# Patient Record
Sex: FEMALE | Race: WHITE | NOT HISPANIC OR LATINO | Employment: OTHER | URBAN - METROPOLITAN AREA
[De-identification: names, ages, dates, MRNs, and addresses within clinical notes are randomized per-mention and may not be internally consistent; named-entity substitution may affect disease eponyms.]

---

## 2017-03-07 RX ORDER — LISINOPRIL 5 MG/1
TABLET ORAL
Qty: 90 TABLET | Refills: 3 | Status: SHIPPED | OUTPATIENT
Start: 2017-03-07 | End: 2017-12-20 | Stop reason: SDUPTHER

## 2017-03-13 ENCOUNTER — PATIENT MESSAGE (OUTPATIENT)
Dept: INTERNAL MEDICINE | Facility: CLINIC | Age: 67
End: 2017-03-13

## 2017-03-22 ENCOUNTER — PATIENT OUTREACH (OUTPATIENT)
Dept: ADMINISTRATIVE | Facility: HOSPITAL | Age: 67
End: 2017-03-22

## 2017-03-22 ENCOUNTER — LAB VISIT (OUTPATIENT)
Dept: LAB | Facility: HOSPITAL | Age: 67
End: 2017-03-22
Attending: INTERNAL MEDICINE
Payer: MEDICARE

## 2017-03-22 PROCEDURE — 83036 HEMOGLOBIN GLYCOSYLATED A1C: CPT

## 2017-03-22 PROCEDURE — 36415 COLL VENOUS BLD VENIPUNCTURE: CPT | Mod: PO

## 2017-03-23 LAB
ESTIMATED AVG GLUCOSE: 189 MG/DL
HBA1C MFR BLD HPLC: 8.2 %

## 2017-04-04 ENCOUNTER — OFFICE VISIT (OUTPATIENT)
Dept: INTERNAL MEDICINE | Facility: CLINIC | Age: 67
End: 2017-04-04
Payer: MEDICARE

## 2017-04-04 VITALS
SYSTOLIC BLOOD PRESSURE: 104 MMHG | WEIGHT: 243.81 LBS | BODY MASS INDEX: 36.95 KG/M2 | HEIGHT: 68 IN | OXYGEN SATURATION: 97 % | DIASTOLIC BLOOD PRESSURE: 68 MMHG | TEMPERATURE: 97 F | HEART RATE: 77 BPM

## 2017-04-04 DIAGNOSIS — E11.69 HYPERLIPIDEMIA ASSOCIATED WITH TYPE 2 DIABETES MELLITUS: ICD-10-CM

## 2017-04-04 DIAGNOSIS — G47.33 OSA ON CPAP: ICD-10-CM

## 2017-04-04 DIAGNOSIS — E78.5 HYPERLIPIDEMIA ASSOCIATED WITH TYPE 2 DIABETES MELLITUS: ICD-10-CM

## 2017-04-04 DIAGNOSIS — E03.9 ACQUIRED HYPOTHYROIDISM: ICD-10-CM

## 2017-04-04 DIAGNOSIS — Z29.9 PREVENTIVE MEASURE: ICD-10-CM

## 2017-04-04 PROCEDURE — 99499 UNLISTED E&M SERVICE: CPT | Mod: S$GLB,,, | Performed by: INTERNAL MEDICINE

## 2017-04-04 PROCEDURE — 1160F RVW MEDS BY RX/DR IN RCRD: CPT | Mod: S$GLB,,, | Performed by: INTERNAL MEDICINE

## 2017-04-04 PROCEDURE — 90471 IMMUNIZATION ADMIN: CPT | Mod: S$GLB,,, | Performed by: INTERNAL MEDICINE

## 2017-04-04 PROCEDURE — 3078F DIAST BP <80 MM HG: CPT | Mod: S$GLB,,, | Performed by: INTERNAL MEDICINE

## 2017-04-04 PROCEDURE — 1157F ADVNC CARE PLAN IN RCRD: CPT | Mod: S$GLB,,, | Performed by: INTERNAL MEDICINE

## 2017-04-04 PROCEDURE — 1159F MED LIST DOCD IN RCRD: CPT | Mod: S$GLB,,, | Performed by: INTERNAL MEDICINE

## 2017-04-04 PROCEDURE — 90632 HEPA VACCINE ADULT IM: CPT | Mod: S$GLB,,, | Performed by: INTERNAL MEDICINE

## 2017-04-04 PROCEDURE — 3045F PR MOST RECENT HEMOGLOBIN A1C LEVEL 7.0-9.0%: CPT | Mod: S$GLB,,, | Performed by: INTERNAL MEDICINE

## 2017-04-04 PROCEDURE — 1126F AMNT PAIN NOTED NONE PRSNT: CPT | Mod: S$GLB,,, | Performed by: INTERNAL MEDICINE

## 2017-04-04 PROCEDURE — 4010F ACE/ARB THERAPY RXD/TAKEN: CPT | Mod: S$GLB,,, | Performed by: INTERNAL MEDICINE

## 2017-04-04 PROCEDURE — 3074F SYST BP LT 130 MM HG: CPT | Mod: S$GLB,,, | Performed by: INTERNAL MEDICINE

## 2017-04-04 PROCEDURE — 99999 PR PBB SHADOW E&M-EST. PATIENT-LVL III: CPT | Mod: PBBFAC,,, | Performed by: INTERNAL MEDICINE

## 2017-04-04 PROCEDURE — 99214 OFFICE O/P EST MOD 30 MIN: CPT | Mod: 25,S$GLB,, | Performed by: INTERNAL MEDICINE

## 2017-04-04 RX ORDER — SODIUM, POTASSIUM,MAG SULFATES 17.5-3.13G
1 SOLUTION, RECONSTITUTED, ORAL ORAL DAILY
Qty: 1 BOTTLE | Refills: 0 | Status: SHIPPED | OUTPATIENT
Start: 2017-04-04 | End: 2017-04-04 | Stop reason: SDUPTHER

## 2017-04-04 RX ORDER — SODIUM, POTASSIUM,MAG SULFATES 17.5-3.13G
1 SOLUTION, RECONSTITUTED, ORAL ORAL DAILY
Qty: 1 BOTTLE | Refills: 0 | Status: ON HOLD | OUTPATIENT
Start: 2017-04-04 | End: 2017-05-02 | Stop reason: HOSPADM

## 2017-04-04 RX ORDER — METFORMIN HYDROCHLORIDE 500 MG/1
500 TABLET, EXTENDED RELEASE ORAL
Qty: 30 TABLET | Refills: 11 | Status: SHIPPED | OUTPATIENT
Start: 2017-04-04 | End: 2017-05-25 | Stop reason: SDUPTHER

## 2017-04-04 NOTE — PROGRESS NOTES
"Subjective:       Patient ID: Jaimie Luque is a 67 y.o. female.    Chief Complaint: Follow-up    HPI Comments: Here for follow up of medical problems.  Has been walking more lately.  AC breakfast 150s.  Lantus 10u daily, victoza, glimepiride, invokana.  Intol of regular metformin in the past but willing to try XR.  Energy good.  BMs normal.  No cp/sob/palp.    Updated/ annual due 6/17:  HM: 4/17 today HAV#2, 9/16 fluvax, 3/15 [id number], 3/14 pdbiel56, 9/09 TDaP, 11/12 zoster, 2/12 BMD rep 3-5y, 2/07 Cscope rep 10y, 6/16 mmg, 2/17 Eye Dr. Franco, 9/16 HCV neg.        Review of Systems   Constitutional: Negative for chills, diaphoresis and fever.   Respiratory: Negative for cough and shortness of breath.    Cardiovascular: Negative for chest pain, palpitations and leg swelling.   Gastrointestinal: Negative for blood in stool, constipation, diarrhea, nausea and vomiting.   Genitourinary: Negative for dysuria, frequency and hematuria.   Psychiatric/Behavioral: The patient is not nervous/anxious.        Objective:   /68 (BP Location: Right arm, Patient Position: Sitting, BP Method: Manual)  Pulse 77  Temp 96.8 °F (36 °C) (Tympanic)   Ht 5' 7.5" (1.715 m)  Wt 110.6 kg (243 lb 13.3 oz)  SpO2 97%  BMI 37.63 kg/m2    Physical Exam   Constitutional: She is oriented to person, place, and time. She appears well-developed.   HENT:   Mouth/Throat: Oropharynx is clear and moist.   Neck: Neck supple. Carotid bruit is not present. No thyroid mass present.   Cardiovascular: Normal rate, regular rhythm and intact distal pulses.  Exam reveals no gallop and no friction rub.    No murmur heard.  Pulmonary/Chest: Effort normal and breath sounds normal. She has no wheezes. She has no rales.   Abdominal: Soft. Bowel sounds are normal. She exhibits no mass. There is no hepatosplenomegaly. There is no tenderness.   Musculoskeletal: She exhibits no edema.   Lymphadenopathy:     She has no cervical adenopathy.   Neurological: " She is alert and oriented to person, place, and time.   Psychiatric: She has a normal mood and affect.     Results for NORMA HILLS (MRN [id number]) as of 4/4/2017 08:42   Ref. Range 12/14/2016 09:09 12/20/2016 16:32 3/22/2017 08:30   Hemoglobin A1C Latest Ref Range: 4.5 - 6.2 % 7.7 (H)  8.2 (H)   Estimated Avg Glucose Latest Ref Range: 68 - 131 mg/dL 174 (H)  189 (H)     Assessment:       1. Uncontrolled type 2 diabetes mellitus without complication, with long-term current use of insulin    2. Hyperlipidemia associated with type 2 diabetes mellitus    3. Acquired hypothyroidism    4. TALIA on CPAP    5. Preventive measure        Plan:       Norma was seen today for follow-up.    Diagnoses and all orders for this visit:    Uncontrolled type 2 diabetes mellitus without complication, with long-term current use of insulin- try XR metformin, incr exercise.  -     Hemoglobin A1c; Future  -     Microalbumin/creatinine urine ratio; Future    Hyperlipidemia associated with type 2 diabetes mellitus- on statin, check lab.    Acquired hypothyroidism- check lab.  -     TSH; Future    TALIA on CPAP- doing well.    Preventive measure- due in 3mo:  -     Hemoglobin A1c; Future  -     Comprehensive metabolic panel; Future  -     Lipid panel; Future  -     Mammo Digital Screening Bilat with CAD; Future  -     CBC auto differential; Future  -     TSH; Future  -     Vitamin D; Future  -     Microalbumin/creatinine urine ratio; Future  -     Case request GI: COLONOSCOPY  -     sodium,potassium,mag sulfates (SUPREP BOWEL PREP KIT) 17.5-3.13-1.6 gram SolR; Take 1 kit by mouth once daily.  -     Hepatitis A Vaccine (Adult) (IM)

## 2017-04-04 NOTE — MR AVS SNAPSHOT
[facility]  [address]  [facility] [address]  Phone: [phone]  Fax: [phone]                  Jaimie Luque   2017 8:40 AM   Office Visit    Description:  Female : 1950   Provider:  Jessica Ward MD   Department:  [facility] - Internal [facility]           Reason for Visit     Follow-up           Diagnoses this Visit        Comments    Uncontrolled type 2 diabetes mellitus without complication, with long-term current use of insulin    -  Primary     Hyperlipidemia associated with type 2 diabetes mellitus         Acquired hypothyroidism         TALIA on CPAP         Preventive measure                To Do List           Future Appointments        Provider Department Dept Phone    2017 8:45 AM [facility] MAMMO1-SCR [facility] [phone]    2017 7:00 AM SPECIMEN, [facility] [phone]    2017 8:20 AM LABORATORY, [facility] [phone]    2017 8:40 AM Jessica Ward MD [facility] [phone]    10/23/2017 10:20 AM Elizabeth Lejeune, NP [facility] [phone]      Goals (5 Years of Data)     None      Follow-Up and Disposition     Return in about 3 months (around 2017).       These Medications        Disp Refills Start End    metformin (GLUCOPHAGE-XR) 500 MG 24 hr tablet 30 tablet 11 2017     Take 1 tablet (500 mg total) by mouth daily with breakfast. - Oral    Pharmacy: [facility] [id number] [place] [address] [facility] Ph #: [phone]       sodium,potassium,mag sulfates (SUPREP BOWEL PREP KIT) 17.5-3.13-1.6 gram SolR 1 Bottle 0 2017     Take 1 kit by mouth once daily. - Oral    Pharmacy: [facility] [address] [facility] Ph #: [phone]         [facility]     [facility] Care [facility] -  Assistance  Unless otherwise directed by your provider,  "please contact [facility], our nurse care line that is available for 24/7 assistance.     Registered nurses in the [facility] provide: appointment scheduling, clinical advisement, health education, and other advisory services.  Call: [phone] (toll free)               Medications           Message regarding Medications     Verify the changes and/or additions to your medication regime listed below are the same as discussed with your clinician today.  If any of these changes or additions are incorrect, please notify your healthcare provider.        START taking these NEW medications        Refills    metformin (GLUCOPHAGE-XR) 500 MG 24 hr tablet 11    Sig: Take 1 tablet (500 mg total) by mouth daily with breakfast.    Class: Normal    Route: Oral    sodium,potassium,mag sulfates (SUPREP BOWEL PREP KIT) 17.5-3.13-1.6 gram SolR 0    Sig: Take 1 kit by mouth once daily.    Class: Normal    Route: Oral           Verify that the below list of medications is an accurate representation of the medications you are currently taking.  If none reported, the list may be blank. If incorrect, please contact your healthcare provider. Carry this list with you in case of emergency.           Current Medications     aspirin 81 mg Tab Take by mouth. 1 Tablet Oral Every day    atorvastatin (LIPITOR) 20 MG tablet TAKE 1 TABLET BY MOUTH EVERY DAY    BD ULTRA-FINE LAURA PEN NEEDLES 32 gauge x 5/32" Ndle USE THREE TIMES DAILY    blood sugar diagnostic Strp TRUEresult Matrix Blood Glucose Monitoring System  Dx:  E11.9   Medically Necessary    cholecalciferol, vitamin D3, 2,000 unit Tab Take by mouth. 1 Tablet Oral Every day    gabapentin (NEURONTIN) 300 MG capsule TAKE ONE CAPSULE BY MOUTH THREE TIMES DAILY    glimepiride (AMARYL) 2 MG tablet Take 1 tablet (2 mg total) by mouth 2 (two) times daily before meals. Take two tablets in AM, two tablets in PM before meals    INVOKANA 300 mg Tab TAKE 1 TABLET BY MOUTH EVERY DAY " "   lancets (ACCU-CHEK SOFTCLIX LANCETS) Misc Accuchek Multiclix drum lancets, [facility]  Dx:  E11.9    LANTUS SOLOSTAR 100 unit/mL (3 mL) InPn pen INJECT 10 UNITS UNDER THE SKIN EVERY DAY    levothyroxine (SYNTHROID) 112 MCG tablet Take 1 tablet (112 mcg total) by mouth once daily.    liraglutide 0.6 mg/0.1 mL, 18 mg/3 mL, subq PNIJ (VICTOZA 3-ANATOLIY) 0.6 mg/0.1 mL (18 mg/3 mL) PnIj Inject 1.8 mg into the skin Daily.    lisinopril (PRINIVIL,ZESTRIL) 5 MG tablet TAKE 1 TABLET BY MOUTH EVERY DAY    loratadine (CLARITIN) 10 mg tablet Take 10 mg by mouth.    meclizine (ANTIVERT) 25 mg tablet Take 1 tablet by mouth as needed.     metformin (GLUCOPHAGE-XR) 500 MG 24 hr tablet Take 1 tablet (500 mg total) by mouth daily with breakfast.    sodium,potassium,mag sulfates (SUPREP BOWEL PREP KIT) 17.5-3.13-1.6 gram SolR Take 1 kit by mouth once daily.           Clinical Reference Information           Your Vitals Were     BP Pulse Temp Height    104/68 (BP Location: Right arm, Patient Position: Sitting, BP Method: Manual) 77 96.8 °F (36 °C) (Tympanic) 5' 7.5" (1.715 m)    Weight SpO2 BMI    110.6 kg (243 lb 13.3 oz) 97% 37.63 kg/m2      Blood Pressure          Most Recent Value    BP  104/68      Allergies as of 4/4/2017     Adhesive Tape-silicones    Penicillins      Immunizations Administered on Date of Encounter - 4/4/2017     Name Date Dose VIS Date Route    HEPATITIS A 4/4/2017 1 mL 7/20/2016 Intramuscular      Orders Placed During Today's Visit      Normal Orders This Visit    Case request GI: COLONOSCOPY     Hepatitis A Vaccine (Adult) (IM)     Future Labs/Procedures Expected by Expires    CBC auto differential  4/4/2017 4/4/2018    Comprehensive metabolic panel  4/4/2017 4/4/2018    Hemoglobin A1c  4/4/2017 6/3/2018    Lipid panel  4/4/2017 4/4/2018    Mammo Digital Screening Bilat with CAD  4/4/2017 4/4/2018    Microalbumin/creatinine urine ratio  4/4/2017 6/3/2018    TSH  4/4/2017 4/4/2018    Vitamin D  As directed " 4/4/2018      Language Assistance Services     ATTENTION: Language assistance services are available, free of charge. Please call [phone].      ATENCIÓN: Si habla walt, tiene a calderon disposición servicios gratuitos de asistencia lingüística. Llame al [phone].     CHÚ Ý: N?u b?n nói Ti?ng Vi?t, có các d?ch v? h? tr? ngôn ng? mi?n phí dành cho b?n. G?i s? [phone].         [facility] complies with applicable Federal civil rights laws and does not discriminate on the basis of race, color, national origin, age, disability, or sex.

## 2017-05-02 ENCOUNTER — ANESTHESIA EVENT (OUTPATIENT)
Dept: ENDOSCOPY | Facility: HOSPITAL | Age: 67
End: 2017-05-02
Payer: MEDICARE

## 2017-05-02 ENCOUNTER — SURGERY (OUTPATIENT)
Age: 67
End: 2017-05-02

## 2017-05-02 ENCOUNTER — HOSPITAL ENCOUNTER (OUTPATIENT)
Facility: HOSPITAL | Age: 67
Discharge: HOME | End: 2017-05-02
Attending: INTERNAL MEDICINE | Admitting: INTERNAL MEDICINE
Payer: MEDICARE

## 2017-05-02 ENCOUNTER — ANESTHESIA (OUTPATIENT)
Dept: ENDOSCOPY | Facility: HOSPITAL | Age: 67
End: 2017-05-02
Payer: MEDICARE

## 2017-05-02 VITALS
HEART RATE: 66 BPM | WEIGHT: 237 LBS | HEIGHT: 68 IN | TEMPERATURE: 98 F | SYSTOLIC BLOOD PRESSURE: 107 MMHG | RESPIRATION RATE: 18 BRPM | BODY MASS INDEX: 35.92 KG/M2 | OXYGEN SATURATION: 99 % | DIASTOLIC BLOOD PRESSURE: 72 MMHG

## 2017-05-02 DIAGNOSIS — B02.29 PHN (POSTHERPETIC NEURALGIA): ICD-10-CM

## 2017-05-02 DIAGNOSIS — E11.69 HYPERLIPIDEMIA ASSOCIATED WITH TYPE 2 DIABETES MELLITUS: ICD-10-CM

## 2017-05-02 DIAGNOSIS — E78.5 HYPERLIPIDEMIA ASSOCIATED WITH TYPE 2 DIABETES MELLITUS: ICD-10-CM

## 2017-05-02 DIAGNOSIS — K57.30 DIVERTICULOSIS OF LARGE INTESTINE WITHOUT HEMORRHAGE: ICD-10-CM

## 2017-05-02 DIAGNOSIS — Z12.11 COLON CANCER SCREENING: Primary | ICD-10-CM

## 2017-05-02 DIAGNOSIS — L40.9 PSORIASIS: ICD-10-CM

## 2017-05-02 DIAGNOSIS — G47.33 OSA ON CPAP: ICD-10-CM

## 2017-05-02 DIAGNOSIS — E66.9 OBESITY (BMI 35.0-39.9 WITHOUT COMORBIDITY): ICD-10-CM

## 2017-05-02 DIAGNOSIS — E03.9 ACQUIRED HYPOTHYROIDISM: ICD-10-CM

## 2017-05-02 LAB — POCT GLUCOSE: 143 MG/DL (ref 70–110)

## 2017-05-02 PROCEDURE — 25000003 PHARM REV CODE 250: Performed by: NURSE ANESTHETIST, CERTIFIED REGISTERED

## 2017-05-02 PROCEDURE — 37000008 HC ANESTHESIA 1ST 15 MINUTES: Performed by: INTERNAL MEDICINE

## 2017-05-02 PROCEDURE — 25000003 PHARM REV CODE 250: Performed by: INTERNAL MEDICINE

## 2017-05-02 PROCEDURE — G0105 COLORECTAL SCRN; HI RISK IND: HCPCS | Performed by: INTERNAL MEDICINE

## 2017-05-02 PROCEDURE — 63600175 PHARM REV CODE 636 W HCPCS: Performed by: NURSE ANESTHETIST, CERTIFIED REGISTERED

## 2017-05-02 PROCEDURE — 82962 GLUCOSE BLOOD TEST: CPT | Performed by: INTERNAL MEDICINE

## 2017-05-02 PROCEDURE — 37000009 HC ANESTHESIA EA ADD 15 MINS: Performed by: INTERNAL MEDICINE

## 2017-05-02 PROCEDURE — G0105 COLORECTAL SCRN; HI RISK IND: HCPCS | Mod: ,,, | Performed by: INTERNAL MEDICINE

## 2017-05-02 RX ORDER — LIDOCAINE HCL/PF 100 MG/5ML
SYRINGE (ML) INTRAVENOUS
Status: DISCONTINUED | OUTPATIENT
Start: 2017-05-02 | End: 2017-05-02

## 2017-05-02 RX ORDER — PROPOFOL 10 MG/ML
INJECTION, EMULSION INTRAVENOUS
Status: DISCONTINUED | OUTPATIENT
Start: 2017-05-02 | End: 2017-05-02

## 2017-05-02 RX ORDER — SODIUM CHLORIDE, SODIUM LACTATE, POTASSIUM CHLORIDE, CALCIUM CHLORIDE 600; 310; 30; 20 MG/100ML; MG/100ML; MG/100ML; MG/100ML
INJECTION, SOLUTION INTRAVENOUS CONTINUOUS
Status: DISCONTINUED | OUTPATIENT
Start: 2017-05-02 | End: 2017-05-02 | Stop reason: HOSPADM

## 2017-05-02 RX ORDER — SODIUM CHLORIDE, SODIUM LACTATE, POTASSIUM CHLORIDE, CALCIUM CHLORIDE 600; 310; 30; 20 MG/100ML; MG/100ML; MG/100ML; MG/100ML
INJECTION, SOLUTION INTRAVENOUS CONTINUOUS PRN
Status: DISCONTINUED | OUTPATIENT
Start: 2017-05-02 | End: 2017-05-02

## 2017-05-02 RX ADMIN — SODIUM CHLORIDE, SODIUM LACTATE, POTASSIUM CHLORIDE, AND CALCIUM CHLORIDE: .6; .31; .03; .02 INJECTION, SOLUTION INTRAVENOUS at 12:05

## 2017-05-02 RX ADMIN — SODIUM CHLORIDE, SODIUM LACTATE, POTASSIUM CHLORIDE, AND CALCIUM CHLORIDE: 600; 310; 30; 20 INJECTION, SOLUTION INTRAVENOUS at 01:05

## 2017-05-02 RX ADMIN — PROPOFOL 30 MG: 10 INJECTION, EMULSION INTRAVENOUS at 02:05

## 2017-05-02 RX ADMIN — LIDOCAINE HYDROCHLORIDE 50 MG: 20 INJECTION, SOLUTION INTRAVENOUS at 01:05

## 2017-05-02 RX ADMIN — PROPOFOL 30 MG: 10 INJECTION, EMULSION INTRAVENOUS at 01:05

## 2017-05-02 RX ADMIN — PROPOFOL 130 MG: 10 INJECTION, EMULSION INTRAVENOUS at 01:05

## 2017-05-02 NOTE — IP AVS SNAPSHOT
[address] [place] [address]           Patient Discharge Instructions   Our goal is to set you up for success. This packet includes information on your condition, medications, and your home care.  It will help you care for yourself to prevent having to return to the hospital.     Please ask your nurse if you have any questions.      There are many details to remember when preparing to leave the hospital. Here is what you will need to do:    1. Take your medicine. If you are prescribed medications, review your Medication List on the following pages. You may have new medications to  at the pharmacy and others that you'll need to stop taking. Review the instructions for how and when to take your medications. Talk with your doctor or nurses if you are unsure of what to do.     2. Go to your follow-up appointments. Specific follow-up information is listed in the following pages. Your may be contacted by a nurse or clinical provider about future appointments. Be sure we have all of the phone numbers to reach you. Please contact your provider's office if you are unable to make an appointment.     3. Watch for warning signs. Your doctor or nurse will give you detailed warning signs to watch for and when to call for assistance. These instructions may also include educational information about your condition. If you experience any of warning signs to your health, call your doctor.               ** Verify the list of medication(s) below is accurate and up to date. Carry this with you in case of emergency. If your medications have changed, please notify your healthcare provider.             Medication List      ASK your doctor about these medications        Additional Info                      aspirin 81 mg Tab   Refills:  0    Instructions:  Take by mouth. 1 Tablet Oral Every day     Begin Date    AM    Noon    PM    Bedtime       atorvastatin 20 MG tablet   Commonly  "known as:  LIPITOR   Quantity:  90 tablet   Refills:  3    Instructions:  TAKE 1 TABLET BY MOUTH EVERY DAY     Begin Date    AM    Noon    PM    Bedtime       BD ULTRA-FINE LAURA PEN NEEDLES 32 gauge x 5/32" Ndle   Quantity:  300 each   Refills:  0   Generic drug:  pen needle, diabetic    Instructions:  USE THREE TIMES DAILY     Begin Date    AM    Noon    PM    Bedtime       blood sugar diagnostic Strp   Quantity:  1 each   Refills:  3    Instructions:  TRUEresult Matrix Blood Glucose Monitoring System  Dx:  E11.9   Medically Necessary     Begin Date    AM    Noon    PM    Bedtime       cholecalciferol (vitamin D3) 2,000 unit Tab   Refills:  0    Instructions:  Take by mouth. 1 Tablet Oral Every day     Begin Date    AM    Noon    PM    Bedtime       gabapentin 300 MG capsule   Commonly known as:  NEURONTIN   Quantity:  270 capsule   Refills:  4    Instructions:  TAKE ONE CAPSULE BY MOUTH THREE TIMES DAILY     Begin Date    AM    Noon    PM    Bedtime       glimepiride 2 MG tablet   Commonly known as:  AMARYL   Quantity:  360 tablet   Refills:  3   Dose:  2 mg    Instructions:  Take 1 tablet (2 mg total) by mouth 2 (two) times daily before meals. Take two tablets in AM, two tablets in PM before meals     Begin Date    AM    Noon    PM    Bedtime       INVOKANA 300 mg Tab tablet   Quantity:  90 tablet   Refills:  3   Generic drug:  canagliflozin    Instructions:  TAKE 1 TABLET BY MOUTH EVERY DAY     Begin Date    AM    Noon    PM    Bedtime       lancets Misc   Commonly known as:  ACCU-CHEK SOFTCLIX LANCETS   Quantity:  306 each   Refills:  3    Instructions:  Accuchek Multiclix drum lancets, [employer]  Dx:  E11.9     Begin Date    AM    Noon    PM    Bedtime       LANTUS SOLOSTAR 100 unit/mL (3 mL) Inpn pen   Quantity:  15 mL   Refills:  4   Generic drug:  insulin glargine    Instructions:  INJECT 10 UNITS UNDER THE SKIN EVERY DAY     Begin Date    AM    Noon    PM    Bedtime       levothyroxine 112 MCG tablet "   Commonly known as:  SYNTHROID   Quantity:  90 tablet   Refills:  3   Dose:  112 mcg    Instructions:  Take 1 tablet (112 mcg total) by mouth once daily.     Begin Date    AM    Noon    PM    Bedtime       liraglutide 0.6 mg/0.1 mL (18 mg/3 mL) subq PNIJ 0.6 mg/0.1 mL (18 mg/3 mL) Pnij   Commonly known as:  VICTOZA 3-ANATOLIY   Quantity:  27 mL   Refills:  3   Dose:  1.8 mg    Instructions:  Inject 1.8 mg into the skin Daily.     Begin Date    AM    Noon    PM    Bedtime       lisinopril 5 MG tablet   Commonly known as:  PRINIVIL,ZESTRIL   Quantity:  90 tablet   Refills:  3    Instructions:  TAKE 1 TABLET BY MOUTH EVERY DAY     Begin Date    AM    Noon    PM    Bedtime       loratadine 10 mg tablet   Commonly known as:  CLARITIN   Refills:  0   Dose:  10 mg    Instructions:  Take 10 mg by mouth.     Begin Date    AM    Noon    PM    Bedtime       meclizine 25 mg tablet   Commonly known as:  ANTIVERT   Refills:  0   Dose:  1 tablet    Instructions:  Take 1 tablet by mouth as needed.     Begin Date    AM    Noon    PM    Bedtime       metformin 500 MG 24 hr tablet   Commonly known as:  GLUCOPHAGE-XR   Quantity:  30 tablet   Refills:  11   Dose:  500 mg    Instructions:  Take 1 tablet (500 mg total) by mouth daily with breakfast.     Begin Date    AM    Noon    PM    Bedtime       sodium,potassium,mag sulfates 17.5-3.13-1.6 gram Solr   Commonly known as:  SUPREP BOWEL PREP KIT   Quantity:  1 Bottle   Refills:  0   Dose:  1 kit    Instructions:  Take 1 kit by mouth once daily.     Begin Date    AM    Noon    PM    Bedtime                  Please bring to all follow up appointments:    1. A copy of your discharge instructions.  2. All medicines you are currently taking in their original bottles.  3. Identification and insurance card.    Please arrive 15 minutes ahead of scheduled appointment time.    Please call 24 hours in advance if you must reschedule your appointment and/or time.        Your Scheduled Appointments      Jun 16, 2017  8:45 AM CDT   Mammo Screening with [facility] [id number]   [facility]    [address] [facility] [address] [place] [address]   [phone]            Jul 05, 2017  7:00 AM CDT   Urine with SPECIMEN, [facility]    [address] [place] [address]   [phone]            Jul 05, 2017  8:20 AM CDT   Fasting Lab with LABORATORY, [facility]    [address] [facility] [place] [address]   [phone]            Jul 12, 2017  8:40 AM CDT   Established Patient Visit with Jessica Ward MD   [facility] ([facility])    [address] [facility] [place] [address]   [phone]            Oct 23, 2017 10:20 AM CDT   Established Patient Visit with Elizabeth Lejeune, NP   [facility] ([facility])    [address] [facility] [facility] [place] [address]   [phone]                  Discharge Instructions         Diverticulosis    Diverticulosis means that small pouches have formed in the wall of your large intestine (colon). Most often, this problem causes no symptoms and is common as people age. But the pouches in the colon are at risk of becoming infected. When this happens, the condition is called diverticulitis. Although most people with diverticulosis never develop diverticulitis, it is still not uncommon. Rectal bleeding can also occur and in less common situations, a type of colon inflammation called colitis.  While most people do not have symptoms, some people with diverticulosis may have:  · Abdominal cramps and pain  · Bloating  · Constipation  · Change in bowel habits  Causes  The exact cause of diverticulosis (and diverticulitis) has not been proved, but a few things are associated with the condition:  · Low-fiber diet  · Constipation  · Lack of exercise  Your healthcare provider will talk with you about how to manage your condition. Diet changes may be all  that are needed to help control diverticulosis and prevent progression to diverticulitis. If you develop diverticulitis, you will likely need other treatments.  Home care  You may be told to take fiber supplements daily. Fiber adds bulk to the stool so that it passes through the colon more easily. Stool softeners may be recommended. You may also be given medications for pain relief. Be sure to take all medications as directed.  In the past, people were told to avoid corn, nuts, and seeds. This is no longer necessary.  Follow these guidelines when caring for yourself at home:  · Eat unprocessed foods that are high in fiber. Whole grains, fruits, and vegetables are good choices.  · Drink 6 to 8 glasses of water every day unless your healthcare provider has you limit how much fluid you should have.  · Watch for changes in your bowel movements. Tell your provider if you notice any changes.  · Begin an exercise program. Ask your provider how to get started. Generally, walking is the best.  · Get plenty of rest and sleep.  Follow-up care  Follow up with your healthcare provider, or as advised. Regular visits may be needed to check on your health. Sometimes special procedures such as colonoscopy, are needed after an episode of diverticulitis or blooding. Be sure to keep all your appointments.  If a stool sample was taken, or cultures were done, you should be told if they are positive, or if your treatment needs to be changed. You can call as directed for the results.  If X-rays were done, a radiologist will look at them. You will be told if there is a change in your treatment.  If antibiotics were prescribed, be sure to finish them all.  When to seek medical advice  Call your healthcare provider right away if any of these occur:  · Fever of 100.4°F (38°C) or higher, or as directed by your healthcare provider  · Severe cramps in the lower left side of the abdomen or pain that is getting worse  · Tenderness in the lower left  "side of the abdomen or worsening pain throughout the abdomen  · Diarrhea or constipation that doesn't get better within 24 hours  · Nausea and vomiting  · Bleeding from the rectum  Call 911  Call emergency services if any of the following occur:  · Trouble breathing  · Confusion  · Very drowsy or trouble awakening  · Fainting or loss of consciousness  · Rapid heart rate  · Chest pain  Date Last Reviewed: 12/30/2015  © 7577-2361 [employer]. [address] [place] [address], [place] [address]. All rights reserved. This information is not intended as a substitute for professional medical care. Always follow your healthcare professional's instructions.            Admission Information     Date & Time Provider Department CSN    5/2/2017  9:56 AM Noe Penn III, MD [facility] -  [id number]      Care Providers     Provider Role Specialty Primary office phone    Noe Penn III, MD Attending Provider Gastroenterology [phone]    Noe Penn III, MD Surgeon  Gastroenterology [phone]      Your Vitals Were     BP Pulse Temp Resp Height Weight    103/41 (BP Location: Left arm, Patient Position: Lying, BP Method: Automatic) 74 97.9 °F (36.6 °C) (Oral) 16 5' 7.5" (1.715 m) 107.5 kg (237 lb)    SpO2 BMI             97% 36.57 kg/m2         Recent Lab Values        5/5/2015 8/11/2015 11/12/2015 2/19/2016 6/15/2016 9/14/2016 12/14/2016 3/22/2017      8:13 AM  8:58 AM  8:09 AM  7:16 AM  8:06 AM  8:29 AM  9:09 AM  8:30 AM    A1C 8.4 (H) 8.3 (H) 7.5 (H) 7.5 (H) 8.0 (H) 8.0 (H) 7.7 (H) 8.2 (H)    Comment for A1C at  8:29 AM on 9/14/2016:  According to ADA guidelines, hemoglobin A1C <7.0% represents  optimal control in non-pregnant diabetic patients.  Different  metrics may apply to specific populations.   Standards of Medical Care in Diabetes - 2016.  For the purpose of screening for the presence of diabetes:  <5.7%     Consistent with the absence of diabetes  5.7-6.4%  Consistent with increasing " risk for diabetes   (prediabetes)  >or=6.5%  Consistent with diabetes  Currently no consensus exists for use of hemoglobin A1C  for diagnosis of diabetes for children.      Comment for A1C at  9:09 AM on 12/14/2016:  According to ADA guidelines, hemoglobin A1C <7.0% represents  optimal control in non-pregnant diabetic patients.  Different  metrics may apply to specific populations.   Standards of Medical Care in Diabetes - 2016.  For the purpose of screening for the presence of diabetes:  <5.7%     Consistent with the absence of diabetes  5.7-6.4%  Consistent with increasing risk for diabetes   (prediabetes)  >or=6.5%  Consistent with diabetes  Currently no consensus exists for use of hemoglobin A1C  for diagnosis of diabetes for children.      Comment for A1C at  8:30 AM on 3/22/2017:  According to ADA guidelines, hemoglobin A1C <7.0% represents  optimal control in non-pregnant diabetic patients.  Different  metrics may apply to specific populations.   Standards of Medical Care in Diabetes - 2016.  For the purpose of screening for the presence of diabetes:  <5.7%     Consistent with the absence of diabetes  5.7-6.4%  Consistent with increasing risk for diabetes   (prediabetes)  >or=6.5%  Consistent with diabetes  Currently no consensus exists for use of hemoglobin A1C  for diagnosis of diabetes for children.        Allergies as of 5/2/2017        Reactions    Adhesive Tape-silicones     Other reaction(s): skin irritation to area    Penicillins       [facility]     [facility] Care Line - 24/7 Assistance  Unless otherwise directed by your provider, please contact [facility], our nurse care line that is available for 24/7 assistance.     Registered nurses in the [facility] provide clinical advisement, health education, appointment booking, and other advisory services.  Call for this free service at [phone].        Advance Directives     An advance directive is a document which,  in the event you are no longer able to make decisions for yourself, tells your healthcare team what kind of treatment you do or do not want to receive, or who you would like to make those decisions for you.  If you do not currently have an advance directive, [facility] encourages you to create one.  For more information call:  [phone]), [phone] ([phone]),  or log on to [email or web address].        Language Assistance Services     ATTENTION: Language assistance services are available, free of charge. Please call [phone].      ATENCIÓN: Si habla español, tiene a calderon disposición servicios gratuitos de asistencia lingüística. Llame al [phone].     CHÚ Ý: N?u b?n nói Ti?ng Vi?t, có các d?ch v? h? tr? ngôn ng? mi?n phí dành cho b?n. G?i s? [phone].        Diabetes Discharge Instructions                                    [facility] complies with applicable Federal civil rights laws and does not discriminate on the basis of race, color, national origin, age, disability, or sex.

## 2017-05-02 NOTE — ANESTHESIA POSTPROCEDURE EVALUATION
"Anesthesia Post Evaluation    Patient: Jaimie Luque    Procedure(s) Performed: Procedure(s) (LRB):  COLONOSCOPY (N/A)    Final Anesthesia Type: MAC  Patient location during evaluation: PACU  Patient participation: Yes- Able to Participate  Level of consciousness: awake and alert and oriented  Post-procedure vital signs: reviewed and stable  Pain management: adequate  Airway patency: patent    Anesthetic complications: no      Cardiovascular status: blood pressure returned to baseline  Respiratory status: unassisted, spontaneous ventilation and room air  Hydration status: euvolemic  Follow-up not needed.        Visit Vitals    BP (!) 103/41 (BP Location: Left arm, Patient Position: Lying, BP Method: Automatic)    Pulse 74    Temp 36.6 °C (97.9 °F) (Oral)    Resp 16    Ht 5' 7.5" (1.715 m)    Wt 107.5 kg (237 lb)    SpO2 97%    Breastfeeding No    BMI 36.57 kg/m2       Pain/Abbey Score: Pain Assessment Performed: Yes (5/2/2017  2:21 PM)  Presence of Pain: denies (5/2/2017  2:21 PM)  Abbey Score: 9 (5/2/2017  2:21 PM)      "

## 2017-05-02 NOTE — TRANSFER OF CARE
"Anesthesia Transfer of Care Note    Patient: Jaimie Luque    Procedure(s) Performed: Procedure(s) (LRB):  COLONOSCOPY (N/A)    Patient location: PACU    Anesthesia Type: MAC    Transport from OR: Transported from OR on room air with adequate spontaneous ventilation    Post pain: adequate analgesia    Post assessment: no apparent anesthetic complications    Post vital signs: stable    Level of consciousness: awake and alert    Nausea/Vomiting: no nausea/vomiting    Complications: none          Last vitals:   Visit Vitals    BP (!) 103/41 (BP Location: Left arm, Patient Position: Lying, BP Method: Automatic)    Pulse 74    Temp 36.6 °C (97.9 °F) (Oral)    Resp 16    Ht 5' 7.5" (1.715 m)    Wt 107.5 kg (237 lb)    SpO2 97%    Breastfeeding No    BMI 36.57 kg/m2     "

## 2017-05-02 NOTE — ANESTHESIA PREPROCEDURE EVALUATION
05/02/2017  Jaimie Luque is a 67 y.o., female.    Anesthesia Evaluation    I have reviewed the Patient Summary Reports.    I have reviewed the Nursing Notes.   I have reviewed the Medications.     Review of Systems  Anesthesia Hx:  No problems with previous Anesthesia    Social:  Non-Smoker, Social Alcohol Use    EENT/Dental:   chronic allergic rhinitis   Cardiovascular:   Exercise tolerance: good hyperlipidemia    Pulmonary:   Sleep Apnea, CPAP    Renal/:  Renal/ Normal     Hepatic/GI:  Hepatic/GI Normal Bowel Prep. 0700 last drink of fluid.   Musculoskeletal:  Musculoskeletal Normal    Neurological:   Neuromuscular Disease,    Endocrine:   Diabetes, type 2, using insulin Hypothyroidism    Dermatological:  Skin Normal    Psych:  Psychiatric Normal           Physical Exam  General:  Well nourished, Obesity    Airway/Jaw/Neck:  Airway Findings: Mallampati: IV                Anesthesia Plan  Type of Anesthesia, risks & benefits discussed:  Anesthesia Type:  MAC  Patient's Preference:   Intra-op Monitoring Plan:   Intra-op Monitoring Plan Comments:   Post Op Pain Control Plan:   Post Op Pain Control Plan Comments:   Induction:   IV  Beta Blocker:         Informed Consent: Patient understands risks and agrees with Anesthesia plan.  Questions answered. Anesthesia consent signed with patient.  ASA Score: 3     Day of Surgery Review of History & Physical: I have interviewed and examined the patient. I have reviewed the patient's H&P dated: 05/02/17. There are no significant changes.  H&P update referred to the surgeon.         Ready For Surgery From Anesthesia Perspective.

## 2017-05-02 NOTE — INTERVAL H&P NOTE
"The patient has been examined and the H&P has been reviewed:  Family History   Problem Relation Age of Onset    Heart disease Father     Melanoma Neg Hx     Lupus Neg Hx      Past Medical History:   Diagnosis Date    Diabetes mellitus type II     Hyperlipidemia     Hypothyroid     TALIA (obstructive sleep apnea)     on CPAP    Osteopenia     Psoriasis      Past Surgical History:   Procedure Laterality Date    HYSTERECTOMY      ROBOTIC ASSISTED HYSTERECTOMY       Social History     Social History    Marital status:      Spouse name: N/A    Number of children: N/A    Years of education: N/A     Occupational History    Not on file.     Social History Main Topics    Smoking status: Never Smoker    Smokeless tobacco: Never Used    Alcohol use Yes      Comment: rare    Drug use: No    Sexual activity: Yes     Partners: Male     Other Topics Concern    Not on file     Social History Narrative    . Lives with spouse. Has 3 children. Patient retired as  for [employer].      Review of patient's allergies indicates:   Allergen Reactions    Adhesive tape-silicones      Other reaction(s): skin irritation to area    Penicillins      No current facility-administered medications on file prior to encounter.      Current Outpatient Prescriptions on File Prior to Encounter   Medication Sig Dispense Refill    aspirin 81 mg Tab Take by mouth. 1 Tablet Oral Every day      atorvastatin (LIPITOR) 20 MG tablet TAKE 1 TABLET BY MOUTH EVERY DAY 90 tablet 3    BD ULTRA-FINE LAURA PEN NEEDLES 32 gauge x 5/32" Ndle USE THREE TIMES DAILY 300 each 0    blood sugar diagnostic Strp TRUEresult Matrix Blood Glucose Monitoring System  Dx:  E11.9   Medically Necessary 1 each 3    cholecalciferol, vitamin D3, 2,000 unit Tab Take by mouth. 1 Tablet Oral Every day      gabapentin (NEURONTIN) 300 MG capsule TAKE ONE CAPSULE BY MOUTH THREE TIMES DAILY 270 capsule 4    glimepiride (AMARYL) 2 MG tablet " Take 1 tablet (2 mg total) by mouth 2 (two) times daily before meals. Take two tablets in AM, two tablets in PM before meals 360 tablet 3    INVOKANA 300 mg Tab TAKE 1 TABLET BY MOUTH EVERY DAY 90 tablet 3    lancets (ACCU-CHEK SOFTCLIX LANCETS) Misc Accuchek Multiclix drum lancets, [facility]  Dx:  E11.9 306 each 3    LANTUS SOLOSTAR 100 unit/mL (3 mL) InPn pen INJECT 10 UNITS UNDER THE SKIN EVERY DAY 15 mL 4    levothyroxine (SYNTHROID) 112 MCG tablet Take 1 tablet (112 mcg total) by mouth once daily. 90 tablet 3    liraglutide 0.6 mg/0.1 mL, 18 mg/3 mL, subq PNIJ (VICTOZA 3-ANATOLIY) 0.6 mg/0.1 mL (18 mg/3 mL) PnIj Inject 1.8 mg into the skin Daily. 27 mL 3    lisinopril (PRINIVIL,ZESTRIL) 5 MG tablet TAKE 1 TABLET BY MOUTH EVERY DAY 90 tablet 3    loratadine (CLARITIN) 10 mg tablet Take 10 mg by mouth.      metformin (GLUCOPHAGE-XR) 500 MG 24 hr tablet Take 1 tablet (500 mg total) by mouth daily with breakfast. 30 tablet 11    sodium,potassium,mag sulfates (SUPREP BOWEL PREP KIT) 17.5-3.13-1.6 gram SolR Take 1 kit by mouth once daily. 1 Bottle 0    meclizine (ANTIVERT) 25 mg tablet Take 1 tablet by mouth as needed.              Anesthesia/Surgery risks, benefits and alternative options discussed and understood by patient/family.          Active Hospital Problems    Diagnosis  POA    Colon cancer screening [Z12.11]  Not Applicable      Resolved Hospital Problems    Diagnosis Date Resolved POA   No resolved problems to display.

## 2017-05-02 NOTE — H&P (VIEW-ONLY)
"Subjective:       Patient ID: Jaimie Luque is a 67 y.o. female.    Chief Complaint: Follow-up    HPI Comments: Here for follow up of medical problems.  Has been walking more lately.  AC breakfast 150s.  Lantus 10u daily, victoza, glimepiride, invokana.  Intol of regular metformin in the past but willing to try XR.  Energy good.  BMs normal.  No cp/sob/palp.    Updated/ annual due 6/17:  HM: 4/17 today HAV#2, 9/16 fluvax, 3/15 suptpv07, 3/14 xcwlfs12, 9/09 TDaP, 11/12 zoster, 2/12 BMD rep 3-5y, 2/07 Cscope rep 10y, 6/16 mmg, 2/17 Eye Dr. Franco, 9/16 HCV neg.        Review of Systems   Constitutional: Negative for chills, diaphoresis and fever.   Respiratory: Negative for cough and shortness of breath.    Cardiovascular: Negative for chest pain, palpitations and leg swelling.   Gastrointestinal: Negative for blood in stool, constipation, diarrhea, nausea and vomiting.   Genitourinary: Negative for dysuria, frequency and hematuria.   Psychiatric/Behavioral: The patient is not nervous/anxious.        Objective:   /68 (BP Location: Right arm, Patient Position: Sitting, BP Method: Manual)  Pulse 77  Temp 96.8 °F (36 °C) (Tympanic)   Ht 5' 7.5" (1.715 m)  Wt 110.6 kg (243 lb 13.3 oz)  SpO2 97%  BMI 37.63 kg/m2    Physical Exam   Constitutional: She is oriented to person, place, and time. She appears well-developed.   HENT:   Mouth/Throat: Oropharynx is clear and moist.   Neck: Neck supple. Carotid bruit is not present. No thyroid mass present.   Cardiovascular: Normal rate, regular rhythm and intact distal pulses.  Exam reveals no gallop and no friction rub.    No murmur heard.  Pulmonary/Chest: Effort normal and breath sounds normal. She has no wheezes. She has no rales.   Abdominal: Soft. Bowel sounds are normal. She exhibits no mass. There is no hepatosplenomegaly. There is no tenderness.   Musculoskeletal: She exhibits no edema.   Lymphadenopathy:     She has no cervical adenopathy.   Neurological: " She is alert and oriented to person, place, and time.   Psychiatric: She has a normal mood and affect.     Results for NORMA HILLS (MRN [id number]) as of 4/4/2017 08:42   Ref. Range 12/14/2016 09:09 12/20/2016 16:32 3/22/2017 08:30   Hemoglobin A1C Latest Ref Range: 4.5 - 6.2 % 7.7 (H)  8.2 (H)   Estimated Avg Glucose Latest Ref Range: 68 - 131 mg/dL 174 (H)  189 (H)     Assessment:       1. Uncontrolled type 2 diabetes mellitus without complication, with long-term current use of insulin    2. Hyperlipidemia associated with type 2 diabetes mellitus    3. Acquired hypothyroidism    4. TALIA on CPAP    5. Preventive measure        Plan:       Norma was seen today for follow-up.    Diagnoses and all orders for this visit:    Uncontrolled type 2 diabetes mellitus without complication, with long-term current use of insulin- try XR metformin, incr exercise.  -     Hemoglobin A1c; Future  -     Microalbumin/creatinine urine ratio; Future    Hyperlipidemia associated with type 2 diabetes mellitus- on statin, check lab.    Acquired hypothyroidism- check lab.  -     TSH; Future    TALIA on CPAP- doing well.    Preventive measure- due in 3mo:  -     Hemoglobin A1c; Future  -     Comprehensive metabolic panel; Future  -     Lipid panel; Future  -     Mammo Digital Screening Bilat with CAD; Future  -     CBC auto differential; Future  -     TSH; Future  -     Vitamin D; Future  -     Microalbumin/creatinine urine ratio; Future  -     Case request GI: COLONOSCOPY  -     sodium,potassium,mag sulfates (SUPREP BOWEL PREP KIT) 17.5-3.13-1.6 gram SolR; Take 1 kit by mouth once daily.  -     Hepatitis A Vaccine (Adult) (IM)

## 2017-05-02 NOTE — ANESTHESIA RELEASE NOTE
"Anesthesia Release from PACU Note    Patient: Jaimie Luque    Procedure(s) Performed: Procedure(s) (LRB):  COLONOSCOPY (N/A)    Anesthesia type: MAC    Post pain: Adequate analgesia    Post assessment: no apparent anesthetic complications, tolerated procedure well and no evidence of recall    Last Vitals:   Visit Vitals    BP (!) 103/41 (BP Location: Left arm, Patient Position: Lying, BP Method: Automatic)    Pulse 74    Temp 36.6 °C (97.9 °F) (Oral)    Resp 16    Ht 5' 7.5" (1.715 m)    Wt 107.5 kg (237 lb)    SpO2 97%    Breastfeeding No    BMI 36.57 kg/m2       Post vital signs: stable    Level of consciousness: awake, alert  and oriented    Nausea/Vomiting: no nausea/no vomiting    Complications: none    Airway Patency: patent    Respiratory: unassisted, spontaneous ventilation, room air    Cardiovascular: stable    Hydration: euvolemic  "

## 2017-05-02 NOTE — DISCHARGE SUMMARY
"[facility]  Brief Operative Note     SUMMARY     Surgery Date: 5/2/2017     Surgeon(s) and Role:     * Noe Penn III, MD - Primary    Assisting Surgeon: None    Pre-op Diagnosis:  Preventive measure [Z29.9]    Post-op Diagnosis:  Post-Op Diagnosis Codes:     * Preventive measure [Z29.9]     - Diverticulosis  Procedure(s) (LRB):  COLONOSCOPY (N/A)    Anesthesia: Choice    Description of the findings of the procedure: Procedures completed. See Procedure note for full details.    Findings/Key Components: Procedures completed. See Procedure note for full details.    Prosthetics/Devices: None    Estimated Blood Loss: * No values recorded between 5/2/2017 12:00 AM and 5/2/2017  2:39 PM *         Specimens:   Specimen     None          Discharge Note    SUMMARY     Admit Date: 5/2/2017    Discharge Date and Time: 5/2/2017    Hospital Course (synopsis of major diagnoses, care, treatment, and services provided during the course of the hospital stay):  Procedures completed. See Procedure note for full details. Discharge patient when discharge criteria met.    Final Diagnosis: Post-Op Diagnosis Codes:     * Preventive measure [Z29.9]    -  Diverticulosis  Disposition: Discharge patient when discharge criteria met.    Follow Up/Patient Instructions:       Medications:  Reconciled Home Medications: Current Discharge Medication List      CONTINUE these medications which have NOT CHANGED    Details   aspirin 81 mg Tab Take by mouth. 1 Tablet Oral Every day      atorvastatin (LIPITOR) 20 MG tablet TAKE 1 TABLET BY MOUTH EVERY DAY  Qty: 90 tablet, Refills: 3      BD ULTRA-FINE LAURA PEN NEEDLES 32 gauge x 5/32" Ndle USE THREE TIMES DAILY  Qty: 300 each, Refills: 0      blood sugar diagnostic Strp TRUEresult Matrix Blood Glucose Monitoring System  Dx:  E11.9   Medically Necessary  Qty: 1 each, Refills: 3    Associated Diagnoses: Diabetes mellitus without complication      cholecalciferol, vitamin D3, 2,000 unit " Tab Take by mouth. 1 Tablet Oral Every day      gabapentin (NEURONTIN) 300 MG capsule TAKE ONE CAPSULE BY MOUTH THREE TIMES DAILY  Qty: 270 capsule, Refills: 4    Associated Diagnoses: PHN (postherpetic neuralgia)      glimepiride (AMARYL) 2 MG tablet Take 1 tablet (2 mg total) by mouth 2 (two) times daily before meals. Take two tablets in AM, two tablets in PM before meals  Qty: 360 tablet, Refills: 3      INVOKANA 300 mg Tab TAKE 1 TABLET BY MOUTH EVERY DAY  Qty: 90 tablet, Refills: 3      lancets (ACCU-CHEK SOFTCLIX LANCETS) Misc Accuchek Multiclix drum lancets, [facility]  Dx:  E11.9  Qty: 306 each, Refills: 3      LANTUS SOLOSTAR 100 unit/mL (3 mL) InPn pen INJECT 10 UNITS UNDER THE SKIN EVERY DAY  Qty: 15 mL, Refills: 4      levothyroxine (SYNTHROID) 112 MCG tablet Take 1 tablet (112 mcg total) by mouth once daily.  Qty: 90 tablet, Refills: 3    Associated Diagnoses: Other specified hypothyroidism      liraglutide 0.6 mg/0.1 mL, 18 mg/3 mL, subq PNIJ (VICTOZA 3-ANATOLIY) 0.6 mg/0.1 mL (18 mg/3 mL) PnIj Inject 1.8 mg into the skin Daily.  Qty: 27 mL, Refills: 3    Associated Diagnoses: Diabetes mellitus without complication      lisinopril (PRINIVIL,ZESTRIL) 5 MG tablet TAKE 1 TABLET BY MOUTH EVERY DAY  Qty: 90 tablet, Refills: 3      loratadine (CLARITIN) 10 mg tablet Take 10 mg by mouth.      metformin (GLUCOPHAGE-XR) 500 MG 24 hr tablet Take 1 tablet (500 mg total) by mouth daily with breakfast.  Qty: 30 tablet, Refills: 11    Associated Diagnoses: Hyperlipidemia associated with type 2 diabetes mellitus; Preventive measure      meclizine (ANTIVERT) 25 mg tablet Take 1 tablet by mouth as needed.          STOP taking these medications       sodium,potassium,mag sulfates (SUPREP BOWEL PREP KIT) 17.5-3.13-1.6 gram SolR Comments:   Reason for Stopping:                Discharge Procedure Orders  Diet general

## 2017-05-02 NOTE — DISCHARGE INSTRUCTIONS

## 2017-05-03 ENCOUNTER — TELEPHONE (OUTPATIENT)
Dept: ENDOSCOPY | Facility: HOSPITAL | Age: 67
End: 2017-05-03

## 2017-05-25 ENCOUNTER — LAB VISIT (OUTPATIENT)
Dept: LAB | Facility: HOSPITAL | Age: 67
End: 2017-05-25
Attending: NURSE PRACTITIONER
Payer: MEDICARE

## 2017-05-25 ENCOUNTER — OFFICE VISIT (OUTPATIENT)
Dept: DIABETES | Facility: CLINIC | Age: 67
End: 2017-05-25
Payer: MEDICARE

## 2017-05-25 VITALS
WEIGHT: 241.63 LBS | DIASTOLIC BLOOD PRESSURE: 60 MMHG | BODY MASS INDEX: 36.62 KG/M2 | SYSTOLIC BLOOD PRESSURE: 114 MMHG | HEIGHT: 68 IN

## 2017-05-25 DIAGNOSIS — E11.69 HYPERLIPIDEMIA ASSOCIATED WITH TYPE 2 DIABETES MELLITUS: ICD-10-CM

## 2017-05-25 DIAGNOSIS — E66.9 OBESITY (BMI 35.0-39.9 WITHOUT COMORBIDITY): ICD-10-CM

## 2017-05-25 DIAGNOSIS — E78.5 HYPERLIPIDEMIA ASSOCIATED WITH TYPE 2 DIABETES MELLITUS: ICD-10-CM

## 2017-05-25 LAB
C PEPTIDE SERPL-MCNC: 1.94 NG/ML
GLUCOSE SERPL-MCNC: 169 MG/DL (ref 70–110)

## 2017-05-25 PROCEDURE — 82948 REAGENT STRIP/BLOOD GLUCOSE: CPT | Mod: S$GLB,,, | Performed by: NURSE PRACTITIONER

## 2017-05-25 PROCEDURE — 3045F PR MOST RECENT HEMOGLOBIN A1C LEVEL 7.0-9.0%: CPT | Mod: S$GLB,,, | Performed by: NURSE PRACTITIONER

## 2017-05-25 PROCEDURE — 84681 ASSAY OF C-PEPTIDE: CPT

## 2017-05-25 PROCEDURE — 86341 ISLET CELL ANTIBODY: CPT

## 2017-05-25 PROCEDURE — 1159F MED LIST DOCD IN RCRD: CPT | Mod: S$GLB,,, | Performed by: NURSE PRACTITIONER

## 2017-05-25 PROCEDURE — 99499 UNLISTED E&M SERVICE: CPT | Mod: S$GLB,,, | Performed by: NURSE PRACTITIONER

## 2017-05-25 PROCEDURE — 99999 PR PBB SHADOW E&M-EST. PATIENT-LVL III: CPT | Mod: PBBFAC,,, | Performed by: NURSE PRACTITIONER

## 2017-05-25 PROCEDURE — 99215 OFFICE O/P EST HI 40 MIN: CPT | Mod: S$GLB,,, | Performed by: NURSE PRACTITIONER

## 2017-05-25 PROCEDURE — 4010F ACE/ARB THERAPY RXD/TAKEN: CPT | Mod: S$GLB,,, | Performed by: NURSE PRACTITIONER

## 2017-05-25 PROCEDURE — 36415 COLL VENOUS BLD VENIPUNCTURE: CPT | Mod: PO

## 2017-05-25 RX ORDER — METFORMIN HYDROCHLORIDE 500 MG/1
1000 TABLET, EXTENDED RELEASE ORAL 2 TIMES DAILY WITH MEALS
Qty: 30 TABLET | Refills: 11 | Status: SHIPPED | OUTPATIENT
Start: 2017-05-25 | End: 2018-07-03 | Stop reason: SDUPTHER

## 2017-05-25 NOTE — PATIENT INSTRUCTIONS
"PATIENT INSTRUCTIONS  - Follow up as scheduled.    - INCREASE METFORMIN AS DIRECTED  - FINISH VICTOZA AND START TRULICITY ONCE A WEEK  - INCREASE EXERCISE- TRY TO GET 5-7000 STEPS A DAY. ONE 30 MINUTE WALK PER DAY WILL GET YOU THERE.   - WORK ON CARB COUNTING AND FIXING PLATES IN A HEALTHY MANNER.     - Bring meter and blood sugar log to each appointment.     - You will take your blood sugar two times daily. Once will always be in the morning before you have any food, (known as your fasting blood sugar), and once should be two hours after EITHER your breakfast, lunch, or dinner, (known as your post-prandial blood sugar). Each day you should check a different meal, (ie. Monday-breakfast; Tuesday- lunch; Wednesday- supper, then repeat).     - Send me your blood sugars weekly if directed to do so. The easiest way to do this is through [facility].    - Blood Sugar Goals:  1. The goal for fasting blood sugars is 80 -130 mg/dl.   2. The goal for the 2 hour after meal blood sugars is below 180 mg/dl.  3. Blood sugars below 70 are unacceptable and should raise a "RED FLAG".                                                                    Diabetes (General Information)  Diabetes is a long-term health problem. It means your body does not make enough insulin. Or it may mean that your body cannot use the insulin it makes. Insulin is a hormone in your body. It lets blood sugar (glucose) reach the cells in your body. All of your cells need glucose for fuel.  When you have diabetes, the glucose in your blood builds up because it cannot get into the cells. This buildup is called high blood sugar (hyperglycemia).  Your blood sugar level depends on several things. It depends on what kind of food you eat and how much of it you eat. It also depends on how much exercise you get, and how much insulin you have in your body. Eating too much of the wrong kinds of food or not taking diabetes medicine on time can cause high blood sugar. " Infections can cause high blood sugar even if you are taking medicines correctly.  These things can also cause low blood sugar:  · Missing meals  · Not eating enough food  · Taking too much diabetes medicine  Diabetes can cause serious problems over time if you do not get treated. These problems include heart disease, stroke, kidney failure, and blindness. They also include nerve pain or loss of feeling in your legs and feet, and gangrene of the feet. By keeping your blood sugar under control you can prevent or delay these problems.  Normal blood sugar levels are 80 to 100 before a meal and less than 180 in the 1 to 2 hours after a meal.  Home care  Follow these guidelines when caring for yourself at home:  · Follow the diet your healthcare provider gives you. Take insulin or other diabetes medicine exactly as told to.  · Watch your blood sugar as you are told to. Keep a log of your results. This will help your provider change your medicines to keep your blood sugar under control.  · Try to reach your ideal weight. You may be able to cut back on or not have to take diabetes medicine if you eat the right foods and get exercise.  · Do not smoke. Smoking worsens the effects of diabetes on your circulation. You are much more likely to have a heart attack if you have diabetes and you smoke.  · Take good care of your feet. If you have lost feeling in your feet, you may not see an injury or infection. Check your feet and between your toes at least once a week.  · Wear a medical alert bracelet or necklace, or carry a card in your wallet that says you have diabetes. This will help healthcare providers give you the right care if you get very ill and cannot tell them that you have diabetes.  Sick day plan  If you get a cold, the flu, or a bacterial or viral infection, take these steps:  · Look at your diabetes sick plan and call your healthcare provider as you were told to. You may need to call your provider right away  if:  ¨ Your blood sugar is above 240 while taking your diabetes medicine  ¨ Your urine ketone levels are above normal or high  ¨ You have been vomiting more than 6 hours  ¨ You have trouble breathing or your breath ha s a fruity smell  ¨ You have a high fever  ¨ You have a fever for several days and you are not getting better  ¨ You get light-headed and are sleepier than usual  · Keep taking your diabetes pills (oral medicine) even if you have been vomiting and are feeling sick. Call your provider right away because you may need insulin to lower your blood sugar until you recover from your illness.  · Keep taking your insulin even if you have been vomiting and are feeling sick. Call your provider right away to ask if you need to change your insulin dose. This will depend on your blood sugar results.  · Check your blood sugar every 2 to 4 hours, or at least 4 times a day.  · Check your ketones often. If you are vomiting and having diarrhea, watch them more often.  · Do not skip meals. Try to eat small meals on a regular schedule. Do this even if you do not feel like eating.  · Drink water or other liquids that do not have caffeine or calories. This will keep you from getting dehydrated. If you are nauseated or vomiting, takes small sips every 5 minutes. To prevent dehydration try to drink a cup (8 ounces) of fluids every hour while you are awake.  General care  Always bring a source of fast-acting sugar with you in case you have symptoms of low blood sugar (below 70). At the first sign of low blood sugar, eat or drink 15 to 20 grams of fast-acting sugar to raise your blood sugar. Examples are:  · 3 to 4 glucose tablets. You can buy these at most drugstores.  · 4 ounces (1/2 cup) of regular (not diet) soft drinks  · 4 ounces (1/2 cup) of any fruit juice  · 8 ounces (1 cup) of milk  · 5 to 6 pieces of hard candy  · 1 tablespoon of honey  Check your blood sugar 15 minutes after treating yourself. If it is still below  70, take 15 to 20 more grams of fast-acting sugar. Test again in 15 minutes. If it returns to normal (70 or above), eat a snack or meal to keep your blood sugar in a safe range. If it stays low, call your doctor or go to an emergency room.  Follow-up care  Follow-up with your healthcare provider, or as advised. For more information about diabetes, visit the American Diabetes Association website at www.diabetes.org or call 902-603-9225.  When to seek medical advice  Call your healthcare provider right away if you have any of these symptoms of high blood sugar:  · Frequent urination  · Dizziness  · Drowsiness  · Thirst  · Headache  · Nausea or vomiting  · Abdominal pain  · Eyesight changes  · Fast breathing  · Confusion or loss of consciousness  Also call your provider right away if you have any of these signs of low blood sugar:  · Fatigue  · Headache  · Shakes  · Excess sweating  · Hunger  · Feeling anxious or restless  · Eyesight changes  · Drowsiness  · Weakness  · Confusion or loss of consciousness  Call 911  Call for emergency help right away if any of these occur:  · Chest pain or shortness of breath  · Dizziness or fainting  · Weakness of an arm or leg or one side of the face  · Trouble speaking or seeing   Date Last Reviewed: 6/1/2016 © 2000-2016 [employer]. [address] [place] [address], [place] [address]. All rights reserved. This information is not intended as a substitute for professional medical care. Always follow your healthcare professional's instructions.                                                                     Understanding Type 2 Diabetes  When your body is working normally, the food you eat is digested and used as fuel. This fuel supplies energy to the bodys cells. When you have diabetes, the fuel cant enter the cells. Without treatment, diabetes can cause serious long-term health problems.     Your body breaks down the food you eat into glucose.          How the body gets  energy  The digestive system breaks down food, resulting in a sugar called glucose. Some of this glucose is stored in the liver. But most of it enters the bloodstream and travels to the cells to be used as fuel. Glucose needs the help of a hormone called insulin to enter the cells. Insulin is made in the pancreas. It is released into the bloodstream in response to the presence of glucose in the blood. Think of insulin as a key. When insulin reaches a cell, it attaches to the cell wall. This signals the cell to create an opening that allows glucose to enter the cell.  When you have type 2 diabetes  Early in type 2 diabetes, your cells dont respond properly to insulin. Because of this, less glucose than normal moves into cells. This is called insulin resistance. In response, the pancreas makes more insulin. But eventually, the pancreas cant produce enough insulin to overcome insulin resistance. As less and less glucose enters cells, it builds up to a harmful level in the bloodstream. This is known as high blood sugar or hyperglycemia. The result is type 2 diabetes. The cells become starved for energy, which can leave you feeling tired and rundown.  Why high blood sugar is a problem  If high blood sugar is not controlled, blood vessels throughout the body become damaged. Prolonged high blood sugar affects organs, blood vessels, and nerves. As a result, the risks of damage to the heart, kidneys, eyes, and limbs increase. Diabetes also makes other problems, such as high blood pressure and high cholesterol, more dangerous. Over time, people with uncontrolled high blood sugar have an increase in risk of dying of, or being disabled by, heart attack or stroke.  Date Last Reviewed: 7/1/2016 © 2000-2016 [employer]. [address] [place] [address], [place] [address]. All rights reserved. This information is not intended as a substitute for professional medical care. Always follow your healthcare professional's  instructions.                                                            Using a Blood Sugar Log    You have diabetes. This means your body has trouble regulating a sugar called glucose. To help manage your diabetes, youll need to check your blood sugar level as directed by your healthcare provider. Keeping a log of your blood sugar levels will help you track your blood sugar readings. Its a simple and easy way to see how well you are controlling your diabetes.  Checking your blood sugar level  You can check your blood sugar level with a blood glucose meter. Youll first prick the side of your finger with a tiny lancet to draw a tiny drop of blood onto the test strip. Some glucose meters let you use another place on your body to test. But these other places should not be used in some cases as they may be inaccurate. Follow the instructions for your glucose meter. And talk with your healthcare provider before doing the test on other places.  The strip goes into the meter first, then a drop of blood is placed on the tip of the strip. The meter then shows a reading that tells you the level of your blood sugar. Your readings should be in your target range as often as possible. This means not too high or too low. Staying in this range helps lower your risk for complications. Your healthcare provider will help you figure out the target range that is best for you.  Tracking your readings  Every time you check your blood sugar, use your log to keep track of your readings. Your meter will also probably have a memory feature that your healthcare provider can check at your next visit. You may be advised by your healthcare provider to check your blood sugar in the morning, at bedtime, and before and after meals. Be sure to write down all of your numbers. Also use your log to record things that might have affected your blood sugar. Some examples include being sick, certain medicines, being physically active, feeling stressed,  or skipping meals.   Lessons learned from your readings  Tracking your blood sugar readings helps you see patterns. These patterns tell you how your actions affect your blood sugar. For instance, you may have higher numbers after eating certain foods or lower numbers after exercise. They just help you understand how to stay in your target range more often, so that your diabetes remains in good control.  Sharing your log with your healthcare team  Bring your blood sugar log and glucose meter with you to all of your healthcare appointments. This can help your healthcare team make changes to your treatment plan, if needed. This may involve making changes in what you eat, what medicines you take, or how much you exercise.  To learn more  The resources below can help you learn more:  · American Diabetes Association 122-291-8768 www.diabetes.org  · [employer] 815-112-5537 www.lighthouse.org  · [employer] [place] 835-123-6336 www.nei.nih.gov  · [employer] 736-019-5728 www.hormone.org  Date Last Reviewed: 5/1/2016  © 0308-7146 [employer]. [address] [place] [address]. All rights reserved. This information is not intended as a substitute for professional medical care. Always follow your healthcare professional's instructions.                                                                                            Diabetes: Exams and Tests    For your diabetes care, you may see your primary care provider or a specialist 2 to 4 times a year, or as directed. This page lists some of the regular exams and tests recommended for people with diabetes. To learn more, contact the American Diabetes Association (508-276-1420, www.diabetes.org).  Tests and immunizations  These should be done at least as often as stated below:  · Blood pressure check: every healthcare provider visit  · A1C: at first, every 3 months; if controlled, then every 3 to 6 months   · Cholesterol and  blood lipid tests: at least every 12 months.  · Urine tests for kidney function: every 12 months  · Flu shots: once a year  · Pneumonia shots: talk with your healthcare provider about which pneumonia vaccines are right for you  · Hepatitis B shots: as soon as possible if youre under 60, or as advised by your healthcare provider if youre older than 60  · Shingles vaccine after age 60, even if you have already had shingles   · Other tests or vaccines: as advised by your healthcare provider  · Individualized medical nutrition therapy: at least once, then as needed  · Stop smoking counseling, if you still smoke, at each visit   Regular exams  The following exams help keep you healthy:  · Foot exams. Nerve and blood vessel problems can affect your feet sooner than other parts of your body. Make sure that your healthcare provider checks your feet at every office visit.  · Eye exams. You can have problems with your eyes even if you dont have trouble seeing. An ophthalmologist (eye healthcare provider) or specially trained optometrist will give you a dilated eye exam at least once a year. If you see dark spots, see poorly in dim light, have eye pain or pressure, or notice any other problems, tell your healthcare provider right away.  · Dental exams. Gum disease (also called periodontal disease) and other mouth problems are common in people with diabetes. To help prevent these problems, see your dentist two or more times a year.  Ask your healthcare provider what other exams youll need on a regular basis.  Date Last Reviewed: 6/1/2016 © 2000-2016 [employer]. [address] [place] [address], [place] [address]. All rights reserved. This information is not intended as a substitute for professional medical care. Always follow your healthcare professional's instructions.

## 2017-05-25 NOTE — PROGRESS NOTES
"Subjective:         Patient ID: Jaimie Luque is a 67 y.o. female.  Patient's current PCP is Jessica Luna MD.   Social History     Social History    Marital status:      Spouse name: N/A    Number of children: N/A    Years of education: N/A     Occupational History    Not on file.     Social History Main Topics    Smoking status: Never Smoker    Smokeless tobacco: Never Used    Alcohol use Yes      Comment: rare    Drug use: No    Sexual activity: Yes     Partners: Male     Other Topics Concern    Not on file     Social History Narrative    . Lives with spouse. Has 3 children. Patient retired as  for [employer].        Chief Complaint: Diabetes Mellitus    HPI  Jaimie Luque is a 67 y.o. White female presenting for new consultation for diabetes. Patient previously saw NAVEED Patel NP, last seen in 2016. Patient has had diabetes for approximately 15 years and has the following complications from diabetes: none. Blood glucose testing is performed sporadically. In the past 1 months patient reports fasting blood glucose values to have approximately ranged from 90s - 150s, with most ranging in the 120s. Patient recently started on Metformin in April 2017.     She denies any recent hospital admissions or emergency room visits.  Height: 5' 7.5" (171.5 cm)  //  Weight: 109.6 kg (241 lb 10 oz), Body mass index is 37.29 kg/m².  Home Blood Glucose reading this AM: 124 mg/dl fasting.  Her blood sugar in clinic today is:   Lab Results   Component Value Date    POCGLU 169 (A) 05/25/2017   Has not eaten yet-fasting    Labs reviewed and are noted below.    Her most recent A1C is:   Lab Results   Component Value Date    HGBA1C 8.2 (H) 03/22/2017     No results found for: CPEPTIDE  No results found for: GLUTAMICACID  Lab Results   Component Value Date    WBC 7.58 06/15/2016    HGB 14.5 06/15/2016    HCT 45.7 06/15/2016     06/15/2016    CHOL 134 06/15/2016    TRIG 84 06/15/2016    " HDL 53 06/15/2016    ALT 15 06/15/2016    AST 15 06/15/2016     06/15/2016    K 4.4 06/15/2016     06/15/2016    CREATININE 0.9 06/15/2016    BUN 12 06/15/2016    CO2 29 06/15/2016    TSH 0.628 06/15/2016    HGBA1C 8.2 (H) 03/22/2017       CURRENT DM MEDICATIONS:   Current Outpatient Prescriptions   Medication Sig Dispense Refill    canagliflozin (INVOKANA) 300 mg Tab tablet Take 1 tablet (300 mg total) by mouth once daily. 30 tablet 0    glimepiride (AMARYL) 2 MG tablet Take 1 tablet (2 mg total) by mouth 2 (two) times daily before meals. Take two tablets in AM, two tablets in PM before meals 360 tablet 3    LANTUS SOLOSTAR 100 unit/mL (3 mL) InPn pen INJECT 10 UNITS UNDER THE SKIN EVERY DAY 15 mL 4    liraglutide 0.6 mg/0.1 mL, 18 mg/3 mL, subq PNIJ (VICTOZA 3-ANATOLIY) 0.6 mg/0.1 mL (18 mg/3 mL) PnIj Inject 1.8 mg into the skin Daily. 27 mL 3    metformin (GLUCOPHAGE-XR) 500 MG 24 hr tablet Take 1 tablet (500 mg total) by mouth daily with breakfast. 30 tablet 11     No current facility-administered medications for this visit.        Health Maintenance   Topic Date Due    Foot Exam  06/22/2017    Lipid Panel  06/15/2017    Influenza Vaccine  08/01/2017    Hemoglobin A1c  09/22/2017    Eye Exam  02/03/2018    Mammogram  06/15/2018    TETANUS VACCINE  09/09/2019    DEXA SCAN  06/15/2021    Colonoscopy  05/02/2022    Hepatitis C Screening  Completed    Zoster Vaccine  Completed    Pneumococcal (65+)  Completed       STANDARDS OF CARE:  Current Ophthalmologist/Optometrist: Dr. Kaur.  Last exam 2017.   Current Dentist: Yes. Last examination in 2016.  Current Podiatrist: No  She  has attended diabetes education in the past.     LIFESTYLE:  ACTIVITY LEVEL: Sedentary  EXERCISE:  none  MEAL PLANNING: Patient reports number of meals per day to be 3 and number of snacks per day to be 2    BLOOD GLUCOSE TESTING: Patient reports testing on average a total of 1 times per day.    Review of Systems    Constitutional: Negative.  Negative for activity change, appetite change, chills, diaphoresis, fatigue, fever and unexpected weight change.   Eyes: Negative for visual disturbance.   Respiratory: Negative for apnea and shortness of breath.    Cardiovascular: Negative.  Negative for chest pain, palpitations and leg swelling.   Gastrointestinal: Negative for abdominal distention, constipation, diarrhea, nausea and vomiting.   Endocrine: Negative.  Negative for cold intolerance, heat intolerance, polydipsia, polyphagia and polyuria.   Genitourinary: Negative.  Negative for frequency.   Skin: Negative.  Negative for color change, pallor, rash and wound.   Neurological: Negative.  Negative for dizziness, seizures, syncope, light-headedness, numbness and headaches.   Psychiatric/Behavioral: Negative for confusion, hallucinations and suicidal ideas.         Objective:      Physical Exam   Constitutional: She is oriented to person, place, and time. She appears well-developed and well-nourished.   HENT:   Head: Normocephalic and atraumatic.   Eyes: EOM are normal. Pupils are equal, round, and reactive to light.   Neck: Trachea normal and normal range of motion. Neck supple. No thyroid mass present.   Cardiovascular: Normal rate, regular rhythm, normal heart sounds and intact distal pulses.  Exam reveals no gallop and no friction rub.    No murmur heard.  Pulses:       Dorsalis pedis pulses are 2+ on the right side, and 2+ on the left side.        Posterior tibial pulses are 2+ on the right side, and 2+ on the left side.   Pulmonary/Chest: Effort normal and breath sounds normal. No respiratory distress. She has no decreased breath sounds. She has no wheezes. She has no rhonchi. She has no rales. She exhibits no tenderness.   Abdominal: Soft. Normal appearance and bowel sounds are normal. She exhibits no distension. There is no tenderness.   Musculoskeletal: Normal range of motion. She exhibits no edema.        Right foot:  There is no deformity.        Left foot: There is no deformity.   Feet:   Right Foot:   Protective Sensation: 10 sites tested. 10 sites sensed.   Skin Integrity: Negative for ulcer, blister, skin breakdown, erythema, warmth, callus or dry skin.   Left Foot:   Protective Sensation: 10 sites tested. 10 sites sensed.   Skin Integrity: Negative for ulcer, blister, skin breakdown, erythema, warmth, callus or dry skin.   Neurological: She is alert and oriented to person, place, and time. She has normal strength and normal reflexes.   Skin: Skin is warm, dry and intact.   Psychiatric: She has a normal mood and affect. Her speech is normal and behavior is normal. Judgment and thought content normal.   Nursing note and vitals reviewed.      Assessment:       1. Uncontrolled type 2 diabetes mellitus without complication, with long-term current use of insulin -Uncontrolled, recently started on Metformin, no diet or exercise plan in place.    2. Hyperlipidemia associated with type 2 diabetes mellitus    3. Obesity (BMI 35.0-39.9 without comorbidity)        Plan:   Uncontrolled type 2 diabetes mellitus without complication, with long-term current use of insulin  -     C-peptide; Future; Expected date: 05/25/2017  -     Glutamic acid decarboxylase; Future; Expected date: 05/25/2017  -     POCT glucose  -     Ambulatory consult to Podiatry  -     metformin (GLUCOPHAGE-XR) 500 MG 24 hr tablet; Take 2 tablets (1,000 mg total) by mouth 2 (two) times daily with meals.  Dispense: 30 tablet; Refill: 11  -     Hemoglobin A1c; Future; Expected date: 06/25/2017  -     dulaglutide (TRULICITY) 1.5 mg/0.5 mL PnIj; Inject 1.5 mg into the skin every 7 days.  Dispense: 4 Syringe; Refill: 6    -DM education performed. Patient to increase Metformin as tolerated, with goal of 1000mg BID. Patient will finish up remaining Victoza and start Trulicity, for easier compliance, as Trulicity is only once per week. Continue other medications as prescribed.  She will increase her steps per day from 1000/day to goal of 5-7000K per day. She will work on carb counting and follow up with dietitian as recommended. Labs today. A1C in one month, RV in 1.5 months after next A1C. She will increase blood sugar testing to twice per day and send in weekly for my review. She will be scheduled with podiatry for DM foot exam.     Hyperlipidemia associated with type 2 diabetes mellitus  - Labs today, f/u with pcp as directed    Obesity (BMI 35.0-39.9 without comorbidity)  - Patient to increase steps per day and should follow up with dietitian for meal planning.         Additional Plan Details:    1.) Patient was instructed to monitor blood glucose 2 - 3 x daily, fasting and ac dinner or at bedtime. Discussed ADA goal for fasting blood sugar, 80 - 130mg/dL; pp blood sugars below 180 mg/dl. Also, discussed prevention of hypoglycemia and the need to adjust goals to higher levels if persistent hypoglycemia.  Reminded to bring BG records or meter to each visit for review.  2.) Reviewed pathophysiology of Type 2 diabetes, complications related to the disease, importance of annual dilated eye exam and daily foot examination.  3.) We discussed the ADA recommendations, which are as follows:  Hemoglobin A1c below 7.0 %. All patients with diabetes should be on statins unless contraindicated.  ACE or ARB therapy if not contraindicated.    4.) Continue medications as prescribed.  My [facility] e-mail or phone review in one week with BG records for adjustment of medication.  5.) Meal planning teaching: Reviewed carb counting, portion control, importance of spacing meals throughout the day to prevent post prandial elevations.  6.) Discussed activity with related benefits, methods, and precautions. Recommended patient start or continue some form of exercise and increase as tolerated to 30 minutes per day to aid in control of BGs.  7.) A1C, TSH, Lipid Panel, CMP with eGFR and Micro/Creatinine are utd or  were ordered per ADA protocol.  8.) Return to clinic in 1.5 months for follow up. The patient was explained the above plan and given opportunity to ask questions.  She understands, chooses and consents to this plan and accepts all the risks, which include but are not limited to the risks mentioned above. She understands the alternative of having no testing, interventions or treatments at this time. She left content and without further questions.     A total of 65 minutes was spent in face to face time, of which over 50% was spent in counseling patient on disease process, complications, treatment, and side effects of medications.        RONNIE DesaiC

## 2017-05-31 ENCOUNTER — PATIENT MESSAGE (OUTPATIENT)
Dept: INTERNAL MEDICINE | Facility: CLINIC | Age: 67
End: 2017-05-31

## 2017-05-31 LAB — GAD65 AB SER-SCNC: 0 NMOL/L

## 2017-06-04 RX ORDER — LIRAGLUTIDE 6 MG/ML
INJECTION SUBCUTANEOUS
Qty: 27 ML | Refills: 3 | Status: SHIPPED | OUTPATIENT
Start: 2017-06-04 | End: 2017-07-12

## 2017-06-15 ENCOUNTER — PATIENT MESSAGE (OUTPATIENT)
Dept: INTERNAL MEDICINE | Facility: CLINIC | Age: 67
End: 2017-06-15

## 2017-06-19 DIAGNOSIS — B02.29 PHN (POSTHERPETIC NEURALGIA): ICD-10-CM

## 2017-06-20 RX ORDER — GABAPENTIN 300 MG/1
CAPSULE ORAL
Qty: 270 CAPSULE | Refills: 1 | Status: SHIPPED | OUTPATIENT
Start: 2017-06-20 | End: 2017-12-14 | Stop reason: SDUPTHER

## 2017-07-05 ENCOUNTER — LAB VISIT (OUTPATIENT)
Dept: LAB | Facility: HOSPITAL | Age: 67
End: 2017-07-05
Attending: INTERNAL MEDICINE
Payer: MEDICARE

## 2017-07-05 DIAGNOSIS — E78.5 HYPERLIPIDEMIA ASSOCIATED WITH TYPE 2 DIABETES MELLITUS: ICD-10-CM

## 2017-07-05 DIAGNOSIS — Z29.9 PREVENTIVE MEASURE: ICD-10-CM

## 2017-07-05 DIAGNOSIS — E11.69 HYPERLIPIDEMIA ASSOCIATED WITH TYPE 2 DIABETES MELLITUS: ICD-10-CM

## 2017-07-05 LAB
CREAT UR-MCNC: 128 MG/DL
MICROALBUMIN UR DL<=1MG/L-MCNC: 6 UG/ML
MICROALBUMIN/CREATININE RATIO: 4.7 UG/MG

## 2017-07-05 PROCEDURE — 82570 ASSAY OF URINE CREATININE: CPT

## 2017-07-12 ENCOUNTER — OFFICE VISIT (OUTPATIENT)
Dept: INTERNAL MEDICINE | Facility: CLINIC | Age: 67
End: 2017-07-12
Payer: MEDICARE

## 2017-07-12 VITALS
SYSTOLIC BLOOD PRESSURE: 120 MMHG | WEIGHT: 233.94 LBS | HEIGHT: 68 IN | DIASTOLIC BLOOD PRESSURE: 66 MMHG | HEART RATE: 82 BPM | OXYGEN SATURATION: 96 % | TEMPERATURE: 97 F | BODY MASS INDEX: 35.45 KG/M2

## 2017-07-12 DIAGNOSIS — N18.30 CKD (CHRONIC KIDNEY DISEASE) STAGE 3, GFR 30-59 ML/MIN: ICD-10-CM

## 2017-07-12 DIAGNOSIS — E78.5 HYPERLIPIDEMIA ASSOCIATED WITH TYPE 2 DIABETES MELLITUS: ICD-10-CM

## 2017-07-12 DIAGNOSIS — E03.9 ACQUIRED HYPOTHYROIDISM: ICD-10-CM

## 2017-07-12 DIAGNOSIS — G47.33 OSA ON CPAP: ICD-10-CM

## 2017-07-12 DIAGNOSIS — Z00.00 ENCOUNTER FOR PREVENTIVE HEALTH EXAMINATION: ICD-10-CM

## 2017-07-12 DIAGNOSIS — E11.69 HYPERLIPIDEMIA ASSOCIATED WITH TYPE 2 DIABETES MELLITUS: ICD-10-CM

## 2017-07-12 DIAGNOSIS — B02.29 PHN (POSTHERPETIC NEURALGIA): ICD-10-CM

## 2017-07-12 DIAGNOSIS — E66.9 OBESITY (BMI 35.0-39.9 WITHOUT COMORBIDITY): ICD-10-CM

## 2017-07-12 PROCEDURE — 99397 PER PM REEVAL EST PAT 65+ YR: CPT | Mod: S$GLB,,, | Performed by: INTERNAL MEDICINE

## 2017-07-12 PROCEDURE — 99999 PR PBB SHADOW E&M-EST. PATIENT-LVL III: CPT | Mod: PBBFAC,,, | Performed by: INTERNAL MEDICINE

## 2017-07-12 PROCEDURE — 99499 UNLISTED E&M SERVICE: CPT | Mod: S$GLB,,, | Performed by: INTERNAL MEDICINE

## 2017-07-12 NOTE — PROGRESS NOTES
"Subjective:       Patient ID: Jaimie Luque is a 67 y.o. female.    Chief Complaint: Annual Exam    Here for f/u medical problems and preventive exam.  Energy ok, but no regular exercise.  DM team working with pt.  AC breakfast today 192.  New regimen started last week.  No f/c/sw/cough.  No cp/sob/palp.  BMs normal.  Urine normal.  Taking vit D.    HM: 4/17 HAV#2, 9/16 fluvax, 3/15 tssera42, 3/14 bipfax79, 9/09 TDaP, 11/12 zoster, 6/16 BMD rep 5y, 5/17 Cscope rep 5y, 6/16 mmg(q2), 2/17 Eye Dr. Franco, 9/16 HCV neg.          Review of Systems   Constitutional: Negative for appetite change, chills, diaphoresis and fever.   HENT: Negative for congestion, ear pain, rhinorrhea, sinus pressure and sore throat.    Respiratory: Negative for cough, chest tightness and shortness of breath.    Cardiovascular: Negative for chest pain and palpitations.   Gastrointestinal: Negative for blood in stool, constipation, diarrhea, nausea and vomiting.   Genitourinary: Negative for dysuria, frequency, hematuria, menstrual problem, urgency and vaginal discharge.   Musculoskeletal: Negative for arthralgias.   Skin: Negative for rash.   Neurological: Negative for dizziness and headaches.   Psychiatric/Behavioral: Negative for sleep disturbance. The patient is not nervous/anxious.        Objective:   /66 (BP Location: Right arm)   Pulse 82   Temp 96.5 °F (35.8 °C) (Tympanic)   Ht 5' 7.5" (1.715 m)   Wt 106.1 kg (233 lb 14.5 oz)   SpO2 96%   BMI 36.09 kg/m²     Physical Exam   Constitutional: She is oriented to person, place, and time. She appears well-developed and well-nourished.   HENT:   Right Ear: External ear normal. Tympanic membrane is not injected.   Left Ear: External ear normal. Tympanic membrane is not injected.   Mouth/Throat: Oropharynx is clear and moist.   Eyes: Conjunctivae are normal.   Neck: Normal range of motion. Neck supple. No thyromegaly present.   Cardiovascular: Normal rate, regular rhythm and " intact distal pulses.  Exam reveals no gallop and no friction rub.    No murmur heard.  Pulses:       Dorsalis pedis pulses are 2+ on the right side, and 2+ on the left side.        Posterior tibial pulses are 2+ on the right side, and 2+ on the left side.   Pulmonary/Chest: Effort normal and breath sounds normal. She has no wheezes. She has no rales. Right breast exhibits no mass, no nipple discharge, no skin change and no tenderness. Left breast exhibits no mass, no nipple discharge, no skin change and no tenderness.   Abdominal: Soft. Bowel sounds are normal. She exhibits no mass. There is no tenderness.   Musculoskeletal: She exhibits no edema.   Feet:   Right Foot:   Protective Sensation: 10 sites tested. 10 sites sensed.   Skin Integrity: Negative for ulcer, blister, skin breakdown, erythema, warmth, callus or dry skin.   Left Foot:   Protective Sensation: 10 sites tested. 10 sites sensed.   Skin Integrity: Negative for ulcer, blister, skin breakdown, erythema, warmth, callus or dry skin.   Lymphadenopathy:     She has no cervical adenopathy.        Right axillary: No lateral adenopathy present.        Left axillary: No lateral adenopathy present.  Neurological: She is alert and oriented to person, place, and time.   Skin: Skin is warm. No rash noted.   Psychiatric: She has a normal mood and affect.     Results for NORMA HILLS (MRN [id number]) as of 7/12/2017 08:40   Ref. Range 7/5/2017 07:55 7/5/2017 08:05   WBC Latest Ref Range: 3.90 - 12.70 K/uL  8.37   RBC Latest Ref Range: 4.00 - 5.40 M/uL  5.21   Hemoglobin Latest Ref Range: 12.0 - 16.0 g/dL  14.9   Hematocrit Latest Ref Range: 37.0 - 48.5 %  44.9   MCV Latest Ref Range: 82 - 98 fL  86   MCH Latest Ref Range: 27.0 - 31.0 pg  28.6   MCHC Latest Ref Range: 32.0 - 36.0 %  33.2   RDW Latest Ref Range: 11.5 - 14.5 %  15.0 (H)   Platelets Latest Ref Range: 150 - 350 K/uL  214   MPV Latest Ref Range: 9.2 - 12.9 fL  11.7   Gran% Latest Ref Range: 38.0 - 73.0 %   45.3   Gran # Latest Ref Range: 1.8 - 7.7 K/uL  3.8   Lymph% Latest Ref Range: 18.0 - 48.0 %  41.6   Lymph # Latest Ref Range: 1.0 - 4.8 K/uL  3.5   Mono% Latest Ref Range: 4.0 - 15.0 %  8.8   Mono # Latest Ref Range: 0.3 - 1.0 K/uL  0.7   Eosinophil% Latest Ref Range: 0.0 - 8.0 %  3.1   Eos # Latest Ref Range: 0.0 - 0.5 K/uL  0.3   Basophil% Latest Ref Range: 0.0 - 1.9 %  0.6   Baso # Latest Ref Range: 0.00 - 0.20 K/uL  0.05   Sodium Latest Ref Range: 136 - 145 mmol/L  137   Potassium Latest Ref Range: 3.5 - 5.1 mmol/L  4.2   Chloride Latest Ref Range: 95 - 110 mmol/L  102   CO2 Latest Ref Range: 23 - 29 mmol/L  26   Anion Gap Latest Ref Range: 8 - 16 mmol/L  9   BUN, Bld Latest Ref Range: 8 - 23 mg/dL  16   Creatinine Latest Ref Range: 0.5 - 1.4 mg/dL  1.1   eGFR if non African American Latest Ref Range: >60 mL/min/1.73 m^2  51.7 (A)   eGFR if African American Latest Ref Range: >60 mL/min/1.73 m^2  59.6 (A)   Glucose Latest Ref Range: 70 - 110 mg/dL  207 (H)   Calcium Latest Ref Range: 8.7 - 10.5 mg/dL  9.4   Alkaline Phosphatase Latest Ref Range: 55 - 135 U/L  73   Total Protein Latest Ref Range: 6.0 - 8.4 g/dL  7.0   Albumin Latest Ref Range: 3.5 - 5.2 g/dL  3.5   Total Bilirubin Latest Ref Range: 0.1 - 1.0 mg/dL  0.9   AST Latest Ref Range: 10 - 40 U/L  14   ALT Latest Ref Range: 10 - 44 U/L  19   Triglycerides Latest Ref Range: 30 - 150 mg/dL  115   Cholesterol Latest Ref Range: 120 - 199 mg/dL  143   HDL Latest Ref Range: 40 - 75 mg/dL  61   LDL Cholesterol Latest Ref Range: 63.0 - 159.0 mg/dL  59.0 (L)   Total Cholesterol/HDL Ratio Latest Ref Range: 2.0 - 5.0   2.3   Vit D, 25-Hydroxy Latest Ref Range: 30 - 96 ng/mL  41   Hemoglobin A1C Latest Ref Range: 4.0 - 5.6 %  8.2 (H)   Estimated Avg Glucose Latest Ref Range: 68 - 131 mg/dL  189 (H)   TSH Latest Ref Range: 0.400 - 4.000 uIU/mL  0.762   Microalbum.,U,Random Latest Units: ug/mL 6.0    Microalb Creat Ratio Latest Ref Range: 0.0 - 30.0 ug/mg 4.7       Assessment:       1. Uncontrolled type 2 diabetes mellitus without complication, with long-term current use of insulin    2. PHN (postherpetic neuralgia)    3. TALIA on CPAP    4. Obesity (BMI 35.0-39.9 without comorbidity)    5. Hyperlipidemia associated with type 2 diabetes mellitus    6. Acquired hypothyroidism    7. Encounter for preventive health examination    8. CKD (chronic kidney disease) stage 3, GFR 30-59 ml/min        Plan:       Jaimie was seen today for annual exam.    Diagnoses and all orders for this visit:    Uncontrolled type 2 diabetes mellitus without complication, with long-term current use of insulin- discussed must start exercise, f/u with DM.  -     Hemoglobin A1c; Future    PHN (postherpetic neuralgia)- stable on lilliam.    TALIA on CPAP    Obesity (BMI 35.0-39.9 without comorbidity)- discussed must start exercise.    Hyperlipidemia associated with type 2 diabetes mellitus- stable on statin.    Acquired hypothyroidism- stable on rx.    Encounter for preventive health examination- utd.    CKD (chronic kidney disease) stage 3, GFR 30-59 ml/min- recheck 3mo make sure not progressing.  -     Basic metabolic panel; Future

## 2017-07-25 ENCOUNTER — PATIENT MESSAGE (OUTPATIENT)
Dept: INTERNAL MEDICINE | Facility: CLINIC | Age: 67
End: 2017-07-25

## 2017-08-22 ENCOUNTER — PATIENT MESSAGE (OUTPATIENT)
Dept: DIABETES | Facility: CLINIC | Age: 67
End: 2017-08-22

## 2017-08-22 ENCOUNTER — TELEPHONE (OUTPATIENT)
Dept: DIABETES | Facility: CLINIC | Age: 67
End: 2017-08-22

## 2017-09-15 RX ORDER — LEVOTHYROXINE SODIUM 112 UG/1
TABLET ORAL
Qty: 90 TABLET | Refills: 3 | Status: SHIPPED | OUTPATIENT
Start: 2017-09-15 | End: 2018-07-03 | Stop reason: SDUPTHER

## 2017-10-05 ENCOUNTER — LAB VISIT (OUTPATIENT)
Dept: LAB | Facility: HOSPITAL | Age: 67
End: 2017-10-05
Attending: INTERNAL MEDICINE
Payer: MEDICARE

## 2017-10-05 DIAGNOSIS — N18.30 CKD (CHRONIC KIDNEY DISEASE) STAGE 3, GFR 30-59 ML/MIN: ICD-10-CM

## 2017-10-05 LAB
ANION GAP SERPL CALC-SCNC: 11 MMOL/L
BUN SERPL-MCNC: 12 MG/DL
CALCIUM SERPL-MCNC: 9.5 MG/DL
CHLORIDE SERPL-SCNC: 104 MMOL/L
CO2 SERPL-SCNC: 25 MMOL/L
CREAT SERPL-MCNC: 1 MG/DL
EST. GFR  (AFRICAN AMERICAN): >60 ML/MIN/1.73 M^2
EST. GFR  (NON AFRICAN AMERICAN): 58 ML/MIN/1.73 M^2
ESTIMATED AVG GLUCOSE: 246 MG/DL
GLUCOSE SERPL-MCNC: 222 MG/DL
HBA1C MFR BLD HPLC: 10.2 %
POTASSIUM SERPL-SCNC: 4.1 MMOL/L
SODIUM SERPL-SCNC: 140 MMOL/L

## 2017-10-05 PROCEDURE — 80048 BASIC METABOLIC PNL TOTAL CA: CPT

## 2017-10-05 PROCEDURE — 36415 COLL VENOUS BLD VENIPUNCTURE: CPT | Mod: PO

## 2017-10-05 PROCEDURE — 83036 HEMOGLOBIN GLYCOSYLATED A1C: CPT

## 2017-10-12 ENCOUNTER — TELEPHONE (OUTPATIENT)
Dept: DIABETES | Facility: CLINIC | Age: 67
End: 2017-10-12

## 2017-10-12 ENCOUNTER — PATIENT MESSAGE (OUTPATIENT)
Dept: DIABETES | Facility: CLINIC | Age: 67
End: 2017-10-12

## 2017-10-12 ENCOUNTER — OFFICE VISIT (OUTPATIENT)
Dept: INTERNAL MEDICINE | Facility: CLINIC | Age: 67
End: 2017-10-12
Payer: MEDICARE

## 2017-10-12 VITALS
HEIGHT: 67 IN | OXYGEN SATURATION: 94 % | DIASTOLIC BLOOD PRESSURE: 77 MMHG | SYSTOLIC BLOOD PRESSURE: 119 MMHG | HEART RATE: 93 BPM | BODY MASS INDEX: 33.84 KG/M2 | WEIGHT: 215.63 LBS | TEMPERATURE: 96 F

## 2017-10-12 DIAGNOSIS — N18.30 CKD (CHRONIC KIDNEY DISEASE) STAGE 3, GFR 30-59 ML/MIN: ICD-10-CM

## 2017-10-12 DIAGNOSIS — Z79.4 TYPE 2 DIABETES MELLITUS WITH HYPEROSMOLARITY WITHOUT COMA, WITH LONG-TERM CURRENT USE OF INSULIN: ICD-10-CM

## 2017-10-12 DIAGNOSIS — E03.9 ACQUIRED HYPOTHYROIDISM: ICD-10-CM

## 2017-10-12 DIAGNOSIS — E11.00 TYPE 2 DIABETES MELLITUS WITH HYPEROSMOLARITY WITHOUT COMA, WITH LONG-TERM CURRENT USE OF INSULIN: ICD-10-CM

## 2017-10-12 DIAGNOSIS — E11.69 HYPERLIPIDEMIA ASSOCIATED WITH TYPE 2 DIABETES MELLITUS: ICD-10-CM

## 2017-10-12 DIAGNOSIS — E78.5 HYPERLIPIDEMIA ASSOCIATED WITH TYPE 2 DIABETES MELLITUS: ICD-10-CM

## 2017-10-12 PROCEDURE — 99214 OFFICE O/P EST MOD 30 MIN: CPT | Mod: S$GLB,,, | Performed by: INTERNAL MEDICINE

## 2017-10-12 PROCEDURE — 99499 UNLISTED E&M SERVICE: CPT | Mod: S$GLB,,, | Performed by: INTERNAL MEDICINE

## 2017-10-12 PROCEDURE — 99999 PR PBB SHADOW E&M-EST. PATIENT-LVL III: CPT | Mod: PBBFAC,,, | Performed by: INTERNAL MEDICINE

## 2017-10-12 RX ORDER — INSULIN GLARGINE 100 [IU]/ML
8 INJECTION, SOLUTION SUBCUTANEOUS DAILY
Qty: 1 BOX | Refills: 11 | Status: SHIPPED | OUTPATIENT
Start: 2017-10-12 | End: 2018-03-29

## 2017-10-12 NOTE — PROGRESS NOTES
Subjective:       Patient ID: Jaimie Luque is a 67 y.o. female.    Chief Complaint: Follow-up    Here for follow up of medical problems.  Stopped Lantus and victoza and glyburide, started on Tanzeum with her metformin and invokana.  4 months on this treatment, and has lost 18#.  Trying to walk, variable, up to 30min.  Morning sugars 170-200.  Denies stress.  No f/c/sw/cough.  No cp/sob/palp.  BMs and urine normal.    Updated/ annual due 7/18:  HM: 4/17 HAV#2, 10/17 today fluvax, 3/15 [id number], 3/14 [id number], 9/09 TDaP, 11/12 zoster, 6/16 BMD rep 5y, 5/17 Cscope rep 5y, 6/16 mmg(q2), 2/17 Eye Dr. Franco, 9/16 HCV neg.          Diabetes   She has type 2 diabetes mellitus. No MedicAlert identification noted. The initial diagnosis of diabetes was made 15 years ago. Pertinent negatives for hypoglycemia include no confusion, dizziness, headaches, hunger, mood changes, nervousness/anxiousness, pallor, seizures, sleepiness, speech difficulty, sweats or tremors. Associated symptoms include polydipsia, weakness and weight loss. Pertinent negatives for diabetes include no blurred vision, no chest pain, no fatigue, no foot paresthesias, no foot ulcerations, no polyphagia, no polyuria and no visual change. Pertinent negatives for hypoglycemia complications include no blackouts, no hospitalization, no nocturnal hypoglycemia, no required assistance and no required glucagon injection. Symptoms are stable. Pertinent negatives for diabetic complications include no autonomic neuropathy, CVA, heart disease, impotence, nephropathy, peripheral neuropathy, PVD or retinopathy. Risk factors for coronary artery disease include obesity and diabetes mellitus. Current diabetic treatment includes oral agent (triple therapy). She is compliant with treatment most of the time. Her weight is decreasing steadily. She is following a generally healthy diet. Meal planning includes avoidance of concentrated sweets. She has not had a previous visit  "with a dietitian. She participates in exercise three times a week. She monitors blood glucose at home 1-2 x per day. Blood glucose monitoring compliance is inadequate. Her home blood glucose trend is increasing rapidly. She does not see a podiatrist.Eye exam is current.     Review of Systems   Constitutional: Positive for weight loss. Negative for chills, diaphoresis, fatigue and fever.   Eyes: Negative for blurred vision.   Respiratory: Negative for cough and shortness of breath.    Cardiovascular: Negative for chest pain, palpitations and leg swelling.   Gastrointestinal: Negative for blood in stool, constipation, diarrhea, nausea and vomiting.   Endocrine: Positive for polydipsia. Negative for polyphagia and polyuria.   Genitourinary: Negative for dysuria, frequency, hematuria and impotence.   Skin: Negative for pallor.   Neurological: Positive for weakness. Negative for dizziness, tremors, seizures, speech difficulty and headaches.   Psychiatric/Behavioral: Negative for confusion. The patient is not nervous/anxious.        Objective:   /77 (BP Location: Right arm, Patient Position: Sitting)   Pulse 93   Temp 96.1 °F (35.6 °C) (Tympanic)   Ht 5' 7" (1.702 m)   Wt 97.8 kg (215 lb 9.8 oz)   SpO2 (!) 94%   BMI 33.77 kg/m²     Physical Exam   Constitutional: She is oriented to person, place, and time. She appears well-developed.   HENT:   Mouth/Throat: Oropharynx is clear and moist.   Neck: Neck supple. Carotid bruit is not present. No thyroid mass present.   Cardiovascular: Normal rate, regular rhythm and intact distal pulses.  Exam reveals no gallop and no friction rub.    No murmur heard.  Pulmonary/Chest: Effort normal and breath sounds normal. She has no wheezes. She has no rales.   Abdominal: Soft. Bowel sounds are normal. She exhibits no mass. There is no hepatosplenomegaly. There is no tenderness.   Musculoskeletal: She exhibits no edema.   Lymphadenopathy:     She has no cervical adenopathy. "   Neurological: She is alert and oriented to person, place, and time.   Psychiatric: She has a normal mood and affect.     Results for NORMA HILLS (MRN [id number]) as of 10/12/2017 08:43   Ref. Range 7/5/2017 08:05 10/5/2017 08:06   Sodium Latest Ref Range: 136 - 145 mmol/L 137 140   Potassium Latest Ref Range: 3.5 - 5.1 mmol/L 4.2 4.1   Chloride Latest Ref Range: 95 - 110 mmol/L 102 104   CO2 Latest Ref Range: 23 - 29 mmol/L 26 25   Anion Gap Latest Ref Range: 8 - 16 mmol/L 9 11   BUN, Bld Latest Ref Range: 8 - 23 mg/dL 16 12   Creatinine Latest Ref Range: 0.5 - 1.4 mg/dL 1.1 1.0   eGFR if non African American Latest Ref Range: >60 mL/min/1.73 m^2 51.7 (A) 58.0 (A)   eGFR if African American Latest Ref Range: >60 mL/min/1.73 m^2 59.6 (A) >60.0   Glucose Latest Ref Range: 70 - 110 mg/dL 207 (H) 222 (H)   Calcium Latest Ref Range: 8.7 - 10.5 mg/dL 9.4 9.5   Alkaline Phosphatase Latest Ref Range: 55 - 135 U/L 73    Total Protein Latest Ref Range: 6.0 - 8.4 g/dL 7.0    Albumin Latest Ref Range: 3.5 - 5.2 g/dL 3.5    Total Bilirubin Latest Ref Range: 0.1 - 1.0 mg/dL 0.9    AST Latest Ref Range: 10 - 40 U/L 14    ALT Latest Ref Range: 10 - 44 U/L 19    Triglycerides Latest Ref Range: 30 - 150 mg/dL 115    Cholesterol Latest Ref Range: 120 - 199 mg/dL 143    HDL Latest Ref Range: 40 - 75 mg/dL 61    LDL Cholesterol Latest Ref Range: 63.0 - 159.0 mg/dL 59.0 (L)    Total Cholesterol/HDL Ratio Latest Ref Range: 2.0 - 5.0  2.3    Vit D, 25-Hydroxy Latest Ref Range: 30 - 96 ng/mL 41    Hemoglobin A1C Latest Ref Range: 4.0 - 5.6 % 8.2 (H) 10.2 (H)   Estimated Avg Glucose Latest Ref Range: 68 - 131 mg/dL 189 (H) 246 (H)     Assessment:       1. Uncontrolled type 2 diabetes mellitus without complication, with long-term current use of insulin    2. Hyperlipidemia associated with type 2 diabetes mellitus    3. Acquired hypothyroidism    4. CKD (chronic kidney disease) stage 3, GFR 30-59 ml/min    5. Type 2 diabetes mellitus with  hyperosmolarity without coma, with long-term current use of insulin        Plan:       Jaimie was seen today for follow-up.    Diagnoses and all orders for this visit:    Uncontrolled type 2 diabetes mellitus without complication, with long-term current use of insulin- restart lantus at 8u.  Monitor sugars.  Send me sugars 1 week.  See DM nurse 2 weeks, RTC 2 mo to me.  -     Ambulatory Referral to Diabetes Education  -     insulin glargine (LANTUS SOLOSTAR) 100 unit/mL (3 mL) InPn pen; Inject 8 Units into the skin once daily.    Hyperlipidemia associated with type 2 diabetes mellitus- stable on statin.    Acquired hypothyroidism- clin stable.    CKD (chronic kidney disease) stage 3, GFR 30-59 ml/min- stable.    RTC 2 mo.  fluvax now.

## 2017-10-18 ENCOUNTER — PATIENT MESSAGE (OUTPATIENT)
Dept: INTERNAL MEDICINE | Facility: CLINIC | Age: 67
End: 2017-10-18

## 2017-10-23 ENCOUNTER — OFFICE VISIT (OUTPATIENT)
Dept: PULMONOLOGY | Facility: CLINIC | Age: 67
End: 2017-10-23
Payer: MEDICARE

## 2017-10-23 VITALS
DIASTOLIC BLOOD PRESSURE: 74 MMHG | HEIGHT: 67 IN | SYSTOLIC BLOOD PRESSURE: 112 MMHG | HEART RATE: 86 BPM | RESPIRATION RATE: 17 BRPM | OXYGEN SATURATION: 95 % | WEIGHT: 217.13 LBS | BODY MASS INDEX: 34.08 KG/M2

## 2017-10-23 DIAGNOSIS — G47.33 OSA (OBSTRUCTIVE SLEEP APNEA): Primary | ICD-10-CM

## 2017-10-23 PROCEDURE — 99213 OFFICE O/P EST LOW 20 MIN: CPT | Mod: S$GLB,,, | Performed by: NURSE PRACTITIONER

## 2017-10-23 PROCEDURE — 99999 PR PBB SHADOW E&M-EST. PATIENT-LVL IV: CPT | Mod: PBBFAC,,, | Performed by: NURSE PRACTITIONER

## 2017-10-23 NOTE — PROGRESS NOTES
"Subjective:      Patient ID: Jaimie Luque is a 67 y.o. female.    Chief Complaint: Sleep Apnea    Presents to office for review of CPAP therapy. Patient states improved symptoms with use of CPAP. Sleeping more soundly. Waking up feeling more refreshed. Improved daytime sleepiness. Patient states she is benefiting from use of the CPAP.   Patient Active Problem List:     Acquired hypothyroidism     Hyperlipidemia associated with type 2 diabetes mellitus     Psoriasis     Obesity (BMI 35.0-39.9 without comorbidity)     PHN (postherpetic neuralgia)     Uncontrolled type 2 diabetes mellitus without complication, with long-term current use of insulin     TALIA on CPAP              /74   Pulse 86   Resp 17   Ht 5' 7" (1.702 m)   Wt 98.5 kg (217 lb 2.5 oz)   SpO2 95%   BMI 34.01 kg/m²   Body mass index is 34.01 kg/m².    Review of Systems   Constitutional: Negative.    HENT: Negative.    Respiratory: Negative.    Cardiovascular: Negative.    Musculoskeletal: Negative.    Gastrointestinal: Negative.    Neurological: Negative.    Psychiatric/Behavioral: Negative.      Objective:      Physical Exam   Constitutional: She is oriented to person, place, and time. She appears well-developed and well-nourished.   HENT:   Head: Normocephalic and atraumatic.   Mouth/Throat: Oropharynx is clear and moist.   Neck: Normal range of motion. Neck supple.   Cardiovascular: Normal rate and regular rhythm.  Exam reveals no gallop.    No murmur heard.  Pulmonary/Chest: Effort normal and breath sounds normal.   Abdominal: Soft. There is no tenderness.   Musculoskeletal: Normal range of motion. She exhibits no edema.   Neurological: She is alert and oriented to person, place, and time.   Skin: Skin is warm and dry.   Psychiatric: She has a normal mood and affect.     Personal Diagnostic Review      70% compliance over last 30 days      Assessment:       1. TALIA (obstructive sleep apnea)        Outpatient Encounter Prescriptions as of " 10/23/2017   Medication Sig Dispense Refill    albiglutide (TANZEUM) 30 mg/0.5 mL PnIj Inject 30 mg into the skin once a week. 12 each 3    aspirin 81 mg Tab Take by mouth. 1 Tablet Oral Every day      atorvastatin (LIPITOR) 20 MG tablet TAKE 1 TABLET BY MOUTH EVERY DAY 90 tablet 3    blood sugar diagnostic Strp TRUEresToothpick Matrix Blood Glucose Monitoring System  Dx:  E11.9   Medically Necessary 1 each 3    canagliflozin (INVOKANA) 300 mg Tab tablet Take 1 tablet (300 mg total) by mouth once daily. 90 tablet 3    cholecalciferol, vitamin D3, 2,000 unit Tab Take by mouth. 1 Tablet Oral Every day      gabapentin (NEURONTIN) 300 MG capsule TAKE 1 CAPSULE BY MOUTH THREE TIMES DAILY 270 capsule 1    insulin glargine (LANTUS SOLOSTAR) 100 unit/mL (3 mL) InPn pen Inject 8 Units into the skin once daily. 1 Box 11    lancets (ACCU-CHEK SOFTCLIX LANCETS) Misc Accuchek Multiclix drum lancets, [facility]  Dx:  E11.9 306 each 3    levothyroxine (SYNTHROID) 112 MCG tablet TAKE 1 TABLET(112 MCG) BY MOUTH EVERY DAY 90 tablet 3    lisinopril (PRINIVIL,ZESTRIL) 5 MG tablet TAKE 1 TABLET BY MOUTH EVERY DAY 90 tablet 3    loratadine (CLARITIN) 10 mg tablet Take 10 mg by mouth.      meclizine (ANTIVERT) 25 mg tablet Take 1 tablet by mouth as needed.       metformin (GLUCOPHAGE-XR) 500 MG 24 hr tablet Take 2 tablets (1,000 mg total) by mouth 2 (two) times daily with meals. 30 tablet 11     No facility-administered encounter medications on file as of 10/23/2017.      Orders Placed This Encounter   Procedures    CPAP/BIPAP SUPPLIES     [facility]. 90 day supply with 3 refills.     Order Specific Question:   Type of mask:     Answer:   Nasal     Order Specific Question:   Headgear?     Answer:   Yes     Order Specific Question:   Tubing?     Answer:   Yes     Order Specific Question:   Humidifier chamber?     Answer:   Yes     Order Specific Question:   Chin strap?     Answer:   Yes     Order Specific Question:    Filters?     Answer:   Yes     Order Specific Question:   Length of need (1-99 months):     Answer:   99     Plan:      Doing well on PAP settings. Patient is compliant. Follow up in 12 months with PAP data download or call earlier if any problems.  CPAP supply order

## 2017-10-27 ENCOUNTER — CLINICAL SUPPORT (OUTPATIENT)
Dept: DIABETES | Facility: CLINIC | Age: 67
End: 2017-10-27
Payer: MEDICARE

## 2017-10-27 VITALS — BODY MASS INDEX: 32.74 KG/M2 | WEIGHT: 216.06 LBS | HEIGHT: 68 IN

## 2017-10-27 PROCEDURE — 99999 PR PBB SHADOW E&M-EST. PATIENT-LVL III: CPT | Mod: PBBFAC,,, | Performed by: DIETITIAN, REGISTERED

## 2017-10-27 PROCEDURE — G0108 DIAB MANAGE TRN  PER INDIV: HCPCS | Mod: S$GLB,,, | Performed by: DIETITIAN, REGISTERED

## 2017-10-27 NOTE — PROGRESS NOTES
"PCP:  Jessica Ward MD  REFERRING PROVIDER:  Jessica Ward MD    HISTORY OF PRESENT ILLNESS:  67 y.o. female patient is in clinic today for diabetes f/u. Patient currently takes invokana 300mg 1 tab daily, lantus 10 units, metformin 1000 mg BID, Tanzeum 30 mg weekly for diabetes. Rarely missing doses. 25 lb weight loss since last visit with diabetes educator. Patient reports decreased appetite and increase BM frequency with start of metformin in January. Fasting BG readings range 140-204 since restart of Lantus early October. Patient has had several diabetes medication changes in the past 6 months. Overall trends are improving.    Hemoglobin A1C   Date Value Ref Range Status   10/05/2017 10.2 (H) 4.0 - 5.6 % Final     Comment:     According to ADA guidelines, hemoglobin A1c <7.0% represents  optimal control in non-pregnant diabetic patients. Different  metrics may apply to specific patient populations.   Standards of Medical Care in Diabetes-2016.  For the purpose of screening for the presence of diabetes:  <5.7%     Consistent with the absence of diabetes  5.7-6.4%  Consistent with increasing risk for diabetes   (prediabetes)  >or=6.5%  Consistent with diabetes  Currently, no consensus exists for use of hemoglobin A1c  for diagnosis of diabetes for children.  This Hemoglobin A1c assay has significant interference with fetal   hemoglobin   (HbF). The results are invalid for patients with abnormal amounts of   HbF,   including those with known Hereditary Persistence   of Fetal Hemoglobin. Heterozygous hemoglobin variants (HbAS, HbAC,   HbAD, HbAE, HbA2) do not significantly interfere with this assay;   however, presence of multiple variants in a sample may impact the %   interference.     , ADA recommends less than 7.0.      VITAL SIGNS:  Height: 5' 7.5" (171.5 cm)   Weight: 98 kg (216 lb 0.8 oz)   IBW: 137.5 lbs +/-10% and AdjIBW: 163 lbs/74kg    ALLERGIES & MEDICATIONS: Reviewed and " Reconciled  MEDICAL/SURGICAL & FAMILY HISTORY: Reviewed and Reconciled    LABORATORY:  Reviewed Diabetes Management Flowsheet and Results Review.    HEALTH MAINTENANCE: Reviewed and Reconciled  Eye exam:  2/17 Chloe Sandoval  Dental exam: Recommend regular exams; denies gums bleeding.  Podiatry exam:  7/17    SELF-MONITORING: Glucometer - Accucheck Varsha; Food - none are avail    ACTIVITY LEVEL: Walking 4 days per week, at least 3000 steps per day.     NUTRITION INTAKE: Meal patterns include 3 meals, 1-2 snacks daily with intake 2143-4444 cals/d. 4-5 days for one gallon of milk.  B - special k OR cheerios R Life w/ milk (whole) - milk (whole), water  S - none  L - leftovers OR sandwich - milk OR water  S - raspberries  D - tempura fried chicken strips, strawberries - milk  S - none  Beverages - milk (whole), water  DIning out - 2x/month    PSYCHOSOCIAL: Stage of change - contemplation  Barriers to change - readiness    EDUCATIONAL ASSESSMENT:   1. Impediments in learning class environment - NONE.  2. Needs improvement in self care management skills.  -healthy eating  -being active  -self-monitoring  -taking medication  -problem solving  -healthy coping  -reducing risks    MNT ASSESSMENT:  2432-6361 calories, 5-6 ounces protein daily  30-45 grams carb/meal, 5-15 grams carb/snack  increase fruit 2 serv/d, vegetables 2+ cups/d, whole grains 3+serv/d, lowfat dairy 3+serv/d  low-fat, low-sodium  150 min physical activity per week, moderate intensity, as tolerated      PLAN:  · Reviewed pathophysiology diabetes, complications and importance of annual dilated eye exams, regular dental exams, and daily foot self-exams.    · Encouraged daily self-monitoring of glucose, food and activity patterns, return records to clinic.      Reviewed glucose goals. Discussed prevention, identification and treatment of hyperglycemia and hypoglycemia and when to contact clinic. Instructed to test glucose twice daily - 2 hr pp rotating  times as needed.   · Reviewed action of diabetes medications and to continue taking as prescribed. Will increase Lantus to 12 units daily - note sent to PCP. Instructed her to send glu readings via mychart or phone in 1 week.   Reviewed micro/macronutrient effect on health management. Reviewed principles of energy metabolism to assist weight and health management.    Discussed importance of daily physical activity with review of benefits, methods, guidelines and precautions.   Discussed behavioral strategies for improving social and environmental support of lifestyle changes. Counseling provided to assist in goal-setting, reviewing complications of diabetes as independent to other health indicators.    Reviewed ADA goals, progress.   GOALS: Self monitoring: daily food & activity journal. Meal plan-90% accuracy, Physical activity-150 minutes per week.   Visit Time Spent:  30 minutes    Thank you for the opportunity to work with your patient.

## 2017-10-31 ENCOUNTER — PATIENT MESSAGE (OUTPATIENT)
Dept: ADMINISTRATIVE | Facility: OTHER | Age: 67
End: 2017-10-31

## 2017-11-20 ENCOUNTER — TELEPHONE (OUTPATIENT)
Dept: DIABETES | Facility: CLINIC | Age: 67
End: 2017-11-20

## 2017-11-21 NOTE — TELEPHONE ENCOUNTER
Called and spoke with patient. Patient stated that she is seeing someone for her diabetes management.

## 2017-11-26 RX ORDER — ATORVASTATIN CALCIUM 20 MG/1
TABLET, FILM COATED ORAL
Qty: 90 TABLET | Refills: 3 | Status: SHIPPED | OUTPATIENT
Start: 2017-11-26 | End: 2018-11-12 | Stop reason: SDUPTHER

## 2017-11-29 ENCOUNTER — PATIENT MESSAGE (OUTPATIENT)
Dept: DIABETES | Facility: CLINIC | Age: 67
End: 2017-11-29

## 2017-12-07 ENCOUNTER — PATIENT MESSAGE (OUTPATIENT)
Dept: DIABETES | Facility: CLINIC | Age: 67
End: 2017-12-07

## 2017-12-14 DIAGNOSIS — B02.29 PHN (POSTHERPETIC NEURALGIA): ICD-10-CM

## 2017-12-14 RX ORDER — GABAPENTIN 300 MG/1
CAPSULE ORAL
Qty: 270 CAPSULE | Refills: 1 | Status: SHIPPED | OUTPATIENT
Start: 2017-12-14 | End: 2018-06-19 | Stop reason: SDUPTHER

## 2017-12-20 ENCOUNTER — OFFICE VISIT (OUTPATIENT)
Dept: INTERNAL MEDICINE | Facility: CLINIC | Age: 67
End: 2017-12-20
Payer: MEDICARE

## 2017-12-20 ENCOUNTER — LAB VISIT (OUTPATIENT)
Dept: LAB | Facility: HOSPITAL | Age: 67
End: 2017-12-20
Attending: INTERNAL MEDICINE
Payer: MEDICARE

## 2017-12-20 VITALS
WEIGHT: 211.88 LBS | TEMPERATURE: 97 F | BODY MASS INDEX: 32.11 KG/M2 | HEIGHT: 68 IN | HEART RATE: 76 BPM | SYSTOLIC BLOOD PRESSURE: 92 MMHG | DIASTOLIC BLOOD PRESSURE: 60 MMHG | OXYGEN SATURATION: 98 %

## 2017-12-20 DIAGNOSIS — R63.0 ANOREXIA: ICD-10-CM

## 2017-12-20 DIAGNOSIS — E78.5 HYPERLIPIDEMIA ASSOCIATED WITH TYPE 2 DIABETES MELLITUS: ICD-10-CM

## 2017-12-20 DIAGNOSIS — G47.33 OSA ON CPAP: ICD-10-CM

## 2017-12-20 DIAGNOSIS — R63.4 WEIGHT LOSS: ICD-10-CM

## 2017-12-20 DIAGNOSIS — E03.9 ACQUIRED HYPOTHYROIDISM: ICD-10-CM

## 2017-12-20 DIAGNOSIS — E11.69 HYPERLIPIDEMIA ASSOCIATED WITH TYPE 2 DIABETES MELLITUS: ICD-10-CM

## 2017-12-20 DIAGNOSIS — M79.675 GREAT TOE PAIN, LEFT: ICD-10-CM

## 2017-12-20 DIAGNOSIS — B02.29 PHN (POSTHERPETIC NEURALGIA): ICD-10-CM

## 2017-12-20 LAB
CREAT SERPL-MCNC: 0.9 MG/DL
EST. GFR  (AFRICAN AMERICAN): >60 ML/MIN/1.73 M^2
EST. GFR  (NON AFRICAN AMERICAN): >60 ML/MIN/1.73 M^2

## 2017-12-20 PROCEDURE — 82565 ASSAY OF CREATININE: CPT | Mod: PO

## 2017-12-20 PROCEDURE — 99999 PR PBB SHADOW E&M-EST. PATIENT-LVL III: CPT | Mod: PBBFAC,,, | Performed by: INTERNAL MEDICINE

## 2017-12-20 PROCEDURE — 99499 UNLISTED E&M SERVICE: CPT | Mod: S$GLB,,, | Performed by: INTERNAL MEDICINE

## 2017-12-20 PROCEDURE — 36415 COLL VENOUS BLD VENIPUNCTURE: CPT | Mod: PO

## 2017-12-20 PROCEDURE — 99214 OFFICE O/P EST MOD 30 MIN: CPT | Mod: S$GLB,,, | Performed by: INTERNAL MEDICINE

## 2017-12-20 RX ORDER — LISINOPRIL 2.5 MG/1
2.5 TABLET ORAL DAILY
Qty: 90 TABLET | Refills: 3 | Status: SHIPPED | OUTPATIENT
Start: 2017-12-20 | End: 2018-03-28 | Stop reason: SDUPTHER

## 2017-12-20 NOTE — PROGRESS NOTES
Subjective:       Patient ID: Jaimie Luque is a 67 y.o. female.    Chief Complaint: Follow-up    Here for follow up of medical problems.  Seeing DM team.  Up to Lantus 16u daily now.  Avg AC breakfast: 152.  Walking more.  Has lost 22# purposefully in past 6mo.  She says purposefully but really has no appetite, attributes to metformin.  Sugars have been increasing despite her wt loss.  Doing well on CPAP.  Sleeping well.  Still needs gabapentin for PHN, from shingles about 5y ago.  Energy much better since CPAP.  Left great toe pain on and off.    Updated/ annual due 7/18:  HM: 4/17 HAV#2, 10/17 fluvax, 3/15 [id number], 3/14 [id number], 9/09 TDaP, 11/12 zoster, 6/16 BMD rep 5y, 5/17 Cscope rep 5y, 6/16 mmg(q2), 2/17 Eye Dr. Franco, 9/16 HCV neg.            Diabetes   She has type 2 diabetes mellitus. No MedicAlert identification noted. The initial diagnosis of diabetes was made 19 years ago. Hypoglycemia symptoms include sleepiness. Pertinent negatives for hypoglycemia include no confusion, dizziness, headaches, hunger, mood changes, nervousness/anxiousness, pallor, seizures, speech difficulty, sweats or tremors. Associated symptoms include weight loss. Pertinent negatives for diabetes include no blurred vision, no chest pain, no fatigue, no foot paresthesias, no foot ulcerations, no polydipsia, no polyphagia, no polyuria, no visual change and no weakness. Pertinent negatives for hypoglycemia complications include no blackouts, no hospitalization, no nocturnal hypoglycemia and no required assistance. Symptoms are improving. Pertinent negatives for diabetic complications include no autonomic neuropathy, CVA, heart disease, impotence, nephropathy, peripheral neuropathy, PVD or retinopathy. Risk factors for coronary artery disease include obesity, sedentary lifestyle and diabetes mellitus. Current diabetic treatment includes diet, insulin injections and oral agent (triple therapy). She is compliant with treatment most  "of the time. She is currently taking insulin pre-breakfast. Insulin injections are given by patient. Rotation sites for injection include the abdominal wall. Her weight is decreasing steadily. She has had a previous visit with a dietitian. She participates in exercise intermittently. She monitors blood glucose at home 1-2 x per day. She monitors urine at home <1 x per month. Blood glucose monitoring compliance is fair. She does not see a podiatrist.Eye exam is current.     Review of Systems   Constitutional: Positive for weight loss. Negative for chills, diaphoresis, fatigue and fever.   Eyes: Negative for blurred vision.   Respiratory: Negative for cough and shortness of breath.    Cardiovascular: Negative for chest pain, palpitations and leg swelling.   Gastrointestinal: Negative for blood in stool, constipation, diarrhea, nausea and vomiting.   Endocrine: Negative for polydipsia, polyphagia and polyuria.   Genitourinary: Negative for dysuria, frequency, hematuria and impotence.   Skin: Negative for pallor.   Neurological: Negative for dizziness, tremors, seizures, speech difficulty, weakness and headaches.   Psychiatric/Behavioral: Negative for confusion. The patient is not nervous/anxious.        Objective:   BP 92/60 (BP Location: Right arm, Patient Position: Sitting)   Pulse 76   Temp 96.7 °F (35.9 °C) (Tympanic)   Ht 5' 7.5" (1.715 m)   Wt 96.1 kg (211 lb 13.8 oz)   SpO2 98%   BMI 32.69 kg/m²     Physical Exam   Constitutional: She is oriented to person, place, and time. She appears well-developed.   HENT:   Mouth/Throat: Oropharynx is clear and moist.   Neck: Neck supple. Carotid bruit is not present. No thyroid mass present.   Cardiovascular: Normal rate, regular rhythm and intact distal pulses.  Exam reveals no gallop and no friction rub.    No murmur heard.  Pulmonary/Chest: Effort normal and breath sounds normal. She has no wheezes. She has no rales.   Abdominal: Soft. Bowel sounds are normal. She " exhibits no mass. There is no hepatosplenomegaly. There is no tenderness.   Musculoskeletal: She exhibits no edema.   Lymphadenopathy:     She has no cervical adenopathy.   Neurological: She is alert and oriented to person, place, and time.   Psychiatric: She has a normal mood and affect.       Assessment:       1. Uncontrolled type 2 diabetes mellitus without complication, with long-term current use of insulin    2. Acquired hypothyroidism    3. Hyperlipidemia associated with type 2 diabetes mellitus    4. TALIA on CPAP    5. Great toe pain, left    6. PHN (postherpetic neuralgia)        Plan:       Jaimie was seen today for follow-up.    Diagnoses and all orders for this visit:    Uncontrolled type 2 diabetes mellitus without complication, with long-term current use of insulin- continue present treatment with DM team, recheck 3mo.  BP running low since wt loss- DECREASE lisinopril to 2.5mg daily.  -     Hemoglobin A1c; Future    Acquired hypothyroidism- clin stable.    Hyperlipidemia associated with type 2 diabetes mellitus    TALIA on CPAP- doing well.    Great toe pain, left- likely PN due to DM, wants further eval.  -     Ambulatory consult to Podiatry    PHN (postherpetic neuralgia)- cont gabapentin.    Anorexia, 22# wt loss, ongoing PHN, worsening DM- CT abd/pelvis with DC.

## 2018-01-03 ENCOUNTER — HOSPITAL ENCOUNTER (OUTPATIENT)
Dept: RADIOLOGY | Facility: HOSPITAL | Age: 68
Discharge: HOME | End: 2018-01-03
Attending: INTERNAL MEDICINE
Payer: MEDICARE

## 2018-01-03 ENCOUNTER — PATIENT MESSAGE (OUTPATIENT)
Dept: INTERNAL MEDICINE | Facility: CLINIC | Age: 68
End: 2018-01-03

## 2018-01-03 DIAGNOSIS — R63.4 WEIGHT LOSS: ICD-10-CM

## 2018-01-03 DIAGNOSIS — R63.0 ANOREXIA: ICD-10-CM

## 2018-01-03 PROCEDURE — 25500020 PHARM REV CODE 255: Performed by: INTERNAL MEDICINE

## 2018-01-03 PROCEDURE — 74178 CT ABD&PLV WO CNTR FLWD CNTR: CPT | Mod: TC

## 2018-01-03 RX ADMIN — IOHEXOL 75 ML: 350 INJECTION, SOLUTION INTRAVENOUS at 10:01

## 2018-01-05 ENCOUNTER — TELEPHONE (OUTPATIENT)
Dept: INTERNAL MEDICINE | Facility: CLINIC | Age: 68
End: 2018-01-05

## 2018-01-05 NOTE — TELEPHONE ENCOUNTER
S/w pt.  Confirmed receiving results on portal.  Scheduled appt with Zuleima next week as ordered.  Pt verbalized understanding./rpr

## 2018-01-05 NOTE — TELEPHONE ENCOUNTER
----- Message from Jessica Ward MD sent at 1/3/2018  6:00 PM CST -----  I released the results to pt, but please tell her I want a back-up appt with Zuleima next week so we can go over the next tests needs and schedule that appt.  SM

## 2018-01-10 ENCOUNTER — OFFICE VISIT (OUTPATIENT)
Dept: PODIATRY | Facility: CLINIC | Age: 68
End: 2018-01-10
Payer: MEDICARE

## 2018-01-10 ENCOUNTER — OFFICE VISIT (OUTPATIENT)
Dept: INTERNAL MEDICINE | Facility: CLINIC | Age: 68
End: 2018-01-10
Payer: MEDICARE

## 2018-01-10 VITALS
BODY MASS INDEX: 32.18 KG/M2 | SYSTOLIC BLOOD PRESSURE: 115 MMHG | HEART RATE: 75 BPM | WEIGHT: 212.31 LBS | HEIGHT: 68 IN | DIASTOLIC BLOOD PRESSURE: 73 MMHG

## 2018-01-10 VITALS
DIASTOLIC BLOOD PRESSURE: 62 MMHG | TEMPERATURE: 95 F | HEIGHT: 68 IN | WEIGHT: 211.19 LBS | BODY MASS INDEX: 32.01 KG/M2 | HEART RATE: 88 BPM | SYSTOLIC BLOOD PRESSURE: 98 MMHG | OXYGEN SATURATION: 98 %

## 2018-01-10 DIAGNOSIS — R91.1 PULMONARY NODULE: ICD-10-CM

## 2018-01-10 DIAGNOSIS — G57.92 NEURITIS OF FOOT, LEFT: Primary | ICD-10-CM

## 2018-01-10 DIAGNOSIS — R63.4 WEIGHT LOSS: ICD-10-CM

## 2018-01-10 DIAGNOSIS — R93.5 ABNORMAL ABDOMINAL CT SCAN: Primary | ICD-10-CM

## 2018-01-10 PROCEDURE — 99213 OFFICE O/P EST LOW 20 MIN: CPT | Mod: S$GLB,,, | Performed by: PHYSICIAN ASSISTANT

## 2018-01-10 PROCEDURE — 99203 OFFICE O/P NEW LOW 30 MIN: CPT | Mod: S$GLB,,, | Performed by: PODIATRIST

## 2018-01-10 PROCEDURE — 99999 PR PBB SHADOW E&M-EST. PATIENT-LVL III: CPT | Mod: PBBFAC,,, | Performed by: PODIATRIST

## 2018-01-10 PROCEDURE — 99999 PR PBB SHADOW E&M-EST. PATIENT-LVL IV: CPT | Mod: PBBFAC,,, | Performed by: PHYSICIAN ASSISTANT

## 2018-01-10 NOTE — LETTER
January 10, 2018      Jessica Ward MD  [address] [facility] [place] [address]           [facility] - Podiatry  [address] [place] [place] [address]  Phone: [phone]  Fax: [phone]          Patient: Jaimie Luque   MR Number: [id number]   YOB: 1950   Date of Visit: 1/10/2018       Dear Dr. Jessica Ward:    Thank you for referring Jaimie Luque to me for evaluation. Attached you will find relevant portions of my assessment and plan of care.    If you have questions, please do not hesitate to call me. I look forward to following Jaimie Luque along with you.    Sincerely,    Isiah Ohara DPM    Enclosure  CC:  No Recipients    If you would like to receive this communication electronically, please contact [email or web address] or [phone] to request more information on [facility] access.    For providers and/or their staff who would like to refer a patient to [facility], please contact us through our one-stop-shop provider referral line, [facility], at [phone].    If you feel you have received this communication in error or would no longer like to receive these types of communications, please e-mail [email or web address]

## 2018-01-10 NOTE — PROGRESS NOTES
Subjective:     Patient ID: Jaimie Luque is a 68 y.o. female.    Chief Complaint: Toe Pain (left great toenail/toe, patient described the pain as an ache)    Jaimie is a 68 y.o. female who presents to the podiatry clinic  with complaint of  left big toe pain. Onset of the symptoms was several months ago. Precipitating event: none known. Current symptoms include: pain, numbness, and tingling. . Aggravating factors: inactivity. Symptoms have been intermittent. Patient has had no prior foot problems. Evaluation to date: none. Treatment to date: Gabapentine due to h/o Shingles. Patients rates pain 2/10 on pain scale. Patient admits she is diabeitc and her blood sugar runs 140-170mg/dl.       Patient Active Problem List   Diagnosis    Acquired hypothyroidism    Hyperlipidemia associated with type 2 diabetes mellitus    Psoriasis    Obesity (BMI 35.0-39.9 without comorbidity)    PHN (postherpetic neuralgia)    Uncontrolled type 2 diabetes mellitus without complication, with long-term current use of insulin    TALIA on CPAP       Medication List with Changes/Refills   Current Medications    ALBIGLUTIDE (TANZEUM) 30 MG/0.5 ML PNIJ    Inject 30 mg into the skin once a week.    ASPIRIN 81 MG TAB    Take by mouth. 1 Tablet Oral Every day    ATORVASTATIN (LIPITOR) 20 MG TABLET    TAKE 1 TABLET BY MOUTH EVERY DAY    BLOOD SUGAR DIAGNOSTIC STRP    [employer] Matrix Blood Glucose Monitoring System  Dx:  E11.9   Medically Necessary    CANAGLIFLOZIN (INVOKANA) 300 MG TAB TABLET    Take 1 tablet (300 mg total) by mouth once daily.    CHOLECALCIFEROL, VITAMIN D3, 2,000 UNIT TAB    Take by mouth. 1 Tablet Oral Every day    GABAPENTIN (NEURONTIN) 300 MG CAPSULE    TAKE 1 CAPSULE BY MOUTH THREE TIMES DAILY    INSULIN GLARGINE (LANTUS SOLOSTAR) 100 UNIT/ML (3 ML) INPN PEN    Inject 8 Units into the skin once daily.    LANCETS (ACCU-CHEK SOFTCLIX LANCETS) MISC    Accuchek Multiclix drum lancets, [facility]  Dx:  E11.9     "LEVOTHYROXINE (SYNTHROID) 112 MCG TABLET    TAKE 1 TABLET(112 MCG) BY MOUTH EVERY DAY    LISINOPRIL (PRINIVIL,ZESTRIL) 2.5 MG TABLET    Take 1 tablet (2.5 mg total) by mouth once daily.    LORATADINE (CLARITIN) 10 MG TABLET    Take 10 mg by mouth.    MECLIZINE (ANTIVERT) 25 MG TABLET    Take 1 tablet by mouth as needed.     METFORMIN (GLUCOPHAGE-XR) 500 MG 24 HR TABLET    Take 2 tablets (1,000 mg total) by mouth 2 (two) times daily with meals.       Review of patient's allergies indicates:   Allergen Reactions    Adhesive tape-silicones      Other reaction(s): skin irritation to area    Penicillins        Past Surgical History:   Procedure Laterality Date    COLONOSCOPY N/A 5/2/2017    Procedure: COLONOSCOPY;  Surgeon: Noe Penn III, MD;  Location: [facility];  Service: Endoscopy;  Laterality: N/A;    HYSTERECTOMY      ROBOTIC ASSISTED HYSTERECTOMY         Family History   Problem Relation Age of Onset    Heart disease Father     Melanoma Neg Hx     Lupus Neg Hx        Social History     Social History    Marital status:      Spouse name: N/A    Number of children: N/A    Years of education: N/A     Occupational History    Not on file.     Social History Main Topics    Smoking status: Never Smoker    Smokeless tobacco: Never Used    Alcohol use Yes      Comment: rare    Drug use: No    Sexual activity: Yes     Partners: Male     Other Topics Concern    Not on file     Social History Narrative    . Lives with spouse. Has 3 children. Patient retired as  for [employer].        Vitals:    01/10/18 1334   BP: 115/73   Pulse: 75   Weight: 96.3 kg (212 lb 4.9 oz)   Height: 5' 7.5" (1.715 m)   PainSc: 0-No pain       Hemoglobin A1C   Date Value Ref Range Status   10/05/2017 10.2 (H) 4.0 - 5.6 % Final     Comment:     According to ADA guidelines, hemoglobin A1c <7.0% represents  optimal control in non-pregnant diabetic patients. Different  metrics may apply to specific " patient populations.   Standards of Medical Care in Diabetes-2016.  For the purpose of screening for the presence of diabetes:  <5.7%     Consistent with the absence of diabetes  5.7-6.4%  Consistent with increasing risk for diabetes   (prediabetes)  >or=6.5%  Consistent with diabetes  Currently, no consensus exists for use of hemoglobin A1c  for diagnosis of diabetes for children.  This Hemoglobin A1c assay has significant interference with fetal   hemoglobin   (HbF). The results are invalid for patients with abnormal amounts of   HbF,   including those with known Hereditary Persistence   of Fetal Hemoglobin. Heterozygous hemoglobin variants (HbAS, HbAC,   HbAD, HbAE, HbA2) do not significantly interfere with this assay;   however, presence of multiple variants in a sample may impact the %   interference.     07/05/2017 8.2 (H) 4.0 - 5.6 % Final     Comment:     According to ADA guidelines, hemoglobin A1c <7.0% represents  optimal control in non-pregnant diabetic patients. Different  metrics may apply to specific patient populations.   Standards of Medical Care in Diabetes-2016.  For the purpose of screening for the presence of diabetes:  <5.7%     Consistent with the absence of diabetes  5.7-6.4%  Consistent with increasing risk for diabetes   (prediabetes)  >or=6.5%  Consistent with diabetes  Currently, no consensus exists for use of hemoglobin A1c  for diagnosis of diabetes for children.  This Hemoglobin A1c assay has significant interference with fetal   hemoglobin   (HbF). The results are invalid for patients with abnormal amounts of   HbF,   including those with known Hereditary Persistence   of Fetal Hemoglobin. Heterozygous hemoglobin variants (HbAS, HbAC,   HbAD, HbAE, HbA2) do not significantly interfere with this assay;   however, presence of multiple variants in a sample may impact the %   interference.     03/22/2017 8.2 (H) 4.5 - 6.2 % Final     Comment:     According to ADA guidelines, hemoglobin A1C  <7.0% represents  optimal control in non-pregnant diabetic patients.  Different  metrics may apply to specific populations.   Standards of Medical Care in Diabetes - 2016.  For the purpose of screening for the presence of diabetes:  <5.7%     Consistent with the absence of diabetes  5.7-6.4%  Consistent with increasing risk for diabetes   (prediabetes)  >or=6.5%  Consistent with diabetes  Currently no consensus exists for use of hemoglobin A1C  for diagnosis of diabetes for children.         Review of Systems   Constitutional: Negative for chills and fever.   Respiratory: Negative for shortness of breath.    Cardiovascular: Negative for chest pain, palpitations, orthopnea, claudication and leg swelling.   Gastrointestinal: Negative for diarrhea, nausea and vomiting.   Musculoskeletal: Negative for joint pain.   Skin: Negative for rash.   Neurological: Positive for tingling. Negative for dizziness, sensory change, focal weakness and weakness.   Psychiatric/Behavioral: Negative.              Objective:       PHYSICAL EXAM: Apperance: Alert and orient in no distress,well developed, and with good attention to grooming and body habits  Patient presents ambulating in tennis shoes.   LOWER EXTREMITY EXAM:  VASCULAR: Dorsalis pedis pulses 2/4 bilateral and Posterior Tibial pulses 2/4 bilateral. Capillary fill time <4 seconds bilateral. No edema observed bilateral. Varicosities absent bilateral. Skin temperature of the lower extremities is warm to warm, proximal to distal. Hair growth WNL bilateral.  DERMATOLOGICAL: No skin rashes, subcutaneous nodules, lesions, or ulcers observed bilateral. Nails 1,2,3,4,5 bilateral normal length and thickness. Webspaces 1-4 clean, dry and without evidence of break in skin integrity bilateral.   NEUROLOGICAL: Light touch, sharp-dull, proprioception all present and equal bilaterally.  Vibratory sensation diminished at bilateral hallux. Protective sensation intact at all 10 sites as tested  with a [place] 5.07 monofilament.   MUSCULOSKELETAL: Muscle strength is 5/5 for foot inverters, everters, plantarflexors, and dorsiflexors. Muscle tone is normal. No pain on palpation or ROM of left hallux.          Assessment:       Encounter Diagnoses   Name Primary?    Neuritis of foot, left Yes    Uncontrolled type 2 diabetes mellitus without complication, with long-term current use of insulin          Plan:   Neuritis of foot, left    Uncontrolled type 2 diabetes mellitus without complication, with long-term current use of insulin      I counseled the patient on her conditions, regarding findings of my examination, my impressions, and usual treatment plan.   Discussed with patient treatments for neuropathy consisting of topical or oral medication.  Recommendations given for over-the-counter medicine such as Two [place] Goats and/or Capsaicin.  Counseled patient on daily foot inspections and proper shoe gear.  Patient referred to physiatry for lower back evaluation.   Patient to return if symptoms persist or worsen.                 Isiah Ohara DPM  [facility]

## 2018-01-10 NOTE — PROGRESS NOTES
"Subjective:       Patient ID: Jaimie Luque is a 68 y.o. female.    Chief Complaint: Follow-up    68 year old female presents to clinic for f/u from last PCP visit for abnormal abd CT and weight loss. She reports feeling well otherwise and reports no CP, SOB, cough, fever, chills, or other medical complaints. Pt denies any hx of smoking.    Abd/pelv CT 1/3/18: "Indeterminate 3.5 x 2.9 cm hypovascular mass within the right hepatic lobe. Recommend triple phase MRI for further evaluation. 9 mm nodule within the right middle lobe. 5 mm nodule within the lingula. Recommend followup chest CT. Hypodensity is seen within the transverse colon measuring 1.1 cm, 1.2 cm within the ascending colon, and splenic flexure measure 1.5 cm. Unclear it this represents polyp because significant stool throughout; recommend correlation with screening colonoscopy after bowel prep."    Colonoscopy 5/2/17: "Diverticulosis in the sigmoid colon. No specimens collected. The exam was otherwise normal to the cecum."    PCP: Dr. Ward    Patient Active Problem List:     Acquired hypothyroidism     Hyperlipidemia associated with type 2 diabetes mellitus     Psoriasis     Obesity (BMI 35.0-39.9 without comorbidity)     PHN (postherpetic neuralgia)     Uncontrolled type 2 diabetes mellitus without complication, with long-term current use of insulin     TALIA on CPAP      Review of Systems   Constitutional: Negative for chills and fever.   Respiratory: Negative for cough and shortness of breath.    Cardiovascular: Negative for chest pain, palpitations and leg swelling.   Gastrointestinal: Negative for nausea and vomiting.   Skin: Negative for rash.   Neurological: Negative for dizziness, weakness, numbness and headaches.   Psychiatric/Behavioral: Negative for confusion.       Objective:       Vitals:    01/10/18 0849   BP: 98/62   BP Location: Right arm   Patient Position: Sitting   BP Method: Large (Manual)   Pulse: 88   Temp: (!) 95.4 °F (35.2 °C) " "  TempSrc: Tympanic   SpO2: 98%   Weight: 95.8 kg (211 lb 3.2 oz)   Height: 5' 7.5" (1.715 m)     Physical Exam   Constitutional: She is oriented to person, place, and time. She appears well-developed and well-nourished. No distress.   HENT:   Head: Normocephalic and atraumatic.   Eyes: EOM are normal. No scleral icterus.   Neck: Neck supple.   Cardiovascular: Normal rate and regular rhythm.    Pulmonary/Chest: Effort normal and breath sounds normal. No respiratory distress. She has no decreased breath sounds. She has no wheezes. She has no rhonchi. She has no rales.   Musculoskeletal: Normal range of motion.   Neurological: She is alert and oriented to person, place, and time. No cranial nerve deficit.   Skin: Skin is warm and dry.   Psychiatric: She has a normal mood and affect. Her speech is normal and behavior is normal. Thought content normal.       Assessment:       1. Abnormal abdominal CT scan    2. Weight loss    3. Pulmonary nodule        Plan:         Jaimie was seen today for follow-up.    Diagnoses and all orders for this visit:    Abnormal abdominal CT scan, Weight loss  -     Ambulatory referral to Gastroenterology  GI appt scheduled for pt for further evaluation of liver and colon abnormalities.    Pulmonary nodule  Pt refuses chest CT for further evaluation.    F/u with PCP in 2 months as scheduled for health management.  "

## 2018-01-16 ENCOUNTER — OFFICE VISIT (OUTPATIENT)
Dept: GASTROENTEROLOGY | Facility: CLINIC | Age: 68
End: 2018-01-16
Payer: MEDICARE

## 2018-01-16 ENCOUNTER — PATIENT MESSAGE (OUTPATIENT)
Dept: GASTROENTEROLOGY | Facility: CLINIC | Age: 68
End: 2018-01-16

## 2018-01-16 ENCOUNTER — LAB VISIT (OUTPATIENT)
Dept: LAB | Facility: HOSPITAL | Age: 68
End: 2018-01-16
Attending: NURSE PRACTITIONER
Payer: MEDICARE

## 2018-01-16 VITALS
HEART RATE: 78 BPM | HEIGHT: 68 IN | SYSTOLIC BLOOD PRESSURE: 98 MMHG | BODY MASS INDEX: 31.87 KG/M2 | WEIGHT: 210.31 LBS | DIASTOLIC BLOOD PRESSURE: 58 MMHG

## 2018-01-16 DIAGNOSIS — K76.9 LIVER LESION: Primary | ICD-10-CM

## 2018-01-16 DIAGNOSIS — R93.5 ABNORMAL CT OF THE ABDOMEN: ICD-10-CM

## 2018-01-16 DIAGNOSIS — K76.9 LIVER LESION: ICD-10-CM

## 2018-01-16 LAB
AFP SERPL-MCNC: 1.2 NG/ML
CANCER AG19-9 SERPL-ACNC: 5 U/ML
CEA SERPL-MCNC: 2.7 NG/ML
CREAT SERPL-MCNC: 1 MG/DL
EST. GFR  (AFRICAN AMERICAN): >60 ML/MIN/1.73 M^2
EST. GFR  (NON AFRICAN AMERICAN): 57.6 ML/MIN/1.73 M^2

## 2018-01-16 PROCEDURE — 86301 IMMUNOASSAY TUMOR CA 19-9: CPT

## 2018-01-16 PROCEDURE — 99204 OFFICE O/P NEW MOD 45 MIN: CPT | Mod: S$GLB,,, | Performed by: NURSE PRACTITIONER

## 2018-01-16 PROCEDURE — 82105 ALPHA-FETOPROTEIN SERUM: CPT

## 2018-01-16 PROCEDURE — 36415 COLL VENOUS BLD VENIPUNCTURE: CPT | Mod: PO

## 2018-01-16 PROCEDURE — 82378 CARCINOEMBRYONIC ANTIGEN: CPT

## 2018-01-16 PROCEDURE — 82565 ASSAY OF CREATININE: CPT

## 2018-01-16 PROCEDURE — 99999 PR PBB SHADOW E&M-EST. PATIENT-LVL IV: CPT | Mod: PBBFAC,,, | Performed by: NURSE PRACTITIONER

## 2018-01-16 NOTE — LETTER
January 17, 2018      EYAD Dominguez  [address] [place] [address]           [facility]  [address] [place] [address]  Phone: [phone]  Fax: [phone]          Patient: Jaimie Luque   MR Number: [id number]   YOB: 1950   Date of Visit: 1/16/2018       Dear Zuleima Eastman:    Thank you for referring Jaimie Luque to me for evaluation. Attached you will find relevant portions of my assessment and plan of care.    If you have questions, please do not hesitate to call me. I look forward to following Jaimie Luque along with you.    Sincerely,    Latoya Dotson, [facility]    Enclosure  CC:  No Recipients    If you would like to receive this communication electronically, please contact [email or web address] or [phone] to request more information on [employer] Link access.    For providers and/or their staff who would like to refer a patient to [facility], please contact us through our one-stop-shop provider referral line, [facility] , at [phone].    If you feel you have received this communication in error or would no longer like to receive these types of communications, please e-mail [email or web address]

## 2018-01-17 NOTE — PROGRESS NOTES
Clinic Consult:  [facility] Consultation Note    Reason for Consult:  The primary encounter diagnosis was Liver lesion. A diagnosis of Abnormal CT of the abdomen was also pertinent to this visit.    PCP: Jessica Luna   [address] / [place] [address]    HPI:  This is a 68 y.o. female here for evaluation of the above  Pt states that she was recently seen by her PCP for unexplained weight loss.  Workup included a CT and/pelvis.  Imaging showed a 9mm nodule in the R middle lobe of the lung.  There is a Indeterminate 3.5 x 2.9 cm hypovascular mass within the right hepatic lobe. Hypodensity is seen within the transverse colon measuring 1.1 cm, 1.2 cm within the ascending colon, and splenic flexure measure 1.5 cm.   Pt had a colonosocpy in 5/17 which showed no polyps.  There was diverticulosis but no other significant findings.   Pt states that she has otherwise been feeling well.  She has attributed the weight loss to starting metformin and that causing a decrease in appetite.  She denies any previous hx of liver disease.  Labs are unremarkable.       Review of Systems   Constitutional: Negative for chills, fever, malaise/fatigue and weight loss.   Respiratory: Negative for cough.    Cardiovascular: Negative for chest pain.   Gastrointestinal:        Per HPI   Musculoskeletal: Negative for myalgias.   Skin: Negative for itching and rash.   Neurological: Negative for headaches.   Psychiatric/Behavioral: The patient is not nervous/anxious.        Medical History:   Past Medical History:   Diagnosis Date    Diabetes mellitus type II     Hyperlipidemia     Hypothyroid     TALIA (obstructive sleep apnea)     on CPAP    Osteopenia     Psoriasis        Surgical History:  Past Surgical History:   Procedure Laterality Date    COLONOSCOPY N/A 5/2/2017    Procedure: COLONOSCOPY;  Surgeon: Noe Penn III, MD;  Location: [facility];  Service: Endoscopy;  Laterality: N/A;    HYSTERECTOMY       ROBOTIC ASSISTED HYSTERECTOMY         Family History:   Family History   Problem Relation Age of Onset    Heart disease Father     Melanoma Neg Hx     Lupus Neg Hx        Social History:   Social History   Substance Use Topics    Smoking status: Never Smoker    Smokeless tobacco: Never Used    Alcohol use Yes      Comment: rare       Allergies: Reviewed    Home Medications:   Current Outpatient Prescriptions on File Prior to Visit   Medication Sig Dispense Refill    albiglutide (TANZEUM) 30 mg/0.5 mL PnIj Inject 30 mg into the skin once a week. 12 each 3    aspirin 81 mg Tab Take by mouth. 1 Tablet Oral Every day      atorvastatin (LIPITOR) 20 MG tablet TAKE 1 TABLET BY MOUTH EVERY DAY 90 tablet 3    blood sugar diagnostic Strp [employer] Matrix Blood Glucose Monitoring System  Dx:  E11.9   Medically Necessary 1 each 3    canagliflozin (INVOKANA) 300 mg Tab tablet Take 1 tablet (300 mg total) by mouth once daily. 90 tablet 3    cholecalciferol, vitamin D3, 2,000 unit Tab Take by mouth. 1 Tablet Oral Every day      gabapentin (NEURONTIN) 300 MG capsule TAKE 1 CAPSULE BY MOUTH THREE TIMES DAILY 270 capsule 1    insulin glargine (LANTUS SOLOSTAR) 100 unit/mL (3 mL) InPn pen Inject 8 Units into the skin once daily. (Patient taking differently: Inject 16 Units into the skin once daily. ) 1 Box 11    lancets (ACCU-CHEK SOFTCLIX LANCETS) Misc Accuchek Multiclix drum lancets, [facility]  Dx:  E11.9 306 each 3    levothyroxine (SYNTHROID) 112 MCG tablet TAKE 1 TABLET(112 MCG) BY MOUTH EVERY DAY 90 tablet 3    lisinopril (PRINIVIL,ZESTRIL) 2.5 MG tablet Take 1 tablet (2.5 mg total) by mouth once daily. 90 tablet 3    loratadine (CLARITIN) 10 mg tablet Take 10 mg by mouth.      meclizine (ANTIVERT) 25 mg tablet Take 1 tablet by mouth as needed.       metformin (GLUCOPHAGE-XR) 500 MG 24 hr tablet Take 2 tablets (1,000 mg total) by mouth 2 (two) times daily with meals. 30 tablet 11     No current  "facility-administered medications on file prior to visit.        Physical Exam:  Vital Signs:  BP (!) 98/58   Pulse 78   Ht 5' 7.5" (1.715 m)   Wt 95.4 kg (210 lb 5.1 oz)   BMI 32.45 kg/m²   Body mass index is 32.45 kg/m².  Physical Exam   Constitutional: She is oriented to person, place, and time. She appears well-developed and well-nourished.   HENT:   Head: Normocephalic.   Eyes: No scleral icterus.   Neck: Normal range of motion.   Cardiovascular: Normal rate and regular rhythm.    Pulmonary/Chest: Effort normal and breath sounds normal.   Abdominal: Soft. Bowel sounds are normal. She exhibits no distension. There is no tenderness.   Musculoskeletal: Normal range of motion.   Neurological: She is alert and oriented to person, place, and time.   Skin: Skin is warm and dry.   Psychiatric: She has a normal mood and affect.   Vitals reviewed.      Labs: Pertinent labs reviewed.    Assessment:  1. Liver lesion    2. Abnormal CT of the abdomen         Recommendations:  Case discussed with Dr. Parker.   Will plan for further imaging and tumor markers for further evaluation.   May need repeat colonoscopy at some time.     Liver lesion  -     CT Chest Without Contrast; Future; Expected date: 01/16/2018  -     MRI Abdomen W WO Contrast; Future; Expected date: 01/16/2018  -     AFP tumor marker; Future; Expected date: 01/16/2018  -     CEA; Future; Expected date: 01/16/2018  -     Cancer antigen 19-9; Future; Expected date: 01/16/2018  -     CREATININE, SERUM; Future; Expected date: 01/16/2018    Abnormal CT of the abdomen  -     CT Chest Without Contrast; Future; Expected date: 01/16/2018  -     MRI Abdomen W WO Contrast; Future; Expected date: 01/16/2018  -     AFP tumor marker; Future; Expected date: 01/16/2018  -     CEA; Future; Expected date: 01/16/2018  -     Cancer antigen 19-9; Future; Expected date: 01/16/2018  -     CREATININE, SERUM; Future; Expected date: 01/16/2018        Follow up to be determined by " results of above.        Thank you so much for allowing me to participate in the care of TRES Arora

## 2018-01-22 ENCOUNTER — OFFICE VISIT (OUTPATIENT)
Dept: PHYSICAL MEDICINE AND REHAB | Facility: CLINIC | Age: 68
End: 2018-01-22
Payer: MEDICARE

## 2018-01-22 VITALS
HEIGHT: 67 IN | DIASTOLIC BLOOD PRESSURE: 64 MMHG | WEIGHT: 210 LBS | BODY MASS INDEX: 32.96 KG/M2 | SYSTOLIC BLOOD PRESSURE: 124 MMHG | HEART RATE: 71 BPM | RESPIRATION RATE: 14 BRPM

## 2018-01-22 DIAGNOSIS — M47.816 SPONDYLOSIS OF LUMBAR REGION WITHOUT MYELOPATHY OR RADICULOPATHY: Primary | ICD-10-CM

## 2018-01-22 DIAGNOSIS — M54.10 RADICULITIS OF LEG: ICD-10-CM

## 2018-01-22 PROCEDURE — 99999 PR PBB SHADOW E&M-EST. PATIENT-LVL III: CPT | Mod: PBBFAC,,, | Performed by: PHYSICAL MEDICINE & REHABILITATION

## 2018-01-22 PROCEDURE — 99204 OFFICE O/P NEW MOD 45 MIN: CPT | Mod: S$GLB,,, | Performed by: PHYSICAL MEDICINE & REHABILITATION

## 2018-01-22 NOTE — PROGRESS NOTES
PM&R NEW PATIENT HISTORY & PHYSICAL :    Referring Physician: Lev    Chief Complaint   Patient presents with    Back Pain     low back , on the left    Knee Pain     left, popletial area    Foot Pain     left       HPI: This is a 68 y.o.  female being seen in clinic today for evaluation of chronic low back achy pain with some discomfort in her left leg.  She feels stiffness in her low back and knee that both improve somewhat with moving.  She feels achy and numbness in her left great toe that has awakened her from sleep on occasion.  She denies weakness in her legs.     History obtained from patient    Functional History:  Walking: Not limited  Transfers: Independent  Assistive devices: No  Power mobility: No  Falls: None     Needs help with:  Nothing - all ADLS normal      Past family, medical, social, and surgical history reviewed in chart    Review of Systems:     General- denies lethargy, weight change, fever, chills  Head/neck- denies swallowing difficulties  ENT- denies hearing changes  Cardiovascular-denies chest pain  Pulmonary- denies shortness of breath  GI- denies constipation or bowel incontinence  - denies bladder incontinence  Skin- denies wounds or rashes  Musculoskeletal- denies weakness, +pain  Neurologic- denies numbness and tingling  Psychiatric- denies depressive or psychotic features, denies anxiety  Lymphatic-denies swelling  Endocrine- denies hypoglycemic symptoms/+DM history  All other pertinent systems negative     Physical Examination:  General: Well developed, well nourished female, NAD  HEENT:NCAT EOMI bilaterally   Pulmonary:Normal respirations    Spinal Examination: CERVICAL  Active ROM is within normal limits.  Inspection: No deformity of spinal alignment.    Spinal Examination: LUMBAR or THORACIC  Active ROM is within normal limits.  Inspection: No deformity of spinal alignment.  No palpable olisthesis.  Palpation: No vertebral tenderness to percussion.  Mild ttp at left si  joint  Facet loading neg bilaterally  SLR Test (seated ):negative bilaterally  Able to stand on heels and toes    Bilateral Upper and Lower Extremities:  Pulses are 2+ at radial,  bilaterally.  Shoulder/Elbow/Wrist/Hand ROM   Hip/Knee/Ankle ROM wnl  Bilateral Extremities show normal capillary refill.  No signs of cyanosis, rubor, edema, skin changes, or dysvascular changes of appendages.  Nails appear intact.    Neurological Exam:  Cranial Nerves:  II-XII grossly intact    Manual Muscle Testing: (Motor 5=normal)  RIGHT Lower extremity: Hip flexion 5/5, Hip Abduction 4/5, Hip Adduction 4/5, Knee extension 5/5, Knee flexion 5/5, Ankle dorsiflexion 5/5, Extensor hallucis longus 5/5, Ankle plantarflexion 5/5  LEFT Lower extremity:  Hip flexion 5/5, Hip Abduction 4/5,Hip Adduction 4/5, Knee extension 5/5, Knee flexion 5/5, Ankle dorsiflexion 5/5, Extensor hallucis longus 5/5, Ankle plantarflexion 5/5    No focal atrophy is noted of either upper or lower extremity.    Bilateral Reflexes:hypo at patellar  No clonus at knee or ankle.    Sensation: tested to light touch  - intact in legs  Gait: Narrow base and good arm swing.    IMPRESSION/PLAN: This is a 68 y.o.  female with lumbar DJD, obesity:Body mass index is 32.89 kg/m².      1. Rx for PT-Core and lower ext strengthening, stretch, ROM, modalities  2. Ice/heat modalities  3. Increase gabapentin 600mg qhs if tolerated  4. Handouts on back care, exercise, stretch provided    Cristela Valiente M.D.  Physical Medicine and Rehab

## 2018-01-22 NOTE — PATIENT INSTRUCTIONS
Possible Causes of Low Back or Leg Pain    The symptoms in your back or leg may be due to pressure on a nerve. This pressure may be caused by a damaged disk or by abnormal bone growth. Either way, you may feel pain, burning, tingling, or numbness. If you have pressure on a nerve that connects to the sciatic nerve, pain may shoot down your leg.    Pressure from the disk  Constant wear and tear can weaken a disk over time and cause back pain. The disk can then be damaged by a sudden movement or injury. If its soft center begins to bulge, the disk may press on a nerve. Or the outside of the disk may tear, and the soft center may squeeze through and pinch a nerve.    Pressure from bone  As a disk wears out, the vertebrae right above and below the disk begin to touch. This can put pressure on a nerve. Often, abnormal bone (called bone spurs) grows where the vertebrae rub against each other. This can cause the foramen or the spinal canal to narrow (called stenosis) and press against a nerve.  Date Last Reviewed: 10/4/2015  © 2734-9164 [employer]. [address] [place] [address]. All rights reserved. This information is not intended as a substitute for professional medical care. Always follow your healthcare professional's instructions.        Back Safety: Poor Posture Hurts  An unhealthy spine often starts with bad habits. Poor movement patterns and posture problems are common causes of back pain. Disk, bone, nerve, and soft tissue problems can all be affected by poor posture. They can lead to pain, stiffness, and other symptoms.    Poor posture backfires  Poor posture can cause pain. Too much slouching puts increased pressure on the disks. An excessive lumbar curve can overload and inflame the vertebrae. As a result, the back muscles may tighten or spasm to splint and protect the spine. This adds to the pain you feel.    Proper posture: The key to safe movement  Your spine bears your weight  throughout the day. This is true whether youre sleeping, standing, or bending. Certain positions strain your spine more than others. But by maintaining proper posture in all positions, you can reduce the stress on your spine.       To improve your standing posture, follow these steps:  · Breathe deeply.  · Relax your shoulders, hips, and ankles. · Think of the ears, shoulders, hips, and ankles as a series of dots. Now, adjust your body to connect the dots in a straight line.  · Tuck your buttocks in just a bit if you need to.      Date Last Reviewed: 10/28/2015  © 7407-2647 [email or web address]. [address] [place] [address], [place] [address]. All rights reserved. This information is not intended as a substitute for professional medical care. Always follow your healthcare professional's instructions.        Caring for Your Back Throughout the Day  Take care of your back throughout the day. You will likely have fewer back problems if you do. Try to warm up before you move. Shift positions often. Also do your best to form healthy habits.    Warm up for the day  Do a few slow, catlike stretches before starting your day. This simple warmup can soften your disks, stretch your back muscles, and help prevent injuries.  Shift positions often  At work and at home, change positions often. This helps keep your body from getting stiff. Stand up or lean back while you sit. If you can, get up and move every 1/2 hour.  Form healthy habits  Here are some suggestions:   · Keep a healthy weight. When you weigh too much, your back is under excess strain. But losing just a few extra pounds can help a lot.  · Try not to overeat. Learn about serving sizes. The size of a serving depends on the food and the food group. Many foods list serving sizes on the labels.  · Handle minor aches with cold and heat. Apply cold the first 24 to 48 hours. Use heat after that. Always place a thin cloth between your skin and the source of cold or  heat.  · Take medicines as directed. This helps keep pain under control. Always read labels, and call your healthcare provider or pharmacist if you have any questions.  Walk each day  A daily walk keeps your back and thigh muscles stretched and strong. This gives your back better support. Be sure to walk with your spines three curves aligned, by keeping your head, hips, and toes connected by a vertical line.   Date Last Reviewed: 10/18/2015  © 3504-6345 [employer]. [address] [place] [address]. All rights reserved. This information is not intended as a substitute for professional medical care. Always follow your healthcare professional's instructions.        Relieving Tension in Your Back  Being relaxed helps keep your mind healthy and your back ready to move. Take short breaks often. Walk around. Stretch. Switch tasks. Also give the following a try.  Make time to relax. Start by setting aside 5 minutes daily.   Deep breathing    Deep breathing is a simple way to reduce stress. You can do it almost any time you need to relax.  · Inhale slowly through your nose. Let your lungs and stomach expand.  · Hold your breath for 2 to 3 seconds.  · Exhale slowly through your mouth until your lungs feel empty. Repeat 3 to 4 times.  Relieve tension  Muscle tension can create tender spots called trigger points. The tips below may help relieve muscle tension.  · Press the trigger point if you can reach it. If not, lie on a soft tennis ball, or ask a friend to press the spot. Use steady pressure for 10 to 15 seconds. Breathe deeply. Repeat a few times.  · Massage trigger points with ice for 2 to 5 minutes. Press lightly at first. Slowly increase firmness.  Date Last Reviewed: 10/18/2015  © 0211-6243 [employer]. [address] [place] [address]. All rights reserved. This information is not intended as a substitute for professional medical care. Always follow your healthcare  professional's instructions.        Back Safety: Basics of Good Posture  Good posture helps protect you from injury. It also increases your comfort. Aim for good posture throughout the day.    Check your posture  The human body works best when it is properly aligned. To improve your standing posture, follow these steps:  · Take a moment to close your eyes and feel your body. Then breathe deeply and relax your shoulders, hips, and knees.  · Now, from the very top of your head, lift up just a bit. Think of a line linking your ears, shoulders, hips, and ankles. Adjust your body to follow the line. You may need to relax your hips and tuck your buttocks under a bit.  · Next, take a look at yourself in a mirror. Is one ear, shoulder, or hip higher than the other? They should be level.  Check how you sit  When you sit properly, pressure on your back is reduced. Try these steps:  · Sit so that the curve of your lower back fits easily against the chair. Keep your gaze level.  · Support your feet. They should be flat on the floor or on a footrest. Your knees should be level with your hips.  · Adjust the chair height as needed. Sit so your forearms are level with the work surface.  Proper posture helps  When your back is aligned, its more likely to stay safe throughout the day.  · Standing in place. Rest one foot on a stool or low box to ease pressure on your lower back. Switch feet often. If you can, adjust the height of your work surface so your neck and shoulders arent under strain.  · Driving. Sit close enough to the steering wheel to keep your knees slightly bent. For comfort, your knees should be level with your hips or just a bit lower. Sit as straight as you can. The curve of your lower back should be fully supported.  · Walking. Stand tall and walk with your head up. Let your arms swing while you walk. This helps relax muscles. Wear shoes that fit and support your feet. If you will be standing or walking for a long  time, dont wear high heels.  · Sitting and sleeping. Choose your furniture with care. Make sure its not causing or increasing your back pain. Chairs should allow for comfortable, correct sitting posture. Use pillows for added support if needed. Your bed should support your backs natural curves without being too hard or too soft.  Date Last Reviewed: 10/18/2015  © 7154-8394 [employer]. [address] [place] [address], [place] [address]. All rights reserved. This information is not intended as a substitute for professional medical care. Always follow your healthcare professional's instructions.        Degenerative Disk Disease    Spinal disks are gel-filled cushions between the bones, or vertebrae, of the spine. The disks act like shock absorbers. Over time, the disks may break down. This is called degenerative disk disease.  This condition can affect the neck or back. It is one of the most common causes of low back pain. It is the leading cause of disability in people under age 45 in the [place]. The pain often remains localized to the lower back or neck. Muscle spasm is often present and adds to the pain.  Disk degeneration is a natural part of aging. But it is not painful for most people. It may also occur as a result of repeated minor injuries due to daily activities, sports, or accidents. It may lead to osteoarthritis of the spine. Back pain related to disk disease may come and go. Or it may become chronic and last for months or years. The disk may bulge or rupture. This is called a slipped disk or herniated disk. That can put pressure on a nearby spinal nerve and cause neck or back pain that spreads down one arm or leg.  X-rays or an MRI may help to diagnose this condition. For acute pain, treatment includes anti-inflammatory medicines, muscle relaxants, rest, ice, or heat. Strong prescription pain medicines, called opioids, may be needed for short-term treatment if pain suddenly gets worse.  Opioid medicines can be addictive. So they are not advised for long-term pain management. Other types of medicines are preferred. Surgery is generally not used to treat this condition unless there is a complication.    Home care  · For neck pain: Use a comfortable pillow that supports the head and keeps the spine in a neutral position. Your head should not be tilted forward or backward.  · For back pain: Avoid sitting for long periods of time. This puts more stress on the lower back than standing or walking. Starting a regular exercise program to strengthen the supporting muscles of the spine will make it easier to live with degenerative disk disease.  · Apply an ice pack over the injured area for no more than 15 to 20 minutes. Do this every 3 to 6 hours for the first 24 to 48 hours. To make an ice pack, put ice cubes in a plastic bag that seals at the top. Wrap the bag in a clean, thin towel or cloth. Never put ice or an ice pack directly on the skin. Keep using ice packs to ease pain and swelling as needed. After 48 hours, apply heat (warm shower or warm bath) for 20 minutes several times a day, or switch between ice and heat.   · You may use over-the-counter pain medicine to control pain, unless another pain medicine was prescribed. If you have chronic liver or kidney disease or ever had a stomach ulcer or GI bleeding, talk with your provider before using these medicines.  Follow-up care  Follow up with your healthcare provider, or as directed.  If X-rays, a CT scan or an MRI were done, you will be notified of any new findings that may affect your care.  When to seek medical advice  Contact your healthcare provider right away if any of these occur:  · Increasing back pain  · Your foot drags when you walk, a condition called foot drop  · You have new weakness, numbness, or pain in one or both arms or legs  · Loss of bowel or bladder control  · Numbness or tingling in the buttock or groin area  Date Last Reviewed:  11/23/2015  © 4380-0707 [employer]. [address] [place] [address]. All rights reserved. This information is not intended as a substitute for professional medical care. Always follow your healthcare professional's instructions.        Back Safety: Getting Into and Out of Bed  Good posture protects your back when you sit, stand, and walk. It is also important while getting into and out of bed. Follow the steps below to get out of bed. Reverse them to get into bed.  Safety tip: Sit at the side of the bed for a few seconds before standing up. Then, after you stand up, wait a moment before walking to be sure youre not dizzy.      Roll onto your side.   1. Roll onto your side  · Keep your knees together.  · Flatten your stomach muscles to keep your back from arching.  · Put your hands on the bed in front of you.     Raise your body.   2. Raise your body  · Push your upper body off the bed as you swing your legs to the floor.  · Keeping your back straight, move your whole body as one unit. Dont bend or twist at the waist.  · Let the weight of your legs help you move.     Stand up.   3. Stand up  · Lean forward from your hip and roll onto the balls of your feet.  · Flatten your stomach muscles to keep your back from arching.  · Using your arm and leg muscles, push yourself to a standing position.  Date Last Reviewed: 8/31/2015  © 9101-5325 [employer]. [address] [place] [address]. All rights reserved. This information is not intended as a substitute for professional medical care. Always follow your healthcare professional's instructions.        Exercises to Strengthen Your Lower Back  Strong lower back and abdominal muscles work together to support your spine. The exercises below will help strengthen the lower back. It is important that you begin exercising slowly and increase levels gradually.  Always begin any exercise program with stretching. If you feel pain while  doing any of these exercises, stop and talk to your doctor about a more specific exercise program that better suits your condition.   Low back stretch  The point of stretching is to make you more flexible and increase your range of motion. Stretch only as much as you are able. Stretch slowly. Do not push your stretch to the limit. If at any point you feel pain while stretching, this is your (temporary) limit.  · Lie on your back with your knees bent and both feet on the ground.  · Slowly raise your left knee to your chest as you flatten your lower back against the floor. Hold for 5 seconds.  · Relax and repeat the exercise with your right knee.  · Do 10 of these exercises for each leg.  · Repeat hugging both knees to your chest at the same time.  Building lower back strength  Start your exercise routine with 10 to 30 minutes a day, 1 to 3 times a day.  Initial exercises  Lying on your back:  1. Ankle pumps: Move your foot up and down, towards your head, and then away. Repeat 10 times with each foot.  2. Heel slides: Slowly bend your knee, drawing the heel of your foot towards you. Then slide your heel/foot from you, straightening your knee. Do not lift your foot off the floor (this is not a leg lift).  3. Abdominal contraction: Bend your knees and put your hands on your stomach. Tighten your stomach muscles. Hold for 5 seconds, then relax. Repeat 10 times.  4. Straight leg raise: Bend one leg at the knee and keep the other leg straight. Tighten your stomach muscles. Slowly lift your straight leg 6 to 12 inches off the floor and hold for up to 5 seconds. Repeat 10 times on each side.  Standin. Wall squats: Stand with your back against the wall. Move your feet about 12 inches away from the wall. Tighten your stomach muscles, and slowly bend your knees until they are at about a 45 degree angle. Do not go down too far. Hold about 5 seconds. Then slowly return to your starting position. Repeat 10 times.  2. Heel  raises: Stand facing the wall. Slowly raise the heels of your feet up and down, while keeping your toes on the floor. If you have trouble balancing, you can touch the wall with your hands. Repeat 10 times.  More advanced exercises  When you feel comfortable enough, try these exercises.  1. Kneeling lumbar extension: Begin on your hands and knees. At the same time, raise and straighten your right arm and left leg until they are parallel to the ground. Hold for 2 seconds and come back slowly to a starting position. Repeat with left arm and right leg, alternating 10 times.  2. Prone lumbar extension: Lie face down, arms extended overhead, palms on the floor. At the same time, raise your right arm and left leg as high as comfortably possible. Hold for 10 seconds and slowly return to start. Repeat with left arm and right leg, alternating 10 times. Gradually build up to 20 times. (Advanced: Repeat this exercise raising both arms and both legs a few inches off the floor at the same time. Hold for 5 seconds and release.)  3. Pelvic tilt: Lie on the floor on your back with your knees bent at 90 degrees. Your feet should be flat on the floor. Inhale, exhale, then slowly contract your abdominal muscles bringing your navel toward your spine. Let your pelvis rock back until your lower back is flat on the floor. Hold for 10 seconds while breathing smoothly.  4. Abdominal crunch: Perform a pelvic tilt (above) flattening your lower back against the floor. Holding the tension in your abdominal muscles, take another breath and raise your shoulder blades off the ground (this is not a full sit-up). Keep your head in line with your body (dont bend your neck forward). Hold for 2 seconds, then slowly lower.  Date Last Reviewed: 6/1/2016  © 0153-1245 [employer]. [address] [place] [address], [place] [address]. All rights reserved. This information is not intended as a substitute for professional medical care. Always follow your  healthcare professional's instructions.        Back Exercises: Back Press    Do this exercise on your hands and knees. Keep your knees under your hips and your hands under your shoulders. Keep your spine in a neutral position (not arched or sagging). Be sure to maintain your necks natural curve:  · Tighten your stomach and buttock muscles to press your back upward. Let your head drop slightly.  · Hold for 5 seconds. Return to starting position.  · Repeat 5 times.  Date Last Reviewed: 10/11/2015  © 3773-9913 [employer]. [address] [place] [address]. All rights reserved. This information is not intended as a substitute for professional medical care. Always follow your healthcare professional's instructions.        Back Exercises: Hip Rotator Stretch    To start, lie on your back with your knees bent and feet flat on the floor. Dont press your neck or lower back to the floor. Breathe deeply. You should feel comfortable and relaxed in this position.  · Rest your right ankle on your left knee.  · Place a towel behind your left thigh, and use it to pull the knee toward your chest. Feel the stretch in your buttocks.  · Hold for 30 to 60 seconds. Release.  · Repeat 2 times.  · Switch legs.   · If there is any pain other than stretch in the knee or buttock, stop and contact your healthcare provider.  For your safety, check with your healthcare provider before starting an exercise program.   Date Last Reviewed: 8/16/2015 © 2000-2017 [employer]. [address] [place] [address]. All rights reserved. This information is not intended as a substitute for professional medical care. Always follow your healthcare professional's instructions.        Back Exercises: Leg Pull    To start, lie on your back with your knees bent and feet flat on the floor. Dont press your neck or lower back to the floor. Breathe deeply. You should feel comfortable and relaxed in this  position.  · Pull one knee to your chest.  · Hold for 30 to 60 seconds. Return to starting position.  · Repeat 2 times.  · Switch legs.  · For a double leg pull, pull both legs to your chest at the same time. Repeat 2 times.  For your safety, check with your healthcare provider before starting an exercise program.   Date Last Reviewed: 8/16/2015  © 4172-3125 [employer]. [address] [place] [address]. All rights reserved. This information is not intended as a substitute for professional medical care. Always follow your healthcare professional's instructions.        Back Exercises: Lower Back Rotation    To start, lie on your back with your knees bent and feet flat on the floor. Dont press your neck or lower back to the floor. Breathe deeply. You should feel comfortable and relaxed in this position.  · Drop both knees to one side. Turn your head to the other side. Keep your shoulders flat on the floor.  · Do not push through pain.  · Hold for 20 seconds.  · Slowly switch sides.  · Repeat 2 to 5 times.  Date Last Reviewed: 10/11/2015  © 4505-0269 [employer]. [address] [place] [address]. All rights reserved. This information is not intended as a substitute for professional medical care. Always follow your healthcare professional's instructions.        Back Exercises: Lower Back Stretch    To start, sit in a chair with your feet flat on the floor. Shift your weight slightly forward. Relax, and keep your ears, shoulders, and hips aligned.  · Sit with your feet well apart.  · Bend forward and touch the floor with the backs of your hands. Relax and let your body drop.  · Hold for 20 seconds. Return to starting position.  · Repeat 2 times.   Date Last Reviewed: 8/16/2015  © 6997-1738 [employer]. [address] [place] [address]. All rights reserved. This information is not intended as a substitute for professional medical care. Always follow  your healthcare professional's instructions.        Back Exercises: Seated Rotation    To start, sit in a chair with your feet flat on the floor. Shift your weight slightly forward to avoid rounding your back. Relax, and keep your ears, shoulders, and hips aligned:  · Fold your arms and elbows just below shoulder height.  · Turn from the waist with hips forward. Turn your head last. Do not push through the pain.  · Hold for a count of 10 to 30. Return to starting position.  · Repeat 3 to 5 times on one side. Then switch sides.  Date Last Reviewed: 10/11/2015  © 6181-0832 [employer]. [address] [place] [address]. All rights reserved. This information is not intended as a substitute for professional medical care. Always follow your healthcare professional's instructions.        Lumbar Flexion (Flexibility)    1. Lie on your back on the floor, with your knees bent and your feet flat on the floor.  2. Gently pull your knees up toward your chest. Put your hands under your thighs to help pull your knees up.  3. Press your lower back down to the floor. Hold for 20 seconds.  4. Lower your legs back down to the floor and relax.  5. Repeat 2 times, or as instructed.  Date Last Reviewed: 3/10/2016  © 0076-0807 [employer]. [address] [place] [address]. All rights reserved. This information is not intended as a substitute for professional medical care. Always follow your healthcare professional's instructions.

## 2018-01-26 ENCOUNTER — TELEPHONE (OUTPATIENT)
Dept: RADIOLOGY | Facility: HOSPITAL | Age: 68
End: 2018-01-26

## 2018-01-29 ENCOUNTER — HOSPITAL ENCOUNTER (OUTPATIENT)
Dept: RADIOLOGY | Facility: HOSPITAL | Age: 68
Discharge: HOME | End: 2018-01-29
Attending: NURSE PRACTITIONER
Payer: MEDICARE

## 2018-01-29 DIAGNOSIS — R93.5 ABNORMAL CT OF THE ABDOMEN: ICD-10-CM

## 2018-01-29 DIAGNOSIS — K76.9 LIVER LESION: ICD-10-CM

## 2018-01-29 PROCEDURE — 71250 CT THORAX DX C-: CPT | Mod: 26,,, | Performed by: RADIOLOGY

## 2018-01-29 PROCEDURE — 74183 MRI ABD W/O CNTR FLWD CNTR: CPT | Mod: 26,,, | Performed by: RADIOLOGY

## 2018-01-29 PROCEDURE — 74183 MRI ABD W/O CNTR FLWD CNTR: CPT | Mod: TC,PO

## 2018-01-29 PROCEDURE — 71250 CT THORAX DX C-: CPT | Mod: TC,PO

## 2018-01-29 PROCEDURE — 25500020 PHARM REV CODE 255: Mod: PO | Performed by: NURSE PRACTITIONER

## 2018-01-29 PROCEDURE — A9585 GADOBUTROL INJECTION: HCPCS | Mod: PO | Performed by: NURSE PRACTITIONER

## 2018-01-29 RX ORDER — GADOBUTROL 604.72 MG/ML
9.5 INJECTION INTRAVENOUS
Status: COMPLETED | OUTPATIENT
Start: 2018-01-29 | End: 2018-01-29

## 2018-01-29 RX ADMIN — GADOBUTROL 9.5 ML: 604.72 INJECTION INTRAVENOUS at 04:01

## 2018-01-30 ENCOUNTER — NUTRITION (OUTPATIENT)
Dept: DIABETES | Facility: CLINIC | Age: 68
End: 2018-01-30
Payer: MEDICARE

## 2018-01-30 VITALS — WEIGHT: 210.75 LBS | BODY MASS INDEX: 33.08 KG/M2 | HEIGHT: 67 IN

## 2018-01-30 PROCEDURE — 99999 PR PBB SHADOW E&M-EST. PATIENT-LVL III: CPT | Mod: PBBFAC,,, | Performed by: DIETITIAN, REGISTERED

## 2018-01-30 PROCEDURE — G0108 DIAB MANAGE TRN  PER INDIV: HCPCS | Mod: S$GLB,,, | Performed by: DIETITIAN, REGISTERED

## 2018-01-30 NOTE — PROGRESS NOTES
"PCP:  Jessica Ward MD  REFERRING PROVIDER:  Jessica Ward MD    HISTORY OF PRESENT ILLNESS:  68 y.o. female patient is in clinic today for diabetes f/u. Patient currently takes Invokana 300mg daily, Lantus 16 units, metformin 1000 mg BID, Tanzeum 30 mg weekly for diabetes. 5 lb weight loss since last visit with diabetes educator -- weight loss over last year: 33 lbs total.  Fasting BG readings range 127-155.     Hemoglobin A1C   Date Value Ref Range Status   10/05/2017 10.2 (H) 4.0 - 5.6 % Final     Comment:     According to ADA guidelines, hemoglobin A1c <7.0% represents  optimal control in non-pregnant diabetic patients. Different  metrics may apply to specific patient populations.   Standards of Medical Care in Diabetes-2016.  For the purpose of screening for the presence of diabetes:  <5.7%     Consistent with the absence of diabetes  5.7-6.4%  Consistent with increasing risk for diabetes   (prediabetes)  >or=6.5%  Consistent with diabetes  Currently, no consensus exists for use of hemoglobin A1c  for diagnosis of diabetes for children.  This Hemoglobin A1c assay has significant interference with fetal   hemoglobin   (HbF). The results are invalid for patients with abnormal amounts of   HbF,   including those with known Hereditary Persistence   of Fetal Hemoglobin. Heterozygous hemoglobin variants (HbAS, HbAC,   HbAD, HbAE, HbA2) do not significantly interfere with this assay;   however, presence of multiple variants in a sample may impact the %   interference.     , ADA recommends less than 7.0.      VITAL SIGNS:  Height: 5' 7" (170.2 cm)   Weight: 95.6 kg (210 lb 12.2 oz)   IBW: 137.5 lbs +/-10% and AdjIBW: 163 lbs/74kg    ALLERGIES & MEDICATIONS: Reviewed and Reconciled  MEDICAL/SURGICAL & FAMILY HISTORY: Reviewed and Reconciled    LABORATORY:  Reviewed Diabetes Management Flowsheet and Results Review.    HEALTH MAINTENANCE: Reviewed and Reconciled  Eye exam: 2/17 Chloe Sandoval -- appt schedule " 2/7/18  Dental exam: Recommend regular exams; denies gums bleeding.  Podiatry exam:  1/2018 Dr. Ohara    SELF-MONITORING: Glucometer - Accucheck Varsha; Food - none are avail    ACTIVITY LEVEL: Walking 4 days per week, at least 3000 steps per day.     NUTRITION INTAKE: Meal patterns include 3 meals, 1-2 snacks daily with intake 6732-3140 cals/d.   B - Special K cereal, raspberries - water   S - none  L - leftovers  S - none  D - 1/2 [employer] chicken croissant sandwich, french fries - water  S - sometimes - ice cream OR maria c chips  Beverages - milk (whole), water  DIning out - 2x/month    PSYCHOSOCIAL: Stage of change - action  Barriers to change - none    EDUCATIONAL ASSESSMENT:   1. Impediments in learning class environment - NONE.  2. Needs improvement in self care management skills.  -healthy eating  -being active  -self-monitoring  -taking medication  -problem solving  -healthy coping  -reducing risks    MNT ASSESSMENT:  3007-9636 calories, 5-6 ounces protein daily  30-45 grams carb/meal, 5-15 grams carb/snack  increase fruit 2 serv/d, vegetables 2+ cups/d, whole grains 3+serv/d, lowfat dairy 3+serv/d  low-fat, low-sodium  150 min physical activity per week, moderate intensity, as tolerated      PLAN:  · Reviewed pathophysiology diabetes, complications and importance of annual dilated eye exams, regular dental exams, and daily foot self-exams.    · Encouraged daily self-monitoring of glucose, food and activity patterns, return records to clinic.      Reviewed glucose goals. Discussed prevention, identification and treatment of hyperglycemia and hypoglycemia and when to contact clinic.   · Reviewed action of diabetes medications and to continue taking as prescribed.    Reviewed micro/macronutrient effect on health management. Reviewed principles of energy metabolism to assist weight and health management.    Discussed importance of daily physical activity with review of benefits, methods, guidelines and  precautions.   Discussed behavioral strategies for improving social and environmental support of lifestyle changes. Counseling provided to assist in goal-setting, reviewing complications of diabetes as independent to other health indicators.    Reviewed ADA goals, progress.   GOALS: Self monitoring: daily food & activity journal. Meal plan-90% accuracy, Physical activity-150 minutes per week.   Visit Time Spent:  30 minutes    Thank you for the opportunity to work with your patient.

## 2018-01-31 ENCOUNTER — PATIENT MESSAGE (OUTPATIENT)
Dept: INTERNAL MEDICINE | Facility: CLINIC | Age: 68
End: 2018-01-31

## 2018-02-06 ENCOUNTER — PATIENT MESSAGE (OUTPATIENT)
Dept: INTERNAL MEDICINE | Facility: CLINIC | Age: 68
End: 2018-02-06

## 2018-02-06 DIAGNOSIS — R93.89 ABNORMAL CT OF THE CHEST: Primary | ICD-10-CM

## 2018-02-06 DIAGNOSIS — D18.00 HEMANGIOMA: Primary | ICD-10-CM

## 2018-02-06 NOTE — TELEPHONE ENCOUNTER
Please tell pt we need to see what a lung specialist recommends regarding her CT lung results.  SM

## 2018-02-09 ENCOUNTER — PATIENT MESSAGE (OUTPATIENT)
Dept: INTERNAL MEDICINE | Facility: CLINIC | Age: 68
End: 2018-02-09

## 2018-02-14 ENCOUNTER — TELEPHONE (OUTPATIENT)
Dept: PULMONOLOGY | Facility: CLINIC | Age: 68
End: 2018-02-14

## 2018-02-14 ENCOUNTER — OFFICE VISIT (OUTPATIENT)
Dept: PULMONOLOGY | Facility: CLINIC | Age: 68
End: 2018-02-14
Payer: MEDICARE

## 2018-02-14 VITALS
BODY MASS INDEX: 32.76 KG/M2 | HEART RATE: 82 BPM | OXYGEN SATURATION: 97 % | HEIGHT: 67 IN | DIASTOLIC BLOOD PRESSURE: 60 MMHG | RESPIRATION RATE: 16 BRPM | SYSTOLIC BLOOD PRESSURE: 96 MMHG | WEIGHT: 208.75 LBS

## 2018-02-14 DIAGNOSIS — R91.8 MULTIPLE LUNG NODULES ON CT: Primary | ICD-10-CM

## 2018-02-14 DIAGNOSIS — G47.33 OSA ON CPAP: ICD-10-CM

## 2018-02-14 DIAGNOSIS — R63.4 WEIGHT LOSS: ICD-10-CM

## 2018-02-14 PROCEDURE — 1159F MED LIST DOCD IN RCRD: CPT | Mod: S$GLB,,, | Performed by: INTERNAL MEDICINE

## 2018-02-14 PROCEDURE — 99999 PR PBB SHADOW E&M-EST. PATIENT-LVL III: CPT | Mod: PBBFAC,,, | Performed by: INTERNAL MEDICINE

## 2018-02-14 PROCEDURE — 3008F BODY MASS INDEX DOCD: CPT | Mod: S$GLB,,, | Performed by: INTERNAL MEDICINE

## 2018-02-14 PROCEDURE — 99214 OFFICE O/P EST MOD 30 MIN: CPT | Mod: S$GLB,,, | Performed by: INTERNAL MEDICINE

## 2018-02-14 PROCEDURE — 99499 UNLISTED E&M SERVICE: CPT | Mod: S$GLB,,, | Performed by: INTERNAL MEDICINE

## 2018-02-14 RX ORDER — INSULIN GLARGINE 100 [IU]/ML
INJECTION, SOLUTION SUBCUTANEOUS
COMMUNITY
End: 2018-04-26 | Stop reason: SDUPTHER

## 2018-02-14 NOTE — PROGRESS NOTES
Pulmonary Outpatient Follow Up Visit     Subjective:    Patient is new to me.   Patient ID: Jaimie Luque is a 68 y.o. female.    Chief Complaint: Sleep Apnea and Abnormal Ct Scan    HPI    68-year-old female patient known to our practice, has been treated for obstructive sleep apnea compliant with CPAP as per last compliance report.   Today she presents for evaluation of lung nodules that showed up on her CAT scan of the chest which was done last month after a CT abdomen done for weight loss showed lung nodules, and showed a liver lesion felt to be a hemangioma on a subsequent MRI of the abdomen.    The patient denies coughing shortness of breath or chest pain.  She states that she lost more than 30 pounds over the last 1-2 years.  She has a history of hysterectomy and oophorectomy that were done in 2009 for uterine prolapse.  The patient health maintenance exam including mammograms, colonoscopy were normal and were performed in 2016 and 2017 respectively.    No personal history of cancer.    Review of Systems   Constitutional: Positive for weight loss. Negative for fever, chills and night sweats.   HENT: Negative for voice change and congestion.         Obstructive sleep apnea.   Respiratory: Negative for cough and sputum production.    Cardiovascular: Negative for chest pain.   Genitourinary: Negative for hematuria.   Endocrine: Diabetes mellitus.  Hypothyroid.   Musculoskeletal: Negative for joint swelling.   Skin: Negative.    Gastrointestinal: Negative for abdominal pain.   Neurological: Negative for syncope.   Hematological: Negative for adenopathy.   Psychiatric/Behavioral: The patient is not nervous/anxious.          Outpatient Encounter Prescriptions as of 2/14/2018   Medication Sig Dispense Refill    aspirin 81 mg Tab Take by mouth. 1 Tablet Oral Every day      atorvastatin (LIPITOR) 20 MG tablet TAKE 1 TABLET BY MOUTH EVERY DAY 90 tablet 3    blood sugar  diagnostic Strp TRUEresult Matrix Blood Glucose Monitoring System  Dx:  E11.9   Medically Necessary 1 each 3    canagliflozin (INVOKANA) 300 mg Tab tablet Take 1 tablet (300 mg total) by mouth once daily. 90 tablet 3    cholecalciferol, vitamin D3, 2,000 unit Tab Take by mouth. 1 Tablet Oral Every day      dulaglutide (TRULICITY) 0.75 mg/0.5 mL PnIj Inject 0.5 mLs (0.75 mg total) into the skin once a week. 4 Syringe 11    gabapentin (NEURONTIN) 300 MG capsule TAKE 1 CAPSULE BY MOUTH THREE TIMES DAILY 270 capsule 1    insulin glargine (LANTUS SOLOSTAR) 100 unit/mL (3 mL) InPn pen Inject 8 Units into the skin once daily. (Patient taking differently: Inject 16 Units into the skin once daily. ) 1 Box 11    insulin glargine (LANTUS SOLOSTAR) 100 unit/mL (3 mL) InPn pen Inject into the skin.      lancets (ACCU-CHEK SOFTCLIX LANCETS) Misc Accuchek Multiclix drum lancets, [facility]  Dx:  E11.9 306 each 3    levothyroxine (SYNTHROID) 112 MCG tablet TAKE 1 TABLET(112 MCG) BY MOUTH EVERY DAY 90 tablet 3    lisinopril (PRINIVIL,ZESTRIL) 2.5 MG tablet Take 1 tablet (2.5 mg total) by mouth once daily. 90 tablet 3    loratadine (CLARITIN) 10 mg tablet Take 10 mg by mouth.      meclizine (ANTIVERT) 25 mg tablet Take 1 tablet by mouth as needed.       metformin (GLUCOPHAGE-XR) 500 MG 24 hr tablet Take 2 tablets (1,000 mg total) by mouth 2 (two) times daily with meals. 30 tablet 11    albiglutide (TANZEUM) 30 mg/0.5 mL PnIj Inject 30 mg into the skin once a week. 12 each 3     No facility-administered encounter medications on file as of 2/14/2018.        Objective:          Physical Exam   Constitutional: She is oriented to person, place, and time. She appears well-developed and well-nourished.   HENT:   Head: Normocephalic.   Cardiovascular: Normal rate and regular rhythm.    Pulmonary/Chest: Normal expansion, effort normal and breath sounds normal.   Abdominal: Soft.   Musculoskeletal: She exhibits no edema.    Lymphadenopathy: No supraclavicular adenopathy is present.   Neurological: She is alert and oriented to person, place, and time.   Skin: Skin is warm.   Psychiatric: She has a normal mood and affect.       Laboratory    Lab Results   Component Value Date    WBC 8.37 07/05/2017    HGB 14.9 07/05/2017    HCT 44.9 07/05/2017    MCV 86 07/05/2017     07/05/2017     BMP  Lab Results   Component Value Date     10/05/2017    K 4.1 10/05/2017     10/05/2017    CO2 25 10/05/2017    BUN 12 10/05/2017    CREATININE 1.0 01/16/2018    CALCIUM 9.5 10/05/2017    ANIONGAP 11 10/05/2017    ESTGFRAFRICA >60.0 01/16/2018    EGFRNONAA 57.6 (A) 01/16/2018     BNP  @LABRCNTIP(BNP,BNPTRIAGEBLO)@  Lab Results   Component Value Date    TSH 0.762 07/05/2017     ABG  @LABRCNTIP(PH,PO2,PCO2,HCO3,BE)@  TSH and a tumor markers were personally reviewed and were within normal limit.  Diagnostic Results:  CT scan of the chest from January 2018 was reviewed personally and showed a right middle lobe 1 cm nodule and other multiple small pulmonary nodules.     CT abdomen and MRI of the abdomen reports were reviewed.    Assessment/Plan:   Multiple lung nodules on CT  -     CT Chest With Contrast; Future; Expected date: 04/16/2018  -     Complete PFT with bronchodilator; Future    TALIA on CPAP    Weight loss      I have discussed the images finding with  the radiologist.  The liver lesion appears to be a hemangioma, and biopsy of the liver lesion is not advisable.  I have discussed with the patient the option CT guided biopsy of the 1 cm right lung nodule versus a 3 months follow-up CAT scan of the chest in mid April.  Patient opted for follow-up CAT scan.    Although the lung nodules do not show suspicious features, however the significant weight loss is concerning in this patient.  Metastatic disease is in the differential.    Follow-up in about 3 months (around 5/14/2018).    This note was prepared using voice recognition  system and is likely to have sound alike errors that may have been overlooked even after proof reading.  Please call me with any questions    Discussed diagnosis, its evaluation, treatment and usual course. All questions answered.    Thank you for the courtesy of participating in the care of this patient    Corky Luna MD

## 2018-02-14 NOTE — TELEPHONE ENCOUNTER
Patient notified of change in F/u appointment date and time, patient agreed and stated understanding

## 2018-02-14 NOTE — LETTER
February 14, 2018      Jessica Ward MD  [address] [facility] [place] [address]           [facility] - [facility]  [address] [place] [address]  Phone: [phone]  Fax: [phone]          Patient: Jaimie Luque   MR Number: [id number]   YOB: 1950   Date of Visit: 2/14/2018       Dear Dr. Jessica Ward:    Thank you for referring Jaimie Luque to me for evaluation. Attached you will find relevant portions of my assessment and plan of care.    If you have questions, please do not hesitate to call me. I look forward to following Jaimie Luque along with you.    Sincerely,    Corky Luna MD    Enclosure  CC:  No Recipients    If you would like to receive this communication electronically, please contact [email or web address] or [phone] to request more information on [employer] Link access.    For providers and/or their staff who would like to refer a patient to [facility], please contact us through our one-stop-shop provider referral line, [facility], at [phone].    If you feel you have received this communication in error or would no longer like to receive these types of communications, please e-mail [email or web address]

## 2018-03-15 ENCOUNTER — PATIENT OUTREACH (OUTPATIENT)
Dept: ADMINISTRATIVE | Facility: HOSPITAL | Age: 68
End: 2018-03-15

## 2018-03-18 RX ORDER — LISINOPRIL 5 MG/1
TABLET ORAL
Qty: 90 TABLET | Refills: 3 | Status: SHIPPED | OUTPATIENT
Start: 2018-03-18 | End: 2018-03-28

## 2018-03-21 ENCOUNTER — LAB VISIT (OUTPATIENT)
Dept: LAB | Facility: HOSPITAL | Age: 68
End: 2018-03-21
Attending: INTERNAL MEDICINE
Payer: MEDICARE

## 2018-03-21 LAB
ESTIMATED AVG GLUCOSE: 186 MG/DL
HBA1C MFR BLD HPLC: 8.1 %

## 2018-03-21 PROCEDURE — 83036 HEMOGLOBIN GLYCOSYLATED A1C: CPT

## 2018-03-21 PROCEDURE — 36415 COLL VENOUS BLD VENIPUNCTURE: CPT | Mod: PO

## 2018-03-28 ENCOUNTER — OFFICE VISIT (OUTPATIENT)
Dept: INTERNAL MEDICINE | Facility: CLINIC | Age: 68
End: 2018-03-28
Payer: MEDICARE

## 2018-03-28 VITALS
HEIGHT: 67 IN | DIASTOLIC BLOOD PRESSURE: 62 MMHG | SYSTOLIC BLOOD PRESSURE: 110 MMHG | TEMPERATURE: 97 F | BODY MASS INDEX: 33.15 KG/M2 | HEART RATE: 93 BPM | OXYGEN SATURATION: 97 % | WEIGHT: 211.19 LBS

## 2018-03-28 DIAGNOSIS — R63.4 WEIGHT LOSS: ICD-10-CM

## 2018-03-28 DIAGNOSIS — E11.69 HYPERLIPIDEMIA ASSOCIATED WITH TYPE 2 DIABETES MELLITUS: ICD-10-CM

## 2018-03-28 DIAGNOSIS — E03.9 ACQUIRED HYPOTHYROIDISM: Primary | ICD-10-CM

## 2018-03-28 DIAGNOSIS — G47.33 OSA ON CPAP: ICD-10-CM

## 2018-03-28 DIAGNOSIS — Z29.9 PREVENTIVE MEASURE: ICD-10-CM

## 2018-03-28 DIAGNOSIS — E78.5 HYPERLIPIDEMIA ASSOCIATED WITH TYPE 2 DIABETES MELLITUS: ICD-10-CM

## 2018-03-28 PROCEDURE — 3078F DIAST BP <80 MM HG: CPT | Mod: CPTII,S$GLB,, | Performed by: INTERNAL MEDICINE

## 2018-03-28 PROCEDURE — 99999 PR PBB SHADOW E&M-EST. PATIENT-LVL III: CPT | Mod: PBBFAC,,, | Performed by: INTERNAL MEDICINE

## 2018-03-28 PROCEDURE — 99499 UNLISTED E&M SERVICE: CPT | Mod: S$GLB,,, | Performed by: INTERNAL MEDICINE

## 2018-03-28 PROCEDURE — 3074F SYST BP LT 130 MM HG: CPT | Mod: CPTII,S$GLB,, | Performed by: INTERNAL MEDICINE

## 2018-03-28 PROCEDURE — 3045F PR MOST RECENT HEMOGLOBIN A1C LEVEL 7.0-9.0%: CPT | Mod: CPTII,S$GLB,, | Performed by: INTERNAL MEDICINE

## 2018-03-28 PROCEDURE — 99214 OFFICE O/P EST MOD 30 MIN: CPT | Mod: S$GLB,,, | Performed by: INTERNAL MEDICINE

## 2018-03-28 RX ORDER — LISINOPRIL 2.5 MG/1
2.5 TABLET ORAL DAILY
Qty: 90 TABLET | Refills: 3 | Status: SHIPPED | OUTPATIENT
Start: 2018-03-28 | End: 2019-03-20 | Stop reason: SDUPTHER

## 2018-03-28 NOTE — PROGRESS NOTES
"Subjective:      Patient ID: Jaimie Luque is a 68 y.o. female.    Chief Complaint: No chief complaint on file.      HPI  Here for follow up of medical problems.  AC breakfast 130-170.  Walking more for exercise. Gained back 2# of 41# previously lost.  Reports good energy "most of the time."  Denies anxiety/depression.  PHN has calmed down in last couple of weeks. No f/c/sw/cough.  No cp/sob/palp.  BMs normal most of the time.    Updated/ annual due 7/18:  HM: 4/17 HAV#2, 10/17 fluvax, 3/15 caavpx35, 3/14 nsjxew44, 9/09 TDaP, 11/12 zoster, 6/16 BMD rep 5y, 5/17 Cscope rep 5y, 6/16 mmg(q2), 2/17 Eye Dr. Franco, 9/16 HCV neg.      Review of Systems   Constitutional: Negative for chills, diaphoresis and fever.   Respiratory: Negative for cough and shortness of breath.    Cardiovascular: Negative for chest pain, palpitations and leg swelling.   Gastrointestinal: Negative for blood in stool, constipation, diarrhea, nausea and vomiting.   Genitourinary: Negative for dysuria, frequency and hematuria.   Psychiatric/Behavioral: The patient is not nervous/anxious.          Objective:   /62 (BP Location: Right arm, Patient Position: Sitting, BP Method: Medium (Manual))   Pulse 93   Temp 97 °F (36.1 °C) (Tympanic)   Ht 5' 7" (1.702 m)   Wt 95.8 kg (211 lb 3.2 oz)   SpO2 97%   BMI 33.08 kg/m²     Physical Exam   Constitutional: She is oriented to person, place, and time. She appears well-developed.   HENT:   Mouth/Throat: Oropharynx is clear and moist.   Neck: Neck supple. Carotid bruit is not present. No thyroid mass present.   Cardiovascular: Normal rate, regular rhythm and intact distal pulses.  Exam reveals no gallop and no friction rub.    No murmur heard.  Pulmonary/Chest: Effort normal and breath sounds normal. She has no wheezes. She has no rales.   Abdominal: Soft. Bowel sounds are normal. She exhibits no mass. There is no hepatosplenomegaly. There is no tenderness.   Musculoskeletal: She exhibits no " edema.   Lymphadenopathy:     She has no cervical adenopathy.   Neurological: She is alert and oriented to person, place, and time.   Psychiatric: She has a normal mood and affect.         Results for orders placed or performed in visit on 03/21/18   Hemoglobin A1c   Result Value Ref Range    Hemoglobin A1C 8.1 (H) 4.0 - 5.6 %    Estimated Avg Glucose 186 (H) 68 - 131 mg/dL       Assessment:       1. Acquired hypothyroidism    2. Uncontrolled type 2 diabetes mellitus without complication, with long-term current use of insulin    3. Hyperlipidemia associated with type 2 diabetes mellitus    4. TALIA on CPAP    5. Weight loss    6. Preventive measure          Plan:     Acquired hypothyroidism- Clinically stable, continue present treatment.    Uncontrolled type 2 diabetes mellitus without complication, with long-term current use of insulin- continue meds and DM team.  -     Hemoglobin A1c; Future; Expected date: 03/28/2018  -     Microalbumin/creatinine urine ratio; Future; Expected date: 03/28/2018    Hyperlipidemia associated with type 2 diabetes mellitus- check lab.    TALIA on CPAP doing well.    Weight loss- gained back a couple pounds, net loss 40#, says purposefully and related to DM meds.  Will follow.    Preventive measure- due in July:  -     CBC auto differential; Future; Expected date: 03/28/2018  -     Comprehensive metabolic panel; Future; Expected date: 03/28/2018  -     Lipid panel; Future; Expected date: 03/28/2018  -     TSH; Future; Expected date: 03/28/2018  -     Vitamin D; Future  -     Hemoglobin A1c; Future; Expected date: 03/28/2018  -     Microalbumin/creatinine urine ratio; Future; Expected date: 03/28/2018  -     Mammo Digital Screening Bilat with CAD; Future; Expected date: 03/28/2018    Other orders  -     lisinopril (PRINIVIL,ZESTRIL) 2.5 MG tablet; Take 1 tablet (2.5 mg total) by mouth once daily.  Dispense: 90 tablet; Refill: 3  -     dulaglutide (TRULICITY) 0.75 mg/0.5 mL PnIj; Inject 0.5  mLs (0.75 mg total) into the skin once a week.  Dispense: 12 Syringe; Refill: 3  -     canagliflozin (INVOKANA) 300 mg Tab tablet; Take 1 tablet (300 mg total) by mouth once daily.  Dispense: 90 tablet; Refill: 3

## 2018-03-29 ENCOUNTER — PATIENT MESSAGE (OUTPATIENT)
Dept: INTERNAL MEDICINE | Facility: CLINIC | Age: 68
End: 2018-03-29

## 2018-03-29 RX ORDER — INSULIN GLARGINE 100 [IU]/ML
16 INJECTION, SOLUTION SUBCUTANEOUS DAILY
Qty: 3 ML | Refills: 6 | Status: SHIPPED | OUTPATIENT
Start: 2018-03-29 | End: 2018-05-15 | Stop reason: SDUPTHER

## 2018-04-04 NOTE — TELEPHONE ENCOUNTER
Informed pt that meds could not be filled until 5-19-18 due to her lantus being filled on 2-23-18. Pt verbalized understanding. /srb

## 2018-04-09 ENCOUNTER — TELEPHONE (OUTPATIENT)
Dept: RADIOLOGY | Facility: HOSPITAL | Age: 68
End: 2018-04-09

## 2018-04-09 ENCOUNTER — TELEPHONE (OUTPATIENT)
Dept: PULMONOLOGY | Facility: CLINIC | Age: 68
End: 2018-04-09

## 2018-04-09 DIAGNOSIS — R91.8 LUNG NODULES: Primary | ICD-10-CM

## 2018-04-09 NOTE — TELEPHONE ENCOUNTER
----- Message from Anabella Smyth RT sent at 4/9/2018  9:21 AM CDT -----  Contact: RADIOLOGY  Hello,    This patient is scheduled for tomorrow for a CT w/ contrast. We will need a recent creatinine for this patient (within the last 30 days). Please schedule patient for STAT labs 1 hr prior to radiology appointment and please call patient about test.. If you have any questions please contact Radiology at [phone].    Thank you,    Radiology Dept

## 2018-04-10 ENCOUNTER — HOSPITAL ENCOUNTER (OUTPATIENT)
Dept: RADIOLOGY | Facility: HOSPITAL | Age: 68
Discharge: HOME | End: 2018-04-10
Attending: INTERNAL MEDICINE
Payer: MEDICARE

## 2018-04-10 DIAGNOSIS — R91.8 MULTIPLE LUNG NODULES ON CT: ICD-10-CM

## 2018-04-10 PROCEDURE — 71260 CT THORAX DX C+: CPT | Mod: TC,PO

## 2018-04-10 PROCEDURE — 71260 CT THORAX DX C+: CPT | Mod: 26,,, | Performed by: RADIOLOGY

## 2018-04-10 PROCEDURE — 25500020 PHARM REV CODE 255: Mod: PO | Performed by: INTERNAL MEDICINE

## 2018-04-10 RX ADMIN — IOHEXOL 75 ML: 350 INJECTION, SOLUTION INTRAVENOUS at 09:04

## 2018-04-11 ENCOUNTER — PROCEDURE VISIT (OUTPATIENT)
Dept: PULMONOLOGY | Facility: CLINIC | Age: 68
End: 2018-04-11
Payer: MEDICARE

## 2018-04-11 ENCOUNTER — LAB VISIT (OUTPATIENT)
Dept: LAB | Facility: HOSPITAL | Age: 68
End: 2018-04-11
Attending: INTERNAL MEDICINE
Payer: MEDICARE

## 2018-04-11 ENCOUNTER — OFFICE VISIT (OUTPATIENT)
Dept: PULMONOLOGY | Facility: CLINIC | Age: 68
End: 2018-04-11
Payer: MEDICARE

## 2018-04-11 ENCOUNTER — TELEPHONE (OUTPATIENT)
Dept: PULMONOLOGY | Facility: CLINIC | Age: 68
End: 2018-04-11

## 2018-04-11 ENCOUNTER — PATIENT MESSAGE (OUTPATIENT)
Dept: PULMONOLOGY | Facility: CLINIC | Age: 68
End: 2018-04-11

## 2018-04-11 VITALS
RESPIRATION RATE: 14 BRPM | HEIGHT: 68 IN | HEART RATE: 76 BPM | WEIGHT: 207 LBS | OXYGEN SATURATION: 95 % | DIASTOLIC BLOOD PRESSURE: 64 MMHG | SYSTOLIC BLOOD PRESSURE: 98 MMHG | BODY MASS INDEX: 31.37 KG/M2

## 2018-04-11 DIAGNOSIS — J43.9 BLEB, LUNG: ICD-10-CM

## 2018-04-11 DIAGNOSIS — R91.8 MULTIPLE LUNG NODULES ON CT: ICD-10-CM

## 2018-04-11 DIAGNOSIS — R94.2 OBSTRUCTIVE PATTERN PRESENT ON PULMONARY FUNCTION TESTING: Primary | ICD-10-CM

## 2018-04-11 DIAGNOSIS — J43.9 PULMONARY EMPHYSEMA, UNSPECIFIED EMPHYSEMA TYPE: ICD-10-CM

## 2018-04-11 DIAGNOSIS — J98.4 SCARRING OF LUNG: ICD-10-CM

## 2018-04-11 DIAGNOSIS — R09.89 PULMONARY AIR TRAPPING: ICD-10-CM

## 2018-04-11 DIAGNOSIS — G47.33 OSA ON CPAP: ICD-10-CM

## 2018-04-11 LAB
POST FEF 25 75: 0.92 L/S (ref 1.46–2.83)
POST FET 100: 11.28 S
POST FEV1 FVC: 65 %
POST FEV1: 1.88 L (ref 2.3–2.95)
POST FIF 50: 3.01 L/S
POST FVC: 2.9 L (ref 3.07–3.83)
POST PEF: 5.91 L/S (ref 5.3–7.19)
PRE DLCO: 18.59 ML/MMHG/MIN (ref 20.2–28.49)
PRE ERV: 0.86 L
PRE FEF 25 75: 0.85 L/S (ref 1.46–2.83)
PRE FET 100: 11.01 S
PRE FEV1 FVC: 63 %
PRE FEV1: 1.82 L (ref 2.3–2.95)
PRE FIF 50: 3.32 L/S
PRE FRC PL: 4.24 L (ref 2.3–3.25)
PRE FVC: 2.88 L (ref 3.07–3.83)
PRE KROGHS K: 4.77 1/MIN
PRE PEF: 5.32 L/S (ref 5.3–7.19)
PRE RV: 3.19 L (ref 1.86–2.56)
PRE SVC: 2.88 L
PRE TLC: 6.08 L (ref 5.14–5.91)
PREDICTED DLCO: 24.34 ML/MMHG/MIN (ref 20.2–28.49)
PREDICTED FEV1 FVC: 76.15 % (ref 71.25–81.04)
PREDICTED FEV1: 2.62 L (ref 2.3–2.95)
PREDICTED FRC N2: 2.78 L (ref 2.3–3.25)
PREDICTED FRC PL: 2.78 L (ref 2.3–3.25)
PREDICTED FVC: 3.45 L (ref 3.07–3.83)
PREDICTED RV: 2.21 L (ref 1.86–2.56)
PREDICTED SVC: 3.23 L
PREDICTED TLC: 5.53 L (ref 5.14–5.91)
PROVOCATION PROTOCOL: ABNORMAL

## 2018-04-11 PROCEDURE — 94060 EVALUATION OF WHEEZING: CPT | Mod: S$GLB,,, | Performed by: INTERNAL MEDICINE

## 2018-04-11 PROCEDURE — 82542 COL CHROMOTOGRAPHY QUAL/QUAN: CPT

## 2018-04-11 PROCEDURE — 99214 OFFICE O/P EST MOD 30 MIN: CPT | Mod: 25,S$GLB,, | Performed by: INTERNAL MEDICINE

## 2018-04-11 PROCEDURE — 3074F SYST BP LT 130 MM HG: CPT | Mod: CPTII,S$GLB,, | Performed by: INTERNAL MEDICINE

## 2018-04-11 PROCEDURE — 94729 DIFFUSING CAPACITY: CPT | Mod: S$GLB,,, | Performed by: INTERNAL MEDICINE

## 2018-04-11 PROCEDURE — 3078F DIAST BP <80 MM HG: CPT | Mod: CPTII,S$GLB,, | Performed by: INTERNAL MEDICINE

## 2018-04-11 PROCEDURE — 99999 PR PBB SHADOW E&M-EST. PATIENT-LVL III: CPT | Mod: PBBFAC,,, | Performed by: INTERNAL MEDICINE

## 2018-04-11 PROCEDURE — 94726 PLETHYSMOGRAPHY LUNG VOLUMES: CPT | Mod: S$GLB,,, | Performed by: INTERNAL MEDICINE

## 2018-04-11 PROCEDURE — 36415 COLL VENOUS BLD VENIPUNCTURE: CPT | Mod: PO

## 2018-04-11 PROCEDURE — 99499 UNLISTED E&M SERVICE: CPT | Mod: S$GLB,,, | Performed by: INTERNAL MEDICINE

## 2018-04-11 RX ORDER — ALBUTEROL SULFATE 90 UG/1
2 AEROSOL, METERED RESPIRATORY (INHALATION) EVERY 6 HOURS PRN
Qty: 1 INHALER | Refills: 4 | Status: SHIPPED | OUTPATIENT
Start: 2018-04-11 | End: 2019-01-30

## 2018-04-11 RX ORDER — MECLIZINE HYDROCHLORIDE CHEWABLE TABLETS 25 MG/1
25 TABLET, CHEWABLE ORAL
COMMUNITY
End: 2018-04-11 | Stop reason: SDUPTHER

## 2018-04-11 RX ORDER — FLUTICASONE FUROATE AND VILANTEROL 100; 25 UG/1; UG/1
1 POWDER RESPIRATORY (INHALATION) DAILY
Qty: 14 EACH | Refills: 5 | Status: SHIPPED | OUTPATIENT
Start: 2018-04-11 | End: 2018-07-03

## 2018-04-11 NOTE — PROGRESS NOTES
Pulmonary Outpatient Follow Up Visit     Subjective:       Patient ID: Jaimie Luque is a 68 y.o. female.    Chief Complaint: Sleep Apnea and Pulmonary Nodules    HPI  68-year-old female patient of presenting for follow-up.  She was initially evaluated about 2 months ago for pulmonary nodules and the plan was to repeat CAT scan of chest in 3 months from the initial CAT scan.      Her initial CAT scan was performed as a workup of weight loss.  Patient has gained couple of pounds recently.  Malignancy workup has been negative so far.      She does have obstructive sleep apnea and she is on compliant with CPAP.  She today denies a cough, denies wheezing however she admits having shortness of breath sometimes on exertion.  Her initial CAT scan was performed as a workup of weight loss.  Patient has gained couple of pounds recently.  Malignancy workup has been negative so far.    Denies smoking, denies a family history of asthma or emphysema.    Review of Systems   Constitutional: Negative for fever, chills and night sweats.   HENT: Positive for congestion.    Eyes: Negative for redness.   Respiratory: Positive for shortness of breath and dyspnea on extertion. Negative for cough, sputum production and wheezing.    Cardiovascular: Negative for chest pain.   Genitourinary: Negative for hematuria.   Endocrine: Hypothyroid.  Diabetes mellitus.   Musculoskeletal:        Diabetes mellitus   Skin:        psoriasis   Gastrointestinal: Negative for abdominal pain.   Neurological: Negative for syncope.        Neuralgia   Hematological: Negative for adenopathy.   Psychiatric/Behavioral: Positive for sleep disturbance. The patient is not nervous/anxious.          Outpatient Encounter Prescriptions as of 4/11/2018   Medication Sig Dispense Refill    aspirin 81 mg Tab Take by mouth. 1 Tablet Oral Every day      atorvastatin (LIPITOR) 20 MG tablet TAKE 1 TABLET BY MOUTH EVERY DAY 90 tablet 3     blood sugar diagnostic Strp TRUEresult Matrix Blood Glucose Monitoring System  Dx:  E11.9   Medically Necessary 1 each 3    canagliflozin (INVOKANA) 300 mg Tab tablet Take 1 tablet (300 mg total) by mouth once daily. 90 tablet 3    cholecalciferol, vitamin D3, 2,000 unit Tab Take by mouth. 1 Tablet Oral Every day      dulaglutide (TRULICITY) 0.75 mg/0.5 mL PnIj Inject 0.5 mLs (0.75 mg total) into the skin once a week. 12 Syringe 3    gabapentin (NEURONTIN) 300 MG capsule TAKE 1 CAPSULE BY MOUTH THREE TIMES DAILY 270 capsule 1    insulin glargine (BASAGLAR KWIKPEN U-100 INSULIN) 100 unit/mL (3 mL) InPn pen Inject 16 Units into the skin once daily. 3 mL 6    insulin glargine (LANTUS SOLOSTAR) 100 unit/mL (3 mL) InPn pen Inject into the skin.      lancets (ACCU-CHEK SOFTCLIX LANCETS) Misc Accuchek Multiclix drum lancets, [facility]  Dx:  E11.9 306 each 3    levothyroxine (SYNTHROID) 112 MCG tablet TAKE 1 TABLET(112 MCG) BY MOUTH EVERY DAY 90 tablet 3    lisinopril (PRINIVIL,ZESTRIL) 2.5 MG tablet Take 1 tablet (2.5 mg total) by mouth once daily. 90 tablet 3    loratadine (CLARITIN) 10 mg tablet Take 10 mg by mouth.      meclizine (ANTIVERT) 25 mg tablet Take 1 tablet by mouth as needed.       metformin (GLUCOPHAGE-XR) 500 MG 24 hr tablet Take 2 tablets (1,000 mg total) by mouth 2 (two) times daily with meals. 30 tablet 11    albuterol 90 mcg/actuation inhaler Inhale 2 puffs into the lungs every 6 (six) hours as needed for Wheezing or Shortness of Breath. Rescue 1 Inhaler 4    fluticasone-vilanterol (BREO ELLIPTA) 100-25 mcg/dose diskus inhaler Inhale 1 puff into the lungs once daily. Controller 14 each 5    [DISCONTINUED] meclizine (ANTIVERT) 32 MG tablet Take 25 mg by mouth.      [] omnipaque 350 iohexol 75 mL        No facility-administered encounter medications on file as of 2018.        Objective:          Physical Exam   Constitutional: She is oriented to person, place, and  time. She appears well-developed and well-nourished.   HENT:   Head: Normocephalic.   Neck: Neck supple.   Cardiovascular: Normal rate and regular rhythm.    Pulmonary/Chest: Normal expansion and effort normal. She has decreased breath sounds. She has no wheezes. She has no rales.   Abdominal: Soft.   Musculoskeletal: She exhibits no edema or tenderness.   Lymphadenopathy:     She has no cervical adenopathy.   Neurological: She is alert and oriented to person, place, and time.   Skin: Skin is warm.   Psychiatric: She has a normal mood and affect.       Laboratory    Lab Results   Component Value Date    WBC 8.37 07/05/2017    HGB 14.9 07/05/2017    HCT 44.9 07/05/2017    MCV 86 07/05/2017     07/05/2017     BMP  Lab Results   Component Value Date     10/05/2017    K 4.1 10/05/2017     10/05/2017    CO2 25 10/05/2017    BUN 12 10/05/2017    CREATININE 0.9 04/10/2018    CALCIUM 9.5 10/05/2017    ANIONGAP 11 10/05/2017    ESTGFRAFRICA >60 04/10/2018    EGFRNONAA >60 04/10/2018     BNP  @LABRCNTIP(BNP,BNPTRIAGEBLO)@  Lab Results   Component Value Date    TSH 0.762 07/05/2017     ABG  @LABRCNTIP(PH,PO2,PCO2,HCO3,BE)@    Diagnostic Results:  PFT done today personally reviewed, moderate obstruction noted, no significant bronchodilation, air trapping noted, minimal decrease of DLCO to 76%.    CT chest with contrast personally reviewed  Stable lung nodules were noted.  Bilateral apical scarring noted.  Left lower lobe emphysematous bleb noted.  Tree-in-bud opacity noted.    Assessment/Plan:   Obstructive pattern present on pulmonary function testing  -     fluticasone-vilanterol (BREO ELLIPTA) 100-25 mcg/dose diskus inhaler; Inhale 1 puff into the lungs once daily. Controller  Dispense: 14 each; Refill: 5  -     albuterol 90 mcg/actuation inhaler; Inhale 2 puffs into the lungs every 6 (six) hours as needed for Wheezing or Shortness of Breath. Rescue  Dispense: 1 Inhaler; Refill: 4    Pulmonary air  trapping  -     fluticasone-vilanterol (BREO ELLIPTA) 100-25 mcg/dose diskus inhaler; Inhale 1 puff into the lungs once daily. Controller  Dispense: 14 each; Refill: 5    Multiple lung nodules on CT  -     CT Chest Without Contrast; Future; Expected date: 01/11/2019    Pulmonary emphysema, unspecified emphysema type  -     Alpha 1 Antitrypsin Defiiciency Profile; Future; Expected date: 04/11/2018  -     Spirometry with/without bronchodilator; Future; Expected date: 07/11/2018    Bleb, lung  -     Alpha 1 Antitrypsin Defiiciency Profile; Future; Expected date: 04/11/2018    Scarring of lung  -     Alpha 1 Antitrypsin Defiiciency Profile; Future; Expected date: 04/11/2018    TALIA on CPAP    This patient has obstructive pattern on PFT with air trapping, she is a never smoker, asthma versus infectious etiology are in the differential.  I will check alpha-1 antitrypsin deficiency profile, IgE as a workup for possible ABPA, might need a bronchoscopy and sampling to rule out unusual infections in the future.   I will start her on ICS/LABA patient albuterol as needed for shortness of breath.    Follow-up in about 3 months (around 7/11/2018).    This note was prepared using voice recognition system and is likely to have sound alike errors that may have been overlooked even after proof reading.  Please call me with any questions    Discussed diagnosis, its evaluation, treatment and usual course. All questions answered.    Thank you for the courtesy of participating in the care of this patient    Corky Luna MD

## 2018-04-12 ENCOUNTER — TELEPHONE (OUTPATIENT)
Dept: PULMONOLOGY | Facility: CLINIC | Age: 68
End: 2018-04-12

## 2018-04-12 NOTE — TELEPHONE ENCOUNTER
----- Message from Rosa Presley LPN sent at 4/11/2018  4:38 PM CDT -----  A CT has been scheduled for me on 7.17.18; however, it was my understanding that the test was be scheduled in January, 2019. Please advise

## 2018-04-18 LAB
A1AT PROTEOTYPE S/Z, LC-MS/MS: NORMAL
A1AT SERPL NEPH-MCNC: 151 MG/DL

## 2018-04-26 NOTE — TELEPHONE ENCOUNTER
----- Message from Caro Solitario sent at 4/26/2018  9:19 AM CDT -----  Contact[facility]  [facility] strips needed...[phone]

## 2018-04-27 RX ORDER — LANCETS
EACH MISCELLANEOUS
Qty: 306 EACH | Refills: 3 | OUTPATIENT
Start: 2018-04-27 | End: 2018-05-08 | Stop reason: SDUPTHER

## 2018-04-27 RX ORDER — INSULIN GLARGINE 100 [IU]/ML
16 INJECTION, SOLUTION SUBCUTANEOUS DAILY
Qty: 3 ML | Refills: 11 | OUTPATIENT
Start: 2018-04-27 | End: 2019-01-30

## 2018-05-01 ENCOUNTER — NUTRITION (OUTPATIENT)
Dept: DIABETES | Facility: CLINIC | Age: 68
End: 2018-05-01
Payer: MEDICARE

## 2018-05-01 VITALS — BODY MASS INDEX: 31.61 KG/M2 | WEIGHT: 208.56 LBS | HEIGHT: 68 IN

## 2018-05-01 DIAGNOSIS — Z79.4 LONG TERM (CURRENT) USE OF INSULIN: ICD-10-CM

## 2018-05-01 DIAGNOSIS — E11.65 TYPE 2 DIABETES MELLITUS WITH HYPERGLYCEMIA, UNSPECIFIED WHETHER LONG TERM INSULIN USE: Primary | ICD-10-CM

## 2018-05-01 PROCEDURE — G0108 DIAB MANAGE TRN  PER INDIV: HCPCS | Mod: S$GLB,,, | Performed by: DIETITIAN, REGISTERED

## 2018-05-01 PROCEDURE — 99999 PR PBB SHADOW E&M-EST. PATIENT-LVL III: CPT | Mod: PBBFAC,,, | Performed by: DIETITIAN, REGISTERED

## 2018-05-01 NOTE — PROGRESS NOTES
"PCP:  Jessica Ward MD  REFERRING PROVIDER:  Jessica Ward MD    HISTORY OF PRESENT ILLNESS:  68 y.o. female patient is in clinic today for diabetes f/u. Patient currently takes Invokana 300mg daily, Lantus 16 units, metformin 1000 mg BID, Trulicity 0.75 weekly for diabetes. 2 lb weight loss since last visit with diabetes educator -- weight loss over last year: 33 lbs total.  Fasting BG readings range 107-140s; PP readings . Patient has been working on increasing activity -- avg 0173-1674 steps on fitness tracker.    Hemoglobin A1C   Date Value Ref Range Status   03/21/2018 8.1 (H) 4.0 - 5.6 % Final     Comment:     According to ADA guidelines, hemoglobin A1c <7.0% represents  optimal control in non-pregnant diabetic patients. Different  metrics may apply to specific patient populations.   Standards of Medical Care in Diabetes-2016.  For the purpose of screening for the presence of diabetes:  <5.7%     Consistent with the absence of diabetes  5.7-6.4%  Consistent with increasing risk for diabetes   (prediabetes)  >or=6.5%  Consistent with diabetes  Currently, no consensus exists for use of hemoglobin A1c  for diagnosis of diabetes for children.  This Hemoglobin A1c assay has significant interference with fetal   hemoglobin   (HbF). The results are invalid for patients with abnormal amounts of   HbF,   including those with known Hereditary Persistence   of Fetal Hemoglobin. Heterozygous hemoglobin variants (HbAS, HbAC,   HbAD, HbAE, HbA2) do not significantly interfere with this assay;   however, presence of multiple variants in a sample may impact the %   interference.     , ADA recommends less than 7.0.      VITAL SIGNS:  Height: 5' 7.5" (171.5 cm)   Weight: 94.6 kg (208 lb 8.9 oz)   IBW: 137.5 lbs +/-10% and AdjIBW: 163 lbs/74kg    ALLERGIES & MEDICATIONS: Reviewed and Reconciled  MEDICAL/SURGICAL & FAMILY HISTORY: Reviewed and Reconciled    LABORATORY:  Reviewed Diabetes Management Flowsheet and " Results Review.    HEALTH MAINTENANCE: Reviewed and Reconciled  Eye exam: 2/18 Chloe Jaime --   Dental exam: Recommend regular exams; denies gums bleeding.  Podiatry exam:  1/2018 Dr. Ohara    SELF-MONITORING: Glucometer - Accucheck Varsha; Food - none are avail    ACTIVITY LEVEL: Walking 4 days per week, at least 5,000 steps per day.     NUTRITION INTAKE: Meal patterns include 3 meals, 1-2 snacks daily with intake 5948-5229 cals/d.   B - Special K cereal, raspberries - water   S - none  L - snack  D - sausage, tator tots, small pc of cake - milk  S - sometimes - ice cream OR maria c chips  Beverages - milk (whole), water  DIning out - 2x/month    PSYCHOSOCIAL: Stage of change - action  Barriers to change - none    EDUCATIONAL ASSESSMENT:   1. Impediments in learning class environment - NONE.  2. Needs improvement in self care management skills.  -healthy eating  -being active  -self-monitoring  -taking medication  -problem solving  -healthy coping  -reducing risks    MNT ASSESSMENT:  2129-5682 calories, 5-6 ounces protein daily  30-45 grams carb/meal, 5-15 grams carb/snack  increase fruit 2 serv/d, vegetables 2+ cups/d, whole grains 3+serv/d, lowfat dairy 3+serv/d  low-fat, low-sodium  150 min physical activity per week, moderate intensity, as tolerated      PLAN:  · Reviewed pathophysiology diabetes, complications and importance of annual dilated eye exams, regular dental exams, and daily foot self-exams.    · Encouraged daily self-monitoring of glucose, food and activity patterns, return records to clinic.      Reviewed glucose goals. Discussed prevention, identification and treatment of hyperglycemia and hypoglycemia and when to contact clinic.   · Reviewed action of diabetes medications and to continue taking as prescribed.    Reviewed micro/macronutrient effect on health management. Reviewed principles of energy metabolism to assist weight and health management.    Discussed importance of daily physical  activity with review of benefits, methods, guidelines and precautions.   Discussed behavioral strategies for improving social and environmental support of lifestyle changes. Counseling provided to assist in goal-setting, reviewing complications of diabetes as independent to other health indicators.    Reviewed ADA goals, progress.   GOALS: Self monitoring: daily food & activity journal. Meal plan-90% accuracy, Physical activity-150 minutes per week.   Visit Time Spent:  30 minutes    6-month recall for cont. Support as patients BG log shows much improvement with BG trending down to 100-110s regularly.     Thank you for the opportunity to work with your patient.

## 2018-05-08 ENCOUNTER — TELEPHONE (OUTPATIENT)
Dept: INTERNAL MEDICINE | Facility: CLINIC | Age: 68
End: 2018-05-08

## 2018-05-08 RX ORDER — LANCETS
EACH MISCELLANEOUS
Qty: 200 EACH | Refills: 3 | Status: SHIPPED | OUTPATIENT
Start: 2018-05-08 | End: 2023-01-17 | Stop reason: SDUPTHER

## 2018-05-08 NOTE — TELEPHONE ENCOUNTER
----- Message from Ulises Salas sent at 5/8/2018  3:00 PM CDT -----  Contact: [facility]  Needs orders and clinical notes for pt to receive lancet device for diabetic testing. [phone]. If any questions pls call [phone].

## 2018-05-15 ENCOUNTER — PATIENT MESSAGE (OUTPATIENT)
Dept: INTERNAL MEDICINE | Facility: CLINIC | Age: 68
End: 2018-05-15

## 2018-05-15 RX ORDER — LANCETS
EACH MISCELLANEOUS
Qty: 200 EACH | Refills: 3 | Status: SHIPPED | OUTPATIENT
Start: 2018-05-15 | End: 2019-01-30

## 2018-05-15 RX ORDER — INSULIN GLARGINE 100 [IU]/ML
16 INJECTION, SOLUTION SUBCUTANEOUS DAILY
Qty: 3 ML | Refills: 6 | Status: SHIPPED | OUTPATIENT
Start: 2018-05-15 | End: 2018-08-20 | Stop reason: SDUPTHER

## 2018-06-19 DIAGNOSIS — B02.29 PHN (POSTHERPETIC NEURALGIA): ICD-10-CM

## 2018-06-19 RX ORDER — GABAPENTIN 300 MG/1
CAPSULE ORAL
Qty: 270 CAPSULE | Refills: 3 | Status: SHIPPED | OUTPATIENT
Start: 2018-06-19 | End: 2019-06-18 | Stop reason: SDUPTHER

## 2018-06-21 RX ORDER — METFORMIN HYDROCHLORIDE 500 MG/1
1000 TABLET, EXTENDED RELEASE ORAL 2 TIMES DAILY WITH MEALS
Qty: 30 TABLET | Refills: 11 | OUTPATIENT
Start: 2018-06-21

## 2018-06-21 NOTE — TELEPHONE ENCOUNTER
Reached out to patient regarding refill request. Patient was advised she will need an appt before receiving a refill on medication. Patient stated she will think about it and give us a call back.

## 2018-06-26 NOTE — PROGRESS NOTES
"Subjective:      Patient ID: Jaimie Luque is a 68 y.o. female.    Chief Complaint: Annual Exam      HPI  Here for f/u medical problems and preventive exam. Lost 10# more since last visit, 50# total.  Good energy in general, no lightheaded.  AC breakfast 120s-130s, occas 160.  Never too low.  No f/c/sw/cough.  No exertional cp/sob/palp.  Denies anxiety.  Loose BMs frequently, alt with constipation.  Taking vit D.  Still needs gabapentin for PHN itching mid chest.    HM: 4/17 HAV#2, 10/17 fluvax, 3/15 [id number], 3/14 [id number], 9/09 TDaP, 11/12 zoster, 6/16 BMD rep 5y, 5/17 Cscope rep 5y, 6/18 mmg(q2), 2/18 Eye Dr. Franco, 9/16 HCV neg.     Review of Systems   Constitutional: Negative for appetite change, chills, diaphoresis and fever.   HENT: Negative for congestion, ear pain, rhinorrhea, sinus pressure and sore throat.    Respiratory: Negative for cough, chest tightness and shortness of breath.    Cardiovascular: Negative for chest pain, palpitations and leg swelling.   Gastrointestinal: Negative for blood in stool, constipation, diarrhea, nausea and vomiting.   Genitourinary: Negative for dysuria, frequency, hematuria, menstrual problem, urgency and vaginal discharge.   Musculoskeletal: Negative for arthralgias.   Skin: Negative for rash.   Neurological: Negative for dizziness and headaches.   Psychiatric/Behavioral: Negative for sleep disturbance. The patient is not nervous/anxious.          Objective:   /72 (BP Location: Right arm, Patient Position: Sitting)   Pulse 84   Temp 98 °F (36.7 °C) (Tympanic)   Ht 5' 7.5" (1.715 m)   Wt 91.4 kg (201 lb 8 oz)   SpO2 96%   BMI 31.09 kg/m²     Physical Exam   Constitutional: She is oriented to person, place, and time. She appears well-developed and well-nourished.   HENT:   Right Ear: External ear normal. Tympanic membrane is not injected.   Left Ear: External ear normal. Tympanic membrane is not injected.   Mouth/Throat: Oropharynx is clear and moist. "   Eyes: Conjunctivae are normal.   Neck: Normal range of motion. Neck supple. No thyromegaly present.   Cardiovascular: Normal rate, regular rhythm and intact distal pulses.  Exam reveals no gallop and no friction rub.    No murmur heard.  Pulses:       Dorsalis pedis pulses are 2+ on the right side, and 2+ on the left side.        Posterior tibial pulses are 2+ on the right side, and 2+ on the left side.   Pulmonary/Chest: Effort normal and breath sounds normal. She has no wheezes. She has no rales. Right breast exhibits no mass, no nipple discharge, no skin change and no tenderness. Left breast exhibits no mass, no nipple discharge, no skin change and no tenderness.   Abdominal: Soft. Bowel sounds are normal. She exhibits no mass. There is no tenderness.   Musculoskeletal: She exhibits no edema.   Feet:   Right Foot:   Protective Sensation: 10 sites tested. 10 sites sensed.   Skin Integrity: Negative for ulcer, blister, skin breakdown, erythema, warmth, callus or dry skin.   Left Foot:   Protective Sensation: 10 sites tested. 10 sites sensed.   Skin Integrity: Negative for ulcer, blister, skin breakdown, erythema, warmth, callus or dry skin.   Lymphadenopathy:     She has no cervical adenopathy.        Right axillary: No lateral adenopathy present.        Left axillary: No lateral adenopathy present.  Neurological: She is alert and oriented to person, place, and time.   Skin: Skin is warm. No rash noted.   Psychiatric: She has a normal mood and affect.       Results for NORMA HILLS (MRN [id number]) as of 7/3/2018 15:51   Ref. Range 6/28/2018 06:54 6/28/2018 07:05   WBC Latest Ref Range: 3.90 - 12.70 K/uL  9.12   RBC Latest Ref Range: 4.00 - 5.40 M/uL  5.14   Hemoglobin Latest Ref Range: 12.0 - 16.0 g/dL  14.6   Hematocrit Latest Ref Range: 37.0 - 48.5 %  45.5   MCV Latest Ref Range: 82 - 98 fL  89   MCH Latest Ref Range: 27.0 - 31.0 pg  28.4   MCHC Latest Ref Range: 32.0 - 36.0 g/dL  32.1   RDW Latest Ref Range:  11.5 - 14.5 %  15.3 (H)   Platelets Latest Ref Range: 150 - 350 K/uL  249   MPV Latest Ref Range: 9.2 - 12.9 fL  11.7   Gran% Latest Ref Range: 38.0 - 73.0 %  59.8   Gran # (ANC) Latest Ref Range: 1.8 - 7.7 K/uL  5.5   Immature Granulocytes Latest Ref Range: 0.0 - 0.5 %  0.4   Immature Grans (Abs) Latest Ref Range: 0.00 - 0.04 K/uL  0.04   Lymph% Latest Ref Range: 18.0 - 48.0 %  29.2   Lymph # Latest Ref Range: 1.0 - 4.8 K/uL  2.7   Mono% Latest Ref Range: 4.0 - 15.0 %  7.8   Mono # Latest Ref Range: 0.3 - 1.0 K/uL  0.7   Eosinophil% Latest Ref Range: 0.0 - 8.0 %  2.3   Eos # Latest Ref Range: 0.0 - 0.5 K/uL  0.2   Basophil% Latest Ref Range: 0.0 - 1.9 %  0.5   Baso # Latest Ref Range: 0.00 - 0.20 K/uL  0.05   nRBC Latest Ref Range: 0 /100 WBC  0   Sodium Latest Ref Range: 136 - 145 mmol/L  139   Potassium Latest Ref Range: 3.5 - 5.1 mmol/L  4.3   Chloride Latest Ref Range: 95 - 110 mmol/L  102   CO2 Latest Ref Range: 23 - 29 mmol/L  27   Anion Gap Latest Ref Range: 8 - 16 mmol/L  10   BUN, Bld Latest Ref Range: 8 - 23 mg/dL  19   Creatinine Latest Ref Range: 0.5 - 1.4 mg/dL  0.9   eGFR if non African American Latest Ref Range: >60 mL/min/1.73 m^2  >60.0   eGFR if African American Latest Ref Range: >60 mL/min/1.73 m^2  >60.0   Glucose Latest Ref Range: 70 - 110 mg/dL  127 (H)   Calcium Latest Ref Range: 8.7 - 10.5 mg/dL  9.8   Alkaline Phosphatase Latest Ref Range: 55 - 135 U/L  63   Total Protein Latest Ref Range: 6.0 - 8.4 g/dL  7.1   Albumin Latest Ref Range: 3.5 - 5.2 g/dL  3.6   Total Bilirubin Latest Ref Range: 0.1 - 1.0 mg/dL  0.9   AST Latest Ref Range: 10 - 40 U/L  13   ALT Latest Ref Range: 10 - 44 U/L  13   Triglycerides Latest Ref Range: 30 - 150 mg/dL  94   Cholesterol Latest Ref Range: 120 - 199 mg/dL  143   HDL Latest Ref Range: 40 - 75 mg/dL  60   LDL Cholesterol Latest Ref Range: 63.0 - 159.0 mg/dL  64.2   Total Cholesterol/HDL Ratio Latest Ref Range: 2.0 - 5.0   2.4   Vit D, 25-Hydroxy Latest Ref  Range: 30 - 96 ng/mL  47   Hemoglobin A1C Latest Ref Range: 4.0 - 5.6 %  7.6 (H)   Estimated Avg Glucose Latest Ref Range: 68 - 131 mg/dL  171 (H)   TSH Latest Ref Range: 0.400 - 4.000 uIU/mL  0.238 (L)   Free T4 Latest Ref Range: 0.71 - 1.51 ng/dL  1.62 (H)   Microalbum.,U,Random Latest Units: ug/mL 9.0    Microalb Creat Ratio Latest Ref Range: 0.0 - 30.0 ug/mg 4.8        Assessment:       1. Encounter for preventive health examination    2. Uncontrolled type 2 diabetes mellitus without complication, with long-term current use of insulin    3. Psoriasis    4. TALIA on CPAP    5. Hyperlipidemia associated with type 2 diabetes mellitus    6. Acquired hypothyroidism    7. Multiple lung nodules on CT          Plan:     Encounter for preventive health examination- utd.    Uncontrolled type 2 diabetes mellitus without complication, with long-term current use of insulin- doing better, cont rx.    Psoriasis    PHN- needs to continue gabapentin.    TALIA on CPAP- doing well.    Hyperlipidemia associated with type 2 diabetes mellitus- 10yr vasc risk 8.5%, cont statin and ASA, increase exercise.    Acquired hypothyroidism- too high, decrease to 100mcg, recheck 3mo.    Multiple lung nodules on CT- f/w Pulm.

## 2018-06-28 ENCOUNTER — HOSPITAL ENCOUNTER (OUTPATIENT)
Dept: RADIOLOGY | Facility: HOSPITAL | Age: 68
Discharge: HOME | End: 2018-06-28
Attending: INTERNAL MEDICINE
Payer: MEDICARE

## 2018-06-28 DIAGNOSIS — E11.69 HYPERLIPIDEMIA ASSOCIATED WITH TYPE 2 DIABETES MELLITUS: ICD-10-CM

## 2018-06-28 DIAGNOSIS — E03.9 ACQUIRED HYPOTHYROIDISM: ICD-10-CM

## 2018-06-28 DIAGNOSIS — Z29.9 PREVENTIVE MEASURE: ICD-10-CM

## 2018-06-28 DIAGNOSIS — E78.5 HYPERLIPIDEMIA ASSOCIATED WITH TYPE 2 DIABETES MELLITUS: ICD-10-CM

## 2018-06-28 DIAGNOSIS — Z12.39 BREAST CANCER SCREENING: ICD-10-CM

## 2018-06-28 PROCEDURE — 77067 SCR MAMMO BI INCL CAD: CPT | Mod: TC,PO

## 2018-06-28 PROCEDURE — 77063 BREAST TOMOSYNTHESIS BI: CPT | Mod: 26,,, | Performed by: RADIOLOGY

## 2018-06-28 PROCEDURE — 77067 SCR MAMMO BI INCL CAD: CPT | Mod: 26,,, | Performed by: RADIOLOGY

## 2018-07-03 ENCOUNTER — OFFICE VISIT (OUTPATIENT)
Dept: INTERNAL MEDICINE | Facility: CLINIC | Age: 68
End: 2018-07-03
Payer: MEDICARE

## 2018-07-03 VITALS
TEMPERATURE: 98 F | HEIGHT: 68 IN | OXYGEN SATURATION: 96 % | BODY MASS INDEX: 30.54 KG/M2 | DIASTOLIC BLOOD PRESSURE: 72 MMHG | HEART RATE: 84 BPM | SYSTOLIC BLOOD PRESSURE: 114 MMHG | WEIGHT: 201.5 LBS

## 2018-07-03 DIAGNOSIS — B02.29 PHN (POSTHERPETIC NEURALGIA): ICD-10-CM

## 2018-07-03 DIAGNOSIS — Z00.00 ENCOUNTER FOR PREVENTIVE HEALTH EXAMINATION: Primary | ICD-10-CM

## 2018-07-03 DIAGNOSIS — E11.69 HYPERLIPIDEMIA ASSOCIATED WITH TYPE 2 DIABETES MELLITUS: ICD-10-CM

## 2018-07-03 DIAGNOSIS — L40.9 PSORIASIS: ICD-10-CM

## 2018-07-03 DIAGNOSIS — E03.9 ACQUIRED HYPOTHYROIDISM: ICD-10-CM

## 2018-07-03 DIAGNOSIS — E78.5 HYPERLIPIDEMIA ASSOCIATED WITH TYPE 2 DIABETES MELLITUS: ICD-10-CM

## 2018-07-03 DIAGNOSIS — G47.33 OSA ON CPAP: ICD-10-CM

## 2018-07-03 DIAGNOSIS — R91.8 MULTIPLE LUNG NODULES ON CT: ICD-10-CM

## 2018-07-03 PROCEDURE — 3078F DIAST BP <80 MM HG: CPT | Mod: CPTII,S$GLB,, | Performed by: INTERNAL MEDICINE

## 2018-07-03 PROCEDURE — 3045F PR MOST RECENT HEMOGLOBIN A1C LEVEL 7.0-9.0%: CPT | Mod: CPTII,S$GLB,, | Performed by: INTERNAL MEDICINE

## 2018-07-03 PROCEDURE — 99999 PR PBB SHADOW E&M-EST. PATIENT-LVL III: CPT | Mod: PBBFAC,,, | Performed by: INTERNAL MEDICINE

## 2018-07-03 PROCEDURE — 3074F SYST BP LT 130 MM HG: CPT | Mod: CPTII,S$GLB,, | Performed by: INTERNAL MEDICINE

## 2018-07-03 PROCEDURE — 99214 OFFICE O/P EST MOD 30 MIN: CPT | Mod: S$GLB,,, | Performed by: INTERNAL MEDICINE

## 2018-07-03 RX ORDER — LEVOTHYROXINE SODIUM 100 UG/1
100 TABLET ORAL
Qty: 90 TABLET | Refills: 3 | Status: SHIPPED | OUTPATIENT
Start: 2018-07-03 | End: 2019-06-18 | Stop reason: SDUPTHER

## 2018-07-03 RX ORDER — METFORMIN HYDROCHLORIDE 500 MG/1
1000 TABLET, EXTENDED RELEASE ORAL 2 TIMES DAILY WITH MEALS
Qty: 360 TABLET | Refills: 3 | Status: SHIPPED | OUTPATIENT
Start: 2018-07-03 | End: 2019-05-03 | Stop reason: SDUPTHER

## 2018-08-03 ENCOUNTER — TELEPHONE (OUTPATIENT)
Dept: RADIOLOGY | Facility: HOSPITAL | Age: 68
End: 2018-08-03

## 2018-08-06 ENCOUNTER — HOSPITAL ENCOUNTER (OUTPATIENT)
Dept: RADIOLOGY | Facility: HOSPITAL | Age: 68
Discharge: HOME | End: 2018-08-06
Attending: NURSE PRACTITIONER
Payer: MEDICARE

## 2018-08-06 DIAGNOSIS — D18.00 HEMANGIOMA: ICD-10-CM

## 2018-08-06 PROCEDURE — 76700 US EXAM ABDOM COMPLETE: CPT | Mod: TC,PO

## 2018-08-06 PROCEDURE — 76700 US EXAM ABDOM COMPLETE: CPT | Mod: 26,,, | Performed by: RADIOLOGY

## 2018-08-20 RX ORDER — INSULIN GLARGINE 100 [IU]/ML
16 INJECTION, SOLUTION SUBCUTANEOUS DAILY
Qty: 3 ML | Refills: 6 | Status: SHIPPED | OUTPATIENT
Start: 2018-08-20 | End: 2018-12-30 | Stop reason: SDUPTHER

## 2018-09-16 RX ORDER — LEVOTHYROXINE SODIUM 112 UG/1
TABLET ORAL
Qty: 90 TABLET | Refills: 3 | Status: SHIPPED | OUTPATIENT
Start: 2018-09-16 | End: 2018-10-02 | Stop reason: DRUGHIGH

## 2018-09-19 NOTE — PROGRESS NOTES
"Subjective:      Patient ID: Jaimie Luque is a 68 y.o. female.    Chief Complaint: Follow-up (3 month)      HPI  Here for follow up of medical problems.  On less dose synthroid.  BMs now no urgency.  Walking more lately.  AC sugars breakfast 110 range.  Doing well on cpap.  Sleeps more soundly with it on.      Updated/ annual due 7/19:  HM: 4/17 HAV#2, 10/18 today fluvax, 3/15 [id number], 3/14 [id number], 10/18 today booster at pharm TDaP, 11/12 zoster, 6/16 BMD rep 5y, 5/17 Cscope rep 5y, 6/18 mmg(q2), 2/18 Eye Dr. Franco, 9/16 HCV neg.     Review of Systems   Constitutional: Positive for fatigue. Negative for chills, diaphoresis and fever.   Respiratory: Negative for cough and shortness of breath.    Cardiovascular: Negative for chest pain, palpitations and leg swelling.   Gastrointestinal: Negative for blood in stool, constipation, diarrhea, nausea and vomiting.   Endocrine: Negative for polydipsia, polyphagia and polyuria.   Genitourinary: Negative for dysuria, frequency and hematuria.   Skin: Negative for pallor.   Neurological: Negative for dizziness, tremors, seizures, speech difficulty, weakness and headaches.   Psychiatric/Behavioral: Negative for confusion. The patient is not nervous/anxious.          Objective:   /76 (BP Location: Right arm, Patient Position: Sitting)   Pulse 78   Temp 98.1 °F (36.7 °C) (Tympanic)   Ht 5' 7.5" (1.715 m)   Wt 90.1 kg (198 lb 10.2 oz)   SpO2 96%   BMI 30.65 kg/m²     Physical Exam   Constitutional: She is oriented to person, place, and time. She appears well-developed.   HENT:   Mouth/Throat: Oropharynx is clear and moist.   Neck: Neck supple. Carotid bruit is not present. No thyroid mass present.   Cardiovascular: Normal rate, regular rhythm and intact distal pulses. Exam reveals no gallop and no friction rub.   No murmur heard.  Pulmonary/Chest: Effort normal and breath sounds normal. She has no wheezes. She has no rales.   Abdominal: Soft. Bowel sounds are " normal. She exhibits no mass. There is no hepatosplenomegaly. There is no tenderness.   Musculoskeletal: She exhibits no edema.   Lymphadenopathy:     She has no cervical adenopathy.   Neurological: She is alert and oriented to person, place, and time.   Psychiatric: She has a normal mood and affect.       Results for NORMA HILLS (MRN [id number]) as of 10/2/2018 14:06   Ref. Range 6/28/2018 07:05 6/28/2018 07:47 8/6/2018 09:30 9/25/2018 09:43   Hemoglobin A1C Latest Ref Range: 4.0 - 5.6 % 7.6 (H)   7.4 (H)   Estimated Avg Glucose Latest Ref Range: 68 - 131 mg/dL 171 (H)   166 (H)   TSH Latest Ref Range: 0.400 - 4.000 uIU/mL 0.238 (L)   0.774   Free T4 Latest Ref Range: 0.71 - 1.51 ng/dL 1.62 (H)          Assessment:       1. Acquired hypothyroidism    2. Uncontrolled type 2 diabetes mellitus without complication, with long-term current use of insulin    3. Preventive measure          Plan:     Acquired hypothyroidism- now clin stable, cont rx.    Uncontrolled type 2 diabetes mellitus without complication, with long-term current use of insulin- doing better, cont increased exercise.  -     Hemoglobin A1c; Future; Expected date: 10/02/2018    Preventive measure- Discussed pt needs to get Shingrix vaccination at pharmacy.  Get TDaP at pharm.

## 2018-09-25 ENCOUNTER — LAB VISIT (OUTPATIENT)
Dept: LAB | Facility: HOSPITAL | Age: 68
End: 2018-09-25
Attending: INTERNAL MEDICINE
Payer: MEDICARE

## 2018-09-25 DIAGNOSIS — E03.9 ACQUIRED HYPOTHYROIDISM: ICD-10-CM

## 2018-09-25 LAB
ESTIMATED AVG GLUCOSE: 166 MG/DL
HBA1C MFR BLD HPLC: 7.4 %
TSH SERPL DL<=0.005 MIU/L-ACNC: 0.77 UIU/ML

## 2018-09-25 PROCEDURE — 36415 COLL VENOUS BLD VENIPUNCTURE: CPT | Mod: PO

## 2018-09-25 PROCEDURE — 84443 ASSAY THYROID STIM HORMONE: CPT

## 2018-09-25 PROCEDURE — 83036 HEMOGLOBIN GLYCOSYLATED A1C: CPT

## 2018-10-02 ENCOUNTER — OFFICE VISIT (OUTPATIENT)
Dept: INTERNAL MEDICINE | Facility: CLINIC | Age: 68
End: 2018-10-02
Payer: MEDICARE

## 2018-10-02 VITALS
WEIGHT: 198.63 LBS | BODY MASS INDEX: 30.1 KG/M2 | HEIGHT: 68 IN | DIASTOLIC BLOOD PRESSURE: 76 MMHG | OXYGEN SATURATION: 96 % | TEMPERATURE: 98 F | SYSTOLIC BLOOD PRESSURE: 110 MMHG | HEART RATE: 78 BPM

## 2018-10-02 DIAGNOSIS — Z29.9 PREVENTIVE MEASURE: ICD-10-CM

## 2018-10-02 DIAGNOSIS — E03.9 ACQUIRED HYPOTHYROIDISM: Primary | ICD-10-CM

## 2018-10-02 PROCEDURE — 99214 OFFICE O/P EST MOD 30 MIN: CPT | Mod: S$PBB,,, | Performed by: INTERNAL MEDICINE

## 2018-10-02 PROCEDURE — 3078F DIAST BP <80 MM HG: CPT | Mod: CPTII,,, | Performed by: INTERNAL MEDICINE

## 2018-10-02 PROCEDURE — 99999 PR PBB SHADOW E&M-EST. PATIENT-LVL IV: CPT | Mod: PBBFAC,,, | Performed by: INTERNAL MEDICINE

## 2018-10-02 PROCEDURE — 1101F PT FALLS ASSESS-DOCD LE1/YR: CPT | Mod: CPTII,,, | Performed by: INTERNAL MEDICINE

## 2018-10-02 PROCEDURE — 3074F SYST BP LT 130 MM HG: CPT | Mod: CPTII,,, | Performed by: INTERNAL MEDICINE

## 2018-10-02 PROCEDURE — 3045F PR MOST RECENT HEMOGLOBIN A1C LEVEL 7.0-9.0%: CPT | Mod: CPTII,,, | Performed by: INTERNAL MEDICINE

## 2018-10-02 PROCEDURE — 99214 OFFICE O/P EST MOD 30 MIN: CPT | Mod: PBBFAC,PO,25 | Performed by: INTERNAL MEDICINE

## 2018-10-02 PROCEDURE — 90662 IIV NO PRSV INCREASED AG IM: CPT | Mod: PBBFAC,PO

## 2018-10-23 ENCOUNTER — OFFICE VISIT (OUTPATIENT)
Dept: SLEEP MEDICINE | Facility: CLINIC | Age: 68
End: 2018-10-23
Payer: MEDICARE

## 2018-10-23 VITALS
SYSTOLIC BLOOD PRESSURE: 118 MMHG | HEART RATE: 70 BPM | BODY MASS INDEX: 31.52 KG/M2 | DIASTOLIC BLOOD PRESSURE: 58 MMHG | WEIGHT: 200.81 LBS | RESPIRATION RATE: 16 BRPM | OXYGEN SATURATION: 97 % | HEIGHT: 67 IN

## 2018-10-23 DIAGNOSIS — G47.33 OSA (OBSTRUCTIVE SLEEP APNEA): Primary | ICD-10-CM

## 2018-10-23 PROCEDURE — 3078F DIAST BP <80 MM HG: CPT | Mod: CPTII,,, | Performed by: NURSE PRACTITIONER

## 2018-10-23 PROCEDURE — 99213 OFFICE O/P EST LOW 20 MIN: CPT | Mod: S$PBB,,, | Performed by: NURSE PRACTITIONER

## 2018-10-23 PROCEDURE — 99999 PR PBB SHADOW E&M-EST. PATIENT-LVL IV: CPT | Mod: PBBFAC,,, | Performed by: NURSE PRACTITIONER

## 2018-10-23 PROCEDURE — 99214 OFFICE O/P EST MOD 30 MIN: CPT | Mod: PBBFAC,PO | Performed by: NURSE PRACTITIONER

## 2018-10-23 PROCEDURE — 3074F SYST BP LT 130 MM HG: CPT | Mod: CPTII,,, | Performed by: NURSE PRACTITIONER

## 2018-10-23 PROCEDURE — 1101F PT FALLS ASSESS-DOCD LE1/YR: CPT | Mod: CPTII,,, | Performed by: NURSE PRACTITIONER

## 2018-10-23 NOTE — PROGRESS NOTES
"Subjective:      Patient ID: Jaimie Luque is a 68 y.o. female.    Chief Complaint: Sleep Apnea    Presents to office for review of CPAP therapy. Patient states improved symptoms with use of CPAP. Sleeping more soundly. Waking up feeling more refreshed. Improved daytime sleepiness. Patient states she is benefiting from use of the CPAP.   She has CT follow up in 3 months with Dr. Luna.  No fever, chills, or hemoptysis. No pleuritic type chest pain. Breathing is stable as compared to 6 months ago.    Patient Active Problem List:     Acquired hypothyroidism     Hyperlipidemia associated with type 2 diabetes mellitus     Psoriasis     Obesity (BMI 35.0-39.9 without comorbidity)     PHN (postherpetic neuralgia)     Uncontrolled type 2 diabetes mellitus without complication, with long-term current use of insulin     TALIA on CPAP              BP (!) 118/58   Pulse 70   Resp 16   Ht 5' 7" (1.702 m)   Wt 91.1 kg (200 lb 13.4 oz)   SpO2 97%   BMI 31.46 kg/m²   Body mass index is 31.46 kg/m².    Review of Systems   Constitutional: Negative.    HENT: Negative.    Respiratory: Negative.    Cardiovascular: Negative.    Musculoskeletal: Negative.    Gastrointestinal: Negative.    Neurological: Negative.    Psychiatric/Behavioral: Negative.      Objective:      Physical Exam   Constitutional: She is oriented to person, place, and time. She appears well-developed and well-nourished.   HENT:   Head: Normocephalic and atraumatic.   Mouth/Throat: Oropharynx is clear and moist.   Neck: Normal range of motion. Neck supple.   Cardiovascular: Normal rate.   Pulmonary/Chest: Effort normal and breath sounds normal.   Abdominal: Soft.   Musculoskeletal: Normal range of motion. She exhibits no edema.   Neurological: She is alert and oriented to person, place, and time.   Skin: Skin is warm and dry.   Psychiatric: She has a normal mood and affect.     Personal Diagnostic Review  CPAP download shows patient wears on average 5 hrs and " 3 minutes. Greater than 4 hrs 66.7 % of the time. AHI unknown      Assessment:       1. TALIA (obstructive sleep apnea)        Outpatient Encounter Medications as of 10/23/2018   Medication Sig Dispense Refill    aspirin 81 mg Tab Take by mouth. 1 Tablet Oral Every day      atorvastatin (LIPITOR) 20 MG tablet TAKE 1 TABLET BY MOUTH EVERY DAY 90 tablet 3    blood sugar diagnostic Strp TRUEresArthroCAD Matrix Blood Glucose Monitoring System  Dx:  E11.9   Medically Necessary 1 each 3    canagliflozin (INVOKANA) 300 mg Tab tablet Take 1 tablet (300 mg total) by mouth once daily. 90 tablet 3    cholecalciferol, vitamin D3, 2,000 unit Tab Take by mouth. 1 Tablet Oral Every day      dulaglutide (TRULICITY) 0.75 mg/0.5 mL PnIj Inject 0.5 mLs (0.75 mg total) into the skin once a week. 12 Syringe 3    gabapentin (NEURONTIN) 300 MG capsule TAKE 1 CAPSULE BY MOUTH THREE TIMES DAILY 270 capsule 3    insulin glargine (BASAGLAR KWIKPEN U-100 INSULIN) 100 unit/mL (3 mL) InPn pen Inject 16 Units into the skin once daily. 3 mL 6    lancets (ACCU-CHEK MULTICLIX LANCET) Misc Accu Chek Multi Clix  Check blood sugar twice daily   Dx: E11.65 200 each 3    lancets (ACCU-CHEK SOFTCLIX LANCETS) Misc Check blood sugar twice daily.  Accuchek Multiclix drum lancets, [facility]  Dx:  E11.9 200 each 3    levothyroxine (SYNTHROID) 100 MCG tablet Take 1 tablet (100 mcg total) by mouth before breakfast. 90 tablet 3    lisinopril (PRINIVIL,ZESTRIL) 2.5 MG tablet Take 1 tablet (2.5 mg total) by mouth once daily. 90 tablet 3    loratadine (CLARITIN) 10 mg tablet Take 10 mg by mouth.      meclizine (ANTIVERT) 25 mg tablet Take 1 tablet by mouth as needed.       metFORMIN (GLUCOPHAGE-XR) 500 MG 24 hr tablet Take 2 tablets (1,000 mg total) by mouth 2 (two) times daily with meals. 360 tablet 3    albuterol 90 mcg/actuation inhaler Inhale 2 puffs into the lungs every 6 (six) hours as needed for Wheezing or Shortness of Breath. Rescue 1 Inhaler  4    diphth,pertus,acell,,tetanus (BOOSTRIX) 2.5-8-5 Lf-mcg-Lf/0.5mL Syrg injection Inject into the muscle as directed 0.5 mL 0    insulin glargine (LANTUS SOLOSTAR) 100 unit/mL (3 mL) InPn pen Inject 16 Units into the skin once daily. 3 mL 11     No facility-administered encounter medications on file as of 10/23/2018.      Orders Placed This Encounter   Procedures    CPAP/BIPAP SUPPLIES     90 day supply with 3 refills.     Order Specific Question:   Type of mask:     Answer:   Nasal     Order Specific Question:   Headgear?     Answer:   Yes     Order Specific Question:   Tubing?     Answer:   Yes     Order Specific Question:   Humidifier chamber?     Answer:   Yes     Order Specific Question:   Chin strap?     Answer:   Yes     Order Specific Question:   Filters?     Answer:   Yes     Order Specific Question:   Length of need (1-99 months):     Answer:   99     Plan:   Wear PAP nightly. Doing well on PAP settings. Follow up in 12 months with PAP data download or call earlier if any problems.

## 2018-11-12 RX ORDER — ATORVASTATIN CALCIUM 20 MG/1
TABLET, FILM COATED ORAL
Qty: 90 TABLET | Refills: 3 | Status: SHIPPED | OUTPATIENT
Start: 2018-11-12 | End: 2019-11-03 | Stop reason: SDUPTHER

## 2018-12-31 RX ORDER — INSULIN GLARGINE 100 [IU]/ML
INJECTION, SOLUTION SUBCUTANEOUS
Qty: 3 BOX | Refills: 6 | Status: SHIPPED | OUTPATIENT
Start: 2018-12-31 | End: 2019-07-02

## 2018-12-31 RX ORDER — DULAGLUTIDE 0.75 MG/.5ML
INJECTION, SOLUTION SUBCUTANEOUS
Qty: 6 ML | Refills: 6 | Status: SHIPPED | OUTPATIENT
Start: 2018-12-31 | End: 2020-02-12

## 2019-01-25 ENCOUNTER — PATIENT MESSAGE (OUTPATIENT)
Dept: INTERNAL MEDICINE | Facility: CLINIC | Age: 69
End: 2019-01-25

## 2019-01-25 NOTE — TELEPHONE ENCOUNTER
Please tell pt I want to check lab and have her see Zuleima Long so we can discuss best treatment change for her.  Please sched lab/Zuleima.  BRIAN

## 2019-01-28 ENCOUNTER — LAB VISIT (OUTPATIENT)
Dept: LAB | Facility: HOSPITAL | Age: 69
End: 2019-01-28
Attending: INTERNAL MEDICINE
Payer: MEDICARE

## 2019-01-28 LAB
ESTIMATED AVG GLUCOSE: 183 MG/DL
HBA1C MFR BLD HPLC: 8 %

## 2019-01-28 PROCEDURE — 83036 HEMOGLOBIN GLYCOSYLATED A1C: CPT

## 2019-01-28 PROCEDURE — 36415 COLL VENOUS BLD VENIPUNCTURE: CPT

## 2019-01-30 ENCOUNTER — OFFICE VISIT (OUTPATIENT)
Dept: INTERNAL MEDICINE | Facility: CLINIC | Age: 69
End: 2019-01-30
Payer: MEDICARE

## 2019-01-30 VITALS
WEIGHT: 205.69 LBS | HEART RATE: 76 BPM | HEIGHT: 67 IN | TEMPERATURE: 99 F | SYSTOLIC BLOOD PRESSURE: 98 MMHG | DIASTOLIC BLOOD PRESSURE: 62 MMHG | OXYGEN SATURATION: 95 % | BODY MASS INDEX: 32.28 KG/M2

## 2019-01-30 DIAGNOSIS — E78.5 HYPERLIPIDEMIA ASSOCIATED WITH TYPE 2 DIABETES MELLITUS: ICD-10-CM

## 2019-01-30 DIAGNOSIS — E03.9 ACQUIRED HYPOTHYROIDISM: ICD-10-CM

## 2019-01-30 DIAGNOSIS — G47.33 OSA ON CPAP: ICD-10-CM

## 2019-01-30 DIAGNOSIS — E11.69 HYPERLIPIDEMIA ASSOCIATED WITH TYPE 2 DIABETES MELLITUS: ICD-10-CM

## 2019-01-30 DIAGNOSIS — R91.8 MULTIPLE LUNG NODULES ON CT: ICD-10-CM

## 2019-01-30 PROCEDURE — 3074F SYST BP LT 130 MM HG: CPT | Mod: CPTII,S$GLB,, | Performed by: PHYSICIAN ASSISTANT

## 2019-01-30 PROCEDURE — 1101F PT FALLS ASSESS-DOCD LE1/YR: CPT | Mod: CPTII,S$GLB,, | Performed by: PHYSICIAN ASSISTANT

## 2019-01-30 PROCEDURE — 3045F PR MOST RECENT HEMOGLOBIN A1C LEVEL 7.0-9.0%: ICD-10-PCS | Mod: CPTII,S$GLB,, | Performed by: PHYSICIAN ASSISTANT

## 2019-01-30 PROCEDURE — 3074F PR MOST RECENT SYSTOLIC BLOOD PRESSURE < 130 MM HG: ICD-10-PCS | Mod: CPTII,S$GLB,, | Performed by: PHYSICIAN ASSISTANT

## 2019-01-30 PROCEDURE — 99999 PR PBB SHADOW E&M-EST. PATIENT-LVL IV: ICD-10-PCS | Mod: PBBFAC,,, | Performed by: PHYSICIAN ASSISTANT

## 2019-01-30 PROCEDURE — 1101F PR PT FALLS ASSESS DOC 0-1 FALLS W/OUT INJ PAST YR: ICD-10-PCS | Mod: CPTII,S$GLB,, | Performed by: PHYSICIAN ASSISTANT

## 2019-01-30 PROCEDURE — 99213 PR OFFICE/OUTPT VISIT, EST, LEVL III, 20-29 MIN: ICD-10-PCS | Mod: S$GLB,,, | Performed by: PHYSICIAN ASSISTANT

## 2019-01-30 PROCEDURE — 3078F PR MOST RECENT DIASTOLIC BLOOD PRESSURE < 80 MM HG: ICD-10-PCS | Mod: CPTII,S$GLB,, | Performed by: PHYSICIAN ASSISTANT

## 2019-01-30 PROCEDURE — 3045F PR MOST RECENT HEMOGLOBIN A1C LEVEL 7.0-9.0%: CPT | Mod: CPTII,S$GLB,, | Performed by: PHYSICIAN ASSISTANT

## 2019-01-30 PROCEDURE — 99999 PR PBB SHADOW E&M-EST. PATIENT-LVL IV: CPT | Mod: PBBFAC,,, | Performed by: PHYSICIAN ASSISTANT

## 2019-01-30 PROCEDURE — 3078F DIAST BP <80 MM HG: CPT | Mod: CPTII,S$GLB,, | Performed by: PHYSICIAN ASSISTANT

## 2019-01-30 PROCEDURE — 99213 OFFICE O/P EST LOW 20 MIN: CPT | Mod: S$GLB,,, | Performed by: PHYSICIAN ASSISTANT

## 2019-01-30 NOTE — PROGRESS NOTES
"Subjective:       Patient ID: Jaimie Luque is a 69 y.o. female.    Chief Complaint: Follow-up (Rx change)    69 year old female presents to clinic for medication changes. She reports insurance is no longer covering Invokana. She has not taken Invokana in about one month. She reports glucose at home has been 120-180 AC breakfast. She reports no CP, SOB, N/V, fever, chills, or other medical complaints. She does not really want to start another medication to take the place of Invokana. She reports she would prefer to work on diet and exercise to see if she can improve glucose on her own first. She admits to not following a healthy diet currently and does not exercise routinely.    PCP: Dr. Ward    Patient Active Problem List:     Acquired hypothyroidism     Hyperlipidemia associated with type 2 diabetes mellitus     Psoriasis     Obesity (BMI 35.0-39.9 without comorbidity)     PHN (postherpetic neuralgia)     Uncontrolled type 2 diabetes mellitus without complication, with long-term current use of insulin     TALIA on CPAP     Multiple lung nodules on CT     Weight loss      Review of Systems   Constitutional: Negative for chills and fever.   Respiratory: Negative for cough and shortness of breath.    Cardiovascular: Negative for chest pain, palpitations and leg swelling.   Gastrointestinal: Negative for abdominal pain, nausea and vomiting.   Skin: Negative for rash.   Neurological: Negative for dizziness, weakness, numbness and headaches.   Psychiatric/Behavioral: Negative for confusion.       Objective:       Vitals:    01/30/19 1038   BP: 98/62   BP Location: Left arm   Patient Position: Sitting   BP Method: Large (Manual)   Pulse: 76   Temp: 98.9 °F (37.2 °C)   TempSrc: Tympanic   SpO2: 95%   Weight: 93.3 kg (205 lb 11 oz)   Height: 5' 7" (1.702 m)     Physical Exam   Constitutional: She is oriented to person, place, and time. She appears well-developed and well-nourished. No distress.   HENT:   Head: " Normocephalic and atraumatic.   Eyes: EOM are normal. No scleral icterus.   Neck: Neck supple.   Cardiovascular: Normal rate and regular rhythm.   Pulmonary/Chest: Effort normal and breath sounds normal. No stridor. No respiratory distress. She has no decreased breath sounds. She has no wheezes. She has no rhonchi. She has no rales.   Musculoskeletal: Normal range of motion.   Neurological: She is alert and oriented to person, place, and time. No cranial nerve deficit.   Skin: Skin is warm and dry. No rash noted.   Psychiatric: She has a normal mood and affect. Her speech is normal and behavior is normal. Thought content normal.       Component      Latest Ref Rng & Units 1/28/2019 9/25/2018   Hemoglobin A1C External      4.0 - 5.6 % 8.0 (H) 7.4 (H)   Estimated Avg Glucose      68 - 131 mg/dL 183 (H) 166 (H)     Assessment:       1. Uncontrolled type 2 diabetes mellitus without complication, with long-term current use of insulin    2. Hyperlipidemia associated with type 2 diabetes mellitus    3. Acquired hypothyroidism    4. Multiple lung nodules on CT    5. TALIA on CPAP        Plan:         Jaimie was seen today for follow-up.    Diagnoses and all orders for this visit:    Uncontrolled type 2 diabetes mellitus without complication, with long-term current use of insulin  Pt declines to start another Rx for DM at this time - pt to work on healthier diet and regular exercise. Monitor glucose and f/u with PCP in 2 months as scheduled for further management. Pt to RTC sooner if glucose worsens prior to then.    Hyperlipidemia associated with type 2 diabetes mellitus  Pt taking statin.    Acquired hypothyroidism  Controlled.    Multiple lung nodules on CT  F/u with pulm as recommended.    TALIA on CPAP  Stable.    F/u with PCP Dr. Ward as scheduled 4/2/19 (with prior A1C).

## 2019-03-19 NOTE — PROGRESS NOTES
"Subjective:      Patient ID: Jaimie Luque is a 69 y.o. female.    Chief Complaint: Results; Follow-up; Nasal Congestion; Cough; and Fatigue      HPI  Here for follow up of medical problems.  Recent nasal drainage and cough.  No f/c/n/v.  AC breakfast sugars , mostly 130s.  No regular exercise.  No exertional cp/sob/palp.  Occas chest pain/pressure when lies down in bed.  BMs normal.  On basal insulin 16u, and trulicity, and metformin.      Updated/ annual due 7/19:  HM: 4/17 HAV#2, 10/18 fluvax, 3/15 [id number], 3/14 [id number], 10/18 booster at pharm TDaP, 11/12 zoster, 6/16 BMD rep 5y, 5/17 Cscope rep 5y, 6/18 mmg(q2), 2/19 Eye Dr. Franco, 9/16 HCV neg.     Review of Systems   Constitutional: Negative for chills, diaphoresis and fever.   Respiratory: Negative for cough and shortness of breath.    Cardiovascular: Negative for chest pain, palpitations and leg swelling.   Gastrointestinal: Negative for blood in stool, constipation, diarrhea, nausea and vomiting.   Genitourinary: Negative for dysuria, frequency and hematuria.   Psychiatric/Behavioral: The patient is not nervous/anxious.          Objective:   /70 (BP Location: Left arm, Patient Position: Sitting, BP Method: Large (Manual))   Pulse 82   Temp 98.4 °F (36.9 °C) (Tympanic)   Ht 5' 7.5" (1.715 m)   Wt 94.6 kg (208 lb 8.9 oz)   SpO2 97%   BMI 32.18 kg/m²     Physical Exam   Constitutional: She is oriented to person, place, and time. She appears well-developed.   HENT:   Mouth/Throat: Oropharynx is clear and moist.   Neck: Neck supple. Carotid bruit is not present. No thyroid mass present.   Cardiovascular: Normal rate, regular rhythm and intact distal pulses. Exam reveals no gallop and no friction rub.   No murmur heard.  Pulmonary/Chest: Effort normal and breath sounds normal. She has no wheezes. She has no rales.   Abdominal: Soft. Bowel sounds are normal. She exhibits no mass. There is no hepatosplenomegaly. There is no tenderness. "   Musculoskeletal: She exhibits no edema.   Lymphadenopathy:     She has no cervical adenopathy.   Neurological: She is alert and oriented to person, place, and time.   Psychiatric: She has a normal mood and affect.       Results for orders placed or performed in visit on 03/26/19   Hemoglobin A1c   Result Value Ref Range    Hemoglobin A1C 8.0 (H) 4.0 - 5.6 %    Estimated Avg Glucose 183 (H) 68 - 131 mg/dL         Assessment:       1. Uncontrolled type 2 diabetes mellitus without complication, with long-term current use of insulin    2. TALIA on CPAP    3. Hyperlipidemia associated with type 2 diabetes mellitus    4. Acquired hypothyroidism    5. Preventive measure    6. Encounter for screening mammogram for malignant neoplasm of breast     7. Seasonal allergic rhinitis due to pollen          Plan:     Uncontrolled type 2 diabetes mellitus without complication, with long-term current use of insulin- increase exercise, recheck 3mo.  -     Microalbumin/creatinine urine ratio; Future; Expected date: 04/02/2019  -     Hemoglobin A1c; Future; Expected date: 04/02/2019  -     Mammo Digital Screening Bilat; Future; Expected date: 04/02/2019    TALIA on CPAP doing well.    All rhin- start flonase, Tulio DM.    Hyperlipidemia associated with type 2 diabetes mellitus- start exercise, recheck 3mo.    Acquired hypothyroidism- Clinically stable, continue present treatment.    Preventive measure- due in 3mo.  -     CBC auto differential; Future; Expected date: 04/02/2019  -     Comprehensive metabolic panel; Future; Expected date: 04/02/2019  -     Lipid panel; Future; Expected date: 04/02/2019  -     TSH; Future; Expected date: 04/02/2019  -     Microalbumin/creatinine urine ratio; Future; Expected date: 04/02/2019  -     Hemoglobin A1c; Future; Expected date: 04/02/2019  -     Mammo Digital Screening Bilat; Future; Expected date: 04/02/2019    Encounter for screening mammogram for malignant neoplasm of breast   -     Mammo Digital  Screening Bilat; Future; Expected date: 04/02/2019

## 2019-03-20 ENCOUNTER — PATIENT MESSAGE (OUTPATIENT)
Dept: INTERNAL MEDICINE | Facility: CLINIC | Age: 69
End: 2019-03-20

## 2019-03-21 RX ORDER — LISINOPRIL 2.5 MG/1
TABLET ORAL
Qty: 90 TABLET | Refills: 3 | Status: SHIPPED | OUTPATIENT
Start: 2019-03-21 | End: 2020-03-09

## 2019-03-21 RX ORDER — PEN NEEDLE, DIABETIC 30 GX3/16"
NEEDLE, DISPOSABLE MISCELLANEOUS
Qty: 300 EACH | Refills: 0 | Status: SHIPPED | OUTPATIENT
Start: 2019-03-21 | End: 2020-11-06 | Stop reason: SDUPTHER

## 2019-03-26 ENCOUNTER — LAB VISIT (OUTPATIENT)
Dept: LAB | Facility: HOSPITAL | Age: 69
End: 2019-03-26
Attending: INTERNAL MEDICINE
Payer: MEDICARE

## 2019-03-26 LAB
ESTIMATED AVG GLUCOSE: 183 MG/DL (ref 68–131)
HBA1C MFR BLD HPLC: 8 % (ref 4–5.6)

## 2019-03-26 PROCEDURE — 36415 COLL VENOUS BLD VENIPUNCTURE: CPT

## 2019-03-26 PROCEDURE — 83036 HEMOGLOBIN GLYCOSYLATED A1C: CPT

## 2019-04-02 ENCOUNTER — OFFICE VISIT (OUTPATIENT)
Dept: INTERNAL MEDICINE | Facility: CLINIC | Age: 69
End: 2019-04-02
Payer: MEDICARE

## 2019-04-02 VITALS
TEMPERATURE: 98 F | SYSTOLIC BLOOD PRESSURE: 112 MMHG | HEART RATE: 82 BPM | DIASTOLIC BLOOD PRESSURE: 70 MMHG | HEIGHT: 68 IN | OXYGEN SATURATION: 97 % | BODY MASS INDEX: 31.61 KG/M2 | WEIGHT: 208.56 LBS

## 2019-04-02 DIAGNOSIS — Z29.9 PREVENTIVE MEASURE: ICD-10-CM

## 2019-04-02 DIAGNOSIS — Z12.31 ENCOUNTER FOR SCREENING MAMMOGRAM FOR MALIGNANT NEOPLASM OF BREAST: ICD-10-CM

## 2019-04-02 DIAGNOSIS — E11.69 HYPERLIPIDEMIA ASSOCIATED WITH TYPE 2 DIABETES MELLITUS: ICD-10-CM

## 2019-04-02 DIAGNOSIS — E03.9 ACQUIRED HYPOTHYROIDISM: ICD-10-CM

## 2019-04-02 DIAGNOSIS — J30.1 SEASONAL ALLERGIC RHINITIS DUE TO POLLEN: ICD-10-CM

## 2019-04-02 DIAGNOSIS — G47.33 OSA ON CPAP: ICD-10-CM

## 2019-04-02 DIAGNOSIS — E78.5 HYPERLIPIDEMIA ASSOCIATED WITH TYPE 2 DIABETES MELLITUS: ICD-10-CM

## 2019-04-02 PROCEDURE — 99499 UNLISTED E&M SERVICE: CPT | Mod: S$GLB,,, | Performed by: INTERNAL MEDICINE

## 2019-04-02 PROCEDURE — 3074F PR MOST RECENT SYSTOLIC BLOOD PRESSURE < 130 MM HG: ICD-10-PCS | Mod: CPTII,S$GLB,, | Performed by: INTERNAL MEDICINE

## 2019-04-02 PROCEDURE — 3045F PR MOST RECENT HEMOGLOBIN A1C LEVEL 7.0-9.0%: ICD-10-PCS | Mod: CPTII,S$GLB,, | Performed by: INTERNAL MEDICINE

## 2019-04-02 PROCEDURE — 3288F PR FALLS RISK ASSESSMENT DOCUMENTED: ICD-10-PCS | Mod: CPTII,S$GLB,, | Performed by: INTERNAL MEDICINE

## 2019-04-02 PROCEDURE — 99214 PR OFFICE/OUTPT VISIT, EST, LEVL IV, 30-39 MIN: ICD-10-PCS | Mod: S$GLB,,, | Performed by: INTERNAL MEDICINE

## 2019-04-02 PROCEDURE — 3288F FALL RISK ASSESSMENT DOCD: CPT | Mod: CPTII,S$GLB,, | Performed by: INTERNAL MEDICINE

## 2019-04-02 PROCEDURE — 99214 OFFICE O/P EST MOD 30 MIN: CPT | Mod: S$GLB,,, | Performed by: INTERNAL MEDICINE

## 2019-04-02 PROCEDURE — 99499 RISK ADDL DX/OHS AUDIT: ICD-10-PCS | Mod: S$GLB,,, | Performed by: INTERNAL MEDICINE

## 2019-04-02 PROCEDURE — 3078F PR MOST RECENT DIASTOLIC BLOOD PRESSURE < 80 MM HG: ICD-10-PCS | Mod: CPTII,S$GLB,, | Performed by: INTERNAL MEDICINE

## 2019-04-02 PROCEDURE — 99999 PR PBB SHADOW E&M-EST. PATIENT-LVL IV: CPT | Mod: PBBFAC,,, | Performed by: INTERNAL MEDICINE

## 2019-04-02 PROCEDURE — 3078F DIAST BP <80 MM HG: CPT | Mod: CPTII,S$GLB,, | Performed by: INTERNAL MEDICINE

## 2019-04-02 PROCEDURE — 1100F PR PT FALLS ASSESS DOC 2+ FALLS/FALL W/INJURY/YR: ICD-10-PCS | Mod: CPTII,S$GLB,, | Performed by: INTERNAL MEDICINE

## 2019-04-02 PROCEDURE — 3074F SYST BP LT 130 MM HG: CPT | Mod: CPTII,S$GLB,, | Performed by: INTERNAL MEDICINE

## 2019-04-02 PROCEDURE — 3045F PR MOST RECENT HEMOGLOBIN A1C LEVEL 7.0-9.0%: CPT | Mod: CPTII,S$GLB,, | Performed by: INTERNAL MEDICINE

## 2019-04-02 PROCEDURE — 1100F PTFALLS ASSESS-DOCD GE2>/YR: CPT | Mod: CPTII,S$GLB,, | Performed by: INTERNAL MEDICINE

## 2019-04-02 PROCEDURE — 99999 PR PBB SHADOW E&M-EST. PATIENT-LVL IV: ICD-10-PCS | Mod: PBBFAC,,, | Performed by: INTERNAL MEDICINE

## 2019-04-02 RX ORDER — FLUTICASONE PROPIONATE 50 MCG
2 SPRAY, SUSPENSION (ML) NASAL DAILY
Qty: 16 G | Refills: 6 | Status: SHIPPED | OUTPATIENT
Start: 2019-04-02 | End: 2021-03-09

## 2019-04-05 ENCOUNTER — PATIENT OUTREACH (OUTPATIENT)
Dept: ADMINISTRATIVE | Facility: HOSPITAL | Age: 69
End: 2019-04-05

## 2019-04-05 NOTE — LETTER
April 5, 2019        We are seeing Jaimie Luque, 1950, at [facility]. Jessica Luna MD is their primary care physician. To help with our [place] maintenance records could you please send the following:     A copy of the most current Diabetic/Insulin Resistant Eye Exam sheet/report,   (including clear determination of Retinopathy status)      Please fax to [facility] at [phone], attention Baltazar Salas LPN.    Thank-you in advance for your assistance. If you have any questions or concerns please contact me at [phone].    Baltazar AYALA LPN-Care Coordinator  [facility]  [phone]

## 2019-05-03 ENCOUNTER — PATIENT MESSAGE (OUTPATIENT)
Dept: FAMILY MEDICINE | Facility: CLINIC | Age: 69
End: 2019-05-03

## 2019-05-03 RX ORDER — METFORMIN HYDROCHLORIDE 500 MG/1
1000 TABLET, EXTENDED RELEASE ORAL 2 TIMES DAILY WITH MEALS
Qty: 360 TABLET | Refills: 3 | Status: SHIPPED | OUTPATIENT
Start: 2019-05-03 | End: 2019-10-29

## 2019-06-10 ENCOUNTER — PATIENT OUTREACH (OUTPATIENT)
Dept: ADMINISTRATIVE | Facility: HOSPITAL | Age: 69
End: 2019-06-10

## 2019-06-10 NOTE — PROGRESS NOTES
Pt schedule 06/25/2019 for A1c       Avis ROD LPN Care Coordinator  Care Coordination Department  [facility]  [phone]

## 2019-06-18 DIAGNOSIS — B02.29 PHN (POSTHERPETIC NEURALGIA): ICD-10-CM

## 2019-06-18 DIAGNOSIS — E03.9 ACQUIRED HYPOTHYROIDISM: ICD-10-CM

## 2019-06-18 RX ORDER — LEVOTHYROXINE SODIUM 100 UG/1
TABLET ORAL
Qty: 90 TABLET | Refills: 3 | Status: SHIPPED | OUTPATIENT
Start: 2019-06-18 | End: 2020-06-03

## 2019-06-18 RX ORDER — GABAPENTIN 300 MG/1
CAPSULE ORAL
Qty: 270 CAPSULE | Refills: 3 | Status: SHIPPED | OUTPATIENT
Start: 2019-06-18 | End: 2020-06-03

## 2019-06-18 NOTE — PROGRESS NOTES
"Subjective:      Patient ID: Jaimie Luque is a 69 y.o. female.    Chief Complaint: Annual Exam      HPI  Here for f/u medical problems and preventive exam.  Sleeping well, on cpap.  Walking for exercise, almost daily.  Energy good.  No f/c/sw/cough.  No cp/sob/palp.  BMs normal.  Urine normal.  Taking vit D.  AC breakfast sugars 120-170s.  Still can't wear bra due to PMN from shingles about 6y ago.    HM: 4/17 HAV#2, 10/18 fluvax, 3/15 [id number], 3/14 [id number], 10/18 booster at pharm TDaP, 11/12 zoster, 2019 Shingrix x1, 6/16 BMD rep 5y, 5/17 Cscope rep 5y, 6/18 mmg(q2), 2/19 Eye Dr. Franco, 9/16 HCV neg.     Review of Systems   Constitutional: Positive for unexpected weight change. Negative for activity change.   HENT: Negative for hearing loss, rhinorrhea and trouble swallowing.    Eyes: Negative for discharge and visual disturbance.   Respiratory: Negative for chest tightness and wheezing.    Cardiovascular: Negative for chest pain and palpitations.   Gastrointestinal: Negative for blood in stool, constipation, diarrhea and vomiting.   Endocrine: Negative for polydipsia and polyuria.   Genitourinary: Negative for difficulty urinating, dysuria, hematuria and menstrual problem.   Musculoskeletal: Negative for arthralgias, joint swelling and neck pain.   Neurological: Negative for weakness and headaches.   Psychiatric/Behavioral: Negative for confusion and dysphoric mood.         Objective:   /62 (BP Location: Left arm, Patient Position: Sitting, BP Method: Large (Manual))   Pulse 80   Temp 98 °F (36.7 °C) (Oral)   Ht 5' 7" (1.702 m)   Wt 95.8 kg (211 lb 3.2 oz)   SpO2 95%   BMI 33.08 kg/m²     Physical Exam   Constitutional: She is oriented to person, place, and time. She appears well-developed and well-nourished.   HENT:   Right Ear: External ear normal. Tympanic membrane is not injected.   Left Ear: External ear normal. Tympanic membrane is not injected.   Mouth/Throat: Oropharynx is clear and " moist.   Eyes: Conjunctivae are normal.   Neck: Normal range of motion. Neck supple. No thyromegaly present.   Cardiovascular: Normal rate, regular rhythm and intact distal pulses. Exam reveals no gallop.   No murmur heard.  Pulses:       Dorsalis pedis pulses are 2+ on the right side, and 2+ on the left side.        Posterior tibial pulses are 2+ on the right side, and 2+ on the left side.   Pulmonary/Chest: Effort normal and breath sounds normal. She has no wheezes. She has no rales. Right breast exhibits no mass, no nipple discharge, no skin change and no tenderness. Left breast exhibits no mass, no nipple discharge, no skin change and no tenderness.   Abdominal: Soft. Bowel sounds are normal. She exhibits no mass. There is no tenderness.   Genitourinary: Vagina normal and uterus normal. Uterus is not tender. Cervix exhibits no motion tenderness and no discharge. Right adnexum displays no mass, no tenderness and no fullness. Left adnexum displays no mass, no tenderness and no fullness. No tenderness in the vagina. No vaginal discharge found.   Musculoskeletal: She exhibits no edema.   Feet:   Right Foot:   Protective Sensation: 10 sites tested. 10 sites sensed.   Skin Integrity: Negative for ulcer, blister, skin breakdown, erythema, warmth, callus or dry skin.   Left Foot:   Protective Sensation: 10 sites tested. 10 sites sensed.   Skin Integrity: Negative for ulcer, blister, skin breakdown, erythema, warmth, callus or dry skin.   Lymphadenopathy:     She has no cervical adenopathy.     She has no axillary adenopathy.   Neurological: She is alert and oriented to person, place, and time.   Skin: Skin is warm. No rash noted.   Psychiatric: She has a normal mood and affect.           Assessment:       1. Encounter for preventive health examination    2. Acquired hypothyroidism    3. Hyperlipidemia associated with type 2 diabetes mellitus    4. Multiple lung nodules on CT    5. TALIA on CPAP    6. Psoriasis    7.  Uncontrolled type 2 diabetes mellitus without complication, with long-term current use of insulin    8. Weight loss    9. PHN (postherpetic neuralgia)    10. Bleb, lung          Plan:     Encounter for preventive health examination- utd.    Acquired hypothyroidism- Clinically stable, continue present treatment.    Hyperlipidemia associated with type 2 diabetes mellitus- LDL at goal.    Multiple lung nodules on CT, Bleb- recheck 4mo.    TALIA on CPAP, doing well.    Psoriasis    Uncontrolled type 2 diabetes mellitus without complication, with long-term current use of insulin- increase to 20u basaglar.  Recheck 4mo.  -     insulin (BASAGLAR KWIKPEN U-100 INSULIN) glargine 100 units/mL (3mL) SubQ pen; Inject 20 Units into the skin once daily.  Dispense: 3 Box; Refill: 6  -     Hemoglobin A1c; Future; Expected date: 07/02/2019    Weight loss, not recently.

## 2019-06-25 ENCOUNTER — LAB VISIT (OUTPATIENT)
Dept: LAB | Facility: HOSPITAL | Age: 69
End: 2019-06-25
Attending: INTERNAL MEDICINE
Payer: MEDICARE

## 2019-06-25 ENCOUNTER — PATIENT MESSAGE (OUTPATIENT)
Dept: FAMILY MEDICINE | Facility: CLINIC | Age: 69
End: 2019-06-25

## 2019-06-25 DIAGNOSIS — E03.9 ACQUIRED HYPOTHYROIDISM: ICD-10-CM

## 2019-06-25 DIAGNOSIS — Z29.9 PREVENTIVE MEASURE: ICD-10-CM

## 2019-06-25 DIAGNOSIS — E78.5 HYPERLIPIDEMIA ASSOCIATED WITH TYPE 2 DIABETES MELLITUS: ICD-10-CM

## 2019-06-25 DIAGNOSIS — E11.69 HYPERLIPIDEMIA ASSOCIATED WITH TYPE 2 DIABETES MELLITUS: ICD-10-CM

## 2019-06-25 LAB
ALBUMIN SERPL BCP-MCNC: 3.3 G/DL (ref 3.5–5.2)
ALP SERPL-CCNC: 66 U/L (ref 55–135)
ALT SERPL W/O P-5'-P-CCNC: 15 U/L (ref 10–44)
ANION GAP SERPL CALC-SCNC: 12 MMOL/L (ref 8–16)
AST SERPL-CCNC: 14 U/L (ref 10–40)
BASOPHILS # BLD AUTO: 0.06 K/UL (ref 0–0.2)
BASOPHILS NFR BLD: 0.8 % (ref 0–1.9)
BILIRUB SERPL-MCNC: 0.4 MG/DL (ref 0.1–1)
BUN SERPL-MCNC: 14 MG/DL (ref 8–23)
CALCIUM SERPL-MCNC: 9.5 MG/DL (ref 8.7–10.5)
CHLORIDE SERPL-SCNC: 103 MMOL/L (ref 95–110)
CHOLEST SERPL-MCNC: 138 MG/DL (ref 120–199)
CHOLEST/HDLC SERPL: 2.1 {RATIO} (ref 2–5)
CO2 SERPL-SCNC: 24 MMOL/L (ref 23–29)
CREAT SERPL-MCNC: 0.9 MG/DL (ref 0.5–1.4)
DIFFERENTIAL METHOD: ABNORMAL
EOSINOPHIL # BLD AUTO: 0.3 K/UL (ref 0–0.5)
EOSINOPHIL NFR BLD: 3.5 % (ref 0–8)
ERYTHROCYTE [DISTWIDTH] IN BLOOD BY AUTOMATED COUNT: 14.5 % (ref 11.5–14.5)
EST. GFR  (AFRICAN AMERICAN): >60 ML/MIN/1.73 M^2
EST. GFR  (NON AFRICAN AMERICAN): >60 ML/MIN/1.73 M^2
GLUCOSE SERPL-MCNC: 161 MG/DL (ref 70–110)
HCT VFR BLD AUTO: 41.9 % (ref 37–48.5)
HDLC SERPL-MCNC: 65 MG/DL (ref 40–75)
HDLC SERPL: 47.1 % (ref 20–50)
HGB BLD-MCNC: 13.3 G/DL (ref 12–16)
IMM GRANULOCYTES # BLD AUTO: 0.03 K/UL (ref 0–0.04)
IMM GRANULOCYTES NFR BLD AUTO: 0.4 % (ref 0–0.5)
LDLC SERPL CALC-MCNC: 60.4 MG/DL (ref 63–159)
LYMPHOCYTES # BLD AUTO: 2.8 K/UL (ref 1–4.8)
LYMPHOCYTES NFR BLD: 39.5 % (ref 18–48)
MCH RBC QN AUTO: 28.2 PG (ref 27–31)
MCHC RBC AUTO-ENTMCNC: 31.7 G/DL (ref 32–36)
MCV RBC AUTO: 89 FL (ref 82–98)
MONOCYTES # BLD AUTO: 0.6 K/UL (ref 0.3–1)
MONOCYTES NFR BLD: 7.7 % (ref 4–15)
NEUTROPHILS # BLD AUTO: 3.4 K/UL (ref 1.8–7.7)
NEUTROPHILS NFR BLD: 48.1 % (ref 38–73)
NONHDLC SERPL-MCNC: 73 MG/DL
NRBC BLD-RTO: 0 /100 WBC
PLATELET # BLD AUTO: 235 K/UL (ref 150–350)
PMV BLD AUTO: 11.7 FL (ref 9.2–12.9)
POTASSIUM SERPL-SCNC: 5 MMOL/L (ref 3.5–5.1)
PROT SERPL-MCNC: 6.6 G/DL (ref 6–8.4)
RBC # BLD AUTO: 4.72 M/UL (ref 4–5.4)
SODIUM SERPL-SCNC: 139 MMOL/L (ref 136–145)
TRIGL SERPL-MCNC: 63 MG/DL (ref 30–150)
TSH SERPL DL<=0.005 MIU/L-ACNC: 1.45 UIU/ML (ref 0.4–4)
WBC # BLD AUTO: 7.11 K/UL (ref 3.9–12.7)

## 2019-06-25 PROCEDURE — 36415 COLL VENOUS BLD VENIPUNCTURE: CPT | Mod: PO

## 2019-06-25 PROCEDURE — 85025 COMPLETE CBC W/AUTO DIFF WBC: CPT

## 2019-06-25 PROCEDURE — 80061 LIPID PANEL: CPT

## 2019-06-25 PROCEDURE — 80053 COMPREHEN METABOLIC PANEL: CPT

## 2019-06-25 PROCEDURE — 84443 ASSAY THYROID STIM HORMONE: CPT

## 2019-06-25 PROCEDURE — 83036 HEMOGLOBIN GLYCOSYLATED A1C: CPT

## 2019-06-26 LAB
ESTIMATED AVG GLUCOSE: 186 MG/DL (ref 68–131)
HBA1C MFR BLD HPLC: 8.1 % (ref 4–5.6)

## 2019-07-02 ENCOUNTER — OFFICE VISIT (OUTPATIENT)
Dept: FAMILY MEDICINE | Facility: CLINIC | Age: 69
End: 2019-07-02
Payer: MEDICARE

## 2019-07-02 VITALS
HEIGHT: 67 IN | OXYGEN SATURATION: 95 % | WEIGHT: 211.19 LBS | HEART RATE: 80 BPM | SYSTOLIC BLOOD PRESSURE: 114 MMHG | TEMPERATURE: 98 F | BODY MASS INDEX: 33.15 KG/M2 | DIASTOLIC BLOOD PRESSURE: 62 MMHG

## 2019-07-02 DIAGNOSIS — B02.29 PHN (POSTHERPETIC NEURALGIA): ICD-10-CM

## 2019-07-02 DIAGNOSIS — Z00.00 ENCOUNTER FOR PREVENTIVE HEALTH EXAMINATION: Primary | ICD-10-CM

## 2019-07-02 DIAGNOSIS — G47.33 OSA ON CPAP: ICD-10-CM

## 2019-07-02 DIAGNOSIS — R63.4 WEIGHT LOSS: ICD-10-CM

## 2019-07-02 DIAGNOSIS — R91.8 MULTIPLE LUNG NODULES ON CT: ICD-10-CM

## 2019-07-02 DIAGNOSIS — E11.69 HYPERLIPIDEMIA ASSOCIATED WITH TYPE 2 DIABETES MELLITUS: ICD-10-CM

## 2019-07-02 DIAGNOSIS — E78.5 HYPERLIPIDEMIA ASSOCIATED WITH TYPE 2 DIABETES MELLITUS: ICD-10-CM

## 2019-07-02 DIAGNOSIS — E03.9 ACQUIRED HYPOTHYROIDISM: ICD-10-CM

## 2019-07-02 DIAGNOSIS — L40.9 PSORIASIS: ICD-10-CM

## 2019-07-02 DIAGNOSIS — J43.9 BLEB, LUNG: ICD-10-CM

## 2019-07-02 PROCEDURE — 99499 UNLISTED E&M SERVICE: CPT | Mod: S$GLB,,, | Performed by: INTERNAL MEDICINE

## 2019-07-02 PROCEDURE — 99214 OFFICE O/P EST MOD 30 MIN: CPT | Mod: S$GLB,,, | Performed by: INTERNAL MEDICINE

## 2019-07-02 PROCEDURE — 99214 PR OFFICE/OUTPT VISIT, EST, LEVL IV, 30-39 MIN: ICD-10-PCS | Mod: S$GLB,,, | Performed by: INTERNAL MEDICINE

## 2019-07-02 PROCEDURE — 3045F PR MOST RECENT HEMOGLOBIN A1C LEVEL 7.0-9.0%: ICD-10-PCS | Mod: CPTII,S$GLB,, | Performed by: INTERNAL MEDICINE

## 2019-07-02 PROCEDURE — 3074F SYST BP LT 130 MM HG: CPT | Mod: CPTII,S$GLB,, | Performed by: INTERNAL MEDICINE

## 2019-07-02 PROCEDURE — 3045F PR MOST RECENT HEMOGLOBIN A1C LEVEL 7.0-9.0%: CPT | Mod: CPTII,S$GLB,, | Performed by: INTERNAL MEDICINE

## 2019-07-02 PROCEDURE — 3078F PR MOST RECENT DIASTOLIC BLOOD PRESSURE < 80 MM HG: ICD-10-PCS | Mod: CPTII,S$GLB,, | Performed by: INTERNAL MEDICINE

## 2019-07-02 PROCEDURE — 99999 PR PBB SHADOW E&M-EST. PATIENT-LVL III: CPT | Mod: PBBFAC,,, | Performed by: INTERNAL MEDICINE

## 2019-07-02 PROCEDURE — 3074F PR MOST RECENT SYSTOLIC BLOOD PRESSURE < 130 MM HG: ICD-10-PCS | Mod: CPTII,S$GLB,, | Performed by: INTERNAL MEDICINE

## 2019-07-02 PROCEDURE — 99499 RISK ADDL DX/OHS AUDIT: ICD-10-PCS | Mod: S$GLB,,, | Performed by: INTERNAL MEDICINE

## 2019-07-02 PROCEDURE — 99999 PR PBB SHADOW E&M-EST. PATIENT-LVL III: ICD-10-PCS | Mod: PBBFAC,,, | Performed by: INTERNAL MEDICINE

## 2019-07-02 PROCEDURE — 3078F DIAST BP <80 MM HG: CPT | Mod: CPTII,S$GLB,, | Performed by: INTERNAL MEDICINE

## 2019-07-02 RX ORDER — INSULIN GLARGINE 100 [IU]/ML
20 INJECTION, SOLUTION SUBCUTANEOUS DAILY
Qty: 3 BOX | Refills: 6
Start: 2019-07-02 | End: 2019-07-02 | Stop reason: SDUPTHER

## 2019-07-02 RX ORDER — INSULIN GLARGINE 100 [IU]/ML
20 INJECTION, SOLUTION SUBCUTANEOUS DAILY
Qty: 3 BOX | Refills: 6 | Status: SHIPPED | OUTPATIENT
Start: 2019-07-02 | End: 2020-01-13

## 2019-07-30 ENCOUNTER — PATIENT OUTREACH (OUTPATIENT)
Dept: ADMINISTRATIVE | Facility: HOSPITAL | Age: 69
End: 2019-07-30

## 2019-08-13 ENCOUNTER — PATIENT MESSAGE (OUTPATIENT)
Dept: FAMILY MEDICINE | Facility: CLINIC | Age: 69
End: 2019-08-13

## 2019-08-13 DIAGNOSIS — R19.7 DIARRHEA, UNSPECIFIED TYPE: Primary | ICD-10-CM

## 2019-08-14 ENCOUNTER — PATIENT MESSAGE (OUTPATIENT)
Dept: GASTROENTEROLOGY | Facility: CLINIC | Age: 69
End: 2019-08-14

## 2019-08-14 ENCOUNTER — HOSPITAL ENCOUNTER (OUTPATIENT)
Dept: RADIOLOGY | Facility: HOSPITAL | Age: 69
Discharge: HOME | End: 2019-08-14
Attending: NURSE PRACTITIONER
Payer: MEDICARE

## 2019-08-14 ENCOUNTER — OFFICE VISIT (OUTPATIENT)
Dept: GASTROENTEROLOGY | Facility: CLINIC | Age: 69
End: 2019-08-14
Payer: MEDICARE

## 2019-08-14 VITALS
HEIGHT: 67 IN | HEART RATE: 76 BPM | WEIGHT: 211.88 LBS | DIASTOLIC BLOOD PRESSURE: 78 MMHG | SYSTOLIC BLOOD PRESSURE: 108 MMHG | BODY MASS INDEX: 33.26 KG/M2

## 2019-08-14 DIAGNOSIS — R19.7 DIARRHEA, UNSPECIFIED TYPE: ICD-10-CM

## 2019-08-14 DIAGNOSIS — K59.00 CONSTIPATION, UNSPECIFIED CONSTIPATION TYPE: ICD-10-CM

## 2019-08-14 DIAGNOSIS — R19.7 DIARRHEA, UNSPECIFIED TYPE: Primary | ICD-10-CM

## 2019-08-14 PROCEDURE — 99999 PR PBB SHADOW E&M-EST. PATIENT-LVL III: ICD-10-PCS | Mod: PBBFAC,,, | Performed by: NURSE PRACTITIONER

## 2019-08-14 PROCEDURE — 74019 RADEX ABDOMEN 2 VIEWS: CPT | Mod: TC

## 2019-08-14 PROCEDURE — 1101F PR PT FALLS ASSESS DOC 0-1 FALLS W/OUT INJ PAST YR: ICD-10-PCS | Mod: CPTII,S$GLB,, | Performed by: NURSE PRACTITIONER

## 2019-08-14 PROCEDURE — 99214 OFFICE O/P EST MOD 30 MIN: CPT | Mod: S$GLB,,, | Performed by: NURSE PRACTITIONER

## 2019-08-14 PROCEDURE — 3074F PR MOST RECENT SYSTOLIC BLOOD PRESSURE < 130 MM HG: ICD-10-PCS | Mod: CPTII,S$GLB,, | Performed by: NURSE PRACTITIONER

## 2019-08-14 PROCEDURE — 3074F SYST BP LT 130 MM HG: CPT | Mod: CPTII,S$GLB,, | Performed by: NURSE PRACTITIONER

## 2019-08-14 PROCEDURE — 99214 PR OFFICE/OUTPT VISIT, EST, LEVL IV, 30-39 MIN: ICD-10-PCS | Mod: S$GLB,,, | Performed by: NURSE PRACTITIONER

## 2019-08-14 PROCEDURE — 74019 RADEX ABDOMEN 2 VIEWS: CPT | Mod: 26,,, | Performed by: RADIOLOGY

## 2019-08-14 PROCEDURE — 3078F DIAST BP <80 MM HG: CPT | Mod: CPTII,S$GLB,, | Performed by: NURSE PRACTITIONER

## 2019-08-14 PROCEDURE — 99999 PR PBB SHADOW E&M-EST. PATIENT-LVL III: CPT | Mod: PBBFAC,,, | Performed by: NURSE PRACTITIONER

## 2019-08-14 PROCEDURE — 74019 XR ABDOMEN FLAT AND ERECT: ICD-10-PCS | Mod: 26,,, | Performed by: RADIOLOGY

## 2019-08-14 PROCEDURE — 3078F PR MOST RECENT DIASTOLIC BLOOD PRESSURE < 80 MM HG: ICD-10-PCS | Mod: CPTII,S$GLB,, | Performed by: NURSE PRACTITIONER

## 2019-08-14 PROCEDURE — 1101F PT FALLS ASSESS-DOCD LE1/YR: CPT | Mod: CPTII,S$GLB,, | Performed by: NURSE PRACTITIONER

## 2019-08-14 NOTE — PROGRESS NOTES
Clinic Consult:  [facility] Consultation Note    Reason for Consult:  The primary encounter diagnosis was Diarrhea, unspecified type. A diagnosis of Constipation, unspecified constipation type was also pertinent to this visit.    PCP: Jessica Luna   [address] / [place] [id number]    HPI:  This is a 69 y.o. female here for evaluation of the above  Pt reports intermittent diarrhea over the last few year which she has related to metformin.   She states that over the last few weeks, she has had multiple episodes of abdominal cramping with rectal pressure.  SHe states that with this she has urgency for a BM, however, often no stool will pass.  After a hard stool passes, she often will have urgency with loose to watery stools.  Has had an episodes of incontinence related to this.   She admits to occasional constipation, but has managed it with the metformin and diet.   She denies any melena or hematochezia.   Last colonoscopy 2017, repeat 5 years recommended.   No fam hx of CRC.   Has had some weight loss, but she relates this to the metformin      Review of Systems   Constitutional: Negative for chills, fever, malaise/fatigue and weight loss.   Respiratory: Negative for cough.    Cardiovascular: Negative for chest pain.   Gastrointestinal:        Per HPI   Musculoskeletal: Negative for myalgias.   Skin: Negative for itching and rash.   Neurological: Negative for headaches.   Psychiatric/Behavioral: The patient is not nervous/anxious.        Medical History:   Past Medical History:   Diagnosis Date    Diabetes mellitus type II     Hyperlipidemia     Hypertension     Hypothyroid     TALIA (obstructive sleep apnea)     on CPAP    Osteopenia     Psoriasis        Surgical History:  Past Surgical History:   Procedure Laterality Date    COLONOSCOPY N/A 5/2/2017    Performed by Noe Penn III, MD at [facility] ENDO    HYSTERECTOMY      OOPHORECTOMY      ROBOTIC ASSISTED HYSTERECTOMY    "      Family History:   Family History   Problem Relation Age of Onset    Heart disease Father     Melanoma Neg Hx     Lupus Neg Hx        Social History:   Social History     Tobacco Use    Smoking status: Never Smoker    Smokeless tobacco: Never Used   Substance Use Topics    Alcohol use: Yes     Frequency: 2-4 times a month     Drinks per session: 1 or 2     Binge frequency: Never     Comment: rare    Drug use: No       Allergies: Reviewed    Home Medications:   Current Outpatient Medications on File Prior to Visit   Medication Sig Dispense Refill    aspirin 81 mg Tab Take by mouth. 1 Tablet Oral Every day      atorvastatin (LIPITOR) 20 MG tablet TAKE 1 TABLET BY MOUTH EVERY DAY 90 tablet 3    blood sugar diagnostic [employer] Matrix Blood Glucose Monitoring System  Dx:  E11.9   Medically Necessary 1 each 3    cholecalciferol, vitamin D3, 2,000 unit Tab Take by mouth. 1 Tablet Oral Every day      fluticasone (FLONASE) 50 mcg/actuation nasal spray 2 sprays (100 mcg total) by Each Nare route once daily. 16 g 6    gabapentin (NEURONTIN) 300 MG capsule TAKE 1 CAPSULE BY MOUTH THREE TIMES DAILY 270 capsule 3    insulin (BASAGLAR KWIKPEN U-100 INSULIN) glargine 100 units/mL (3mL) SubQ pen Inject 20 Units into the skin once daily. 3 Box 6    lancets (ACCU-CHEK SOFTCLIX LANCETS) Misc Check blood sugar twice daily.  Accuchek Multiclix drum lancets, [facility]  Dx:  E11.9 200 each 3    levothyroxine (SYNTHROID) 100 MCG tablet TAKE 1 TABLET(100 MCG) BY MOUTH BEFORE BREAKFAST 90 tablet 3    lisinopril (PRINIVIL,ZESTRIL) 2.5 MG tablet TAKE 1 TABLET(2.5 MG) BY MOUTH EVERY DAY 90 tablet 3    meclizine (ANTIVERT) 25 mg tablet Take 1 tablet by mouth as needed.       metFORMIN (GLUCOPHAGE-XR) 500 MG 24 hr tablet Take 2 tablets (1,000 mg total) by mouth 2 (two) times daily with meals. 360 tablet 3    pen needle, diabetic (BD ULTRA-FINE LAURA PEN NEEDLE) 32 gauge x 5/32" Ndle USE THREE TIMES DAILY 300 " "each 0    TRULICITY 0.75 mg/0.5 mL PnIj INJECT 0.5 ML( 0.75 MG TOTAL) UNDER THE SKIN ONCE A WEEK 6 mL 6     No current facility-administered medications on file prior to visit.        Physical Exam:  Vital Signs:  /78   Pulse 76   Ht 5' 7" (1.702 m)   Wt 96.1 kg (211 lb 13.8 oz)   BMI 33.18 kg/m²   Body mass index is 33.18 kg/m².  Physical Exam   Constitutional: She is oriented to person, place, and time. She appears well-developed and well-nourished.   HENT:   Head: Normocephalic.   Eyes: No scleral icterus.   Neck: Normal range of motion.   Cardiovascular: Normal rate and regular rhythm.   Pulmonary/Chest: Effort normal and breath sounds normal.   Abdominal: Soft. Bowel sounds are normal. She exhibits no distension. There is no tenderness.   Musculoskeletal: Normal range of motion.   Neurological: She is alert and oriented to person, place, and time.   Skin: Skin is warm and dry.   Psychiatric: She has a normal mood and affect.   Vitals reviewed.      Labs: Pertinent labs reviewed.  Assessment:  1. Diarrhea, unspecified type    2. Constipation, unspecified constipation type         Recommendations:  - ? If constipation with overflow vs other etiologies  - will plan for an x-ray today for initial investigation  - If the x-ray if normal, will plan for colonoscopy for further investigation given this is an alteration in the bowel pattern.   - offered colonoscopy now, but has declined.       Follow up to be determined by results of above.        Thank you so much for allowing me to participate in the care of TRES Arora  "

## 2019-08-14 NOTE — LETTER
August 14, 2019      Jessica Ward MD  [address]  [place] [address]           [facility]  [address]  [place] [address]  Phone: [phone]  Fax: [phone]          Patient: Jaimie Luque   MR Number: [id number]   YOB: 1950   Date of Visit: 8/14/2019       Dear Dr. Jessica Ward:    Thank you for referring Jaimie Luque to me for evaluation. Attached you will find relevant portions of my assessment and plan of care.    If you have questions, please do not hesitate to call me. I look forward to following Jaimie Luque along with you.    Sincerely,    Latoya Dotson, [facility]    Enclosure  CC:  No Recipients    If you would like to receive this communication electronically, please contact [email or web address] or [phone] to request more information on [facility] access.    For providers and/or their staff who would like to refer a patient to [facility], please contact us through our one-stop-shop provider referral line, [facility] , at [phone].    If you feel you have received this communication in error or would no longer like to receive these types of communications, please e-mail [email or web address]

## 2019-08-22 ENCOUNTER — PATIENT MESSAGE (OUTPATIENT)
Dept: GASTROENTEROLOGY | Facility: CLINIC | Age: 69
End: 2019-08-22

## 2019-08-22 ENCOUNTER — TELEPHONE (OUTPATIENT)
Dept: GASTROENTEROLOGY | Facility: CLINIC | Age: 69
End: 2019-08-22

## 2019-08-26 ENCOUNTER — PATIENT MESSAGE (OUTPATIENT)
Dept: GASTROENTEROLOGY | Facility: CLINIC | Age: 69
End: 2019-08-26

## 2019-09-25 ENCOUNTER — PATIENT MESSAGE (OUTPATIENT)
Dept: GASTROENTEROLOGY | Facility: CLINIC | Age: 69
End: 2019-09-25

## 2019-09-26 ENCOUNTER — PATIENT MESSAGE (OUTPATIENT)
Dept: GASTROENTEROLOGY | Facility: CLINIC | Age: 69
End: 2019-09-26

## 2019-10-15 NOTE — PROGRESS NOTES
"Subjective:      Patient ID: Jaimie Luque is a 69 y.o. female.    Chief Complaint: Follow-up (4 months )      HPI  Here for follow up of medical problems.  AC breakfast sugars 120-140.  Stopped metformin 2 days ago and now no more loose stool  Doesn't want to restart.  Given Zpak by Pulm.  To f/u with CT lung in 3mo.  PHN pain still ongoing.  Gabapentin helps, doesn't want higher dose.  Sleeps ok with cpap.    Updated/ annual due 7/20:  HM: 10/19 fluvax, 4/17 HAV#2, 3/15 pmasws30, 3/14 wnznoo12, 10/18 booster at pharm TDaP, 11/12 zoster, 2019 Shingrix x2, 6/16 BMD rep 5y, 5/17 Cscope rep 5y, 6/18 mmg(q2), 2/19 Eye Dr. Franco, 9/16 HCV neg.     Review of Systems   Constitutional: Negative for chills, diaphoresis and fever.   Respiratory: Negative for cough and shortness of breath.    Cardiovascular: Negative for chest pain, palpitations and leg swelling.   Gastrointestinal: Negative for blood in stool, constipation, diarrhea, nausea and vomiting.   Genitourinary: Negative for dysuria, frequency and hematuria.   Psychiatric/Behavioral: The patient is not nervous/anxious.          Objective:   BP (!) 122/58   Pulse 81   Temp 98.3 °F (36.8 °C) (Oral)   Ht 5' 7.5" (1.715 m)   Wt 96.8 kg (213 lb 6.5 oz)   SpO2 97%   BMI 32.93 kg/m²     Physical Exam   Constitutional: She is oriented to person, place, and time. She appears well-developed.   HENT:   Mouth/Throat: Oropharynx is clear and moist.   Neck: Neck supple. Carotid bruit is not present. No thyroid mass present.   Cardiovascular: Normal rate, regular rhythm and intact distal pulses. Exam reveals no gallop and no friction rub.   No murmur heard.  Pulmonary/Chest: Effort normal and breath sounds normal. She has no wheezes. She has no rales.   Abdominal: Soft. Bowel sounds are normal. She exhibits no mass. There is no hepatosplenomegaly. There is no tenderness.   Musculoskeletal: She exhibits no edema.   Lymphadenopathy:     She has no cervical adenopathy. "   Neurological: She is alert and oriented to person, place, and time.   Psychiatric: She has a normal mood and affect.       Results for NORMA HILLS (MRN [id number]) as of 10/29/2019 13:43   Ref. Range 10/22/2019 10:18 10/28/2019 10:00   WBC Latest Ref Range: 3.90 - 12.70 K/uL  7.13   RBC Latest Ref Range: 4.00 - 5.40 M/uL  4.70   Hemoglobin Latest Ref Range: 12.0 - 16.0 g/dL  13.5   Hematocrit Latest Ref Range: 37.0 - 48.5 %  41.7   MCV Latest Ref Range: 82 - 98 fL  89   MCH Latest Ref Range: 27.0 - 31.0 pg  28.7   MCHC Latest Ref Range: 32.0 - 36.0 g/dL  32.4   RDW Latest Ref Range: 11.5 - 14.5 %  14.7 (H)   Platelets Latest Ref Range: 150 - 350 K/uL  228   MPV Latest Ref Range: 9.2 - 12.9 fL  11.5   Gran% Latest Ref Range: 38.0 - 73.0 %  61.3   Gran # (ANC) Latest Ref Range: 1.8 - 7.7 K/uL  4.4   Lymph% Latest Ref Range: 18.0 - 48.0 %  27.1   Lymph # Latest Ref Range: 1.0 - 4.8 K/uL  1.9   Mono% Latest Ref Range: 4.0 - 15.0 %  8.7   Mono # Latest Ref Range: 0.3 - 1.0 K/uL  0.6   Eosinophil% Latest Ref Range: 0.0 - 8.0 %  1.8   Eos # Latest Ref Range: 0.0 - 0.5 K/uL  0.1   Basophil% Latest Ref Range: 0.0 - 1.9 %  0.7   Baso # Latest Ref Range: 0.00 - 0.20 K/uL  0.05   nRBC Latest Ref Range: 0 /100 WBC  0   Differential Method Unknown  Automated   Immature Grans (Abs) Latest Ref Range: 0.00 - 0.04 K/uL  0.03   Immature Granulocytes Latest Ref Range: 0.0 - 0.5 %  0.4   Sed Rate Latest Ref Range: 0 - 36 mm/Hr  7   Hemoglobin A1C External Latest Ref Range: 4.0 - 5.6 % 7.8 (H)    Estimated Avg Glucose Latest Ref Range: 68 - 131 mg/dL 177 (H)        Assessment:       1. Uncontrolled type 2 diabetes mellitus without complication, with long-term current use of insulin    2. Hyperlipidemia associated with type 2 diabetes mellitus    3. Acquired hypothyroidism    4. Preventive measure    5. PHN (postherpetic neuralgia)    6. Diarrhea, unspecified type          Plan:     Uncontrolled type 2 diabetes mellitus without  complication, with long-term current use of insulin- send me sugars at other times of day in one week, will decide on change.  Poss januvia vs glimepiride.  Poss retry metformin.  Recheck 3mo.    Hyperlipidemia associated with type 2 diabetes mellitus    Acquired hypothyroidism- Clinically stable, continue present treatment.    Preventive measure- fluvax.    PHN (postherpetic neuralgia)- cont lilliam.    Diarrhea, unspecified type- will watch.  Stool OCP pending.    Lung nodules- per Dr. Schumacher.  On Zpak.

## 2019-10-21 ENCOUNTER — TELEPHONE (OUTPATIENT)
Dept: RADIOLOGY | Facility: HOSPITAL | Age: 69
End: 2019-10-21

## 2019-10-22 ENCOUNTER — PATIENT MESSAGE (OUTPATIENT)
Dept: FAMILY MEDICINE | Facility: CLINIC | Age: 69
End: 2019-10-22

## 2019-10-22 ENCOUNTER — HOSPITAL ENCOUNTER (OUTPATIENT)
Dept: RADIOLOGY | Facility: HOSPITAL | Age: 69
Discharge: HOME | End: 2019-10-22
Attending: INTERNAL MEDICINE
Payer: MEDICARE

## 2019-10-22 ENCOUNTER — IMMUNIZATION (OUTPATIENT)
Dept: PHARMACY | Facility: CLINIC | Age: 69
End: 2019-10-22
Payer: MEDICARE

## 2019-10-22 DIAGNOSIS — R91.8 MULTIPLE LUNG NODULES ON CT: ICD-10-CM

## 2019-10-22 DIAGNOSIS — R91.8 ABNORMAL CT SCAN OF LUNG: Primary | ICD-10-CM

## 2019-10-22 PROCEDURE — 71250 CT THORAX DX C-: CPT | Mod: TC

## 2019-10-22 PROCEDURE — 71250 CT THORAX DX C-: CPT | Mod: 26,,, | Performed by: RADIOLOGY

## 2019-10-22 PROCEDURE — 71250 CT CHEST WITHOUT CONTRAST: ICD-10-PCS | Mod: 26,,, | Performed by: RADIOLOGY

## 2019-10-28 ENCOUNTER — OFFICE VISIT (OUTPATIENT)
Dept: PULMONOLOGY | Facility: CLINIC | Age: 69
End: 2019-10-28
Payer: MEDICARE

## 2019-10-28 ENCOUNTER — LAB VISIT (OUTPATIENT)
Dept: LAB | Facility: HOSPITAL | Age: 69
End: 2019-10-28
Attending: INTERNAL MEDICINE
Payer: MEDICARE

## 2019-10-28 VITALS
OXYGEN SATURATION: 96 % | HEIGHT: 68 IN | SYSTOLIC BLOOD PRESSURE: 104 MMHG | HEART RATE: 69 BPM | BODY MASS INDEX: 31.81 KG/M2 | WEIGHT: 209.88 LBS | DIASTOLIC BLOOD PRESSURE: 62 MMHG | RESPIRATION RATE: 18 BRPM

## 2019-10-28 DIAGNOSIS — J47.9 BRONCHIECTASIS WITHOUT COMPLICATION: ICD-10-CM

## 2019-10-28 DIAGNOSIS — R91.1 LUNG NODULE: ICD-10-CM

## 2019-10-28 DIAGNOSIS — G47.33 OSA ON CPAP: Primary | ICD-10-CM

## 2019-10-28 DIAGNOSIS — R19.7 DIARRHEA, UNSPECIFIED TYPE: ICD-10-CM

## 2019-10-28 DIAGNOSIS — R91.8 MULTIPLE LUNG NODULES ON CT: ICD-10-CM

## 2019-10-28 DIAGNOSIS — R06.02 SHORTNESS OF BREATH: ICD-10-CM

## 2019-10-28 LAB
BASOPHILS # BLD AUTO: 0.05 K/UL (ref 0–0.2)
BASOPHILS NFR BLD: 0.7 % (ref 0–1.9)
DIFFERENTIAL METHOD: ABNORMAL
EOSINOPHIL # BLD AUTO: 0.1 K/UL (ref 0–0.5)
EOSINOPHIL NFR BLD: 1.8 % (ref 0–8)
ERYTHROCYTE [DISTWIDTH] IN BLOOD BY AUTOMATED COUNT: 14.7 % (ref 11.5–14.5)
ERYTHROCYTE [SEDIMENTATION RATE] IN BLOOD BY WESTERGREN METHOD: 7 MM/HR (ref 0–36)
HCT VFR BLD AUTO: 41.7 % (ref 37–48.5)
HGB BLD-MCNC: 13.5 G/DL (ref 12–16)
IMM GRANULOCYTES # BLD AUTO: 0.03 K/UL (ref 0–0.04)
IMM GRANULOCYTES NFR BLD AUTO: 0.4 % (ref 0–0.5)
LYMPHOCYTES # BLD AUTO: 1.9 K/UL (ref 1–4.8)
LYMPHOCYTES NFR BLD: 27.1 % (ref 18–48)
MCH RBC QN AUTO: 28.7 PG (ref 27–31)
MCHC RBC AUTO-ENTMCNC: 32.4 G/DL (ref 32–36)
MCV RBC AUTO: 89 FL (ref 82–98)
MONOCYTES # BLD AUTO: 0.6 K/UL (ref 0.3–1)
MONOCYTES NFR BLD: 8.7 % (ref 4–15)
NEUTROPHILS # BLD AUTO: 4.4 K/UL (ref 1.8–7.7)
NEUTROPHILS NFR BLD: 61.3 % (ref 38–73)
NRBC BLD-RTO: 0 /100 WBC
PLATELET # BLD AUTO: 228 K/UL (ref 150–350)
PMV BLD AUTO: 11.5 FL (ref 9.2–12.9)
RBC # BLD AUTO: 4.7 M/UL (ref 4–5.4)
WBC # BLD AUTO: 7.13 K/UL (ref 3.9–12.7)

## 2019-10-28 PROCEDURE — 3074F SYST BP LT 130 MM HG: CPT | Mod: CPTII,S$GLB,, | Performed by: INTERNAL MEDICINE

## 2019-10-28 PROCEDURE — 1101F PR PT FALLS ASSESS DOC 0-1 FALLS W/OUT INJ PAST YR: ICD-10-PCS | Mod: CPTII,S$GLB,, | Performed by: INTERNAL MEDICINE

## 2019-10-28 PROCEDURE — 99999 PR PBB SHADOW E&M-EST. PATIENT-LVL IV: CPT | Mod: PBBFAC,,, | Performed by: INTERNAL MEDICINE

## 2019-10-28 PROCEDURE — 99215 PR OFFICE/OUTPT VISIT, EST, LEVL V, 40-54 MIN: ICD-10-PCS | Mod: S$GLB,,, | Performed by: INTERNAL MEDICINE

## 2019-10-28 PROCEDURE — 85652 RBC SED RATE AUTOMATED: CPT

## 2019-10-28 PROCEDURE — 99499 RISK ADDL DX/OHS AUDIT: ICD-10-PCS | Mod: S$GLB,,, | Performed by: INTERNAL MEDICINE

## 2019-10-28 PROCEDURE — 99499 UNLISTED E&M SERVICE: CPT | Mod: S$GLB,,, | Performed by: INTERNAL MEDICINE

## 2019-10-28 PROCEDURE — 99999 PR PBB SHADOW E&M-EST. PATIENT-LVL IV: ICD-10-PCS | Mod: PBBFAC,,, | Performed by: INTERNAL MEDICINE

## 2019-10-28 PROCEDURE — 86480 TB TEST CELL IMMUN MEASURE: CPT

## 2019-10-28 PROCEDURE — 99215 OFFICE O/P EST HI 40 MIN: CPT | Mod: S$GLB,,, | Performed by: INTERNAL MEDICINE

## 2019-10-28 PROCEDURE — 1101F PT FALLS ASSESS-DOCD LE1/YR: CPT | Mod: CPTII,S$GLB,, | Performed by: INTERNAL MEDICINE

## 2019-10-28 PROCEDURE — 36415 COLL VENOUS BLD VENIPUNCTURE: CPT

## 2019-10-28 PROCEDURE — 85025 COMPLETE CBC W/AUTO DIFF WBC: CPT

## 2019-10-28 PROCEDURE — 3074F PR MOST RECENT SYSTOLIC BLOOD PRESSURE < 130 MM HG: ICD-10-PCS | Mod: CPTII,S$GLB,, | Performed by: INTERNAL MEDICINE

## 2019-10-28 PROCEDURE — 3078F DIAST BP <80 MM HG: CPT | Mod: CPTII,S$GLB,, | Performed by: INTERNAL MEDICINE

## 2019-10-28 PROCEDURE — 3078F PR MOST RECENT DIASTOLIC BLOOD PRESSURE < 80 MM HG: ICD-10-PCS | Mod: CPTII,S$GLB,, | Performed by: INTERNAL MEDICINE

## 2019-10-28 RX ORDER — AZITHROMYCIN 250 MG/1
TABLET, FILM COATED ORAL
Qty: 6 TABLET | Refills: 0 | Status: SHIPPED | OUTPATIENT
Start: 2019-10-28 | End: 2020-02-06

## 2019-10-28 NOTE — LETTER
October 28, 2019      Jessica Ward MD  [address]  [place] [address]           [facility]  [address]  [place] [address]  Phone: [phone]  Fax: [phone]          Patient: Jaimie Luque   MR Number: [id number]   YOB: 1950   Date of Visit: 10/28/2019       Dear Dr. Jessica Ward:    Thank you for referring Jaimie Luque to me for evaluation. Attached you will find relevant portions of my assessment and plan of care.    If you have questions, please do not hesitate to call me. I look forward to following Jaimie Luque along with you.    Sincerely,    Kulwinder Schumacher MD    Enclosure  CC:  No Recipients    If you would like to receive this communication electronically, please contact [email or web address] or [phone] to request more information on [facility] Link access.    For providers and/or their staff who would like to refer a patient to [facility], please contact us through our one-stop-shop provider referral line, [facility], at [phone].    If you feel you have received this communication in error or would no longer like to receive these types of communications, please e-mail [email or web address]

## 2019-10-28 NOTE — PROGRESS NOTES
Subjective:       Patient ID: Jaimie Luque is a 69 y.o. female.    Chief Complaint: She       Sleep Apnea    HPI     Abnormal CT of Chest :  Nonsmoker who has had a number of CT scans performed for weight loss  Abnormal CT scan with mucous plugging and new nodular infiltrates  Aymptomatic  She today denies a cough, denies wheezing however she admits having shortness of breath sometimes on exertion.    She was initially evaluated  for pulmonary nodules and had a repeat CAT scan of chest was performed that demonstrated nodular infiltrates       Her initial CAT scan was performed as a workup of weight loss.  Patient has gained couple of pounds recently.  Malignancy workup has been negative so far.   She does have obstructive sleep apnea and she is on compliant with CPAP.   Patient's weight is stable recently.  Malignancy workup has been negative so far.   Denies smoking, denies a family history of asthma or emphysema.        Diarrhea - august 2019  Onset of diarrhea was 5 years ago.- initially with metformin. Lost weight. eventually switched to extended release. Diarrhea is occurring approximately 1 times per day. Patient describes diarrhea as watery. Diarrhea has been associated with suspicious food/drink - metformin. Patient denies blood in stool, fever, illness in household contacts. Previous visits for diarrhea: multiple, this is a longstanding diagnosis.  Evaluation to date: Colonoscopy otherwise none.  Treatment to date: fiber chioce and miralax for constipation none.    Obstructive Sleep Apnea:  Obstructive Sleep Apnea on Continuous Positive Airway Pressure:  Patient is using CPAP as prescribed and benefiting from therapy. Patient has complaints of none            Past Medical History:   Diagnosis Date    Diabetes mellitus type II     Hyperlipidemia     Hypertension     Hypothyroid     TALIA (obstructive sleep apnea)     on CPAP    Osteopenia     Psoriasis      Past Surgical History:   Procedure  Laterality Date    COLONOSCOPY N/A 5/2/2017    Procedure: COLONOSCOPY;  Surgeon: Noe Penn III, MD;  Location: [facility];  Service: Endoscopy;  Laterality: N/A;    HYSTERECTOMY      OOPHORECTOMY      ROBOTIC ASSISTED HYSTERECTOMY       Social History     Socioeconomic History    Marital status:      Spouse name: Not on file    Number of children: Not on file    Years of education: Not on file    Highest education level: Not on file   Occupational History    Not on file   Social Needs    Financial resource strain: Not hard at all    Food insecurity:     Worry: Never true     Inability: Never true    Transportation needs:     Medical: No     Non-medical: No   Tobacco Use    Smoking status: Never Smoker    Smokeless tobacco: Never Used   Substance and Sexual Activity    Alcohol use: Yes     Frequency: 2-4 times a month     Drinks per session: 1 or 2     Binge frequency: Never     Comment: rare    Drug use: No    Sexual activity: Yes     Partners: Male   Lifestyle    Physical activity:     Days per week: 1 day     Minutes per session: 40 min    Stress: Only a little   Relationships    Social connections:     Talks on phone: Once a week     Gets together: More than three times a week     Attends Shinto service: Not on file     Active member of club or organization: Yes     Attends meetings of clubs or organizations: More than 4 times per year     Relationship status:    Other Topics Concern    Are you pregnant or think you may be? Not Asked    Breast-feeding Not Asked   Social History Narrative    . Lives with spouse. Has 3 children. Patient retired as  for [employer].      Review of Systems   Constitutional: Negative for fever and fatigue.   HENT: Negative for postnasal drip and rhinorrhea.    Eyes: Negative for redness and itching.   Respiratory: Positive for dyspnea on extertion. Negative for cough, shortness of breath, wheezing and Paroxysmal  "Nocturnal Dyspnea.    Cardiovascular: Negative for chest pain.   Genitourinary: Negative for difficulty urinating and hematuria.   Endocrine: Negative for polyphagia, cold intolerance and heat intolerance.    Musculoskeletal: Negative for arthralgias.   Skin: Negative for rash.   Gastrointestinal: Positive for abdominal distention. Negative for nausea, vomiting and abdominal pain.        Diahrrea   Neurological: Negative for dizziness and headaches.   Hematological: Negative for adenopathy. Does not bruise/bleed easily and no excessive bruising.   Psychiatric/Behavioral: The patient is not nervous/anxious.        Objective:      /62   Pulse 69   Resp 18   Ht 5' 7.5" (1.715 m)   Wt 95.2 kg (209 lb 14.1 oz)   SpO2 96%   BMI 32.39 kg/m²   Physical Exam   Constitutional: She is oriented to person, place, and time. She appears well-developed and well-nourished.   HENT:   Head: Normocephalic and atraumatic.   Eyes: Pupils are equal, round, and reactive to light. Conjunctivae are normal.   Neck: Neck supple. No JVD present. No tracheal deviation present. No thyromegaly present.   Cardiovascular: Normal rate, regular rhythm and normal heart sounds.   Pulmonary/Chest: Effort normal and breath sounds normal. No respiratory distress. She has no wheezes. She has no rales. She exhibits no tenderness.   Abdominal: Soft. Bowel sounds are normal.   Musculoskeletal: Normal range of motion. She exhibits no edema.   Lymphadenopathy:     She has no cervical adenopathy.   Neurological: She is alert and oriented to person, place, and time.   Skin: Skin is warm and dry.   Nursing note and vitals reviewed.    Personal Diagnostic Review  CT of chest performed on 10/22/1091 without contrast revealed new  infiltrates.  CT Chest Without Contrast  Narrative: EXAMINATION:  CT CHEST WITHOUT CONTRAST    CLINICAL HISTORY:  f/u lung nodules; Other nonspecific abnormal finding of lung field    TECHNIQUE:  Low dose axial images, sagittal " and coronal reformations were obtained from the thoracic inlet to the lung bases. Contrast was not administered.    COMPARISON:  04/10/2018    FINDINGS:  There is a cluster of nodules versus irregularly-shaped parenchymal opacification seen within the right upper lobe on series 4 image numbers 120 through 145 which in aggregate measures approximately 2.8 cm in the oblique AP dimension on the sagittal images and up to 10 mm in the transverse dimension.  There appears to be some very subtle ground-glass opacification surrounding this irregular nodular opacity suggesting that this is likely infectious in nature.  Previously in this area there were only some minimal micro nodular opacities, which is also suggestive of an infectious or inflammatory etiology.  There is a stable noncalcified pulmonary nodule within the right middle lobe that measures approximately 11 mm x 7 mm with some minimal tree-in-bud/ground-glass type opacification seen distal to this nodular area that is stable in appearance when compared to the prior study.  There is also a noncalcified pulmonary nodule within the right lower lobe series 4, image 335 that is not significantly changed in appearance when compared to the prior exam and measures approximately 5 mm.  Also within the right lower lobe near the lung base on series 4, image 365 is a 3-4 mm noncalcified pulmonary nodule that is also stable.  Small cluster of micro nodular opacification also present within the left lower lobe more superiorly best seen on series 4 image numbers 259 through 265 stable in appearance when compared to the prior.  Small cluster of micro nodular opacification also present in the right lower lobe near the lung base in the costophrenic angle best seen on series 4, image 376 that appears to be new and is favored to represent an infectious or inflammatory etiology..    The aorta is unremarkable in appearance. There is no pericardial effusion.  No enlarged mediastinal,  hilar or axillary lymph nodes are identified.    There is a stable lesion within the posterior segment of the right hepatic lobe and most consistent with a hemangioma as demonstrated on prior MRI.    No suspicious appearing osseus abnormalities are identified.  Impression: 1. Irregularly-shaped cluster of nodules versus irregular shaped parenchymal consolidation within the right upper lobe with measurements given above and we appearing to be surrounded by some subtle ground-glass opacity in an area of previous micro nodular opacification highly suggestive of an infectious or inflammatory etiology.  Consider pulmonary consultation and CT follow-up.  2. Very tiny area of cluster of micro nodular opacification also present within the right lung base near the costophrenic angle which is also new when compared to the prior study and is also favored to represent an infectious or inflammatory etiology.  Remaining nodular and micronodular opacities as described in detail above and stable when compared to the prior exam.  3. Remaining findings as discussed above and stable when compared to the prior study.    Electronically signed by: Octavio Bella DO  Date:    10/22/2019  Time:    10:39      Office Spirometry Results:     No flowsheet data found.  Pulmonary Studies Review 10/28/2019   SpO2 96   Height 67.500   Weight 3358.05   BMI (Calculated) 32.5   Predicted Distance 267.1   Predicted Distance Meters (Calculated) 406.26         Assessment:       TALIA on CPAP  -     CPAP/BIPAP SUPPLIES    Multiple lung nodules on CT  -     QUANTIFERON GOLD TB; Future; Expected date: 10/28/2019  -     azithromycin (ZITHROMAX Z-ANATOLIY) 250 MG tablet; 500 mg on day 1 (two tablets) followed by 250 mg once daily on days 2-5  Dispense: 6 tablet; Refill: 0  -     Sedimentation rate; Future; Expected date: 10/28/2019  -     CBC auto differential; Future; Expected date: 10/28/2019    Bronchiectasis without complication - possible early  bronchiectesis  -     azithromycin (ZITHROMAX Z-ANATOLIY) 250 MG tablet; 500 mg on day 1 (two tablets) followed by 250 mg once daily on days 2-5  Dispense: 6 tablet; Refill: 0  -     Sedimentation rate; Future; Expected date: 10/28/2019  -     CBC auto differential; Future; Expected date: 10/28/2019    Diarrhea, unspecified type  -     Stool Exam-Ova,Cysts,Parasites; Future; Expected date: 10/28/2019    Lung nodule  -     CT Chest Without Contrast; Future; Expected date: 10/28/2019    Shortness of breath  -     CT Chest Without Contrast; Future; Expected date: 10/28/2019          Outpatient Encounter Medications as of 10/28/2019   Medication Sig Dispense Refill    aspirin 81 mg Tab Take by mouth. 1 Tablet Oral Every day      atorvastatin (LIPITOR) 20 MG tablet TAKE 1 TABLET BY MOUTH EVERY DAY 90 tablet 3    blood sugar diagnostic Strp TRUEresSoftLayer Matrix Blood Glucose Monitoring System  Dx:  E11.9   Medically Necessary 1 each 3    cholecalciferol, vitamin D3, 2,000 unit Tab Take by mouth. 1 Tablet Oral Every day      fluticasone (FLONASE) 50 mcg/actuation nasal spray 2 sprays (100 mcg total) by Each Nare route once daily. 16 g 6    gabapentin (NEURONTIN) 300 MG capsule TAKE 1 CAPSULE BY MOUTH THREE TIMES DAILY 270 capsule 3    insulin (BASAGLAR KWIKPEN U-100 INSULIN) glargine 100 units/mL (3mL) SubQ pen Inject 20 Units into the skin once daily. 3 Box 6    lancets (ACCU-CHEK SOFTCLIX LANCETS) Misc Check blood sugar twice daily.  Accuchek Multiclix drum lancets, [employer]  Dx:  E11.9 200 each 3    levothyroxine (SYNTHROID) 100 MCG tablet TAKE 1 TABLET(100 MCG) BY MOUTH BEFORE BREAKFAST 90 tablet 3    lisinopril (PRINIVIL,ZESTRIL) 2.5 MG tablet TAKE 1 TABLET(2.5 MG) BY MOUTH EVERY DAY 90 tablet 3    meclizine (ANTIVERT) 25 mg tablet Take 1 tablet by mouth as needed.       metFORMIN (GLUCOPHAGE-XR) 500 MG 24 hr tablet Take 2 tablets (1,000 mg total) by mouth 2 (two) times daily with meals. 360 tablet 3     "pen needle, diabetic (BD ULTRA-FINE LAURA PEN NEEDLE) 32 gauge x 5/32" Ndle USE THREE TIMES DAILY 300 each 0    TRULICITY 0.75 mg/0.5 mL PnIj INJECT 0.5 ML( 0.75 MG TOTAL) UNDER THE SKIN ONCE A WEEK 6 mL 6    azithromycin (ZITHROMAX Z-ANATOLIY) 250 MG tablet 500 mg on day 1 (two tablets) followed by 250 mg once daily on days 2-5 6 tablet 0    [] flu vacc [id number],65yr up,PF (FLUZONE HIGH-DOSE , PF,) 180 mcg/0.5 mL Syrg Inject 0.5 mLs into the muscle once. For one dose. for 1 dose 0.5 mL 0     No facility-administered encounter medications on file as of 10/28/2019.      Plan:       Requested Prescriptions     Signed Prescriptions Disp Refills    azithromycin (ZITHROMAX Z-ANATOLIY) 250 MG tablet 6 tablet 0     Si mg on day 1 (two tablets) followed by 250 mg once daily on days 2-5     Problem List Items Addressed This Visit     Diarrhea    Relevant Orders    Stool Exam-Ova,Cysts,Parasites    Multiple lung nodules on CT    Relevant Medications    azithromycin (ZITHROMAX Z-ANATOLIY) 250 MG tablet    Other Relevant Orders    QUANTIFERON GOLD TB    Sedimentation rate    CBC auto differential    TALIA on CPAP - Primary    Relevant Orders    CPAP/BIPAP SUPPLIES      Other Visit Diagnoses     Bronchiectasis without complication - possible early bronchiectesis        Relevant Medications    azithromycin (ZITHROMAX Z-ANATOLIY) 250 MG tablet    Other Relevant Orders    Sedimentation rate    CBC auto differential    Lung nodule        Relevant Orders    CT Chest Without Contrast    Shortness of breath        Relevant Orders    CT Chest Without Contrast             Follow up in about 3 months (around 2020) for Review CT/PET day of visit.    MEDICAL DECISION MAKING: Moderate to high complexity.  Overall, the multiple problems listed are of moderate to high severity that may impact quality of life and activities of daily living. Side effects of medications, treatment plan as well as options and alternatives reviewed and " discussed with patient. There was counseling of patient concerning these issues.    Total time spent in face to face counseling and coordination of care - 45  minutes over 50% of time was used in discussion of prognosis, risks, benefits of treatment, instructions and compliance with regimen . Discussion with other physicians or health care providers (DME, NP, pharmacy, respiratory therapy) occurred.

## 2019-10-29 ENCOUNTER — OFFICE VISIT (OUTPATIENT)
Dept: FAMILY MEDICINE | Facility: CLINIC | Age: 69
End: 2019-10-29
Payer: MEDICARE

## 2019-10-29 ENCOUNTER — PATIENT MESSAGE (OUTPATIENT)
Dept: FAMILY MEDICINE | Facility: CLINIC | Age: 69
End: 2019-10-29

## 2019-10-29 ENCOUNTER — LAB VISIT (OUTPATIENT)
Dept: LAB | Facility: HOSPITAL | Age: 69
End: 2019-10-29
Attending: INTERNAL MEDICINE
Payer: MEDICARE

## 2019-10-29 VITALS
OXYGEN SATURATION: 97 % | TEMPERATURE: 98 F | DIASTOLIC BLOOD PRESSURE: 58 MMHG | BODY MASS INDEX: 32.34 KG/M2 | WEIGHT: 213.38 LBS | HEIGHT: 68 IN | HEART RATE: 81 BPM | SYSTOLIC BLOOD PRESSURE: 122 MMHG

## 2019-10-29 DIAGNOSIS — B02.29 PHN (POSTHERPETIC NEURALGIA): ICD-10-CM

## 2019-10-29 DIAGNOSIS — Z29.9 PREVENTIVE MEASURE: ICD-10-CM

## 2019-10-29 DIAGNOSIS — R91.8 MULTIPLE LUNG NODULES ON CT: ICD-10-CM

## 2019-10-29 DIAGNOSIS — E11.69 HYPERLIPIDEMIA ASSOCIATED WITH TYPE 2 DIABETES MELLITUS: ICD-10-CM

## 2019-10-29 DIAGNOSIS — R19.7 DIARRHEA, UNSPECIFIED TYPE: ICD-10-CM

## 2019-10-29 DIAGNOSIS — E78.5 HYPERLIPIDEMIA ASSOCIATED WITH TYPE 2 DIABETES MELLITUS: ICD-10-CM

## 2019-10-29 DIAGNOSIS — E03.9 ACQUIRED HYPOTHYROIDISM: ICD-10-CM

## 2019-10-29 PROCEDURE — 3078F DIAST BP <80 MM HG: CPT | Mod: CPTII,S$GLB,, | Performed by: INTERNAL MEDICINE

## 2019-10-29 PROCEDURE — 99999 PR PBB SHADOW E&M-EST. PATIENT-LVL IV: CPT | Mod: PBBFAC,,, | Performed by: INTERNAL MEDICINE

## 2019-10-29 PROCEDURE — 1101F PT FALLS ASSESS-DOCD LE1/YR: CPT | Mod: CPTII,S$GLB,, | Performed by: INTERNAL MEDICINE

## 2019-10-29 PROCEDURE — 3074F SYST BP LT 130 MM HG: CPT | Mod: CPTII,S$GLB,, | Performed by: INTERNAL MEDICINE

## 2019-10-29 PROCEDURE — 87209 SMEAR COMPLEX STAIN: CPT

## 2019-10-29 PROCEDURE — 3074F PR MOST RECENT SYSTOLIC BLOOD PRESSURE < 130 MM HG: ICD-10-PCS | Mod: CPTII,S$GLB,, | Performed by: INTERNAL MEDICINE

## 2019-10-29 PROCEDURE — 99214 OFFICE O/P EST MOD 30 MIN: CPT | Mod: S$GLB,,, | Performed by: INTERNAL MEDICINE

## 2019-10-29 PROCEDURE — 99214 PR OFFICE/OUTPT VISIT, EST, LEVL IV, 30-39 MIN: ICD-10-PCS | Mod: S$GLB,,, | Performed by: INTERNAL MEDICINE

## 2019-10-29 PROCEDURE — 3078F PR MOST RECENT DIASTOLIC BLOOD PRESSURE < 80 MM HG: ICD-10-PCS | Mod: CPTII,S$GLB,, | Performed by: INTERNAL MEDICINE

## 2019-10-29 PROCEDURE — 1101F PR PT FALLS ASSESS DOC 0-1 FALLS W/OUT INJ PAST YR: ICD-10-PCS | Mod: CPTII,S$GLB,, | Performed by: INTERNAL MEDICINE

## 2019-10-29 PROCEDURE — 99999 PR PBB SHADOW E&M-EST. PATIENT-LVL IV: ICD-10-PCS | Mod: PBBFAC,,, | Performed by: INTERNAL MEDICINE

## 2019-10-30 LAB
M TB IFN-G CD4+ BCKGRND COR BLD-ACNC: 0 IU/ML
MITOGEN IGNF BCKGRD COR BLD-ACNC: >10 IU/ML
MITOGEN IGNF BCKGRD COR BLD-ACNC: NEGATIVE [IU]/ML
NIL: 0.03 IU/ML
TB2 - NIL: 0 IU/ML

## 2019-10-31 LAB — O+P STL MICRO: NORMAL

## 2019-11-04 RX ORDER — ATORVASTATIN CALCIUM 20 MG/1
TABLET, FILM COATED ORAL
Qty: 90 TABLET | Refills: 3 | Status: SHIPPED | OUTPATIENT
Start: 2019-11-04 | End: 2020-10-16

## 2020-01-06 ENCOUNTER — PATIENT MESSAGE (OUTPATIENT)
Dept: FAMILY MEDICINE | Facility: CLINIC | Age: 70
End: 2020-01-06

## 2020-01-06 RX ORDER — INSULIN GLARGINE 100 [IU]/ML
5 INJECTION, SOLUTION SUBCUTANEOUS DAILY
Qty: 15 ML | Refills: 3 | Status: SHIPPED | OUTPATIENT
Start: 2020-01-06 | End: 2020-01-13 | Stop reason: SDUPTHER

## 2020-01-13 ENCOUNTER — TELEPHONE (OUTPATIENT)
Dept: FAMILY MEDICINE | Facility: CLINIC | Age: 70
End: 2020-01-13

## 2020-01-13 ENCOUNTER — PATIENT MESSAGE (OUTPATIENT)
Dept: FAMILY MEDICINE | Facility: CLINIC | Age: 70
End: 2020-01-13

## 2020-01-13 RX ORDER — INSULIN GLARGINE 100 [IU]/ML
20 INJECTION, SOLUTION SUBCUTANEOUS DAILY
Qty: 15 ML | Refills: 3 | Status: SHIPPED | OUTPATIENT
Start: 2020-01-13 | End: 2020-02-03

## 2020-01-20 NOTE — PROGRESS NOTES
Subjective:      Patient ID: Jaimie Luque is a 70 y.o. female.    Chief Complaint: Follow-up (3 months )      HPI  Here for follow up of medical problems.  Gained back 9# from 3 months ago.  Stopped metformin XR 3 months ago.  AC breakfast sugars 170-180.  No more diarrhea.  Not checking sugars otherwise.  On trulicity, and Lantus 20u daily..  Says diet indiscretion.  Walking some for exercise.    No f/c/sw/cough.  Some dysuria last week.  No dental infx.  No recent steroids.  Gabapentin helps neuralgia, but still can't wear a bra.  No constipation.  Tired today, some cold intol.        1/29/20 CT chest:  Impression       Interval improvement with decrease in size of previously described cluster of nodules in the right upper lobe suggesting benign etiology.  Additional area of micronodular cluster in the right lower lobe which was a new finding since the CT from April 2018 is unchanged.  At least 1 new nodule as above measuring 4.5 mm, likely inflammatory in etiology.  Continued follow-up is suggested. Multiple other bilateral pulmonary nodules are also stable.         Updated/ annual due 7/20:  HM: 10/19 fluvax, 4/17 HAV#2, 3/15 [id number], 3/14 [id number], 10/18 booster at pharm TDaP, 11/12 zoster, 2019 Shingrix x2, 6/16 BMD rep 5y, 5/17 Cscope rep 5y, 6/18 mmg(q2), 2/19 Eye Dr. Franco, 9/16 HCV neg, 1/20 CT lung repeat due 1y with Pulm.     Review of Systems   Constitutional: Negative for chills, diaphoresis and fever.   Respiratory: Negative for cough and shortness of breath.    Cardiovascular: Negative for chest pain, palpitations and leg swelling.   Gastrointestinal: Negative for blood in stool, constipation, diarrhea, nausea and vomiting.   Genitourinary: Negative for dysuria, frequency and hematuria.   Psychiatric/Behavioral: The patient is not nervous/anxious.          Objective:   /62 (BP Location: Left arm, Patient Position: Sitting, BP Method: Large (Manual))   Pulse 94   Temp 98 °F (36.7 °C)  "(Oral)   Ht 5' 7.5" (1.715 m)   Wt 100.7 kg (222 lb 0.1 oz)   SpO2 97%   BMI 34.26 kg/m²     Physical Exam   Constitutional: She is oriented to person, place, and time. She appears well-developed.   HENT:   Mouth/Throat: Oropharynx is clear and moist.   Neck: Neck supple. Carotid bruit is not present. No thyroid mass present.   Cardiovascular: Normal rate, regular rhythm and intact distal pulses. Exam reveals no gallop and no friction rub.   No murmur heard.  Pulmonary/Chest: Effort normal and breath sounds normal. She has no wheezes. She has no rales.   Abdominal: Soft. Bowel sounds are normal. She exhibits no mass. There is no hepatosplenomegaly. There is no tenderness.   Musculoskeletal: She exhibits no edema.   Lymphadenopathy:     She has no cervical adenopathy.   Neurological: She is alert and oriented to person, place, and time.   Psychiatric: She has a normal mood and affect.       Results for NORMA HILLS (MRN [id number]) as of 2/3/2020 15:39   Ref. Range 10/22/2019 10:18 10/28/2019 10:00 10/29/2019 11:23 1/29/2020 08:52   Hemoglobin A1C External Latest Ref Range: 4.0 - 5.6 % 7.8 (H)   10.3 (H)   Estimated Avg Glucose Latest Ref Range: 68 - 131 mg/dL 177 (H)   249 (H)       Assessment:       1. Uncontrolled type 2 diabetes mellitus without complication, with long-term current use of insulin    2. Hyperlipidemia associated with type 2 diabetes mellitus    3. Acquired hypothyroidism    4. TALIA on CPAP    5. Weight loss    6. Preventive measure    7. Dysuria          Plan:     Uncontrolled type 2 diabetes mellitus without complication, with long-term current use of insulin- increase lantus to 25u daily- monitor sugars, f/u in 2 weeks with DM nurse, me in 6wk.    Hyperlipidemia associated with type 2 diabetes mellitus    Acquired hypothyroidism- recheck due to sugars.  -     TSH; Future; Expected date: 02/03/2020    TALIA on CPAP    Weight loss, has resolved.    Preventive measure    Dysuria- r/o infx causing " uncontrol.  -     Urinalysis; Future; Expected date: 02/03/2020

## 2020-01-29 ENCOUNTER — PATIENT MESSAGE (OUTPATIENT)
Dept: FAMILY MEDICINE | Facility: CLINIC | Age: 70
End: 2020-01-29

## 2020-01-29 ENCOUNTER — HOSPITAL ENCOUNTER (OUTPATIENT)
Dept: RADIOLOGY | Facility: HOSPITAL | Age: 70
Discharge: HOME | End: 2020-01-29
Attending: INTERNAL MEDICINE
Payer: MEDICARE

## 2020-01-29 ENCOUNTER — OFFICE VISIT (OUTPATIENT)
Dept: PULMONOLOGY | Facility: CLINIC | Age: 70
End: 2020-01-29
Payer: MEDICARE

## 2020-01-29 VITALS
RESPIRATION RATE: 18 BRPM | OXYGEN SATURATION: 97 % | HEART RATE: 62 BPM | DIASTOLIC BLOOD PRESSURE: 56 MMHG | WEIGHT: 222.88 LBS | BODY MASS INDEX: 33.78 KG/M2 | SYSTOLIC BLOOD PRESSURE: 100 MMHG | HEIGHT: 68 IN

## 2020-01-29 DIAGNOSIS — R91.1 LUNG NODULE: ICD-10-CM

## 2020-01-29 DIAGNOSIS — R06.09 DOE (DYSPNEA ON EXERTION): ICD-10-CM

## 2020-01-29 DIAGNOSIS — R94.2 ABNORMAL PFT: ICD-10-CM

## 2020-01-29 DIAGNOSIS — G47.33 OSA ON CPAP: ICD-10-CM

## 2020-01-29 DIAGNOSIS — R91.8 MULTIPLE LUNG NODULES ON CT: Primary | ICD-10-CM

## 2020-01-29 DIAGNOSIS — R06.02 SHORTNESS OF BREATH: ICD-10-CM

## 2020-01-29 PROCEDURE — 99499 RISK ADDL DX/OHS AUDIT: ICD-10-PCS | Mod: S$GLB,,, | Performed by: INTERNAL MEDICINE

## 2020-01-29 PROCEDURE — 99215 PR OFFICE/OUTPT VISIT, EST, LEVL V, 40-54 MIN: ICD-10-PCS | Mod: S$GLB,,, | Performed by: INTERNAL MEDICINE

## 2020-01-29 PROCEDURE — 1159F PR MEDICATION LIST DOCUMENTED IN MEDICAL RECORD: ICD-10-PCS | Mod: S$GLB,,, | Performed by: INTERNAL MEDICINE

## 2020-01-29 PROCEDURE — 99999 PR PBB SHADOW E&M-EST. PATIENT-LVL IV: ICD-10-PCS | Mod: PBBFAC,,, | Performed by: INTERNAL MEDICINE

## 2020-01-29 PROCEDURE — 71250 CT THORAX DX C-: CPT | Mod: TC

## 2020-01-29 PROCEDURE — 1159F MED LIST DOCD IN RCRD: CPT | Mod: S$GLB,,, | Performed by: INTERNAL MEDICINE

## 2020-01-29 PROCEDURE — 71250 CT CHEST WITHOUT CONTRAST: ICD-10-PCS | Mod: 26,,, | Performed by: RADIOLOGY

## 2020-01-29 PROCEDURE — 3074F PR MOST RECENT SYSTOLIC BLOOD PRESSURE < 130 MM HG: ICD-10-PCS | Mod: CPTII,S$GLB,, | Performed by: INTERNAL MEDICINE

## 2020-01-29 PROCEDURE — 3078F PR MOST RECENT DIASTOLIC BLOOD PRESSURE < 80 MM HG: ICD-10-PCS | Mod: CPTII,S$GLB,, | Performed by: INTERNAL MEDICINE

## 2020-01-29 PROCEDURE — 99999 PR PBB SHADOW E&M-EST. PATIENT-LVL IV: CPT | Mod: PBBFAC,,, | Performed by: INTERNAL MEDICINE

## 2020-01-29 PROCEDURE — 1101F PT FALLS ASSESS-DOCD LE1/YR: CPT | Mod: CPTII,S$GLB,, | Performed by: INTERNAL MEDICINE

## 2020-01-29 PROCEDURE — 3078F DIAST BP <80 MM HG: CPT | Mod: CPTII,S$GLB,, | Performed by: INTERNAL MEDICINE

## 2020-01-29 PROCEDURE — 99499 UNLISTED E&M SERVICE: CPT | Mod: S$GLB,,, | Performed by: INTERNAL MEDICINE

## 2020-01-29 PROCEDURE — 1101F PR PT FALLS ASSESS DOC 0-1 FALLS W/OUT INJ PAST YR: ICD-10-PCS | Mod: CPTII,S$GLB,, | Performed by: INTERNAL MEDICINE

## 2020-01-29 PROCEDURE — 71250 CT THORAX DX C-: CPT | Mod: 26,,, | Performed by: RADIOLOGY

## 2020-01-29 PROCEDURE — 99215 OFFICE O/P EST HI 40 MIN: CPT | Mod: S$GLB,,, | Performed by: INTERNAL MEDICINE

## 2020-01-29 PROCEDURE — 3074F SYST BP LT 130 MM HG: CPT | Mod: CPTII,S$GLB,, | Performed by: INTERNAL MEDICINE

## 2020-01-29 NOTE — PROGRESS NOTES
Subjective:       Patient ID: Jaimie Luque is a 70 y.o. female.    Chief Complaint: She       Sleep Apnea    HPI   70-year-old nonsmoker of cigarettes to seen back in follow-up examination for evaluation of abnormalities on her CT scan of the chest.  On prior studies there was a pattern of abnormalities that suggested bronchiectasis or bronchiolitis.  At this time the patient is completely asymptomatic.  She has no complaints of fever chills, no cough or chest congestion, no pleuritic chest pain.  She is noted to have abnormal pulmonary function studies consistent with an obstructive defect on prior lung function tests but the patient is asymptomatic.  She really performs strenuous activities and has no complaints of wheezing or shortness of breath. Overall she feels well.  No c/o chest congestion   No problems with weight loss  No cough or chest congestion.  Overall the pattern of abnormalities on repeated CT scans of the chest would suggest that the patient may be having silent aspiration, however her history does not clearly support this process.    Abnormal CT of Chest :  Nonsmoker who has had a number of CT scans performed for weight loss  Abnormal CT scan with mucous plugging and new nodular infiltrates on prior studies.  Today she is aymptomatic.  She today denies a cough, denies wheezing however she admits having shortness of breath sometimes on exertion.     She was initially evaluated  for pulmonary nodules and had a repeat CAT scan of chest was performed that demonstrated nodular infiltrates - unchanged from prior studies     Her initial CAT scan was performed as a workup of weight loss.  Patient has gained couple of pounds recently.  Malignancy workup has been negative so far.   She does have obstructive sleep apnea and she is on compliant with CPAP.   Patient's weight is stable recently.  Malignancy workup has been negative so far.   Denies smoking, denies a family history of asthma or  emphysema.    I have offered bronchoscopy to this patient for further evaluation of the abnormalities on CT scan of her chest but after consideration of the risks involved with the procedure and the patient's present lack of any symptoms she is were reluctant to undergo this procedure at this time.    Obstructive Sleep Apnea on Continuous Positive Airway Pressure:  Patient is using CPAP as prescribed and benefiting from therapy. Patient has complaints of none          Past Medical History:   Diagnosis Date    Diabetes mellitus type II     Hyperlipidemia     Hypertension     Hypothyroid     TALIA (obstructive sleep apnea)     on CPAP    Osteopenia     Psoriasis      Past Surgical History:   Procedure Laterality Date    COLONOSCOPY N/A 5/2/2017    Procedure: COLONOSCOPY;  Surgeon: Noe Penn III, MD;  Location: [facility];  Service: Endoscopy;  Laterality: N/A;    HYSTERECTOMY      OOPHORECTOMY      ROBOTIC ASSISTED HYSTERECTOMY       Social History     Socioeconomic History    Marital status:      Spouse name: Not on file    Number of children: Not on file    Years of education: Not on file    Highest education level: Not on file   Occupational History    Not on file   Social Needs    Financial resource strain: Not hard at all    Food insecurity:     Worry: Never true     Inability: Never true    Transportation needs:     Medical: No     Non-medical: No   Tobacco Use    Smoking status: Never Smoker    Smokeless tobacco: Never Used   Substance and Sexual Activity    Alcohol use: Yes     Frequency: 2-4 times a month     Drinks per session: 1 or 2     Binge frequency: Never     Comment: rare    Drug use: No    Sexual activity: Yes     Partners: Male   Lifestyle    Physical activity:     Days per week: 1 day     Minutes per session: 40 min    Stress: Only a little   Relationships    Social connections:     Talks on phone: Once a week     Gets together: More than three times a week      "Attends Hindu service: Not on file     Active member of club or organization: Yes     Attends meetings of clubs or organizations: More than 4 times per year     Relationship status:    Other Topics Concern    Are you pregnant or think you may be? Not Asked    Breast-feeding Not Asked   Social History Narrative    . Lives with spouse. Has 3 children. Patient retired as  for [employer].      Review of Systems   Constitutional: Negative for fever and fatigue.   HENT: Negative for postnasal drip and rhinorrhea.    Eyes: Negative for redness and itching.   Respiratory: Positive for dyspnea on extertion. Negative for cough, shortness of breath, wheezing and Paroxysmal Nocturnal Dyspnea.    Cardiovascular: Negative for chest pain.   Genitourinary: Negative for difficulty urinating and hematuria.   Endocrine: Negative for polyphagia, cold intolerance and heat intolerance.    Musculoskeletal: Negative for arthralgias.   Skin: Negative for rash.   Gastrointestinal: Negative for nausea, vomiting, abdominal pain and abdominal distention.   Neurological: Negative for dizziness and headaches.   Hematological: Negative for adenopathy. Does not bruise/bleed easily and no excessive bruising.   Psychiatric/Behavioral: The patient is not nervous/anxious.    All other systems reviewed and are negative.      Objective:      BP (!) 100/56   Pulse 62   Resp 18   Ht 5' 7.5" (1.715 m)   Wt 101.1 kg (222 lb 14.2 oz)   SpO2 97%   BMI 34.39 kg/m²   Physical Exam   Constitutional: She is oriented to person, place, and time. She appears well-developed and well-nourished.   HENT:   Head: Normocephalic and atraumatic.   Eyes: Pupils are equal, round, and reactive to light. Conjunctivae are normal.   Neck: Neck supple. No JVD present. No tracheal deviation present. No thyromegaly present.   Cardiovascular: Normal rate, regular rhythm and normal heart sounds.   Pulmonary/Chest: Effort normal and breath " sounds normal. No respiratory distress. She has no wheezes. She has no rales. She exhibits no tenderness.   Abdominal: Soft. Bowel sounds are normal.   Musculoskeletal: Normal range of motion. She exhibits no edema.   Lymphadenopathy:     She has no cervical adenopathy.   Neurological: She is alert and oriented to person, place, and time.   Skin: Skin is warm and dry.   Nursing note and vitals reviewed.    Personal Diagnostic Review  CT of chest performed on 1/29/2020 without contrast revealed abnormalities in multiple lobes consistent with bronchiolitis or early bronchiectasis.  .  CT Chest Without Contrast  Narrative: EXAMINATION:  CT CHEST WITHOUT CONTRAST    CLINICAL HISTORY:  f/u lung nodules; Other nonspecific abnormal finding of lung field    TECHNIQUE:  Low dose axial images, sagittal and coronal reformations were obtained from the thoracic inlet to the lung bases. Contrast was not administered.    COMPARISON:  04/10/2018    FINDINGS:  There is a cluster of nodules versus irregularly-shaped parenchymal opacification seen within the right upper lobe on series 4 image numbers 120 through 145 which in aggregate measures approximately 2.8 cm in the oblique AP dimension on the sagittal images and up to 10 mm in the transverse dimension.  There appears to be some very subtle ground-glass opacification surrounding this irregular nodular opacity suggesting that this is likely infectious in nature.  Previously in this area there were only some minimal micro nodular opacities, which is also suggestive of an infectious or inflammatory etiology.  There is a stable noncalcified pulmonary nodule within the right middle lobe that measures approximately 11 mm x 7 mm with some minimal tree-in-bud/ground-glass type opacification seen distal to this nodular area that is stable in appearance when compared to the prior study.  There is also a noncalcified pulmonary nodule within the right lower lobe series 4, image 335 that is  not significantly changed in appearance when compared to the prior exam and measures approximately 5 mm.  Also within the right lower lobe near the lung base on series 4, image 365 is a 3-4 mm noncalcified pulmonary nodule that is also stable.  Small cluster of micro nodular opacification also present within the left lower lobe more superiorly best seen on series 4 image numbers 259 through 265 stable in appearance when compared to the prior.  Small cluster of micro nodular opacification also present in the right lower lobe near the lung base in the costophrenic angle best seen on series 4, image 376 that appears to be new and is favored to represent an infectious or inflammatory etiology..    The aorta is unremarkable in appearance. There is no pericardial effusion.  No enlarged mediastinal, hilar or axillary lymph nodes are identified.    There is a stable lesion within the posterior segment of the right hepatic lobe and most consistent with a hemangioma as demonstrated on prior MRI.    No suspicious appearing osseus abnormalities are identified.  Impression: 1. Irregularly-shaped cluster of nodules versus irregular shaped parenchymal consolidation within the right upper lobe with measurements given above and we appearing to be surrounded by some subtle ground-glass opacity in an area of previous micro nodular opacification highly suggestive of an infectious or inflammatory etiology.  Consider pulmonary consultation and CT follow-up.  2. Very tiny area of cluster of micro nodular opacification also present within the right lung base near the costophrenic angle which is also new when compared to the prior study and is also favored to represent an infectious or inflammatory etiology.  Remaining nodular and micronodular opacities as described in detail above and stable when compared to the prior exam.  3. Remaining findings as discussed above and stable when compared to the prior study.    Electronically signed  by: Octavio Bella DO  Date:    10/22/2019  Time:    10:39      Office Spirometry Results:     No flowsheet data found.  Pulmonary Studies Review 1/29/2020   SpO2 97   Height 67.500   Weight 3566.16   BMI (Calculated) 34.4   Predicted Distance 249.41   Predicted Distance Meters (Calculated) 386.97         Assessment:       Multiple lung nodules on CT  1/29/2020.  I have offered bronchoscopy to this patient for further evaluation of the abnormalities on CT scan of her chest but after consideration of the risks involved with the procedure and the patient's present lack of any symptoms she is were reluctant to undergo this procedure at this time.    TALIA on CPAP  Doing well continue Continuous Positive Airway Pressure as prescribed    Multiple lung nodules on CT  -     Complete PFT with bronchodilator; Future; Expected date: 01/29/2020  -     CT Chest Without Contrast; Future; Expected date: 01/29/2020    TALIA on CPAP    Lung nodule  -     CT Chest Without Contrast; Future; Expected date: 01/29/2020    Abnormal PFT  -     Complete PFT with bronchodilator; Future; Expected date: 01/29/2020    JARA (dyspnea on exertion)  -     Complete PFT with bronchodilator; Future; Expected date: 01/29/2020    Uncontrolled type 2 diabetes mellitus without complication, with long-term current use of insulin          Outpatient Encounter Medications as of 1/29/2020   Medication Sig Dispense Refill    aspirin 81 mg Tab Take by mouth. 1 Tablet Oral Every day      atorvastatin (LIPITOR) 20 MG tablet TAKE 1 TABLET BY MOUTH EVERY DAY 90 tablet 3    blood sugar diagnostic Strp TRUEresult Matrix Blood Glucose Monitoring System  Dx:  E11.9   Medically Necessary 1 each 3    cholecalciferol, vitamin D3, 2,000 unit Tab Take by mouth. 1 Tablet Oral Every day      fluticasone (FLONASE) 50 mcg/actuation nasal spray 2 sprays (100 mcg total) by Each Nare route once daily. 16 g 6    gabapentin (NEURONTIN) 300 MG capsule TAKE 1 CAPSULE BY MOUTH THREE  "TIMES DAILY 270 capsule 3    insulin (LANTUS SOLOSTAR U-100 INSULIN) glargine 100 units/mL (3mL) SubQ pen Inject 20 Units into the skin once daily. 15 mL 3    lancets (ACCU-CHEK SOFTCLIX LANCETS) Misc Check blood sugar twice daily.  Accuchek Multiclix drum lancets, [facility]  Dx:  E11.9 200 each 3    levothyroxine (SYNTHROID) 100 MCG tablet TAKE 1 TABLET(100 MCG) BY MOUTH BEFORE BREAKFAST 90 tablet 3    lisinopril (PRINIVIL,ZESTRIL) 2.5 MG tablet TAKE 1 TABLET(2.5 MG) BY MOUTH EVERY DAY 90 tablet 3    meclizine (ANTIVERT) 25 mg tablet Take 1 tablet by mouth as needed.       pen needle, diabetic (BD ULTRA-FINE LAURA PEN NEEDLE) 32 gauge x 5/32" Ndle USE THREE TIMES DAILY 300 each 0    TRULICITY 0.75 mg/0.5 mL PnIj INJECT 0.5 ML( 0.75 MG TOTAL) UNDER THE SKIN ONCE A WEEK 6 mL 6    azithromycin (ZITHROMAX Z-ANATOLIY) 250 MG tablet 500 mg on day 1 (two tablets) followed by 250 mg once daily on days 2-5 6 tablet 0     No facility-administered encounter medications on file as of 1/29/2020.      Plan:       Requested Prescriptions      No prescriptions requested or ordered in this encounter     Problem List Items Addressed This Visit     Multiple lung nodules on CT - Primary    Current Assessment & Plan     1/29/2020.  I have offered bronchoscopy to this patient for further evaluation of the abnormalities on CT scan of her chest but after consideration of the risks involved with the procedure and the patient's present lack of any symptoms she is were reluctant to undergo this procedure at this time.         Relevant Orders    Complete PFT with bronchodilator    CT Chest Without Contrast    TALIA on CPAP    Current Assessment & Plan     Doing well continue Continuous Positive Airway Pressure as prescribed         Uncontrolled type 2 diabetes mellitus without complication, with long-term current use of insulin      Other Visit Diagnoses     Lung nodule        Relevant Orders    CT Chest Without Contrast    Abnormal PFT "        Relevant Orders    Complete PFT with bronchodilator    JARA (dyspnea on exertion)        Relevant Orders    Complete PFT with bronchodilator             Follow up in about 1 year (around 1/29/2021) for PFT on return, Review CT/PET day of visit.    MEDICAL DECISION MAKING: Moderate to high complexity.  Overall, the multiple problems listed are of moderate to high severity that may impact quality of life and activities of daily living. Side effects of medications, treatment plan as well as options and alternatives reviewed and discussed with patient. There was counseling of patient concerning these issues.    Total time spent in face to face counseling and coordination of care - 40  minutes over 50% of time was used in discussion of prognosis, risks, benefits of treatment, instructions and compliance with regimen . Discussion with other physicians or health care providers (DME, NP, pharmacy, respiratory therapy) occurred.

## 2020-01-29 NOTE — ASSESSMENT & PLAN NOTE
1/29/2020.  I have offered bronchoscopy to this patient for further evaluation of the abnormalities on CT scan of her chest but after consideration of the risks involved with the procedure and the patient's present lack of any symptoms she is were reluctant to undergo this procedure at this time.

## 2020-01-31 ENCOUNTER — PATIENT OUTREACH (OUTPATIENT)
Dept: ADMINISTRATIVE | Facility: HOSPITAL | Age: 70
End: 2020-01-31

## 2020-01-31 NOTE — PROGRESS NOTES
Outside Data entered Per Care Coordinator           Avis ROD LPN Care Coordinator  Care Coordination Department  [facility]  [phone]

## 2020-02-03 ENCOUNTER — OFFICE VISIT (OUTPATIENT)
Dept: FAMILY MEDICINE | Facility: CLINIC | Age: 70
End: 2020-02-03
Payer: MEDICARE

## 2020-02-03 ENCOUNTER — LAB VISIT (OUTPATIENT)
Dept: LAB | Facility: HOSPITAL | Age: 70
End: 2020-02-03
Payer: MEDICARE

## 2020-02-03 VITALS
HEIGHT: 68 IN | WEIGHT: 222 LBS | HEART RATE: 94 BPM | BODY MASS INDEX: 33.65 KG/M2 | SYSTOLIC BLOOD PRESSURE: 112 MMHG | TEMPERATURE: 98 F | OXYGEN SATURATION: 97 % | DIASTOLIC BLOOD PRESSURE: 62 MMHG

## 2020-02-03 DIAGNOSIS — R63.4 WEIGHT LOSS: ICD-10-CM

## 2020-02-03 DIAGNOSIS — E03.9 ACQUIRED HYPOTHYROIDISM: ICD-10-CM

## 2020-02-03 DIAGNOSIS — Z29.9 PREVENTIVE MEASURE: ICD-10-CM

## 2020-02-03 DIAGNOSIS — E11.69 HYPERLIPIDEMIA ASSOCIATED WITH TYPE 2 DIABETES MELLITUS: ICD-10-CM

## 2020-02-03 DIAGNOSIS — E78.5 HYPERLIPIDEMIA ASSOCIATED WITH TYPE 2 DIABETES MELLITUS: ICD-10-CM

## 2020-02-03 DIAGNOSIS — G47.33 OSA ON CPAP: ICD-10-CM

## 2020-02-03 DIAGNOSIS — R30.0 DYSURIA: ICD-10-CM

## 2020-02-03 PROCEDURE — 1126F PR PAIN SEVERITY QUANTIFIED, NO PAIN PRESENT: ICD-10-PCS | Mod: S$GLB,,, | Performed by: INTERNAL MEDICINE

## 2020-02-03 PROCEDURE — 1101F PR PT FALLS ASSESS DOC 0-1 FALLS W/OUT INJ PAST YR: ICD-10-PCS | Mod: CPTII,S$GLB,, | Performed by: INTERNAL MEDICINE

## 2020-02-03 PROCEDURE — 3078F PR MOST RECENT DIASTOLIC BLOOD PRESSURE < 80 MM HG: ICD-10-PCS | Mod: CPTII,S$GLB,, | Performed by: INTERNAL MEDICINE

## 2020-02-03 PROCEDURE — 99214 OFFICE O/P EST MOD 30 MIN: CPT | Mod: S$GLB,,, | Performed by: INTERNAL MEDICINE

## 2020-02-03 PROCEDURE — 99999 PR PBB SHADOW E&M-EST. PATIENT-LVL III: ICD-10-PCS | Mod: PBBFAC,,, | Performed by: INTERNAL MEDICINE

## 2020-02-03 PROCEDURE — 36415 COLL VENOUS BLD VENIPUNCTURE: CPT | Mod: PO

## 2020-02-03 PROCEDURE — 3046F PR MOST RECENT HEMOGLOBIN A1C LEVEL > 9.0%: ICD-10-PCS | Mod: CPTII,S$GLB,, | Performed by: INTERNAL MEDICINE

## 2020-02-03 PROCEDURE — 99499 UNLISTED E&M SERVICE: CPT | Mod: S$GLB,,, | Performed by: INTERNAL MEDICINE

## 2020-02-03 PROCEDURE — 99499 RISK ADDL DX/OHS AUDIT: ICD-10-PCS | Mod: S$GLB,,, | Performed by: INTERNAL MEDICINE

## 2020-02-03 PROCEDURE — 3078F DIAST BP <80 MM HG: CPT | Mod: CPTII,S$GLB,, | Performed by: INTERNAL MEDICINE

## 2020-02-03 PROCEDURE — 3074F SYST BP LT 130 MM HG: CPT | Mod: CPTII,S$GLB,, | Performed by: INTERNAL MEDICINE

## 2020-02-03 PROCEDURE — 3074F PR MOST RECENT SYSTOLIC BLOOD PRESSURE < 130 MM HG: ICD-10-PCS | Mod: CPTII,S$GLB,, | Performed by: INTERNAL MEDICINE

## 2020-02-03 PROCEDURE — 99214 PR OFFICE/OUTPT VISIT, EST, LEVL IV, 30-39 MIN: ICD-10-PCS | Mod: S$GLB,,, | Performed by: INTERNAL MEDICINE

## 2020-02-03 PROCEDURE — 84443 ASSAY THYROID STIM HORMONE: CPT

## 2020-02-03 PROCEDURE — 1159F MED LIST DOCD IN RCRD: CPT | Mod: S$GLB,,, | Performed by: INTERNAL MEDICINE

## 2020-02-03 PROCEDURE — 99999 PR PBB SHADOW E&M-EST. PATIENT-LVL III: CPT | Mod: PBBFAC,,, | Performed by: INTERNAL MEDICINE

## 2020-02-03 PROCEDURE — 1101F PT FALLS ASSESS-DOCD LE1/YR: CPT | Mod: CPTII,S$GLB,, | Performed by: INTERNAL MEDICINE

## 2020-02-03 PROCEDURE — 1126F AMNT PAIN NOTED NONE PRSNT: CPT | Mod: S$GLB,,, | Performed by: INTERNAL MEDICINE

## 2020-02-03 PROCEDURE — 3046F HEMOGLOBIN A1C LEVEL >9.0%: CPT | Mod: CPTII,S$GLB,, | Performed by: INTERNAL MEDICINE

## 2020-02-03 PROCEDURE — 1159F PR MEDICATION LIST DOCUMENTED IN MEDICAL RECORD: ICD-10-PCS | Mod: S$GLB,,, | Performed by: INTERNAL MEDICINE

## 2020-02-03 RX ORDER — INSULIN GLARGINE 100 [IU]/ML
25 INJECTION, SOLUTION SUBCUTANEOUS DAILY
Qty: 15 ML | Refills: 3
Start: 2020-02-03 | End: 2020-02-06 | Stop reason: SDUPTHER

## 2020-02-04 LAB — TSH SERPL DL<=0.005 MIU/L-ACNC: 2.15 UIU/ML (ref 0.4–4)

## 2020-02-06 ENCOUNTER — PATIENT MESSAGE (OUTPATIENT)
Dept: FAMILY MEDICINE | Facility: CLINIC | Age: 70
End: 2020-02-06

## 2020-02-06 ENCOUNTER — TELEPHONE (OUTPATIENT)
Dept: FAMILY MEDICINE | Facility: CLINIC | Age: 70
End: 2020-02-06

## 2020-02-06 RX ORDER — INSULIN GLARGINE 100 [IU]/ML
25 INJECTION, SOLUTION SUBCUTANEOUS DAILY
Qty: 30 ML | Refills: 3 | Status: SHIPPED | OUTPATIENT
Start: 2020-02-06 | End: 2021-02-26

## 2020-02-07 ENCOUNTER — PATIENT MESSAGE (OUTPATIENT)
Dept: FAMILY MEDICINE | Facility: CLINIC | Age: 70
End: 2020-02-07

## 2020-02-10 ENCOUNTER — TELEPHONE (OUTPATIENT)
Dept: FAMILY MEDICINE | Facility: CLINIC | Age: 70
End: 2020-02-10

## 2020-02-11 NOTE — TELEPHONE ENCOUNTER
----- Message from Alex Huddleston sent at 2/11/2020  9:29 AM CST -----  Contact: self [phone]  .Type:  Patient Returning Call    Who Called:Jaimie Luque  Who Left Message for Patient:  Does the patient know what this is regarding?:no  Would the patient rather a call back or a response via [facility]? Call back  Best Call Back Number:[phone]  Additional Information:

## 2020-02-11 NOTE — TELEPHONE ENCOUNTER
S/w pt.    Order and Letter of Medical Necessity faxed to [facility] for diabetic testing supplies./rpr

## 2020-02-12 RX ORDER — DULAGLUTIDE 0.75 MG/.5ML
INJECTION, SOLUTION SUBCUTANEOUS
Qty: 6 ML | Refills: 6 | Status: SHIPPED | OUTPATIENT
Start: 2020-02-12 | End: 2020-03-03 | Stop reason: DRUGHIGH

## 2020-03-03 ENCOUNTER — OFFICE VISIT (OUTPATIENT)
Dept: DIABETES | Facility: CLINIC | Age: 70
End: 2020-03-03
Payer: MEDICARE

## 2020-03-03 VITALS
SYSTOLIC BLOOD PRESSURE: 122 MMHG | WEIGHT: 228.38 LBS | BODY MASS INDEX: 34.61 KG/M2 | DIASTOLIC BLOOD PRESSURE: 83 MMHG | HEIGHT: 68 IN | HEART RATE: 58 BPM

## 2020-03-03 DIAGNOSIS — E03.9 ACQUIRED HYPOTHYROIDISM: ICD-10-CM

## 2020-03-03 DIAGNOSIS — E11.65 TYPE 2 DIABETES MELLITUS WITH HYPERGLYCEMIA, WITH LONG-TERM CURRENT USE OF INSULIN: Primary | ICD-10-CM

## 2020-03-03 DIAGNOSIS — E78.5 HYPERLIPIDEMIA ASSOCIATED WITH TYPE 2 DIABETES MELLITUS: ICD-10-CM

## 2020-03-03 DIAGNOSIS — E66.9 OBESITY (BMI 30-39.9): ICD-10-CM

## 2020-03-03 DIAGNOSIS — E11.69 HYPERLIPIDEMIA ASSOCIATED WITH TYPE 2 DIABETES MELLITUS: ICD-10-CM

## 2020-03-03 DIAGNOSIS — Z79.4 TYPE 2 DIABETES MELLITUS WITH HYPERGLYCEMIA, WITH LONG-TERM CURRENT USE OF INSULIN: Primary | ICD-10-CM

## 2020-03-03 LAB — GLUCOSE SERPL-MCNC: 209 MG/DL (ref 70–110)

## 2020-03-03 PROCEDURE — 82962 POCT GLUCOSE, HAND-HELD DEVICE: ICD-10-PCS | Mod: S$GLB,,, | Performed by: PHYSICIAN ASSISTANT

## 2020-03-03 PROCEDURE — 1126F PR PAIN SEVERITY QUANTIFIED, NO PAIN PRESENT: ICD-10-PCS | Mod: S$GLB,,, | Performed by: PHYSICIAN ASSISTANT

## 2020-03-03 PROCEDURE — 1101F PR PT FALLS ASSESS DOC 0-1 FALLS W/OUT INJ PAST YR: ICD-10-PCS | Mod: CPTII,S$GLB,, | Performed by: PHYSICIAN ASSISTANT

## 2020-03-03 PROCEDURE — 3046F HEMOGLOBIN A1C LEVEL >9.0%: CPT | Mod: CPTII,S$GLB,, | Performed by: PHYSICIAN ASSISTANT

## 2020-03-03 PROCEDURE — 3074F PR MOST RECENT SYSTOLIC BLOOD PRESSURE < 130 MM HG: ICD-10-PCS | Mod: CPTII,S$GLB,, | Performed by: PHYSICIAN ASSISTANT

## 2020-03-03 PROCEDURE — 99214 OFFICE O/P EST MOD 30 MIN: CPT | Mod: S$GLB,,, | Performed by: PHYSICIAN ASSISTANT

## 2020-03-03 PROCEDURE — 3074F SYST BP LT 130 MM HG: CPT | Mod: CPTII,S$GLB,, | Performed by: PHYSICIAN ASSISTANT

## 2020-03-03 PROCEDURE — 3079F DIAST BP 80-89 MM HG: CPT | Mod: CPTII,S$GLB,, | Performed by: PHYSICIAN ASSISTANT

## 2020-03-03 PROCEDURE — 99999 PR PBB SHADOW E&M-EST. PATIENT-LVL IV: ICD-10-PCS | Mod: PBBFAC,,, | Performed by: PHYSICIAN ASSISTANT

## 2020-03-03 PROCEDURE — 1159F PR MEDICATION LIST DOCUMENTED IN MEDICAL RECORD: ICD-10-PCS | Mod: S$GLB,,, | Performed by: PHYSICIAN ASSISTANT

## 2020-03-03 PROCEDURE — 1126F AMNT PAIN NOTED NONE PRSNT: CPT | Mod: S$GLB,,, | Performed by: PHYSICIAN ASSISTANT

## 2020-03-03 PROCEDURE — 99999 PR PBB SHADOW E&M-EST. PATIENT-LVL IV: CPT | Mod: PBBFAC,,, | Performed by: PHYSICIAN ASSISTANT

## 2020-03-03 PROCEDURE — 99214 PR OFFICE/OUTPT VISIT, EST, LEVL IV, 30-39 MIN: ICD-10-PCS | Mod: S$GLB,,, | Performed by: PHYSICIAN ASSISTANT

## 2020-03-03 PROCEDURE — 82962 GLUCOSE BLOOD TEST: CPT | Mod: S$GLB,,, | Performed by: PHYSICIAN ASSISTANT

## 2020-03-03 PROCEDURE — 3046F PR MOST RECENT HEMOGLOBIN A1C LEVEL > 9.0%: ICD-10-PCS | Mod: CPTII,S$GLB,, | Performed by: PHYSICIAN ASSISTANT

## 2020-03-03 PROCEDURE — 1159F MED LIST DOCD IN RCRD: CPT | Mod: S$GLB,,, | Performed by: PHYSICIAN ASSISTANT

## 2020-03-03 PROCEDURE — 3079F PR MOST RECENT DIASTOLIC BLOOD PRESSURE 80-89 MM HG: ICD-10-PCS | Mod: CPTII,S$GLB,, | Performed by: PHYSICIAN ASSISTANT

## 2020-03-03 PROCEDURE — 1101F PT FALLS ASSESS-DOCD LE1/YR: CPT | Mod: CPTII,S$GLB,, | Performed by: PHYSICIAN ASSISTANT

## 2020-03-03 NOTE — PROGRESS NOTES
"PCP: Jessica Luna MD    Subjective:     Chief Complaint: Diabetes - Established Patient    HISTORY OF PRESENT ILLNESS: 70 y.o.   female presenting for diabetes management visit.   Patient has previously been established with the Diabetes Management Department and was last seen by Katelyn Tristan NP on 05/25/17.   Patient is new to me and is here for establishment of future care .   Patient has had Type II diabetes since 20 or more years.  Pertinent to decision making is the following comorbidities: HLD, Hypothyroidism and Obesity by BMI  Patient has the following Diabetes complications: without complications  She  has attended diabetes education in the past.     Patient's most recent A1c of 10.3% was completed 1 months ago.   Patient states since Her last A1c Her blood glucose levels have been high  in the morning / fasting.   Patient monitors blood glucose 1 times per day with meter : Fasting.   Patient blood glucose monitoring device will not be uploaded into [employer] today secondary to patient forgetting device.   Per patient recall, fasting blood sugars are ranging from 110 - 234. .   Patient endorses the following diabetes related symptoms: None*.   Patient is due today for the following diabetes-related health maintenance standards: None.   She denies any recent hospital admissions or emergency room visits. Patient denies history of DKA.   She denies having hypoglycemia.   Patient's concerns today include glycemic control.   Patient medication regimen is as below.     CURRENT DM MEDICATIONS:    Lantus 25 units qhs   Trulicity 0.75 mg weekly    Patient has failed the following Diabetes medications:    Metformin - GI    Invokana    Glyburide    Victoza    Basal - Basaglar      Labs Reviewed.       Lab Results   Component Value Date    CPEPTIDE 1.94 05/25/2017     Lab Results   Component Value Date    GLUTAMICACID 0.00 05/25/2017       Height: 5' 7.5" (171.5 cm)  //  Weight: 103.6 " kg (228 lb 6.3 oz), Body mass index is 35.24 kg/m².  Her blood sugar in clinic today is:    Lab Results   Component Value Date    POCGLU 209 (A) 03/03/2020       Review of Systems   Constitutional: Negative for activity change, appetite change, chills and fever.   HENT: Negative for dental problem, mouth sores, nosebleeds, sore throat and trouble swallowing.    Eyes: Negative for pain and discharge.   Respiratory: Negative for shortness of breath, wheezing and stridor.    Cardiovascular: Negative for chest pain, palpitations and leg swelling.   Gastrointestinal: Negative for abdominal pain, diarrhea, nausea and vomiting.   Endocrine: Negative for polydipsia, polyphagia and polyuria.   Genitourinary: Negative for dysuria, frequency and urgency.   Musculoskeletal: Negative for joint swelling and myalgias.   Skin: Negative for rash and wound.   Neurological: Negative for dizziness, syncope, weakness and headaches.   Psychiatric/Behavioral: Negative for behavioral problems and dysphoric mood.         Diabetes Management Status  Statin: Taking  ACE/ARB: Taking    Screening or Prevention Patient's value Goal Complete/Controlled?   HgA1C Testing and Control   Lab Results   Component Value Date    HGBA1C 10.3 (H) 01/29/2020      Annually/Less than 8% No   Lipid profile : 06/25/2019 Annually Yes   LDL control Lab Results   Component Value Date    LDLCALC 60.4 (L) 06/25/2019    Annually/Less than 100 mg/dl  Yes   Nephropathy screening Lab Results   Component Value Date    MICALBCREAT 6.2 06/25/2019     Lab Results   Component Value Date    PROTEINUA Negative 02/03/2020    Annually Yes   Blood pressure BP Readings from Last 1 Encounters:   03/03/20 122/83    Less than 140/90 Yes   Dilated retinal exam : 01/27/2020 Annually Yes    Foot exam   : 07/02/2019 Annually Yes     ACTIVITY LEVEL: Moderately Active. Discussed activities, benefits, methods, and precautions.  MEAL PLANNING: Patient reports number of meals per day to be 3  and number of snacks per day to be 2.   Patient is encouraged to carb count and consume no more than 30 - 45 grams of carbohydrates in each meal and 15 grams of carbohydrates in each snack.     Social History     Socioeconomic History    Marital status:      Spouse name: Not on file    Number of children: Not on file    Years of education: Not on file    Highest education level: Not on file   Occupational History    Not on file   Social Needs    Financial resource strain: Not hard at all    Food insecurity:     Worry: Never true     Inability: Never true    Transportation needs:     Medical: No     Non-medical: No   Tobacco Use    Smoking status: Never Smoker    Smokeless tobacco: Never Used   Substance and Sexual Activity    Alcohol use: Yes     Frequency: 2-4 times a month     Drinks per session: 1 or 2     Binge frequency: Never     Comment: rare    Drug use: No    Sexual activity: Yes     Partners: Male   Lifestyle    Physical activity:     Days per week: 1 day     Minutes per session: 40 min    Stress: Only a little   Relationships    Social connections:     Talks on phone: Once a week     Gets together: More than three times a week     Attends [employer] service: Not on file     Active member of club or organization: Yes     Attends meetings of clubs or organizations: More than 4 times per year     Relationship status:    Other Topics Concern    Are you pregnant or think you may be? Not Asked    Breast-feeding Not Asked   Social History Narrative    . Lives with spouse. Has 3 children. Patient retired as  for [employer].      Past Medical History:   Diagnosis Date    Diabetes mellitus type II     Hyperlipidemia     Hypertension     Hypothyroid     TALIA (obstructive sleep apnea)     on CPAP    Osteopenia     Psoriasis        Objective:        Physical Exam   Constitutional: She is oriented to person, place, and time. She appears well-developed and  well-nourished. No distress.   HENT:   Head: Normocephalic and atraumatic.   Right Ear: External ear normal.   Left Ear: External ear normal.   Nose: Nose normal.   Eyes: Pupils are equal, round, and reactive to light. EOM are normal. Right eye exhibits no discharge. Left eye exhibits no discharge.   Neck: Normal range of motion. Neck supple.   Cardiovascular: Normal rate, regular rhythm, normal heart sounds and intact distal pulses.   Pulmonary/Chest: Effort normal and breath sounds normal.   Abdominal: Soft.   Musculoskeletal: Normal range of motion.   Neurological: She is alert and oriented to person, place, and time. She exhibits normal muscle tone. Coordination normal.   Skin: Skin is warm and dry. Capillary refill takes less than 2 seconds. She is not diaphoretic.   Psychiatric: She has a normal mood and affect. Her behavior is normal. Judgment and thought content normal.         Assessment / Plan:     Type 2 diabetes mellitus with hyperglycemia, with long-term current use of insulin  -     POCT Glucose, Hand-Held Device  -     Hemoglobin A1c; Standing  -     dulaglutide (TRULICITY) 1.5 mg/0.5 mL PnIj; Inject 1.5 mg into the skin every 7 days.  Dispense: 4 Syringe; Refill: 11  -     Ambulatory referral/consult to Diabetes Education; Future; Expected date: 03/10/2020    Hyperlipidemia associated with type 2 diabetes mellitus    Acquired hypothyroidism    Obesity (BMI 30-39.9)      Additional Plan Details:    - POCT Glucose  - Encouraged continuation of lifestyle changes including regular exercise and limiting carbohydrates to 30-45 grams per meal threes times daily and 15 grams per snack with a limit of two daily.   - Patient is new to me today and will establish care with me for future visits.  - Encouraged continued monitoring of blood glucose with an increase to 3 times daily at Fasting, Before Dinner and Before Bed.   - Current DM Medication Regimen: Change Trulicity to 1.5 mg weekly. Change Lantus to 22  units qhs since increasing Trulicity and patient has concerns about hypoglycemia with medication change. If not improving, add Jardiance 10 mg.   - Referral to Diabetic Education - reestablish care   - Health Maintenance standards addressed today: None  - Nursing Visit: Patient is age 79 or younger with an A1c of 7.5 or greater and will need nursing visit by Phone in 2 weeks for BG log review and non-insulin adjustment since patient unable or unwilling to come in for physical visit.   - Follow up in 8 weeks with A1c prior.       Blakeney McKnight, PA-C [facility]    A total of 30 minutes was spent in face to face time, of which over 50 % was spent in counseling patient on disease process, complications, treatment, and side effects of medications.

## 2020-03-09 ENCOUNTER — CLINICAL SUPPORT (OUTPATIENT)
Dept: DIABETES | Facility: CLINIC | Age: 70
End: 2020-03-09
Payer: MEDICARE

## 2020-03-09 VITALS — BODY MASS INDEX: 34.65 KG/M2 | WEIGHT: 228.63 LBS | HEIGHT: 68 IN

## 2020-03-09 DIAGNOSIS — Z79.4 TYPE 2 DIABETES MELLITUS WITH HYPERGLYCEMIA, WITH LONG-TERM CURRENT USE OF INSULIN: ICD-10-CM

## 2020-03-09 DIAGNOSIS — E11.65 TYPE 2 DIABETES MELLITUS WITH HYPERGLYCEMIA, WITH LONG-TERM CURRENT USE OF INSULIN: ICD-10-CM

## 2020-03-09 PROCEDURE — 99999 PR PBB SHADOW E&M-EST. PATIENT-LVL III: ICD-10-PCS | Mod: PBBFAC,,, | Performed by: DIETITIAN, REGISTERED

## 2020-03-09 PROCEDURE — G0108 PR DIAB MANAGE TRN  PER INDIV: ICD-10-PCS | Mod: S$GLB,,, | Performed by: DIETITIAN, REGISTERED

## 2020-03-09 PROCEDURE — G0108 DIAB MANAGE TRN  PER INDIV: HCPCS | Mod: S$GLB,,, | Performed by: DIETITIAN, REGISTERED

## 2020-03-09 PROCEDURE — 99999 PR PBB SHADOW E&M-EST. PATIENT-LVL III: CPT | Mod: PBBFAC,,, | Performed by: DIETITIAN, REGISTERED

## 2020-03-09 RX ORDER — LISINOPRIL 2.5 MG/1
TABLET ORAL
Qty: 90 TABLET | Refills: 3 | Status: SHIPPED | OUTPATIENT
Start: 2020-03-09 | End: 2021-02-26

## 2020-03-09 NOTE — PROGRESS NOTES
Diabetes Education  Author: Kelin Rolle RD, CDE  Date: 3/9/2020    Diabetes Care Management Summary  Diabetes Education Record Assessment/Progress: Initial  Current Diabetes Risk Level: High     Last A1c:   Lab Results   Component Value Date    HGBA1C 10.3 (H) 01/29/2020     Last Visit with Diabetes Educator: none  Last OPCM Referral: : Not Found   Enrolled in OPCM: No    Diabetes Type  Diabetes Type : Type II    Diabetes History  Diabetes Diagnosis: >10 years(~20yrs)  Current Treatment: Diet, Exercise, Insulin, Injectable(trulicity 1.5mg wkly, lantus 22 units daily)  Reviewed Problem List with Patient: Yes     Results for NORMA HILLS (MRN [id number]) as of 3/9/2020 09:59   Ref. Range 5/25/2017 09:34   C-Peptide Latest Ref Range: 0.78 - 5.19 ng/mL 1.94     Health Maintenance was reviewed today with patient. Discussed with patient importance of routine eye exams, foot exams/foot care, blood work (i.e.: A1c, microalbumin, and lipid), dental visits, yearly flu vaccine, and pneumonia vaccine as indicated by PCP. Patient verbalized understanding.     Health Maintenance Topics with due status: Not Due       Topic Last Completion Date    DEXA SCAN 06/15/2016    Colonoscopy 05/02/2017    Mammogram 06/28/2018    TETANUS VACCINE 10/02/2018    Lipid Panel 06/25/2019    Foot Exam 07/02/2019    Eye Exam 01/27/2020    Hemoglobin A1c 01/29/2020    Low Dose Statin 03/03/2020     There are no preventive care reminders to display for this patient.    Nutrition  Meal Planning: (~2000 cals/d. Excess carb from portions, refined starches, snacks. Excess sat fat from snacks. Inadequate nonstarchy veg. )  Meal Plan 24 Hour Recall - Breakfast: cheerios, life or special k cereal 1 cup, whole or 2% milk, sometimes fruit OR eggs (2d/wk), mcmuffin or toast (white) - water or whole/2% milk   Meal Plan 24 Hour Recall - Lunch: pizza (homemade, cheese, occs italian sausage) OR sandwich (white bread, ham), chips, sometimes fruit OR  leftovers  Meal Plan 24 Hour Recall - Dinner: broiled shrimp, pretzels OR chix/meatballs w/ pasta or rice, beans or carrrot  Meal Plan 24 Hour Recall - Snack: ice cream OR cookies OR fruit chews OR berries/grapes; santiago: water, milk     Monitoring   Self Monitoring : Per records, fst BG , 142, 190; bedtime 204-395.  In the last month, how often have you had a low blood sugar reaction?: never    Exercise   Exercise Type: (6-7k steps daily)    Current Diabetes Treatment   Current Treatment: Diet, Exercise, Insulin, Injectable(trulicity 1.5mg wkly, lantus 22 units daily)    Social History  Preferred Learning Method: Face to Face  Primary Support: Self  Smoking Status: Never a Smoker  Alcohol Use: Rarely     DDS-2 Score  ( > 3 = SIGNIFICANT DISTRESS): 1.5       Barriers to Change  Barriers to Change: None  Learning Challenges : None    Readiness to Learn   Readiness to Learn : Eager    Cultural Influences  Cultural Influences: No    Diabetes Education Assessment/Progress  Diabetes Disease Process (diabetes disease process and treatment options): Discussion, Individual Session, Demonstrates Understanding/Competency(verbalizes/demonstrates), Written Materials Provided  Nutrition (Incorporating nutritional management into one's lifestyle): Discussion, Individual Session, Demonstrates Understanding/Competency (verbalizes/demonstrates), Written Materials Provided  Physical Activity (incorporating physical activity into one's lifestyle): Discussion, Individual Session, Demonstrates Understanding/Competency (verbalizes/demonstrates), Written Materials Provided  Medications (states correct name, dose, onset, peak, duration, side effects & timing of meds): Discussion, Individual Session, Demonstrates Understanding/Competency(verbalizes/demonstrates), Written Materials Provided  Monitoring (monitoring blood glucose/other parameters & using results): Discussion, Individual Session, Demonstrates Understanding/Competency  (verbalizes/demonstrates), Written Materials Provided  Acute Complications (preventing, detecting, and treating acute complications): Discussion, Individual Session, Demonstrates Understanding/Competency (verbalizes/demonstrates), Written Materials Provided  Chronic Complications (preventing, detecting, and treating chronic complications): Discussion, Individual Session, Demonstrates Understanding/Competency (verbalizes/demonstrates), Written Materials Provided  Clinical (diabetes, other pertinent medical history, and relevant comorbidities reviewed during visit): Discussion, Individual Session, Demonstrates Understanding/Competency (verbalizes/demonstrates)  Cognitive (knowledge of self-management skills, functional health literacy): Discussion, Individual Session, Demonstrates Understanding/Competency (verbalizes/demonstrates)  Psychosocial (emotional response to diabetes): Discussion, Individual Session, Demonstrates Understanding/Competency (verbalizes/demonstrates)  Diabetes Distress and Support Systems: Discussion, Individual Session, Demonstrates Understanding/Competency (verbalizes/demonstrates)  Behavioral (readiness for change, lifestyle practices, self-care behaviors): Discussion, Individual Session, Demonstrates Understanding/Competency (verbalizes/demonstrates)    Goals  Patient has selected/evaluated goals during today's session: Yes, selected  Healthy Eating: Set(use meal plan - carb portions/spacing, snacks (swerve/stevia for baking; weight watchers ice cream bar))  Start Date: 03/09/20  Target Date: 05/11/20  Physical Activity: Set(maintain walking prog (7-8+k/d))  Start Date: 03/09/20  Target Date: 05/11/20  Monitoring: Set(test BG 2x/d -fst, acd; bring records/meter to clinic)  Start Date: 03/09/20  Target Date: 05/11/20    Diabetes Care Plan/Intervention  Education Plan/Intervention: Individual [facility] Pt will work on carb targets, improve snacks  to see if daytime BG levels can be improved.   DMST 2mos, prn or as referred.    Diabetes Meal Plan  Restrictions: Low Fat, Low Sodium  Calories: 1500  Carbohydrate Per Meal: 30-45g  Carbohydrate Per Snack : 7-15g    Today's Self-Management Care Plan was developed with the patient's input and is based on barriers identified during today's assessment.    The long and short-term goals in the care plan were written with the patient/caregiver's input. The patient has agreed to work toward these goals to improve her overall diabetes control.      The patient received a copy of today's self-management plan and verbalized understanding of the care plan, goals, and all of today's instructions.      The patient was encouraged to communicate with her physician and care team regarding her condition(s) and treatment.  I provided the patient with my contact information today and encouraged her to contact me via phone or patient portal as needed.     Education Units of Time   Time Spent: 60 min

## 2020-03-17 ENCOUNTER — PATIENT MESSAGE (OUTPATIENT)
Dept: DIABETES | Facility: CLINIC | Age: 70
End: 2020-03-17

## 2020-04-02 ENCOUNTER — PATIENT MESSAGE (OUTPATIENT)
Dept: DIABETES | Facility: CLINIC | Age: 70
End: 2020-04-02

## 2020-04-02 DIAGNOSIS — Z79.4 TYPE 2 DIABETES MELLITUS WITH HYPERGLYCEMIA, WITH LONG-TERM CURRENT USE OF INSULIN: ICD-10-CM

## 2020-04-02 DIAGNOSIS — E11.65 TYPE 2 DIABETES MELLITUS WITH HYPERGLYCEMIA, WITH LONG-TERM CURRENT USE OF INSULIN: ICD-10-CM

## 2020-04-06 ENCOUNTER — PATIENT MESSAGE (OUTPATIENT)
Dept: DIABETES | Facility: CLINIC | Age: 70
End: 2020-04-06

## 2020-04-06 DIAGNOSIS — E11.65 TYPE 2 DIABETES MELLITUS WITH HYPERGLYCEMIA, WITH LONG-TERM CURRENT USE OF INSULIN: ICD-10-CM

## 2020-04-06 DIAGNOSIS — Z79.4 TYPE 2 DIABETES MELLITUS WITH HYPERGLYCEMIA, WITH LONG-TERM CURRENT USE OF INSULIN: ICD-10-CM

## 2020-04-07 ENCOUNTER — PATIENT MESSAGE (OUTPATIENT)
Dept: DIABETES | Facility: CLINIC | Age: 70
End: 2020-04-07

## 2020-04-22 ENCOUNTER — PATIENT MESSAGE (OUTPATIENT)
Dept: DIABETES | Facility: CLINIC | Age: 70
End: 2020-04-22

## 2020-04-22 ENCOUNTER — OFFICE VISIT (OUTPATIENT)
Dept: DIABETES | Facility: CLINIC | Age: 70
End: 2020-04-22
Payer: MEDICARE

## 2020-04-22 DIAGNOSIS — E11.69 HYPERLIPIDEMIA ASSOCIATED WITH TYPE 2 DIABETES MELLITUS: ICD-10-CM

## 2020-04-22 DIAGNOSIS — Z79.4 TYPE 2 DIABETES MELLITUS WITH HYPERGLYCEMIA, WITH LONG-TERM CURRENT USE OF INSULIN: Primary | ICD-10-CM

## 2020-04-22 DIAGNOSIS — E78.5 HYPERLIPIDEMIA ASSOCIATED WITH TYPE 2 DIABETES MELLITUS: ICD-10-CM

## 2020-04-22 DIAGNOSIS — E03.9 ACQUIRED HYPOTHYROIDISM: ICD-10-CM

## 2020-04-22 DIAGNOSIS — E66.9 OBESITY (BMI 30-39.9): ICD-10-CM

## 2020-04-22 DIAGNOSIS — E11.65 TYPE 2 DIABETES MELLITUS WITH HYPERGLYCEMIA, WITH LONG-TERM CURRENT USE OF INSULIN: Primary | ICD-10-CM

## 2020-04-22 PROCEDURE — 3046F HEMOGLOBIN A1C LEVEL >9.0%: CPT | Mod: CPTII,95,, | Performed by: PHYSICIAN ASSISTANT

## 2020-04-22 PROCEDURE — 3046F PR MOST RECENT HEMOGLOBIN A1C LEVEL > 9.0%: ICD-10-PCS | Mod: CPTII,95,, | Performed by: PHYSICIAN ASSISTANT

## 2020-04-22 PROCEDURE — 1101F PR PT FALLS ASSESS DOC 0-1 FALLS W/OUT INJ PAST YR: ICD-10-PCS | Mod: CPTII,95,, | Performed by: PHYSICIAN ASSISTANT

## 2020-04-22 PROCEDURE — 1159F MED LIST DOCD IN RCRD: CPT | Mod: 95,,, | Performed by: PHYSICIAN ASSISTANT

## 2020-04-22 PROCEDURE — 1159F PR MEDICATION LIST DOCUMENTED IN MEDICAL RECORD: ICD-10-PCS | Mod: 95,,, | Performed by: PHYSICIAN ASSISTANT

## 2020-04-22 PROCEDURE — 99214 OFFICE O/P EST MOD 30 MIN: CPT | Mod: 95,,, | Performed by: PHYSICIAN ASSISTANT

## 2020-04-22 PROCEDURE — 1101F PT FALLS ASSESS-DOCD LE1/YR: CPT | Mod: CPTII,95,, | Performed by: PHYSICIAN ASSISTANT

## 2020-04-22 PROCEDURE — 99214 PR OFFICE/OUTPT VISIT, EST, LEVL IV, 30-39 MIN: ICD-10-PCS | Mod: 95,,, | Performed by: PHYSICIAN ASSISTANT

## 2020-04-22 RX ORDER — GLIPIZIDE 5 MG/1
5 TABLET ORAL
Qty: 90 TABLET | Refills: 3 | Status: SHIPPED | OUTPATIENT
Start: 2020-04-22 | End: 2020-05-26 | Stop reason: SDUPTHER

## 2020-04-22 NOTE — PROGRESS NOTES
PCP: Jessica Luna MD    Subjective:     Chief Complaint: Diabetes - Established Patient    TELEMEDICINE VISIT:     The patient location is: Home  The chief complaint leading to consultation is: Diabetes Follow up  Visit type: Virtual visit with synchronous audio and video  Total time spent with patient: 30  Each patient to whom he or she provides medical services by telemedicine is:  (1) informed of the relationship between the physician and patient and the respective role of any other health care provider with respect to management of the patient; and (2) notified that he or she may decline to receive medical services by telemedicine and may withdraw from such care at any time.    Notes: N/A      HISTORY OF PRESENT ILLNESS: 70 y.o.   female presenting for diabetes management visit.   Patient has previously been established with the Diabetes Management Department and was last seen by myself on 03/03/20.   Patient has had Type II diabetes since 20 or more years.  Pertinent to decision making is the following comorbidities: HLD, Hypothyroidism and Obesity by BMI  Patient has the following Diabetes complications: without complications  She  has attended diabetes education in the past.     Patient's most recent A1c of 10.3% was completed 3 months ago.   Patient states since Her last A1c Her blood glucose levels have been high  in the evening.   Patient monitors blood glucose 2 times per day with meter : Fasting and Before Bed.   Patient blood glucose monitoring device will not be uploaded into Media Section today secondary to Telemedicine visit. However, patient has submitted logs to be reviewed in Media section.   Per log review, fasting blood sugars are ranging from 91 - 184 and before bed blood sugars ranging from 211 - 414 .   Patient endorses the following diabetes related symptoms: None.   Patient is due today for the following diabetes-related health maintenance standards: None.   She denies any  recent hospital admissions or emergency room visits. Patient denies history of DKA.   She denies having hypoglycemia.   Patient's concerns today include glycemic control.   Patient medication regimen is as below.     CURRENT DM MEDICATIONS:    Lantus 25 units qhs   Trulicity 1.5 mg weekly    Patient has failed the following Diabetes medications:    Metformin - GI    Invokana    Glyburide    Victoza    Basal - Basaglar      Labs Reviewed.       Lab Results   Component Value Date    CPEPTIDE 1.94 05/25/2017     Lab Results   Component Value Date    GLUTAMICACID 0.00 05/25/2017          //   , There is no height or weight on file to calculate BMI.  Her blood sugar in clinic today is:    Lab Results   Component Value Date    POCGLU 209 (A) 03/03/2020       Review of Systems   Constitutional: Negative for activity change, appetite change, chills and fever.   HENT: Negative for dental problem, mouth sores, nosebleeds, sore throat and trouble swallowing.    Eyes: Negative for pain and discharge.   Respiratory: Negative for shortness of breath, wheezing and stridor.    Cardiovascular: Negative for chest pain, palpitations and leg swelling.   Gastrointestinal: Negative for abdominal pain, diarrhea, nausea and vomiting.   Endocrine: Negative for polydipsia, polyphagia and polyuria.   Genitourinary: Negative for dysuria, frequency and urgency.   Musculoskeletal: Negative for joint swelling and myalgias.   Skin: Negative for rash and wound.   Neurological: Negative for dizziness, syncope, weakness and headaches.   Psychiatric/Behavioral: Negative for behavioral problems and dysphoric mood.         Diabetes Management Status  Statin: Taking  ACE/ARB: Taking    Screening or Prevention Patient's value Goal Complete/Controlled?   HgA1C Testing and Control   Lab Results   Component Value Date    HGBA1C 10.3 (H) 01/29/2020      Annually/Less than 8% No   Lipid profile : 06/25/2019 Annually Yes   LDL control Lab Results    Component Value Date    LDLCALC 60.4 (L) 06/25/2019    Annually/Less than 100 mg/dl  Yes   Nephropathy screening Lab Results   Component Value Date    MICALBCREAT 6.2 06/25/2019     Lab Results   Component Value Date    PROTEINUA Negative 02/03/2020    Annually Yes   Blood pressure BP Readings from Last 1 Encounters:   03/03/20 122/83    Less than 140/90 Yes   Dilated retinal exam : 01/27/2020 Annually Yes    Foot exam   : 07/02/2019 Annually Yes     ACTIVITY LEVEL: Moderately Active. Discussed activities, benefits, methods, and precautions.  MEAL PLANNING: Patient reports number of meals per day to be 3 and number of snacks per day to be 2.   Patient is encouraged to carb count and consume no more than 30 - 45 grams of carbohydrates in each meal and 15 grams of carbohydrates in each snack.     Social History     Socioeconomic History    Marital status:      Spouse name: Not on file    Number of children: Not on file    Years of education: Not on file    Highest education level: Not on file   Occupational History    Not on file   Social Needs    Financial resource strain: Not hard at all    Food insecurity:     Worry: Never true     Inability: Never true    Transportation needs:     Medical: No     Non-medical: No   Tobacco Use    Smoking status: Never Smoker    Smokeless tobacco: Never Used   Substance and Sexual Activity    Alcohol use: Yes     Frequency: 2-4 times a month     Drinks per session: 1 or 2     Binge frequency: Never     Comment: rare    Drug use: No    Sexual activity: Yes     Partners: Male   Lifestyle    Physical activity:     Days per week: 1 day     Minutes per session: 40 min    Stress: Only a little   Relationships    Social connections:     Talks on phone: Once a week     Gets together: More than three times a week     Attends [employer] witness service: Not on file     Active member of club or organization: Yes     Attends meetings of clubs or organizations: More than 4 times  per year     Relationship status:    Other Topics Concern    Are you pregnant or think you may be? Not Asked    Breast-feeding Not Asked   Social History Narrative    . Lives with spouse. Has 3 children. Patient retired as  for [employer].      Past Medical History:   Diagnosis Date    Diabetes mellitus type II     Hyperlipidemia     Hypertension     Hypothyroid     TALIA (obstructive sleep apnea)     on CPAP    Osteopenia     Psoriasis        Objective:        Physical Exam   Constitutional: She is oriented to person, place, and time. She appears well-developed and well-nourished. No distress.   HENT:   Head: Normocephalic and atraumatic.   Right Ear: External ear normal.   Left Ear: External ear normal.   Nose: Nose normal.   Eyes: EOM are normal. Right eye exhibits no discharge. Left eye exhibits no discharge.   Neck: Normal range of motion.   Pulmonary/Chest: Effort normal. No stridor. No respiratory distress.   Musculoskeletal: Normal range of motion.   Neurological: She is alert and oriented to person, place, and time. She exhibits normal muscle tone. Coordination normal.   Skin: She is not diaphoretic. No pallor.   Psychiatric: She has a normal mood and affect. Her behavior is normal. Judgment and thought content normal.       Physical Exam limited secondary to Telemedicine visit    Assessment / Plan:     Type 2 diabetes mellitus with hyperglycemia, with long-term current use of insulin  -     Ambulatory referral/consult to Pharmacy Assistance; Future; Expected date: 04/29/2020  -     glipiZIDE (GLUCOTROL) 5 MG tablet; Take 1 tablet (5 mg total) by mouth before dinner.  Dispense: 90 tablet; Refill: 3  -     CBC auto differential; Future; Expected date: 04/22/2020  -     Comprehensive metabolic panel; Future; Expected date: 04/22/2020  -     Lipid panel; Future; Expected date: 04/22/2020  -     Microalbumin/creatinine urine ratio; Future; Expected date:  04/22/2020    Hyperlipidemia associated with type 2 diabetes mellitus    Acquired hypothyroidism    Obesity (BMI 30-39.9)      Additional Plan Details:    - POCT Glucose  - Encouraged continuation of lifestyle changes including regular exercise and limiting carbohydrates to 30-45 grams per meal threes times daily and 15 grams per snack with a limit of two daily.   - Encouraged continued monitoring of blood glucose with an increase to 3 times daily at Fasting, Before Dinner and Before Bed.   - Current DM Medication Regimen: Add Glipizide 5 mg before dinner. Continue Trulicity to 1.5 mg weekly. Continue Lantus to 25 units qhs.  If not improving, add Jardiance 10 mg.   - [facility]  - Health Maintenance standards addressed today: None  - Nursing Visit: Patient is age 79 or younger with an A1c of 7.5 or greater and will need nursing visit by Phone in 2 weeks for BG log review and non-insulin adjustment since patient unable or unwilling to come in for physical visit.   - Follow up in 3 months with A1c prior.       Blakeney McKnight, PA-C [facility]    A total of 30 minutes was spent in face to face time, of which over 50 % was spent in counseling patient on disease process, complications, treatment, and side effects of medications.

## 2020-04-28 ENCOUNTER — PATIENT MESSAGE (OUTPATIENT)
Dept: DIABETES | Facility: CLINIC | Age: 70
End: 2020-04-28

## 2020-05-08 ENCOUNTER — PATIENT MESSAGE (OUTPATIENT)
Dept: DIABETES | Facility: CLINIC | Age: 70
End: 2020-05-08

## 2020-05-08 ENCOUNTER — TELEPHONE (OUTPATIENT)
Dept: DIABETES | Facility: CLINIC | Age: 70
End: 2020-05-08

## 2020-05-08 NOTE — TELEPHONE ENCOUNTER
Patient stated she would send her log on the portal.            *called patient to remind her to upload her documents to her [employer] portal*

## 2020-05-11 ENCOUNTER — CLINICAL SUPPORT (OUTPATIENT)
Dept: DIABETES | Facility: CLINIC | Age: 70
End: 2020-05-11
Payer: MEDICARE

## 2020-05-11 ENCOUNTER — PATIENT MESSAGE (OUTPATIENT)
Dept: DIABETES | Facility: CLINIC | Age: 70
End: 2020-05-11

## 2020-05-11 DIAGNOSIS — E11.65 TYPE 2 DIABETES MELLITUS WITH HYPERGLYCEMIA, WITH LONG-TERM CURRENT USE OF INSULIN: Primary | ICD-10-CM

## 2020-05-11 DIAGNOSIS — Z79.4 TYPE 2 DIABETES MELLITUS WITH HYPERGLYCEMIA, WITH LONG-TERM CURRENT USE OF INSULIN: Primary | ICD-10-CM

## 2020-05-11 PROCEDURE — 99999 PR PBB SHADOW E&M-EST. PATIENT-LVL III: ICD-10-PCS | Mod: PBBFAC,,, | Performed by: DIETITIAN, REGISTERED

## 2020-05-11 PROCEDURE — G0108 DIAB MANAGE TRN  PER INDIV: HCPCS | Mod: S$GLB,,, | Performed by: DIETITIAN, REGISTERED

## 2020-05-11 PROCEDURE — G0108 PR DIAB MANAGE TRN  PER INDIV: ICD-10-PCS | Mod: S$GLB,,, | Performed by: DIETITIAN, REGISTERED

## 2020-05-11 PROCEDURE — 99999 PR PBB SHADOW E&M-EST. PATIENT-LVL III: CPT | Mod: PBBFAC,,, | Performed by: DIETITIAN, REGISTERED

## 2020-05-11 NOTE — LETTER
May 11, 2020        Lorraine Holliday PA-C  [address]  [place] [address]             [facility]  [address]  [place] [address]  Phone: [phone]  Fax: [phone]   Patient: Jaimie Luque   MR Number: [id number]   YOB: 1950   Date of Visit: 5/11/2020       Dear Dr. Holliday:    Thank you for referring Jaimie Luque to me for evaluation. Below are the relevant portions of my assessment and plan of care.     If you have questions, please do not hesitate to call me. I look forward to following Jaimie along with you.    Sincerely,      Kelin Rolle RD, CDE           CC  Jessica Ward MD

## 2020-05-11 NOTE — PROGRESS NOTES
Diabetes Care Specialist Telehealth Visit Note     It was in the patient's best interest to receive diabetes self-management education and support services in this format to prevent the exposure to COVID-19.        Established Patient - Audio Only Telehealth Visit  The patient location is: home   The chief complaint leading to consultation is: Diabetes    Visit type: Virtual visit with audio only (telephone)  Total time spent with patient: 60 min      The reason for the audio only service rather than synchronous audio and video virtual visit was related to technical difficulties or patient preference/necessity.     Each patient to whom I provide medical services by telemedicine is:  (1) informed of the relationship between the physician and patient and the respective role of any other health care provider with respect to management of the patient; and (2) notified that they may decline to receive medical services by telemedicine and may withdraw from such care at any time. Patient verbally consented to receive this service via voice-only telephone call.      Diabetes Education  Author: Kelin Rolle RD, CDE  Date: 5/11/2020    Diabetes Care Management Summary  Diabetes Education Record Assessment/Progress: Comprehensive/Group  Current Diabetes Risk Level: High     Last A1c:   Lab Results   Component Value Date    HGBA1C 10.3 (H) 01/29/2020     Last Visit with Diabetes Educator:  3/9/20 assessment visit   Last OPCM Referral:  Not Found   Enrolled in OPCM: No      Diabetes Type  Diabetes Type : Type II    Diabetes History  Diabetes Diagnosis: >10 years(>20 years)  Current Treatment: Diet, Exercise, Oral Medication, Insulin, Injectable(glipizide 5mg 1tab supper daily; trulicity 1.5mg wkly, lantus 25 units daily)  Reviewed Problem List with Patient: Yes    Health Maintenance was reviewed today with patient. Discussed with patient importance of routine eye exams, foot exams/foot care, blood work (i.e.: A1c,  microalbumin, and lipid), dental visits, yearly flu vaccine, and pneumonia vaccine as indicated by PCP. Patient verbalized understanding.     Health Maintenance Topics with due status: Not Due       Topic Last Completion Date    DEXA SCAN 06/15/2016    Colonoscopy 05/02/2017    TETANUS VACCINE 10/02/2018    Lipid Panel 06/25/2019    Eye Exam 01/27/2020    Hemoglobin A1c 01/29/2020    Low Dose Statin 03/03/2020     Health Maintenance Due   Topic Date Due    Mammogram  06/28/2020    Foot Exam  07/02/2020       Nutrition  Meal Planning: skipping meals(~8581-7164 cals/d. Meals ~2/d, 1 hs snack; missing lunch. Excess carb from dessert type snacks; pt working to improve starch portions and whole grains by adding oats to smoothie. Excess sat fat from snacks. Inadequate nonstarchy veg.)  Meal Plan 24 Hour Recall - Breakfast: cheerios, life or special k cereal 1 cup, whole or 2% milk, sometimes fruit OR smoothie (milk, oats, pecans, fruit) - water or whole/2% milk   Meal Plan 24 Hour Recall - Lunch: sandwich (white bread, ham), chips, sometimes fruit OR cheese, crackers OR none   Meal Plan 24 Hour Recall - Dinner: cooked meals (portions control)  Meal Plan 24 Hour Recall - Snack: cinnamon rolls or dessert bake; santiago: water, milk     Monitoring   Self Monitoring : Per records, fst BG , 126, 128; bedtime 148, 184-359, 458.  In the last month, how often have you had a low blood sugar reaction?: never   Excursions from hs dessert type snack.     Exercise   Exercise Type: (6-7k steps daily)    Current Diabetes Treatment   Current Treatment: Diet, Exercise, Oral Medication, Insulin, Injectable(glipizide 5mg 1tab supper daily; trulicity 1.5mg wkly, lantus 25 units daily)    Social History  Preferred Learning Method: Face to Face  Primary Support: Self  Smoking Status: Never a Smoker  Alcohol Use: Rarely     Barriers to Change  Barriers to Change: None  Learning Challenges : None    Readiness to Learn   Readiness to Learn :  Eager    Cultural Influences  Cultural Influences: No    Diabetes Education Assessment/Progress  Diabetes Disease Process (diabetes disease process and treatment options): Comprehends Key Points  Nutrition (Incorporating nutritional management into one's lifestyle): Discussion, Individual Session, Demonstrates Understanding/Competency (verbalizes/demonstrates)  Physical Activity (incorporating physical activity into one's lifestyle): Discussion, Individual Session, Demonstrates Understanding/Competency (verbalizes/demonstrates)  Medications (states correct name, dose, onset, peak, duration, side effects & timing of meds): Discussion, Individual Session, Demonstrates Understanding/Competency(verbalizes/demonstrates)  Monitoring (monitoring blood glucose/other parameters & using results): Discussion, Individual Session, Demonstrates Understanding/Competency (verbalizes/demonstrates)  Acute Complications (preventing, detecting, and treating acute complications): Discussion, Individual Session, Demonstrates Understanding/Competency (verbalizes/demonstrates)  Chronic Complications (preventing, detecting, and treating chronic complications): Comprehends Key Points  Clinical (diabetes, other pertinent medical history, and relevant comorbidities reviewed during visit): Demonstrates Understanding/Competency (verbalizes/demonstrates)  Cognitive (knowledge of self-management skills, functional health literacy): Discussion, Individual Session, Demonstrates Understanding/Competency (verbalizes/demonstrates)  Psychosocial (emotional response to diabetes): Demonstrates Understanding/Competency (verbalizes/demonstrates)  Diabetes Distress and Support Systems: Demonstrates Understanding/Competency (verbalizes/demonstrates)  Behavioral (readiness for change, lifestyle practices, self-care behaviors): Discussion, Individual Session, Demonstrates Understanding/Competency (verbalizes/demonstrates)    Goals  Patient has  selected/evaluated goals during today's session: Yes, selected  Healthy Eating: Set(use meal plan - carb portions/spacing, snacks (swerve/stevia for baking; weight watchers ice cream bar))  Met Percentage : 50%  Start Date: 05/11/20  Target Date: 06/15/20  Physical Activity: % Met(maintain walking prog (7-8+k/d))  Met Percentage : 75%  Start Date: 05/11/20  Target Date: 06/15/20  Monitoring: % Met(test BG 2x/d -fst, acd; bring records/meter to clinic)  Met Percentage : 100%  Start Date: 05/11/20  Target Date: 06/15/20    Diabetes Self-Management Support Plan  Exercise/Nutrition: websites  Review Status: Patient has selected and agrees to support plan.    Diabetes Care Plan/Intervention  Education Plan/Intervention: Individual Follow-Up DS[facility] RTC 1mos, prn or as referred. Pt wants to work on improving hs snack and will try protein shake (homemade or pre-made ~2pm and then healthier snack list for hs). Requested pt send BG logs 1 week via Group-IB. If meal plan doesn't help evening BG levels, pt will likely benefit from increase to Glipizide 5mg twice daily.      Diabetes Meal Plan  Restrictions: Low Fat, Low Sodium, Restricted Carbohydrate  Calories: 1500, 1600  Carbohydrate Per Meal: 30-45g  Carbohydrate Per Snack : 7-15g, 15-20g    Today's Self-Management Care Plan was developed with the patient's input and is based on barriers identified during today's assessment.    The long and short-term goals in the care plan were written with the patient/caregiver's input. The patient has agreed to work toward these goals to improve her overall diabetes control.      The patient received a copy of today's self-management plan and verbalized understanding of the care plan, goals, and all of today's instructions.      The patient was encouraged to communicate with her physician and care team regarding her condition(s) and treatment.  I provided the patient with my contact information  today and encouraged her to contact me via phone or patient portal as needed.     Education Units of Time   Time Spent: 60 min

## 2020-05-15 ENCOUNTER — LAB VISIT (OUTPATIENT)
Dept: LAB | Facility: HOSPITAL | Age: 70
End: 2020-05-15
Attending: PHYSICIAN ASSISTANT
Payer: MEDICARE

## 2020-05-15 DIAGNOSIS — Z79.4 TYPE 2 DIABETES MELLITUS WITH HYPERGLYCEMIA, WITH LONG-TERM CURRENT USE OF INSULIN: ICD-10-CM

## 2020-05-15 DIAGNOSIS — E11.65 TYPE 2 DIABETES MELLITUS WITH HYPERGLYCEMIA, WITH LONG-TERM CURRENT USE OF INSULIN: ICD-10-CM

## 2020-05-15 LAB
ESTIMATED AVG GLUCOSE: 220 MG/DL (ref 68–131)
HBA1C MFR BLD HPLC: 9.3 % (ref 4–5.6)

## 2020-05-15 PROCEDURE — 83036 HEMOGLOBIN GLYCOSYLATED A1C: CPT

## 2020-05-15 PROCEDURE — 36415 COLL VENOUS BLD VENIPUNCTURE: CPT

## 2020-05-15 NOTE — TELEPHONE ENCOUNTER
Nursing Visit for Provider Lorraine Holliday PA-C in Diabetes - 2020    Date of Last Visit: 2020    Medication Regimen:   · Lantus 25 units qhs  · Trulicity 1.5 mg weekly  · Glipizide 5 mg before dinner    Blood Glucose Log uploaded into Media on 20    Log Review Comments:   Dates: 20 (day started glipizide) - 20  Fastin - 128  Before Bed: 148 - 458    Recommendations:   - Change Glipizide 10 mg before dinner.   - continue Trulicity 1.5 mg weekly and Lantus 25 units qhs.   - Blood sugar logs in 2 weeks     Lorraine Holliday PA-C  Diabetes Management

## 2020-05-25 ENCOUNTER — PATIENT MESSAGE (OUTPATIENT)
Dept: DIABETES | Facility: CLINIC | Age: 70
End: 2020-05-25

## 2020-05-25 ENCOUNTER — OFFICE VISIT (OUTPATIENT)
Dept: PODIATRY | Facility: CLINIC | Age: 70
End: 2020-05-25
Payer: MEDICARE

## 2020-05-25 VITALS
BODY MASS INDEX: 34.65 KG/M2 | HEART RATE: 88 BPM | SYSTOLIC BLOOD PRESSURE: 121 MMHG | WEIGHT: 228.63 LBS | DIASTOLIC BLOOD PRESSURE: 70 MMHG | HEIGHT: 68 IN

## 2020-05-25 DIAGNOSIS — G57.92 NEURITIS OF FOOT, LEFT: ICD-10-CM

## 2020-05-25 PROCEDURE — 1125F PR PAIN SEVERITY QUANTIFIED, PAIN PRESENT: ICD-10-PCS | Mod: S$GLB,,, | Performed by: PODIATRIST

## 2020-05-25 PROCEDURE — 3046F HEMOGLOBIN A1C LEVEL >9.0%: CPT | Mod: CPTII,S$GLB,, | Performed by: PODIATRIST

## 2020-05-25 PROCEDURE — 99214 OFFICE O/P EST MOD 30 MIN: CPT | Mod: S$GLB,,, | Performed by: PODIATRIST

## 2020-05-25 PROCEDURE — 99499 UNLISTED E&M SERVICE: CPT | Mod: S$GLB,,, | Performed by: PODIATRIST

## 2020-05-25 PROCEDURE — 99999 PR PBB SHADOW E&M-EST. PATIENT-LVL III: CPT | Mod: PBBFAC,,, | Performed by: PODIATRIST

## 2020-05-25 PROCEDURE — 1159F MED LIST DOCD IN RCRD: CPT | Mod: S$GLB,,, | Performed by: PODIATRIST

## 2020-05-25 PROCEDURE — 3074F SYST BP LT 130 MM HG: CPT | Mod: CPTII,S$GLB,, | Performed by: PODIATRIST

## 2020-05-25 PROCEDURE — 1159F PR MEDICATION LIST DOCUMENTED IN MEDICAL RECORD: ICD-10-PCS | Mod: S$GLB,,, | Performed by: PODIATRIST

## 2020-05-25 PROCEDURE — 99999 PR PBB SHADOW E&M-EST. PATIENT-LVL III: ICD-10-PCS | Mod: PBBFAC,,, | Performed by: PODIATRIST

## 2020-05-25 PROCEDURE — 3078F DIAST BP <80 MM HG: CPT | Mod: CPTII,S$GLB,, | Performed by: PODIATRIST

## 2020-05-25 PROCEDURE — 99499 RISK ADDL DX/OHS AUDIT: ICD-10-PCS | Mod: S$GLB,,, | Performed by: PODIATRIST

## 2020-05-25 PROCEDURE — 1101F PT FALLS ASSESS-DOCD LE1/YR: CPT | Mod: CPTII,S$GLB,, | Performed by: PODIATRIST

## 2020-05-25 PROCEDURE — 1125F AMNT PAIN NOTED PAIN PRSNT: CPT | Mod: S$GLB,,, | Performed by: PODIATRIST

## 2020-05-25 PROCEDURE — 3074F PR MOST RECENT SYSTOLIC BLOOD PRESSURE < 130 MM HG: ICD-10-PCS | Mod: CPTII,S$GLB,, | Performed by: PODIATRIST

## 2020-05-25 PROCEDURE — 1101F PR PT FALLS ASSESS DOC 0-1 FALLS W/OUT INJ PAST YR: ICD-10-PCS | Mod: CPTII,S$GLB,, | Performed by: PODIATRIST

## 2020-05-25 PROCEDURE — 3046F PR MOST RECENT HEMOGLOBIN A1C LEVEL > 9.0%: ICD-10-PCS | Mod: CPTII,S$GLB,, | Performed by: PODIATRIST

## 2020-05-25 PROCEDURE — 99214 PR OFFICE/OUTPT VISIT, EST, LEVL IV, 30-39 MIN: ICD-10-PCS | Mod: S$GLB,,, | Performed by: PODIATRIST

## 2020-05-25 PROCEDURE — 3078F PR MOST RECENT DIASTOLIC BLOOD PRESSURE < 80 MM HG: ICD-10-PCS | Mod: CPTII,S$GLB,, | Performed by: PODIATRIST

## 2020-05-25 NOTE — PROGRESS NOTES
Subjective:     Patient ID: Jaimie Luque is a 70 y.o. female.    Chief Complaint: Foot Pain (L foot numbing , tingling and radiates around the foot. Diabetic Pt. Wears sandals.Rates pain 2/10. PCP DR Luna, last visit 3/3/20)    Jaimie is a 70 y.o. female who presents to the clinic upon referral from Dr. Rodriguez ref. provider found  for evaluation and treatment of diabetic feet. Jaimie has a past medical history of Diabetes mellitus type II, Hyperlipidemia, Hypertension, Hypothyroid, TALIA (obstructive sleep apnea), Osteopenia, and Psoriasis. Patient relates no major problem with feet. Only complaints today consist of numbness and tingling in feet. Patient states it has not been happening this week much. Patient also complains of pain in her left arch. Patient has no other pedal complaints st this time.     PCP: Jessica Luna MD    Date Last Seen by PCP: 03/03/2020    Current shoe gear: Casual shoes    Hemoglobin A1C   Date Value Ref Range Status   05/15/2020 9.3 (H) 4.0 - 5.6 % Final     Comment:     ADA Screening Guidelines:  5.7-6.4%  Consistent with prediabetes  >or=6.5%  Consistent with diabetes  High levels of fetal hemoglobin interfere with the HbA1C  assay. Heterozygous hemoglobin variants (HbS, HgC, etc)do  not significantly interfere with this assay.   However, presence of multiple variants may affect accuracy.     01/29/2020 10.3 (H) 4.0 - 5.6 % Final     Comment:     ADA Screening Guidelines:  5.7-6.4%  Consistent with prediabetes  >or=6.5%  Consistent with diabetes  High levels of fetal hemoglobin interfere with the HbA1C  assay. Heterozygous hemoglobin variants (HbS, HgC, etc)do  not significantly interfere with this assay.   However, presence of multiple variants may affect accuracy.     10/22/2019 7.8 (H) 4.0 - 5.6 % Final     Comment:     ADA Screening Guidelines:  5.7-6.4%  Consistent with prediabetes  >or=6.5%  Consistent with diabetes  High levels of fetal hemoglobin interfere with the  "HbA1C  assay. Heterozygous hemoglobin variants (HbS, HgC, etc)do  not significantly interfere with this assay.   However, presence of multiple variants may affect accuracy.                  Patient Active Problem List   Diagnosis    Acquired hypothyroidism    Hyperlipidemia associated with type 2 diabetes mellitus    Psoriasis    PHN (postherpetic neuralgia)    Uncontrolled type 2 diabetes mellitus without complication, with long-term current use of insulin    TALIA on CPAP    Multiple lung nodules on CT    Weight loss    Bleb, lung    Diarrhea       Medication List with Changes/Refills   Current Medications    ASPIRIN 81 MG TAB    Take by mouth. 1 Tablet Oral Every day    ATORVASTATIN (LIPITOR) 20 MG TABLET    TAKE 1 TABLET BY MOUTH EVERY DAY    BLOOD SUGAR DIAGNOSTIC STRP    To check BG 2 times daily, to use with AccuChek Varsha Plus  Dx:  E11.9    CHOLECALCIFEROL, VITAMIN D3, 2,000 UNIT TAB    Take by mouth. 1 Tablet Oral Every day    DULAGLUTIDE (TRULICITY) 1.5 MG/0.5 ML PNIJ    Inject 1.5 mg into the skin every 7 days.    FLUTICASONE (FLONASE) 50 MCG/ACTUATION NASAL SPRAY    2 sprays (100 mcg total) by Each Nare route once daily.    GABAPENTIN (NEURONTIN) 300 MG CAPSULE    TAKE 1 CAPSULE BY MOUTH THREE TIMES DAILY    GLIPIZIDE (GLUCOTROL) 5 MG TABLET    Take 1 tablet (5 mg total) by mouth before dinner.    INSULIN (LANTUS SOLOSTAR U-100 INSULIN) GLARGINE 100 UNITS/ML (3ML) SUBQ PEN    Inject 25 Units into the skin once daily.    LANCETS (ACCU-CHEK SOFTCLIX LANCETS) MISC    Check blood sugar twice daily.  Accuchek Multiclix drum lancets, [facility]  Dx:  E11.9    LEVOTHYROXINE (SYNTHROID) 100 MCG TABLET    TAKE 1 TABLET(100 MCG) BY MOUTH BEFORE BREAKFAST    LISINOPRIL (PRINIVIL,ZESTRIL) 2.5 MG TABLET    TAKE 1 TABLET(2.5 MG) BY MOUTH EVERY DAY    MECLIZINE (ANTIVERT) 25 MG TABLET    Take 1 tablet by mouth as needed.     PEN NEEDLE, DIABETIC (BD ULTRA-FINE LAURA PEN NEEDLE) 32 GAUGE X 5/32" NDLE    USE " THREE TIMES DAILY       Review of patient's allergies indicates:   Allergen Reactions    Adhesive tape-silicones      Other reaction(s): skin irritation to area    Penicillins        Past Surgical History:   Procedure Laterality Date    COLONOSCOPY N/A 5/2/2017    Procedure: COLONOSCOPY;  Surgeon: Noe Penn III, MD;  Location: [facility];  Service: Endoscopy;  Laterality: N/A;    HYSTERECTOMY      OOPHORECTOMY      ROBOTIC ASSISTED HYSTERECTOMY         Family History   Problem Relation Age of Onset    Heart disease Father     Diabetes Brother     Melanoma Neg Hx     Lupus Neg Hx        Social History     Socioeconomic History    Marital status:      Spouse name: Not on file    Number of children: Not on file    Years of education: Not on file    Highest education level: Not on file   Occupational History    Not on file   Social Needs    Financial resource strain: Not hard at all    Food insecurity:     Worry: Never true     Inability: Never true    Transportation needs:     Medical: No     Non-medical: No   Tobacco Use    Smoking status: Never Smoker    Smokeless tobacco: Never Used   Substance and Sexual Activity    Alcohol use: Yes     Frequency: 2-4 times a month     Drinks per session: 1 or 2     Binge frequency: Never     Comment: rare    Drug use: No    Sexual activity: Yes     Partners: Male   Lifestyle    Physical activity:     Days per week: 1 day     Minutes per session: 40 min    Stress: Only a little   Relationships    Social connections:     Talks on phone: Once a week     Gets together: More than three times a week     Attends Caoda[employer] service: Not on file     Active member of club or organization: Yes     Attends meetings of clubs or organizations: More than 4 times per year     Relationship status:    Other Topics Concern    Are you pregnant or think you may be? Not Asked    Breast-feeding Not Asked   Social History Narrative    . Lives with  "spouse. Has 3 children. Patient retired as  for [employer].        Vitals:    05/25/20 1417   BP: 121/70   Pulse: 88   Weight: 103.7 kg (228 lb 9.9 oz)   Height: 5' 7.5" (1.715 m)   PainSc:   2   PainLoc: Foot       Hemoglobin A1C   Date Value Ref Range Status   05/15/2020 9.3 (H) 4.0 - 5.6 % Final     Comment:     ADA Screening Guidelines:  5.7-6.4%  Consistent with prediabetes  >or=6.5%  Consistent with diabetes  High levels of fetal hemoglobin interfere with the HbA1C  assay. Heterozygous hemoglobin variants (HbS, HgC, etc)do  not significantly interfere with this assay.   However, presence of multiple variants may affect accuracy.     01/29/2020 10.3 (H) 4.0 - 5.6 % Final     Comment:     ADA Screening Guidelines:  5.7-6.4%  Consistent with prediabetes  >or=6.5%  Consistent with diabetes  High levels of fetal hemoglobin interfere with the HbA1C  assay. Heterozygous hemoglobin variants (HbS, HgC, etc)do  not significantly interfere with this assay.   However, presence of multiple variants may affect accuracy.     10/22/2019 7.8 (H) 4.0 - 5.6 % Final     Comment:     ADA Screening Guidelines:  5.7-6.4%  Consistent with prediabetes  >or=6.5%  Consistent with diabetes  High levels of fetal hemoglobin interfere with the HbA1C  assay. Heterozygous hemoglobin variants (HbS, HgC, etc)do  not significantly interfere with this assay.   However, presence of multiple variants may affect accuracy.         Review of Systems   Constitutional: Negative for chills and fever.   Respiratory: Negative for shortness of breath.    Cardiovascular: Negative for chest pain, palpitations, orthopnea, claudication and leg swelling.   Gastrointestinal: Negative for diarrhea, nausea and vomiting.   Musculoskeletal: Negative for joint pain.   Skin: Negative for rash.   Neurological: Positive for tingling.   Psychiatric/Behavioral: Negative.              Objective:   PHYSICAL EXAM: Apperance: Alert and orient in no " distress,well developed, and with good attention to grooming and body habits  Patient presents ambulating in casual sandals.   LOWER EXTREMITY EXAM:  VASCULAR: Dorsalis pedis pulses 2/4 bilateral and Posterior Tibial pulses 2/4 bilateral. Capillary fill time <4 seconds bilateral. No edema observed bilateral. Varicosities absent bilateral. Skin temperature of the lower extremities is warm to warm, proximal to distal. Hair growth WNL bilateral.  DERMATOLOGICAL: No skin rashes, subcutaneous nodules, lesions, or ulcers observed bilateral. Nails 1,2,3,4,5 bilateral normal length and thickness. Webspaces 1,2,3,4 bilateral clean, dry and without evidence of break in skin integrity. Minimal hyperkeratotic tissue noted to bilateral lateral 5th toes.   NEUROLOGICAL: Light touch, sharp-dull, proprioception all present and equal bilaterally.  Vibratory sensation intact at bilateral hallux. Protective sensation intact at all 10 sites as tested with a [place]-Jake 5.07 monofilament.   MUSCULOSKELETAL: Muscle strength is 5/5 for foot inverters, everters, plantarflexors, and dorsiflexors. Muscle tone is normal. No pain on palpation of bilateral feet. Decreased arch noted bilateral.     Assessment:   Uncontrolled type 2 diabetes mellitus without complication, with long-term current use of insulin    Neuritis of foot, left          Plan:   Uncontrolled type 2 diabetes mellitus without complication, with long-term current use of insulin    Neuritis of foot, left      I counseled the patient on her conditions, regarding findings of my examination, my impressions, and usual treatment plan.   Greater than 50% of this visit spent on counseling and coordination of care.  Greater than 15 minutes of a 20 minute appointment spent on education about the diabetic foot, neuropathy, and prevention of limb loss.  Shoe inspection. Diabetic Foot Education. Patient reminded of the importance of good nutrition and blood sugar control to help  prevent podiatric complications of diabetes. Patient instructed on proper foot hygeine. We discussed wearing proper shoe gear, daily foot inspections, never walking without protective shoe gear, never putting sharp instruments to feet.    The patient and I reviewed the types of shoes she should be wearing, my recommendation includes generally the best time of the day for a shoe fitting is the afternoon, shoes with a wide toe box, very good cushion, and tennis shoes with removable inner soles.The patient and I reviewed my recommendations for over-the-counter orthotic inserts.   Discussed with patient treatments for neuropathy consisting of topical or oral medication.  Patient to continue Gabapentin as prescribed by PCP.   Patient  will continue to monitor the areas daily, inspect feet, wear protective shoe gear when ambulatory, moisturizer to maintain skin integrity. Patient reminded of the importance of good nutrition and blood sugar control to help prevent podiatric complications of diabetes.  Patient to return 12 months or sooner if needed.                 Isiah Ohara DPM  [facility]

## 2020-05-26 ENCOUNTER — OFFICE VISIT (OUTPATIENT)
Dept: DERMATOLOGY | Facility: CLINIC | Age: 70
End: 2020-05-26
Payer: MEDICARE

## 2020-05-26 DIAGNOSIS — L81.9 SKIN HYPOPIGMENTATION: Primary | ICD-10-CM

## 2020-05-26 DIAGNOSIS — Z79.4 TYPE 2 DIABETES MELLITUS WITH HYPERGLYCEMIA, WITH LONG-TERM CURRENT USE OF INSULIN: ICD-10-CM

## 2020-05-26 DIAGNOSIS — E11.65 TYPE 2 DIABETES MELLITUS WITH HYPERGLYCEMIA, WITH LONG-TERM CURRENT USE OF INSULIN: ICD-10-CM

## 2020-05-26 PROCEDURE — 1159F MED LIST DOCD IN RCRD: CPT | Mod: S$GLB,,, | Performed by: DERMATOLOGY

## 2020-05-26 PROCEDURE — 99999 PR PBB SHADOW E&M-EST. PATIENT-LVL II: ICD-10-PCS | Mod: PBBFAC,,, | Performed by: DERMATOLOGY

## 2020-05-26 PROCEDURE — 1126F AMNT PAIN NOTED NONE PRSNT: CPT | Mod: S$GLB,,, | Performed by: DERMATOLOGY

## 2020-05-26 PROCEDURE — 99202 PR OFFICE/OUTPT VISIT, NEW, LEVL II, 15-29 MIN: ICD-10-PCS | Mod: S$GLB,,, | Performed by: DERMATOLOGY

## 2020-05-26 PROCEDURE — 1101F PR PT FALLS ASSESS DOC 0-1 FALLS W/OUT INJ PAST YR: ICD-10-PCS | Mod: CPTII,S$GLB,, | Performed by: DERMATOLOGY

## 2020-05-26 PROCEDURE — 99202 OFFICE O/P NEW SF 15 MIN: CPT | Mod: S$GLB,,, | Performed by: DERMATOLOGY

## 2020-05-26 PROCEDURE — 1159F PR MEDICATION LIST DOCUMENTED IN MEDICAL RECORD: ICD-10-PCS | Mod: S$GLB,,, | Performed by: DERMATOLOGY

## 2020-05-26 PROCEDURE — 99999 PR PBB SHADOW E&M-EST. PATIENT-LVL II: CPT | Mod: PBBFAC,,, | Performed by: DERMATOLOGY

## 2020-05-26 PROCEDURE — 1126F PR PAIN SEVERITY QUANTIFIED, NO PAIN PRESENT: ICD-10-PCS | Mod: S$GLB,,, | Performed by: DERMATOLOGY

## 2020-05-26 PROCEDURE — 1101F PT FALLS ASSESS-DOCD LE1/YR: CPT | Mod: CPTII,S$GLB,, | Performed by: DERMATOLOGY

## 2020-05-26 RX ORDER — GLIPIZIDE 5 MG/1
10 TABLET ORAL
Qty: 180 TABLET | Refills: 3 | Status: SHIPPED | OUTPATIENT
Start: 2020-05-26 | End: 2020-07-22 | Stop reason: SDUPTHER

## 2020-05-26 NOTE — PROGRESS NOTES
Subjective:       Patient ID:  Jaimie Luque is a 70 y.o. female who presents for   Chief Complaint   Patient presents with    Spot     History of Present Illness: The patient presents with chief complaint of spot.  Location: nose  Duration: 3 weeks  Signs/Symptoms: slight light discoloration in center of nose, denies pain/bleeding/itching; denies rubbing at area    Prior treatments: none    Wears CPAP at night over nose    Denies personal hx of skin cancer.  Brother may have had skin cancer.      Review of Systems   Skin: Positive for daily sunscreen use (daily on face) and activity-related sunscreen use. Negative for recent sunburn.   Hematologic/Lymphatic: Bruises/bleeds easily (bruises; on twice weekly ASA).        Objective:    Physical Exam   Constitutional: She appears well-developed and well-nourished. No distress.   Neurological: She is alert and oriented to person, place, and time. She is not disoriented.   Psychiatric: She has a normal mood and affect.   Skin:   Areas Examined (abnormalities noted in diagram):   Head / Face Inspection Performed              Diagram Legend     Erythematous scaling macule/papule c/w actinic keratosis       Vascular papule c/w angioma      Pigmented verrucoid papule/plaque c/w seborrheic keratosis      Yellow umbilicated papule c/w sebaceous hyperplasia      Irregularly shaped tan macule c/w lentigo     1-2 mm smooth white papules consistent with Milia      Movable subcutaneous cyst with punctum c/w epidermal inclusion cyst      Subcutaneous movable cyst c/w pilar cyst      Firm pink to brown papule c/w dermatofibroma      Pedunculated fleshy papule(s) c/w skin tag(s)      Evenly pigmented macule c/w junctional nevus     Mildly variegated pigmented, slightly irregular-bordered macule c/w mildly atypical nevus      Flesh colored to evenly pigmented papule c/w intradermal nevus       Pink pearly papule/plaque c/w basal cell carcinoma      Erythematous hyperkeratotic  cursted plaque c/w SCC      Surgical scar with no sign of skin cancer recurrence      Open and closed comedones      Inflammatory papules and pustules      Verrucoid papule consistent consistent with wart     Erythematous eczematous patches and plaques     Dystrophic onycholytic nail with subungual debris c/w onychomycosis     Umbilicated papule    Erythematous-base heme-crusted tan verrucoid plaque consistent with inflamed seborrheic keratosis     Erythematous Silvery Scaling Plaque c/w Psoriasis     See annotation      Assessment / Plan:        Skin hypopigmentation- Nasal bridge/sidewalls  - very subtle, in area of potential friction from CPAP, suspect PIPA, vs dyschromia/chronic photoaging of skin. Also considered morpheaform BCC and discussed with patient, offered biopsy vs monitoring with repeat check in 3 months.  Patient elects to monitor at home for any changes in size/shape/color/texture/sensation and will inform us if she would like to come back to have it monitored         **20 minutes spent in counseling and coordination of care**      Follow up if symptoms worsen or fail to improve.

## 2020-05-27 NOTE — PROGRESS NOTES
Patient, Jaimie Luque (MRN #[id number]), presented with a recorded BMI of 35.28 kg/m^2 and a documented comorbidity(s):  - Diabetes Mellitus Type 2  to which the severe obesity is a contributing factor. This is consistent with the definition of severe obesity (BMI 35.0-39.9) with comorbidity (ICD-10 E66.01, Z68.35). The patient's severe obesity was monitored, evaluated, addressed and/or treated. This addendum to the medical record is made on 05/26/2020.

## 2020-05-29 ENCOUNTER — PATIENT MESSAGE (OUTPATIENT)
Dept: DIABETES | Facility: CLINIC | Age: 70
End: 2020-05-29

## 2020-05-29 ENCOUNTER — CLINICAL SUPPORT (OUTPATIENT)
Dept: DIABETES | Facility: CLINIC | Age: 70
End: 2020-05-29
Payer: MEDICARE

## 2020-06-14 ENCOUNTER — PATIENT OUTREACH (OUTPATIENT)
Dept: ADMINISTRATIVE | Facility: OTHER | Age: 70
End: 2020-06-14

## 2020-06-14 DIAGNOSIS — Z12.31 ENCOUNTER FOR SCREENING MAMMOGRAM FOR MALIGNANT NEOPLASM OF BREAST: Primary | ICD-10-CM

## 2020-06-14 NOTE — PROGRESS NOTES
Patient's chart was reviewed.   Requested updates within Care Everywhere.  Immunizations reconciled.    Health Maintenance was updated.  Mammogram order placed

## 2020-06-15 ENCOUNTER — CLINICAL SUPPORT (OUTPATIENT)
Dept: DIABETES | Facility: CLINIC | Age: 70
End: 2020-06-15
Payer: MEDICARE

## 2020-06-15 DIAGNOSIS — Z79.4 TYPE 2 DIABETES MELLITUS WITH HYPERGLYCEMIA, WITH LONG-TERM CURRENT USE OF INSULIN: Primary | ICD-10-CM

## 2020-06-15 DIAGNOSIS — E11.65 TYPE 2 DIABETES MELLITUS WITH HYPERGLYCEMIA, WITH LONG-TERM CURRENT USE OF INSULIN: Primary | ICD-10-CM

## 2020-06-15 PROCEDURE — 99999 PR PBB SHADOW E&M-EST. PATIENT-LVL III: CPT | Mod: PBBFAC,,, | Performed by: DIETITIAN, REGISTERED

## 2020-06-15 PROCEDURE — G0108 PR DIAB MANAGE TRN  PER INDIV: ICD-10-PCS | Mod: S$GLB,,, | Performed by: DIETITIAN, REGISTERED

## 2020-06-15 PROCEDURE — 99999 PR PBB SHADOW E&M-EST. PATIENT-LVL III: ICD-10-PCS | Mod: PBBFAC,,, | Performed by: DIETITIAN, REGISTERED

## 2020-06-15 PROCEDURE — G0108 DIAB MANAGE TRN  PER INDIV: HCPCS | Mod: S$GLB,,, | Performed by: DIETITIAN, REGISTERED

## 2020-06-15 NOTE — PROGRESS NOTES
Diabetes Education  Author: Kelin Rolle RD, CDE  Date: 6/15/2020    Diabetes Care Management Summary  Diabetes Education Record Assessment/Progress: Comprehensive/Group(3/9/20 assessment visit)  Current Diabetes Risk Level: High     Last A1c:   Lab Results   Component Value Date    HGBA1C 9.3 (H) 05/15/2020     Last Visit with Diabetes Educator: Last Education Visit: Not Found  Last OPCM Referral: : Not Found   Enrolled in OPCM: No    Diabetes Type  Diabetes Type : Type II    Diabetes History  Diabetes Diagnosis: >10 years(>20yrs)  Current Treatment: Diet, Exercise, Injectable, Oral Medication, Insulin(glipizide 5mg 2tab dinner daily; trulicity 1.5mg wkly, lantus 25 units daily)  Reviewed Problem List with Patient: Yes    Health Maintenance was reviewed today with patient. Discussed with patient importance of routine eye exams, foot exams/foot care, blood work (i.e.: A1c, microalbumin, and lipid), dental visits, yearly flu vaccine, and pneumonia vaccine as indicated by PCP. Patient verbalized understanding.     Health Maintenance Topics with due status: Not Due       Topic Last Completion Date    DEXA SCAN 06/15/2016    Colorectal Cancer Screening 05/02/2017    TETANUS VACCINE 10/02/2018    Lipid Panel 06/25/2019    Eye Exam 01/27/2020    Hemoglobin A1c 05/15/2020    Foot Exam 05/25/2020    Low Dose Statin 05/26/2020     Health Maintenance Due   Topic Date Due    Mammogram  06/28/2020       Nutrition  Meal Planning: (~0854-5967 cals/d. Meals ~2/d, hs snack. Missing lunch. Excess carb, sat.fat from dessert type baked goods. Pt working to reduce freq of baking during week.)  Meal Plan 24 Hour Recall - Breakfast: 1030-11am  1/2c grapenuts OR 1c cheerios, life or special k cereal, whole or 2% milk, sometimes fruit  Meal Plan 24 Hour Recall - Lunch: sandwich (white bread, ham), chips, sometimes fruit OR cheese, crackers OR none (4d/wk)  Meal Plan 24 Hour Recall - Dinner: Chinese leftovers (take out infreq) OR  3/4-1c pasta w/ chix/shrimp/meatballs  Meal Plan 24 Hour Recall - Snack: baked reduce reduced from 3x/wk to 1x/wk (maria c chip cookies, cake or pie 4-6 serv ea) - hasn't tried diabetes type recipes; santiago: water, milk    Monitoring   Self Monitoring : Per records since 6/1/20, fst BG ; 2-4 hrs ppd 156-265, 301. Excursions from baked goods; however, readings improved with increase glipizide.  In the last month, how often have you had a low blood sugar reaction?: never    Exercise   Exercise Type: (Walking 10-20min. Wii system, occs uses due weather. Has exercise bands, dumbbells at home.)    Current Diabetes Treatment   Current Treatment: Diet, Exercise, Injectable, Oral Medication, Insulin(glipizide 5mg 2tab dinner daily; trulicity 1.5mg wkly, lantus 25 units daily)    Social History  Preferred Learning Method: Face to Face  Primary Support: Self  Smoking Status: Never a Smoker  Alcohol Use: Rarely         Barriers to Change  Barriers to Change: None  Learning Challenges : None    Readiness to Learn   Readiness to Learn : Eager    Cultural Influences  Cultural Influences: No    Diabetes Education Assessment/Progress  Diabetes Disease Process (diabetes disease process and treatment options): Comprehends Key Points  Nutrition (Incorporating nutritional management into one's lifestyle): Discussion, Needs Review, Instructed, Individual Session, Demonstrates Understanding/Competency (verbalizes/demonstrates), Written Materials Provided  Physical Activity (incorporating physical activity into one's lifestyle): Discussion, Needs Review, Instructed, Individual Session, Demonstrates Understanding/Competency (verbalizes/demonstrates), Written Materials Provided  Medications (states correct name, dose, onset, peak, duration, side effects & timing of meds): Discussion, Individual Session, Demonstrates Understanding/Competency(verbalizes/demonstrates)  Monitoring (monitoring blood glucose/other parameters & using results):  Discussion, Individual Session, Demonstrates Understanding/Competency (verbalizes/demonstrates)  Acute Complications (preventing, detecting, and treating acute complications): Discussion, Individual Session, Demonstrates Understanding/Competency (verbalizes/demonstrates)  Chronic Complications (preventing, detecting, and treating chronic complications): Discussion, Needs Review, Instructed, Individual Session, Demonstrates Understanding/Competency (verbalizes/demonstrates)  Clinical (diabetes, other pertinent medical history, and relevant comorbidities reviewed during visit): Demonstrates Understanding/Competency (verbalizes/demonstrates)  Cognitive (knowledge of self-management skills, functional health literacy): Discussion, Needs Review, Instructed, Individual Session, Demonstrates Understanding/Competency (verbalizes/demonstrates), Written Materials Provided  Psychosocial (emotional response to diabetes): Demonstrates Understanding/Competency (verbalizes/demonstrates)  Diabetes Distress and Support Systems: Demonstrates Understanding/Competency (verbalizes/demonstrates)  Behavioral (readiness for change, lifestyle practices, self-care behaviors): Discussion, Needs Review, Instructed, Individual Session, Demonstrates Understanding/Competency (verbalizes/demonstrates)    Goals  Patient has selected/evaluated goals during today's session: Yes, evaluated  Healthy Eating: Set(use meal plan - carb portions/spacing, snacks (swerve/stevia for baking; weight watchers ice cream bar)  Met Percentage : 50%  Start Date: 06/15/20(carb portions/spacing; continue reduce baked good freq/portion (recipe resources provided); MR shake for lunch/mid-day meal)  Target Date: 08/24/20  Physical Activity: Set(maintain walking prog (7-8+k/d))  Met Percentage : 50%  Start Date: 06/15/20(increase use Wii, bands, dumbbells)  Target Date: 08/24/20  Monitoring: Set(test BG 2x/d -fst, acd; bring records/meter to clinic)  Met Percentage :  100%  Start Date: 06/15/20  Target Date: 08/24/20     Diabetes Care Plan/Intervention  Education Plan/Intervention: Individual Follow-Up DSMT(Maintain fu w/ [facility] for medical mgmt.) RTC ~6-8wks, prn or as referred.    Diabetes Meal Plan  Restrictions: Low Fat, Low Sodium, Restricted Carbohydrate  Calories: 1500, 1600  Carbohydrate Per Meal: 30-45g  Carbohydrate Per Snack : 7-15g, 15-20g    Today's Self-Management Care Plan was developed with the patient's input and is based on barriers identified during today's assessment.    The long and short-term goals in the care plan were written with the patient/caregiver's input. The patient has agreed to work toward these goals to improve her overall diabetes control.      The patient received a copy of today's self-management plan and verbalized understanding of the care plan, goals, and all of today's instructions.      The patient was encouraged to communicate with her physician and care team regarding her condition(s) and treatment.  I provided the patient with my contact information today and encouraged her to contact me via phone or patient portal as needed.     Education Units of Time   Time Spent: 60 min

## 2020-06-15 NOTE — LETTER
Nela 15, 2020        Lorraine Holliday PA-C  [address]  [place] [address]             [facility]  [address]  [place] [address]  Phone: [phone]  Fax: [phone]   Patient: Jaimie Luque   MR Number: [id number]   YOB: 1950   Date of Visit: 6/15/2020       Dear Dr. Holliday:    Thank you for referring Jaimie Luque to me for evaluation. Below are the relevant portions of my assessment and plan of care.            If you have questions, please do not hesitate to call me. I look forward to following Jaimie along with you.    Sincerely,      eKlin Rolle RD, CDE           CC  Jessica Ward MD

## 2020-06-16 ENCOUNTER — HOSPITAL ENCOUNTER (OUTPATIENT)
Dept: RADIOLOGY | Facility: HOSPITAL | Age: 70
Discharge: HOME | End: 2020-06-16
Attending: INTERNAL MEDICINE
Payer: MEDICARE

## 2020-06-16 VITALS — HEIGHT: 67 IN | BODY MASS INDEX: 35.88 KG/M2 | WEIGHT: 228.63 LBS

## 2020-06-16 DIAGNOSIS — Z12.31 ENCOUNTER FOR SCREENING MAMMOGRAM FOR MALIGNANT NEOPLASM OF BREAST: ICD-10-CM

## 2020-06-16 PROCEDURE — 77067 MAMMO DIGITAL SCREENING BILAT WITH TOMOSYNTHESIS_CAD: ICD-10-PCS | Mod: 26,,, | Performed by: RADIOLOGY

## 2020-06-16 PROCEDURE — 77063 MAMMO DIGITAL SCREENING BILAT WITH TOMOSYNTHESIS_CAD: ICD-10-PCS | Mod: 26,,, | Performed by: RADIOLOGY

## 2020-06-16 PROCEDURE — 77067 SCR MAMMO BI INCL CAD: CPT | Mod: TC

## 2020-06-16 PROCEDURE — 77067 SCR MAMMO BI INCL CAD: CPT | Mod: 26,,, | Performed by: RADIOLOGY

## 2020-06-16 PROCEDURE — 77063 BREAST TOMOSYNTHESIS BI: CPT | Mod: 26,,, | Performed by: RADIOLOGY

## 2020-07-13 ENCOUNTER — LAB VISIT (OUTPATIENT)
Dept: LAB | Facility: HOSPITAL | Age: 70
End: 2020-07-13
Attending: PHYSICIAN ASSISTANT
Payer: MEDICARE

## 2020-07-13 DIAGNOSIS — Z79.4 TYPE 2 DIABETES MELLITUS WITH HYPERGLYCEMIA, WITH LONG-TERM CURRENT USE OF INSULIN: ICD-10-CM

## 2020-07-13 DIAGNOSIS — E11.65 TYPE 2 DIABETES MELLITUS WITH HYPERGLYCEMIA, WITH LONG-TERM CURRENT USE OF INSULIN: ICD-10-CM

## 2020-07-13 LAB
ALBUMIN SERPL BCP-MCNC: 3.2 G/DL (ref 3.5–5.2)
ALP SERPL-CCNC: 65 U/L (ref 55–135)
ALT SERPL W/O P-5'-P-CCNC: 21 U/L (ref 10–44)
ANION GAP SERPL CALC-SCNC: 5 MMOL/L (ref 8–16)
AST SERPL-CCNC: 20 U/L (ref 10–40)
BASOPHILS # BLD AUTO: 0.04 K/UL (ref 0–0.2)
BASOPHILS NFR BLD: 0.5 % (ref 0–1.9)
BILIRUB SERPL-MCNC: 0.7 MG/DL (ref 0.1–1)
BUN SERPL-MCNC: 11 MG/DL (ref 8–23)
CALCIUM SERPL-MCNC: 8.8 MG/DL (ref 8.7–10.5)
CHLORIDE SERPL-SCNC: 106 MMOL/L (ref 95–110)
CHOLEST SERPL-MCNC: 136 MG/DL (ref 120–199)
CHOLEST/HDLC SERPL: 2.3 {RATIO} (ref 2–5)
CO2 SERPL-SCNC: 28 MMOL/L (ref 23–29)
CREAT SERPL-MCNC: 0.9 MG/DL (ref 0.5–1.4)
DIFFERENTIAL METHOD: ABNORMAL
EOSINOPHIL # BLD AUTO: 0.2 K/UL (ref 0–0.5)
EOSINOPHIL NFR BLD: 2 % (ref 0–8)
ERYTHROCYTE [DISTWIDTH] IN BLOOD BY AUTOMATED COUNT: 14.8 % (ref 11.5–14.5)
EST. GFR  (AFRICAN AMERICAN): >60 ML/MIN/1.73 M^2
EST. GFR  (NON AFRICAN AMERICAN): >60 ML/MIN/1.73 M^2
GLUCOSE SERPL-MCNC: 94 MG/DL (ref 70–110)
HCT VFR BLD AUTO: 42.3 % (ref 37–48.5)
HDLC SERPL-MCNC: 60 MG/DL (ref 40–75)
HDLC SERPL: 44.1 % (ref 20–50)
HGB BLD-MCNC: 13.3 G/DL (ref 12–16)
IMM GRANULOCYTES # BLD AUTO: 0.04 K/UL (ref 0–0.04)
IMM GRANULOCYTES NFR BLD AUTO: 0.5 % (ref 0–0.5)
LDLC SERPL CALC-MCNC: 62.6 MG/DL (ref 63–159)
LYMPHOCYTES # BLD AUTO: 2.3 K/UL (ref 1–4.8)
LYMPHOCYTES NFR BLD: 30.3 % (ref 18–48)
MCH RBC QN AUTO: 28.3 PG (ref 27–31)
MCHC RBC AUTO-ENTMCNC: 31.4 G/DL (ref 32–36)
MCV RBC AUTO: 90 FL (ref 82–98)
MONOCYTES # BLD AUTO: 0.6 K/UL (ref 0.3–1)
MONOCYTES NFR BLD: 7.9 % (ref 4–15)
NEUTROPHILS # BLD AUTO: 4.4 K/UL (ref 1.8–7.7)
NEUTROPHILS NFR BLD: 58.8 % (ref 38–73)
NONHDLC SERPL-MCNC: 76 MG/DL
NRBC BLD-RTO: 0 /100 WBC
PLATELET # BLD AUTO: 221 K/UL (ref 150–350)
PMV BLD AUTO: 11.8 FL (ref 9.2–12.9)
POTASSIUM SERPL-SCNC: 4.4 MMOL/L (ref 3.5–5.1)
PROT SERPL-MCNC: 6.7 G/DL (ref 6–8.4)
RBC # BLD AUTO: 4.7 M/UL (ref 4–5.4)
SODIUM SERPL-SCNC: 139 MMOL/L (ref 136–145)
TRIGL SERPL-MCNC: 67 MG/DL (ref 30–150)
WBC # BLD AUTO: 7.45 K/UL (ref 3.9–12.7)

## 2020-07-13 PROCEDURE — 36415 COLL VENOUS BLD VENIPUNCTURE: CPT

## 2020-07-13 PROCEDURE — 85025 COMPLETE CBC W/AUTO DIFF WBC: CPT

## 2020-07-13 PROCEDURE — 80053 COMPREHEN METABOLIC PANEL: CPT

## 2020-07-13 PROCEDURE — 80061 LIPID PANEL: CPT

## 2020-07-19 ENCOUNTER — LAB VISIT (OUTPATIENT)
Dept: PRIMARY CARE CLINIC | Facility: CLINIC | Age: 70
End: 2020-07-19
Payer: MEDICARE

## 2020-07-19 DIAGNOSIS — Z00.6 RESEARCH SUBJECT: ICD-10-CM

## 2020-07-19 LAB — SARS-COV-2 IGG SERPLBLD QL IA.RAPID: NEGATIVE

## 2020-07-19 PROCEDURE — 86769 SARS-COV-2 COVID-19 ANTIBODY: CPT

## 2020-07-19 PROCEDURE — U0003 INFECTIOUS AGENT DETECTION BY NUCLEIC ACID (DNA OR RNA); SEVERE ACUTE RESPIRATORY SYNDROME CORONAVIRUS 2 (SARS-COV-2) (CORONAVIRUS DISEASE [COVID-19]), AMPLIFIED PROBE TECHNIQUE, MAKING USE OF HIGH THROUGHPUT TECHNOLOGIES AS DESCRIBED BY CMS-2020-01-R: HCPCS

## 2020-07-19 NOTE — RESEARCH
Date of Consent: 7/19/2020    Sponsor: [facility]    Study Title/IRB Number: Observational study of Sars-CoV2 Immunoglobulin G (IgG) seroprevalence among the [facility] population over time 2020.163  Principle Investigator: Winifred Rodriges, PhD    Did the patient need translation services? No   name: N/A    Prior to the Informed Consent (IC) being signed, or any study protocol required data collection, testing, procedure, or intervention being performed, the following was done and/or discussed:   Patient was given a paper copy of the IC for review    Patient was given study FAQ   Purpose of the study and qualifications to participate    Study design and tests or procedures done at this visit   Confidentiality and HIPAA Authorization for Release of Medical Records for the research trial/ subject's rights/research related injury   Risk, Benefits, Alternative Treatments, Compensation and Costs   Participation in the research trial is voluntary and patient may withdraw at anytime   Contact information for study related questions    Patient verbalizes understanding of the above: Yes  Contact information for PI and IRB given to patient: Yes  Patient able to adequately summarize: the purpose of the study, the risks associated with the study, and all procedures, testing, and follow-ups associated with the study: Yes    The consent was discussed verbally with the patient and all questions were answered satisfactorily. Patient gave verbal consent for the Seroprevalence research study with an IRB approval date of 05/08/2020.      The Consent, Consent Witness and name of Clinical Research Coordinator consenting was captured and documented in REDCap.    All Inclusion and Exclusion Criteria reviewed, subject meets all Inclusion criteria and does not meet any Exclusion Criteria at this time.     Patient Eligibility was confirmed.    Patient responded to survey questions.    The following biospecimen  collection procedures were collected:    -Nasopharyngeal Swab Collection  -Blood collection

## 2020-07-22 ENCOUNTER — OFFICE VISIT (OUTPATIENT)
Dept: DIABETES | Facility: CLINIC | Age: 70
End: 2020-07-22
Payer: MEDICARE

## 2020-07-22 VITALS
SYSTOLIC BLOOD PRESSURE: 120 MMHG | HEART RATE: 77 BPM | DIASTOLIC BLOOD PRESSURE: 78 MMHG | BODY MASS INDEX: 37.75 KG/M2 | WEIGHT: 240.5 LBS | HEIGHT: 67 IN

## 2020-07-22 DIAGNOSIS — Z79.4 TYPE 2 DIABETES MELLITUS WITH HYPERGLYCEMIA, WITH LONG-TERM CURRENT USE OF INSULIN: Primary | ICD-10-CM

## 2020-07-22 DIAGNOSIS — E11.69 HYPERLIPIDEMIA ASSOCIATED WITH TYPE 2 DIABETES MELLITUS: ICD-10-CM

## 2020-07-22 DIAGNOSIS — E66.9 OBESITY (BMI 30-39.9): ICD-10-CM

## 2020-07-22 DIAGNOSIS — E03.9 ACQUIRED HYPOTHYROIDISM: ICD-10-CM

## 2020-07-22 DIAGNOSIS — E78.5 HYPERLIPIDEMIA ASSOCIATED WITH TYPE 2 DIABETES MELLITUS: ICD-10-CM

## 2020-07-22 DIAGNOSIS — E11.65 TYPE 2 DIABETES MELLITUS WITH HYPERGLYCEMIA, WITH LONG-TERM CURRENT USE OF INSULIN: Primary | ICD-10-CM

## 2020-07-22 LAB
GLUCOSE SERPL-MCNC: 206 MG/DL (ref 70–110)
SARS-COV-2 RNA RESP QL NAA+PROBE: NOT DETECTED

## 2020-07-22 PROCEDURE — 3074F PR MOST RECENT SYSTOLIC BLOOD PRESSURE < 130 MM HG: ICD-10-PCS | Mod: CPTII,S$GLB,, | Performed by: PHYSICIAN ASSISTANT

## 2020-07-22 PROCEDURE — 3074F SYST BP LT 130 MM HG: CPT | Mod: CPTII,S$GLB,, | Performed by: PHYSICIAN ASSISTANT

## 2020-07-22 PROCEDURE — 99214 PR OFFICE/OUTPT VISIT, EST, LEVL IV, 30-39 MIN: ICD-10-PCS | Mod: S$GLB,,, | Performed by: PHYSICIAN ASSISTANT

## 2020-07-22 PROCEDURE — 1126F AMNT PAIN NOTED NONE PRSNT: CPT | Mod: S$GLB,,, | Performed by: PHYSICIAN ASSISTANT

## 2020-07-22 PROCEDURE — 3078F DIAST BP <80 MM HG: CPT | Mod: CPTII,S$GLB,, | Performed by: PHYSICIAN ASSISTANT

## 2020-07-22 PROCEDURE — 1101F PT FALLS ASSESS-DOCD LE1/YR: CPT | Mod: CPTII,S$GLB,, | Performed by: PHYSICIAN ASSISTANT

## 2020-07-22 PROCEDURE — 3078F PR MOST RECENT DIASTOLIC BLOOD PRESSURE < 80 MM HG: ICD-10-PCS | Mod: CPTII,S$GLB,, | Performed by: PHYSICIAN ASSISTANT

## 2020-07-22 PROCEDURE — 99499 UNLISTED E&M SERVICE: CPT | Mod: S$GLB,,, | Performed by: PHYSICIAN ASSISTANT

## 2020-07-22 PROCEDURE — 3008F BODY MASS INDEX DOCD: CPT | Mod: CPTII,S$GLB,, | Performed by: PHYSICIAN ASSISTANT

## 2020-07-22 PROCEDURE — 1159F MED LIST DOCD IN RCRD: CPT | Mod: S$GLB,,, | Performed by: PHYSICIAN ASSISTANT

## 2020-07-22 PROCEDURE — 1126F PR PAIN SEVERITY QUANTIFIED, NO PAIN PRESENT: ICD-10-PCS | Mod: S$GLB,,, | Performed by: PHYSICIAN ASSISTANT

## 2020-07-22 PROCEDURE — 3046F PR MOST RECENT HEMOGLOBIN A1C LEVEL > 9.0%: ICD-10-PCS | Mod: CPTII,S$GLB,, | Performed by: PHYSICIAN ASSISTANT

## 2020-07-22 PROCEDURE — 1101F PR PT FALLS ASSESS DOC 0-1 FALLS W/OUT INJ PAST YR: ICD-10-PCS | Mod: CPTII,S$GLB,, | Performed by: PHYSICIAN ASSISTANT

## 2020-07-22 PROCEDURE — 82962 GLUCOSE BLOOD TEST: CPT | Mod: S$GLB,,, | Performed by: PHYSICIAN ASSISTANT

## 2020-07-22 PROCEDURE — 99999 PR PBB SHADOW E&M-EST. PATIENT-LVL IV: ICD-10-PCS | Mod: PBBFAC,,, | Performed by: PHYSICIAN ASSISTANT

## 2020-07-22 PROCEDURE — 99214 OFFICE O/P EST MOD 30 MIN: CPT | Mod: S$GLB,,, | Performed by: PHYSICIAN ASSISTANT

## 2020-07-22 PROCEDURE — 3008F PR BODY MASS INDEX (BMI) DOCUMENTED: ICD-10-PCS | Mod: CPTII,S$GLB,, | Performed by: PHYSICIAN ASSISTANT

## 2020-07-22 PROCEDURE — 99499 RISK ADDL DX/OHS AUDIT: ICD-10-PCS | Mod: S$GLB,,, | Performed by: PHYSICIAN ASSISTANT

## 2020-07-22 PROCEDURE — 99999 PR PBB SHADOW E&M-EST. PATIENT-LVL IV: CPT | Mod: PBBFAC,,, | Performed by: PHYSICIAN ASSISTANT

## 2020-07-22 PROCEDURE — 82962 POCT GLUCOSE, HAND-HELD DEVICE: ICD-10-PCS | Mod: S$GLB,,, | Performed by: PHYSICIAN ASSISTANT

## 2020-07-22 PROCEDURE — 3046F HEMOGLOBIN A1C LEVEL >9.0%: CPT | Mod: CPTII,S$GLB,, | Performed by: PHYSICIAN ASSISTANT

## 2020-07-22 PROCEDURE — 1159F PR MEDICATION LIST DOCUMENTED IN MEDICAL RECORD: ICD-10-PCS | Mod: S$GLB,,, | Performed by: PHYSICIAN ASSISTANT

## 2020-07-22 RX ORDER — GLIPIZIDE 5 MG/1
10 TABLET ORAL
Qty: 360 TABLET | Refills: 3 | Status: SHIPPED | OUTPATIENT
Start: 2020-07-22 | End: 2021-07-27 | Stop reason: SDUPTHER

## 2020-07-22 NOTE — PROGRESS NOTES
PCP: Jessica Luna MD    Subjective:     Chief Complaint: Diabetes - Established Patient    HISTORY OF PRESENT ILLNESS: 70 y.o.   female presenting for diabetes management visit.   Patient has previously been established with the Diabetes Management Department and was last seen by myself on 03/03/20.   Patient has had Type II diabetes since 20 or more years.  Pertinent to decision making is the following comorbidities: HLD, Hypothyroidism and Obesity by BMI  Patient has the following Diabetes complications: without complications  She  has attended diabetes education in the past.     Patient's most recent A1c of 9.3% was completed 3 months ago.   Patient states since Her last A1c Her blood glucose levels have been high  in the evening. Patient relates to higher carb intake.   Patient monitors blood glucose 2 times per day with meter : Fasting and Before Bed.   Patient blood glucose monitoring device will not be uploaded into Media Section today secondary to Telemedicine visit. However, patient has submitted logs to be reviewed in Media section.   Per log review, fasting blood sugars are ranging from 69 - 115 with one excursion to 150 and before bed blood sugars ranging from 124 - 229 with one excursion to 366.   Patient endorses the following diabetes related symptoms: None.   Patient is due today for the following diabetes-related health maintenance standards: None.   She denies any recent hospital admissions or emergency room visits. Patient denies history of DKA.   She denies having hypoglycemia.   Patient's concerns today include glycemic control.  Patient medication regimen is as below.     CURRENT DM MEDICATIONS:    Lantus 25 units qhs   Trulicity 1.5 mg weekly   Glipizide 10 mg before dinner    Patient has failed the following Diabetes medications:    Metformin - GI    Invokana    Glyburide    Victoza    Basal - Basaglar      Labs Reviewed.       Lab Results   Component Value Date     "CPEPTIDE 1.94 05/25/2017     Lab Results   Component Value Date    GLUTAMICACID 0.00 05/25/2017       Height: 5' 7" (170.2 cm)  //  Weight: 109.1 kg (240 lb 8.4 oz), Body mass index is 37.67 kg/m².  Her blood sugar in clinic today is:    Lab Results   Component Value Date    POCGLU 209 (A) 03/03/2020       Review of Systems   Constitutional: Negative for activity change, appetite change, chills and fever.   HENT: Negative for dental problem, mouth sores, nosebleeds, sore throat and trouble swallowing.    Eyes: Negative for pain and discharge.   Respiratory: Negative for shortness of breath, wheezing and stridor.    Cardiovascular: Negative for chest pain, palpitations and leg swelling.   Gastrointestinal: Negative for abdominal pain, diarrhea, nausea and vomiting.   Endocrine: Negative for polydipsia, polyphagia and polyuria.   Genitourinary: Negative for dysuria, frequency and urgency.   Musculoskeletal: Negative for joint swelling and myalgias.   Skin: Negative for rash and wound.   Neurological: Negative for dizziness, syncope, weakness and headaches.   Psychiatric/Behavioral: Negative for behavioral problems and dysphoric mood.         Diabetes Management Status  Statin: Taking  ACE/ARB: Taking    Screening or Prevention Patient's value Goal Complete/Controlled?   HgA1C Testing and Control   Lab Results   Component Value Date    HGBA1C 9.3 (H) 05/15/2020      Annually/Less than 8% No   Lipid profile : 07/13/2020 Annually Yes   LDL control Lab Results   Component Value Date    LDLCALC 62.6 (L) 07/13/2020    Annually/Less than 100 mg/dl  Yes   Nephropathy screening Lab Results   Component Value Date    MICALBCREAT 4.1 07/13/2020     Lab Results   Component Value Date    PROTEINUA Negative 02/03/2020    Annually Yes   Blood pressure BP Readings from Last 1 Encounters:   07/22/20 120/78    Less than 140/90 Yes   Dilated retinal exam : 01/27/2020 Annually Yes    Foot exam   : 05/25/2020 Annually Yes     ACTIVITY " LEVEL: Moderately Active. Discussed activities, benefits, methods, and precautions.  MEAL PLANNING: Patient reports number of meals per day to be 3 and number of snacks per day to be 2.   Patient is encouraged to carb count and consume no more than 30 - 45 grams of carbohydrates in each meal and 15 grams of carbohydrates in each snack.     Social History     Socioeconomic History    Marital status:      Spouse name: Not on file    Number of children: Not on file    Years of education: Not on file    Highest education level: Not on file   Occupational History    Not on file   Social Needs    Financial resource strain: Not hard at all    Food insecurity     Worry: Never true     Inability: Never true    Transportation needs     Medical: No     Non-medical: No   Tobacco Use    Smoking status: Never Smoker    Smokeless tobacco: Never Used   Substance and Sexual Activity    Alcohol use: Yes     Frequency: 2-4 times a month     Drinks per session: 1 or 2     Binge frequency: Never     Comment: rare    Drug use: No    Sexual activity: Yes     Partners: Male   Lifestyle    Physical activity     Days per week: 1 day     Minutes per session: 40 min    Stress: Only a little   Relationships    Social connections     Talks on phone: Once a week     Gets together: More than three times a week     Attends Denominational service: Not on file     Active member of club or organization: Yes     Attends meetings of clubs or organizations: More than 4 times per year     Relationship status:    Other Topics Concern    Are you pregnant or think you may be? Not Asked    Breast-feeding Not Asked   Social History Narrative    . Lives with spouse. Has 3 children. Patient retired as  for [employer].      Past Medical History:   Diagnosis Date    Diabetes mellitus type II     Hyperlipidemia     Hypertension     Hypothyroid     TALIA (obstructive sleep apnea)     on CPAP    Osteopenia      Psoriasis        Objective:        Physical Exam  Constitutional:       General: She is not in acute distress.     Appearance: She is well-developed. She is not diaphoretic.   HENT:      Head: Normocephalic and atraumatic.      Right Ear: External ear normal.      Left Ear: External ear normal.      Nose: Nose normal.   Eyes:      General:         Right eye: No discharge.         Left eye: No discharge.   Neck:      Musculoskeletal: Normal range of motion.   Pulmonary:      Effort: Pulmonary effort is normal. No respiratory distress.      Breath sounds: No stridor.   Musculoskeletal: Normal range of motion.   Skin:     Coloration: Skin is not pale.   Neurological:      Mental Status: She is alert and oriented to person, place, and time.      Motor: No abnormal muscle tone.      Coordination: Coordination normal.   Psychiatric:         Behavior: Behavior normal.         Thought Content: Thought content normal.         Judgment: Judgment normal.             Assessment / Plan:     Type 2 diabetes mellitus with hyperglycemia, with long-term current use of insulin  -     POCT Glucose, Hand-Held Device  -     glipiZIDE (GLUCOTROL) 5 MG tablet; Take 2 tablets (10 mg total) by mouth 2 (two) times daily before meals.  Dispense: 360 tablet; Refill: 3    Hyperlipidemia associated with type 2 diabetes mellitus    Acquired hypothyroidism    Obesity (BMI 30-39.9)      Additional Plan Details:    - POCT Glucose  - Encouraged continuation of lifestyle changes including regular exercise and limiting carbohydrates to 30-45 grams per meal threes times daily and 15 grams per snack with a limit of two daily.   - Encouraged continued monitoring of blood glucose with an increase to 3 times daily at Fasting, Before Dinner and Before Bed.    - Current DM Medication Regimen: Change Lantus to 24 units at night. Continue Trulicity 1.5 mg weekly. Continue Glipizide 10 mg before dinner - and 1 tab as needed for snack greater 30g.   - Health  Maintenance standards addressed today: None  - Nursing Visit: Patient is age 79 or younger with an A1c of 7.5 or greater and qualifies for nursing visit, but will defer for now. .   - Follow up in 3 months with A1c prior.       Blakeney McKnight, PA-C [facility]    A total of 30 minutes was spent in face to face time, of which over 50 % was spent in counseling patient on disease process, complications, treatment, and side effects of medications.

## 2020-07-22 NOTE — PATIENT INSTRUCTIONS
Change Lantus to 24 units at night   Continue Trulicity 1.5 mg weekly   Continue Glipizide 10 mg before dinner - and 1 tab as needed for snack greater 30g     Recommend lower carbs snack - diabetes protein shakes like glucerna, sugar free candies - Lex Maria

## 2020-08-24 ENCOUNTER — LAB VISIT (OUTPATIENT)
Dept: LAB | Facility: HOSPITAL | Age: 70
End: 2020-08-24
Attending: INTERNAL MEDICINE
Payer: MEDICARE

## 2020-08-24 DIAGNOSIS — Z79.4 TYPE 2 DIABETES MELLITUS WITH HYPERGLYCEMIA, WITH LONG-TERM CURRENT USE OF INSULIN: ICD-10-CM

## 2020-08-24 DIAGNOSIS — E11.65 TYPE 2 DIABETES MELLITUS WITH HYPERGLYCEMIA, WITH LONG-TERM CURRENT USE OF INSULIN: ICD-10-CM

## 2020-08-24 LAB
ESTIMATED AVG GLUCOSE: 174 MG/DL (ref 68–131)
HBA1C MFR BLD HPLC: 7.7 % (ref 4–5.6)

## 2020-08-24 PROCEDURE — 36415 COLL VENOUS BLD VENIPUNCTURE: CPT

## 2020-08-24 PROCEDURE — 83036 HEMOGLOBIN GLYCOSYLATED A1C: CPT

## 2020-09-21 ENCOUNTER — OFFICE VISIT (OUTPATIENT)
Dept: FAMILY MEDICINE | Facility: CLINIC | Age: 70
End: 2020-09-21
Payer: MEDICARE

## 2020-09-21 VITALS
SYSTOLIC BLOOD PRESSURE: 124 MMHG | WEIGHT: 248.25 LBS | DIASTOLIC BLOOD PRESSURE: 72 MMHG | BODY MASS INDEX: 38.96 KG/M2 | HEIGHT: 67 IN | OXYGEN SATURATION: 96 % | HEART RATE: 89 BPM | TEMPERATURE: 98 F

## 2020-09-21 DIAGNOSIS — G47.33 OSA ON CPAP: ICD-10-CM

## 2020-09-21 DIAGNOSIS — Z00.00 ENCOUNTER FOR PREVENTIVE HEALTH EXAMINATION: ICD-10-CM

## 2020-09-21 DIAGNOSIS — E78.5 HYPERLIPIDEMIA ASSOCIATED WITH TYPE 2 DIABETES MELLITUS: ICD-10-CM

## 2020-09-21 DIAGNOSIS — E11.69 HYPERLIPIDEMIA ASSOCIATED WITH TYPE 2 DIABETES MELLITUS: ICD-10-CM

## 2020-09-21 DIAGNOSIS — R91.8 MULTIPLE LUNG NODULES ON CT: ICD-10-CM

## 2020-09-21 DIAGNOSIS — E03.9 ACQUIRED HYPOTHYROIDISM: ICD-10-CM

## 2020-09-21 PROCEDURE — 3074F PR MOST RECENT SYSTOLIC BLOOD PRESSURE < 130 MM HG: ICD-10-PCS | Mod: CPTII,S$GLB,, | Performed by: INTERNAL MEDICINE

## 2020-09-21 PROCEDURE — 3074F SYST BP LT 130 MM HG: CPT | Mod: CPTII,S$GLB,, | Performed by: INTERNAL MEDICINE

## 2020-09-21 PROCEDURE — 3008F PR BODY MASS INDEX (BMI) DOCUMENTED: ICD-10-PCS | Mod: CPTII,S$GLB,, | Performed by: INTERNAL MEDICINE

## 2020-09-21 PROCEDURE — 3051F PR MOST RECENT HEMOGLOBIN A1C LEVEL 7.0 - < 8.0%: ICD-10-PCS | Mod: CPTII,S$GLB,, | Performed by: INTERNAL MEDICINE

## 2020-09-21 PROCEDURE — 3051F HG A1C>EQUAL 7.0%<8.0%: CPT | Mod: CPTII,S$GLB,, | Performed by: INTERNAL MEDICINE

## 2020-09-21 PROCEDURE — 99999 PR PBB SHADOW E&M-EST. PATIENT-LVL IV: ICD-10-PCS | Mod: PBBFAC,,, | Performed by: INTERNAL MEDICINE

## 2020-09-21 PROCEDURE — 3008F BODY MASS INDEX DOCD: CPT | Mod: CPTII,S$GLB,, | Performed by: INTERNAL MEDICINE

## 2020-09-21 PROCEDURE — 3078F DIAST BP <80 MM HG: CPT | Mod: CPTII,S$GLB,, | Performed by: INTERNAL MEDICINE

## 2020-09-21 PROCEDURE — 1159F PR MEDICATION LIST DOCUMENTED IN MEDICAL RECORD: ICD-10-PCS | Mod: S$GLB,,, | Performed by: INTERNAL MEDICINE

## 2020-09-21 PROCEDURE — G0008 ADMIN INFLUENZA VIRUS VAC: HCPCS | Mod: S$GLB,,, | Performed by: INTERNAL MEDICINE

## 2020-09-21 PROCEDURE — 1126F PR PAIN SEVERITY QUANTIFIED, NO PAIN PRESENT: ICD-10-PCS | Mod: S$GLB,,, | Performed by: INTERNAL MEDICINE

## 2020-09-21 PROCEDURE — 99214 OFFICE O/P EST MOD 30 MIN: CPT | Mod: 25,S$GLB,, | Performed by: INTERNAL MEDICINE

## 2020-09-21 PROCEDURE — 1126F AMNT PAIN NOTED NONE PRSNT: CPT | Mod: S$GLB,,, | Performed by: INTERNAL MEDICINE

## 2020-09-21 PROCEDURE — 1101F PR PT FALLS ASSESS DOC 0-1 FALLS W/OUT INJ PAST YR: ICD-10-PCS | Mod: CPTII,S$GLB,, | Performed by: INTERNAL MEDICINE

## 2020-09-21 PROCEDURE — G0008 FLU VACCINE - QUADRIVALENT - HIGH DOSE (65+) PRESERVATIVE FREE IM: ICD-10-PCS | Mod: S$GLB,,, | Performed by: INTERNAL MEDICINE

## 2020-09-21 PROCEDURE — 1101F PT FALLS ASSESS-DOCD LE1/YR: CPT | Mod: CPTII,S$GLB,, | Performed by: INTERNAL MEDICINE

## 2020-09-21 PROCEDURE — 99999 PR PBB SHADOW E&M-EST. PATIENT-LVL IV: CPT | Mod: PBBFAC,,, | Performed by: INTERNAL MEDICINE

## 2020-09-21 PROCEDURE — 3078F PR MOST RECENT DIASTOLIC BLOOD PRESSURE < 80 MM HG: ICD-10-PCS | Mod: CPTII,S$GLB,, | Performed by: INTERNAL MEDICINE

## 2020-09-21 PROCEDURE — 99214 PR OFFICE/OUTPT VISIT, EST, LEVL IV, 30-39 MIN: ICD-10-PCS | Mod: 25,S$GLB,, | Performed by: INTERNAL MEDICINE

## 2020-09-21 PROCEDURE — 90662 FLU VACCINE - QUADRIVALENT - HIGH DOSE (65+) PRESERVATIVE FREE IM: ICD-10-PCS | Mod: S$GLB,,, | Performed by: INTERNAL MEDICINE

## 2020-09-21 PROCEDURE — 1159F MED LIST DOCD IN RCRD: CPT | Mod: S$GLB,,, | Performed by: INTERNAL MEDICINE

## 2020-09-21 PROCEDURE — 90662 IIV NO PRSV INCREASED AG IM: CPT | Mod: S$GLB,,, | Performed by: INTERNAL MEDICINE

## 2020-09-21 NOTE — PROGRESS NOTES
"Subjective:      Patient ID: Jaimie Luque is a 70 y.o. female.    Chief Complaint: Annual Exam      HPI  Here for f/u medical problems and preventive exam.  Intol of metformin.  Taking glip, trulicity, lantus.  AC breakfast sugars .  Walks 5d per week for exercise.  No f/c/sw/cough.  No cp/sob/palp.  BMs and urine normal.  Taking vit D.    HM: 9/20 today fluvax, 4/17 HAV#2, 3/15 [id number], 3/14 [id number], 10/18 booster at pharm TDaP, 11/12 zoster, 2019 Shingrix x2, 6/16 BMD rep 5y, 5/17 Cscope rep 5y, 6/20 mmg(q2), 1/20 Eye Dr. Roly Olivares , 9/16 HCV neg, 1/20 CT lung repeat due 1y with Pulm.     Review of Systems   Constitutional: Positive for unexpected weight change. Negative for activity change, appetite change, chills, diaphoresis and fever.   HENT: Negative for congestion, ear pain, hearing loss, rhinorrhea, sinus pressure, sore throat and trouble swallowing.    Eyes: Negative for discharge and visual disturbance.   Respiratory: Negative for cough, chest tightness, shortness of breath and wheezing.    Cardiovascular: Negative for chest pain and palpitations.   Gastrointestinal: Negative for blood in stool, constipation, diarrhea, nausea and vomiting.   Endocrine: Negative for polydipsia and polyuria.   Genitourinary: Negative for difficulty urinating, dysuria, frequency, hematuria, menstrual problem, urgency and vaginal discharge.   Musculoskeletal: Positive for arthralgias. Negative for joint swelling and neck pain.   Skin: Negative for rash.   Neurological: Negative for dizziness, weakness and headaches.   Psychiatric/Behavioral: Negative for confusion, dysphoric mood and sleep disturbance. The patient is not nervous/anxious.          Objective:   /72   Pulse 89   Temp 97.5 °F (36.4 °C) (Temporal)   Ht 5' 7" (1.702 m)   Wt 112.6 kg (248 lb 3.8 oz)   SpO2 96%   BMI 38.88 kg/m²     Physical Exam  Constitutional:       Appearance: She is well-developed.   HENT:      Right Ear: External ear " normal. Tympanic membrane is not injected.      Left Ear: External ear normal. Tympanic membrane is not injected.   Eyes:      Conjunctiva/sclera: Conjunctivae normal.   Neck:      Musculoskeletal: Normal range of motion and neck supple.      Thyroid: No thyromegaly.   Cardiovascular:      Rate and Rhythm: Normal rate and regular rhythm.      Pulses:           Dorsalis pedis pulses are 2+ on the right side and 2+ on the left side.        Posterior tibial pulses are 2+ on the right side and 2+ on the left side.      Heart sounds: No murmur. No friction rub. No gallop.    Pulmonary:      Effort: Pulmonary effort is normal.      Breath sounds: Normal breath sounds. No wheezing or rales.   Chest:      Breasts:         Right: No mass, nipple discharge, skin change or tenderness.         Left: No mass, nipple discharge, skin change or tenderness.   Abdominal:      General: Bowel sounds are normal.      Palpations: Abdomen is soft. There is no mass.      Tenderness: There is no abdominal tenderness.   Feet:      Right foot:      Protective Sensation: 10 sites tested. 10 sites sensed.      Skin integrity: No ulcer, blister, skin breakdown, erythema, warmth, callus or dry skin.      Left foot:      Protective Sensation: 10 sites tested. 10 sites sensed.      Skin integrity: No ulcer, blister, skin breakdown, erythema, warmth, callus or dry skin.   Lymphadenopathy:      Cervical: No cervical adenopathy.   Skin:     General: Skin is warm.      Findings: No rash.   Neurological:      Mental Status: She is alert and oriented to person, place, and time.       Results for NORMA HILLS (MRN [id number]) as of 9/21/2020 10:00   Ref. Range 2/3/2020 16:10   TSH Latest Ref Range: 0.400 - 4.000 uIU/mL 2.153       Results for NORMA HILLS (MRN [id number]) as of 9/21/2020 09:47   Ref. Range 7/13/2020 08:15 7/13/2020 08:53 7/19/2020 13:23 7/22/2020 09:12 8/24/2020 08:27   WBC Latest Ref Range: 3.90 - 12.70 K/uL 7.45       RBC Latest Ref  Range: 4.00 - 5.40 M/uL 4.70       Hemoglobin Latest Ref Range: 12.0 - 16.0 g/dL 13.3       Hematocrit Latest Ref Range: 37.0 - 48.5 % 42.3       MCV Latest Ref Range: 82 - 98 fL 90       MCH Latest Ref Range: 27.0 - 31.0 pg 28.3       MCHC Latest Ref Range: 32.0 - 36.0 g/dL 31.4 (L)       RDW Latest Ref Range: 11.5 - 14.5 % 14.8 (H)       Platelets Latest Ref Range: 150 - 350 K/uL 221       MPV Latest Ref Range: 9.2 - 12.9 fL 11.8       Gran% Latest Ref Range: 38.0 - 73.0 % 58.8       Gran # (ANC) Latest Ref Range: 1.8 - 7.7 K/uL 4.4       Lymph% Latest Ref Range: 18.0 - 48.0 % 30.3       Lymph # Latest Ref Range: 1.0 - 4.8 K/uL 2.3       Mono% Latest Ref Range: 4.0 - 15.0 % 7.9       Mono # Latest Ref Range: 0.3 - 1.0 K/uL 0.6       Eosinophil% Latest Ref Range: 0.0 - 8.0 % 2.0       Eos # Latest Ref Range: 0.0 - 0.5 K/uL 0.2       Basophil% Latest Ref Range: 0.0 - 1.9 % 0.5       Baso # Latest Ref Range: 0.00 - 0.20 K/uL 0.04       nRBC Latest Ref Range: 0 /100 WBC 0       Differential Method Unknown Automated       Immature Grans (Abs) Latest Ref Range: 0.00 - 0.04 K/uL 0.04       Immature Granulocytes Latest Ref Range: 0.0 - 0.5 % 0.5       Sodium Latest Ref Range: 136 - 145 mmol/L 139       Potassium Latest Ref Range: 3.5 - 5.1 mmol/L 4.4       Chloride Latest Ref Range: 95 - 110 mmol/L 106       CO2 Latest Ref Range: 23 - 29 mmol/L 28       Anion Gap Latest Ref Range: 8 - 16 mmol/L 5 (L)       BUN, Bld Latest Ref Range: 8 - 23 mg/dL 11       Creatinine Latest Ref Range: 0.5 - 1.4 mg/dL 0.9       eGFR if non African American Latest Ref Range: >60 mL/min/1.73 m^2 >60.0       eGFR if African American Latest Ref Range: >60 mL/min/1.73 m^2 >60.0       Glucose Latest Ref Range: 70 - 110 mg/dL 94       Calcium Latest Ref Range: 8.7 - 10.5 mg/dL 8.8       Alkaline Phosphatase Latest Ref Range: 55 - 135 U/L 65       PROTEIN TOTAL Latest Ref Range: 6.0 - 8.4 g/dL 6.7       Albumin Latest Ref Range: 3.5 - 5.2 g/dL 3.2  (L)       BILIRUBIN TOTAL Latest Ref Range: 0.1 - 1.0 mg/dL 0.7       AST Latest Ref Range: 10 - 40 U/L 20       ALT Latest Ref Range: 10 - 44 U/L 21       Triglycerides Latest Ref Range: 30 - 150 mg/dL 67       Cholesterol Latest Ref Range: 120 - 199 mg/dL 136       HDL Latest Ref Range: 40 - 75 mg/dL 60       Hdl/Cholesterol Ratio Latest Ref Range: 20.0 - 50.0 % 44.1       LDL Cholesterol External Latest Ref Range: 63.0 - 159.0 mg/dL 62.6 (L)       Non-HDL Cholesterol Latest Units: mg/dL 76       Total Cholesterol/HDL Ratio Latest Ref Range: 2.0 - 5.0  2.3       Hemoglobin A1C External Latest Ref Range: 4.0 - 5.6 %     7.7 (H)   Estimated Avg Glucose Latest Ref Range: 68 - 131 mg/dL     174 (H)   SARS-CoV2 (COVID-19) Qualitative PCR Latest Ref Range: Not Detected    Not Detected     Antibody SARS CoV-2 Latest Ref Range: Negative    Negative     Creatinine, Random Ur Latest Ref Range: 15.0 - 325.0 mg/dL  241.0      Microalbum.,U,Random Latest Units: ug/mL  10.0      MICROALB/CREAT RATIO Latest Ref Range: 0.0 - 30.0 ug/mg  4.1        Assessment:       1. Uncontrolled type 2 diabetes mellitus without complication, with long-term current use of insulin    2. Hyperlipidemia associated with type 2 diabetes mellitus    3. Acquired hypothyroidism    4. TALIA on CPAP    5. Multiple lung nodules on CT    6. Encounter for preventive health examination          Plan:     Uncontrolled type 2 diabetes mellitus without complication, with long-term current use of insulin- continue meds and DM nurse.    Hyperlipidemia associated with type 2 diabetes mellitus- doing well, cont statin.    Acquired hypothyroidism- Clinically stable, continue present treatment.    TALIA on CPAP    Multiple lung nodules on CT- CT due 1/21 with Pulm.    Encounter for preventive health examination- fluvax.  RTC 6 mo.

## 2020-10-09 ENCOUNTER — PES CALL (OUTPATIENT)
Dept: ADMINISTRATIVE | Facility: CLINIC | Age: 70
End: 2020-10-09

## 2020-10-22 ENCOUNTER — OFFICE VISIT (OUTPATIENT)
Dept: DIABETES | Facility: CLINIC | Age: 70
End: 2020-10-22
Payer: MEDICARE

## 2020-10-22 VITALS
SYSTOLIC BLOOD PRESSURE: 132 MMHG | WEIGHT: 249.81 LBS | DIASTOLIC BLOOD PRESSURE: 71 MMHG | BODY MASS INDEX: 39.12 KG/M2 | HEART RATE: 75 BPM

## 2020-10-22 DIAGNOSIS — E03.9 ACQUIRED HYPOTHYROIDISM: ICD-10-CM

## 2020-10-22 DIAGNOSIS — E78.5 HYPERLIPIDEMIA ASSOCIATED WITH TYPE 2 DIABETES MELLITUS: ICD-10-CM

## 2020-10-22 DIAGNOSIS — E66.9 OBESITY (BMI 30-39.9): ICD-10-CM

## 2020-10-22 DIAGNOSIS — Z79.4 TYPE 2 DIABETES MELLITUS WITH HYPERGLYCEMIA, WITH LONG-TERM CURRENT USE OF INSULIN: Primary | ICD-10-CM

## 2020-10-22 DIAGNOSIS — G47.33 OSA ON CPAP: ICD-10-CM

## 2020-10-22 DIAGNOSIS — E11.69 HYPERLIPIDEMIA ASSOCIATED WITH TYPE 2 DIABETES MELLITUS: ICD-10-CM

## 2020-10-22 DIAGNOSIS — E11.65 TYPE 2 DIABETES MELLITUS WITH HYPERGLYCEMIA, WITH LONG-TERM CURRENT USE OF INSULIN: Primary | ICD-10-CM

## 2020-10-22 LAB — GLUCOSE SERPL-MCNC: 196 MG/DL (ref 70–110)

## 2020-10-22 PROCEDURE — 82962 POCT GLUCOSE, HAND-HELD DEVICE: ICD-10-PCS | Mod: S$GLB,,, | Performed by: PHYSICIAN ASSISTANT

## 2020-10-22 PROCEDURE — 99999 PR PBB SHADOW E&M-EST. PATIENT-LVL III: ICD-10-PCS | Mod: PBBFAC,,, | Performed by: PHYSICIAN ASSISTANT

## 2020-10-22 PROCEDURE — 3051F PR MOST RECENT HEMOGLOBIN A1C LEVEL 7.0 - < 8.0%: ICD-10-PCS | Mod: CPTII,S$GLB,, | Performed by: PHYSICIAN ASSISTANT

## 2020-10-22 PROCEDURE — 1101F PR PT FALLS ASSESS DOC 0-1 FALLS W/OUT INJ PAST YR: ICD-10-PCS | Mod: CPTII,S$GLB,, | Performed by: PHYSICIAN ASSISTANT

## 2020-10-22 PROCEDURE — 3075F SYST BP GE 130 - 139MM HG: CPT | Mod: CPTII,S$GLB,, | Performed by: PHYSICIAN ASSISTANT

## 2020-10-22 PROCEDURE — 1126F AMNT PAIN NOTED NONE PRSNT: CPT | Mod: S$GLB,,, | Performed by: PHYSICIAN ASSISTANT

## 2020-10-22 PROCEDURE — 3008F PR BODY MASS INDEX (BMI) DOCUMENTED: ICD-10-PCS | Mod: CPTII,S$GLB,, | Performed by: PHYSICIAN ASSISTANT

## 2020-10-22 PROCEDURE — 99999 PR PBB SHADOW E&M-EST. PATIENT-LVL III: CPT | Mod: PBBFAC,,, | Performed by: PHYSICIAN ASSISTANT

## 2020-10-22 PROCEDURE — 3008F BODY MASS INDEX DOCD: CPT | Mod: CPTII,S$GLB,, | Performed by: PHYSICIAN ASSISTANT

## 2020-10-22 PROCEDURE — 3075F PR MOST RECENT SYSTOLIC BLOOD PRESS GE 130-139MM HG: ICD-10-PCS | Mod: CPTII,S$GLB,, | Performed by: PHYSICIAN ASSISTANT

## 2020-10-22 PROCEDURE — 99499 UNLISTED E&M SERVICE: CPT | Mod: S$GLB,,, | Performed by: PHYSICIAN ASSISTANT

## 2020-10-22 PROCEDURE — 1159F MED LIST DOCD IN RCRD: CPT | Mod: S$GLB,,, | Performed by: PHYSICIAN ASSISTANT

## 2020-10-22 PROCEDURE — 82962 GLUCOSE BLOOD TEST: CPT | Mod: S$GLB,,, | Performed by: PHYSICIAN ASSISTANT

## 2020-10-22 PROCEDURE — 3078F DIAST BP <80 MM HG: CPT | Mod: CPTII,S$GLB,, | Performed by: PHYSICIAN ASSISTANT

## 2020-10-22 PROCEDURE — 3078F PR MOST RECENT DIASTOLIC BLOOD PRESSURE < 80 MM HG: ICD-10-PCS | Mod: CPTII,S$GLB,, | Performed by: PHYSICIAN ASSISTANT

## 2020-10-22 PROCEDURE — 3051F HG A1C>EQUAL 7.0%<8.0%: CPT | Mod: CPTII,S$GLB,, | Performed by: PHYSICIAN ASSISTANT

## 2020-10-22 PROCEDURE — 1159F PR MEDICATION LIST DOCUMENTED IN MEDICAL RECORD: ICD-10-PCS | Mod: S$GLB,,, | Performed by: PHYSICIAN ASSISTANT

## 2020-10-22 PROCEDURE — 1126F PR PAIN SEVERITY QUANTIFIED, NO PAIN PRESENT: ICD-10-PCS | Mod: S$GLB,,, | Performed by: PHYSICIAN ASSISTANT

## 2020-10-22 PROCEDURE — 99214 OFFICE O/P EST MOD 30 MIN: CPT | Mod: S$GLB,,, | Performed by: PHYSICIAN ASSISTANT

## 2020-10-22 PROCEDURE — 1101F PT FALLS ASSESS-DOCD LE1/YR: CPT | Mod: CPTII,S$GLB,, | Performed by: PHYSICIAN ASSISTANT

## 2020-10-22 PROCEDURE — 99499 RISK ADDL DX/OHS AUDIT: ICD-10-PCS | Mod: S$GLB,,, | Performed by: PHYSICIAN ASSISTANT

## 2020-10-22 PROCEDURE — 99214 PR OFFICE/OUTPT VISIT, EST, LEVL IV, 30-39 MIN: ICD-10-PCS | Mod: S$GLB,,, | Performed by: PHYSICIAN ASSISTANT

## 2020-10-22 NOTE — PATIENT INSTRUCTIONS
CURRENT DM MEDICATIONS:    Lantus 22 units qhs - change Lantus to 16 units the night before Trulicity increase   Trulicity 3 mg weekly   Glipizide 10 mg before dinner and 5 mg per snack

## 2020-10-22 NOTE — PROGRESS NOTES
PCP: Jessica Luna MD    Subjective:     Chief Complaint: Diabetes - Established Patient    HISTORY OF PRESENT ILLNESS: 70 y.o.   female presenting for diabetes management visit.   Patient has previously been established with the Diabetes Management Department and was last seen by myself on 07/22/20.   Patient has had Type II diabetes since 20 or more years.  Pertinent to decision making is the following comorbidities: HLD, Hypothyroidism and Obesity by BMI  Patient has the following Diabetes complications: without complications  She  has attended diabetes education in the past.     Patient's most recent A1c of 7.7% was completed 2 months ago.   Patient states since Her last A1c Her blood glucose levels have been high  in the evening. Patient relates to higher carb intake.   Patient monitors blood glucose 2 times per day with meter : Fasting and Before Bed.   Patient blood glucose monitoring device will not be uploaded into Media Section today secondary to patient forgetting device.  Per log review, fasting blood sugars are ranging from 69 - 150 and before bed blood sugars ranging from 173 - 335.   Patient endorses the following diabetes related symptoms: None.   Patient is due today for the following diabetes-related health maintenance standards: None - up to date.   She denies any recent hospital admissions or emergency room visits. Patient denies history of DKA.   She denies having hypoglycemia.   Patient's concerns today include glycemic control.  Patient medication regimen is as below.     CURRENT DM MEDICATIONS:    Lantus 24 units qhs   Trulicity 1.5 mg weekly   Glipizide 10 mg before dinner and 5 mg per snack     Patient has failed the following Diabetes medications:    Metformin - GI    Invokana - coverage   Glyburide    Victoza    Basal - Basaglar      Labs Reviewed.       Lab Results   Component Value Date    CPEPTIDE 1.94 05/25/2017     Lab Results   Component Value Date     GLUTAMICACID 0.00 05/25/2017          //  Weight: 113.3 kg (249 lb 12.5 oz), Body mass index is 39.12 kg/m².  Her blood sugar in clinic today is:    Lab Results   Component Value Date    POCGLU 196 (A) 10/22/2020       Review of Systems   Constitutional: Negative for activity change, appetite change, chills and fever.   HENT: Negative for dental problem, mouth sores, nosebleeds, sore throat and trouble swallowing.    Eyes: Negative for pain and discharge.   Respiratory: Negative for shortness of breath, wheezing and stridor.    Cardiovascular: Negative for chest pain, palpitations and leg swelling.   Gastrointestinal: Negative for abdominal pain, diarrhea, nausea and vomiting.   Endocrine: Negative for polydipsia, polyphagia and polyuria.   Genitourinary: Negative for dysuria, frequency and urgency.   Musculoskeletal: Negative for joint swelling and myalgias.   Skin: Negative for rash and wound.   Neurological: Negative for dizziness, syncope, weakness and headaches.   Psychiatric/Behavioral: Negative for behavioral problems and dysphoric mood.         Diabetes Management Status  Statin: Taking  ACE/ARB: Taking    Screening or Prevention Patient's value Goal Complete/Controlled?   HgA1C Testing and Control   Lab Results   Component Value Date    HGBA1C 7.7 (H) 08/24/2020      Annually/Less than 8% No   Lipid profile : 07/13/2020 Annually Yes   LDL control Lab Results   Component Value Date    LDLCALC 62.6 (L) 07/13/2020    Annually/Less than 100 mg/dl  Yes   Nephropathy screening Lab Results   Component Value Date    MICALBCREAT 4.1 07/13/2020     Lab Results   Component Value Date    PROTEINUA Negative 02/03/2020    Annually Yes   Blood pressure BP Readings from Last 1 Encounters:   09/21/20 124/72    Less than 140/90 Yes   Dilated retinal exam : 01/27/2020 Annually Yes    Foot exam   : 09/21/2020 Annually Yes     ACTIVITY LEVEL: Moderately Active. Discussed activities, benefits, methods, and precautions.  MEAL  PLANNING: Patient reports number of meals per day to be 3 and number of snacks per day to be 2.   Patient is encouraged to carb count and consume no more than 30 - 45 grams of carbohydrates in each meal and 15 grams of carbohydrates in each snack.     Social History     Socioeconomic History    Marital status:      Spouse name: Not on file    Number of children: Not on file    Years of education: Not on file    Highest education level: Not on file   Occupational History    Not on file   Social Needs    Financial resource strain: Not hard at all    Food insecurity     Worry: Never true     Inability: Never true    Transportation needs     Medical: No     Non-medical: No   Tobacco Use    Smoking status: Never Smoker    Smokeless tobacco: Never Used   Substance and Sexual Activity    Alcohol use: Yes     Frequency: 2-4 times a month     Drinks per session: 1 or 2     Binge frequency: Never     Comment: rare    Drug use: No    Sexual activity: Yes     Partners: Male   Lifestyle    Physical activity     Days per week: 1 day     Minutes per session: 10 min    Stress: Only a little   Relationships    Social connections     Talks on phone: Once a week     Gets together: More than three times a week     Attends Ep[employer] service: Not on file     Active member of club or organization: Yes     Attends meetings of clubs or organizations: More than 4 times per year     Relationship status:    Other Topics Concern    Are you pregnant or think you may be? Not Asked    Breast-feeding Not Asked   Social History Narrative    . Lives with spouse. Has 3 children. Patient retired as  for [employer].      Past Medical History:   Diagnosis Date    Diabetes mellitus type II     Hyperlipidemia     Hypertension     Hypothyroid     TALIA (obstructive sleep apnea)     on CPAP    Osteopenia     Psoriasis        Objective:        Physical Exam  Constitutional:       General: She is not  in acute distress.     Appearance: She is well-developed. She is not diaphoretic.   HENT:      Head: Normocephalic and atraumatic.      Right Ear: External ear normal.      Left Ear: External ear normal.      Nose: Nose normal.   Eyes:      General:         Right eye: No discharge.         Left eye: No discharge.   Neck:      Musculoskeletal: Normal range of motion.   Pulmonary:      Effort: Pulmonary effort is normal. No respiratory distress.      Breath sounds: No stridor.   Musculoskeletal: Normal range of motion.   Skin:     Coloration: Skin is not pale.   Neurological:      Mental Status: She is alert and oriented to person, place, and time.      Motor: No abnormal muscle tone.      Coordination: Coordination normal.   Psychiatric:         Behavior: Behavior normal.         Thought Content: Thought content normal.         Judgment: Judgment normal.             Assessment / Plan:     Type 2 diabetes mellitus with hyperglycemia, with long-term current use of insulin  -     POCT Glucose, Hand-Held Device  -     dulaglutide (TRULICITY) 3 mg/0.5 mL pen injector; Inject 3 mg into the skin once a week.  Dispense: 12 pen; Refill: 3    Hyperlipidemia associated with type 2 diabetes mellitus    Acquired hypothyroidism    TALIA on CPAP    Obesity (BMI 30-39.9)      Additional Plan Details:    - POCT Glucose  - Encouraged continuation of lifestyle changes including regular exercise and limiting carbohydrates to 30-45 grams per meal threes times daily and 15 grams per snack with a limit of two daily.   - Encouraged continued monitoring of blood glucose with an increase to 3 times daily at Fasting, Before Dinner and Before Bed.    - Current DM Medication Regimen: Change Lantus to 16 units at night with Trulicity change. Change Trulicity to 3 mg weekly. Continue Glipizide 10 mg before dinner - and 1 tab as needed for snack greater 30g.   - Health Maintenance standards addressed today: None  - Nursing Visit: Patient is age 78 or  younger with an A1c of 7.5 or greater and qualifies for nursing visit, but will defer for now. .   - Follow up in 3 months with A1c prior.       Blakeney McKnight, PA-C [facility]    A total of 30 minutes was spent in face to face time, of which over 50 % was spent in counseling patient on disease process, complications, treatment, and side effects of medications.

## 2020-12-16 ENCOUNTER — PATIENT MESSAGE (OUTPATIENT)
Dept: DIABETES | Facility: CLINIC | Age: 70
End: 2020-12-16

## 2020-12-29 DIAGNOSIS — R91.8 MULTIPLE LUNG NODULES: ICD-10-CM

## 2021-01-05 ENCOUNTER — IMMUNIZATION (OUTPATIENT)
Dept: INTERNAL MEDICINE | Facility: CLINIC | Age: 71
End: 2021-01-05
Payer: MEDICARE

## 2021-01-05 DIAGNOSIS — Z23 NEED FOR VACCINATION: ICD-10-CM

## 2021-01-05 PROCEDURE — 91300 COVID-19, MRNA, LNP-S, PF, 30 MCG/0.3 ML DOSE VACCINE: CPT | Mod: PBBFAC | Performed by: FAMILY MEDICINE

## 2021-01-11 ENCOUNTER — TELEPHONE (OUTPATIENT)
Dept: PULMONOLOGY | Facility: CLINIC | Age: 71
End: 2021-01-11

## 2021-01-11 ENCOUNTER — LAB VISIT (OUTPATIENT)
Dept: LAB | Facility: HOSPITAL | Age: 71
End: 2021-01-11
Attending: PHYSICIAN ASSISTANT
Payer: MEDICARE

## 2021-01-11 DIAGNOSIS — E11.65 TYPE 2 DIABETES MELLITUS WITH HYPERGLYCEMIA, WITH LONG-TERM CURRENT USE OF INSULIN: ICD-10-CM

## 2021-01-11 DIAGNOSIS — Z79.4 TYPE 2 DIABETES MELLITUS WITH HYPERGLYCEMIA, WITH LONG-TERM CURRENT USE OF INSULIN: ICD-10-CM

## 2021-01-11 LAB
ESTIMATED AVG GLUCOSE: 229 MG/DL (ref 68–131)
HBA1C MFR BLD HPLC: 9.6 % (ref 4–5.6)

## 2021-01-11 PROCEDURE — 83036 HEMOGLOBIN GLYCOSYLATED A1C: CPT

## 2021-01-11 PROCEDURE — 36415 COLL VENOUS BLD VENIPUNCTURE: CPT

## 2021-01-19 ENCOUNTER — OFFICE VISIT (OUTPATIENT)
Dept: PULMONOLOGY | Facility: CLINIC | Age: 71
End: 2021-01-19
Payer: MEDICARE

## 2021-01-19 ENCOUNTER — HOSPITAL ENCOUNTER (OUTPATIENT)
Dept: RADIOLOGY | Facility: HOSPITAL | Age: 71
Discharge: HOME | End: 2021-01-19
Attending: INTERNAL MEDICINE
Payer: MEDICARE

## 2021-01-19 VITALS
DIASTOLIC BLOOD PRESSURE: 80 MMHG | RESPIRATION RATE: 18 BRPM | BODY MASS INDEX: 38.58 KG/M2 | HEART RATE: 84 BPM | SYSTOLIC BLOOD PRESSURE: 112 MMHG | OXYGEN SATURATION: 95 % | HEIGHT: 67 IN | WEIGHT: 245.81 LBS

## 2021-01-19 DIAGNOSIS — G47.33 OSA ON CPAP: Primary | ICD-10-CM

## 2021-01-19 DIAGNOSIS — G47.09 OTHER INSOMNIA: ICD-10-CM

## 2021-01-19 DIAGNOSIS — R91.8 MULTIPLE LUNG NODULES ON CT: ICD-10-CM

## 2021-01-19 DIAGNOSIS — R91.1 LUNG NODULE: ICD-10-CM

## 2021-01-19 DIAGNOSIS — E66.01 CLASS 2 SEVERE OBESITY DUE TO EXCESS CALORIES WITH SERIOUS COMORBIDITY AND BODY MASS INDEX (BMI) OF 38.0 TO 38.9 IN ADULT: ICD-10-CM

## 2021-01-19 DIAGNOSIS — R91.1 SOLITARY PULMONARY NODULE: ICD-10-CM

## 2021-01-19 PROBLEM — E66.09 CLASS 2 OBESITY DUE TO EXCESS CALORIES WITH BODY MASS INDEX (BMI) OF 38.0 TO 38.9 IN ADULT: Status: ACTIVE | Noted: 2021-01-19

## 2021-01-19 PROBLEM — E66.812 CLASS 2 OBESITY DUE TO EXCESS CALORIES WITH BODY MASS INDEX (BMI) OF 38.0 TO 38.9 IN ADULT: Status: ACTIVE | Noted: 2021-01-19

## 2021-01-19 PROCEDURE — 1159F MED LIST DOCD IN RCRD: CPT | Mod: S$GLB,,, | Performed by: INTERNAL MEDICINE

## 2021-01-19 PROCEDURE — 3079F PR MOST RECENT DIASTOLIC BLOOD PRESSURE 80-89 MM HG: ICD-10-PCS | Mod: CPTII,S$GLB,, | Performed by: INTERNAL MEDICINE

## 2021-01-19 PROCEDURE — 1101F PR PT FALLS ASSESS DOC 0-1 FALLS W/OUT INJ PAST YR: ICD-10-PCS | Mod: CPTII,S$GLB,, | Performed by: INTERNAL MEDICINE

## 2021-01-19 PROCEDURE — 71250 CT CHEST WITHOUT CONTRAST: ICD-10-PCS | Mod: 26,,, | Performed by: RADIOLOGY

## 2021-01-19 PROCEDURE — 3008F PR BODY MASS INDEX (BMI) DOCUMENTED: ICD-10-PCS | Mod: CPTII,S$GLB,, | Performed by: INTERNAL MEDICINE

## 2021-01-19 PROCEDURE — 1159F PR MEDICATION LIST DOCUMENTED IN MEDICAL RECORD: ICD-10-PCS | Mod: S$GLB,,, | Performed by: INTERNAL MEDICINE

## 2021-01-19 PROCEDURE — 99214 PR OFFICE/OUTPT VISIT, EST, LEVL IV, 30-39 MIN: ICD-10-PCS | Mod: S$GLB,,, | Performed by: INTERNAL MEDICINE

## 2021-01-19 PROCEDURE — 3079F DIAST BP 80-89 MM HG: CPT | Mod: CPTII,S$GLB,, | Performed by: INTERNAL MEDICINE

## 2021-01-19 PROCEDURE — 99999 PR PBB SHADOW E&M-EST. PATIENT-LVL III: CPT | Mod: PBBFAC,,, | Performed by: INTERNAL MEDICINE

## 2021-01-19 PROCEDURE — 71250 CT THORAX DX C-: CPT | Mod: TC

## 2021-01-19 PROCEDURE — 71250 CT THORAX DX C-: CPT | Mod: 26,,, | Performed by: RADIOLOGY

## 2021-01-19 PROCEDURE — 99999 PR PBB SHADOW E&M-EST. PATIENT-LVL III: ICD-10-PCS | Mod: PBBFAC,,, | Performed by: INTERNAL MEDICINE

## 2021-01-19 PROCEDURE — 3288F FALL RISK ASSESSMENT DOCD: CPT | Mod: CPTII,S$GLB,, | Performed by: INTERNAL MEDICINE

## 2021-01-19 PROCEDURE — 3008F BODY MASS INDEX DOCD: CPT | Mod: CPTII,S$GLB,, | Performed by: INTERNAL MEDICINE

## 2021-01-19 PROCEDURE — 99499 RISK ADDL DX/OHS AUDIT: ICD-10-PCS | Mod: S$GLB,,, | Performed by: INTERNAL MEDICINE

## 2021-01-19 PROCEDURE — 99499 UNLISTED E&M SERVICE: CPT | Mod: S$GLB,,, | Performed by: INTERNAL MEDICINE

## 2021-01-19 PROCEDURE — 1101F PT FALLS ASSESS-DOCD LE1/YR: CPT | Mod: CPTII,S$GLB,, | Performed by: INTERNAL MEDICINE

## 2021-01-19 PROCEDURE — 3074F SYST BP LT 130 MM HG: CPT | Mod: CPTII,S$GLB,, | Performed by: INTERNAL MEDICINE

## 2021-01-19 PROCEDURE — 3288F PR FALLS RISK ASSESSMENT DOCUMENTED: ICD-10-PCS | Mod: CPTII,S$GLB,, | Performed by: INTERNAL MEDICINE

## 2021-01-19 PROCEDURE — 99214 OFFICE O/P EST MOD 30 MIN: CPT | Mod: S$GLB,,, | Performed by: INTERNAL MEDICINE

## 2021-01-19 PROCEDURE — 3074F PR MOST RECENT SYSTOLIC BLOOD PRESSURE < 130 MM HG: ICD-10-PCS | Mod: CPTII,S$GLB,, | Performed by: INTERNAL MEDICINE

## 2021-01-25 ENCOUNTER — PATIENT MESSAGE (OUTPATIENT)
Dept: DIABETES | Facility: CLINIC | Age: 71
End: 2021-01-25

## 2021-01-26 ENCOUNTER — OFFICE VISIT (OUTPATIENT)
Dept: DIABETES | Facility: CLINIC | Age: 71
End: 2021-01-26
Payer: MEDICARE

## 2021-01-26 ENCOUNTER — IMMUNIZATION (OUTPATIENT)
Dept: INTERNAL MEDICINE | Facility: CLINIC | Age: 71
End: 2021-01-26
Payer: MEDICARE

## 2021-01-26 VITALS
SYSTOLIC BLOOD PRESSURE: 125 MMHG | DIASTOLIC BLOOD PRESSURE: 76 MMHG | HEART RATE: 90 BPM | WEIGHT: 245.56 LBS | BODY MASS INDEX: 38.47 KG/M2

## 2021-01-26 DIAGNOSIS — E66.9 OBESITY (BMI 30-39.9): ICD-10-CM

## 2021-01-26 DIAGNOSIS — Z23 NEED FOR VACCINATION: Primary | ICD-10-CM

## 2021-01-26 DIAGNOSIS — Z79.4 TYPE 2 DIABETES MELLITUS WITH HYPERGLYCEMIA, WITH LONG-TERM CURRENT USE OF INSULIN: Primary | ICD-10-CM

## 2021-01-26 DIAGNOSIS — E03.9 ACQUIRED HYPOTHYROIDISM: ICD-10-CM

## 2021-01-26 DIAGNOSIS — E78.5 HYPERLIPIDEMIA ASSOCIATED WITH TYPE 2 DIABETES MELLITUS: ICD-10-CM

## 2021-01-26 DIAGNOSIS — E11.69 HYPERLIPIDEMIA ASSOCIATED WITH TYPE 2 DIABETES MELLITUS: ICD-10-CM

## 2021-01-26 DIAGNOSIS — E11.65 TYPE 2 DIABETES MELLITUS WITH HYPERGLYCEMIA, WITH LONG-TERM CURRENT USE OF INSULIN: Primary | ICD-10-CM

## 2021-01-26 DIAGNOSIS — G47.33 OSA ON CPAP: ICD-10-CM

## 2021-01-26 LAB — GLUCOSE SERPL-MCNC: 174 MG/DL (ref 70–110)

## 2021-01-26 PROCEDURE — 99999 PR PBB SHADOW E&M-EST. PATIENT-LVL III: ICD-10-PCS | Mod: PBBFAC,,, | Performed by: PHYSICIAN ASSISTANT

## 2021-01-26 PROCEDURE — 3046F HEMOGLOBIN A1C LEVEL >9.0%: CPT | Mod: CPTII,S$GLB,, | Performed by: PHYSICIAN ASSISTANT

## 2021-01-26 PROCEDURE — 99214 OFFICE O/P EST MOD 30 MIN: CPT | Mod: S$GLB,,, | Performed by: PHYSICIAN ASSISTANT

## 2021-01-26 PROCEDURE — 3008F BODY MASS INDEX DOCD: CPT | Mod: CPTII,S$GLB,, | Performed by: PHYSICIAN ASSISTANT

## 2021-01-26 PROCEDURE — 3078F PR MOST RECENT DIASTOLIC BLOOD PRESSURE < 80 MM HG: ICD-10-PCS | Mod: CPTII,S$GLB,, | Performed by: PHYSICIAN ASSISTANT

## 2021-01-26 PROCEDURE — 99999 PR PBB SHADOW E&M-EST. PATIENT-LVL III: CPT | Mod: PBBFAC,,, | Performed by: PHYSICIAN ASSISTANT

## 2021-01-26 PROCEDURE — 3288F FALL RISK ASSESSMENT DOCD: CPT | Mod: CPTII,S$GLB,, | Performed by: PHYSICIAN ASSISTANT

## 2021-01-26 PROCEDURE — 91300 COVID-19, MRNA, LNP-S, PF, 30 MCG/0.3 ML DOSE VACCINE: CPT | Mod: PBBFAC | Performed by: FAMILY MEDICINE

## 2021-01-26 PROCEDURE — 1101F PT FALLS ASSESS-DOCD LE1/YR: CPT | Mod: CPTII,S$GLB,, | Performed by: PHYSICIAN ASSISTANT

## 2021-01-26 PROCEDURE — 1159F MED LIST DOCD IN RCRD: CPT | Mod: S$GLB,,, | Performed by: PHYSICIAN ASSISTANT

## 2021-01-26 PROCEDURE — 3074F SYST BP LT 130 MM HG: CPT | Mod: CPTII,S$GLB,, | Performed by: PHYSICIAN ASSISTANT

## 2021-01-26 PROCEDURE — 3288F PR FALLS RISK ASSESSMENT DOCUMENTED: ICD-10-PCS | Mod: CPTII,S$GLB,, | Performed by: PHYSICIAN ASSISTANT

## 2021-01-26 PROCEDURE — 82962 GLUCOSE BLOOD TEST: CPT | Mod: S$GLB,,, | Performed by: PHYSICIAN ASSISTANT

## 2021-01-26 PROCEDURE — 3046F PR MOST RECENT HEMOGLOBIN A1C LEVEL > 9.0%: ICD-10-PCS | Mod: CPTII,S$GLB,, | Performed by: PHYSICIAN ASSISTANT

## 2021-01-26 PROCEDURE — 0002A COVID-19, MRNA, LNP-S, PF, 30 MCG/0.3 ML DOSE VACCINE: CPT | Mod: PBBFAC | Performed by: FAMILY MEDICINE

## 2021-01-26 PROCEDURE — 99214 PR OFFICE/OUTPT VISIT, EST, LEVL IV, 30-39 MIN: ICD-10-PCS | Mod: S$GLB,,, | Performed by: PHYSICIAN ASSISTANT

## 2021-01-26 PROCEDURE — 1159F PR MEDICATION LIST DOCUMENTED IN MEDICAL RECORD: ICD-10-PCS | Mod: S$GLB,,, | Performed by: PHYSICIAN ASSISTANT

## 2021-01-26 PROCEDURE — 3074F PR MOST RECENT SYSTOLIC BLOOD PRESSURE < 130 MM HG: ICD-10-PCS | Mod: CPTII,S$GLB,, | Performed by: PHYSICIAN ASSISTANT

## 2021-01-26 PROCEDURE — 1101F PR PT FALLS ASSESS DOC 0-1 FALLS W/OUT INJ PAST YR: ICD-10-PCS | Mod: CPTII,S$GLB,, | Performed by: PHYSICIAN ASSISTANT

## 2021-01-26 PROCEDURE — 3008F PR BODY MASS INDEX (BMI) DOCUMENTED: ICD-10-PCS | Mod: CPTII,S$GLB,, | Performed by: PHYSICIAN ASSISTANT

## 2021-01-26 PROCEDURE — 82962 POCT GLUCOSE, HAND-HELD DEVICE: ICD-10-PCS | Mod: S$GLB,,, | Performed by: PHYSICIAN ASSISTANT

## 2021-01-26 PROCEDURE — 3078F DIAST BP <80 MM HG: CPT | Mod: CPTII,S$GLB,, | Performed by: PHYSICIAN ASSISTANT

## 2021-01-26 RX ORDER — DULAGLUTIDE 4.5 MG/.5ML
4.5 INJECTION, SOLUTION SUBCUTANEOUS WEEKLY
Qty: 12 PEN | Refills: 3 | Status: SHIPPED | OUTPATIENT
Start: 2021-01-26 | End: 2021-09-02 | Stop reason: SDUPTHER

## 2021-01-26 RX ORDER — EMPAGLIFLOZIN 10 MG/1
10 TABLET, FILM COATED ORAL DAILY
Qty: 90 TABLET | Refills: 3 | Status: SHIPPED | OUTPATIENT
Start: 2021-01-26 | End: 2021-10-29

## 2021-01-27 ENCOUNTER — LAB VISIT (OUTPATIENT)
Dept: LAB | Facility: HOSPITAL | Age: 71
End: 2021-01-27
Attending: PHYSICIAN ASSISTANT
Payer: MEDICARE

## 2021-01-27 DIAGNOSIS — Z79.4 TYPE 2 DIABETES MELLITUS WITH HYPERGLYCEMIA, WITH LONG-TERM CURRENT USE OF INSULIN: ICD-10-CM

## 2021-01-27 DIAGNOSIS — E11.65 TYPE 2 DIABETES MELLITUS WITH HYPERGLYCEMIA, WITH LONG-TERM CURRENT USE OF INSULIN: ICD-10-CM

## 2021-01-27 LAB
ALBUMIN SERPL BCP-MCNC: 3.4 G/DL (ref 3.5–5.2)
ALP SERPL-CCNC: 85 U/L (ref 55–135)
ALT SERPL W/O P-5'-P-CCNC: 14 U/L (ref 10–44)
ANION GAP SERPL CALC-SCNC: 9 MMOL/L (ref 8–16)
AST SERPL-CCNC: 12 U/L (ref 10–40)
BILIRUB SERPL-MCNC: 1.1 MG/DL (ref 0.1–1)
BUN SERPL-MCNC: 14 MG/DL (ref 8–23)
CALCIUM SERPL-MCNC: 9 MG/DL (ref 8.7–10.5)
CHLORIDE SERPL-SCNC: 102 MMOL/L (ref 95–110)
CO2 SERPL-SCNC: 25 MMOL/L (ref 23–29)
CREAT SERPL-MCNC: 0.9 MG/DL (ref 0.5–1.4)
EST. GFR  (AFRICAN AMERICAN): >60 ML/MIN/1.73 M^2
EST. GFR  (NON AFRICAN AMERICAN): >60 ML/MIN/1.73 M^2
GLUCOSE SERPL-MCNC: 220 MG/DL (ref 70–110)
POTASSIUM SERPL-SCNC: 4.3 MMOL/L (ref 3.5–5.1)
PROT SERPL-MCNC: 7.1 G/DL (ref 6–8.4)
SODIUM SERPL-SCNC: 136 MMOL/L (ref 136–145)

## 2021-01-27 PROCEDURE — 36415 COLL VENOUS BLD VENIPUNCTURE: CPT

## 2021-01-27 PROCEDURE — 80053 COMPREHEN METABOLIC PANEL: CPT

## 2021-02-23 ENCOUNTER — PATIENT MESSAGE (OUTPATIENT)
Dept: FAMILY MEDICINE | Facility: CLINIC | Age: 71
End: 2021-02-23

## 2021-02-23 DIAGNOSIS — R29.898 WEAKNESS OF BOTH LOWER EXTREMITIES: Primary | ICD-10-CM

## 2021-03-09 ENCOUNTER — CLINICAL SUPPORT (OUTPATIENT)
Dept: REHABILITATION | Facility: HOSPITAL | Age: 71
End: 2021-03-09
Attending: INTERNAL MEDICINE
Payer: MEDICARE

## 2021-03-09 ENCOUNTER — PATIENT MESSAGE (OUTPATIENT)
Dept: FAMILY MEDICINE | Facility: CLINIC | Age: 71
End: 2021-03-09

## 2021-03-09 DIAGNOSIS — R29.898 WEAKNESS OF BOTH LOWER EXTREMITIES: ICD-10-CM

## 2021-03-09 PROCEDURE — 97161 PT EVAL LOW COMPLEX 20 MIN: CPT

## 2021-03-09 PROCEDURE — 97110 THERAPEUTIC EXERCISES: CPT

## 2021-03-22 ENCOUNTER — PATIENT MESSAGE (OUTPATIENT)
Dept: FAMILY MEDICINE | Facility: CLINIC | Age: 71
End: 2021-03-22

## 2021-03-22 ENCOUNTER — OFFICE VISIT (OUTPATIENT)
Dept: FAMILY MEDICINE | Facility: CLINIC | Age: 71
End: 2021-03-22
Payer: MEDICARE

## 2021-03-22 VITALS
HEIGHT: 67 IN | SYSTOLIC BLOOD PRESSURE: 124 MMHG | BODY MASS INDEX: 37.65 KG/M2 | WEIGHT: 239.88 LBS | OXYGEN SATURATION: 97 % | TEMPERATURE: 98 F | HEART RATE: 87 BPM | DIASTOLIC BLOOD PRESSURE: 76 MMHG

## 2021-03-22 DIAGNOSIS — Z29.9 PREVENTIVE MEASURE: ICD-10-CM

## 2021-03-22 DIAGNOSIS — Z78.0 ASYMPTOMATIC POSTMENOPAUSAL STATE: ICD-10-CM

## 2021-03-22 DIAGNOSIS — G47.33 OSA ON CPAP: ICD-10-CM

## 2021-03-22 DIAGNOSIS — R53.81 MALAISE AND FATIGUE: ICD-10-CM

## 2021-03-22 DIAGNOSIS — R53.83 MALAISE AND FATIGUE: ICD-10-CM

## 2021-03-22 DIAGNOSIS — E11.69 HYPERLIPIDEMIA ASSOCIATED WITH TYPE 2 DIABETES MELLITUS: ICD-10-CM

## 2021-03-22 DIAGNOSIS — L40.9 PSORIASIS: ICD-10-CM

## 2021-03-22 DIAGNOSIS — E55.9 VITAMIN D DEFICIENCY: ICD-10-CM

## 2021-03-22 DIAGNOSIS — E03.9 ACQUIRED HYPOTHYROIDISM: Primary | ICD-10-CM

## 2021-03-22 DIAGNOSIS — E11.65 UNCONTROLLED TYPE 2 DIABETES MELLITUS WITH HYPERGLYCEMIA: ICD-10-CM

## 2021-03-22 DIAGNOSIS — E78.5 HYPERLIPIDEMIA ASSOCIATED WITH TYPE 2 DIABETES MELLITUS: ICD-10-CM

## 2021-03-22 PROCEDURE — 3008F PR BODY MASS INDEX (BMI) DOCUMENTED: ICD-10-PCS | Mod: CPTII,S$GLB,, | Performed by: INTERNAL MEDICINE

## 2021-03-22 PROCEDURE — 99999 PR PBB SHADOW E&M-EST. PATIENT-LVL IV: ICD-10-PCS | Mod: PBBFAC,,, | Performed by: INTERNAL MEDICINE

## 2021-03-22 PROCEDURE — 3074F SYST BP LT 130 MM HG: CPT | Mod: CPTII,S$GLB,, | Performed by: INTERNAL MEDICINE

## 2021-03-22 PROCEDURE — 3288F FALL RISK ASSESSMENT DOCD: CPT | Mod: CPTII,S$GLB,, | Performed by: INTERNAL MEDICINE

## 2021-03-22 PROCEDURE — 3078F PR MOST RECENT DIASTOLIC BLOOD PRESSURE < 80 MM HG: ICD-10-PCS | Mod: CPTII,S$GLB,, | Performed by: INTERNAL MEDICINE

## 2021-03-22 PROCEDURE — 3008F BODY MASS INDEX DOCD: CPT | Mod: CPTII,S$GLB,, | Performed by: INTERNAL MEDICINE

## 2021-03-22 PROCEDURE — 1126F PR PAIN SEVERITY QUANTIFIED, NO PAIN PRESENT: ICD-10-PCS | Mod: S$GLB,,, | Performed by: INTERNAL MEDICINE

## 2021-03-22 PROCEDURE — 1159F MED LIST DOCD IN RCRD: CPT | Mod: S$GLB,,, | Performed by: INTERNAL MEDICINE

## 2021-03-22 PROCEDURE — 3078F DIAST BP <80 MM HG: CPT | Mod: CPTII,S$GLB,, | Performed by: INTERNAL MEDICINE

## 2021-03-22 PROCEDURE — 3288F PR FALLS RISK ASSESSMENT DOCUMENTED: ICD-10-PCS | Mod: CPTII,S$GLB,, | Performed by: INTERNAL MEDICINE

## 2021-03-22 PROCEDURE — 3046F HEMOGLOBIN A1C LEVEL >9.0%: CPT | Mod: CPTII,S$GLB,, | Performed by: INTERNAL MEDICINE

## 2021-03-22 PROCEDURE — 1159F PR MEDICATION LIST DOCUMENTED IN MEDICAL RECORD: ICD-10-PCS | Mod: S$GLB,,, | Performed by: INTERNAL MEDICINE

## 2021-03-22 PROCEDURE — 3046F PR MOST RECENT HEMOGLOBIN A1C LEVEL > 9.0%: ICD-10-PCS | Mod: CPTII,S$GLB,, | Performed by: INTERNAL MEDICINE

## 2021-03-22 PROCEDURE — 99214 OFFICE O/P EST MOD 30 MIN: CPT | Mod: S$GLB,,, | Performed by: INTERNAL MEDICINE

## 2021-03-22 PROCEDURE — 1101F PR PT FALLS ASSESS DOC 0-1 FALLS W/OUT INJ PAST YR: ICD-10-PCS | Mod: CPTII,S$GLB,, | Performed by: INTERNAL MEDICINE

## 2021-03-22 PROCEDURE — 1126F AMNT PAIN NOTED NONE PRSNT: CPT | Mod: S$GLB,,, | Performed by: INTERNAL MEDICINE

## 2021-03-22 PROCEDURE — 3074F PR MOST RECENT SYSTOLIC BLOOD PRESSURE < 130 MM HG: ICD-10-PCS | Mod: CPTII,S$GLB,, | Performed by: INTERNAL MEDICINE

## 2021-03-22 PROCEDURE — 1101F PT FALLS ASSESS-DOCD LE1/YR: CPT | Mod: CPTII,S$GLB,, | Performed by: INTERNAL MEDICINE

## 2021-03-22 PROCEDURE — 99214 PR OFFICE/OUTPT VISIT, EST, LEVL IV, 30-39 MIN: ICD-10-PCS | Mod: S$GLB,,, | Performed by: INTERNAL MEDICINE

## 2021-03-22 PROCEDURE — 99999 PR PBB SHADOW E&M-EST. PATIENT-LVL IV: CPT | Mod: PBBFAC,,, | Performed by: INTERNAL MEDICINE

## 2021-03-24 ENCOUNTER — PATIENT MESSAGE (OUTPATIENT)
Dept: DIABETES | Facility: CLINIC | Age: 71
End: 2021-03-24

## 2021-03-26 ENCOUNTER — CLINICAL SUPPORT (OUTPATIENT)
Dept: REHABILITATION | Facility: HOSPITAL | Age: 71
End: 2021-03-26
Payer: MEDICARE

## 2021-03-26 ENCOUNTER — LAB VISIT (OUTPATIENT)
Dept: LAB | Facility: HOSPITAL | Age: 71
End: 2021-03-26
Attending: INTERNAL MEDICINE
Payer: MEDICARE

## 2021-03-26 DIAGNOSIS — R53.81 MALAISE AND FATIGUE: ICD-10-CM

## 2021-03-26 DIAGNOSIS — E11.65 TYPE 2 DIABETES MELLITUS WITH HYPERGLYCEMIA, WITH LONG-TERM CURRENT USE OF INSULIN: ICD-10-CM

## 2021-03-26 DIAGNOSIS — R53.83 MALAISE AND FATIGUE: ICD-10-CM

## 2021-03-26 DIAGNOSIS — Z79.4 TYPE 2 DIABETES MELLITUS WITH HYPERGLYCEMIA, WITH LONG-TERM CURRENT USE OF INSULIN: ICD-10-CM

## 2021-03-26 DIAGNOSIS — R29.898 WEAKNESS OF BOTH LOWER EXTREMITIES: ICD-10-CM

## 2021-03-26 LAB
ALBUMIN SERPL BCP-MCNC: 3.4 G/DL (ref 3.5–5.2)
ALP SERPL-CCNC: 85 U/L (ref 55–135)
ALT SERPL W/O P-5'-P-CCNC: 16 U/L (ref 10–44)
ANION GAP SERPL CALC-SCNC: 11 MMOL/L (ref 8–16)
AST SERPL-CCNC: 14 U/L (ref 10–40)
BILIRUB SERPL-MCNC: 0.8 MG/DL (ref 0.1–1)
BUN SERPL-MCNC: 13 MG/DL (ref 8–23)
CALCIUM SERPL-MCNC: 8.7 MG/DL (ref 8.7–10.5)
CHLORIDE SERPL-SCNC: 102 MMOL/L (ref 95–110)
CO2 SERPL-SCNC: 24 MMOL/L (ref 23–29)
CREAT SERPL-MCNC: 1.2 MG/DL (ref 0.5–1.4)
EST. GFR  (AFRICAN AMERICAN): 52.2 ML/MIN/1.73 M^2
EST. GFR  (NON AFRICAN AMERICAN): 45.3 ML/MIN/1.73 M^2
GLUCOSE SERPL-MCNC: 352 MG/DL (ref 70–110)
POTASSIUM SERPL-SCNC: 4.2 MMOL/L (ref 3.5–5.1)
PROT SERPL-MCNC: 7.3 G/DL (ref 6–8.4)
SODIUM SERPL-SCNC: 137 MMOL/L (ref 136–145)
TSH SERPL DL<=0.005 MIU/L-ACNC: 2.15 UIU/ML (ref 0.4–4)

## 2021-03-26 PROCEDURE — 97110 THERAPEUTIC EXERCISES: CPT | Mod: CQ

## 2021-03-26 PROCEDURE — 36415 COLL VENOUS BLD VENIPUNCTURE: CPT | Performed by: PHYSICIAN ASSISTANT

## 2021-03-26 PROCEDURE — 80053 COMPREHEN METABOLIC PANEL: CPT | Performed by: PHYSICIAN ASSISTANT

## 2021-03-26 PROCEDURE — 83036 HEMOGLOBIN GLYCOSYLATED A1C: CPT | Performed by: PHYSICIAN ASSISTANT

## 2021-03-26 PROCEDURE — 84443 ASSAY THYROID STIM HORMONE: CPT | Performed by: INTERNAL MEDICINE

## 2021-03-27 LAB
ESTIMATED AVG GLUCOSE: 235 MG/DL (ref 68–131)
HBA1C MFR BLD: 9.8 % (ref 4–5.6)

## 2021-03-29 ENCOUNTER — PATIENT MESSAGE (OUTPATIENT)
Dept: INTERNAL MEDICINE | Facility: CLINIC | Age: 71
End: 2021-03-29

## 2021-03-29 ENCOUNTER — PATIENT OUTREACH (OUTPATIENT)
Dept: ADMINISTRATIVE | Facility: OTHER | Age: 71
End: 2021-03-29

## 2021-03-30 ENCOUNTER — PATIENT MESSAGE (OUTPATIENT)
Dept: DIABETES | Facility: CLINIC | Age: 71
End: 2021-03-30

## 2021-03-31 ENCOUNTER — OFFICE VISIT (OUTPATIENT)
Dept: DIABETES | Facility: CLINIC | Age: 71
End: 2021-03-31
Payer: MEDICARE

## 2021-03-31 ENCOUNTER — PATIENT MESSAGE (OUTPATIENT)
Dept: DIABETES | Facility: CLINIC | Age: 71
End: 2021-03-31

## 2021-03-31 DIAGNOSIS — E11.69 HYPERLIPIDEMIA ASSOCIATED WITH TYPE 2 DIABETES MELLITUS: ICD-10-CM

## 2021-03-31 DIAGNOSIS — E03.9 ACQUIRED HYPOTHYROIDISM: ICD-10-CM

## 2021-03-31 DIAGNOSIS — E78.5 HYPERLIPIDEMIA ASSOCIATED WITH TYPE 2 DIABETES MELLITUS: ICD-10-CM

## 2021-03-31 DIAGNOSIS — Z79.4 TYPE 2 DIABETES MELLITUS WITH HYPERGLYCEMIA, WITH LONG-TERM CURRENT USE OF INSULIN: Primary | ICD-10-CM

## 2021-03-31 DIAGNOSIS — G47.33 OSA ON CPAP: ICD-10-CM

## 2021-03-31 DIAGNOSIS — E11.65 TYPE 2 DIABETES MELLITUS WITH HYPERGLYCEMIA, WITH LONG-TERM CURRENT USE OF INSULIN: Primary | ICD-10-CM

## 2021-03-31 DIAGNOSIS — E66.9 OBESITY (BMI 30-39.9): ICD-10-CM

## 2021-03-31 DIAGNOSIS — R39.15 URINARY URGENCY: ICD-10-CM

## 2021-03-31 PROCEDURE — 3046F PR MOST RECENT HEMOGLOBIN A1C LEVEL > 9.0%: ICD-10-PCS | Mod: CPTII,95,, | Performed by: PHYSICIAN ASSISTANT

## 2021-03-31 PROCEDURE — 3046F HEMOGLOBIN A1C LEVEL >9.0%: CPT | Mod: CPTII,95,, | Performed by: PHYSICIAN ASSISTANT

## 2021-03-31 PROCEDURE — 99214 OFFICE O/P EST MOD 30 MIN: CPT | Mod: 95,,, | Performed by: PHYSICIAN ASSISTANT

## 2021-03-31 PROCEDURE — 1159F MED LIST DOCD IN RCRD: CPT | Mod: 95,,, | Performed by: PHYSICIAN ASSISTANT

## 2021-03-31 PROCEDURE — 99214 PR OFFICE/OUTPT VISIT, EST, LEVL IV, 30-39 MIN: ICD-10-PCS | Mod: 95,,, | Performed by: PHYSICIAN ASSISTANT

## 2021-03-31 PROCEDURE — 1159F PR MEDICATION LIST DOCUMENTED IN MEDICAL RECORD: ICD-10-PCS | Mod: 95,,, | Performed by: PHYSICIAN ASSISTANT

## 2021-03-31 RX ORDER — INSULIN GLARGINE 100 [IU]/ML
12 INJECTION, SOLUTION SUBCUTANEOUS DAILY
Qty: 12 ML | Refills: 3 | Status: SHIPPED | OUTPATIENT
Start: 2021-03-31 | End: 2021-09-22

## 2021-04-01 ENCOUNTER — LAB VISIT (OUTPATIENT)
Dept: LAB | Facility: HOSPITAL | Age: 71
End: 2021-04-01
Attending: PHYSICIAN ASSISTANT
Payer: MEDICARE

## 2021-04-01 DIAGNOSIS — Z79.4 TYPE 2 DIABETES MELLITUS WITH HYPERGLYCEMIA, WITH LONG-TERM CURRENT USE OF INSULIN: ICD-10-CM

## 2021-04-01 DIAGNOSIS — E11.65 TYPE 2 DIABETES MELLITUS WITH HYPERGLYCEMIA, WITH LONG-TERM CURRENT USE OF INSULIN: ICD-10-CM

## 2021-04-01 LAB
C PEPTIDE SERPL-MCNC: 2.35 NG/ML (ref 0.78–5.19)
ESTIMATED AVG GLUCOSE: 223 MG/DL (ref 68–131)
GLUCOSE SERPL-MCNC: 202 MG/DL (ref 70–110)
HBA1C MFR BLD: 9.4 % (ref 4–5.6)

## 2021-04-01 PROCEDURE — 82947 ASSAY GLUCOSE BLOOD QUANT: CPT | Performed by: PHYSICIAN ASSISTANT

## 2021-04-01 PROCEDURE — 36415 COLL VENOUS BLD VENIPUNCTURE: CPT | Performed by: PHYSICIAN ASSISTANT

## 2021-04-01 PROCEDURE — 84681 ASSAY OF C-PEPTIDE: CPT | Performed by: PHYSICIAN ASSISTANT

## 2021-04-01 PROCEDURE — 83036 HEMOGLOBIN GLYCOSYLATED A1C: CPT | Performed by: PHYSICIAN ASSISTANT

## 2021-04-06 ENCOUNTER — CLINICAL SUPPORT (OUTPATIENT)
Dept: REHABILITATION | Facility: HOSPITAL | Age: 71
End: 2021-04-06
Payer: MEDICARE

## 2021-04-06 DIAGNOSIS — R29.898 WEAKNESS OF BOTH LOWER EXTREMITIES: ICD-10-CM

## 2021-04-06 PROCEDURE — 97110 THERAPEUTIC EXERCISES: CPT

## 2021-04-08 ENCOUNTER — TELEPHONE (OUTPATIENT)
Dept: DIABETES | Facility: CLINIC | Age: 71
End: 2021-04-08

## 2021-04-21 ENCOUNTER — CLINICAL SUPPORT (OUTPATIENT)
Dept: REHABILITATION | Facility: HOSPITAL | Age: 71
End: 2021-04-21
Payer: MEDICARE

## 2021-04-21 DIAGNOSIS — R29.898 WEAKNESS OF BOTH LOWER EXTREMITIES: ICD-10-CM

## 2021-04-21 PROCEDURE — 97110 THERAPEUTIC EXERCISES: CPT

## 2021-05-18 ENCOUNTER — PATIENT OUTREACH (OUTPATIENT)
Dept: ADMINISTRATIVE | Facility: HOSPITAL | Age: 71
End: 2021-05-18

## 2021-06-13 ENCOUNTER — OFFICE VISIT (OUTPATIENT)
Dept: URGENT CARE | Facility: CLINIC | Age: 71
End: 2021-06-13
Payer: MEDICARE

## 2021-06-13 VITALS
SYSTOLIC BLOOD PRESSURE: 112 MMHG | TEMPERATURE: 99 F | OXYGEN SATURATION: 96 % | DIASTOLIC BLOOD PRESSURE: 64 MMHG | HEIGHT: 67 IN | RESPIRATION RATE: 20 BRPM | HEART RATE: 91 BPM | BODY MASS INDEX: 37.51 KG/M2 | WEIGHT: 239 LBS

## 2021-06-13 DIAGNOSIS — Z11.52 ENCOUNTER FOR SCREENING FOR COVID-19: Primary | ICD-10-CM

## 2021-06-13 DIAGNOSIS — J32.9 SINUSITIS, UNSPECIFIED CHRONICITY, UNSPECIFIED LOCATION: ICD-10-CM

## 2021-06-13 DIAGNOSIS — L30.9 DERMATITIS: ICD-10-CM

## 2021-06-13 LAB
CTP QC/QA: YES
SARS-COV-2 RDRP RESP QL NAA+PROBE: NEGATIVE

## 2021-06-13 PROCEDURE — U0002: ICD-10-PCS | Mod: QW,S$GLB,, | Performed by: PHYSICIAN ASSISTANT

## 2021-06-13 PROCEDURE — 99214 PR OFFICE/OUTPT VISIT, EST, LEVL IV, 30-39 MIN: ICD-10-PCS | Mod: S$GLB,CS,, | Performed by: PHYSICIAN ASSISTANT

## 2021-06-13 PROCEDURE — 99499 UNLISTED E&M SERVICE: CPT | Mod: S$GLB,,, | Performed by: PHYSICIAN ASSISTANT

## 2021-06-13 PROCEDURE — 3008F PR BODY MASS INDEX (BMI) DOCUMENTED: ICD-10-PCS | Mod: CPTII,S$GLB,, | Performed by: PHYSICIAN ASSISTANT

## 2021-06-13 PROCEDURE — 3008F BODY MASS INDEX DOCD: CPT | Mod: CPTII,S$GLB,, | Performed by: PHYSICIAN ASSISTANT

## 2021-06-13 PROCEDURE — 99499 RISK ADDL DX/OHS AUDIT: ICD-10-PCS | Mod: S$GLB,,, | Performed by: PHYSICIAN ASSISTANT

## 2021-06-13 PROCEDURE — 1126F PR PAIN SEVERITY QUANTIFIED, NO PAIN PRESENT: ICD-10-PCS | Mod: S$GLB,,, | Performed by: PHYSICIAN ASSISTANT

## 2021-06-13 PROCEDURE — 1126F AMNT PAIN NOTED NONE PRSNT: CPT | Mod: S$GLB,,, | Performed by: PHYSICIAN ASSISTANT

## 2021-06-13 PROCEDURE — 99214 OFFICE O/P EST MOD 30 MIN: CPT | Mod: S$GLB,CS,, | Performed by: PHYSICIAN ASSISTANT

## 2021-06-13 PROCEDURE — U0002 COVID-19 LAB TEST NON-CDC: HCPCS | Mod: QW,S$GLB,, | Performed by: PHYSICIAN ASSISTANT

## 2021-06-13 RX ORDER — FLUTICASONE PROPIONATE 50 MCG
1 SPRAY, SUSPENSION (ML) NASAL DAILY
COMMUNITY
End: 2021-10-29

## 2021-06-13 RX ORDER — HYDROCORTISONE 25 MG/G
OINTMENT TOPICAL 2 TIMES DAILY
Qty: 20 G | Refills: 0 | Status: SHIPPED | OUTPATIENT
Start: 2021-06-13 | End: 2021-10-29

## 2021-06-13 RX ORDER — AZITHROMYCIN 250 MG/1
TABLET, FILM COATED ORAL
Qty: 6 TABLET | Refills: 0 | Status: SHIPPED | OUTPATIENT
Start: 2021-06-13 | End: 2021-09-22

## 2021-06-16 ENCOUNTER — TELEPHONE (OUTPATIENT)
Dept: URGENT CARE | Facility: CLINIC | Age: 71
End: 2021-06-16

## 2021-06-18 ENCOUNTER — TELEPHONE (OUTPATIENT)
Dept: PULMONOLOGY | Facility: CLINIC | Age: 71
End: 2021-06-18

## 2021-06-21 DIAGNOSIS — G47.33 OSA ON CPAP: Primary | ICD-10-CM

## 2021-07-08 ENCOUNTER — DOCUMENTATION ONLY (OUTPATIENT)
Dept: REHABILITATION | Facility: HOSPITAL | Age: 71
End: 2021-07-08

## 2021-07-14 DIAGNOSIS — Z12.31 OTHER SCREENING MAMMOGRAM: ICD-10-CM

## 2021-07-19 ENCOUNTER — TELEPHONE (OUTPATIENT)
Dept: PULMONOLOGY | Facility: CLINIC | Age: 71
End: 2021-07-19

## 2021-07-27 ENCOUNTER — LAB VISIT (OUTPATIENT)
Dept: LAB | Facility: HOSPITAL | Age: 71
End: 2021-07-27
Payer: MEDICARE

## 2021-07-27 ENCOUNTER — PATIENT MESSAGE (OUTPATIENT)
Dept: DIABETES | Facility: CLINIC | Age: 71
End: 2021-07-27

## 2021-07-27 ENCOUNTER — OFFICE VISIT (OUTPATIENT)
Dept: DIABETES | Facility: CLINIC | Age: 71
End: 2021-07-27
Payer: MEDICARE

## 2021-07-27 VITALS
DIASTOLIC BLOOD PRESSURE: 72 MMHG | SYSTOLIC BLOOD PRESSURE: 124 MMHG | WEIGHT: 225.06 LBS | HEART RATE: 75 BPM | BODY MASS INDEX: 35.25 KG/M2

## 2021-07-27 DIAGNOSIS — R60.0 BILATERAL EDEMA OF LOWER EXTREMITY: ICD-10-CM

## 2021-07-27 DIAGNOSIS — E66.9 OBESITY (BMI 30-39.9): ICD-10-CM

## 2021-07-27 DIAGNOSIS — E11.65 TYPE 2 DIABETES MELLITUS WITH HYPERGLYCEMIA, WITH LONG-TERM CURRENT USE OF INSULIN: Primary | ICD-10-CM

## 2021-07-27 DIAGNOSIS — E11.65 TYPE 2 DIABETES MELLITUS WITH HYPERGLYCEMIA, WITH LONG-TERM CURRENT USE OF INSULIN: ICD-10-CM

## 2021-07-27 DIAGNOSIS — Z79.4 TYPE 2 DIABETES MELLITUS WITH HYPERGLYCEMIA, WITH LONG-TERM CURRENT USE OF INSULIN: Primary | ICD-10-CM

## 2021-07-27 DIAGNOSIS — E11.69 HYPERLIPIDEMIA ASSOCIATED WITH TYPE 2 DIABETES MELLITUS: ICD-10-CM

## 2021-07-27 DIAGNOSIS — E03.9 ACQUIRED HYPOTHYROIDISM: ICD-10-CM

## 2021-07-27 DIAGNOSIS — Z79.4 TYPE 2 DIABETES MELLITUS WITH HYPERGLYCEMIA, WITH LONG-TERM CURRENT USE OF INSULIN: ICD-10-CM

## 2021-07-27 DIAGNOSIS — G47.33 OSA ON CPAP: ICD-10-CM

## 2021-07-27 DIAGNOSIS — E78.5 HYPERLIPIDEMIA ASSOCIATED WITH TYPE 2 DIABETES MELLITUS: ICD-10-CM

## 2021-07-27 PROCEDURE — 99999 PR PBB SHADOW E&M-EST. PATIENT-LVL IV: CPT | Mod: PBBFAC,,, | Performed by: PHYSICIAN ASSISTANT

## 2021-07-27 PROCEDURE — 83036 HEMOGLOBIN GLYCOSYLATED A1C: CPT | Performed by: PHYSICIAN ASSISTANT

## 2021-07-27 PROCEDURE — 3046F PR MOST RECENT HEMOGLOBIN A1C LEVEL > 9.0%: ICD-10-PCS | Mod: CPTII,S$GLB,, | Performed by: PHYSICIAN ASSISTANT

## 2021-07-27 PROCEDURE — 1126F AMNT PAIN NOTED NONE PRSNT: CPT | Mod: CPTII,S$GLB,, | Performed by: PHYSICIAN ASSISTANT

## 2021-07-27 PROCEDURE — 1159F PR MEDICATION LIST DOCUMENTED IN MEDICAL RECORD: ICD-10-PCS | Mod: CPTII,S$GLB,, | Performed by: PHYSICIAN ASSISTANT

## 2021-07-27 PROCEDURE — 99214 PR OFFICE/OUTPT VISIT, EST, LEVL IV, 30-39 MIN: ICD-10-PCS | Mod: S$GLB,,, | Performed by: PHYSICIAN ASSISTANT

## 2021-07-27 PROCEDURE — 3046F HEMOGLOBIN A1C LEVEL >9.0%: CPT | Mod: CPTII,S$GLB,, | Performed by: PHYSICIAN ASSISTANT

## 2021-07-27 PROCEDURE — 1159F MED LIST DOCD IN RCRD: CPT | Mod: CPTII,S$GLB,, | Performed by: PHYSICIAN ASSISTANT

## 2021-07-27 PROCEDURE — 99999 PR PBB SHADOW E&M-EST. PATIENT-LVL IV: ICD-10-PCS | Mod: PBBFAC,,, | Performed by: PHYSICIAN ASSISTANT

## 2021-07-27 PROCEDURE — 99214 OFFICE O/P EST MOD 30 MIN: CPT | Mod: S$GLB,,, | Performed by: PHYSICIAN ASSISTANT

## 2021-07-27 PROCEDURE — 36415 COLL VENOUS BLD VENIPUNCTURE: CPT | Performed by: PHYSICIAN ASSISTANT

## 2021-07-27 PROCEDURE — 99499 UNLISTED E&M SERVICE: CPT | Mod: S$GLB,,, | Performed by: PHYSICIAN ASSISTANT

## 2021-07-27 PROCEDURE — 3008F BODY MASS INDEX DOCD: CPT | Mod: CPTII,S$GLB,, | Performed by: PHYSICIAN ASSISTANT

## 2021-07-27 PROCEDURE — 1126F PR PAIN SEVERITY QUANTIFIED, NO PAIN PRESENT: ICD-10-PCS | Mod: CPTII,S$GLB,, | Performed by: PHYSICIAN ASSISTANT

## 2021-07-27 PROCEDURE — 3008F PR BODY MASS INDEX (BMI) DOCUMENTED: ICD-10-PCS | Mod: CPTII,S$GLB,, | Performed by: PHYSICIAN ASSISTANT

## 2021-07-27 PROCEDURE — 99499 RISK ADDL DX/OHS AUDIT: ICD-10-PCS | Mod: S$GLB,,, | Performed by: PHYSICIAN ASSISTANT

## 2021-07-27 RX ORDER — GLIPIZIDE 5 MG/1
10 TABLET ORAL
Qty: 540 TABLET | Refills: 3 | Status: SHIPPED | OUTPATIENT
Start: 2021-07-27 | End: 2021-11-16

## 2021-07-28 LAB
ESTIMATED AVG GLUCOSE: 226 MG/DL (ref 68–131)
HBA1C MFR BLD: 9.5 % (ref 4–5.6)

## 2021-08-18 ENCOUNTER — PATIENT MESSAGE (OUTPATIENT)
Dept: DIABETES | Facility: CLINIC | Age: 71
End: 2021-08-18

## 2021-08-18 DIAGNOSIS — B37.31 YEAST INFECTION INVOLVING THE VAGINA AND SURROUNDING AREA: Primary | ICD-10-CM

## 2021-08-18 DIAGNOSIS — B37.31 YEAST INFECTION INVOLVING THE VAGINA AND SURROUNDING AREA: ICD-10-CM

## 2021-08-18 RX ORDER — FLUCONAZOLE 150 MG/1
150 TABLET ORAL
Qty: 2 TABLET | Refills: 0 | Status: SHIPPED | OUTPATIENT
Start: 2021-08-18 | End: 2021-08-26 | Stop reason: SDUPTHER

## 2021-08-26 ENCOUNTER — PATIENT MESSAGE (OUTPATIENT)
Dept: DIABETES | Facility: CLINIC | Age: 71
End: 2021-08-26

## 2021-08-26 DIAGNOSIS — B37.31 YEAST INFECTION INVOLVING THE VAGINA AND SURROUNDING AREA: ICD-10-CM

## 2021-08-26 RX ORDER — FLUCONAZOLE 150 MG/1
150 TABLET ORAL
Qty: 2 TABLET | Refills: 0 | OUTPATIENT
Start: 2021-08-26

## 2021-08-26 RX ORDER — FLUCONAZOLE 150 MG/1
150 TABLET ORAL
Qty: 2 TABLET | Refills: 0 | Status: SHIPPED | OUTPATIENT
Start: 2021-08-26 | End: 2021-09-22

## 2021-09-02 ENCOUNTER — PATIENT MESSAGE (OUTPATIENT)
Dept: DIABETES | Facility: CLINIC | Age: 71
End: 2021-09-02

## 2021-09-02 DIAGNOSIS — E11.65 TYPE 2 DIABETES MELLITUS WITH HYPERGLYCEMIA, WITH LONG-TERM CURRENT USE OF INSULIN: ICD-10-CM

## 2021-09-02 DIAGNOSIS — Z79.4 TYPE 2 DIABETES MELLITUS WITH HYPERGLYCEMIA, WITH LONG-TERM CURRENT USE OF INSULIN: ICD-10-CM

## 2021-09-02 RX ORDER — DULAGLUTIDE 4.5 MG/.5ML
4.5 INJECTION, SOLUTION SUBCUTANEOUS WEEKLY
Qty: 12 PEN | Refills: 3 | Status: SHIPPED | OUTPATIENT
Start: 2021-09-02 | End: 2022-06-17 | Stop reason: ALTCHOICE

## 2021-09-13 ENCOUNTER — PATIENT MESSAGE (OUTPATIENT)
Dept: FAMILY MEDICINE | Facility: CLINIC | Age: 71
End: 2021-09-13

## 2021-09-15 ENCOUNTER — LAB VISIT (OUTPATIENT)
Dept: LAB | Facility: HOSPITAL | Age: 71
End: 2021-09-15
Attending: INTERNAL MEDICINE
Payer: MEDICARE

## 2021-09-15 DIAGNOSIS — Z29.9 PREVENTIVE MEASURE: ICD-10-CM

## 2021-09-15 DIAGNOSIS — E11.69 HYPERLIPIDEMIA ASSOCIATED WITH TYPE 2 DIABETES MELLITUS: ICD-10-CM

## 2021-09-15 DIAGNOSIS — E55.9 VITAMIN D DEFICIENCY: ICD-10-CM

## 2021-09-15 DIAGNOSIS — E78.5 HYPERLIPIDEMIA ASSOCIATED WITH TYPE 2 DIABETES MELLITUS: ICD-10-CM

## 2021-09-15 DIAGNOSIS — E11.65 UNCONTROLLED TYPE 2 DIABETES MELLITUS WITH HYPERGLYCEMIA: ICD-10-CM

## 2021-09-15 LAB
25(OH)D3+25(OH)D2 SERPL-MCNC: 45 NG/ML (ref 30–96)
ALBUMIN SERPL BCP-MCNC: 3.1 G/DL (ref 3.5–5.2)
ALP SERPL-CCNC: 75 U/L (ref 55–135)
ALT SERPL W/O P-5'-P-CCNC: 19 U/L (ref 10–44)
ANION GAP SERPL CALC-SCNC: 9 MMOL/L (ref 8–16)
AST SERPL-CCNC: 16 U/L (ref 10–40)
BASOPHILS # BLD AUTO: 0.07 K/UL (ref 0–0.2)
BASOPHILS NFR BLD: 1 % (ref 0–1.9)
BILIRUB SERPL-MCNC: 0.8 MG/DL (ref 0.1–1)
BUN SERPL-MCNC: 11 MG/DL (ref 8–23)
CALCIUM SERPL-MCNC: 9.1 MG/DL (ref 8.7–10.5)
CHLORIDE SERPL-SCNC: 103 MMOL/L (ref 95–110)
CHOLEST SERPL-MCNC: 125 MG/DL (ref 120–199)
CHOLEST/HDLC SERPL: 2.5 {RATIO} (ref 2–5)
CO2 SERPL-SCNC: 25 MMOL/L (ref 23–29)
CREAT SERPL-MCNC: 0.9 MG/DL (ref 0.5–1.4)
DIFFERENTIAL METHOD: ABNORMAL
EOSINOPHIL # BLD AUTO: 0.2 K/UL (ref 0–0.5)
EOSINOPHIL NFR BLD: 2.1 % (ref 0–8)
ERYTHROCYTE [DISTWIDTH] IN BLOOD BY AUTOMATED COUNT: 17.3 % (ref 11.5–14.5)
EST. GFR  (AFRICAN AMERICAN): >60 ML/MIN/1.73 M^2
EST. GFR  (NON AFRICAN AMERICAN): >60 ML/MIN/1.73 M^2
ESTIMATED AVG GLUCOSE: 214 MG/DL (ref 68–131)
GLUCOSE SERPL-MCNC: 133 MG/DL (ref 70–110)
HBA1C MFR BLD: 9.1 % (ref 4–5.6)
HCT VFR BLD AUTO: 40.9 % (ref 37–48.5)
HDLC SERPL-MCNC: 51 MG/DL (ref 40–75)
HDLC SERPL: 40.8 % (ref 20–50)
HGB BLD-MCNC: 13.2 G/DL (ref 12–16)
IMM GRANULOCYTES # BLD AUTO: 0.03 K/UL (ref 0–0.04)
IMM GRANULOCYTES NFR BLD AUTO: 0.4 % (ref 0–0.5)
LDLC SERPL CALC-MCNC: 61.4 MG/DL (ref 63–159)
LYMPHOCYTES # BLD AUTO: 2.8 K/UL (ref 1–4.8)
LYMPHOCYTES NFR BLD: 39 % (ref 18–48)
MCH RBC QN AUTO: 27.2 PG (ref 27–31)
MCHC RBC AUTO-ENTMCNC: 32.3 G/DL (ref 32–36)
MCV RBC AUTO: 84 FL (ref 82–98)
MONOCYTES # BLD AUTO: 0.6 K/UL (ref 0.3–1)
MONOCYTES NFR BLD: 7.7 % (ref 4–15)
NEUTROPHILS # BLD AUTO: 3.5 K/UL (ref 1.8–7.7)
NEUTROPHILS NFR BLD: 49.8 % (ref 38–73)
NONHDLC SERPL-MCNC: 74 MG/DL
NRBC BLD-RTO: 0 /100 WBC
PLATELET # BLD AUTO: 208 K/UL (ref 150–450)
PMV BLD AUTO: 11.6 FL (ref 9.2–12.9)
POTASSIUM SERPL-SCNC: 4.4 MMOL/L (ref 3.5–5.1)
PROT SERPL-MCNC: 6.6 G/DL (ref 6–8.4)
RBC # BLD AUTO: 4.85 M/UL (ref 4–5.4)
SODIUM SERPL-SCNC: 137 MMOL/L (ref 136–145)
TRIGL SERPL-MCNC: 63 MG/DL (ref 30–150)
TSH SERPL DL<=0.005 MIU/L-ACNC: 1.73 UIU/ML (ref 0.4–4)
WBC # BLD AUTO: 7.12 K/UL (ref 3.9–12.7)

## 2021-09-15 PROCEDURE — 85025 COMPLETE CBC W/AUTO DIFF WBC: CPT | Performed by: INTERNAL MEDICINE

## 2021-09-15 PROCEDURE — 80053 COMPREHEN METABOLIC PANEL: CPT | Performed by: INTERNAL MEDICINE

## 2021-09-15 PROCEDURE — 80061 LIPID PANEL: CPT | Performed by: INTERNAL MEDICINE

## 2021-09-15 PROCEDURE — 83036 HEMOGLOBIN GLYCOSYLATED A1C: CPT | Performed by: INTERNAL MEDICINE

## 2021-09-15 PROCEDURE — 36415 COLL VENOUS BLD VENIPUNCTURE: CPT | Performed by: INTERNAL MEDICINE

## 2021-09-15 PROCEDURE — 82306 VITAMIN D 25 HYDROXY: CPT | Performed by: INTERNAL MEDICINE

## 2021-09-15 PROCEDURE — 84443 ASSAY THYROID STIM HORMONE: CPT | Performed by: INTERNAL MEDICINE

## 2021-09-20 ENCOUNTER — APPOINTMENT (OUTPATIENT)
Dept: RADIOLOGY | Facility: HOSPITAL | Age: 71
End: 2021-09-20
Attending: INTERNAL MEDICINE
Payer: MEDICARE

## 2021-09-20 DIAGNOSIS — Z78.0 ASYMPTOMATIC POSTMENOPAUSAL STATE: ICD-10-CM

## 2021-09-20 DIAGNOSIS — Z29.9 PREVENTIVE MEASURE: ICD-10-CM

## 2021-09-20 PROCEDURE — 77080 DXA BONE DENSITY AXIAL: CPT | Mod: TC

## 2021-09-20 PROCEDURE — 77080 DEXA BONE DENSITY SPINE HIP: ICD-10-PCS | Mod: 26,,, | Performed by: RADIOLOGY

## 2021-09-20 PROCEDURE — 77080 DXA BONE DENSITY AXIAL: CPT | Mod: 26,,, | Performed by: RADIOLOGY

## 2021-09-22 ENCOUNTER — OFFICE VISIT (OUTPATIENT)
Dept: FAMILY MEDICINE | Facility: CLINIC | Age: 71
End: 2021-09-22
Payer: MEDICARE

## 2021-09-22 VITALS
OXYGEN SATURATION: 95 % | HEIGHT: 67 IN | SYSTOLIC BLOOD PRESSURE: 126 MMHG | DIASTOLIC BLOOD PRESSURE: 70 MMHG | HEART RATE: 83 BPM | BODY MASS INDEX: 35.29 KG/M2 | TEMPERATURE: 98 F | WEIGHT: 224.88 LBS | RESPIRATION RATE: 18 BRPM

## 2021-09-22 DIAGNOSIS — Z79.4 TYPE 2 DIABETES MELLITUS WITH HYPERGLYCEMIA, WITH LONG-TERM CURRENT USE OF INSULIN: ICD-10-CM

## 2021-09-22 DIAGNOSIS — Z00.00 ENCOUNTER FOR PREVENTIVE HEALTH EXAMINATION: ICD-10-CM

## 2021-09-22 DIAGNOSIS — E11.65 UNCONTROLLED TYPE 2 DIABETES MELLITUS WITH HYPERGLYCEMIA: Primary | ICD-10-CM

## 2021-09-22 DIAGNOSIS — L40.9 PSORIASIS: ICD-10-CM

## 2021-09-22 DIAGNOSIS — E11.69 HYPERLIPIDEMIA ASSOCIATED WITH TYPE 2 DIABETES MELLITUS: ICD-10-CM

## 2021-09-22 DIAGNOSIS — E03.9 ACQUIRED HYPOTHYROIDISM: ICD-10-CM

## 2021-09-22 DIAGNOSIS — E78.5 HYPERLIPIDEMIA ASSOCIATED WITH TYPE 2 DIABETES MELLITUS: ICD-10-CM

## 2021-09-22 DIAGNOSIS — G47.33 OSA ON CPAP: ICD-10-CM

## 2021-09-22 DIAGNOSIS — E11.65 TYPE 2 DIABETES MELLITUS WITH HYPERGLYCEMIA, WITH LONG-TERM CURRENT USE OF INSULIN: ICD-10-CM

## 2021-09-22 DIAGNOSIS — B02.29 PHN (POSTHERPETIC NEURALGIA): ICD-10-CM

## 2021-09-22 PROCEDURE — 3046F PR MOST RECENT HEMOGLOBIN A1C LEVEL > 9.0%: ICD-10-PCS | Mod: CPTII,S$GLB,, | Performed by: INTERNAL MEDICINE

## 2021-09-22 PROCEDURE — 3008F BODY MASS INDEX DOCD: CPT | Mod: CPTII,S$GLB,, | Performed by: INTERNAL MEDICINE

## 2021-09-22 PROCEDURE — 3288F PR FALLS RISK ASSESSMENT DOCUMENTED: ICD-10-PCS | Mod: CPTII,S$GLB,, | Performed by: INTERNAL MEDICINE

## 2021-09-22 PROCEDURE — 1126F AMNT PAIN NOTED NONE PRSNT: CPT | Mod: CPTII,S$GLB,, | Performed by: INTERNAL MEDICINE

## 2021-09-22 PROCEDURE — 3074F PR MOST RECENT SYSTOLIC BLOOD PRESSURE < 130 MM HG: ICD-10-PCS | Mod: CPTII,S$GLB,, | Performed by: INTERNAL MEDICINE

## 2021-09-22 PROCEDURE — 1159F MED LIST DOCD IN RCRD: CPT | Mod: CPTII,S$GLB,, | Performed by: INTERNAL MEDICINE

## 2021-09-22 PROCEDURE — 3066F PR DOCUMENTATION OF TREATMENT FOR NEPHROPATHY: ICD-10-PCS | Mod: CPTII,S$GLB,, | Performed by: INTERNAL MEDICINE

## 2021-09-22 PROCEDURE — 99214 OFFICE O/P EST MOD 30 MIN: CPT | Mod: S$GLB,,, | Performed by: INTERNAL MEDICINE

## 2021-09-22 PROCEDURE — 99999 PR PBB SHADOW E&M-EST. PATIENT-LVL IV: ICD-10-PCS | Mod: PBBFAC,,, | Performed by: INTERNAL MEDICINE

## 2021-09-22 PROCEDURE — 1101F PR PT FALLS ASSESS DOC 0-1 FALLS W/OUT INJ PAST YR: ICD-10-PCS | Mod: CPTII,S$GLB,, | Performed by: INTERNAL MEDICINE

## 2021-09-22 PROCEDURE — 3061F PR NEG MICROALBUMINURIA RESULT DOCUMENTED/REVIEW: ICD-10-PCS | Mod: CPTII,S$GLB,, | Performed by: INTERNAL MEDICINE

## 2021-09-22 PROCEDURE — 3078F DIAST BP <80 MM HG: CPT | Mod: CPTII,S$GLB,, | Performed by: INTERNAL MEDICINE

## 2021-09-22 PROCEDURE — 99214 PR OFFICE/OUTPT VISIT, EST, LEVL IV, 30-39 MIN: ICD-10-PCS | Mod: S$GLB,,, | Performed by: INTERNAL MEDICINE

## 2021-09-22 PROCEDURE — 3288F FALL RISK ASSESSMENT DOCD: CPT | Mod: CPTII,S$GLB,, | Performed by: INTERNAL MEDICINE

## 2021-09-22 PROCEDURE — 1126F PR PAIN SEVERITY QUANTIFIED, NO PAIN PRESENT: ICD-10-PCS | Mod: CPTII,S$GLB,, | Performed by: INTERNAL MEDICINE

## 2021-09-22 PROCEDURE — 3078F PR MOST RECENT DIASTOLIC BLOOD PRESSURE < 80 MM HG: ICD-10-PCS | Mod: CPTII,S$GLB,, | Performed by: INTERNAL MEDICINE

## 2021-09-22 PROCEDURE — 1160F PR REVIEW ALL MEDS BY PRESCRIBER/CLIN PHARMACIST DOCUMENTED: ICD-10-PCS | Mod: CPTII,S$GLB,, | Performed by: INTERNAL MEDICINE

## 2021-09-22 PROCEDURE — 3066F NEPHROPATHY DOC TX: CPT | Mod: CPTII,S$GLB,, | Performed by: INTERNAL MEDICINE

## 2021-09-22 PROCEDURE — 4010F PR ACE/ARB THEARPY RXD/TAKEN: ICD-10-PCS | Mod: CPTII,S$GLB,, | Performed by: INTERNAL MEDICINE

## 2021-09-22 PROCEDURE — 3061F NEG MICROALBUMINURIA REV: CPT | Mod: CPTII,S$GLB,, | Performed by: INTERNAL MEDICINE

## 2021-09-22 PROCEDURE — 4010F ACE/ARB THERAPY RXD/TAKEN: CPT | Mod: CPTII,S$GLB,, | Performed by: INTERNAL MEDICINE

## 2021-09-22 PROCEDURE — 1101F PT FALLS ASSESS-DOCD LE1/YR: CPT | Mod: CPTII,S$GLB,, | Performed by: INTERNAL MEDICINE

## 2021-09-22 PROCEDURE — 3074F SYST BP LT 130 MM HG: CPT | Mod: CPTII,S$GLB,, | Performed by: INTERNAL MEDICINE

## 2021-09-22 PROCEDURE — 1159F PR MEDICATION LIST DOCUMENTED IN MEDICAL RECORD: ICD-10-PCS | Mod: CPTII,S$GLB,, | Performed by: INTERNAL MEDICINE

## 2021-09-22 PROCEDURE — 99999 PR PBB SHADOW E&M-EST. PATIENT-LVL IV: CPT | Mod: PBBFAC,,, | Performed by: INTERNAL MEDICINE

## 2021-09-22 PROCEDURE — 3008F PR BODY MASS INDEX (BMI) DOCUMENTED: ICD-10-PCS | Mod: CPTII,S$GLB,, | Performed by: INTERNAL MEDICINE

## 2021-09-22 PROCEDURE — 1160F RVW MEDS BY RX/DR IN RCRD: CPT | Mod: CPTII,S$GLB,, | Performed by: INTERNAL MEDICINE

## 2021-09-22 PROCEDURE — 3046F HEMOGLOBIN A1C LEVEL >9.0%: CPT | Mod: CPTII,S$GLB,, | Performed by: INTERNAL MEDICINE

## 2021-09-22 RX ORDER — INSULIN GLARGINE 100 [IU]/ML
15 INJECTION, SOLUTION SUBCUTANEOUS DAILY
Qty: 3 ML | Refills: 3
Start: 2021-09-22 | End: 2022-01-04 | Stop reason: SDUPTHER

## 2021-09-30 ENCOUNTER — PATIENT MESSAGE (OUTPATIENT)
Dept: FAMILY MEDICINE | Facility: CLINIC | Age: 71
End: 2021-09-30

## 2021-10-04 ENCOUNTER — PATIENT MESSAGE (OUTPATIENT)
Dept: ADMINISTRATIVE | Facility: HOSPITAL | Age: 71
End: 2021-10-04

## 2021-10-08 ENCOUNTER — TELEPHONE (OUTPATIENT)
Dept: FAMILY MEDICINE | Facility: CLINIC | Age: 71
End: 2021-10-08

## 2021-10-15 ENCOUNTER — IMMUNIZATION (OUTPATIENT)
Dept: PHARMACY | Facility: CLINIC | Age: 71
End: 2021-10-15
Payer: MEDICARE

## 2021-10-15 DIAGNOSIS — Z23 NEED FOR VACCINATION: Primary | ICD-10-CM

## 2021-10-18 ENCOUNTER — PATIENT OUTREACH (OUTPATIENT)
Dept: ADMINISTRATIVE | Facility: OTHER | Age: 71
End: 2021-10-18

## 2021-10-20 ENCOUNTER — OFFICE VISIT (OUTPATIENT)
Dept: PULMONOLOGY | Facility: CLINIC | Age: 71
End: 2021-10-20
Payer: MEDICARE

## 2021-10-20 ENCOUNTER — HOSPITAL ENCOUNTER (OUTPATIENT)
Dept: RADIOLOGY | Facility: HOSPITAL | Age: 71
Discharge: HOME | End: 2021-10-20
Attending: INTERNAL MEDICINE
Payer: MEDICARE

## 2021-10-20 VITALS
SYSTOLIC BLOOD PRESSURE: 114 MMHG | OXYGEN SATURATION: 97 % | DIASTOLIC BLOOD PRESSURE: 80 MMHG | BODY MASS INDEX: 35.5 KG/M2 | RESPIRATION RATE: 18 BRPM | HEIGHT: 67 IN | WEIGHT: 226.19 LBS | HEART RATE: 77 BPM

## 2021-10-20 DIAGNOSIS — R91.1 SOLITARY PULMONARY NODULE: ICD-10-CM

## 2021-10-20 DIAGNOSIS — R91.8 MULTIPLE LUNG NODULES ON CT: ICD-10-CM

## 2021-10-20 DIAGNOSIS — E66.01 CLASS 2 SEVERE OBESITY DUE TO EXCESS CALORIES WITH SERIOUS COMORBIDITY AND BODY MASS INDEX (BMI) OF 35.0 TO 35.9 IN ADULT: ICD-10-CM

## 2021-10-20 DIAGNOSIS — G47.33 OSA ON CPAP: Primary | ICD-10-CM

## 2021-10-20 PROCEDURE — 3008F PR BODY MASS INDEX (BMI) DOCUMENTED: ICD-10-PCS | Mod: CPTII,S$GLB,, | Performed by: INTERNAL MEDICINE

## 2021-10-20 PROCEDURE — 3079F PR MOST RECENT DIASTOLIC BLOOD PRESSURE 80-89 MM HG: ICD-10-PCS | Mod: CPTII,S$GLB,, | Performed by: INTERNAL MEDICINE

## 2021-10-20 PROCEDURE — 3074F SYST BP LT 130 MM HG: CPT | Mod: CPTII,S$GLB,, | Performed by: INTERNAL MEDICINE

## 2021-10-20 PROCEDURE — 3074F PR MOST RECENT SYSTOLIC BLOOD PRESSURE < 130 MM HG: ICD-10-PCS | Mod: CPTII,S$GLB,, | Performed by: INTERNAL MEDICINE

## 2021-10-20 PROCEDURE — 3061F NEG MICROALBUMINURIA REV: CPT | Mod: CPTII,S$GLB,, | Performed by: INTERNAL MEDICINE

## 2021-10-20 PROCEDURE — 1101F PR PT FALLS ASSESS DOC 0-1 FALLS W/OUT INJ PAST YR: ICD-10-PCS | Mod: CPTII,S$GLB,, | Performed by: INTERNAL MEDICINE

## 2021-10-20 PROCEDURE — 1159F MED LIST DOCD IN RCRD: CPT | Mod: CPTII,S$GLB,, | Performed by: INTERNAL MEDICINE

## 2021-10-20 PROCEDURE — 1159F PR MEDICATION LIST DOCUMENTED IN MEDICAL RECORD: ICD-10-PCS | Mod: CPTII,S$GLB,, | Performed by: INTERNAL MEDICINE

## 2021-10-20 PROCEDURE — 71250 CT CHEST WITHOUT CONTRAST: ICD-10-PCS | Mod: 26,,, | Performed by: RADIOLOGY

## 2021-10-20 PROCEDURE — 71250 CT THORAX DX C-: CPT | Mod: 26,,, | Performed by: RADIOLOGY

## 2021-10-20 PROCEDURE — 3066F NEPHROPATHY DOC TX: CPT | Mod: CPTII,S$GLB,, | Performed by: INTERNAL MEDICINE

## 2021-10-20 PROCEDURE — 3288F PR FALLS RISK ASSESSMENT DOCUMENTED: ICD-10-PCS | Mod: CPTII,S$GLB,, | Performed by: INTERNAL MEDICINE

## 2021-10-20 PROCEDURE — 99215 OFFICE O/P EST HI 40 MIN: CPT | Mod: S$GLB,,, | Performed by: INTERNAL MEDICINE

## 2021-10-20 PROCEDURE — 3066F PR DOCUMENTATION OF TREATMENT FOR NEPHROPATHY: ICD-10-PCS | Mod: CPTII,S$GLB,, | Performed by: INTERNAL MEDICINE

## 2021-10-20 PROCEDURE — 3079F DIAST BP 80-89 MM HG: CPT | Mod: CPTII,S$GLB,, | Performed by: INTERNAL MEDICINE

## 2021-10-20 PROCEDURE — 99499 RISK ADDL DX/OHS AUDIT: ICD-10-PCS | Mod: S$GLB,,, | Performed by: INTERNAL MEDICINE

## 2021-10-20 PROCEDURE — 71250 CT THORAX DX C-: CPT | Mod: TC

## 2021-10-20 PROCEDURE — 1101F PT FALLS ASSESS-DOCD LE1/YR: CPT | Mod: CPTII,S$GLB,, | Performed by: INTERNAL MEDICINE

## 2021-10-20 PROCEDURE — 4010F ACE/ARB THERAPY RXD/TAKEN: CPT | Mod: CPTII,S$GLB,, | Performed by: INTERNAL MEDICINE

## 2021-10-20 PROCEDURE — 3008F BODY MASS INDEX DOCD: CPT | Mod: CPTII,S$GLB,, | Performed by: INTERNAL MEDICINE

## 2021-10-20 PROCEDURE — 3061F PR NEG MICROALBUMINURIA RESULT DOCUMENTED/REVIEW: ICD-10-PCS | Mod: CPTII,S$GLB,, | Performed by: INTERNAL MEDICINE

## 2021-10-20 PROCEDURE — 3288F FALL RISK ASSESSMENT DOCD: CPT | Mod: CPTII,S$GLB,, | Performed by: INTERNAL MEDICINE

## 2021-10-20 PROCEDURE — 4010F PR ACE/ARB THEARPY RXD/TAKEN: ICD-10-PCS | Mod: CPTII,S$GLB,, | Performed by: INTERNAL MEDICINE

## 2021-10-20 PROCEDURE — 99999 PR PBB SHADOW E&M-EST. PATIENT-LVL V: CPT | Mod: PBBFAC,,, | Performed by: INTERNAL MEDICINE

## 2021-10-20 PROCEDURE — 99999 PR PBB SHADOW E&M-EST. PATIENT-LVL V: ICD-10-PCS | Mod: PBBFAC,,, | Performed by: INTERNAL MEDICINE

## 2021-10-20 PROCEDURE — 3046F HEMOGLOBIN A1C LEVEL >9.0%: CPT | Mod: CPTII,S$GLB,, | Performed by: INTERNAL MEDICINE

## 2021-10-20 PROCEDURE — 99215 PR OFFICE/OUTPT VISIT, EST, LEVL V, 40-54 MIN: ICD-10-PCS | Mod: S$GLB,,, | Performed by: INTERNAL MEDICINE

## 2021-10-20 PROCEDURE — 3046F PR MOST RECENT HEMOGLOBIN A1C LEVEL > 9.0%: ICD-10-PCS | Mod: CPTII,S$GLB,, | Performed by: INTERNAL MEDICINE

## 2021-10-20 PROCEDURE — 99499 UNLISTED E&M SERVICE: CPT | Mod: S$GLB,,, | Performed by: INTERNAL MEDICINE

## 2021-10-21 PROBLEM — E66.01 CLASS 2 SEVERE OBESITY DUE TO EXCESS CALORIES WITH SERIOUS COMORBIDITY AND BODY MASS INDEX (BMI) OF 35.0 TO 35.9 IN ADULT: Status: ACTIVE | Noted: 2021-01-19

## 2021-10-27 ENCOUNTER — PATIENT MESSAGE (OUTPATIENT)
Dept: DIABETES | Facility: CLINIC | Age: 71
End: 2021-10-27
Payer: MEDICARE

## 2021-10-28 ENCOUNTER — OFFICE VISIT (OUTPATIENT)
Dept: DIABETES | Facility: CLINIC | Age: 71
End: 2021-10-28
Payer: MEDICARE

## 2021-10-28 DIAGNOSIS — E11.65 TYPE 2 DIABETES MELLITUS WITH HYPERGLYCEMIA, WITH LONG-TERM CURRENT USE OF INSULIN: Primary | ICD-10-CM

## 2021-10-28 DIAGNOSIS — E78.5 HYPERLIPIDEMIA ASSOCIATED WITH TYPE 2 DIABETES MELLITUS: ICD-10-CM

## 2021-10-28 DIAGNOSIS — Z79.4 TYPE 2 DIABETES MELLITUS WITH HYPERGLYCEMIA, WITH LONG-TERM CURRENT USE OF INSULIN: Primary | ICD-10-CM

## 2021-10-28 DIAGNOSIS — G47.33 OSA ON CPAP: ICD-10-CM

## 2021-10-28 DIAGNOSIS — E11.69 HYPERLIPIDEMIA ASSOCIATED WITH TYPE 2 DIABETES MELLITUS: ICD-10-CM

## 2021-10-28 DIAGNOSIS — E03.9 ACQUIRED HYPOTHYROIDISM: ICD-10-CM

## 2021-10-28 DIAGNOSIS — E66.9 OBESITY (BMI 30-39.9): ICD-10-CM

## 2021-10-28 PROCEDURE — 4010F PR ACE/ARB THEARPY RXD/TAKEN: ICD-10-PCS | Mod: CPTII,95,, | Performed by: PHYSICIAN ASSISTANT

## 2021-10-28 PROCEDURE — 3066F NEPHROPATHY DOC TX: CPT | Mod: CPTII,95,, | Performed by: PHYSICIAN ASSISTANT

## 2021-10-28 PROCEDURE — 99214 OFFICE O/P EST MOD 30 MIN: CPT | Mod: 95,,, | Performed by: PHYSICIAN ASSISTANT

## 2021-10-28 PROCEDURE — 3061F NEG MICROALBUMINURIA REV: CPT | Mod: CPTII,95,, | Performed by: PHYSICIAN ASSISTANT

## 2021-10-28 PROCEDURE — 3046F HEMOGLOBIN A1C LEVEL >9.0%: CPT | Mod: CPTII,95,, | Performed by: PHYSICIAN ASSISTANT

## 2021-10-28 PROCEDURE — 3046F PR MOST RECENT HEMOGLOBIN A1C LEVEL > 9.0%: ICD-10-PCS | Mod: CPTII,95,, | Performed by: PHYSICIAN ASSISTANT

## 2021-10-28 PROCEDURE — 4010F ACE/ARB THERAPY RXD/TAKEN: CPT | Mod: CPTII,95,, | Performed by: PHYSICIAN ASSISTANT

## 2021-10-28 PROCEDURE — 99214 PR OFFICE/OUTPT VISIT, EST, LEVL IV, 30-39 MIN: ICD-10-PCS | Mod: 95,,, | Performed by: PHYSICIAN ASSISTANT

## 2021-10-28 PROCEDURE — 3066F PR DOCUMENTATION OF TREATMENT FOR NEPHROPATHY: ICD-10-PCS | Mod: CPTII,95,, | Performed by: PHYSICIAN ASSISTANT

## 2021-10-28 PROCEDURE — 3061F PR NEG MICROALBUMINURIA RESULT DOCUMENTED/REVIEW: ICD-10-PCS | Mod: CPTII,95,, | Performed by: PHYSICIAN ASSISTANT

## 2021-10-28 RX ORDER — PEN NEEDLE, DIABETIC 30 GX3/16"
1 NEEDLE, DISPOSABLE MISCELLANEOUS
Qty: 300 EACH | Refills: 3 | Status: SHIPPED | OUTPATIENT
Start: 2021-10-28 | End: 2022-10-28

## 2021-10-28 RX ORDER — INSULIN LISPRO 100 [IU]/ML
5 INJECTION, SOLUTION INTRAVENOUS; SUBCUTANEOUS
Qty: 15 ML | Refills: 3 | Status: SHIPPED | OUTPATIENT
Start: 2021-10-28 | End: 2022-01-24 | Stop reason: SDUPTHER

## 2021-10-29 ENCOUNTER — OFFICE VISIT (OUTPATIENT)
Dept: OBSTETRICS AND GYNECOLOGY | Facility: CLINIC | Age: 71
End: 2021-10-29
Payer: MEDICARE

## 2021-10-29 ENCOUNTER — PATIENT MESSAGE (OUTPATIENT)
Dept: OBSTETRICS AND GYNECOLOGY | Facility: CLINIC | Age: 71
End: 2021-10-29

## 2021-10-29 VITALS
HEIGHT: 67 IN | DIASTOLIC BLOOD PRESSURE: 68 MMHG | SYSTOLIC BLOOD PRESSURE: 110 MMHG | WEIGHT: 225.06 LBS | BODY MASS INDEX: 35.33 KG/M2

## 2021-10-29 DIAGNOSIS — N89.8 VAGINAL IRRITATION: Primary | ICD-10-CM

## 2021-10-29 PROCEDURE — 1126F PR PAIN SEVERITY QUANTIFIED, NO PAIN PRESENT: ICD-10-PCS | Mod: CPTII,S$GLB,, | Performed by: NURSE PRACTITIONER

## 2021-10-29 PROCEDURE — 1160F RVW MEDS BY RX/DR IN RCRD: CPT | Mod: CPTII,S$GLB,, | Performed by: NURSE PRACTITIONER

## 2021-10-29 PROCEDURE — 3046F HEMOGLOBIN A1C LEVEL >9.0%: CPT | Mod: CPTII,S$GLB,, | Performed by: NURSE PRACTITIONER

## 2021-10-29 PROCEDURE — 3046F PR MOST RECENT HEMOGLOBIN A1C LEVEL > 9.0%: ICD-10-PCS | Mod: CPTII,S$GLB,, | Performed by: NURSE PRACTITIONER

## 2021-10-29 PROCEDURE — 3008F BODY MASS INDEX DOCD: CPT | Mod: CPTII,S$GLB,, | Performed by: NURSE PRACTITIONER

## 2021-10-29 PROCEDURE — 1126F AMNT PAIN NOTED NONE PRSNT: CPT | Mod: CPTII,S$GLB,, | Performed by: NURSE PRACTITIONER

## 2021-10-29 PROCEDURE — 1101F PR PT FALLS ASSESS DOC 0-1 FALLS W/OUT INJ PAST YR: ICD-10-PCS | Mod: CPTII,S$GLB,, | Performed by: NURSE PRACTITIONER

## 2021-10-29 PROCEDURE — 3008F PR BODY MASS INDEX (BMI) DOCUMENTED: ICD-10-PCS | Mod: CPTII,S$GLB,, | Performed by: NURSE PRACTITIONER

## 2021-10-29 PROCEDURE — 3078F PR MOST RECENT DIASTOLIC BLOOD PRESSURE < 80 MM HG: ICD-10-PCS | Mod: CPTII,S$GLB,, | Performed by: NURSE PRACTITIONER

## 2021-10-29 PROCEDURE — 3066F NEPHROPATHY DOC TX: CPT | Mod: CPTII,S$GLB,, | Performed by: NURSE PRACTITIONER

## 2021-10-29 PROCEDURE — 87481 CANDIDA DNA AMP PROBE: CPT | Mod: 59 | Performed by: NURSE PRACTITIONER

## 2021-10-29 PROCEDURE — 99999 PR PBB SHADOW E&M-EST. PATIENT-LVL IV: ICD-10-PCS | Mod: PBBFAC,,, | Performed by: NURSE PRACTITIONER

## 2021-10-29 PROCEDURE — 3061F PR NEG MICROALBUMINURIA RESULT DOCUMENTED/REVIEW: ICD-10-PCS | Mod: CPTII,S$GLB,, | Performed by: NURSE PRACTITIONER

## 2021-10-29 PROCEDURE — 3288F FALL RISK ASSESSMENT DOCD: CPT | Mod: CPTII,S$GLB,, | Performed by: NURSE PRACTITIONER

## 2021-10-29 PROCEDURE — 99204 OFFICE O/P NEW MOD 45 MIN: CPT | Mod: S$GLB,,, | Performed by: NURSE PRACTITIONER

## 2021-10-29 PROCEDURE — 3078F DIAST BP <80 MM HG: CPT | Mod: CPTII,S$GLB,, | Performed by: NURSE PRACTITIONER

## 2021-10-29 PROCEDURE — 3061F NEG MICROALBUMINURIA REV: CPT | Mod: CPTII,S$GLB,, | Performed by: NURSE PRACTITIONER

## 2021-10-29 PROCEDURE — 3074F SYST BP LT 130 MM HG: CPT | Mod: CPTII,S$GLB,, | Performed by: NURSE PRACTITIONER

## 2021-10-29 PROCEDURE — 4010F PR ACE/ARB THEARPY RXD/TAKEN: ICD-10-PCS | Mod: CPTII,S$GLB,, | Performed by: NURSE PRACTITIONER

## 2021-10-29 PROCEDURE — 1101F PT FALLS ASSESS-DOCD LE1/YR: CPT | Mod: CPTII,S$GLB,, | Performed by: NURSE PRACTITIONER

## 2021-10-29 PROCEDURE — 3066F PR DOCUMENTATION OF TREATMENT FOR NEPHROPATHY: ICD-10-PCS | Mod: CPTII,S$GLB,, | Performed by: NURSE PRACTITIONER

## 2021-10-29 PROCEDURE — 1159F MED LIST DOCD IN RCRD: CPT | Mod: CPTII,S$GLB,, | Performed by: NURSE PRACTITIONER

## 2021-10-29 PROCEDURE — 99204 PR OFFICE/OUTPT VISIT, NEW, LEVL IV, 45-59 MIN: ICD-10-PCS | Mod: S$GLB,,, | Performed by: NURSE PRACTITIONER

## 2021-10-29 PROCEDURE — 3074F PR MOST RECENT SYSTOLIC BLOOD PRESSURE < 130 MM HG: ICD-10-PCS | Mod: CPTII,S$GLB,, | Performed by: NURSE PRACTITIONER

## 2021-10-29 PROCEDURE — 4010F ACE/ARB THERAPY RXD/TAKEN: CPT | Mod: CPTII,S$GLB,, | Performed by: NURSE PRACTITIONER

## 2021-10-29 PROCEDURE — 1160F PR REVIEW ALL MEDS BY PRESCRIBER/CLIN PHARMACIST DOCUMENTED: ICD-10-PCS | Mod: CPTII,S$GLB,, | Performed by: NURSE PRACTITIONER

## 2021-10-29 PROCEDURE — 1159F PR MEDICATION LIST DOCUMENTED IN MEDICAL RECORD: ICD-10-PCS | Mod: CPTII,S$GLB,, | Performed by: NURSE PRACTITIONER

## 2021-10-29 PROCEDURE — 99999 PR PBB SHADOW E&M-EST. PATIENT-LVL IV: CPT | Mod: PBBFAC,,, | Performed by: NURSE PRACTITIONER

## 2021-10-29 PROCEDURE — 3288F PR FALLS RISK ASSESSMENT DOCUMENTED: ICD-10-PCS | Mod: CPTII,S$GLB,, | Performed by: NURSE PRACTITIONER

## 2021-10-29 RX ORDER — NYSTATIN 100000 U/G
OINTMENT TOPICAL 2 TIMES DAILY
Qty: 30 G | Refills: 1 | Status: SHIPPED | OUTPATIENT
Start: 2021-10-29 | End: 2022-01-04

## 2021-10-29 RX ORDER — INFLUENZA VACCINE, ADJUVANTED 15; 15; 15; 15 UG/.5ML; UG/.5ML; UG/.5ML; UG/.5ML
INJECTION, SUSPENSION INTRAMUSCULAR
COMMUNITY
Start: 2021-09-27 | End: 2022-08-01

## 2021-10-29 RX ORDER — TRIAMCINOLONE ACETONIDE 0.25 MG/G
OINTMENT TOPICAL 2 TIMES DAILY
Qty: 80 G | Refills: 1 | Status: SHIPPED | OUTPATIENT
Start: 2021-10-29 | End: 2022-01-04

## 2021-11-02 LAB
BACTERIAL VAGINOSIS DNA: NEGATIVE
CANDIDA GLABRATA DNA: NEGATIVE
CANDIDA KRUSEI DNA: NEGATIVE
CANDIDA RRNA VAG QL PROBE: NEGATIVE
T VAGINALIS RRNA GENITAL QL PROBE: NEGATIVE

## 2021-11-05 ENCOUNTER — PATIENT MESSAGE (OUTPATIENT)
Dept: DIABETES | Facility: CLINIC | Age: 71
End: 2021-11-05
Payer: MEDICARE

## 2021-11-09 ENCOUNTER — TELEPHONE (OUTPATIENT)
Dept: ADMINISTRATIVE | Facility: HOSPITAL | Age: 71
End: 2021-11-09
Payer: MEDICARE

## 2021-11-16 ENCOUNTER — PATIENT MESSAGE (OUTPATIENT)
Dept: DIABETES | Facility: CLINIC | Age: 71
End: 2021-11-16

## 2021-11-16 ENCOUNTER — OFFICE VISIT (OUTPATIENT)
Dept: DIABETES | Facility: CLINIC | Age: 71
End: 2021-11-16
Payer: MEDICARE

## 2021-11-16 DIAGNOSIS — E11.69 HYPERLIPIDEMIA ASSOCIATED WITH TYPE 2 DIABETES MELLITUS: ICD-10-CM

## 2021-11-16 DIAGNOSIS — E66.9 OBESITY (BMI 30-39.9): ICD-10-CM

## 2021-11-16 DIAGNOSIS — E78.5 HYPERLIPIDEMIA ASSOCIATED WITH TYPE 2 DIABETES MELLITUS: ICD-10-CM

## 2021-11-16 DIAGNOSIS — E03.9 ACQUIRED HYPOTHYROIDISM: ICD-10-CM

## 2021-11-16 DIAGNOSIS — G47.33 OSA ON CPAP: ICD-10-CM

## 2021-11-16 DIAGNOSIS — Z79.4 TYPE 2 DIABETES MELLITUS WITH HYPERGLYCEMIA, WITH LONG-TERM CURRENT USE OF INSULIN: Primary | ICD-10-CM

## 2021-11-16 DIAGNOSIS — E11.65 TYPE 2 DIABETES MELLITUS WITH HYPERGLYCEMIA, WITH LONG-TERM CURRENT USE OF INSULIN: Primary | ICD-10-CM

## 2021-11-16 PROCEDURE — 3066F PR DOCUMENTATION OF TREATMENT FOR NEPHROPATHY: ICD-10-PCS | Mod: CPTII,95,, | Performed by: PHYSICIAN ASSISTANT

## 2021-11-16 PROCEDURE — 4010F PR ACE/ARB THEARPY RXD/TAKEN: ICD-10-PCS | Mod: CPTII,95,, | Performed by: PHYSICIAN ASSISTANT

## 2021-11-16 PROCEDURE — 3061F NEG MICROALBUMINURIA REV: CPT | Mod: CPTII,95,, | Performed by: PHYSICIAN ASSISTANT

## 2021-11-16 PROCEDURE — 4010F ACE/ARB THERAPY RXD/TAKEN: CPT | Mod: CPTII,95,, | Performed by: PHYSICIAN ASSISTANT

## 2021-11-16 PROCEDURE — 3066F NEPHROPATHY DOC TX: CPT | Mod: CPTII,95,, | Performed by: PHYSICIAN ASSISTANT

## 2021-11-16 PROCEDURE — 99214 PR OFFICE/OUTPT VISIT, EST, LEVL IV, 30-39 MIN: ICD-10-PCS | Mod: 95,,, | Performed by: PHYSICIAN ASSISTANT

## 2021-11-16 PROCEDURE — 3061F PR NEG MICROALBUMINURIA RESULT DOCUMENTED/REVIEW: ICD-10-PCS | Mod: CPTII,95,, | Performed by: PHYSICIAN ASSISTANT

## 2021-11-16 PROCEDURE — 99214 OFFICE O/P EST MOD 30 MIN: CPT | Mod: 95,,, | Performed by: PHYSICIAN ASSISTANT

## 2021-11-16 PROCEDURE — 3046F PR MOST RECENT HEMOGLOBIN A1C LEVEL > 9.0%: ICD-10-PCS | Mod: CPTII,95,, | Performed by: PHYSICIAN ASSISTANT

## 2021-11-16 PROCEDURE — 3046F HEMOGLOBIN A1C LEVEL >9.0%: CPT | Mod: CPTII,95,, | Performed by: PHYSICIAN ASSISTANT

## 2021-11-16 RX ORDER — GLIPIZIDE 5 MG/1
5 TABLET ORAL DAILY
Qty: 90 TABLET | Refills: 3
Start: 2021-11-16 | End: 2023-02-03

## 2021-11-21 ENCOUNTER — PATIENT MESSAGE (OUTPATIENT)
Dept: DIABETES | Facility: CLINIC | Age: 71
End: 2021-11-21
Payer: MEDICARE

## 2021-12-15 ENCOUNTER — LAB VISIT (OUTPATIENT)
Dept: LAB | Facility: HOSPITAL | Age: 71
End: 2021-12-15
Attending: INTERNAL MEDICINE
Payer: MEDICARE

## 2021-12-15 ENCOUNTER — CLINICAL SUPPORT (OUTPATIENT)
Dept: DIABETES | Facility: CLINIC | Age: 71
End: 2021-12-15
Payer: MEDICARE

## 2021-12-15 DIAGNOSIS — E11.65 UNCONTROLLED TYPE 2 DIABETES MELLITUS WITH HYPERGLYCEMIA: ICD-10-CM

## 2021-12-15 LAB
ESTIMATED AVG GLUCOSE: 223 MG/DL (ref 68–131)
HBA1C MFR BLD: 9.4 % (ref 4–5.6)

## 2021-12-15 PROCEDURE — 83036 HEMOGLOBIN GLYCOSYLATED A1C: CPT | Performed by: INTERNAL MEDICINE

## 2021-12-15 PROCEDURE — 36415 COLL VENOUS BLD VENIPUNCTURE: CPT | Performed by: INTERNAL MEDICINE

## 2021-12-16 ENCOUNTER — PATIENT MESSAGE (OUTPATIENT)
Dept: DIABETES | Facility: CLINIC | Age: 71
End: 2021-12-16
Payer: MEDICARE

## 2021-12-16 ENCOUNTER — TELEPHONE (OUTPATIENT)
Dept: FAMILY MEDICINE | Facility: CLINIC | Age: 71
End: 2021-12-16
Payer: MEDICARE

## 2021-12-16 NOTE — TELEPHONE ENCOUNTER
----- Message from Mary Minor sent at 12/16/2021  3:26 PM CST -----  .Type:  Patient Returning Call    Who Called:NORMA HILLS [[id number]]  Who Left Message for Patient:nurse  Does the patient know what this is regarding?:r/s apt  Would the patient rather a call back or a response via [facility]? call  Best Call Back Number:[phone]  Additional Information:

## 2021-12-23 ENCOUNTER — PATIENT OUTREACH (OUTPATIENT)
Dept: ADMINISTRATIVE | Facility: HOSPITAL | Age: 71
End: 2021-12-23
Payer: MEDICARE

## 2021-12-28 NOTE — PROGRESS NOTES
"Subjective:      Patient ID: Jaimie Luque is a 71 y.o. female.    Chief Complaint: Follow-up      HPI  Here for follow up of medical problems.  AC breakfast sugars 120-160.  Not exercising due to knee pain.  Gained 9#, net loss 15#, says on purpose.    Sees Lorraine for DM, now on meal insulin.  -250 range.    10/21 chest CT:  Impression:     1. Previously described new 6 mm left upper lobe is unchanged.  There has been interval development of similar appearing nodular opacities in the lingula and right upper lobe.  Appearance of these nodules is suggestive of possible areas of mucous plugging.  Continued follow-up is recommended.  2. Multiple other bilateral pulmonary nodules remain unchanged as above measuring up to 11 mm in the right middle lobe.  3. Other stable findings as above.        Electronically signed by: Stone Ross MD  Date:                                            10/20/2021  Time    Updated/ annual due 9/22:  HM: 9/21 fluvax, 2/21 covid vaccines/booster, 4/17 HAV#2, 3/15 [id number], 3/14 [id number], 10/18 booster at pharm TDaP, 11/12 zoster, 2019 Shingrix x2, 9/21 BMD rep 5y, 5/17 Cscope rep 5y, 6/20 mmg(q2), 2/3/21 Eye Dr. Roly Olivares , 9/16 HCV neg, 1/21 CT lung repeat due 1y with Pulm.     Review of Systems   Constitutional: Negative for chills, diaphoresis and fever.   Respiratory: Negative for cough and shortness of breath.    Cardiovascular: Negative for chest pain, palpitations and leg swelling.   Gastrointestinal: Negative for blood in stool, constipation, diarrhea, nausea and vomiting.   Genitourinary: Negative for dysuria, frequency and hematuria.   Psychiatric/Behavioral: The patient is not nervous/anxious.          Objective:   BP (!) 144/74 (BP Location: Left arm, Patient Position: Sitting)   Pulse 83   Temp 97.4 °F (36.3 °C) (Oral)   Resp 18   Ht 5' 7" (1.702 m)   Wt 106 kg (233 lb 11 oz)   SpO2 98%   BMI 36.60 kg/m²     Physical Exam  Constitutional:       " Appearance: She is well-developed.   HENT:      Mouth/Throat:      Mouth: Oropharynx is clear and moist.   Neck:      Thyroid: No thyroid mass.      Vascular: No carotid bruit.   Cardiovascular:      Rate and Rhythm: Normal rate and regular rhythm.      Pulses: Intact distal pulses.           Dorsalis pedis pulses are 2+ on the right side and 2+ on the left side.        Posterior tibial pulses are 2+ on the right side and 2+ on the left side.      Heart sounds: No murmur heard.  No friction rub. No gallop.    Pulmonary:      Effort: Pulmonary effort is normal.      Breath sounds: Normal breath sounds. No wheezing or rales.   Abdominal:      General: Bowel sounds are normal.      Palpations: Abdomen is soft. There is no hepatosplenomegaly or mass.      Tenderness: There is no abdominal tenderness.   Musculoskeletal:         General: No edema.      Cervical back: Neck supple.   Feet:      Right foot:      Skin integrity: No erythema, warmth, callus or dry skin.      Left foot:      Skin integrity: No erythema, warmth, callus or dry skin.   Lymphadenopathy:      Cervical: No cervical adenopathy.   Neurological:      Mental Status: She is alert and oriented to person, place, and time.   Psychiatric:         Mood and Affect: Mood and affect normal.           Results for NORMA HILLS (MRN [id number]) as of 1/4/2022 08:49   Ref. Range 9/15/2021 08:14 9/20/2021 09:19 10/20/2021 15:45 10/29/2021 08:57 12/15/2021 09:16   Hemoglobin A1C External Latest Ref Range: 4.0 - 5.6 % 9.1 (H)    9.4 (H)   Estimated Avg Glucose Latest Ref Range: 68 - 131 mg/dL 214 (H)    223 (H)     Assessment:       1. Uncontrolled type 2 diabetes mellitus with hyperglycemia    2. PHN (postherpetic neuralgia)    3. Acquired hypothyroidism    4. Weight loss    5. Screen for colon cancer    6. Chronic pain of right knee    7. Severe obesity with body mass index (BMI) of 35.0 to 35.9 and comorbidity          Plan:     Uncontrolled type 2 diabetes mellitus  with hyperglycemia- worsening, gained 9# and increased insulin.  Increase lantus to 18u daily.  Monitor sugars more often during day, return to Ms. Holliday.    PHN (postherpetic neuralgia)- cont gabapentin- make sure no underlying CA- cont to follow.    Acquired hypothyroidism- Clinically stable, continue present treatment.    Weight loss, 15#, will follow.  RTC 3mo.  Needs knee treatment so hopefully can exercise more.    Cscope for 5/22.    Xray and Ortho for right knee pain, sometimes gives out.

## 2022-01-04 ENCOUNTER — HOSPITAL ENCOUNTER (OUTPATIENT)
Dept: RADIOLOGY | Facility: HOSPITAL | Age: 72
Discharge: HOME | End: 2022-01-04
Attending: INTERNAL MEDICINE
Payer: MEDICARE

## 2022-01-04 ENCOUNTER — OFFICE VISIT (OUTPATIENT)
Dept: INTERNAL MEDICINE | Facility: CLINIC | Age: 72
End: 2022-01-04
Payer: MEDICARE

## 2022-01-04 VITALS
TEMPERATURE: 97 F | WEIGHT: 233.69 LBS | OXYGEN SATURATION: 98 % | BODY MASS INDEX: 36.68 KG/M2 | RESPIRATION RATE: 18 BRPM | HEIGHT: 67 IN | SYSTOLIC BLOOD PRESSURE: 144 MMHG | HEART RATE: 83 BPM | DIASTOLIC BLOOD PRESSURE: 74 MMHG

## 2022-01-04 DIAGNOSIS — G89.29 CHRONIC PAIN OF RIGHT KNEE: ICD-10-CM

## 2022-01-04 DIAGNOSIS — R63.4 WEIGHT LOSS: ICD-10-CM

## 2022-01-04 DIAGNOSIS — Z12.11 SCREEN FOR COLON CANCER: ICD-10-CM

## 2022-01-04 DIAGNOSIS — E11.65 UNCONTROLLED TYPE 2 DIABETES MELLITUS WITH HYPERGLYCEMIA: Primary | ICD-10-CM

## 2022-01-04 DIAGNOSIS — E66.01 SEVERE OBESITY WITH BODY MASS INDEX (BMI) OF 35.0 TO 35.9 AND COMORBIDITY: ICD-10-CM

## 2022-01-04 DIAGNOSIS — E03.9 ACQUIRED HYPOTHYROIDISM: ICD-10-CM

## 2022-01-04 DIAGNOSIS — B02.29 PHN (POSTHERPETIC NEURALGIA): ICD-10-CM

## 2022-01-04 DIAGNOSIS — M25.561 CHRONIC PAIN OF RIGHT KNEE: ICD-10-CM

## 2022-01-04 PROCEDURE — 73562 X-RAY EXAM OF KNEE 3: CPT | Mod: TC,RT

## 2022-01-04 PROCEDURE — 1159F MED LIST DOCD IN RCRD: CPT | Mod: CPTII,S$GLB,, | Performed by: INTERNAL MEDICINE

## 2022-01-04 PROCEDURE — 3078F PR MOST RECENT DIASTOLIC BLOOD PRESSURE < 80 MM HG: ICD-10-PCS | Mod: CPTII,S$GLB,, | Performed by: INTERNAL MEDICINE

## 2022-01-04 PROCEDURE — 3008F BODY MASS INDEX DOCD: CPT | Mod: CPTII,S$GLB,, | Performed by: INTERNAL MEDICINE

## 2022-01-04 PROCEDURE — 99999 PR PBB SHADOW E&M-EST. PATIENT-LVL V: CPT | Mod: PBBFAC,,, | Performed by: INTERNAL MEDICINE

## 2022-01-04 PROCEDURE — 3288F FALL RISK ASSESSMENT DOCD: CPT | Mod: CPTII,S$GLB,, | Performed by: INTERNAL MEDICINE

## 2022-01-04 PROCEDURE — 73562 XR KNEE ORTHO RIGHT: ICD-10-PCS | Mod: 26,RT,, | Performed by: RADIOLOGY

## 2022-01-04 PROCEDURE — 73560 XR KNEE ORTHO RIGHT: ICD-10-PCS | Mod: 26,LT,, | Performed by: RADIOLOGY

## 2022-01-04 PROCEDURE — 73560 X-RAY EXAM OF KNEE 1 OR 2: CPT | Mod: 26,LT,, | Performed by: RADIOLOGY

## 2022-01-04 PROCEDURE — 73560 X-RAY EXAM OF KNEE 1 OR 2: CPT | Mod: TC,LT,59

## 2022-01-04 PROCEDURE — 1126F AMNT PAIN NOTED NONE PRSNT: CPT | Mod: CPTII,S$GLB,, | Performed by: INTERNAL MEDICINE

## 2022-01-04 PROCEDURE — 1101F PT FALLS ASSESS-DOCD LE1/YR: CPT | Mod: CPTII,S$GLB,, | Performed by: INTERNAL MEDICINE

## 2022-01-04 PROCEDURE — 99999 PR PBB SHADOW E&M-EST. PATIENT-LVL V: ICD-10-PCS | Mod: PBBFAC,,, | Performed by: INTERNAL MEDICINE

## 2022-01-04 PROCEDURE — 3008F PR BODY MASS INDEX (BMI) DOCUMENTED: ICD-10-PCS | Mod: CPTII,S$GLB,, | Performed by: INTERNAL MEDICINE

## 2022-01-04 PROCEDURE — 99214 PR OFFICE/OUTPT VISIT, EST, LEVL IV, 30-39 MIN: ICD-10-PCS | Mod: S$GLB,,, | Performed by: INTERNAL MEDICINE

## 2022-01-04 PROCEDURE — 73562 X-RAY EXAM OF KNEE 3: CPT | Mod: 26,RT,, | Performed by: RADIOLOGY

## 2022-01-04 PROCEDURE — 3077F PR MOST RECENT SYSTOLIC BLOOD PRESSURE >= 140 MM HG: ICD-10-PCS | Mod: CPTII,S$GLB,, | Performed by: INTERNAL MEDICINE

## 2022-01-04 PROCEDURE — 1159F PR MEDICATION LIST DOCUMENTED IN MEDICAL RECORD: ICD-10-PCS | Mod: CPTII,S$GLB,, | Performed by: INTERNAL MEDICINE

## 2022-01-04 PROCEDURE — 3077F SYST BP >= 140 MM HG: CPT | Mod: CPTII,S$GLB,, | Performed by: INTERNAL MEDICINE

## 2022-01-04 PROCEDURE — 3288F PR FALLS RISK ASSESSMENT DOCUMENTED: ICD-10-PCS | Mod: CPTII,S$GLB,, | Performed by: INTERNAL MEDICINE

## 2022-01-04 PROCEDURE — 3078F DIAST BP <80 MM HG: CPT | Mod: CPTII,S$GLB,, | Performed by: INTERNAL MEDICINE

## 2022-01-04 PROCEDURE — 99214 OFFICE O/P EST MOD 30 MIN: CPT | Mod: S$GLB,,, | Performed by: INTERNAL MEDICINE

## 2022-01-04 PROCEDURE — 1126F PR PAIN SEVERITY QUANTIFIED, NO PAIN PRESENT: ICD-10-PCS | Mod: CPTII,S$GLB,, | Performed by: INTERNAL MEDICINE

## 2022-01-04 PROCEDURE — 1101F PR PT FALLS ASSESS DOC 0-1 FALLS W/OUT INJ PAST YR: ICD-10-PCS | Mod: CPTII,S$GLB,, | Performed by: INTERNAL MEDICINE

## 2022-01-04 RX ORDER — INSULIN GLARGINE 100 [IU]/ML
18 INJECTION, SOLUTION SUBCUTANEOUS DAILY
Qty: 3 ML | Refills: 3
Start: 2022-01-04 | End: 2022-03-18 | Stop reason: SDUPTHER

## 2022-01-05 ENCOUNTER — PATIENT MESSAGE (OUTPATIENT)
Dept: ENDOSCOPY | Facility: HOSPITAL | Age: 72
End: 2022-01-05
Payer: MEDICARE

## 2022-01-06 ENCOUNTER — TELEPHONE (OUTPATIENT)
Dept: ADMINISTRATIVE | Facility: HOSPITAL | Age: 72
End: 2022-01-06
Payer: MEDICARE

## 2022-01-07 RX ORDER — SODIUM, POTASSIUM,MAG SULFATES 17.5-3.13G
1 SOLUTION, RECONSTITUTED, ORAL ORAL DAILY
Qty: 1 KIT | Refills: 0 | Status: SHIPPED | OUTPATIENT
Start: 2022-01-07 | End: 2022-01-09

## 2022-01-12 ENCOUNTER — OFFICE VISIT (OUTPATIENT)
Dept: DIABETES | Facility: CLINIC | Age: 72
End: 2022-01-12
Payer: MEDICARE

## 2022-01-12 ENCOUNTER — PATIENT MESSAGE (OUTPATIENT)
Dept: DIABETES | Facility: CLINIC | Age: 72
End: 2022-01-12

## 2022-01-12 VITALS
DIASTOLIC BLOOD PRESSURE: 85 MMHG | SYSTOLIC BLOOD PRESSURE: 135 MMHG | HEART RATE: 81 BPM | BODY MASS INDEX: 35.98 KG/M2 | WEIGHT: 229.75 LBS

## 2022-01-12 DIAGNOSIS — E11.65 TYPE 2 DIABETES MELLITUS WITH HYPERGLYCEMIA, WITH LONG-TERM CURRENT USE OF INSULIN: Primary | ICD-10-CM

## 2022-01-12 DIAGNOSIS — E03.9 ACQUIRED HYPOTHYROIDISM: ICD-10-CM

## 2022-01-12 DIAGNOSIS — Z79.4 TYPE 2 DIABETES MELLITUS WITH HYPERGLYCEMIA, WITH LONG-TERM CURRENT USE OF INSULIN: Primary | ICD-10-CM

## 2022-01-12 DIAGNOSIS — E11.69 HYPERLIPIDEMIA ASSOCIATED WITH TYPE 2 DIABETES MELLITUS: ICD-10-CM

## 2022-01-12 DIAGNOSIS — E78.5 HYPERLIPIDEMIA ASSOCIATED WITH TYPE 2 DIABETES MELLITUS: ICD-10-CM

## 2022-01-12 DIAGNOSIS — E66.9 OBESITY (BMI 30-39.9): ICD-10-CM

## 2022-01-12 DIAGNOSIS — G47.33 OSA ON CPAP: ICD-10-CM

## 2022-01-12 PROCEDURE — 3008F PR BODY MASS INDEX (BMI) DOCUMENTED: ICD-10-PCS | Mod: CPTII,S$GLB,, | Performed by: PHYSICIAN ASSISTANT

## 2022-01-12 PROCEDURE — 1101F PR PT FALLS ASSESS DOC 0-1 FALLS W/OUT INJ PAST YR: ICD-10-PCS | Mod: CPTII,S$GLB,, | Performed by: PHYSICIAN ASSISTANT

## 2022-01-12 PROCEDURE — 99214 PR OFFICE/OUTPT VISIT, EST, LEVL IV, 30-39 MIN: ICD-10-PCS | Mod: S$GLB,,, | Performed by: PHYSICIAN ASSISTANT

## 2022-01-12 PROCEDURE — 99214 OFFICE O/P EST MOD 30 MIN: CPT | Mod: S$GLB,,, | Performed by: PHYSICIAN ASSISTANT

## 2022-01-12 PROCEDURE — 3008F BODY MASS INDEX DOCD: CPT | Mod: CPTII,S$GLB,, | Performed by: PHYSICIAN ASSISTANT

## 2022-01-12 PROCEDURE — 3288F PR FALLS RISK ASSESSMENT DOCUMENTED: ICD-10-PCS | Mod: CPTII,S$GLB,, | Performed by: PHYSICIAN ASSISTANT

## 2022-01-12 PROCEDURE — 3079F DIAST BP 80-89 MM HG: CPT | Mod: CPTII,S$GLB,, | Performed by: PHYSICIAN ASSISTANT

## 2022-01-12 PROCEDURE — 3288F FALL RISK ASSESSMENT DOCD: CPT | Mod: CPTII,S$GLB,, | Performed by: PHYSICIAN ASSISTANT

## 2022-01-12 PROCEDURE — 1101F PT FALLS ASSESS-DOCD LE1/YR: CPT | Mod: CPTII,S$GLB,, | Performed by: PHYSICIAN ASSISTANT

## 2022-01-12 PROCEDURE — 1159F PR MEDICATION LIST DOCUMENTED IN MEDICAL RECORD: ICD-10-PCS | Mod: CPTII,S$GLB,, | Performed by: PHYSICIAN ASSISTANT

## 2022-01-12 PROCEDURE — 1125F AMNT PAIN NOTED PAIN PRSNT: CPT | Mod: CPTII,S$GLB,, | Performed by: PHYSICIAN ASSISTANT

## 2022-01-12 PROCEDURE — 1159F MED LIST DOCD IN RCRD: CPT | Mod: CPTII,S$GLB,, | Performed by: PHYSICIAN ASSISTANT

## 2022-01-12 PROCEDURE — 1125F PR PAIN SEVERITY QUANTIFIED, PAIN PRESENT: ICD-10-PCS | Mod: CPTII,S$GLB,, | Performed by: PHYSICIAN ASSISTANT

## 2022-01-12 PROCEDURE — 3079F PR MOST RECENT DIASTOLIC BLOOD PRESSURE 80-89 MM HG: ICD-10-PCS | Mod: CPTII,S$GLB,, | Performed by: PHYSICIAN ASSISTANT

## 2022-01-12 PROCEDURE — 99999 PR PBB SHADOW E&M-EST. PATIENT-LVL IV: CPT | Mod: PBBFAC,,, | Performed by: PHYSICIAN ASSISTANT

## 2022-01-12 PROCEDURE — 3075F SYST BP GE 130 - 139MM HG: CPT | Mod: CPTII,S$GLB,, | Performed by: PHYSICIAN ASSISTANT

## 2022-01-12 PROCEDURE — 3075F PR MOST RECENT SYSTOLIC BLOOD PRESS GE 130-139MM HG: ICD-10-PCS | Mod: CPTII,S$GLB,, | Performed by: PHYSICIAN ASSISTANT

## 2022-01-12 PROCEDURE — 99999 PR PBB SHADOW E&M-EST. PATIENT-LVL IV: ICD-10-PCS | Mod: PBBFAC,,, | Performed by: PHYSICIAN ASSISTANT

## 2022-01-12 NOTE — PATIENT INSTRUCTIONS
CURRENT DM MEDICATIONS:    Lantus 20 units qhs   Trulicity 4.5 mg weekly    Glipizide 5 mg before bed time snack   Humalog 8 units with small / regular or 12 units with heavier carb meal TID wm

## 2022-01-12 NOTE — PROGRESS NOTES
PCP: Jessica Luna MD    Subjective:     Chief Complaint: Diabetes - Established Patient    HISTORY OF PRESENT ILLNESS: 72 y.o.   female presenting for diabetes management visit.   The patient's last visit with me was on 11/16/2021.  Patient has had Type II diabetes since 20 or more years.  Pertinent to decision making is the following comorbidities: HLD, Hypothyroidism and Obesity by BMI  Patient has the following Diabetes complications: without complications  She has attended diabetes education in the past.     Patient's most recent A1c of 9.4% was completed 1 months ago.   Patient states since Her last A1c Her blood glucose levels have been high  throughout the day .   Patient monitors blood glucose 4 times per day with meter : Fasting, Before Lunch, Before Dinner and Before Bed.   Patient blood glucose monitoring device will not be uploaded into Media Section today secondary to patient forgetting device. Logs in media today.   Per log review, fasting blood sugars are ranging from 116 - 186 and before bed blood sugars ranging from 161 - 250.   Patient endorses the following diabetes related symptoms: None.   Patient is due today for the following diabetes-related health maintenance standards: Eye Exam. Due in Feb of 2022.     She denies any recent hospital admissions or emergency room visits. Patient denies history of DKA.   She denies having hypoglycemia.   Patient's concerns today include glycemic control.   Patient medication regimen is as below.      CURRENT DM MEDICATIONS:    Lantus 18 units qhs   Trulicity 4.5 mg weekly    Glipizide 5 mg before bed time snack   Humalog 6 units with small / regular or 8 units with heavier carb meal TID wm    Patient has failed the following Diabetes medications:    Metformin - GI    Invokana - coverage   Jardiance - yeast infection   Glyburide    Victoza    Basal - Basaglar      Labs Reviewed.       Lab Results   Component Value Date    CPEPTIDE 2.35  04/01/2021     Lab Results   Component Value Date    GLUTAMICACID 0.00 05/25/2017          //  Weight: 104.2 kg (229 lb 11.5 oz), Body mass index is 35.98 kg/m².  Her blood sugar in clinic today is:    Lab Results   Component Value Date    POCGLU 174 (A) 01/26/2021       Review of Systems   Constitutional: Negative for activity change, appetite change, chills and fever.   HENT: Negative for dental problem, mouth sores, nosebleeds, sore throat and trouble swallowing.    Eyes: Negative for pain and discharge.   Respiratory: Negative for shortness of breath, wheezing and stridor.    Cardiovascular: Negative for chest pain, palpitations and leg swelling.   Gastrointestinal: Negative for abdominal pain, diarrhea, nausea and vomiting.   Endocrine: Negative for polydipsia, polyphagia and polyuria.   Genitourinary: Negative for dysuria, frequency and urgency.   Musculoskeletal: Negative for joint swelling and myalgias.   Skin: Negative for rash and wound.   Neurological: Negative for dizziness, syncope, weakness and headaches.   Psychiatric/Behavioral: Negative for behavioral problems and dysphoric mood.         Diabetes Management Status  Statin: Taking  ACE/ARB: Taking    Screening or Prevention Patient's value Goal Complete/Controlled?   HgA1C Testing and Control   Lab Results   Component Value Date    HGBA1C 9.4 (H) 12/15/2021      Annually/Less than 8% No   Lipid profile : 09/15/2021 Annually Yes   LDL control Lab Results   Component Value Date    LDLCALC 61.4 (L) 09/15/2021    Annually/Less than 100 mg/dl  Yes   Nephropathy screening Lab Results   Component Value Date    MICALBCREAT Unable to calculate 09/15/2021     Lab Results   Component Value Date    PROTEINUA Negative 04/01/2021    Annually Yes   Blood pressure BP Readings from Last 1 Encounters:   01/12/22 135/85    Less than 140/90 Yes   Dilated retinal exam : 02/03/2021 Annually Yes    Foot exam   : 09/22/2021 Annually Yes     ACTIVITY LEVEL: Moderately  Active. Discussed activities, benefits, methods, and precautions.  MEAL PLANNING: Patient reports number of meals per day to be 3 and number of snacks per day to be 2.   Patient is encouraged to carb count and consume no more than 30 - 45 grams of carbohydrates in each meal and 15 grams of carbohydrates in each snack.     Social History     Socioeconomic History    Marital status:    Tobacco Use    Smoking status: Never Smoker    Smokeless tobacco: Never Used   Substance and Sexual Activity    Alcohol use: Yes     Comment: rare    Drug use: No    Sexual activity: Yes     Partners: Male   Social History Narrative    . Lives with spouse. Has 3 children. Patient retired as  for [employer].      Social Determinants of Health     Financial Resource Strain: Low Risk     Difficulty of Paying Living Expenses: Not hard at all   Food Insecurity: No Food Insecurity    Worried About Running Out of Food in the Last Year: Never true    Ran Out of Food in the Last Year: Never true   Transportation Needs: No Transportation Needs    Lack of Transportation (Medical): No    Lack of Transportation (Non-Medical): No   Physical Activity: Insufficiently Active    Days of Exercise per Week: 4 days    Minutes of Exercise per Session: 10 min   Stress: Stress Concern Present    Feeling of Stress : To some extent   Social Connections: Unknown    Frequency of Communication with Friends and Family: Three times a week    Frequency of Social Gatherings with Friends and Family: More than three times a week    Active Member of Clubs or Organizations: Yes    Attends Club or Organization Meetings: More than 4 times per year    Marital Status:    Housing Stability: Low Risk     Unable to Pay for Housing in the Last Year: No    Number of Places Lived in the Last Year: 1    Unstable Housing in the Last Year: No     Past Medical History:   Diagnosis Date    Diabetes mellitus type II      Hyperlipidemia     Hypertension     Hypothyroid     TALIA (obstructive sleep apnea)     on CPAP    Osteopenia     Psoriasis        Objective:        Physical Exam  Constitutional:       General: She is not in acute distress.     Appearance: She is well-developed and well-nourished. She is not diaphoretic.   HENT:      Head: Normocephalic and atraumatic.      Right Ear: External ear normal.      Left Ear: External ear normal.      Nose: Nose normal.   Eyes:      General:         Right eye: No discharge.         Left eye: No discharge.      Extraocular Movements: EOM normal.      Pupils: Pupils are equal, round, and reactive to light.   Cardiovascular:      Rate and Rhythm: Normal rate and regular rhythm.      Pulses: Intact distal pulses.      Heart sounds: Normal heart sounds.   Pulmonary:      Effort: Pulmonary effort is normal.      Breath sounds: Normal breath sounds.   Abdominal:      Palpations: Abdomen is soft.   Musculoskeletal:         General: Normal range of motion.      Cervical back: Normal range of motion and neck supple.   Skin:     General: Skin is warm and dry.      Capillary Refill: Capillary refill takes less than 2 seconds.   Neurological:      Mental Status: She is alert and oriented to person, place, and time.      Motor: No abnormal muscle tone.      Coordination: Coordination normal.   Psychiatric:         Mood and Affect: Mood and affect normal.         Behavior: Behavior normal.         Thought Content: Thought content normal.         Judgment: Judgment normal.           Assessment / Plan:     Type 2 diabetes mellitus with hyperglycemia, with long-term current use of insulin  -     POCT Glucose, Hand-Held Device    Hyperlipidemia associated with type 2 diabetes mellitus    Acquired hypothyroidism    TALIA on CPAP    Obesity (BMI 30-39.9)      Additional Plan Details:    - POCT Glucose  - Encouraged continuation of lifestyle changes including regular exercise and limiting carbohydrates to  30-45 grams per meal threes times daily and 15 grams per snack with a limit of two daily.   - Encouraged continued monitoring of blood glucose with maintenance of 4 times daily at Fasting, Before Lunch, Before Dinner and Before Bed. Discussed Dexcom - will consider.    - Current DM Medication Regimen: Continue Lantus 15 units at night. Continue Trulicity 4.5 mg weekly. Change Glipizide 5 mg before night time snack if eating one. Change Humalog 8 units with small / regular or 12 units with heavier carb meal TID wm.   - Health Maintenance standards addressed today: Eye Exam - will be completed outside of [facility] and patient will schedule  - Nursing Visit: Patient is age 78 or younger with an A1c of 7.5 or greater and qualifies for nursing visit, but will defer for now. .   - Follow up in 2 months with A1c prior and in 4 weeks for telephone call.       Blakeney McKnight, PA-C [facility]    A total of 30 minutes was spent in face to face time, of which over 50 % was spent in counseling patient on disease process, complications, treatment, and side effects of medications.

## 2022-01-18 ENCOUNTER — PATIENT MESSAGE (OUTPATIENT)
Dept: DIABETES | Facility: CLINIC | Age: 72
End: 2022-01-18
Payer: MEDICARE

## 2022-01-18 ENCOUNTER — PATIENT MESSAGE (OUTPATIENT)
Dept: INTERNAL MEDICINE | Facility: CLINIC | Age: 72
End: 2022-01-18
Payer: MEDICARE

## 2022-01-20 NOTE — TELEPHONE ENCOUNTER
No new care gaps identified.  Powered by [employer]. Reference number: [id number].   1/20/2022 7:16:56 AM CST

## 2022-01-20 NOTE — TELEPHONE ENCOUNTER
Encounter details require adjustment(s)/ updating by [facility] Staff  As of this time Protocols and CDM: did not populate or display   Adjustment(s) made: Department  CDM should display. Medication(s) delegated by the OR.  Will resend refill request encounter to P Centralized Refill Staff Pool.   [facility]   Note composed:7:16 AM 01/20/2022

## 2022-01-24 ENCOUNTER — PATIENT MESSAGE (OUTPATIENT)
Dept: DIABETES | Facility: CLINIC | Age: 72
End: 2022-01-24
Payer: MEDICARE

## 2022-01-24 DIAGNOSIS — E11.65 TYPE 2 DIABETES MELLITUS WITH HYPERGLYCEMIA, WITH LONG-TERM CURRENT USE OF INSULIN: ICD-10-CM

## 2022-01-24 DIAGNOSIS — Z79.4 TYPE 2 DIABETES MELLITUS WITH HYPERGLYCEMIA, WITH LONG-TERM CURRENT USE OF INSULIN: ICD-10-CM

## 2022-01-24 RX ORDER — INSULIN LISPRO 100 [IU]/ML
12 INJECTION, SOLUTION INTRAVENOUS; SUBCUTANEOUS
Qty: 33 ML | Refills: 3 | Status: SHIPPED | OUTPATIENT
Start: 2022-01-24 | End: 2023-02-03 | Stop reason: SDUPTHER

## 2022-01-26 ENCOUNTER — PATIENT MESSAGE (OUTPATIENT)
Dept: FAMILY MEDICINE | Facility: CLINIC | Age: 72
End: 2022-01-26
Payer: MEDICARE

## 2022-02-01 ENCOUNTER — OFFICE VISIT (OUTPATIENT)
Dept: URGENT CARE | Facility: CLINIC | Age: 72
End: 2022-02-01
Payer: MEDICARE

## 2022-02-01 VITALS
TEMPERATURE: 98 F | OXYGEN SATURATION: 100 % | SYSTOLIC BLOOD PRESSURE: 127 MMHG | RESPIRATION RATE: 18 BRPM | HEART RATE: 78 BPM | DIASTOLIC BLOOD PRESSURE: 69 MMHG

## 2022-02-01 DIAGNOSIS — J02.9 VIRAL PHARYNGITIS: Primary | ICD-10-CM

## 2022-02-01 DIAGNOSIS — R09.82 PND (POST-NASAL DRIP): ICD-10-CM

## 2022-02-01 DIAGNOSIS — J02.9 SORE THROAT: ICD-10-CM

## 2022-02-01 LAB
CTP QC/QA: YES
SARS-COV-2 RDRP RESP QL NAA+PROBE: NEGATIVE

## 2022-02-01 PROCEDURE — 99212 PR OFFICE/OUTPT VISIT, EST, LEVL II, 10-19 MIN: ICD-10-PCS | Mod: S$GLB,,, | Performed by: PHYSICIAN ASSISTANT

## 2022-02-01 PROCEDURE — 3078F DIAST BP <80 MM HG: CPT | Mod: CPTII,S$GLB,, | Performed by: PHYSICIAN ASSISTANT

## 2022-02-01 PROCEDURE — 3078F PR MOST RECENT DIASTOLIC BLOOD PRESSURE < 80 MM HG: ICD-10-PCS | Mod: CPTII,S$GLB,, | Performed by: PHYSICIAN ASSISTANT

## 2022-02-01 PROCEDURE — 3074F PR MOST RECENT SYSTOLIC BLOOD PRESSURE < 130 MM HG: ICD-10-PCS | Mod: CPTII,S$GLB,, | Performed by: PHYSICIAN ASSISTANT

## 2022-02-01 PROCEDURE — U0002 COVID-19 LAB TEST NON-CDC: HCPCS | Mod: QW,S$GLB,, | Performed by: PHYSICIAN ASSISTANT

## 2022-02-01 PROCEDURE — 99212 OFFICE O/P EST SF 10 MIN: CPT | Mod: S$GLB,,, | Performed by: PHYSICIAN ASSISTANT

## 2022-02-01 PROCEDURE — U0002: ICD-10-PCS | Mod: QW,S$GLB,, | Performed by: PHYSICIAN ASSISTANT

## 2022-02-01 PROCEDURE — 3074F SYST BP LT 130 MM HG: CPT | Mod: CPTII,S$GLB,, | Performed by: PHYSICIAN ASSISTANT

## 2022-02-01 PROCEDURE — 1159F PR MEDICATION LIST DOCUMENTED IN MEDICAL RECORD: ICD-10-PCS | Mod: CPTII,S$GLB,, | Performed by: PHYSICIAN ASSISTANT

## 2022-02-01 PROCEDURE — 1125F AMNT PAIN NOTED PAIN PRSNT: CPT | Mod: CPTII,S$GLB,, | Performed by: PHYSICIAN ASSISTANT

## 2022-02-01 PROCEDURE — 1159F MED LIST DOCD IN RCRD: CPT | Mod: CPTII,S$GLB,, | Performed by: PHYSICIAN ASSISTANT

## 2022-02-01 PROCEDURE — 1125F PR PAIN SEVERITY QUANTIFIED, PAIN PRESENT: ICD-10-PCS | Mod: CPTII,S$GLB,, | Performed by: PHYSICIAN ASSISTANT

## 2022-02-01 NOTE — PROGRESS NOTES
Subjective:       Patient ID: Jaimie Luque is a 72 y.o. female.    Vitals:  temperature is 98.1 °F (36.7 °C). Her blood pressure is 127/69 and her pulse is 78. Her respiration is 18 and oxygen saturation is 100%.     Chief Complaint: Sore Throat (PT PRESENTS TO / WITH SORE THROAT FOR A COUPLE DAYS.)    PT PRESENTS TO / WITH SORE THROAT FOR A COUPLE DAYS. No fever, congestion, cough, difficulty swallowing. Wants to r/o covid.     Sore Throat   This is a new problem. The current episode started in the past 7 days. The problem has been unchanged. Neither side of throat is experiencing more pain than the other. There has been no fever. The pain is mild. Pertinent negatives include no congestion, coughing, ear pain, headaches, hoarse voice, neck pain, shortness of breath, stridor, swollen glands, trouble swallowing or vomiting. She has tried nothing for the symptoms. The treatment provided no relief.       Constitution: Negative for chills, fatigue and fever.   HENT: Positive for sore throat. Negative for ear pain, congestion, trouble swallowing and voice change.    Neck: Negative for neck pain.   Respiratory: Negative for cough, shortness of breath and stridor.    Gastrointestinal: Negative for vomiting.   Neurological: Negative for headaches.       Objective:      Physical Exam   Constitutional: She appears well-developed.  Non-toxic appearance. She does not appear ill. No distress.   HENT:   Head: Normocephalic and atraumatic.   Ears:   Right Ear: External ear normal.   Left Ear: External ear normal.   Nose: Nose normal.   Mouth/Throat: Mucous membranes are moist. Posterior oropharyngeal erythema present. No oropharyngeal exudate.      Comments: + PND  Eyes: Conjunctivae and EOM are normal.   Neck: Neck supple.   Pulmonary/Chest: Effort normal and breath sounds normal.   Abdominal: Normal appearance.   Musculoskeletal: Normal range of motion.         General: Normal range of motion.   Lymphadenopathy:     She  has no cervical adenopathy.   Neurological: no focal deficit. She is alert. She displays no weakness. Gait normal.   Skin: Skin is warm, dry, not diaphoretic, not pale and no rash.   Psychiatric: Her behavior is normal.     Results for orders placed or performed in visit on 02/01/22   POCT COVID-19 Rapid Screening   Result Value Ref Range    POC Rapid COVID Negative Negative     Acceptable Yes            Assessment:       1. Viral pharyngitis    2. Sore throat    3. PND (post-nasal drip)          Plan:       Supportive care for sore throat. Low concern for strep based on [facility] criteria. Tylenol or ibuprofen prn for pain. F/u with PCP or rtc if any new or worsening symptoms.     Viral pharyngitis    Sore throat  -     POCT COVID-19 Rapid Screening    PND (post-nasal drip)

## 2022-02-01 NOTE — PATIENT INSTRUCTIONS
Patient Education       Viral Pharyngitis   About this topic   Viral pharyngitis is also known as a sore throat. It is an infection of the throat caused by a tiny germ called a virus. A sore throat can easily spread from person to person. Coughing, sneezing, and touching something with the germ on it spreads the sore throat.  Viral infections most often go away after 7 to 10 days. You may not need any treatment. Some can cause very serious health problems.     What are the causes?   A germ called a virus causes this condition.  What can make this more likely to happen?   You are more likely to get viral pharyngitis with a cold or the flu. These illnesses spread easily from one person to others. You are more likely to have a sore throat if you have a weak immune system.  What are the main signs?   · Sore throat. May also have trouble swallowing or breathing.  · Swollen tonsils or glands  · Throat appears red  · Muscle aches and joint pain  · Feeling tired  · Fever of 100.4°F (38°C) or higher  How does the doctor diagnose this health problem?   Your doctor will take your history and do an exam. Your doctor will look at your throat. The doctor may order a throat swab to test for a strep infection. They may also test you for the flu.  How does the doctor treat this health problem?   There is no specific treatment to cure a virus. Antibiotics will not work. The goal will be to treat your signs.  Your doctor may suggest:  · Gargle with warm salt water 3 to 4 times each day. Mix 1/2 teaspoon (2.5 grams) salt with a cup (240 mL) of warm water.  · Suck on hard candy or cough drops.  · Use a cool mist humidifier to help you breathe easier.  · Do not smoke. Stay away from others who are smoking.  What drugs may be needed?   The doctor may order drugs to:  · Help with pain   · Soothe the throat  · Lower fever  What changes to diet are needed?   · If your throat feels too sore to eat solid foods, you may drink water, juice,  milk, milkshakes, or soups.  · Do not drink sports drinks, soft drinks, undiluted fruit juice, or drinks with a lot of sugar. These may cause fluid loss and bother your throat.  · Stay away from caffeine; acidic juices like orange juice or lemonade; and beer, wine, and mixed drinks (alcohol). These can worsen your signs.  What problems could happen?   · Ear or sinus infection  · Asthma attack  · Abscess in the throat  · Lung problems like pneumonia or bronchitis  · Very bad fluid loss. This is dehydration.  What can be done to prevent this health problem?   · Wash your hands often with soap and water for at least 20 seconds, especially after coughing or sneezing. Alcohol-based hand sanitizers also work to kill germs.  · If you are sick, cover your mouth and nose with tissue when you cough or sneeze. You can also cough or sneeze into your elbow. Throw away tissues in the trash and wash your hands after touching used tissues.  · Do not get too close (kissing, hugging) to people who are sick.  · Avoid going to crowded places.  · Avoid sharing your towels or hankies with anyone who is sick. Clean often handled things like door handles, remotes, toys, and phones. Wipe them with a disinfectant.  · Do not share knives and forks or drinking glasses with someone who has a sore throat. Wash these objects with hot, soapy water.  · Get at least 6 to 8 hours of sleep each night.  · Get a flu shot each year.  Where can I learn more?   American Academy of Otolaryngology - Head and Neck Surgery  https://www.enthealth.org/conditions/sore-throats/   American Academy of Otolaryngology - Head and Neck Surgery  https://www.enthealth.org/conditions/tonsillitis/   Ministry of Health  https://www.health.govt.nz/your-health/conditions-and-treatments/diseases-and-illnesses/sore-throat   NHS Choices  http://www.nhs.uk/conditions/Sore-throat/Pages/Introduction.aspx   Last Reviewed Date   2020-05-12  Consumer Information Use and Disclaimer    This information is not specific medical advice and does not replace information you receive from your health care provider. This is only a brief summary of general information. It does NOT include all information about conditions, illnesses, injuries, tests, procedures, treatments, therapies, discharge instructions or life-style choices that may apply to you. You must talk with your health care provider for complete information about your health and treatment options. This information should not be used to decide whether or not to accept your health care providers advice, instructions or recommendations. Only your health care provider has the knowledge and training to provide advice that is right for you.  Copyright   Copyright © 2021 UpTo[employer], Inc. and its affiliates and/or licensors. All rights reserved.    Please follow up with your Primary care provider within 2-5 days if your signs and symptoms have not resolved or worsen.     If your condition worsens or fails to improve we recommend that you receive another evaluation at the emergency room immediately or contact your primary medical clinic to discuss your concerns.   You must understand that you have received an Urgent Care treatment only and that you may be released before all of your medical problems are known or treated. You, the patient, will arrange for follow up care as instructed.     RED FLAGS/WARNING SYMPTOMS DISCUSSED WITH PATIENT THAT WOULD WARRANT EMERGENT MEDICAL ATTENTION. PATIENT VERBALIZED UNDERSTANDING.

## 2022-02-04 ENCOUNTER — TELEPHONE (OUTPATIENT)
Dept: URGENT CARE | Facility: CLINIC | Age: 72
End: 2022-02-04
Payer: MEDICARE

## 2022-02-04 RX ORDER — ATORVASTATIN CALCIUM 20 MG/1
TABLET, FILM COATED ORAL
Qty: 90 TABLET | Refills: 3 | OUTPATIENT
Start: 2022-02-04

## 2022-02-04 NOTE — TELEPHONE ENCOUNTER
This medication has been reordered by the PCP already. Will remove duplicate and close out encounter.

## 2022-02-11 ENCOUNTER — OFFICE VISIT (OUTPATIENT)
Dept: SPORTS MEDICINE | Facility: CLINIC | Age: 72
End: 2022-02-11
Payer: MEDICARE

## 2022-02-11 VITALS — HEIGHT: 67 IN | BODY MASS INDEX: 35.94 KG/M2 | WEIGHT: 229 LBS

## 2022-02-11 DIAGNOSIS — M76.31 ILIOTIBIAL BAND SYNDROME OF RIGHT SIDE: ICD-10-CM

## 2022-02-11 DIAGNOSIS — M17.11 PRIMARY OSTEOARTHRITIS OF RIGHT KNEE: Primary | ICD-10-CM

## 2022-02-11 DIAGNOSIS — G89.29 CHRONIC PAIN OF RIGHT KNEE: ICD-10-CM

## 2022-02-11 DIAGNOSIS — M25.561 CHRONIC PAIN OF RIGHT KNEE: ICD-10-CM

## 2022-02-11 DIAGNOSIS — R29.898 WEAKNESS OF BOTH HIPS: ICD-10-CM

## 2022-02-11 PROCEDURE — 99204 OFFICE O/P NEW MOD 45 MIN: CPT | Mod: S$GLB,,, | Performed by: PHYSICAL MEDICINE & REHABILITATION

## 2022-02-11 PROCEDURE — 1159F PR MEDICATION LIST DOCUMENTED IN MEDICAL RECORD: ICD-10-PCS | Mod: CPTII,S$GLB,, | Performed by: PHYSICAL MEDICINE & REHABILITATION

## 2022-02-11 PROCEDURE — 1101F PT FALLS ASSESS-DOCD LE1/YR: CPT | Mod: CPTII,S$GLB,, | Performed by: PHYSICAL MEDICINE & REHABILITATION

## 2022-02-11 PROCEDURE — 3008F BODY MASS INDEX DOCD: CPT | Mod: CPTII,S$GLB,, | Performed by: PHYSICAL MEDICINE & REHABILITATION

## 2022-02-11 PROCEDURE — 3288F FALL RISK ASSESSMENT DOCD: CPT | Mod: CPTII,S$GLB,, | Performed by: PHYSICAL MEDICINE & REHABILITATION

## 2022-02-11 PROCEDURE — 1159F MED LIST DOCD IN RCRD: CPT | Mod: CPTII,S$GLB,, | Performed by: PHYSICAL MEDICINE & REHABILITATION

## 2022-02-11 PROCEDURE — 1160F RVW MEDS BY RX/DR IN RCRD: CPT | Mod: CPTII,S$GLB,, | Performed by: PHYSICAL MEDICINE & REHABILITATION

## 2022-02-11 PROCEDURE — 99999 PR PBB SHADOW E&M-EST. PATIENT-LVL III: ICD-10-PCS | Mod: PBBFAC,,, | Performed by: PHYSICAL MEDICINE & REHABILITATION

## 2022-02-11 PROCEDURE — 1125F PR PAIN SEVERITY QUANTIFIED, PAIN PRESENT: ICD-10-PCS | Mod: CPTII,S$GLB,, | Performed by: PHYSICAL MEDICINE & REHABILITATION

## 2022-02-11 PROCEDURE — 99204 PR OFFICE/OUTPT VISIT, NEW, LEVL IV, 45-59 MIN: ICD-10-PCS | Mod: S$GLB,,, | Performed by: PHYSICAL MEDICINE & REHABILITATION

## 2022-02-11 PROCEDURE — 1160F PR REVIEW ALL MEDS BY PRESCRIBER/CLIN PHARMACIST DOCUMENTED: ICD-10-PCS | Mod: CPTII,S$GLB,, | Performed by: PHYSICAL MEDICINE & REHABILITATION

## 2022-02-11 PROCEDURE — 1125F AMNT PAIN NOTED PAIN PRSNT: CPT | Mod: CPTII,S$GLB,, | Performed by: PHYSICAL MEDICINE & REHABILITATION

## 2022-02-11 PROCEDURE — 3008F PR BODY MASS INDEX (BMI) DOCUMENTED: ICD-10-PCS | Mod: CPTII,S$GLB,, | Performed by: PHYSICAL MEDICINE & REHABILITATION

## 2022-02-11 PROCEDURE — 1101F PR PT FALLS ASSESS DOC 0-1 FALLS W/OUT INJ PAST YR: ICD-10-PCS | Mod: CPTII,S$GLB,, | Performed by: PHYSICAL MEDICINE & REHABILITATION

## 2022-02-11 PROCEDURE — 99999 PR PBB SHADOW E&M-EST. PATIENT-LVL III: CPT | Mod: PBBFAC,,, | Performed by: PHYSICAL MEDICINE & REHABILITATION

## 2022-02-11 PROCEDURE — 3288F PR FALLS RISK ASSESSMENT DOCUMENTED: ICD-10-PCS | Mod: CPTII,S$GLB,, | Performed by: PHYSICAL MEDICINE & REHABILITATION

## 2022-02-11 RX ORDER — DICLOFENAC SODIUM 10 MG/G
2 GEL TOPICAL 4 TIMES DAILY
Qty: 100 G | Refills: 2 | Status: SHIPPED | OUTPATIENT
Start: 2022-02-11 | End: 2022-03-11

## 2022-02-11 NOTE — PROGRESS NOTES
SPORTS MEDICINE / PM&R New Patient Visit :    Referring Physician: Jessica Ward MD    Chief Complaint   Patient presents with    Right Knee - Pain       HPI: This is a 72 y.o.  female being seen in clinic today for evaluation of Pain of the Right Knee      The problem began 1 years ago.  She feels dull, throbbing, aching, constant and pain with movement pain in her right knee . The symptoms show no change. She has tried heat and NSAIDS with improvement. She has tried therapy. She has tried physical therapy at [facility] approximately 1 year ago.     History obtained from patient.  Patient states that she has had right knee pain for about 1 year.  She states that the knee issues started with her noticing a change in her gait.  She states that she noticed she was starting to waddle.  Then pain developed after sitting in a car or at a desk for a long period of time.  She now has had several episodes of the knee feeling like its going to give out on her.  She identifies the knee pain as mainly anterior lateral in nature with occasional posterior knee pain.  She does have diabetes and is having some issues with controlling it at this time.     Past family, medical, social, surgical history, and vital signs reviewed in chart.    General    Nursing note and vitals reviewed.  Constitutional: She is oriented to person, place, and time. She appears well-developed and well-nourished.   Obese     HENT:   Head: Normocephalic and atraumatic.   Eyes: Conjunctivae and EOM are normal. Pupils are equal, round, and reactive to light.   Neck: Neck supple.   Cardiovascular: Intact distal pulses.    Pulmonary/Chest: Effort normal. No respiratory distress.   Abdominal: She exhibits no distension.   Neurological: She is alert and oriented to person, place, and time.   Psychiatric: She has a normal mood and affect.     General Musculoskeletal Exam   Gait: normal       Right Knee Exam     Inspection   Erythema: absent  Swelling:  absent  Effusion: absent  Deformity: absent    Tenderness   The patient is tender to palpation of the lateral joint line and iliotibial band.    Crepitus   The patient has crepitus of the lateral joint line.    Range of Motion   The patient has normal right knee ROM.    Tests   Meniscus   Meryl:  Medial - negative Lateral - negative  Ligament Examination Lachman: normal (-1 to 2mm) PCL-Posterior Drawer: normal (0 to 2mm)     MCL - Valgus: normal (0 to 2mm)  LCL - Varus: normal  Patella   Patellar apprehension: negative  Patellar Tracking: normal    Other   Meniscal Cyst: absent  Popliteal (Baker's) Cyst: absent  Sensation: normal    Left Knee Exam   Left knee exam is normal.    Inspection   Erythema: absent  Swelling: absent  Effusion: absent    Range of Motion   The patient has normal left knee ROM.    Tests   Meniscus   Meryl:  Medial - negative Lateral - negative  Stability Lachman: normal (-1 to 2mm) PCL-Posterior Drawer: normal (0 to 2mm)  MCL - Valgus: normal (0 to 2mm)  LCL - Varus: normal (0 to 2mm)  Patella   Patellar apprehension: negative  Patellar Tracking: normal    Other   Sensation: normal    Right Hip Exam   Right hip exam is normal.     Tests   Pain w/ forced internal rotation (MARLEEN): absent  Left Hip Exam   Left hip exam is normal.    Tests   Pain w/ forced internal rotation (MARLEEN): absent          Muscle Strength   Right Lower Extremity   Hip Abduction: 4/5   Hip Adduction: 5/5   Hip Flexion: 5/5   Quadriceps:  5/5   Hamstrin/5   Left Lower Extremity   Hip Abduction: 4/5   Hip Adduction: 5/5   Hip Flexion: 5/5   Quadriceps:  5/5   Hamstrin/5     Reflexes     Left Side  Achilles:  0  Quadriceps:  0    Right Side   Achilles:  0  Quadriceps:  0    Vascular Exam     Right Pulses  Dorsalis Pedis:      2+          Left Pulses  Dorsalis Pedis:      2+              X-ray Knee Ortho Right  Narrative: EXAMINATION:  XR KNEE ORTHO RIGHT    CLINICAL HISTORY:  Pain in right  knee    TECHNIQUE:  AP standing of both knees, Merchant views of both knees as well as a lateral view of the right knee were performed.    COMPARISON:  Knee radiographs February 1, 2007    FINDINGS:  There is no right knee fracture.  Mild tricompartmental right knee osteoarthritis.  No osseous erosion or suspicious osseous lesion.  No right knee effusion.  No acute soft tissue abnormality identified.  Atherosclerotic calcifications seen posterior to the right knee.    Limited evaluation of the left knee also reveals mild tricompartmental osteoarthritis.  Impression: As above.    Electronically signed by: Amilcar Villalobos  Date:    01/04/2022  Time:    10:28         Patient Instructions     Assessment:  Jaimie Luque is a 72 y.o. female   Chief Complaint   Patient presents with    Right Knee - Pain       Primary osteoarthritis of right knee    Chronic pain of right knee  -     Ambulatory referral/consult to Orthopedics    Iliotibial band syndrome of right side    Weakness of both hips        Plan:   We personally reviewed her imaging today in clinic and agree with the radiologist's report.   We answered her questions regarding exercise, walking, cycling, trampolines, jumping.   Research supports the use of these things for helping joint / arthritis pain:     Collagen supplements -  collagen info I like [place] Hydrolyzed Collagen: on Amazon     High Dose Fish Oil / Krill oil - I like Nordic Naturals: Nordic site  krill oil info     Turmeric / Curcumin - curcumin info I believe treatment dose is around 500 mg once or twice daily. No particular brand recommendation.     SAMe - SAMe info (no brand recommendation)    Recommend trying Voltaren Gel, or generic diclofenac gel, over the counter, and following the directions on the package (4 times daily, give at least a week to see if it helps. Use it for 3 weeks on / 1 week off).   Remember that you may need to use it consistently for several days before seeing  results.       Strongly advised a new course of PT but she declines. Will try to perform exercises previously learned at PT.         Follow-up: 4 weeks or sooner if there are any problems between now and then.    Thank you for choosing [facility] and Dr. Dawna Palma for your orthopedic & sports medicine care. It is our goal to provide you with exceptional care that will help keep you healthy, active, and get you back in the game.    Please do not hesitate to reach out to us via email, phone, or MyChart with any questions, concerns, or feedback.    If you felt that you received exemplary care today, please consider leaving us feedback on Healthgrades at:  [email or web address]     If you are experiencing pain/discomfort ,or have questions after 5pm and would like to be connected to the [facility] on-call team, please call this number and specify which Sports Medicine provider is treating you: [phone]         Disclaimer: This note was prepared using a voice recognition system and is likely to have sound alike errors within the text.     Dawna Palma M.D.  Sports Medicine

## 2022-02-11 NOTE — PATIENT INSTRUCTIONS
Assessment:  Jaimie Luque is a 72 y.o. female   Chief Complaint   Patient presents with    Right Knee - Pain       Primary osteoarthritis of right knee    Chronic pain of right knee  -     Ambulatory referral/consult to Orthopedics    Iliotibial band syndrome of right side    Weakness of both hips        Plan:   We personally reviewed her imaging today in clinic and agree with the radiologist's report.   We answered her questions regarding exercise, walking, cycling, trampolines, jumping.   Research supports the use of these things for helping joint / arthritis pain:     Collagen supplements -  collagen info I like [place] Hydrolyzed Collagen: on Amazon     High Dose Fish Oil / Krill oil - I like [employer] Naturals: Nordic site  krill oil info     Turmeric / Curcumin - curcumin info I believe treatment dose is around 500 mg once or twice daily. No particular brand recommendation.     SAMe - SAMe info (no brand recommendation)    Recommend trying Voltaren Gel, or generic diclofenac gel, over the counter, and following the directions on the package (4 times daily, give at least a week to see if it helps. Use it for 3 weeks on / 1 week off).   Remember that you may need to use it consistently for several days before seeing results.       Strongly advised a new course of PT but she declines. Will try to perform exercises previously learned at PT.         Follow-up: 4 weeks or sooner if there are any problems between now and then.    Thank you for choosing [facility] and Dr. Dawna Palma for your orthopedic & sports medicine care. It is our goal to provide you with exceptional care that will help keep you healthy, active, and get you back in the game.    Please do not hesitate to reach out to us via email, phone, or MyChart with any questions, concerns, or feedback.    If you felt that you received exemplary care today, please consider leaving us feedback on Healthgrades  at:  [email or web address]     If you are experiencing pain/discomfort ,or have questions after 5pm and would like to be connected to the [facility] on-call team, please call this number and specify which Sports Medicine provider is treating you: [phone]

## 2022-02-14 ENCOUNTER — PATIENT OUTREACH (OUTPATIENT)
Dept: ADMINISTRATIVE | Facility: OTHER | Age: 72
End: 2022-02-14
Payer: MEDICARE

## 2022-02-14 NOTE — PROGRESS NOTES
PCP: Jessica Luna MD    Subjective:     Chief Complaint: Diabetes - Established Patient    Established Patient - Audio Only Telehealth Visit     The patient location is: Home  The chief complaint leading to consultation is: Diabetes follow up  Visit type: Virtual visit with audio only (telephone)  Total Time Spent with Patient: 15 minutes     The reason for the audio only service rather than synchronous audio and video virtual visit was related to technical difficulties or patient preference/necessity.     Each patient to whom I provide medical services by telemedicine is:  (1) informed of the relationship between the physician and patient and the respective role of any other health care provider with respect to management of the patient; and (2) notified that they may decline to receive medical services by telemedicine and may withdraw from such care at any time. Patient verbally consented to receive this service via voice-only telephone call.    This service was not originating from a related E/M service provided within the previous 7 days nor will  to an E/M service or procedure within the next 24 hours or my soonest available appointment.  Prevailing standard of care was able to be met in this audio-only visit.       HISTORY OF PRESENT ILLNESS: 72 y.o.   female presenting for diabetes management visit.   The patient's last visit with me was on 1/12/2022.  Patient has had Type II diabetes since 20 or more years.  Pertinent to decision making is the following comorbidities: HLD, Hypothyroidism and Obesity by BMI  Patient has the following Diabetes complications: without complications  She has attended diabetes education in the past.     Patient's most recent A1c of 9.4% was completed 2 months ago.   Patient states since Her last A1c Her blood glucose levels have been high  throughout the day .   Patient monitors blood glucose 4 times per day with meter : Fasting, Before Lunch, Before Dinner  and Before Bed. Interested in Dexcom CGM.   Patient blood glucose monitoring device will not be uploaded into Media Section today secondary to patient forgetting device. .   Per log review, fasting blood sugars are ranging from 83 - 207 and before bed blood sugars ranging from 99 - low 300s.   Patient endorses the following diabetes related symptoms: None.   Patient is due today for the following diabetes-related health maintenance standards: Eye Exam. Completed 02/09/22.  She denies any recent hospital admissions or emergency room visits. Patient denies history of DKA.   She denies having hypoglycemia.   Patient's concerns today include glycemic control.   Patient medication regimen is as below.      CURRENT DM MEDICATIONS:    Lantus 18 units qhs   Trulicity 4.5 mg weekly    Glipizide 5 mg before bed time snack - not taking   Humalog 8 units with small / regular or 12 units with heavier carb meal TID wm    Patient has failed the following Diabetes medications:    Metformin - GI    Invokana - coverage   Jardiance - yeast infection   Glyburide    Victoza    Basal - Basaglar      Labs Reviewed.       Lab Results   Component Value Date    CPEPTIDE 2.35 04/01/2021     Lab Results   Component Value Date    GLUTAMICACID 0.00 05/25/2017          //   , There is no height or weight on file to calculate BMI.  Her blood sugar in clinic today is:    Lab Results   Component Value Date    POCGLU 174 (A) 01/26/2021       Review of Systems   Constitutional: Negative for activity change, appetite change, chills and fever.   HENT: Negative for dental problem, mouth sores, nosebleeds, sore throat and trouble swallowing.    Eyes: Negative for pain and discharge.   Respiratory: Negative for shortness of breath, wheezing and stridor.    Cardiovascular: Negative for chest pain, palpitations and leg swelling.   Gastrointestinal: Negative for abdominal pain, diarrhea, nausea and vomiting.   Endocrine: Negative for polydipsia,  polyphagia and polyuria.   Genitourinary: Negative for dysuria, frequency and urgency.   Musculoskeletal: Negative for joint swelling and myalgias.   Skin: Negative for rash and wound.   Neurological: Negative for dizziness, syncope, weakness and headaches.   Psychiatric/Behavioral: Negative for behavioral problems and dysphoric mood.         Diabetes Management Status  Statin: Taking  ACE/ARB: Taking    Screening or Prevention Patient's value Goal Complete/Controlled?   HgA1C Testing and Control   Lab Results   Component Value Date    HGBA1C 9.4 (H) 12/15/2021      Annually/Less than 8% No   Lipid profile : 09/15/2021 Annually Yes   LDL control Lab Results   Component Value Date    LDLCALC 61.4 (L) 09/15/2021    Annually/Less than 100 mg/dl  Yes   Nephropathy screening Lab Results   Component Value Date    MICALBCREAT Unable to calculate 09/15/2021     Lab Results   Component Value Date    PROTEINUA Negative 04/01/2021    Annually Yes   Blood pressure BP Readings from Last 1 Encounters:   02/01/22 127/69    Less than 140/90 Yes   Dilated retinal exam : 02/03/2021 Annually Yes    Foot exam   : 09/22/2021 Annually Yes     ACTIVITY LEVEL: Moderately Active. Discussed activities, benefits, methods, and precautions.  MEAL PLANNING: Patient reports number of meals per day to be 3 and number of snacks per day to be 2.   Patient is encouraged to carb count and consume no more than 30 - 45 grams of carbohydrates in each meal and 15 grams of carbohydrates in each snack.     Social History     Socioeconomic History    Marital status:    Tobacco Use    Smoking status: Never Smoker    Smokeless tobacco: Never Used   Substance and Sexual Activity    Alcohol use: Yes     Comment: rare    Drug use: No    Sexual activity: Yes     Partners: Male   Social History Narrative    . Lives with spouse. Has 3 children. Patient retired as  for [employer].      Social Determinants of Health     Financial  Resource Strain: Low Risk     Difficulty of Paying Living Expenses: Not hard at all   Food Insecurity: No Food Insecurity    Worried About Running Out of Food in the Last Year: Never true    Ran Out of Food in the Last Year: Never true   Transportation Needs: No Transportation Needs    Lack of Transportation (Medical): No    Lack of Transportation (Non-Medical): No   Physical Activity: Insufficiently Active    Days of Exercise per Week: 4 days    Minutes of Exercise per Session: 10 min   Stress: Stress Concern Present    Feeling of Stress : To some extent   Social Connections: Unknown    Frequency of Communication with Friends and Family: Three times a week    Frequency of Social Gatherings with Friends and Family: More than three times a week    Active Member of Clubs or Organizations: Yes    Attends Club or Organization Meetings: More than 4 times per year    Marital Status:    Housing Stability: Low Risk     Unable to Pay for Housing in the Last Year: No    Number of Places Lived in the Last Year: 1    Unstable Housing in the Last Year: No     Past Medical History:   Diagnosis Date    Diabetes mellitus type II     Hyperlipidemia     Hypertension     Hypothyroid     TALIA (obstructive sleep apnea)     on CPAP    Osteopenia     Psoriasis        Objective:        Physical Exam  Neurological:      Mental Status: She is alert and oriented to person, place, and time. Mental status is at baseline.   Psychiatric:         Mood and Affect: Mood normal.         Behavior: Behavior normal.         Thought Content: Thought content normal.         Judgment: Judgment normal.           Assessment / Plan:     Type 2 diabetes mellitus with hyperglycemia, with long-term current use of insulin    Hyperlipidemia associated with type 2 diabetes mellitus    Acquired hypothyroidism    TALIA on CPAP    Obesity (BMI 30-39.9)      Additional Plan Details:    - POCT Glucose  - Encouraged continuation of lifestyle  changes including regular exercise and limiting carbohydrates to 30-45 grams per meal threes times daily and 15 grams per snack with a limit of two daily.   - Encouraged continued monitoring of blood glucose with maintenance of 4 times daily at Fasting, Before Lunch, Before Dinner and Before Bed. Dexcom order.    - Current DM Medication Regimen: Continue Lantus 18 units at night. Continue Trulicity 4.5 mg weekly. Change Glipizide 5 mg before night time snack if eating one. Change Humalog 8 units with small / regular or 14 units with heavier carb meal TID wm.   - Health Maintenance standards addressed today: Eye Exam - completed outside of [facility]; will sign ROR today for records  - Nursing Visit: Patient is age 79 or younger with an A1c of 7.5 or greater and qualifies for nursing visit, but will defer for now. .   - Follow up in 1 months with A1c prior.      Blakeney McKnight, PA-C [facility]

## 2022-02-15 ENCOUNTER — OFFICE VISIT (OUTPATIENT)
Dept: DIABETES | Facility: CLINIC | Age: 72
End: 2022-02-15
Payer: MEDICARE

## 2022-02-15 DIAGNOSIS — G47.33 OSA ON CPAP: ICD-10-CM

## 2022-02-15 DIAGNOSIS — E66.9 OBESITY (BMI 30-39.9): ICD-10-CM

## 2022-02-15 DIAGNOSIS — E11.65 TYPE 2 DIABETES MELLITUS WITH HYPERGLYCEMIA, WITH LONG-TERM CURRENT USE OF INSULIN: Primary | ICD-10-CM

## 2022-02-15 DIAGNOSIS — Z79.4 TYPE 2 DIABETES MELLITUS WITH HYPERGLYCEMIA, WITH LONG-TERM CURRENT USE OF INSULIN: Primary | ICD-10-CM

## 2022-02-15 DIAGNOSIS — E03.9 ACQUIRED HYPOTHYROIDISM: ICD-10-CM

## 2022-02-15 DIAGNOSIS — E11.69 HYPERLIPIDEMIA ASSOCIATED WITH TYPE 2 DIABETES MELLITUS: ICD-10-CM

## 2022-02-15 DIAGNOSIS — E78.5 HYPERLIPIDEMIA ASSOCIATED WITH TYPE 2 DIABETES MELLITUS: ICD-10-CM

## 2022-02-15 PROCEDURE — 4010F ACE/ARB THERAPY RXD/TAKEN: CPT | Mod: CPTII,95,, | Performed by: PHYSICIAN ASSISTANT

## 2022-02-15 PROCEDURE — 99442 PR PHYSICIAN TELEPHONE EVALUATION 11-20 MIN: CPT | Mod: 95,,, | Performed by: PHYSICIAN ASSISTANT

## 2022-02-15 PROCEDURE — 99442 PR PHYSICIAN TELEPHONE EVALUATION 11-20 MIN: ICD-10-PCS | Mod: 95,,, | Performed by: PHYSICIAN ASSISTANT

## 2022-02-15 PROCEDURE — 4010F PR ACE/ARB THEARPY RXD/TAKEN: ICD-10-PCS | Mod: CPTII,95,, | Performed by: PHYSICIAN ASSISTANT

## 2022-02-18 RX ORDER — BLOOD-GLUCOSE TRANSMITTER
EACH MISCELLANEOUS
Qty: 1 EACH | Refills: 3 | Status: SHIPPED | OUTPATIENT
Start: 2022-02-18 | End: 2022-03-16 | Stop reason: SDUPTHER

## 2022-02-18 RX ORDER — BLOOD-GLUCOSE SENSOR
EACH MISCELLANEOUS
Qty: 9 EACH | Refills: 3 | Status: SHIPPED | OUTPATIENT
Start: 2022-02-18 | End: 2022-03-16 | Stop reason: SDUPTHER

## 2022-02-18 RX ORDER — BLOOD-GLUCOSE,RECEIVER,CONT
EACH MISCELLANEOUS
Qty: 1 EACH | Refills: 0 | Status: SHIPPED | OUTPATIENT
Start: 2022-02-18 | End: 2022-03-16 | Stop reason: SDUPTHER

## 2022-02-22 ENCOUNTER — PATIENT MESSAGE (OUTPATIENT)
Dept: FAMILY MEDICINE | Facility: CLINIC | Age: 72
End: 2022-02-22
Payer: MEDICARE

## 2022-02-22 ENCOUNTER — PATIENT MESSAGE (OUTPATIENT)
Dept: DIABETES | Facility: CLINIC | Age: 72
End: 2022-02-22
Payer: MEDICARE

## 2022-03-02 ENCOUNTER — OFFICE VISIT (OUTPATIENT)
Dept: INTERNAL MEDICINE | Facility: CLINIC | Age: 72
End: 2022-03-02
Payer: MEDICARE

## 2022-03-02 VITALS
HEART RATE: 110 BPM | SYSTOLIC BLOOD PRESSURE: 118 MMHG | OXYGEN SATURATION: 96 % | DIASTOLIC BLOOD PRESSURE: 66 MMHG | WEIGHT: 234.81 LBS | BODY MASS INDEX: 35.59 KG/M2 | HEIGHT: 68 IN | RESPIRATION RATE: 20 BRPM

## 2022-03-02 DIAGNOSIS — G47.33 OSA ON CPAP: ICD-10-CM

## 2022-03-02 DIAGNOSIS — E66.01 SEVERE OBESITY WITH BODY MASS INDEX (BMI) OF 35.0 TO 35.9 AND COMORBIDITY: ICD-10-CM

## 2022-03-02 DIAGNOSIS — E11.65 UNCONTROLLED TYPE 2 DIABETES MELLITUS WITH HYPERGLYCEMIA: ICD-10-CM

## 2022-03-02 DIAGNOSIS — E03.9 ACQUIRED HYPOTHYROIDISM: ICD-10-CM

## 2022-03-02 DIAGNOSIS — E11.69 HYPERLIPIDEMIA ASSOCIATED WITH TYPE 2 DIABETES MELLITUS: ICD-10-CM

## 2022-03-02 DIAGNOSIS — Z00.00 ENCOUNTER FOR PREVENTIVE HEALTH EXAMINATION: Primary | ICD-10-CM

## 2022-03-02 DIAGNOSIS — E78.5 HYPERLIPIDEMIA ASSOCIATED WITH TYPE 2 DIABETES MELLITUS: ICD-10-CM

## 2022-03-02 PROCEDURE — 3074F SYST BP LT 130 MM HG: CPT | Mod: CPTII,S$GLB,, | Performed by: NURSE PRACTITIONER

## 2022-03-02 PROCEDURE — 99999 PR PBB SHADOW E&M-EST. PATIENT-LVL V: CPT | Mod: PBBFAC,,, | Performed by: NURSE PRACTITIONER

## 2022-03-02 PROCEDURE — 1170F PR FUNCTIONAL STATUS ASSESSED: ICD-10-PCS | Mod: CPTII,S$GLB,, | Performed by: NURSE PRACTITIONER

## 2022-03-02 PROCEDURE — 1170F FXNL STATUS ASSESSED: CPT | Mod: CPTII,S$GLB,, | Performed by: NURSE PRACTITIONER

## 2022-03-02 PROCEDURE — 3074F PR MOST RECENT SYSTOLIC BLOOD PRESSURE < 130 MM HG: ICD-10-PCS | Mod: CPTII,S$GLB,, | Performed by: NURSE PRACTITIONER

## 2022-03-02 PROCEDURE — 4010F ACE/ARB THERAPY RXD/TAKEN: CPT | Mod: CPTII,S$GLB,, | Performed by: NURSE PRACTITIONER

## 2022-03-02 PROCEDURE — 3288F PR FALLS RISK ASSESSMENT DOCUMENTED: ICD-10-PCS | Mod: CPTII,S$GLB,, | Performed by: NURSE PRACTITIONER

## 2022-03-02 PROCEDURE — G0439 PPPS, SUBSEQ VISIT: HCPCS | Mod: S$GLB,,, | Performed by: NURSE PRACTITIONER

## 2022-03-02 PROCEDURE — G0439 PR MEDICARE ANNUAL WELLNESS SUBSEQUENT VISIT: ICD-10-PCS | Mod: S$GLB,,, | Performed by: NURSE PRACTITIONER

## 2022-03-02 PROCEDURE — 3008F PR BODY MASS INDEX (BMI) DOCUMENTED: ICD-10-PCS | Mod: CPTII,S$GLB,, | Performed by: NURSE PRACTITIONER

## 2022-03-02 PROCEDURE — 4010F PR ACE/ARB THEARPY RXD/TAKEN: ICD-10-PCS | Mod: CPTII,S$GLB,, | Performed by: NURSE PRACTITIONER

## 2022-03-02 PROCEDURE — 1159F MED LIST DOCD IN RCRD: CPT | Mod: CPTII,S$GLB,, | Performed by: NURSE PRACTITIONER

## 2022-03-02 PROCEDURE — 3008F BODY MASS INDEX DOCD: CPT | Mod: CPTII,S$GLB,, | Performed by: NURSE PRACTITIONER

## 2022-03-02 PROCEDURE — 1159F PR MEDICATION LIST DOCUMENTED IN MEDICAL RECORD: ICD-10-PCS | Mod: CPTII,S$GLB,, | Performed by: NURSE PRACTITIONER

## 2022-03-02 PROCEDURE — 99999 PR PBB SHADOW E&M-EST. PATIENT-LVL V: ICD-10-PCS | Mod: PBBFAC,,, | Performed by: NURSE PRACTITIONER

## 2022-03-02 PROCEDURE — 1101F PT FALLS ASSESS-DOCD LE1/YR: CPT | Mod: CPTII,S$GLB,, | Performed by: NURSE PRACTITIONER

## 2022-03-02 PROCEDURE — 1125F AMNT PAIN NOTED PAIN PRSNT: CPT | Mod: CPTII,S$GLB,, | Performed by: NURSE PRACTITIONER

## 2022-03-02 PROCEDURE — 3078F DIAST BP <80 MM HG: CPT | Mod: CPTII,S$GLB,, | Performed by: NURSE PRACTITIONER

## 2022-03-02 PROCEDURE — 1125F PR PAIN SEVERITY QUANTIFIED, PAIN PRESENT: ICD-10-PCS | Mod: CPTII,S$GLB,, | Performed by: NURSE PRACTITIONER

## 2022-03-02 PROCEDURE — 1101F PR PT FALLS ASSESS DOC 0-1 FALLS W/OUT INJ PAST YR: ICD-10-PCS | Mod: CPTII,S$GLB,, | Performed by: NURSE PRACTITIONER

## 2022-03-02 PROCEDURE — 3078F PR MOST RECENT DIASTOLIC BLOOD PRESSURE < 80 MM HG: ICD-10-PCS | Mod: CPTII,S$GLB,, | Performed by: NURSE PRACTITIONER

## 2022-03-02 PROCEDURE — 3288F FALL RISK ASSESSMENT DOCD: CPT | Mod: CPTII,S$GLB,, | Performed by: NURSE PRACTITIONER

## 2022-03-02 NOTE — PATIENT INSTRUCTIONS
Counseling and Referral of Other Preventative  (Italic type indicates deductible and co-insurance are waived)    Patient Name: Jaimie Luque  Today's Date: 3/2/2022    Health Maintenance       Date Due Completion Date    Eye Exam 02/03/2022 2/3/2021 (Done)    Override on 2/3/2021: Done    Colorectal Cancer Screening 05/02/2022 5/2/2017    Hemoglobin A1c 03/15/2022 12/15/2021    Mammogram 06/16/2022 6/16/2020    Diabetes Urine Screening 09/15/2022 9/15/2021    Lipid Panel 09/15/2022 9/15/2021    Foot Exam 09/22/2022 9/22/2021    Override on 5/25/2020: Done    Low Dose Statin 02/11/2023 2/11/2022    DEXA Scan 09/20/2026 9/20/2021    TETANUS VACCINE 10/02/2028 10/2/2018        No orders of the defined types were placed in this encounter.    The following information is provided to all patients.  This information is to help you find resources for any of the problems found today that may be affecting your health:                Living healthy guide: www.[facility].[place].gov      Understanding Diabetes: www.diabetes.org      Eating healthy: www.cdc.gov/healthyweight      CDC home safety checklist: www.cdc.gov/steadi/patient.html      Agency on Aging: www.goea.[place].[facility]      Alcoholics anonymous (AA): www.aa.org      Physical Activity: www.mikie.nih.gov/ep5igxi      Tobacco use: www.quitwithusla.org

## 2022-03-02 NOTE — PROGRESS NOTES
"  I offered to discuss advanced care planning, including how to pick a person who would make decisions for you if you were unable to make them for yourself, called a health care power of , and what kind of decisions you might make such as use of life sustaining treatments such as ventilators and tube feeding when faced with a life limiting illness recorded on a living will that they will need to know. (How you want to be cared for as you near the end of your natural life)     X Patient is interested in learning more about how to make advanced directives.  I provided them paperwork and offered to discuss this with them.    Jaimie Luque presented for a  Medicare AWV and comprehensive Health Risk Assessment today. The following components were reviewed and updated:    · Medical history  · Family History  · Social history  · Allergies and Current Medications  · Health Risk Assessment  · Health Maintenance  · Care Team     ** See Completed Assessments for Annual Wellness Visit within the encounter summary.**       The following assessments were completed:  · Living Situation  · CAGE  · Depression Screening  · Timed Get Up and Go  · Whisper Test  · Cognitive Function Screening  · Nutrition Screening  · ADL Screening  · PAQ Screening    Vitals:    03/02/22 0856   BP: 118/66   Pulse: 110   Resp: 20   SpO2: 96%   Weight: 106.5 kg (234 lb 12.6 oz)   Height: 5' 7.5" (1.715 m)     Body mass index is 36.23 kg/m².  Physical Exam  HENT:      Head: Normocephalic and atraumatic.      Right Ear: Tympanic membrane normal.      Left Ear: Tympanic membrane normal.   Eyes:      Conjunctiva/sclera: Conjunctivae normal.   Cardiovascular:      Rate and Rhythm: Normal rate and regular rhythm.      Heart sounds: Normal heart sounds.   Pulmonary:      Effort: Pulmonary effort is normal.      Breath sounds: Normal breath sounds.   Abdominal:      General: Bowel sounds are normal.      Palpations: Abdomen is soft.   Musculoskeletal:    "      General: Normal range of motion.      Cervical back: Normal range of motion and neck supple.   Skin:     General: Skin is warm and dry.   Neurological:      Mental Status: She is alert and oriented to person, place, and time.           Diagnoses and health risks identified today and associated recommendations/orders:    1. Encounter for preventive health examination  Completed today. Eye exam LUNA signed today. Colonoscopy scheduled for may    2. Uncontrolled type 2 diabetes mellitus with hyperglycemia  This problem is not controlled she is currently seeing Diabetes Management.  Continue follow-up as scheduled    3. Acquired hypothyroidism  This problem is controlled last TSH 1.7-5 on 09/15/21 follow-up PCP    4. Hyperlipidemia associated with type 2 diabetes mellitus  This problem is well controlled continue current management follow-up with PCP    5. Severe obesity with body mass index (BMI) of 35.0 to 35.9 and comorbidity  This problem is not controlled she is limited secondary to knee pain which she is seeing orthopedics for follow-up PCP    6. TALIA on CPAP  This problem is stable she is seeing pulmonology and using CPAP      Provided Jaimie with a 5-10 year written screening schedule and personal prevention plan. Recommendations were developed using the USPSTF age appropriate recommendations. Education, counseling, and referrals were provided as needed. After Visit Summary printed and given to patient which includes a list of additional screenings\tests needed.    Follow up with PCP and specialists as scheduled  New labs/tests ordered today:  Upcoming health maintenance scheduled:  HRA follow up in 1 year    June Sagastume NP

## 2022-03-09 ENCOUNTER — PATIENT MESSAGE (OUTPATIENT)
Dept: DIABETES | Facility: CLINIC | Age: 72
End: 2022-03-09
Payer: MEDICARE

## 2022-03-11 ENCOUNTER — LAB VISIT (OUTPATIENT)
Dept: LAB | Facility: HOSPITAL | Age: 72
End: 2022-03-11
Attending: PHYSICIAN ASSISTANT
Payer: MEDICARE

## 2022-03-11 ENCOUNTER — OFFICE VISIT (OUTPATIENT)
Dept: SPORTS MEDICINE | Facility: CLINIC | Age: 72
End: 2022-03-11
Payer: MEDICARE

## 2022-03-11 VITALS — BODY MASS INDEX: 35.46 KG/M2 | HEIGHT: 68 IN | WEIGHT: 234 LBS

## 2022-03-11 DIAGNOSIS — E11.65 TYPE 2 DIABETES MELLITUS WITH HYPERGLYCEMIA, WITH LONG-TERM CURRENT USE OF INSULIN: ICD-10-CM

## 2022-03-11 DIAGNOSIS — Z79.4 TYPE 2 DIABETES MELLITUS WITH HYPERGLYCEMIA, WITH LONG-TERM CURRENT USE OF INSULIN: ICD-10-CM

## 2022-03-11 DIAGNOSIS — M25.561 CHRONIC PAIN OF RIGHT KNEE: ICD-10-CM

## 2022-03-11 DIAGNOSIS — R29.898 WEAKNESS OF BOTH HIPS: ICD-10-CM

## 2022-03-11 DIAGNOSIS — G89.29 CHRONIC PAIN OF RIGHT KNEE: ICD-10-CM

## 2022-03-11 DIAGNOSIS — M17.11 PRIMARY OSTEOARTHRITIS OF RIGHT KNEE: Primary | ICD-10-CM

## 2022-03-11 LAB
ESTIMATED AVG GLUCOSE: 214 MG/DL (ref 68–131)
HBA1C MFR BLD: 9.1 % (ref 4–5.6)

## 2022-03-11 PROCEDURE — 99999 PR PBB SHADOW E&M-EST. PATIENT-LVL IV: ICD-10-PCS | Mod: PBBFAC,,, | Performed by: PHYSICAL MEDICINE & REHABILITATION

## 2022-03-11 PROCEDURE — 1160F PR REVIEW ALL MEDS BY PRESCRIBER/CLIN PHARMACIST DOCUMENTED: ICD-10-PCS | Mod: CPTII,S$GLB,, | Performed by: PHYSICAL MEDICINE & REHABILITATION

## 2022-03-11 PROCEDURE — 99214 PR OFFICE/OUTPT VISIT, EST, LEVL IV, 30-39 MIN: ICD-10-PCS | Mod: S$GLB,,, | Performed by: PHYSICAL MEDICINE & REHABILITATION

## 2022-03-11 PROCEDURE — 3008F BODY MASS INDEX DOCD: CPT | Mod: CPTII,S$GLB,, | Performed by: PHYSICAL MEDICINE & REHABILITATION

## 2022-03-11 PROCEDURE — 4010F ACE/ARB THERAPY RXD/TAKEN: CPT | Mod: CPTII,S$GLB,, | Performed by: PHYSICAL MEDICINE & REHABILITATION

## 2022-03-11 PROCEDURE — 3008F PR BODY MASS INDEX (BMI) DOCUMENTED: ICD-10-PCS | Mod: CPTII,S$GLB,, | Performed by: PHYSICAL MEDICINE & REHABILITATION

## 2022-03-11 PROCEDURE — 1101F PR PT FALLS ASSESS DOC 0-1 FALLS W/OUT INJ PAST YR: ICD-10-PCS | Mod: CPTII,S$GLB,, | Performed by: PHYSICAL MEDICINE & REHABILITATION

## 2022-03-11 PROCEDURE — 1159F PR MEDICATION LIST DOCUMENTED IN MEDICAL RECORD: ICD-10-PCS | Mod: CPTII,S$GLB,, | Performed by: PHYSICAL MEDICINE & REHABILITATION

## 2022-03-11 PROCEDURE — 1159F MED LIST DOCD IN RCRD: CPT | Mod: CPTII,S$GLB,, | Performed by: PHYSICAL MEDICINE & REHABILITATION

## 2022-03-11 PROCEDURE — 83036 HEMOGLOBIN GLYCOSYLATED A1C: CPT | Performed by: PHYSICIAN ASSISTANT

## 2022-03-11 PROCEDURE — 1125F PR PAIN SEVERITY QUANTIFIED, PAIN PRESENT: ICD-10-PCS | Mod: CPTII,S$GLB,, | Performed by: PHYSICAL MEDICINE & REHABILITATION

## 2022-03-11 PROCEDURE — 99999 PR PBB SHADOW E&M-EST. PATIENT-LVL IV: CPT | Mod: PBBFAC,,, | Performed by: PHYSICAL MEDICINE & REHABILITATION

## 2022-03-11 PROCEDURE — 36415 COLL VENOUS BLD VENIPUNCTURE: CPT | Performed by: PHYSICIAN ASSISTANT

## 2022-03-11 PROCEDURE — 99214 OFFICE O/P EST MOD 30 MIN: CPT | Mod: S$GLB,,, | Performed by: PHYSICAL MEDICINE & REHABILITATION

## 2022-03-11 PROCEDURE — 4010F PR ACE/ARB THEARPY RXD/TAKEN: ICD-10-PCS | Mod: CPTII,S$GLB,, | Performed by: PHYSICAL MEDICINE & REHABILITATION

## 2022-03-11 PROCEDURE — 3288F FALL RISK ASSESSMENT DOCD: CPT | Mod: CPTII,S$GLB,, | Performed by: PHYSICAL MEDICINE & REHABILITATION

## 2022-03-11 PROCEDURE — 3288F PR FALLS RISK ASSESSMENT DOCUMENTED: ICD-10-PCS | Mod: CPTII,S$GLB,, | Performed by: PHYSICAL MEDICINE & REHABILITATION

## 2022-03-11 PROCEDURE — 1160F RVW MEDS BY RX/DR IN RCRD: CPT | Mod: CPTII,S$GLB,, | Performed by: PHYSICAL MEDICINE & REHABILITATION

## 2022-03-11 PROCEDURE — 1101F PT FALLS ASSESS-DOCD LE1/YR: CPT | Mod: CPTII,S$GLB,, | Performed by: PHYSICAL MEDICINE & REHABILITATION

## 2022-03-11 PROCEDURE — 1125F AMNT PAIN NOTED PAIN PRSNT: CPT | Mod: CPTII,S$GLB,, | Performed by: PHYSICAL MEDICINE & REHABILITATION

## 2022-03-11 RX ORDER — MELOXICAM 15 MG/1
15 TABLET ORAL DAILY
Qty: 30 TABLET | Refills: 2 | Status: SHIPPED | OUTPATIENT
Start: 2022-03-11 | End: 2022-06-03

## 2022-03-11 NOTE — PATIENT INSTRUCTIONS
Assessment:  Jaimie Luque is a 72 y.o. female   Chief Complaint   Patient presents with    Right Knee - Pain       Primary osteoarthritis of right knee  -     meloxicam (MOBIC) 15 MG tablet; Take 1 tablet (15 mg total) by mouth once daily.  Dispense: 30 tablet; Refill: 2    Weakness of both hips  -     meloxicam (MOBIC) 15 MG tablet; Take 1 tablet (15 mg total) by mouth once daily.  Dispense: 30 tablet; Refill: 2    Chronic pain of right knee  -     meloxicam (MOBIC) 15 MG tablet; Take 1 tablet (15 mg total) by mouth once daily.  Dispense: 30 tablet; Refill: 2        Plan:  We personally reviewed her imaging today in clinic and agree with the radiologist's report.  She is still not interested in PT and hasn't performed her previous HEP. If she changes her mind about therapy, I'd be happy to refer her.   Stop voltaren since it hasn't helped and try meloxicam. She was warned of GI side effects and long term CV risks of NSAIDS.   She is not interested in injections or surgical options at this time, and would need better control of diabetes before having CSI regardless.      Follow-up: 3 months or sooner if there are any problems between now and then.    Thank you for choosing [facility] and Dr. Dawna Palma for your orthopedic & sports medicine care. It is our goal to provide you with exceptional care that will help keep you healthy, active, and get you back in the game.    Please do not hesitate to reach out to us via email, phone, or MyChart with any questions, concerns, or feedback.    If you felt that you received exemplary care today, please consider leaving us feedback on [employer] at:  [email or web address]     If you are experiencing pain/discomfort ,or have questions after 5pm and would like to be connected to the [facility] on-call team, please call this number and specify which Sports Medicine provider is  treating you: [phone]

## 2022-03-11 NOTE — PROGRESS NOTES
SPORTS MEDICINE / PM&R Follow Up Visit :    Referring Physician: No ref. provider found    Chief Complaint   Patient presents with    Right Knee - Pain       HPI: This is a 72 y.o.  female being seen in clinic today for evaluation of Pain of the Right Knee       She was last seen in clinic 02/11/2022. Since last visit, the symptoms show no change.  She has not been to therapy. She has tried Voltaren, Alleve and Tylenol and walking  for this problem with some improvement.       History obtained from patient.  Patient states that she continues to have knee pain and hasn't seen much of an improvement.  She has also noticed that her left knee is beginning to bother her but not as much as the right.  She is continuing to use the Voltaren gel and takes Alleve or Tylenol to help with pain.  She states that she hasn't been performing her home exercises that she received from previous physical therapy sessions.      Past family, medical, social, surgical history, and vital signs reviewed in chart.      General    Nursing note and vitals reviewed.  Constitutional: She is oriented to person, place, and time. She appears well-developed and well-nourished.   Obese     HENT:   Head: Normocephalic and atraumatic.   Eyes: Conjunctivae and EOM are normal. Pupils are equal, round, and reactive to light.   Neck: Neck supple.   Cardiovascular: Intact distal pulses.    Pulmonary/Chest: Effort normal. No respiratory distress.   Abdominal: She exhibits no distension.   Neurological: She is alert and oriented to person, place, and time.   Psychiatric: She has a normal mood and affect.     General Musculoskeletal Exam   Gait: normal       Right Knee Exam     Inspection   Erythema: absent  Swelling: absent  Effusion: absent  Deformity: absent    Tenderness   The patient is tender to palpation of the lateral joint line and iliotibial band.    Crepitus   The patient has crepitus of the lateral joint line.    Range of Motion   The patient has  normal right knee ROM.    Tests   Meniscus   Meryl:  Medial - negative Lateral - negative  Ligament Examination Lachman: normal (-1 to 2mm) PCL-Posterior Drawer: normal (0 to 2mm)     MCL - Valgus: normal (0 to 2mm)  LCL - Varus: normal  Patella   Patellar apprehension: negative  Patellar Tracking: normal    Other   Meniscal Cyst: absent  Popliteal (Baker's) Cyst: absent  Sensation: normal    Left Knee Exam   Left knee exam is normal.    Inspection   Erythema: absent  Swelling: absent  Effusion: absent    Range of Motion   The patient has normal left knee ROM.    Tests   Meniscus   Meryl:  Medial - negative Lateral - negative  Stability Lachman: normal (-1 to 2mm) PCL-Posterior Drawer: normal (0 to 2mm)  MCL - Valgus: normal (0 to 2mm)  LCL - Varus: normal (0 to 2mm)  Patella   Patellar apprehension: negative  Patellar Tracking: normal    Other   Sensation: normal    Right Hip Exam   Right hip exam is normal.     Tests   Pain w/ forced internal rotation (MARLEEN): absent  Left Hip Exam   Left hip exam is normal.    Tests   Pain w/ forced internal rotation (MARLEEN): absent          Muscle Strength   Right Lower Extremity   Hip Abduction: 4/5   Hip Adduction: 5/5   Hip Flexion: 5/5   Quadriceps:  5/5   Hamstrin/5   Left Lower Extremity   Hip Abduction: 4/5   Hip Adduction: 5/5   Hip Flexion: 5/5   Quadriceps:  5/5   Hamstrin/5     Reflexes     Left Side  Achilles:  0  Quadriceps:  0    Right Side   Achilles:  0  Quadriceps:  0    Vascular Exam     Right Pulses  Dorsalis Pedis:      2+          Left Pulses  Dorsalis Pedis:      2+              X-ray Knee Ortho Right  Narrative: EXAMINATION:  XR KNEE ORTHO RIGHT    CLINICAL HISTORY:  Pain in right knee    TECHNIQUE:  AP standing of both knees, Merchant views of both knees as well as a lateral view of the right knee were performed.    COMPARISON:  Knee radiographs 2007    FINDINGS:  There is no right knee fracture.  Mild tricompartmental right  knee osteoarthritis.  No osseous erosion or suspicious osseous lesion.  No right knee effusion.  No acute soft tissue abnormality identified.  Atherosclerotic calcifications seen posterior to the right knee.    Limited evaluation of the left knee also reveals mild tricompartmental osteoarthritis.  Impression: As above.    Electronically signed by: Amilcar Villalobos  Date:    01/04/2022  Time:    10:28            Patient Instructions     Assessment:  Jaimie Luque is a 72 y.o. female   Chief Complaint   Patient presents with    Right Knee - Pain       Primary osteoarthritis of right knee  -     meloxicam (MOBIC) 15 MG tablet; Take 1 tablet (15 mg total) by mouth once daily.  Dispense: 30 tablet; Refill: 2    Weakness of both hips  -     meloxicam (MOBIC) 15 MG tablet; Take 1 tablet (15 mg total) by mouth once daily.  Dispense: 30 tablet; Refill: 2    Chronic pain of right knee  -     meloxicam (MOBIC) 15 MG tablet; Take 1 tablet (15 mg total) by mouth once daily.  Dispense: 30 tablet; Refill: 2        Plan:   We personally reviewed her imaging today in clinic and agree with the radiologist's report.   She is still not interested in PT and hasn't performed her previous HEP. If she changes her mind about therapy, I'd be happy to refer her.    Stop voltaren since it hasn't helped and try meloxicam. She was warned of GI side effects and long term CV risks of NSAIDS.    She is not interested in injections or surgical options at this time, and would need better control of diabetes before having CSI regardless.      Follow-up: 3 months or sooner if there are any problems between now and then.    Thank you for choosing [facility] and Dr. Dawna Palma for your orthopedic & sports medicine care. It is our goal to provide you with exceptional care that will help keep you healthy, active, and get you back in the game.    Please do not hesitate to reach out to us via email, phone, or MyChart with any  questions, concerns, or feedback.    If you felt that you received exemplary care today, please consider leaving us feedback on Healthgrades at:  [email or web address]     If you are experiencing pain/discomfort ,or have questions after 5pm and would like to be connected to the [facility] on-call team, please call this number and specify which Sports Medicine provider is treating you: [phone]          Disclaimer: This note was prepared using a voice recognition system and is likely to have sound alike errors within the text.     Dawna Palma M.D.  Sports Medicine

## 2022-03-16 ENCOUNTER — OFFICE VISIT (OUTPATIENT)
Dept: DIABETES | Facility: CLINIC | Age: 72
End: 2022-03-16
Payer: MEDICARE

## 2022-03-16 VITALS
HEART RATE: 95 BPM | WEIGHT: 234.13 LBS | DIASTOLIC BLOOD PRESSURE: 74 MMHG | BODY MASS INDEX: 36.13 KG/M2 | SYSTOLIC BLOOD PRESSURE: 124 MMHG

## 2022-03-16 DIAGNOSIS — E11.69 HYPERLIPIDEMIA ASSOCIATED WITH TYPE 2 DIABETES MELLITUS: ICD-10-CM

## 2022-03-16 DIAGNOSIS — E03.9 ACQUIRED HYPOTHYROIDISM: ICD-10-CM

## 2022-03-16 DIAGNOSIS — Z79.4 TYPE 2 DIABETES MELLITUS WITH HYPERGLYCEMIA, WITH LONG-TERM CURRENT USE OF INSULIN: Primary | ICD-10-CM

## 2022-03-16 DIAGNOSIS — E66.9 OBESITY (BMI 30-39.9): ICD-10-CM

## 2022-03-16 DIAGNOSIS — E78.5 HYPERLIPIDEMIA ASSOCIATED WITH TYPE 2 DIABETES MELLITUS: ICD-10-CM

## 2022-03-16 DIAGNOSIS — E11.65 TYPE 2 DIABETES MELLITUS WITH HYPERGLYCEMIA, WITH LONG-TERM CURRENT USE OF INSULIN: Primary | ICD-10-CM

## 2022-03-16 DIAGNOSIS — G47.33 OSA ON CPAP: ICD-10-CM

## 2022-03-16 LAB — GLUCOSE SERPL-MCNC: 210 MG/DL (ref 70–110)

## 2022-03-16 PROCEDURE — 4010F ACE/ARB THERAPY RXD/TAKEN: CPT | Mod: CPTII,S$GLB,, | Performed by: PHYSICIAN ASSISTANT

## 2022-03-16 PROCEDURE — 3046F HEMOGLOBIN A1C LEVEL >9.0%: CPT | Mod: CPTII,S$GLB,, | Performed by: PHYSICIAN ASSISTANT

## 2022-03-16 PROCEDURE — 82962 POCT GLUCOSE, HAND-HELD DEVICE: ICD-10-PCS | Mod: S$GLB,,, | Performed by: PHYSICIAN ASSISTANT

## 2022-03-16 PROCEDURE — 4010F PR ACE/ARB THEARPY RXD/TAKEN: ICD-10-PCS | Mod: CPTII,S$GLB,, | Performed by: PHYSICIAN ASSISTANT

## 2022-03-16 PROCEDURE — 1159F MED LIST DOCD IN RCRD: CPT | Mod: CPTII,S$GLB,, | Performed by: PHYSICIAN ASSISTANT

## 2022-03-16 PROCEDURE — 99214 OFFICE O/P EST MOD 30 MIN: CPT | Mod: S$GLB,,, | Performed by: PHYSICIAN ASSISTANT

## 2022-03-16 PROCEDURE — 1125F AMNT PAIN NOTED PAIN PRSNT: CPT | Mod: CPTII,S$GLB,, | Performed by: PHYSICIAN ASSISTANT

## 2022-03-16 PROCEDURE — 1101F PT FALLS ASSESS-DOCD LE1/YR: CPT | Mod: CPTII,S$GLB,, | Performed by: PHYSICIAN ASSISTANT

## 2022-03-16 PROCEDURE — 3078F DIAST BP <80 MM HG: CPT | Mod: CPTII,S$GLB,, | Performed by: PHYSICIAN ASSISTANT

## 2022-03-16 PROCEDURE — 3288F FALL RISK ASSESSMENT DOCD: CPT | Mod: CPTII,S$GLB,, | Performed by: PHYSICIAN ASSISTANT

## 2022-03-16 PROCEDURE — 3008F PR BODY MASS INDEX (BMI) DOCUMENTED: ICD-10-PCS | Mod: CPTII,S$GLB,, | Performed by: PHYSICIAN ASSISTANT

## 2022-03-16 PROCEDURE — 99999 PR PBB SHADOW E&M-EST. PATIENT-LVL III: CPT | Mod: PBBFAC,,, | Performed by: PHYSICIAN ASSISTANT

## 2022-03-16 PROCEDURE — 99214 PR OFFICE/OUTPT VISIT, EST, LEVL IV, 30-39 MIN: ICD-10-PCS | Mod: S$GLB,,, | Performed by: PHYSICIAN ASSISTANT

## 2022-03-16 PROCEDURE — 3074F SYST BP LT 130 MM HG: CPT | Mod: CPTII,S$GLB,, | Performed by: PHYSICIAN ASSISTANT

## 2022-03-16 PROCEDURE — 3288F PR FALLS RISK ASSESSMENT DOCUMENTED: ICD-10-PCS | Mod: CPTII,S$GLB,, | Performed by: PHYSICIAN ASSISTANT

## 2022-03-16 PROCEDURE — 3074F PR MOST RECENT SYSTOLIC BLOOD PRESSURE < 130 MM HG: ICD-10-PCS | Mod: CPTII,S$GLB,, | Performed by: PHYSICIAN ASSISTANT

## 2022-03-16 PROCEDURE — 3078F PR MOST RECENT DIASTOLIC BLOOD PRESSURE < 80 MM HG: ICD-10-PCS | Mod: CPTII,S$GLB,, | Performed by: PHYSICIAN ASSISTANT

## 2022-03-16 PROCEDURE — 82962 GLUCOSE BLOOD TEST: CPT | Mod: S$GLB,,, | Performed by: PHYSICIAN ASSISTANT

## 2022-03-16 PROCEDURE — 1125F PR PAIN SEVERITY QUANTIFIED, PAIN PRESENT: ICD-10-PCS | Mod: CPTII,S$GLB,, | Performed by: PHYSICIAN ASSISTANT

## 2022-03-16 PROCEDURE — 1159F PR MEDICATION LIST DOCUMENTED IN MEDICAL RECORD: ICD-10-PCS | Mod: CPTII,S$GLB,, | Performed by: PHYSICIAN ASSISTANT

## 2022-03-16 PROCEDURE — 3046F PR MOST RECENT HEMOGLOBIN A1C LEVEL > 9.0%: ICD-10-PCS | Mod: CPTII,S$GLB,, | Performed by: PHYSICIAN ASSISTANT

## 2022-03-16 PROCEDURE — 1101F PR PT FALLS ASSESS DOC 0-1 FALLS W/OUT INJ PAST YR: ICD-10-PCS | Mod: CPTII,S$GLB,, | Performed by: PHYSICIAN ASSISTANT

## 2022-03-16 PROCEDURE — 3008F BODY MASS INDEX DOCD: CPT | Mod: CPTII,S$GLB,, | Performed by: PHYSICIAN ASSISTANT

## 2022-03-16 PROCEDURE — 99999 PR PBB SHADOW E&M-EST. PATIENT-LVL III: ICD-10-PCS | Mod: PBBFAC,,, | Performed by: PHYSICIAN ASSISTANT

## 2022-03-16 RX ORDER — BLOOD-GLUCOSE,RECEIVER,CONT
EACH MISCELLANEOUS
Qty: 1 EACH | Refills: 0 | Status: SHIPPED | OUTPATIENT
Start: 2022-03-16 | End: 2023-07-24

## 2022-03-16 RX ORDER — BLOOD-GLUCOSE SENSOR
EACH MISCELLANEOUS
Qty: 9 EACH | Refills: 3 | Status: SHIPPED | OUTPATIENT
Start: 2022-03-16 | End: 2023-07-24

## 2022-03-16 RX ORDER — BLOOD-GLUCOSE TRANSMITTER
EACH MISCELLANEOUS
Qty: 1 EACH | Refills: 3 | Status: SHIPPED | OUTPATIENT
Start: 2022-03-16 | End: 2023-09-13

## 2022-03-16 NOTE — PROGRESS NOTES
PCP: Jessica Luna MD    Subjective:     Chief Complaint: Diabetes - Established Patient    HISTORY OF PRESENT ILLNESS: 72 y.o.   female presenting for diabetes management visit.   The patient's last visit with me was on 2/15/2022.  Patient has had Type II diabetes since 20 or more years.  Pertinent to decision making is the following comorbidities: HLD, Hypothyroidism and Obesity by BMI  Patient has the following Diabetes complications: without complications  She has attended diabetes education in the past.     Patient's most recent A1c of 9.1% was completed 1 weeks ago.   Patient states since Her last A1c Her blood glucose levels have been high  throughout the day .   Patient monitors blood glucose 4 times per day with meter : Fasting, Before Lunch, Before Dinner and Before Bed. Dexcom denied - appeal pending.   Patient blood glucose monitoring device will not be uploaded into [employer] today secondary to patient forgetting device. .   Per log review, fasting blood sugars are ranging from 93 - 179 and before bed blood sugars ranging from 105 -320.   Patient endorses the following diabetes related symptoms: None.   Patient is due today for the following diabetes-related health maintenance standards: Eye Exam. Completed 02/09/22.  She denies any recent hospital admissions or emergency room visits. Patient denies history of DKA.   She denies having hypoglycemia.   Patient's concerns today include glycemic control.   Patient medication regimen is as below.      CURRENT DM MEDICATIONS:    Lantus 18 units qhs - dosing timing variability   Trulicity 4.5 mg weekly    Glipizide 5 mg before bed time snack - missing doses   Humalog 8 units with small meal, 10 units with regular meal, or 12 units with heavier carb meal TID wm - adjust dose frequently based on blood glucose data    Patient has failed the following Diabetes medications:    Metformin - GI    Invokana - coverage   Jardiance - yeast  infection   Glyburide    Victoza    Basal - Basaglar      Labs Reviewed.       Lab Results   Component Value Date    CPEPTIDE 2.35 04/01/2021     Lab Results   Component Value Date    GLUTAMICACID 0.00 05/25/2017          //  Weight: 106.2 kg (234 lb 2.1 oz), Body mass index is 36.13 kg/m².  Her blood sugar in clinic today is:    Lab Results   Component Value Date    POCGLU 210 (A) 03/16/2022       Review of Systems   Constitutional: Negative for activity change, appetite change, chills and fever.   HENT: Negative for dental problem, mouth sores, nosebleeds, sore throat and trouble swallowing.    Eyes: Negative for pain and discharge.   Respiratory: Negative for shortness of breath, wheezing and stridor.    Cardiovascular: Negative for chest pain, palpitations and leg swelling.   Gastrointestinal: Negative for abdominal pain, diarrhea, nausea and vomiting.   Endocrine: Negative for polydipsia, polyphagia and polyuria.   Genitourinary: Negative for dysuria, frequency and urgency.   Musculoskeletal: Negative for joint swelling and myalgias.   Skin: Negative for rash and wound.   Neurological: Negative for dizziness, syncope, weakness and headaches.   Psychiatric/Behavioral: Negative for behavioral problems and dysphoric mood.         Diabetes Management Status  Statin: Taking  ACE/ARB: Taking    Screening or Prevention Patient's value Goal Complete/Controlled?   HgA1C Testing and Control   Lab Results   Component Value Date    HGBA1C 9.1 (H) 03/11/2022      Annually/Less than 8% No   Lipid profile : 09/15/2021 Annually Yes   LDL control Lab Results   Component Value Date    LDLCALC 61.4 (L) 09/15/2021    Annually/Less than 100 mg/dl  Yes   Nephropathy screening Lab Results   Component Value Date    MICALBCREAT Unable to calculate 09/15/2021     Lab Results   Component Value Date    PROTEINUA Negative 04/01/2021    Annually Yes   Blood pressure BP Readings from Last 1 Encounters:   03/16/22 124/74    Less than  140/90 Yes   Dilated retinal exam : 02/03/2021 Annually Yes    Foot exam   : 09/22/2021 Annually Yes     ACTIVITY LEVEL: Moderately Active. Discussed activities, benefits, methods, and precautions.  MEAL PLANNING: Patient reports number of meals per day to be 3 and number of snacks per day to be 2.   Patient is encouraged to carb count and consume no more than 30 - 45 grams of carbohydrates in each meal and 15 grams of carbohydrates in each snack.     Social History     Socioeconomic History    Marital status:    Tobacco Use    Smoking status: Never Smoker    Smokeless tobacco: Never Used   Substance and Sexual Activity    Alcohol use: Yes     Comment: rare    Drug use: No    Sexual activity: Yes     Partners: Male   Social History Narrative    . Lives with spouse. Has 3 children. Patient retired as  for [employer].      Social Determinants of Health     Financial Resource Strain: Low Risk     Difficulty of Paying Living Expenses: Not hard at all   Food Insecurity: No Food Insecurity    Worried About Running Out of Food in the Last Year: Never true    Ran Out of Food in the Last Year: Never true   Transportation Needs: No Transportation Needs    Lack of Transportation (Medical): No    Lack of Transportation (Non-Medical): No   Physical Activity: Insufficiently Active    Days of Exercise per Week: 4 days    Minutes of Exercise per Session: 10 min   Stress: Stress Concern Present    Feeling of Stress : To some extent   Social Connections: Unknown    Frequency of Communication with Friends and Family: Three times a week    Frequency of Social Gatherings with Friends and Family: More than three times a week    Active Member of Clubs or Organizations: Yes    Attends Club or Organization Meetings: More than 4 times per year    Marital Status:    Housing Stability: Low Risk     Unable to Pay for Housing in the Last Year: No    Number of Places Lived in the Last  Year: 1    Unstable Housing in the Last Year: No     Past Medical History:   Diagnosis Date    Diabetes mellitus type II     Hyperlipidemia     Hypertension     Hypothyroid     TALIA (obstructive sleep apnea)     on CPAP    Osteopenia     Psoriasis        Objective:        Physical Exam  Constitutional:       General: She is not in acute distress.     Appearance: She is well-developed. She is not diaphoretic.   HENT:      Head: Normocephalic and atraumatic.      Right Ear: External ear normal.      Left Ear: External ear normal.      Nose: Nose normal.   Eyes:      General:         Right eye: No discharge.         Left eye: No discharge.      Pupils: Pupils are equal, round, and reactive to light.   Cardiovascular:      Rate and Rhythm: Normal rate and regular rhythm.      Heart sounds: Normal heart sounds.   Pulmonary:      Effort: Pulmonary effort is normal.      Breath sounds: Normal breath sounds.   Abdominal:      Palpations: Abdomen is soft.   Musculoskeletal:         General: Normal range of motion.      Cervical back: Normal range of motion and neck supple.   Skin:     General: Skin is warm and dry.      Capillary Refill: Capillary refill takes less than 2 seconds.   Neurological:      Mental Status: She is alert and oriented to person, place, and time. Mental status is at baseline.      Motor: No abnormal muscle tone.      Coordination: Coordination normal.   Psychiatric:         Mood and Affect: Mood normal.         Behavior: Behavior normal.         Thought Content: Thought content normal.         Judgment: Judgment normal.           Assessment / Plan:     Type 2 diabetes mellitus with hyperglycemia, with long-term current use of insulin  -     POCT Glucose, Hand-Held Device  -     Hemoglobin A1C; Standing  -     blood-glucose sensor (DEXCOM G6 SENSOR) Gretta; Use 1 every 10 days.  Dispense: 9 each; Refill: 3  -     blood-glucose meter,continuous (DEXCOM G6 ) Misc; Use daily.  Dispense: 1  each; Refill: 0  -     blood-glucose transmitter (DEXCOM G6 TRANSMITTER) Gretta; Use 1 every 90 days.  Dispense: 1 each; Refill: 3    Hyperlipidemia associated with type 2 diabetes mellitus    Acquired hypothyroidism    TALIA on CPAP    Obesity (BMI 30-39.9)      Additional Plan Details:    - POCT Glucose  - Encouraged continuation of lifestyle changes including regular exercise and limiting carbohydrates to 30-45 grams per meal threes times daily and 15 grams per snack with a limit of two daily.   - Encouraged continued monitoring of blood glucose with maintenance of 4 times daily at Fasting, Before Lunch, Before Dinner and Before Bed. Dexcom appeal.   - Current DM Medication Regimen: Change Lantus 18 units at noon. Continue Trulicity 4.5 mg weekly. Change Glipizide 5 mg before night time snack if eating one. Change Humalog 8 units with small / 10 units with regular or 14 units with heavier carb meal TID wm. ADJUST INSULIN FREQUENTLY BASED ON BLOOD SUGAR.   - Health Maintenance standards addressed today: Eye Exam - completed outside of [facility]; will sign ROR today for records  - Nursing Visit: Patient is age 79 or younger with an A1c of 7.5 or greater and qualifies for nursing visit, but will defer for now. .   - Follow up in 3 months with A1c prior.      Blakeney McKnight, PA-C [facility]        A total of 30 minutes was spent in face to face time, of which over 50 % was spent in counseling patient on disease process, complications, treatment, and side effects of medications.

## 2022-03-16 NOTE — PATIENT INSTRUCTIONS
CURRENT DM MEDICATIONS:   Lantus 18 units at lunch time   Trulicity 4.5 mg weekly   Glipizide 5 mg before bed time snack  Humalog 8 units with small meal, 10 units with regular meal, or 12 units with heavier carb meal TID wm - adjust dose frequently based on blood glucose data

## 2022-03-18 ENCOUNTER — PATIENT MESSAGE (OUTPATIENT)
Dept: DIABETES | Facility: CLINIC | Age: 72
End: 2022-03-18
Payer: MEDICARE

## 2022-03-18 DIAGNOSIS — E11.65 UNCONTROLLED TYPE 2 DIABETES MELLITUS WITH HYPERGLYCEMIA: ICD-10-CM

## 2022-03-18 RX ORDER — INSULIN GLARGINE 100 [IU]/ML
18 INJECTION, SOLUTION SUBCUTANEOUS DAILY
Qty: 3 ML | Refills: 3 | Status: SHIPPED | OUTPATIENT
Start: 2022-03-18 | End: 2022-05-10 | Stop reason: SDUPTHER

## 2022-03-27 ENCOUNTER — PATIENT MESSAGE (OUTPATIENT)
Dept: DIABETES | Facility: CLINIC | Age: 72
End: 2022-03-27
Payer: MEDICARE

## 2022-04-04 ENCOUNTER — PATIENT MESSAGE (OUTPATIENT)
Dept: ENDOSCOPY | Facility: HOSPITAL | Age: 72
End: 2022-04-04
Payer: MEDICARE

## 2022-04-06 ENCOUNTER — CLINICAL SUPPORT (OUTPATIENT)
Dept: DIABETES | Facility: CLINIC | Age: 72
End: 2022-04-06
Payer: MEDICARE

## 2022-04-06 VITALS — WEIGHT: 235.69 LBS | BODY MASS INDEX: 35.72 KG/M2 | HEIGHT: 68 IN

## 2022-04-06 DIAGNOSIS — E11.65 TYPE 2 DIABETES MELLITUS WITH HYPERGLYCEMIA, WITH LONG-TERM CURRENT USE OF INSULIN: Primary | ICD-10-CM

## 2022-04-06 DIAGNOSIS — Z79.4 TYPE 2 DIABETES MELLITUS WITH HYPERGLYCEMIA, WITH LONG-TERM CURRENT USE OF INSULIN: ICD-10-CM

## 2022-04-06 DIAGNOSIS — E11.65 TYPE 2 DIABETES MELLITUS WITH HYPERGLYCEMIA, WITH LONG-TERM CURRENT USE OF INSULIN: ICD-10-CM

## 2022-04-06 DIAGNOSIS — Z79.4 TYPE 2 DIABETES MELLITUS WITH HYPERGLYCEMIA, WITH LONG-TERM CURRENT USE OF INSULIN: Primary | ICD-10-CM

## 2022-04-06 PROCEDURE — 99999 PR PBB SHADOW E&M-EST. PATIENT-LVL II: CPT | Mod: PBBFAC,,, | Performed by: DIETITIAN, REGISTERED

## 2022-04-06 PROCEDURE — G0108 PR DIAB MANAGE TRN  PER INDIV: ICD-10-PCS | Mod: S$GLB,,, | Performed by: DIETITIAN, REGISTERED

## 2022-04-06 PROCEDURE — 99999 PR PBB SHADOW E&M-EST. PATIENT-LVL II: ICD-10-PCS | Mod: PBBFAC,,, | Performed by: DIETITIAN, REGISTERED

## 2022-04-06 PROCEDURE — G0108 DIAB MANAGE TRN  PER INDIV: HCPCS | Mod: S$GLB,,, | Performed by: DIETITIAN, REGISTERED

## 2022-04-06 NOTE — LETTER
April 6, 2022        Lorraine Holliday PA-C  [address]  [place] [address]             [facility]  [address]  [place] [address]  Phone: [phone]  Fax: [phone]   Patient: Jaimie Luque   MR Number: [id number]   YOB: 1950   Date of Visit: 4/6/2022       Dear Dr. Holliday:    Thank you for referring Jaimie Luque to me for evaluation. Below are the relevant portions of my assessment and plan of care.    If you have questions, please do not hesitate to call me. I look forward to following Jaimie along with you.    Sincerely,      Aminata Mack RD           CC  No Recipients

## 2022-04-06 NOTE — PROGRESS NOTES
"Diabetes Care Specialist Progress Note  Author: Aminata Mack RD  Date: 4/6/2022    Program Intake  Reason for Diabetes Program Visit:: Intervention  Type of Intervention:: Individual  Individual: Device Training  Device Training: Personal CGM    Lab Results   Component Value Date    HGBA1C 9.1 (H) 03/11/2022     CURRENT DM MEDICATIONS:    Glipizide, 5 mg - only with heavy snack at night   Trulicity, 4.5 mg weekly   Lantus, 18 units mid afternoon    Humalog, 8-12 units AC - based on meals    8 units with small / 10 units with regular / 14 units with heavier carb meal TID     Clinical    Weight: 106.9 kg (235 lb 10.8 oz)   Height: 5' 7.5" (171.5 cm)   Body mass index is 36.37 kg/m².    Diabetes Self-Management Skills Assessment    Home Blood Glucose Monitoring  Patient states that blood sugar is checked at home daily.: yes  Monitoring Method:: home glucometer  How often do you check your blood sugar?: Twice a day  When do you check your blood sugar?: Before breakfast, Before bedtime  When you check what is your typical blood sugar range? : fasting 120-165 //  before bed, 140-300  Blood glucose logs:: no  Blood glucose logs reviewed today?: no  Home Blood Glucose Monitoring Skills Assessment Completed: : Yes  Assessment indicates:: Instruction Needed  Area of need?: Yes    Acute Complications  Patient is able to identify types of acute complications: Yes  Patient Identified:: Hypoglycemia  Patient is able to state the basic meaning of hypoglycemia?: Yes  Able to state the blood sugar range for hypoglycemia?: yes  Patient stated range:: less than 70  Patient can identify general symptoms of hypoglycemia: no (see comments)  Able to state proper treatment of hypoglycemia?: no (see comments)  Acute Complications Skills Assessment Completed: : Yes  Assessment indicates:: Knowledge deficit, Instruction Needed  Area of need?: Yes    Assessment Summary and Plan    Based on today's diabetes care assessment, the " "following areas of need were identified:        Diabetes Self-Management Skills 4/6/2022   Home Blood Glucose Monitoring Yes - care plan added - Dexcom start   Acute Complications Yes - Reviewed blood glucose goals, prevention, detection, signs and symptoms, and treatment of hypoglycemia and hyperglycemia, and when to contact the clinic.          Today's interventions were provided through individual discussion, instruction, and written materials were provided.      Patient verbalized understanding of instruction and written materials.  Pt was able to return back demonstration of instructions today. Patient understood key points, needs reinforcement and further instruction.     Diabetes Self-Management Care Plan:    Today's Diabetes Self-Management Care Plan was developed with Jaimie's input. Jaimie has agreed to work toward the following goal(s) to improve his/her overall diabetes control.      Care Plan: Diabetes Management   Updates made since 3/7/2022 12:00 AM      Problem: Blood Glucose Self-Monitoring       Goal: Patient agrees to check blood sugars using Dexcom G6 CGM    Start Date: 4/6/2022   Expected End Date: 10/6/2022   Priority: High   Barriers: No Barriers Identified   Note:    Patient referred to  clinic today for  Dexcom G6 continuous glucose sensor system training.  Charged  in office. Pt will continue charging at home. Education provided using  "Quick Start Guide" per Dexcom protocol.  ·  Overview:  5min glucose reading updates, trending arrows, BG graph screens, battery life indicator, Blue Tooth Symbol. No calibration needed.   ·  Menus: trend Graph, start sensor, enter BG, events, Alerts, Settings, Shutdown, Stop Sensor.   ·  Screens and prompts:    * 2 hr warm-up after starting sensor    * Low battery Notification     · The  transmitter ID programmed in .  ·  Settings:    * Low alert: 75    * High alert: 280    * Rise rate: off    * Fall Rate: " off    * Urgent low:  ON    * Urgent low soon: on  · Reviewed sensor site selection. Site selected and prepped using aseptic technique Inserted to abdomen. Transmitter placed in pod and secured.  · Practiced sensor pod/transmitter removal from site, and removal of transmitter from sensor pod.  · Patient able to demonstrate without difficulty.  Encouraged to review manual prior to starting another sensor.   · Review   problem solving aspects of sensor transmission/ variables that can disrupt RF transmission.  range 20 ft from transmitter.  · Pt instructed on lag time of interstitial fluid from CBG and was advised to tx hypoglycemia and dose insulin only when a glucose reading and trend arrow is present.   · Dexcom technical support contact number given and examples of when to contact them discussed. Pt will download software at home for Dexcom download.      Task: Reviewed the importance of self-monitoring blood glucose and keeping logs. Completed 4/6/2022          Follow Up Plan     Follow up in about 2 weeks (around 4/20/2022).    Today's care plan and follow up schedule was discussed with patient.  Jaimie verbalized understanding of the care plan, goals, and agrees to follow up plan.        The patient was encouraged to communicate with his/her health care provider/physician and care team regarding his/her condition(s) and treatment.  I provided the patient with my contact information today and encouraged to contact me via phone or [facility] as needed.     Length of Visit   Total Time: 60 Minutes

## 2022-04-18 ENCOUNTER — CLINICAL SUPPORT (OUTPATIENT)
Dept: DIABETES | Facility: CLINIC | Age: 72
End: 2022-04-18
Payer: MEDICARE

## 2022-04-18 VITALS — HEIGHT: 68 IN | BODY MASS INDEX: 36.22 KG/M2 | WEIGHT: 239 LBS

## 2022-04-18 DIAGNOSIS — E11.65 UNCONTROLLED TYPE 2 DIABETES MELLITUS WITH HYPERGLYCEMIA: Primary | ICD-10-CM

## 2022-04-18 PROCEDURE — G0108 PR DIAB MANAGE TRN  PER INDIV: ICD-10-PCS | Mod: S$GLB,,, | Performed by: DIETITIAN, REGISTERED

## 2022-04-18 PROCEDURE — 99999 PR PBB SHADOW E&M-EST. PATIENT-LVL II: ICD-10-PCS | Mod: PBBFAC,,, | Performed by: DIETITIAN, REGISTERED

## 2022-04-18 PROCEDURE — 99999 PR PBB SHADOW E&M-EST. PATIENT-LVL II: CPT | Mod: PBBFAC,,, | Performed by: DIETITIAN, REGISTERED

## 2022-04-18 PROCEDURE — G0108 DIAB MANAGE TRN  PER INDIV: HCPCS | Mod: S$GLB,,, | Performed by: DIETITIAN, REGISTERED

## 2022-04-18 NOTE — PATIENT INSTRUCTIONS
Humalog meal size dosin units for low carb meal  10 units for meal with 2-3 servings of carbs  14 units for high carb meal

## 2022-04-18 NOTE — PROGRESS NOTES
"Diabetes Care Specialist Progress Note  Author: Aminata Mack RD  Date: 4/18/2022    Program Intake  Reason for Diabetes Program Visit:: Intervention  Type of Intervention:: Individual  Individual: Education  Education: Self-Management Skill Review, Nutrition and Meal Planning    Lab Results   Component Value Date    HGBA1C 9.1 (H) 03/11/2022     CURRENT DM MEDICATIONS:   · Glipizide, 5 mg - only with heavy snack at night  · Trulicity, 4.5 mg weekly  · Lantus, 18 units mid afternoon   · Humalog, 8-12 units AC - based on meals               8 units with small / 10 units with regular / 14 units with heavier carb meal TID      Clinical    Weight: 108.4 kg (238 lb 15.7 oz)   Height: 5' 7.5" (171.5 cm)   Body mass index is 36.88 kg/m².  Wt gain of 3 lbs since last visit on 4/6/2022    Nutritional Status  Meal Plan 24 Hour Recall: Breakfast, Lunch, Dinner  Meal Plan 24 Hour Recall - Breakfast: sausage, egg and cheese croissant; glass of whole milk  Meal Plan 24 Hour Recall - Lunch: ham, scalloped potatoes, green beans, 1 roll, cookies; water  Meal Plan 24 Hour Recall - Dinner: same - leftovers from lunch      Diabetes Self-Management Skills Assessment    Diabetes Disease Process/Treatment Options  Patient/caregiver able to state what happens when someone has diabetes.: yes  Patient/caregiver knows what type of diabetes they have.: yes  Diabetes Type : Type II  Diabetes Disease Process/Treatment Options: Skills Assessment Completed: Yes  Assessment indicates:: Knowledge deficit  Area of need?: Yes    Nutrition/Healthy Eating  Challenges to healthy eating:: lack of will power, other (see comments) (pt likes sweets and sometimes snacks more than she should; pt feels that she needs to plan better)  Method of carbohydrate measurement:: no method  Patient can identify foods that impact blood sugar.: yes  Patient-identified foods:: sweets, starchy vegetables (corn, peas, beans), starches (bread, pasta, rice, cereal), soda, " fruit/fruit juice  Nutrition/Healthy Eating Skills Assessment Completed:: Yes  Assessment indicates:: Knowledge deficit, Instruction Needed  Area of need?: Yes    Physical Activity/Exercise  Patient's daily activity level:: lightly active (pt is more active when her great nephews are in school and she walks to the bus stop)  Patient formally exercises outside of work.: no  Reasons for not exercising:: other (see comments) (pt has started exercising; she does have pain in left knee and sometimes both knees - pt is seeing Dr. Palma for treatment)  Patient can identify forms of physical activity.: no  Patient can identify reasons why exercise/physical activity is important in diabetes management.: no  Physical Activity/Exercise Skills Assessment Completed: : Yes  Assessment indicates:: Knowledge deficit  Area of need?: Yes    Medications  Patient is able to describe current diabetes management routine.: yes  Patient is able to identify current diabetes medications, dosages, and appropriate timing of medications.: yes  Patient understands the purpose of the medications taken for diabetes.: no  Patient reports problems or concerns with current medication regimen.: no  Medication Skills Assessment Completed:: Yes  Assessment indicates:: Knowledge deficit, Instruction Needed  Area of need?: Yes    Home Blood Glucose Monitoring  Patient states that blood sugar is checked at home daily.: yes  Monitoring Method:: personal continuous glucose monitor  Personal CGM type:: Dexcom  Patient is able to use personal CGM appropriately.: yes  CGM Report reviewed?: yes  Assessment indicates:: Adequate understanding  Area of need?: No    Chronic Complications  Patient can identify major chronic complications of diabetes.: yes  Stated chronic complications:: retinopathy, kidney disease  Patient can identify ways to prevent or delay diabetes complications.: yes  Stated ways to prevent complications:: healthy eating and regular activity,  controlling blood sugar, controlling cholesterol and triglycerides  Patient is aware that having diabetes increases risk of heart disease?: Yes  Patient is aware that heart disease is the leading cause of death and disability in people with diabetes?: No  Patient able to state risk factors for heart disease?: No  Patient is taking statin?: Yes  Chronic Complications Skills Assessment Completed: : Yes  Assessment indicates:: Knowledge deficit  Area of need?: Yes    Psychosocial/Coping  Patient can identify ways of coping with chronic disease.: yes  Psychosocial/Coping Skills Assessment Completed: : Yes  Assessment indicates:: Adequate understanding  Area of need?: No      Assessment Summary and Plan    Based on today's diabetes care assessment, the following areas of need were identified:        Diabetes Self-Management Skills 4/18/2022   Diabetes Disease Process/Treatment Options Yes - Discussed pathology of Type 2 diabetes, risk factors and treatment options.      Nutrition/Healthy Eating Yes - care plan added     Physical Activity/Exercise Yes - Discussed physical activity as it relates to insulin resistance and weight loss.      Medication Yes - care plan added      Home Blood Glucose Monitoring No   Acute Complications Completed on 4/6/22   Chronic Complications Yes - Discussed importance of A1c less than 7 to reduce risk of micro and macro complications, controlled Blood Pressure and Cholesterol Lab Values for prevention heart disease, heart attack, stroke.  Health maintenance reviewed     Psychosocial/Coping No          Today's interventions were provided through individual discussion, instruction, and written materials were provided.      Patient verbalized understanding of instruction and written materials.  Pt was able to return back demonstration of instructions today. Patient understood key points, needs reinforcement and further instruction.     Diabetes Self-Management Care Plan:    Today's Diabetes  Self-Management Care Plan was developed with Jaimie's input. Jaimie has agreed to work toward the following goal(s) to improve his/her overall diabetes control.      Care Plan: Diabetes Management   Updates made since 3/19/2022 12:00 AM      Problem: Blood Glucose Self-Monitoring       Goal: Patient agrees to check blood sugars using Dexcom G6 CGM Completed 4/18/2022   Start Date: 4/6/2022   Expected End Date: 10/6/2022   This Visit's Progress: Met   Priority: High   Barriers: No Barriers Identified      Task: Reviewed the importance of self-monitoring blood glucose and keeping logs. Completed 4/6/2022      Problem: Healthy Eating       Goal: Eat 3 meals daily with 30-45g/2-3 servings of Carbohydrate per meal (requiring 10 units Humalog). Snacks should include <15 grams of carb.    Start Date: 4/18/2022   Expected End Date: 10/18/2022   Priority: Medium   Barriers: No Barriers Identified      Task: Reviewed the sources and role of Carbohydrate, Protein, and Fat and how each nutrient impacts blood sugar. Completed 4/18/2022      Task: Provided visual examples using dry measuring cups, food models, and other familiar objects such as computer mouse, deck or cards, tennis ball etc. to help with visualization of portions. Completed 4/18/2022      Task: Explained how to count carbohydrates using the food label and the use of dry measuring cups for accurate carb counting. Completed 4/18/2022      Task: Discussed strategies for choosing healthier, lower fat foods - pt to switch to 2% milk Completed 4/18/2022      Task: Recommended replacing beverages containing high sugar content with noncaloric/sugar free options and/or water. Completed 4/18/2022      Task: Review the importance of balancing carbohydrates with each meal using portion control techniques to count servings of carbohydrate and label reading to identify serving size and amount of total carbs per serving. Completed 4/18/2022      Problem: Medications       Goal:  Patient Agrees to take Diabetes Medication(s) as prescribed.    Start Date: 4/18/2022   Expected End Date: 10/18/2022   Priority: High   Barriers: No Barriers Identified   Note:    Pt will sometimes take Humalog after eating rather than before the meal  Pt to take Humalog before each meal    Reviewed meal size dosing. The max amount of insulin she has been taking is 12 units.   Re-printed dosing amounts for her to follow more closely  8 units for low carb meal  10 units for meal with 2-3 servings of carbs  14 units for high carb meal     Task: Reviewed with patient all current diabetes medications and provided basic review of the purpose, dosage, frequency, side effects, and storage of both oral and injectable diabetes medications. Completed 4/18/2022      Task: Discussed guidelines for preventing, detecting and treating hypoglycemia and hyperglycemia and reviewed the importance of meal and medication timing with diabetes mediations for prevention of hypoglycemia and maximum drug benefit. Completed 4/18/2022          Follow Up Plan     Follow up in about 3 months (around 7/18/2022).   Will evaluate care plan  Pt to f/u with Lorraine Holliday as scheduled in June    Today's care plan and follow up schedule was discussed with patient.  Jaimie verbalized understanding of the care plan, goals, and agrees to follow up plan.        The patient was encouraged to communicate with his/her health care provider/physician and care team regarding his/her condition(s) and treatment.  I provided the patient with my contact information today and encouraged to contact me via phone or [facility] Patient Portal as needed.     Length of Visit   Total Time: 60 Minutes

## 2022-04-20 ENCOUNTER — OFFICE VISIT (OUTPATIENT)
Dept: FAMILY MEDICINE | Facility: CLINIC | Age: 72
End: 2022-04-20
Payer: MEDICARE

## 2022-04-20 ENCOUNTER — TELEPHONE (OUTPATIENT)
Dept: FAMILY MEDICINE | Facility: CLINIC | Age: 72
End: 2022-04-20

## 2022-04-20 VITALS
BODY MASS INDEX: 36.81 KG/M2 | TEMPERATURE: 98 F | HEART RATE: 102 BPM | WEIGHT: 234.56 LBS | HEIGHT: 67 IN | OXYGEN SATURATION: 97 % | SYSTOLIC BLOOD PRESSURE: 114 MMHG | DIASTOLIC BLOOD PRESSURE: 76 MMHG

## 2022-04-20 DIAGNOSIS — Z29.9 PREVENTIVE MEASURE: ICD-10-CM

## 2022-04-20 DIAGNOSIS — R63.4 WEIGHT LOSS: ICD-10-CM

## 2022-04-20 DIAGNOSIS — E11.69 HYPERLIPIDEMIA ASSOCIATED WITH TYPE 2 DIABETES MELLITUS: ICD-10-CM

## 2022-04-20 DIAGNOSIS — E03.9 ACQUIRED HYPOTHYROIDISM: ICD-10-CM

## 2022-04-20 DIAGNOSIS — E78.5 HYPERLIPIDEMIA ASSOCIATED WITH TYPE 2 DIABETES MELLITUS: ICD-10-CM

## 2022-04-20 DIAGNOSIS — E11.65 UNCONTROLLED TYPE 2 DIABETES MELLITUS WITH HYPERGLYCEMIA: Primary | ICD-10-CM

## 2022-04-20 DIAGNOSIS — G47.33 OSA ON CPAP: ICD-10-CM

## 2022-04-20 DIAGNOSIS — Z12.31 ENCOUNTER FOR SCREENING MAMMOGRAM FOR MALIGNANT NEOPLASM OF BREAST: ICD-10-CM

## 2022-04-20 DIAGNOSIS — E66.01 SEVERE OBESITY WITH BODY MASS INDEX (BMI) OF 35.0 TO 35.9 AND COMORBIDITY: ICD-10-CM

## 2022-04-20 PROCEDURE — 4010F ACE/ARB THERAPY RXD/TAKEN: CPT | Mod: CPTII,S$GLB,, | Performed by: INTERNAL MEDICINE

## 2022-04-20 PROCEDURE — 3288F PR FALLS RISK ASSESSMENT DOCUMENTED: ICD-10-PCS | Mod: CPTII,S$GLB,, | Performed by: INTERNAL MEDICINE

## 2022-04-20 PROCEDURE — 99214 OFFICE O/P EST MOD 30 MIN: CPT | Mod: 25,S$GLB,, | Performed by: INTERNAL MEDICINE

## 2022-04-20 PROCEDURE — 1101F PR PT FALLS ASSESS DOC 0-1 FALLS W/OUT INJ PAST YR: ICD-10-PCS | Mod: CPTII,S$GLB,, | Performed by: INTERNAL MEDICINE

## 2022-04-20 PROCEDURE — 99999 PR PBB SHADOW E&M-EST. PATIENT-LVL IV: ICD-10-PCS | Mod: PBBFAC,,, | Performed by: INTERNAL MEDICINE

## 2022-04-20 PROCEDURE — 3046F PR MOST RECENT HEMOGLOBIN A1C LEVEL > 9.0%: ICD-10-PCS | Mod: CPTII,S$GLB,, | Performed by: INTERNAL MEDICINE

## 2022-04-20 PROCEDURE — 99499 UNLISTED E&M SERVICE: CPT | Mod: S$GLB,,, | Performed by: INTERNAL MEDICINE

## 2022-04-20 PROCEDURE — G0009 PNEUMOCOCCAL CONJUGATE VACCINE 20-VALENT: ICD-10-PCS | Mod: S$GLB,,, | Performed by: INTERNAL MEDICINE

## 2022-04-20 PROCEDURE — 3008F PR BODY MASS INDEX (BMI) DOCUMENTED: ICD-10-PCS | Mod: CPTII,S$GLB,, | Performed by: INTERNAL MEDICINE

## 2022-04-20 PROCEDURE — 99214 PR OFFICE/OUTPT VISIT, EST, LEVL IV, 30-39 MIN: ICD-10-PCS | Mod: 25,S$GLB,, | Performed by: INTERNAL MEDICINE

## 2022-04-20 PROCEDURE — 3008F BODY MASS INDEX DOCD: CPT | Mod: CPTII,S$GLB,, | Performed by: INTERNAL MEDICINE

## 2022-04-20 PROCEDURE — 90677 PNEUMOCOCCAL CONJUGATE VACCINE 20-VALENT: ICD-10-PCS | Mod: S$GLB,,, | Performed by: INTERNAL MEDICINE

## 2022-04-20 PROCEDURE — 90677 PCV20 VACCINE IM: CPT | Mod: S$GLB,,, | Performed by: INTERNAL MEDICINE

## 2022-04-20 PROCEDURE — G0009 ADMIN PNEUMOCOCCAL VACCINE: HCPCS | Mod: S$GLB,,, | Performed by: INTERNAL MEDICINE

## 2022-04-20 PROCEDURE — 3046F HEMOGLOBIN A1C LEVEL >9.0%: CPT | Mod: CPTII,S$GLB,, | Performed by: INTERNAL MEDICINE

## 2022-04-20 PROCEDURE — 99999 PR PBB SHADOW E&M-EST. PATIENT-LVL IV: CPT | Mod: PBBFAC,,, | Performed by: INTERNAL MEDICINE

## 2022-04-20 PROCEDURE — 1159F PR MEDICATION LIST DOCUMENTED IN MEDICAL RECORD: ICD-10-PCS | Mod: CPTII,S$GLB,, | Performed by: INTERNAL MEDICINE

## 2022-04-20 PROCEDURE — 3288F FALL RISK ASSESSMENT DOCD: CPT | Mod: CPTII,S$GLB,, | Performed by: INTERNAL MEDICINE

## 2022-04-20 PROCEDURE — 99499 RISK ADDL DX/OHS AUDIT: ICD-10-PCS | Mod: S$GLB,,, | Performed by: INTERNAL MEDICINE

## 2022-04-20 PROCEDURE — 1159F MED LIST DOCD IN RCRD: CPT | Mod: CPTII,S$GLB,, | Performed by: INTERNAL MEDICINE

## 2022-04-20 PROCEDURE — 4010F PR ACE/ARB THEARPY RXD/TAKEN: ICD-10-PCS | Mod: CPTII,S$GLB,, | Performed by: INTERNAL MEDICINE

## 2022-04-20 PROCEDURE — 1101F PT FALLS ASSESS-DOCD LE1/YR: CPT | Mod: CPTII,S$GLB,, | Performed by: INTERNAL MEDICINE

## 2022-04-20 NOTE — TELEPHONE ENCOUNTER
Well that's the crunch time for the executive patients.  She can get her labs done there, and make appt here.  If she calls closer to that time and I can, I will see her there.  SM

## 2022-04-20 NOTE — TELEPHONE ENCOUNTER
Pt wants to know when she returns for her 6 month f/u can she come and see you at the [facility] on a Friday?

## 2022-05-02 ENCOUNTER — ANESTHESIA EVENT (OUTPATIENT)
Dept: ENDOSCOPY | Facility: HOSPITAL | Age: 72
End: 2022-05-02
Payer: MEDICARE

## 2022-05-02 ENCOUNTER — PATIENT MESSAGE (OUTPATIENT)
Dept: DIABETES | Facility: CLINIC | Age: 72
End: 2022-05-02
Payer: MEDICARE

## 2022-05-02 NOTE — PRE-PROCEDURE INSTRUCTIONS
PAT call completed and patient educated on the bowel prep, clear liquid diet, procedure instructions and medical history discussed. Patient informed of arrival time of 0630am on Wednesday, May 04, at [facility]. Please check in on the 1st floor of hospital with your .     Phone contact [phone] for any needs morning of procedure. Patient will be accompanied by spouse and is made aware that  is to remain during entire visit and made aware of the limited visitor policy. All questions and concerns addressed.      Endoscopy instructions reviewed. Bowel prep received by patient and copy of instructions received via [facility].       Stop all solid foods by 12MN on Monday on May 02, (day/date) until after procedure and begin clear liquid diet.      Stop all diabetic medications by Monday pm.     Tuesday- First half of bowel prep to begin by 4PM, drink in 1.5 hours and continue clear liquid diet.       Wednesday-Second half of bowel prep start at 12MN, morning of procedure and to complete in 1.5 hours.        Nothing by mouth after 2nd bowel prep is completed.     Informed to take AM medication of LISINOPRIL with sip of water by 0530am.      Patient verbalizes understanding of teaching and all instructions.       Your procedure will be performed at [facility]. Many GPS systems are NOT providing accurate instructions, take [address], from either direction, to [address] and proceed to the [address]bound service road, turning between the [address] and [place]. Once at the [facility], look for signs directing you to Hospital/Surgery. Check in for your procedure at  for Hospital/Surgery.

## 2022-05-02 NOTE — ANESTHESIA PREPROCEDURE EVALUATION
05/02/2022  Jaimie Luque is a 72 y.o., female.  Past Medical History:   Diagnosis Date    Diabetes mellitus type II     Hyperlipidemia     Hypertension     Hypothyroid     TALIA (obstructive sleep apnea)     on CPAP    Osteopenia     Psoriasis      Past Surgical History:   Procedure Laterality Date    COLONOSCOPY N/A 5/2/2017    Procedure: COLONOSCOPY;  Surgeon: Noe Penn III, MD;  Location: [facility];  Service: Endoscopy;  Laterality: N/A;    HYSTERECTOMY      OOPHORECTOMY      ROBOTIC ASSISTED HYSTERECTOMY           Pre-op Assessment    I have reviewed the Patient Summary Reports.     I have reviewed the Nursing Notes. I have reviewed the NPO Status.   I have reviewed the Medications.     Review of Systems  Cardiovascular:   Hypertension    Pulmonary:   Sleep Apnea    Endocrine:   Diabetes, poorly controlled, type 2 Hypothyroidism  Diabetes  Metabolic Disorders, Obesity / BMI > 30      Physical Exam    Airway:  Mallampati: III / III  Mouth Opening: Normal  TM Distance: Normal  Tongue: Normal  Neck ROM: Normal ROM    Dental:  Intact        Anesthesia Plan  Type of Anesthesia, risks & benefits discussed:    Anesthesia Type: Gen Natural Airway  Intra-op Monitoring Plan: Standard ASA Monitors  Induction:  IV  Informed Consent: Informed consent signed with the Patient and all parties understand the risks and agree with anesthesia plan.  All questions answered.   ASA Score: 3  Day of Surgery Review of History & Physical: H&P Update referred to the surgeon/provider.    Ready For Surgery From Anesthesia Perspective.     .

## 2022-05-03 NOTE — TELEPHONE ENCOUNTER
I spoke to her about what she should do today as she is preparing for her colonoscopy. I told her to keep an eye on her BG with her Dexcom. If she does happen to drink something that has around 45 grams of sugar, she would need her Novolog, but she can just plan to drink SF.   Do you want her to lower her Lantus dose today and go ahead and take it? She was told not to take any DM meds today.

## 2022-05-04 ENCOUNTER — PATIENT MESSAGE (OUTPATIENT)
Dept: FAMILY MEDICINE | Facility: CLINIC | Age: 72
End: 2022-05-04
Payer: MEDICARE

## 2022-05-04 ENCOUNTER — HOSPITAL ENCOUNTER (OUTPATIENT)
Facility: HOSPITAL | Age: 72
Discharge: HOME | End: 2022-05-04
Attending: INTERNAL MEDICINE | Admitting: INTERNAL MEDICINE
Payer: MEDICARE

## 2022-05-04 ENCOUNTER — ANESTHESIA (OUTPATIENT)
Dept: ENDOSCOPY | Facility: HOSPITAL | Age: 72
End: 2022-05-04
Payer: MEDICARE

## 2022-05-04 VITALS
RESPIRATION RATE: 17 BRPM | BODY MASS INDEX: 36.59 KG/M2 | TEMPERATURE: 98 F | DIASTOLIC BLOOD PRESSURE: 58 MMHG | WEIGHT: 233.13 LBS | OXYGEN SATURATION: 99 % | SYSTOLIC BLOOD PRESSURE: 116 MMHG | HEART RATE: 63 BPM | HEIGHT: 67 IN

## 2022-05-04 DIAGNOSIS — Z01.818 PRE-OP TESTING: ICD-10-CM

## 2022-05-04 DIAGNOSIS — Z12.11 COLON CANCER SCREENING: Primary | ICD-10-CM

## 2022-05-04 LAB — POCT GLUCOSE: 154 MG/DL (ref 70–110)

## 2022-05-04 PROCEDURE — 63600175 PHARM REV CODE 636 W HCPCS: Performed by: NURSE ANESTHETIST, CERTIFIED REGISTERED

## 2022-05-04 PROCEDURE — D9220A PRA ANESTHESIA: ICD-10-PCS | Mod: ANES,,, | Performed by: ANESTHESIOLOGY

## 2022-05-04 PROCEDURE — 93005 ELECTROCARDIOGRAM TRACING: CPT

## 2022-05-04 PROCEDURE — 93010 ELECTROCARDIOGRAM REPORT: CPT | Mod: ,,, | Performed by: INTERNAL MEDICINE

## 2022-05-04 PROCEDURE — 37000008 HC ANESTHESIA 1ST 15 MINUTES: Performed by: INTERNAL MEDICINE

## 2022-05-04 PROCEDURE — 93010 EKG 12-LEAD: ICD-10-PCS | Mod: ,,, | Performed by: INTERNAL MEDICINE

## 2022-05-04 PROCEDURE — 25000003 PHARM REV CODE 250: Performed by: NURSE ANESTHETIST, CERTIFIED REGISTERED

## 2022-05-04 PROCEDURE — G0121 COLON CA SCRN NOT HI RSK IND: HCPCS | Mod: ,,, | Performed by: INTERNAL MEDICINE

## 2022-05-04 PROCEDURE — D9220A PRA ANESTHESIA: ICD-10-PCS | Mod: CRNA,,, | Performed by: NURSE ANESTHETIST, CERTIFIED REGISTERED

## 2022-05-04 PROCEDURE — 37000009 HC ANESTHESIA EA ADD 15 MINS: Performed by: INTERNAL MEDICINE

## 2022-05-04 PROCEDURE — G0121 COLON CA SCRN NOT HI RSK IND: ICD-10-PCS | Mod: ,,, | Performed by: INTERNAL MEDICINE

## 2022-05-04 PROCEDURE — 63600175 PHARM REV CODE 636 W HCPCS: Performed by: INTERNAL MEDICINE

## 2022-05-04 PROCEDURE — G0121 COLON CA SCRN NOT HI RSK IND: HCPCS | Performed by: INTERNAL MEDICINE

## 2022-05-04 PROCEDURE — D9220A PRA ANESTHESIA: Mod: CRNA,,, | Performed by: NURSE ANESTHETIST, CERTIFIED REGISTERED

## 2022-05-04 PROCEDURE — D9220A PRA ANESTHESIA: Mod: ANES,,, | Performed by: ANESTHESIOLOGY

## 2022-05-04 RX ORDER — ONDANSETRON 2 MG/ML
INJECTION INTRAMUSCULAR; INTRAVENOUS
Status: DISCONTINUED | OUTPATIENT
Start: 2022-05-04 | End: 2022-05-04

## 2022-05-04 RX ORDER — LIDOCAINE HYDROCHLORIDE 20 MG/ML
INJECTION, SOLUTION EPIDURAL; INFILTRATION; INTRACAUDAL; PERINEURAL
Status: DISCONTINUED | OUTPATIENT
Start: 2022-05-04 | End: 2022-05-04

## 2022-05-04 RX ORDER — PROPOFOL 10 MG/ML
VIAL (ML) INTRAVENOUS
Status: DISCONTINUED | OUTPATIENT
Start: 2022-05-04 | End: 2022-05-04

## 2022-05-04 RX ORDER — SODIUM CHLORIDE, SODIUM LACTATE, POTASSIUM CHLORIDE, CALCIUM CHLORIDE 600; 310; 30; 20 MG/100ML; MG/100ML; MG/100ML; MG/100ML
INJECTION, SOLUTION INTRAVENOUS CONTINUOUS
Status: DISCONTINUED | OUTPATIENT
Start: 2022-05-04 | End: 2022-05-04 | Stop reason: HOSPADM

## 2022-05-04 RX ADMIN — PROPOFOL 20 MG: 10 INJECTION, EMULSION INTRAVENOUS at 08:05

## 2022-05-04 RX ADMIN — ONDANSETRON 4 MG: 2 INJECTION, SOLUTION INTRAMUSCULAR; INTRAVENOUS at 08:05

## 2022-05-04 RX ADMIN — PROPOFOL 30 MG: 10 INJECTION, EMULSION INTRAVENOUS at 08:05

## 2022-05-04 RX ADMIN — PROPOFOL 50 MG: 10 INJECTION, EMULSION INTRAVENOUS at 08:05

## 2022-05-04 RX ADMIN — PROPOFOL 10 MG: 10 INJECTION, EMULSION INTRAVENOUS at 08:05

## 2022-05-04 RX ADMIN — SODIUM CHLORIDE, SODIUM LACTATE, POTASSIUM CHLORIDE, AND CALCIUM CHLORIDE: 600; 310; 30; 20 INJECTION, SOLUTION INTRAVENOUS at 08:05

## 2022-05-04 RX ADMIN — LIDOCAINE HYDROCHLORIDE 20 MG: 20 INJECTION, SOLUTION EPIDURAL; INFILTRATION; INTRACAUDAL; PERINEURAL at 08:05

## 2022-05-04 NOTE — ANESTHESIA POSTPROCEDURE EVALUATION
Anesthesia Post Evaluation    Patient: Jaimie Luque    Procedure(s) Performed: Procedure(s) (LRB):  COLONOSCOPY (N/A)    Final Anesthesia Type: general      Patient location during evaluation: PACU  Patient participation: Yes- Able to Participate  Level of consciousness: awake and alert and oriented  Post-procedure vital signs: reviewed and stable  Pain management: adequate  Airway patency: patent    PONV status at discharge: No PONV  Anesthetic complications: no      Cardiovascular status: blood pressure returned to baseline, stable and hemodynamically stable  Respiratory status: unassisted  Hydration status: euvolemic  Follow-up not needed.          Vitals Value Taken Time   /58 05/04/22 0855   Temp 36.7 °C (98 °F) 05/04/22 0825   Pulse 63 05/04/22 0855   Resp 17 05/04/22 0855   SpO2 99 % 05/04/22 0855         Event Time   Out of Recovery 08:55:43         Pain/Abbey Score: Abbey Score: 10 (5/4/2022  8:55 AM)

## 2022-05-04 NOTE — TRANSFER OF CARE
"Anesthesia Transfer of Care Note    Patient: Jaimie Luque    Procedure(s) Performed: Procedure(s) (LRB):  COLONOSCOPY (N/A)    Patient location: PACU    Anesthesia Type: MAC    Transport from OR: Transported from OR on room air with adequate spontaneous ventilation    Post pain: adequate analgesia    Post assessment: no apparent anesthetic complications and tolerated procedure well    Post vital signs: stable    Level of consciousness: awake and alert    Nausea/Vomiting: no nausea/vomiting    Complications: none    Transfer of care protocol was followed      Last vitals:   Visit Vitals  /67 (BP Location: Left arm, Patient Position: Lying)   Pulse 69   Temp 36.7 °C (98 °F) (Temporal)   Resp 16   Ht 5' 7" (1.702 m)   Wt 105.7 kg (233 lb 2.2 oz)   SpO2 96%   Breastfeeding No   BMI 36.51 kg/m²     "

## 2022-05-04 NOTE — ANESTHESIA POSTPROCEDURE EVALUATION
Anesthesia Post Evaluation    Patient: Jaimie Luque    Procedure(s) Performed: Procedure(s) (LRB):  COLONOSCOPY (N/A)    [facility] Anesthesia Post Op Evaluation      Vitals Value Taken Time   /58 05/04/22 0855   Temp 36.7 °C (98 °F) 05/04/22 0825   Pulse 63 05/04/22 0855   Resp 17 05/04/22 0855   SpO2 99 % 05/04/22 0855         Event Time   Out of Recovery 08:55:43         Pain/Abbey Score: Abbey Score: 10 (5/4/2022  8:55 AM)

## 2022-05-04 NOTE — PROVATION PATIENT INSTRUCTIONS
Discharge Summary/Instructions after an Endoscopic Procedure  Patient Name: Jaimie Luque  Patient MRN: [id number]  Patient YOB: 1950  Wednesday, May 4, 2022  Elodia Swain MD  Dear patient,  As a result of recent federal legislation (The Federal Cures Act), you may   receive lab or pathology results from your procedure in your [facility]   account before your physician is able to contact you. Your physician or   their representative will relay the results to you with their   recommendations at their soonest availability.  Thank you,  RESTRICTIONS:  During your procedure today, you received medications for sedation.  These   medications may affect your judgment, balance and coordination.  Therefore,   for 24 hours, you have the following restrictions:   - DO NOT drive a car, operate machinery, make legal/financial decisions,   sign important papers or drink alcohol.    ACTIVITY:  Today: no heavy lifting, straining or running due to procedural   sedation/anesthesia.  The following day: return to full activity including work.  DIET:  Eat and drink normally unless instructed otherwise.     TREATMENT FOR COMMON SIDE EFFECTS:  - Mild abdominal pain, nausea, belching, bloating or excessive gas:  rest,   eat lightly and use a heating pad.  - Sore Throat: treat with throat lozenges and/or gargle with warm salt   water.  - Because air was used during the procedure, expelling large amounts of air   from your rectum or belching is normal.  - If a bowel prep was taken, you may not have a bowel movement for 1-3 days.    This is normal.  SYMPTOMS TO WATCH FOR AND REPORT TO YOUR PHYSICIAN:  1. Abdominal pain or bloating, other than gas cramps.  2. Chest pain.  3. Back pain.  4. Signs of infection such as: chills or fever occurring within 24 hours   after the procedure.  5. Rectal bleeding, which would show as bright red, maroon, or black stools.   (A tablespoon of blood from the rectum is not serious, especially if    hemorrhoids are present.)  6. Vomiting.  7. Weakness or dizziness.  GO DIRECTLY TO THE NEAREST EMERGENCY ROOM IF YOU HAVE ANY OF THE FOLLOWING:      Difficulty breathing              Chills and/or fever over 101 F   Persistent vomiting and/or vomiting blood   Severe abdominal pain   Severe chest pain   Black, tarry stools   Bleeding- more than one tablespoon   Any other symptom or condition that you feel may need urgent attention  Your doctor recommends these additional instructions:  If any biopsies were taken, your doctors clinic will contact you in 1 to 2   weeks with any results.  - Patient has a contact number available for emergencies.  The signs and   symptoms of potential delayed complications were discussed with the   patient.  Return to normal activities tomorrow.  Written discharge   instructions were provided to the patient.   - Discharge patient to home (via wheelchair).   - Resume previous diet today.   - Continue present medications.   - Repeat colonoscopy in 1 year because the bowel preparation was   suboptimal.  For questions, problems or results please call your physician Elodia Swain MD at Work:  [phone]  If you have any questions about the above instructions, call the GI   department at [phone] or call the endoscopy unit at [phone]   from 7am until 3 pm.  [facility], EMERGENCY ROOM PHONE NUMBER:   [phone]  IF A COMPLICATION OR EMERGENCY SITUATION ARISES AND YOU ARE UNABLE TO REACH   YOUR PHYSICIAN - GO DIRECTLY TO THE EMERGENCY ROOM.  I have read or have had read to me these discharge instructions for my   procedure and have received a written copy.  I understand these   instructions and will follow-up with my physician if I have any questions.     __________________________________       _____________________________________  Nurse Signature                                          Patient/Designated   Responsible Party Signature  Elodia  MD Elodia Swain MD  5/4/2022 8:23:16 AM  This report has been verified and signed electronically.  Dear patient,  As a result of recent federal legislation (The Federal Cures Act), you may   receive lab or pathology results from your procedure in your [facility]   account before your physician is able to contact you. Your physician or   their representative will relay the results to you with their   recommendations at their soonest availability.  Thank you,  PROVATION

## 2022-05-04 NOTE — PLAN OF CARE
Discharge instructions reviewed with pt and family, handouts given, verbalized understanding with no further questions at this time. Dr. Swain spoke to pt at bedside, reviewed procedure and answered questions. No pain or nausea noted, tolerating po fluids without difficulty, no other complaints noted. Fall precautions reviewed, consents in chart, PIV to be removed at discharge.

## 2022-05-04 NOTE — H&P
PRE PROCEDURE H&P    Patient Name: Jaimie Luque  MRN: [id number]  : 1950  Date of Procedure:  2022  Referring Physician: Jessica Ward MD  Primary Physician: Jessica Ward MD  Procedure Physician: Elodia Swain MD       Planned Procedure: Colonoscopy  Diagnosis: screening for colon cancer  Chief Complaint: Same as above    HPI: Patient is an 72 y.o. female is here for the above.     Last colonoscopy:   Family history: negative   Anticoagulation: none     Past Medical History:   Past Medical History:   Diagnosis Date    Diabetes mellitus type II     Hyperlipidemia     Hypertension     Hypothyroid     TALIA (obstructive sleep apnea)     on CPAP    Osteopenia     Psoriasis         Past Surgical History:  Past Surgical History:   Procedure Laterality Date    COLONOSCOPY N/A 2017    Procedure: COLONOSCOPY;  Surgeon: Noe Penn III, MD;  Location: [facility];  Service: Endoscopy;  Laterality: N/A;    HYSTERECTOMY      OOPHORECTOMY      ROBOTIC ASSISTED HYSTERECTOMY          Home Medications:  Prior to Admission medications    Medication Sig Start Date End Date Taking? Authorizing Provider   atorvastatin (LIPITOR) 20 MG tablet Take 1 tablet (20 mg total) by mouth once daily. 22  Yes Jessica Ward MD   blood-glucose meter,continuous (DEXCOM G6 ) Misc Use daily. 3/16/22  Yes Lorraine Holliday PA-C   cholecalciferol, vitamin D3, 2,000 unit Tab Take by mouth. 1 Tablet Oral Every day   Yes Historical Provider   dulaglutide (TRULICITY) 4.5 mg/0.5 mL pen injector Inject 4.5 mg into the skin once a week. 21  Yes Lorraine Holliday PA-C   gabapentin (NEURONTIN) 300 MG capsule TAKE 1 CAPSULE BY MOUTH THREE TIMES DAILY 21  Yes Jessica Ward MD   glipiZIDE (GLUCOTROL) 5 MG tablet Take 1 tablet (5 mg total) by mouth once daily. 21 Yes Lorraine Holliday PA-C   insulin (LANTUS SOLOSTAR U-100 INSULIN) glargine 100 units/mL (3mL) SubQ pen  "Inject 18 Units into the skin once daily. 3/18/22 3/18/23 Yes Lorraine Holliday PA-C   insulin lispro 100 unit/mL pen Inject 12 Units into the skin 3 (three) times daily with meals. 1/24/22 1/24/23 Yes Lorraine Holliday PA-C   levothyroxine (SYNTHROID) 100 MCG tablet TAKE 1 TABLET(100 MCG) BY MOUTH BEFORE BREAKFAST 5/27/21  Yes Jessica Ward MD   lisinopriL (PRINIVIL,ZESTRIL) 2.5 MG tablet TAKE 1 TABLET(2.5 MG) BY MOUTH EVERY DAY 2/15/22  Yes Jessica Ward MD   aspirin 81 mg Tab Take by mouth. 1 Tablet Oral 2 times weekly     Historical Provider   blood sugar diagnostic Strp To check BG 2 times daily, to use with AccuChek Varsha Plus  Dx:  E11.9 2/6/20   Jessica Ward MD   blood-glucose sensor (DEXCOM G6 SENSOR) Gretta Use 1 every 10 days. 3/16/22   Lorraine Holliday PA-C   blood-glucose transmitter (DEXCOM G6 TRANSMITTER) Gretta Use 1 every 90 days. 3/16/22   Lorraine Holliday PA-C   diphenhydrAMINE-acetaminophen (TYLENOL PM)  mg Tab Take 1 tablet by mouth nightly as needed. 1/19/21   Kulwinder Schumacher MD   FLUAD QUAD 2021-22,65Y UP,,PF, 60 mcg (15 mcg x 4)/0.5 mL Syrg  9/27/21   Historical Provider   lancets (ACCU-CHEK SOFTCLIX LANCETS) Misc Check blood sugar twice daily.  Accuchek Multiclix drum lancets, [employer]  Dx:  E11.9 5/8/18   Jessica Ward MD   meclizine (ANTIVERT) 25 mg tablet Take 1 tablet by mouth as needed.  6/12/12   Historical Provider   meloxicam (MOBIC) 15 MG tablet Take 1 tablet (15 mg total) by mouth once daily. 3/11/22   Dawna Palma MD   pen needle, diabetic (BD ULTRA-FINE LAURA PEN NEEDLE) 32 gauge x 5/32" Ndle 1 each by Misc.(Non-Drug; Combo Route) route 4 (four) times daily with meals and nightly. 10/28/21 10/28/22  Lorraine Holliday PA-C        Allergies:  Review of patient's allergies indicates:   Allergen Reactions    Adhesive tape-silicones      Other reaction(s): skin irritation to area    Penicillins         Social History:   Social " "History     Socioeconomic History    Marital status:    Tobacco Use    Smoking status: Never Smoker    Smokeless tobacco: Never Used   Substance and Sexual Activity    Alcohol use: Yes     Comment: rare    Drug use: No    Sexual activity: Yes     Partners: Male   Social History Narrative    . Lives with spouse. Has 3 children. Patient retired as  for [employer].      Social Determinants of Health     Financial Resource Strain: Low Risk     Difficulty of Paying Living Expenses: Not hard at all   Food Insecurity: No Food Insecurity    Worried About Running Out of Food in the Last Year: Never true    Ran Out of Food in the Last Year: Never true   Transportation Needs: No Transportation Needs    Lack of Transportation (Medical): No    Lack of Transportation (Non-Medical): No   Physical Activity: Insufficiently Active    Days of Exercise per Week: 3 days    Minutes of Exercise per Session: 20 min   Stress: No Stress Concern Present    Feeling of Stress : Not at all   Social Connections: Unknown    Frequency of Communication with Friends and Family: Once a week    Frequency of Social Gatherings with Friends and Family: More than three times a week    Active Member of Clubs or Organizations: Yes    Attends Club or Organization Meetings: More than 4 times per year    Marital Status:    Housing Stability: Low Risk     Unable to Pay for Housing in the Last Year: No    Number of Places Lived in the Last Year: 1    Unstable Housing in the Last Year: No       Family History:  Family History   Problem Relation Age of Onset    Heart disease Father     Diabetes Brother     Melanoma Neg Hx     Lupus Neg Hx        ROS: No acute cardiac events, no acute respiratory complaints.     Physical Exam (all patients):    BP (!) 161/86 (BP Location: Left arm, Patient Position: Sitting)   Pulse 85   Temp 98.1 °F (36.7 °C) (Temporal)   Resp 16   Ht 5' 7" (1.702 m)   Wt 105.7 kg " (233 lb 2.2 oz)   SpO2 96%   Breastfeeding No   BMI 36.51 kg/m²   Lungs: Clear to auscultation bilaterally, respirations unlabored  Heart: Regular rate and rhythm, S1 and S2 normal, no obvious murmurs  Abdomen:         Soft, non-tender, bowel sounds normal, no masses, no organomegaly    Lab Results   Component Value Date    WBC 7.12 09/15/2021    MCV 84 09/15/2021    RDW 17.3 (H) 09/15/2021     09/15/2021     (H) 09/15/2021    HGBA1C 9.1 (H) 03/11/2022    BUN 11 09/15/2021     09/15/2021    K 4.4 09/15/2021     09/15/2021        SEDATION PLAN: per anesthesia      History reviewed, vital signs satisfactory, cardiopulmonary status satisfactory, sedation options, risks and plans have been discussed with the patient  All their questions were answered and the patient agrees to the sedation procedures as planned and the patient is deemed an appropriate candidate for the sedation as planned.    Procedure explained to patient, informed consent obtained and placed in chart.    Elodia Swain  5/4/2022  8:02 AM

## 2022-05-10 ENCOUNTER — PATIENT MESSAGE (OUTPATIENT)
Dept: DIABETES | Facility: CLINIC | Age: 72
End: 2022-05-10
Payer: MEDICARE

## 2022-05-10 DIAGNOSIS — E11.65 UNCONTROLLED TYPE 2 DIABETES MELLITUS WITH HYPERGLYCEMIA: ICD-10-CM

## 2022-05-10 RX ORDER — INSULIN GLARGINE 100 [IU]/ML
18 INJECTION, SOLUTION SUBCUTANEOUS DAILY
Qty: 6 ML | Refills: 11 | Status: SHIPPED | OUTPATIENT
Start: 2022-05-10 | End: 2022-05-20 | Stop reason: SDUPTHER

## 2022-05-11 ENCOUNTER — PATIENT MESSAGE (OUTPATIENT)
Dept: FAMILY MEDICINE | Facility: CLINIC | Age: 72
End: 2022-05-11
Payer: MEDICARE

## 2022-05-11 DIAGNOSIS — R94.31 ABNORMAL EKG: Primary | ICD-10-CM

## 2022-05-16 DIAGNOSIS — E03.9 ACQUIRED HYPOTHYROIDISM: ICD-10-CM

## 2022-05-16 RX ORDER — LEVOTHYROXINE SODIUM 100 UG/1
TABLET ORAL
Qty: 90 TABLET | Refills: 3 | Status: SHIPPED | OUTPATIENT
Start: 2022-05-16 | End: 2022-11-17

## 2022-05-17 NOTE — TELEPHONE ENCOUNTER
No new care gaps identified.  [facility] Embedded Care Gaps. Reference number: [id number]. 5/16/2022   7:03:56 PM CDT

## 2022-05-17 NOTE — TELEPHONE ENCOUNTER
Refill Routing Note   Medication(s) are not appropriate for processing by [facility] for the following reason(s):      - Patient has been seen in the ED/Hospital since the last PCP visit    [facility] action(s):  Defer          Medication reconciliation completed: No     Appointments  past 12m or future 3m with PCP    Date Provider   Last Visit   4/20/2022 Jessica Ward MD   Next Visit   10/20/2022 Jessica Ward MD   ED visits in past 90 days: 0        Note composed:7:03 PM 05/16/2022

## 2022-05-19 ENCOUNTER — PATIENT MESSAGE (OUTPATIENT)
Dept: FAMILY MEDICINE | Facility: CLINIC | Age: 72
End: 2022-05-19
Payer: MEDICARE

## 2022-05-19 DIAGNOSIS — H93.11 TINNITUS OF RIGHT EAR: Primary | ICD-10-CM

## 2022-05-20 RX ORDER — INSULIN GLARGINE 100 [IU]/ML
18 INJECTION, SOLUTION SUBCUTANEOUS DAILY
Qty: 18 ML | Refills: 3 | Status: SHIPPED | OUTPATIENT
Start: 2022-05-20 | End: 2023-08-21

## 2022-05-25 NOTE — PROGRESS NOTES
Chief complaint:   Chief Complaint   Patient presents with    Tinnitus     Noticed it a couple of weeks ago, happens in right ear. Happens most of the time, notices it when its quiet.         History of Present Illness:     Ms. Luque is a 72 y.o. female presenting for evaluation of tinnitus.     Patient presents with tinnitus. Onset of symptoms was gradual starting 2 weeks ago ago with unchanged course since that time. Patient describes the tinnitus as constant located in the right ear. The quality is described as high pitch. The pattern is nonpulsatile with an intensity that is very soft. Patient describes her level of annoyance as minimally annoying, always aware, noticed more in quiet settings. Associated symptoms include no hearing loss, pain, dizziness, drainage or recurrent otitis. Previous treatments include none.    Denies significant family history of hearing loss, loud noise exposure.     The patient denies significant eustachian tube risk factors: ear pressure, ear pain, sensation of clogging, ear symptoms with a cold or sinusitis, popping or crackling sensation, ringing in the ear, and muffled hearing.     The patient also denies pain deep within the ear, tenderness around the ear canal or pre-auricular area, or headaches.         Review of Systems     A complete 10 point ROS was completed and are positive as per above HPI.    Otherwise negative for fever, diplopia, chest pain, shortness of breath, vomiting, blood in urine, joint pain, skin rash, seizures and unusual bleeding.       History        Past Medical History:   Past Medical History:   Diagnosis Date    Diabetes mellitus type II     Hyperlipidemia     Hypertension     Hypothyroid     TALIA (obstructive sleep apnea)     on CPAP    Osteopenia     Psoriasis     .          Past Surgical History:  Past Surgical History:   Procedure Laterality Date    COLONOSCOPY N/A 5/2/2017    Procedure: COLONOSCOPY;  Surgeon: Noe Penn III, MD;   Location: [facility];  Service: Endoscopy;  Laterality: N/A;    COLONOSCOPY N/A 5/4/2022    Procedure: COLONOSCOPY;  Surgeon: Elodia Swain MD;  Location: [facility];  Service: Endoscopy;  Laterality: N/A;    HYSTERECTOMY      OOPHORECTOMY      ROBOTIC ASSISTED HYSTERECTOMY     .         Medications: Medication list was reviewed. She  has a current medication list which includes the following prescription(s): aspirin, atorvastatin, blood sugar diagnostic, dexcom g6 , dexcom g6 sensor, dexcom g6 transmitter, cholecalciferol (vitamin d3), diphenhydramine-acetaminophen, trulicity, fluad quad 2021-22(65y up)(pf), gabapentin, glipizide, lantus solostar u-100 insulin, insulin lispro, lancets, levothyroxine, lisinopril, meclizine, meloxicam, and pen needle, diabetic.         Allergies:   Review of patient's allergies indicates:   Allergen Reactions    Adhesive tape-silicones      Other reaction(s): skin irritation to area    Penicillins             Family history: family history includes Diabetes in her brother; Heart disease in her father.         Social History          Alcohol use:  reports current alcohol use.            Tobacco:  reports that she has never smoked. She has never used smokeless tobacco.         Please see the patient's intake form for full details of past medical history, past surgical history, family history, social history and review of systems. ?This information was reviewed by me and noted.      Physical Examination     General: Well developed, well nourished, well hydrated. Verbal with a strong voice and not dysphonic.     Head/Face: Normocephalic, atraumatic. No scars or lesions. Facial musculature equal.     Eyes: No scleral icterus or conjunctival hemorrhage. EOMI. PERRLA.     Ears:     · Right ear: No gross deformity. EAC is clear of debris and erythema. The TM is intact with a pneumatized middle ear. No signs of retraction, fluid or infection.      · Left ear: No gross  deformity. EAC is clear of debris and erythema. The TM is intact with a pneumatized middle ear. No signs of retraction, fluid or infection.       Neurologic: Moving all extremities without gross abnormality.CN II-XII grossly intact. House-Brackmann 1/6. No signs of nystagmus.      Psych: Alert and oriented to person, place, and time with an appropriate mood and affect.       Audiogram     Audiogram, 05/26/2022 was independently reviewed and interpreted            Assessment     Tinnitus of right ear     Plan:      The diagnosis and options of management were discussed at length with the patient including hearing function, hearing conservation, and tinnitus management. We spent a considerable amount of time discussing drug therapy, antioxidants, biofeedback, masking and sound therapy for tinnitus.     We discussed some preventative measures to help prevent and minimize tinnitus including:      Reduce exposure to extremely loud noise   Avoid total silence   Decrease salt intake   Monitor one's blood pressure   Avoid stimulants such as caffeine and nicotine   Exercise   Reduce fatigue   Manage stress    We also discussed different types of Sound Therapy. Sound therapy is a broad term that may be used in many ways, depending on the specific product, clinical setting, or individual clinician. In general, sound therapy means the use of external noise in order to alter your perception of, or reaction to, tinnitus. Like other tinnitus treatments, sound therapies do not cure the condition, but they may significantly lower the perceived burden and intensity of tinnitus. Some options for sound therapy include:     Portable sound generator/sound machines: these can be purchased at certain electronic suppliers. They can produce soothing sounds that help to dampen your brain's recognition of the tinnitus.   Home masking: such as the use of electric fans, radios or television.   Music therapy: Many patients find that  music, particularly classical passages that don't contain wide variations in loudness (ampliltude) can be both soothing to the limbic system (the emotional processor in the brain that is commonly negatively linked to a patient's reaction to tinnitus) and stimulating to the auditory cortex. There are several Apps available that have preselected music that can help with tinnitus. Some examples include: Audio Care, [place], Beltone Tinnitus Calmer, and MyNoise, among others.   Amplification: The use of hearing aids and a combination of hearing aids and maskers are often effective ways to minimize tinnitus. While it is not clear whether hearing aids help by amplifying background sounds that can mask out the tinnitus or by actually altering the production of tinnitus, most hearing aid wearers report at least some reduction in their tinnitus. This may be due to the reduction in contrast between tinnitus and silence, or because of the new stimulation provided to the brain. This an especially helpful option when the patient also suffers from hearing loss as the hearing aids may simultaneously improving the hearing and tinnitus.    Formal Tinnitus Retraining Therapy (TRT) does exist, but is not offered by any providers in our state. If all other measures fail and your tinnitus is debilitating it might be worth searching for TRT providers in nearby states. These techniques consist of two main components -- directive counseling and low level sound generators. Directive counseling provides intensive, individualized education regarding the causes and effects of tinnitus on the ear, the brain, and the coping mechanism. Low-level sound generators may help the brain relearn a pattern that will de-emphasize the importance of the tinnitus. These devices also may be helpful in desensitizing patients who are overly sensitive to sound.    There are also support groups that exist which can be helpful for some patients.     More helpful  information can be found ton the American Tinnitus Association website: https://www.genie.org/    I recommend conservative preventative measures and sound therapy techniques listed above.    Additionally, I recommend repeat audiogram in 1 year, sooner if she notices a change in hearing.              Eriberto Barrios MD  [facility] Department of Otolaryngology   [facility]  [address] [facility].  [place] [id number]  P: [phone]  F: [phone]

## 2022-05-26 ENCOUNTER — OFFICE VISIT (OUTPATIENT)
Dept: OTOLARYNGOLOGY | Facility: CLINIC | Age: 72
End: 2022-05-26
Payer: MEDICARE

## 2022-05-26 ENCOUNTER — CLINICAL SUPPORT (OUTPATIENT)
Dept: AUDIOLOGY | Facility: CLINIC | Age: 72
End: 2022-05-26
Payer: MEDICARE

## 2022-05-26 VITALS — TEMPERATURE: 97 F | HEART RATE: 93 BPM | SYSTOLIC BLOOD PRESSURE: 130 MMHG | DIASTOLIC BLOOD PRESSURE: 66 MMHG

## 2022-05-26 DIAGNOSIS — H90.5 HEARING LOSS, SENSORINEURAL, COMBINED TYPES: Primary | ICD-10-CM

## 2022-05-26 DIAGNOSIS — H93.11 SUBJECTIVE TINNITUS OF RIGHT EAR: ICD-10-CM

## 2022-05-26 DIAGNOSIS — H93.11 TINNITUS OF RIGHT EAR: ICD-10-CM

## 2022-05-26 PROCEDURE — 99999 PR PBB SHADOW E&M-EST. PATIENT-LVL IV: CPT | Mod: PBBFAC,,, | Performed by: STUDENT IN AN ORGANIZED HEALTH CARE EDUCATION/TRAINING PROGRAM

## 2022-05-26 PROCEDURE — 92557 PR COMPREHENSIVE HEARING TEST: ICD-10-PCS | Mod: S$GLB,,, | Performed by: AUDIOLOGIST

## 2022-05-26 PROCEDURE — 99203 PR OFFICE/OUTPT VISIT, NEW, LEVL III, 30-44 MIN: ICD-10-PCS | Mod: S$GLB,,, | Performed by: STUDENT IN AN ORGANIZED HEALTH CARE EDUCATION/TRAINING PROGRAM

## 2022-05-26 PROCEDURE — 3046F PR MOST RECENT HEMOGLOBIN A1C LEVEL > 9.0%: ICD-10-PCS | Mod: CPTII,S$GLB,, | Performed by: STUDENT IN AN ORGANIZED HEALTH CARE EDUCATION/TRAINING PROGRAM

## 2022-05-26 PROCEDURE — 99203 OFFICE O/P NEW LOW 30 MIN: CPT | Mod: S$GLB,,, | Performed by: STUDENT IN AN ORGANIZED HEALTH CARE EDUCATION/TRAINING PROGRAM

## 2022-05-26 PROCEDURE — 1126F PR PAIN SEVERITY QUANTIFIED, NO PAIN PRESENT: ICD-10-PCS | Mod: CPTII,S$GLB,, | Performed by: STUDENT IN AN ORGANIZED HEALTH CARE EDUCATION/TRAINING PROGRAM

## 2022-05-26 PROCEDURE — 3288F FALL RISK ASSESSMENT DOCD: CPT | Mod: CPTII,S$GLB,, | Performed by: STUDENT IN AN ORGANIZED HEALTH CARE EDUCATION/TRAINING PROGRAM

## 2022-05-26 PROCEDURE — 1159F PR MEDICATION LIST DOCUMENTED IN MEDICAL RECORD: ICD-10-PCS | Mod: CPTII,S$GLB,, | Performed by: STUDENT IN AN ORGANIZED HEALTH CARE EDUCATION/TRAINING PROGRAM

## 2022-05-26 PROCEDURE — 3078F DIAST BP <80 MM HG: CPT | Mod: CPTII,S$GLB,, | Performed by: STUDENT IN AN ORGANIZED HEALTH CARE EDUCATION/TRAINING PROGRAM

## 2022-05-26 PROCEDURE — 3288F PR FALLS RISK ASSESSMENT DOCUMENTED: ICD-10-PCS | Mod: CPTII,S$GLB,, | Performed by: STUDENT IN AN ORGANIZED HEALTH CARE EDUCATION/TRAINING PROGRAM

## 2022-05-26 PROCEDURE — 92567 PR TYMPA2METRY: ICD-10-PCS | Mod: S$GLB,,, | Performed by: AUDIOLOGIST

## 2022-05-26 PROCEDURE — 1159F MED LIST DOCD IN RCRD: CPT | Mod: CPTII,S$GLB,, | Performed by: STUDENT IN AN ORGANIZED HEALTH CARE EDUCATION/TRAINING PROGRAM

## 2022-05-26 PROCEDURE — 4010F PR ACE/ARB THEARPY RXD/TAKEN: ICD-10-PCS | Mod: CPTII,S$GLB,, | Performed by: STUDENT IN AN ORGANIZED HEALTH CARE EDUCATION/TRAINING PROGRAM

## 2022-05-26 PROCEDURE — 3046F HEMOGLOBIN A1C LEVEL >9.0%: CPT | Mod: CPTII,S$GLB,, | Performed by: STUDENT IN AN ORGANIZED HEALTH CARE EDUCATION/TRAINING PROGRAM

## 2022-05-26 PROCEDURE — 92557 COMPREHENSIVE HEARING TEST: CPT | Mod: S$GLB,,, | Performed by: AUDIOLOGIST

## 2022-05-26 PROCEDURE — 99999 PR PBB SHADOW E&M-EST. PATIENT-LVL IV: ICD-10-PCS | Mod: PBBFAC,,, | Performed by: STUDENT IN AN ORGANIZED HEALTH CARE EDUCATION/TRAINING PROGRAM

## 2022-05-26 PROCEDURE — 4010F ACE/ARB THERAPY RXD/TAKEN: CPT | Mod: CPTII,S$GLB,, | Performed by: STUDENT IN AN ORGANIZED HEALTH CARE EDUCATION/TRAINING PROGRAM

## 2022-05-26 PROCEDURE — 92567 TYMPANOMETRY: CPT | Mod: S$GLB,,, | Performed by: AUDIOLOGIST

## 2022-05-26 PROCEDURE — 1101F PT FALLS ASSESS-DOCD LE1/YR: CPT | Mod: CPTII,S$GLB,, | Performed by: STUDENT IN AN ORGANIZED HEALTH CARE EDUCATION/TRAINING PROGRAM

## 2022-05-26 PROCEDURE — 3075F PR MOST RECENT SYSTOLIC BLOOD PRESS GE 130-139MM HG: ICD-10-PCS | Mod: CPTII,S$GLB,, | Performed by: STUDENT IN AN ORGANIZED HEALTH CARE EDUCATION/TRAINING PROGRAM

## 2022-05-26 PROCEDURE — 3078F PR MOST RECENT DIASTOLIC BLOOD PRESSURE < 80 MM HG: ICD-10-PCS | Mod: CPTII,S$GLB,, | Performed by: STUDENT IN AN ORGANIZED HEALTH CARE EDUCATION/TRAINING PROGRAM

## 2022-05-26 PROCEDURE — 3075F SYST BP GE 130 - 139MM HG: CPT | Mod: CPTII,S$GLB,, | Performed by: STUDENT IN AN ORGANIZED HEALTH CARE EDUCATION/TRAINING PROGRAM

## 2022-05-26 PROCEDURE — 1126F AMNT PAIN NOTED NONE PRSNT: CPT | Mod: CPTII,S$GLB,, | Performed by: STUDENT IN AN ORGANIZED HEALTH CARE EDUCATION/TRAINING PROGRAM

## 2022-05-26 PROCEDURE — 1101F PR PT FALLS ASSESS DOC 0-1 FALLS W/OUT INJ PAST YR: ICD-10-PCS | Mod: CPTII,S$GLB,, | Performed by: STUDENT IN AN ORGANIZED HEALTH CARE EDUCATION/TRAINING PROGRAM

## 2022-05-26 NOTE — PATIENT INSTRUCTIONS
Tinnitus (ringing in the ears)  Tinnitus is the perception of sound when no actual external noise is present. While it is commonly referred to as ringing in the ears, tinnitus can manifest many different perceptions of sound, including buzzing, hissing, whistling, swooshing, and clicking.     Preventative measures to help prevent and minimize tinnitus include:     Reduce exposure to extremely loud noise  Avoid total silence  Decrease salt intake  Monitor one's blood pressure  Avoid stimulants such as caffeine and nicotine  Exercise  Reduce fatigue  Manage stress    Sound Therapy: Sound therapy is a broad term that may be used in many ways. In general, sound therapy means the use of external noise in order to alter your perception of tinnitus. Like other tinnitus treatments, sound therapies do not cure the condition, but they may significantly lower the burden and intensity of tinnitus. Some options for sound therapy include:    Portable sound generator/sound machines: these can be purchased at certain electronic suppliers. They can produce soothing sounds that help to dampen your brain's recognition of the tinnitus.   Example of a portable and affordable sound machine: https://Zauber.Dragon Law/Portable-Relaxing-Soothing-Charging-Auto-Off/dp/[email or web address]/ref=pd_lpo_2?pd_rd_i=[email or web address]&psc=1    Home masking: such as the use of electric fans, radios or television to dampen the tinnitus.    Music therapy: Many patients find that music, particularly classical passages that don't contain wide variations in loudness (ampliltude) can be both soothing to the limbic system (the emotional processor in the brain that is commonly negatively linked to a patient's reaction to tinnitus) and stimulating to the auditory cortex. There are several Apps available that have preselected music that can help with tinnitus. Some examples include: Audio Care, [place], Beltone Tinnitus Calmer, and MyNoise, among others.    Amplification: The use  of hearing aids and a combination of hearing aids and maskers are often effective ways to minimize tinnitus. While it is not clear whether hearing aids help by amplifying background sounds that can mask out the tinnitus or by actually altering the production of tinnitus, most hearing aid wearers report at least some reduction in their tinnitus. This may be due to the reduction in contrast between tinnitus and silence, or because of the new stimulation provided to the brain. This an especially helpful option when the patient also suffers from hearing loss as the hearing aids may simultaneously improving the hearing and tinnitus.    Tinnitus Retraining Therapy (TRT) does exist, but is not offered by any providers in our state. If all other measures fail and your tinnitus is debilitating it might be worth searching for TRT providers in nearby states. These techniques consist of two main components -- directive counseling and low level sound generators. Directive counseling provides intensive, individualized education regarding the causes and effects of tinnitus on the ear, the brain, and the coping mechanism. Low-level sound generators may help the brain relearn a pattern that will de-emphasize the importance of the tinnitus. These devices also may be helpful in desensitizing patients who are overly sensitive to sound.    There are also support groups that exist which can be helpful for some patients.     More helpful information can be found on the American Tinnitus Association website: https://www.genie.org/

## 2022-05-26 NOTE — PROGRESS NOTES
Jaimie Luque was seen 05/26/2022 for an audiological evaluation.  Patient complains of new onset of constant mild right sided tinnitus. She denies any decrease in hearing. She does not report any noise exposure history.     Results reveal a mild sensorineural hearing loss 7885-9467 Hz for the right ear, and  mild sensorineural hearing loss 5077-0133 Hz for the left ear.   Speech Reception Thresholds were  15 dBHL for the right ear and 10 dBHL for the left ear.   Word recognition scores were excellent for the right ear and excellent for the left ear.   Tympanograms were Type A, normal for the right ear and Type A, normal for the left ear.    Patient was counseled on the above findings.    We discussed that tinnitus is most often caused by a hearing loss, and that as the hair cells are damaged, either genetic or as a result of loud noise exposure, they then cause tinnitus. Some patients find that restricting the salt or caffeine in their diet helps, and there is also an OTC supplement, lipflavinoids, that some people find to be effective though their benefit is not fully proven. Tinnitus tends to be louder in times of stress and fatigue, and may decrease with time. Sound machines may also be an effective masking technique if needed at night.     Recommendations include:    1.  ENT followup  2.  Wear hearing protective devices around loud noise  3.  Annual audiograms

## 2022-06-01 ENCOUNTER — PATIENT MESSAGE (OUTPATIENT)
Dept: SPORTS MEDICINE | Facility: CLINIC | Age: 72
End: 2022-06-01
Payer: MEDICARE

## 2022-06-02 DIAGNOSIS — G89.29 CHRONIC PAIN OF RIGHT KNEE: ICD-10-CM

## 2022-06-02 DIAGNOSIS — M17.11 PRIMARY OSTEOARTHRITIS OF RIGHT KNEE: ICD-10-CM

## 2022-06-02 DIAGNOSIS — M25.561 CHRONIC PAIN OF RIGHT KNEE: ICD-10-CM

## 2022-06-02 DIAGNOSIS — R29.898 WEAKNESS OF BOTH HIPS: ICD-10-CM

## 2022-06-03 RX ORDER — MELOXICAM 15 MG/1
TABLET ORAL
Qty: 30 TABLET | Refills: 2 | Status: SHIPPED | OUTPATIENT
Start: 2022-06-03 | End: 2022-08-01

## 2022-06-08 ENCOUNTER — TELEPHONE (OUTPATIENT)
Dept: SPORTS MEDICINE | Facility: CLINIC | Age: 72
End: 2022-06-08

## 2022-06-08 ENCOUNTER — OFFICE VISIT (OUTPATIENT)
Dept: SPORTS MEDICINE | Facility: CLINIC | Age: 72
End: 2022-06-08
Payer: MEDICARE

## 2022-06-08 VITALS — BODY MASS INDEX: 36.57 KG/M2 | HEIGHT: 67 IN | WEIGHT: 233 LBS

## 2022-06-08 DIAGNOSIS — M25.561 CHRONIC PAIN OF RIGHT KNEE: ICD-10-CM

## 2022-06-08 DIAGNOSIS — R29.898 WEAKNESS OF BOTH HIPS: Primary | ICD-10-CM

## 2022-06-08 DIAGNOSIS — M17.11 PRIMARY OSTEOARTHRITIS OF RIGHT KNEE: ICD-10-CM

## 2022-06-08 DIAGNOSIS — G89.29 CHRONIC PAIN OF RIGHT KNEE: ICD-10-CM

## 2022-06-08 PROCEDURE — 99999 PR PBB SHADOW E&M-EST. PATIENT-LVL IV: ICD-10-PCS | Mod: PBBFAC,,, | Performed by: PHYSICAL MEDICINE & REHABILITATION

## 2022-06-08 PROCEDURE — 1101F PT FALLS ASSESS-DOCD LE1/YR: CPT | Mod: CPTII,S$GLB,, | Performed by: PHYSICAL MEDICINE & REHABILITATION

## 2022-06-08 PROCEDURE — 1159F PR MEDICATION LIST DOCUMENTED IN MEDICAL RECORD: ICD-10-PCS | Mod: CPTII,S$GLB,, | Performed by: PHYSICAL MEDICINE & REHABILITATION

## 2022-06-08 PROCEDURE — 99214 OFFICE O/P EST MOD 30 MIN: CPT | Mod: S$GLB,,, | Performed by: PHYSICAL MEDICINE & REHABILITATION

## 2022-06-08 PROCEDURE — 1160F PR REVIEW ALL MEDS BY PRESCRIBER/CLIN PHARMACIST DOCUMENTED: ICD-10-PCS | Mod: CPTII,S$GLB,, | Performed by: PHYSICAL MEDICINE & REHABILITATION

## 2022-06-08 PROCEDURE — 1125F AMNT PAIN NOTED PAIN PRSNT: CPT | Mod: CPTII,S$GLB,, | Performed by: PHYSICAL MEDICINE & REHABILITATION

## 2022-06-08 PROCEDURE — 1160F RVW MEDS BY RX/DR IN RCRD: CPT | Mod: CPTII,S$GLB,, | Performed by: PHYSICAL MEDICINE & REHABILITATION

## 2022-06-08 PROCEDURE — 1125F PR PAIN SEVERITY QUANTIFIED, PAIN PRESENT: ICD-10-PCS | Mod: CPTII,S$GLB,, | Performed by: PHYSICAL MEDICINE & REHABILITATION

## 2022-06-08 PROCEDURE — 3008F BODY MASS INDEX DOCD: CPT | Mod: CPTII,S$GLB,, | Performed by: PHYSICAL MEDICINE & REHABILITATION

## 2022-06-08 PROCEDURE — 3288F FALL RISK ASSESSMENT DOCD: CPT | Mod: CPTII,S$GLB,, | Performed by: PHYSICAL MEDICINE & REHABILITATION

## 2022-06-08 PROCEDURE — 1101F PR PT FALLS ASSESS DOC 0-1 FALLS W/OUT INJ PAST YR: ICD-10-PCS | Mod: CPTII,S$GLB,, | Performed by: PHYSICAL MEDICINE & REHABILITATION

## 2022-06-08 PROCEDURE — 4010F PR ACE/ARB THEARPY RXD/TAKEN: ICD-10-PCS | Mod: CPTII,S$GLB,, | Performed by: PHYSICAL MEDICINE & REHABILITATION

## 2022-06-08 PROCEDURE — 1159F MED LIST DOCD IN RCRD: CPT | Mod: CPTII,S$GLB,, | Performed by: PHYSICAL MEDICINE & REHABILITATION

## 2022-06-08 PROCEDURE — 3288F PR FALLS RISK ASSESSMENT DOCUMENTED: ICD-10-PCS | Mod: CPTII,S$GLB,, | Performed by: PHYSICAL MEDICINE & REHABILITATION

## 2022-06-08 PROCEDURE — 3008F PR BODY MASS INDEX (BMI) DOCUMENTED: ICD-10-PCS | Mod: CPTII,S$GLB,, | Performed by: PHYSICAL MEDICINE & REHABILITATION

## 2022-06-08 PROCEDURE — 3046F PR MOST RECENT HEMOGLOBIN A1C LEVEL > 9.0%: ICD-10-PCS | Mod: CPTII,S$GLB,, | Performed by: PHYSICAL MEDICINE & REHABILITATION

## 2022-06-08 PROCEDURE — 99214 PR OFFICE/OUTPT VISIT, EST, LEVL IV, 30-39 MIN: ICD-10-PCS | Mod: S$GLB,,, | Performed by: PHYSICAL MEDICINE & REHABILITATION

## 2022-06-08 PROCEDURE — 99999 PR PBB SHADOW E&M-EST. PATIENT-LVL IV: CPT | Mod: PBBFAC,,, | Performed by: PHYSICAL MEDICINE & REHABILITATION

## 2022-06-08 PROCEDURE — 3046F HEMOGLOBIN A1C LEVEL >9.0%: CPT | Mod: CPTII,S$GLB,, | Performed by: PHYSICAL MEDICINE & REHABILITATION

## 2022-06-08 PROCEDURE — 4010F ACE/ARB THERAPY RXD/TAKEN: CPT | Mod: CPTII,S$GLB,, | Performed by: PHYSICAL MEDICINE & REHABILITATION

## 2022-06-08 NOTE — PATIENT INSTRUCTIONS
Assessment:  Jaimie Luque is a 72 y.o. female   Chief Complaint   Patient presents with    Right Knee - Pain       Weakness of both hips  -     Ambulatory referral/consult to Home Health; Future; Expected date: 06/09/2022    Chronic pain of right knee  -     Ambulatory referral/consult to Home Health; Future; Expected date: 06/09/2022    Primary osteoarthritis of right knee  -     Ambulatory referral/consult to Home Health; Future; Expected date: 06/09/2022      Plan:  She inquires about a consultation to help her set up grab bars in her bathroom and other home modifications since she has trouble getting up from a seated position.  Will order Home Health for a home eval.  She will start water aerobics later this month.  Still not interested in PT or injections.  Doing well on mobic, refill was recently sent in.        Follow-up: As needed    Thank you for choosing [facility] and Dr. Dawna Palma for your orthopedic & sports medicine care. It is our goal to provide you with exceptional care that will help keep you healthy, active, and get you back in the game.    Please do not hesitate to reach out to us via email, phone, or MyChart with any questions, concerns, or feedback.    If you felt that you received exemplary care today, please consider leaving us feedback on Healthgrades at:  [email or web address]     If you are experiencing pain/discomfort ,or have questions after 5pm and would like to be connected to the [facility] on-call team, please call this number and specify which Sports Medicine provider is treating you: [phone]

## 2022-06-08 NOTE — TELEPHONE ENCOUNTER
Contacted patient and notified that [facility] is not in network with her insurance.  Asked patient to contact her insurance company and ask them to provide us with in network Home Health providers and to let us know and we will put a referral in with one of those providers.  Patient verbalized understanding of instructions.

## 2022-06-08 NOTE — PROGRESS NOTES
SPORTS MEDICINE / PM&R Follow Up Visit :    Referring Physician: No ref. provider found    Chief Complaint   Patient presents with    Right Knee - Pain       HPI: This is a 72 y.o.  female being seen in clinic today for evaluation of Pain of the Right Knee       She was last seen in clinic 03/11/2022. Since last visit, the symptoms have improved in some ways with pain but she feels that she has noticed that getting up and down from a seated position has become more difficult and buckling of the knee has become more frequent.  She has not been to therapy since her last visit but did it in the past. She has tried meloxicam for this problem with some improvement.       History obtained from patient.  Patient states that she is currently having a dull ache in her knee that varies in intensity from day to day.  She states yesterday she was walking and had instances of buckling and issues of stumbling and feeling as if her foot was getting caught. She states that yesterday when she was walking that she felt pain from the knee, into her anterior lower leg and felt like someone kicked her in her posterior lower leg.  She is curious about having someone come to her home and help fit it for handicap modifications and was hoping to get some advise about who to contact.  She states that she is going to start performing her HEP that she was given by physical therapy prior to seeing us at her last visit and she is going to start with water aerobics on June 21st and hopes this helps with some of her knee pain.     Past family, medical, social, surgical history, and vital signs reviewed in chart.      General    Nursing note and vitals reviewed.  Constitutional: She is oriented to person, place, and time. She appears well-developed and well-nourished.   Obese     HENT:   Head: Normocephalic and atraumatic.   Eyes: Conjunctivae and EOM are normal. Pupils are equal, round, and reactive to light.   Neck: Neck supple.    Cardiovascular: Intact distal pulses.    Pulmonary/Chest: Effort normal. No respiratory distress.   Abdominal: She exhibits no distension.   Neurological: She is alert and oriented to person, place, and time.   Psychiatric: She has a normal mood and affect.     General Musculoskeletal Exam   Gait: normal       Right Knee Exam     Inspection   Erythema: absent  Swelling: absent  Effusion: absent  Deformity: absent    Tenderness   The patient is tender to palpation of the lateral joint line and iliotibial band.    Crepitus   The patient has crepitus of the lateral joint line.    Range of Motion   The patient has normal right knee ROM.    Tests   Meniscus   Meryl:  Medial - negative Lateral - negative  Ligament Examination Lachman: normal (-1 to 2mm) PCL-Posterior Drawer: normal (0 to 2mm)     MCL - Valgus: normal (0 to 2mm)  LCL - Varus: normal  Patella   Patellar apprehension: negative  Patellar Tracking: normal    Other   Meniscal Cyst: absent  Popliteal (Baker's) Cyst: absent  Sensation: normal    Left Knee Exam   Left knee exam is normal.    Inspection   Erythema: absent  Swelling: absent  Effusion: absent    Range of Motion   The patient has normal left knee ROM.    Tests   Meniscus   Meryl:  Medial - negative Lateral - negative  Stability Lachman: normal (-1 to 2mm) PCL-Posterior Drawer: normal (0 to 2mm)  MCL - Valgus: normal (0 to 2mm)  LCL - Varus: normal (0 to 2mm)  Patella   Patellar apprehension: negative  Patellar Tracking: normal    Other   Sensation: normal    Right Hip Exam   Right hip exam is normal.     Tests   Pain w/ forced internal rotation (MARLEEN): absent  Left Hip Exam   Left hip exam is normal.    Tests   Pain w/ forced internal rotation (MARLEEN): absent          Muscle Strength   Right Lower Extremity   Hip Abduction: 4/5   Hip Adduction: 5/5   Hip Flexion: 5/5   Quadriceps:  5/5   Hamstrin/5   Left Lower Extremity   Hip Abduction: 4/5   Hip Adduction: 5/5   Hip Flexion: 5/5    Quadriceps:  5/5   Hamstrin/5     Reflexes     Left Side  Achilles:  0  Quadriceps:  0    Right Side   Achilles:  0  Quadriceps:  0    Vascular Exam     Right Pulses  Dorsalis Pedis:      2+          Left Pulses  Dorsalis Pedis:      2+              X-ray Knee Ortho Right  Narrative: EXAMINATION:  XR KNEE ORTHO RIGHT    CLINICAL HISTORY:  Pain in right knee    TECHNIQUE:  AP standing of both knees, Merchant views of both knees as well as a lateral view of the right knee were performed.    COMPARISON:  Knee radiographs 2007    FINDINGS:  There is no right knee fracture.  Mild tricompartmental right knee osteoarthritis.  No osseous erosion or suspicious osseous lesion.  No right knee effusion.  No acute soft tissue abnormality identified.  Atherosclerotic calcifications seen posterior to the right knee.    Limited evaluation of the left knee also reveals mild tricompartmental osteoarthritis.  Impression: As above.    Electronically signed by: Amilcar Villalobos  Date:    2022  Time:    10:28            Patient Instructions     Assessment:  Jaimie Luque is a 72 y.o. female   Chief Complaint   Patient presents with    Right Knee - Pain       Weakness of both hips  -     Ambulatory referral/consult to Home Health; Future; Expected date: 2022    Chronic pain of right knee  -     Ambulatory referral/consult to Home Health; Future; Expected date: 2022    Primary osteoarthritis of right knee  -     Ambulatory referral/consult to Home Health; Future; Expected date: 2022      Plan:   She inquires about a consultation to help her set up grab bars in her bathroom and other home modifications since she has trouble getting up from a seated position.   Will order Home Health for a home eval.   She will start water aerobics later this month.   Still not interested in PT or injections.   Doing well on mobic, refill was recently sent in.         Follow-up: As needed    Thank you for  choosing [facility] and Dr. Dawna Palma for your orthopedic & sports medicine care. It is our goal to provide you with exceptional care that will help keep you healthy, active, and get you back in the game.    Please do not hesitate to reach out to us via email, phone, or MyChart with any questions, concerns, or feedback.    If you felt that you received exemplary care today, please consider leaving us feedback on Healthgrades at:  [email or web address]     If you are experiencing pain/discomfort ,or have questions after 5pm and would like to be connected to the [facility] on-call team, please call this number and specify which Sports Medicine provider is treating you: [phone]          Disclaimer: This note was prepared using a voice recognition system and is likely to have sound alike errors within the text.     Dawna Palma M.D.  Sports Medicine

## 2022-06-09 ENCOUNTER — TELEPHONE (OUTPATIENT)
Dept: SPORTS MEDICINE | Facility: CLINIC | Age: 72
End: 2022-06-09
Payer: MEDICARE

## 2022-06-09 ENCOUNTER — PATIENT MESSAGE (OUTPATIENT)
Dept: SPORTS MEDICINE | Facility: CLINIC | Age: 72
End: 2022-06-09
Payer: MEDICARE

## 2022-06-09 DIAGNOSIS — M17.11 PRIMARY OSTEOARTHRITIS OF RIGHT KNEE: ICD-10-CM

## 2022-06-09 DIAGNOSIS — R29.898 WEAKNESS OF BOTH HIPS: Primary | ICD-10-CM

## 2022-06-09 DIAGNOSIS — G89.29 CHRONIC PAIN OF RIGHT KNEE: ICD-10-CM

## 2022-06-09 DIAGNOSIS — M25.561 CHRONIC PAIN OF RIGHT KNEE: ICD-10-CM

## 2022-06-09 NOTE — TELEPHONE ENCOUNTER
ANYAM advising of Dr Reese message below. Pt Feb appt was canceled. Writer advised pt to call clinic back and schedule her FU after neuro psych appt in April        Faxed and received email confirmation of receipt to [facility] for Home Assessment

## 2022-06-10 PROCEDURE — G0180 MD CERTIFICATION HHA PATIENT: HCPCS | Mod: ,,, | Performed by: STUDENT IN AN ORGANIZED HEALTH CARE EDUCATION/TRAINING PROGRAM

## 2022-06-10 PROCEDURE — G0180 PR HOME HEALTH MD CERTIFICATION: ICD-10-PCS | Mod: ,,, | Performed by: STUDENT IN AN ORGANIZED HEALTH CARE EDUCATION/TRAINING PROGRAM

## 2022-06-13 ENCOUNTER — LAB VISIT (OUTPATIENT)
Dept: LAB | Facility: HOSPITAL | Age: 72
End: 2022-06-13
Attending: PHYSICIAN ASSISTANT
Payer: MEDICARE

## 2022-06-13 DIAGNOSIS — Z79.4 TYPE 2 DIABETES MELLITUS WITH HYPERGLYCEMIA, WITH LONG-TERM CURRENT USE OF INSULIN: ICD-10-CM

## 2022-06-13 DIAGNOSIS — E11.65 TYPE 2 DIABETES MELLITUS WITH HYPERGLYCEMIA, WITH LONG-TERM CURRENT USE OF INSULIN: ICD-10-CM

## 2022-06-13 LAB
ESTIMATED AVG GLUCOSE: 192 MG/DL (ref 68–131)
HBA1C MFR BLD: 8.3 % (ref 4–5.6)

## 2022-06-13 PROCEDURE — 36415 COLL VENOUS BLD VENIPUNCTURE: CPT | Performed by: PHYSICIAN ASSISTANT

## 2022-06-13 PROCEDURE — 83036 HEMOGLOBIN GLYCOSYLATED A1C: CPT | Performed by: PHYSICIAN ASSISTANT

## 2022-06-15 ENCOUNTER — TELEPHONE (OUTPATIENT)
Dept: DIABETES | Facility: CLINIC | Age: 72
End: 2022-06-15
Payer: MEDICARE

## 2022-06-15 NOTE — TELEPHONE ENCOUNTER
Called to r/s appt for diabetes education. Currently on July 12 and need to move to another time that day or another day and time. LM with callback number.

## 2022-06-16 ENCOUNTER — TELEPHONE (OUTPATIENT)
Dept: CARDIOLOGY | Facility: CLINIC | Age: 72
End: 2022-06-16
Payer: MEDICARE

## 2022-06-17 ENCOUNTER — PATIENT MESSAGE (OUTPATIENT)
Dept: DIABETES | Facility: CLINIC | Age: 72
End: 2022-06-17

## 2022-06-17 ENCOUNTER — OFFICE VISIT (OUTPATIENT)
Dept: DIABETES | Facility: CLINIC | Age: 72
End: 2022-06-17
Payer: MEDICARE

## 2022-06-17 VITALS
DIASTOLIC BLOOD PRESSURE: 53 MMHG | WEIGHT: 244.25 LBS | SYSTOLIC BLOOD PRESSURE: 93 MMHG | HEIGHT: 67 IN | BODY MASS INDEX: 38.34 KG/M2 | RESPIRATION RATE: 18 BRPM | OXYGEN SATURATION: 96 % | HEART RATE: 86 BPM

## 2022-06-17 DIAGNOSIS — E66.9 OBESITY (BMI 30-39.9): ICD-10-CM

## 2022-06-17 DIAGNOSIS — E78.5 HYPERLIPIDEMIA ASSOCIATED WITH TYPE 2 DIABETES MELLITUS: ICD-10-CM

## 2022-06-17 DIAGNOSIS — G47.33 OSA ON CPAP: ICD-10-CM

## 2022-06-17 DIAGNOSIS — E11.65 UNCONTROLLED TYPE 2 DIABETES MELLITUS WITH HYPERGLYCEMIA: Primary | ICD-10-CM

## 2022-06-17 DIAGNOSIS — E03.9 ACQUIRED HYPOTHYROIDISM: ICD-10-CM

## 2022-06-17 DIAGNOSIS — E11.69 HYPERLIPIDEMIA ASSOCIATED WITH TYPE 2 DIABETES MELLITUS: ICD-10-CM

## 2022-06-17 LAB — GLUCOSE SERPL-MCNC: 129 MG/DL (ref 70–110)

## 2022-06-17 PROCEDURE — 3074F SYST BP LT 130 MM HG: CPT | Mod: CPTII,S$GLB,, | Performed by: PHYSICIAN ASSISTANT

## 2022-06-17 PROCEDURE — 99214 OFFICE O/P EST MOD 30 MIN: CPT | Mod: S$GLB,,, | Performed by: PHYSICIAN ASSISTANT

## 2022-06-17 PROCEDURE — 3052F HG A1C>EQUAL 8.0%<EQUAL 9.0%: CPT | Mod: CPTII,S$GLB,, | Performed by: PHYSICIAN ASSISTANT

## 2022-06-17 PROCEDURE — 3074F PR MOST RECENT SYSTOLIC BLOOD PRESSURE < 130 MM HG: ICD-10-PCS | Mod: CPTII,S$GLB,, | Performed by: PHYSICIAN ASSISTANT

## 2022-06-17 PROCEDURE — 1125F AMNT PAIN NOTED PAIN PRSNT: CPT | Mod: CPTII,S$GLB,, | Performed by: PHYSICIAN ASSISTANT

## 2022-06-17 PROCEDURE — 3288F FALL RISK ASSESSMENT DOCD: CPT | Mod: CPTII,S$GLB,, | Performed by: PHYSICIAN ASSISTANT

## 2022-06-17 PROCEDURE — 3288F PR FALLS RISK ASSESSMENT DOCUMENTED: ICD-10-PCS | Mod: CPTII,S$GLB,, | Performed by: PHYSICIAN ASSISTANT

## 2022-06-17 PROCEDURE — 3078F DIAST BP <80 MM HG: CPT | Mod: CPTII,S$GLB,, | Performed by: PHYSICIAN ASSISTANT

## 2022-06-17 PROCEDURE — 3008F PR BODY MASS INDEX (BMI) DOCUMENTED: ICD-10-PCS | Mod: CPTII,S$GLB,, | Performed by: PHYSICIAN ASSISTANT

## 2022-06-17 PROCEDURE — 82962 POCT GLUCOSE, HAND-HELD DEVICE: ICD-10-PCS | Mod: S$GLB,,, | Performed by: PHYSICIAN ASSISTANT

## 2022-06-17 PROCEDURE — 1125F PR PAIN SEVERITY QUANTIFIED, PAIN PRESENT: ICD-10-PCS | Mod: CPTII,S$GLB,, | Performed by: PHYSICIAN ASSISTANT

## 2022-06-17 PROCEDURE — 3052F PR MOST RECENT HEMOGLOBIN A1C LEVEL 8.0 - < 9.0%: ICD-10-PCS | Mod: CPTII,S$GLB,, | Performed by: PHYSICIAN ASSISTANT

## 2022-06-17 PROCEDURE — 99214 PR OFFICE/OUTPT VISIT, EST, LEVL IV, 30-39 MIN: ICD-10-PCS | Mod: S$GLB,,, | Performed by: PHYSICIAN ASSISTANT

## 2022-06-17 PROCEDURE — 95251 PR GLUCOSE MONITOR, 72 HOUR, PHYS INTERP: ICD-10-PCS | Mod: S$GLB,,, | Performed by: PHYSICIAN ASSISTANT

## 2022-06-17 PROCEDURE — 4010F ACE/ARB THERAPY RXD/TAKEN: CPT | Mod: CPTII,S$GLB,, | Performed by: PHYSICIAN ASSISTANT

## 2022-06-17 PROCEDURE — 95251 CONT GLUC MNTR ANALYSIS I&R: CPT | Mod: S$GLB,,, | Performed by: PHYSICIAN ASSISTANT

## 2022-06-17 PROCEDURE — 99999 PR PBB SHADOW E&M-EST. PATIENT-LVL V: CPT | Mod: PBBFAC,,, | Performed by: PHYSICIAN ASSISTANT

## 2022-06-17 PROCEDURE — 3008F BODY MASS INDEX DOCD: CPT | Mod: CPTII,S$GLB,, | Performed by: PHYSICIAN ASSISTANT

## 2022-06-17 PROCEDURE — 99999 PR PBB SHADOW E&M-EST. PATIENT-LVL V: ICD-10-PCS | Mod: PBBFAC,,, | Performed by: PHYSICIAN ASSISTANT

## 2022-06-17 PROCEDURE — 4010F PR ACE/ARB THEARPY RXD/TAKEN: ICD-10-PCS | Mod: CPTII,S$GLB,, | Performed by: PHYSICIAN ASSISTANT

## 2022-06-17 PROCEDURE — 1101F PT FALLS ASSESS-DOCD LE1/YR: CPT | Mod: CPTII,S$GLB,, | Performed by: PHYSICIAN ASSISTANT

## 2022-06-17 PROCEDURE — 3078F PR MOST RECENT DIASTOLIC BLOOD PRESSURE < 80 MM HG: ICD-10-PCS | Mod: CPTII,S$GLB,, | Performed by: PHYSICIAN ASSISTANT

## 2022-06-17 PROCEDURE — 1101F PR PT FALLS ASSESS DOC 0-1 FALLS W/OUT INJ PAST YR: ICD-10-PCS | Mod: CPTII,S$GLB,, | Performed by: PHYSICIAN ASSISTANT

## 2022-06-17 PROCEDURE — 82962 GLUCOSE BLOOD TEST: CPT | Mod: S$GLB,,, | Performed by: PHYSICIAN ASSISTANT

## 2022-06-17 RX ORDER — SEMAGLUTIDE 2.68 MG/ML
2 INJECTION, SOLUTION SUBCUTANEOUS
Qty: 9 ML | Refills: 3 | Status: SHIPPED | OUTPATIENT
Start: 2022-06-17 | End: 2022-06-17 | Stop reason: ALTCHOICE

## 2022-06-17 RX ORDER — DULAGLUTIDE 4.5 MG/.5ML
4.5 INJECTION, SOLUTION SUBCUTANEOUS WEEKLY
Qty: 12 PEN | Refills: 3 | Status: SHIPPED | OUTPATIENT
Start: 2022-06-17 | End: 2022-07-13 | Stop reason: ALTCHOICE

## 2022-06-17 NOTE — PATIENT INSTRUCTIONS
CURRENT DM MEDICATIONS:   Lantus 16 units daily qhs - change to 12 units with Ozempic   Trulicity 4.5 mg weekly - will attempt to change Ozempic 2 mg weekly  Glipizide 5 mg before bed time snack   Humalog 8 units with small / 10 units with regular / 14 units with heavier carb meal TID wm  Humalog correction dosing every 3 hours    If able to start Ozempic today:   Change Humalog 6 units with small / 8 units with regular / 12 units with heavier carb meal TID wm    Humalog Correction Dosing every 3 hours as needed OUTSIDE OF EATING  If  - 250, may take 2 units of Humalog  If  - 300, may take 3 units of Humalog   If  - 350, may take 4 units of Humalog  If  - 400, may take 5 units of Humalog  If +, may take 6 units of Humalog

## 2022-06-17 NOTE — PROGRESS NOTES
PCP: Jessica Luna MD    Subjective:     Chief Complaint: Diabetes - Established Patient    HISTORY OF PRESENT ILLNESS: 72 y.o.   female presenting for diabetes management visit.   The patient's last visit with me was on 3/16/2022.  Patient has had Type II diabetes since 20 or more years.  Pertinent to decision making is the following comorbidities: HLD, Hypothyroidism and Obesity by BMI  Patient has the following Diabetes complications: without complications  She has attended diabetes education in the past.     Patient's most recent A1c of 8.3% was completed 1 weeks ago.   Patient states since Her last A1c Her blood glucose levels have been high  throughout the day .   Patient monitors blood glucose 4 times per day and Continuously with personal CGM Dexcom.   Patient blood glucose monitoring device will be uploaded into [employer] today.      Patients records show some baseline near hypoglycemia throughout the day with occasional postprandial hyperglycemia.     Patient endorses the following diabetes related symptoms: None.   Patient is due today for the following diabetes-related health maintenance standards: None - up to date.   She denies any recent hospital admissions or emergency room visits. Patient denies history of DKA.   She denies having hypoglycemia.   Patient's concerns today include glycemic control.   Patient medication regimen is as below.      CURRENT DM MEDICATIONS:    Lantus 18 units daily qhs   Trulicity 4.5 mg weekly    Glipizide 5 mg before bed time snack    Humalog 8 units with small / 10 units with regular / 14 units with heavier carb meal TID wm    Patient has failed the following Diabetes medications:    Metformin - GI    Invokana - coverage   Jardiance - yeast infection   Glyburide    Victoza    Basal - Basaglar      Labs Reviewed.       Lab Results   Component Value Date    CPEPTIDE 2.35 04/01/2021     Lab Results   Component Value Date    GLUTAMICACID 0.00  "05/25/2017       Height: 5' 7" (170.2 cm)  //  Weight: 110.8 kg (244 lb 4.3 oz), Body mass index is 38.26 kg/m².  Her blood sugar in clinic today is:    Lab Results   Component Value Date    POCGLU 129 (A) 06/17/2022       Review of Systems   Constitutional: Negative for activity change, appetite change, chills and fever.   HENT: Negative for dental problem, mouth sores, nosebleeds, sore throat and trouble swallowing.    Eyes: Negative for pain and discharge.   Respiratory: Negative for shortness of breath, wheezing and stridor.    Cardiovascular: Negative for chest pain, palpitations and leg swelling.   Gastrointestinal: Negative for abdominal pain, diarrhea, nausea and vomiting.   Endocrine: Negative for polydipsia, polyphagia and polyuria.   Genitourinary: Negative for dysuria, frequency and urgency.   Musculoskeletal: Negative for joint swelling and myalgias.   Skin: Negative for rash and wound.   Neurological: Negative for dizziness, syncope, weakness and headaches.   Psychiatric/Behavioral: Negative for behavioral problems and dysphoric mood.         Diabetes Management Status  Statin: Taking  ACE/ARB: Taking    Screening or Prevention Patient's value Goal Complete/Controlled?   HgA1C Testing and Control   Lab Results   Component Value Date    HGBA1C 8.3 (H) 06/13/2022      Annually/Less than 8% No   Lipid profile : 09/15/2021 Annually Yes   LDL control Lab Results   Component Value Date    LDLCALC 61.4 (L) 09/15/2021    Annually/Less than 100 mg/dl  Yes   Nephropathy screening Lab Results   Component Value Date    MICALBCREAT Unable to calculate 09/15/2021     Lab Results   Component Value Date    PROTEINUA Negative 04/01/2021    Annually Yes   Blood pressure BP Readings from Last 1 Encounters:   05/26/22 130/66    Less than 140/90 Yes   Dilated retinal exam : 02/09/2022 Annually Yes    Foot exam   : 09/22/2021 Annually Yes     ACTIVITY LEVEL: Moderately Active. Discussed activities, benefits, methods, and " precautions.  MEAL PLANNING: Patient reports number of meals per day to be 3 and number of snacks per day to be 2.   Patient is encouraged to carb count and consume no more than 30 - 45 grams of carbohydrates in each meal and 15 grams of carbohydrates in each snack.     Social History     Socioeconomic History    Marital status:    Tobacco Use    Smoking status: Never Smoker    Smokeless tobacco: Never Used   Substance and Sexual Activity    Alcohol use: Yes     Comment: rare    Drug use: No    Sexual activity: Yes     Partners: Male   Social History Narrative    . Lives with spouse. Has 3 children. Patient retired as  for [employer].      Social Determinants of Health     Financial Resource Strain: Low Risk     Difficulty of Paying Living Expenses: Not hard at all   Food Insecurity: No Food Insecurity    Worried About Running Out of Food in the Last Year: Never true    Ran Out of Food in the Last Year: Never true   Transportation Needs: No Transportation Needs    Lack of Transportation (Medical): No    Lack of Transportation (Non-Medical): No   Physical Activity: Insufficiently Active    Days of Exercise per Week: 3 days    Minutes of Exercise per Session: 20 min   Stress: No Stress Concern Present    Feeling of Stress : Not at all   Social Connections: Unknown    Frequency of Communication with Friends and Family: Once a week    Frequency of Social Gatherings with Friends and Family: More than three times a week    Active Member of Clubs or Organizations: Yes    Attends Club or Organization Meetings: More than 4 times per year    Marital Status:    Housing Stability: Low Risk     Unable to Pay for Housing in the Last Year: No    Number of Places Lived in the Last Year: 1    Unstable Housing in the Last Year: No     Past Medical History:   Diagnosis Date    Diabetes mellitus type II     Hyperlipidemia     Hypertension     Hypothyroid     TALIA  (obstructive sleep apnea)     on CPAP    Osteopenia     Psoriasis        Objective:        Physical Exam  Constitutional:       General: She is not in acute distress.     Appearance: She is well-developed. She is not diaphoretic.   HENT:      Head: Normocephalic and atraumatic.      Right Ear: External ear normal.      Left Ear: External ear normal.      Nose: Nose normal.   Eyes:      General:         Right eye: No discharge.         Left eye: No discharge.      Pupils: Pupils are equal, round, and reactive to light.   Cardiovascular:      Rate and Rhythm: Normal rate and regular rhythm.      Heart sounds: Normal heart sounds.   Pulmonary:      Effort: Pulmonary effort is normal.      Breath sounds: Normal breath sounds.   Abdominal:      Palpations: Abdomen is soft.   Musculoskeletal:         General: Normal range of motion.      Cervical back: Normal range of motion and neck supple.   Skin:     General: Skin is warm and dry.      Capillary Refill: Capillary refill takes less than 2 seconds.   Neurological:      Mental Status: She is alert and oriented to person, place, and time. Mental status is at baseline.      Motor: No abnormal muscle tone.      Coordination: Coordination normal.   Psychiatric:         Mood and Affect: Mood normal.         Behavior: Behavior normal.         Thought Content: Thought content normal.         Judgment: Judgment normal.           Assessment / Plan:     Uncontrolled type 2 diabetes mellitus with hyperglycemia  -     POCT Glucose, Hand-Held Device  -     Discontinue: semaglutide (OZEMPIC) 2 mg/dose (8 mg/3 mL) PnIj; Inject 2 mg into the skin every 7 days.  Dispense: 9 mL; Refill: 3  -     dulaglutide (TRULICITY) 4.5 mg/0.5 mL pen injector; Inject 4.5 mg into the skin once a week.  Dispense: 12 pen; Refill: 3    Hyperlipidemia associated with type 2 diabetes mellitus    Acquired hypothyroidism    TALIA on CPAP    Obesity (BMI 30-39.9)      Additional Plan Details:    - POCT  Glucose  - Encouraged continuation of lifestyle changes including regular exercise and limiting carbohydrates to 30-45 grams per meal threes times daily and 15 grams per snack with a limit of two daily.   - Encouraged continued monitoring of blood glucose with maintenance of 4 times daily and Continuously with personal CGM Dexcom.    - Current DM Medication Regimen: Change Lantus 16 units daily and then 12 units daily with Ozempic change. Change Trulicity to Ozempic 2 mg weekly. Continue Glipizide 5 mg before night time snack if eating one. Change Humalog 8 units with small / 10 units with regular / 14 units with heavier carb meal TID wm and to 6 units with small / 8 units with regular / 12 units with heavier carb meal TID wm with Ozempic change. ADJUST INSULIN FREQUENTLY BASED ON BLOOD SUGAR.   - Health Maintenance standards addressed today: COVID - 19 Vaccine - booster sched and Mammogram scheduled  - Nursing Visit: Patient is age 79 or younger with an A1c of 7.5 or greater and qualifies for nursing visit, but will defer for now. .   - Follow up in 2 months with A1c prior.     Humalog Correction Dosing every 3 hours as needed OUTSIDE OF EATING  If  - 250, may take 2 units of Humalog  If  - 300, may take 3 units of Humalog   If  - 350, may take 4 units of Humalog  If  - 400, may take 5 units of Humalog    If +, may take 6 units of Humalog     Blakeney McKnight, PA-C [facility]        A total of 30 minutes was spent in face to face time, of which over 50 % was spent in counseling patient on disease process, complications, treatment, and side effects of medications.

## 2022-06-21 ENCOUNTER — PATIENT MESSAGE (OUTPATIENT)
Dept: DIABETES | Facility: CLINIC | Age: 72
End: 2022-06-21
Payer: MEDICARE

## 2022-06-22 ENCOUNTER — OFFICE VISIT (OUTPATIENT)
Dept: CARDIOLOGY | Facility: CLINIC | Age: 72
End: 2022-06-22
Payer: MEDICARE

## 2022-06-22 VITALS
SYSTOLIC BLOOD PRESSURE: 134 MMHG | OXYGEN SATURATION: 98 % | BODY MASS INDEX: 38.03 KG/M2 | WEIGHT: 242.31 LBS | DIASTOLIC BLOOD PRESSURE: 70 MMHG | HEART RATE: 92 BPM | HEIGHT: 67 IN

## 2022-06-22 DIAGNOSIS — E11.65 UNCONTROLLED TYPE 2 DIABETES MELLITUS WITH HYPERGLYCEMIA: ICD-10-CM

## 2022-06-22 DIAGNOSIS — E11.69 HYPERLIPIDEMIA ASSOCIATED WITH TYPE 2 DIABETES MELLITUS: ICD-10-CM

## 2022-06-22 DIAGNOSIS — E66.01 SEVERE OBESITY WITH BODY MASS INDEX (BMI) OF 35.0 TO 35.9 AND COMORBIDITY: ICD-10-CM

## 2022-06-22 DIAGNOSIS — E03.9 ACQUIRED HYPOTHYROIDISM: ICD-10-CM

## 2022-06-22 DIAGNOSIS — E78.5 HYPERLIPIDEMIA ASSOCIATED WITH TYPE 2 DIABETES MELLITUS: ICD-10-CM

## 2022-06-22 DIAGNOSIS — R94.31 ABNORMAL EKG: ICD-10-CM

## 2022-06-22 DIAGNOSIS — R91.8 MULTIPLE LUNG NODULES ON CT: Primary | ICD-10-CM

## 2022-06-22 PROCEDURE — 3288F FALL RISK ASSESSMENT DOCD: CPT | Mod: CPTII,S$GLB,, | Performed by: INTERNAL MEDICINE

## 2022-06-22 PROCEDURE — 3052F PR MOST RECENT HEMOGLOBIN A1C LEVEL 8.0 - < 9.0%: ICD-10-PCS | Mod: CPTII,S$GLB,, | Performed by: INTERNAL MEDICINE

## 2022-06-22 PROCEDURE — 99204 OFFICE O/P NEW MOD 45 MIN: CPT | Mod: S$GLB,,, | Performed by: INTERNAL MEDICINE

## 2022-06-22 PROCEDURE — 1159F MED LIST DOCD IN RCRD: CPT | Mod: CPTII,S$GLB,, | Performed by: INTERNAL MEDICINE

## 2022-06-22 PROCEDURE — 99999 PR PBB SHADOW E&M-EST. PATIENT-LVL V: ICD-10-PCS | Mod: PBBFAC,,, | Performed by: INTERNAL MEDICINE

## 2022-06-22 PROCEDURE — 1126F AMNT PAIN NOTED NONE PRSNT: CPT | Mod: CPTII,S$GLB,, | Performed by: INTERNAL MEDICINE

## 2022-06-22 PROCEDURE — 1126F PR PAIN SEVERITY QUANTIFIED, NO PAIN PRESENT: ICD-10-PCS | Mod: CPTII,S$GLB,, | Performed by: INTERNAL MEDICINE

## 2022-06-22 PROCEDURE — 3075F SYST BP GE 130 - 139MM HG: CPT | Mod: CPTII,S$GLB,, | Performed by: INTERNAL MEDICINE

## 2022-06-22 PROCEDURE — 4010F PR ACE/ARB THEARPY RXD/TAKEN: ICD-10-PCS | Mod: CPTII,S$GLB,, | Performed by: INTERNAL MEDICINE

## 2022-06-22 PROCEDURE — 1101F PR PT FALLS ASSESS DOC 0-1 FALLS W/OUT INJ PAST YR: ICD-10-PCS | Mod: CPTII,S$GLB,, | Performed by: INTERNAL MEDICINE

## 2022-06-22 PROCEDURE — 1101F PT FALLS ASSESS-DOCD LE1/YR: CPT | Mod: CPTII,S$GLB,, | Performed by: INTERNAL MEDICINE

## 2022-06-22 PROCEDURE — 3008F BODY MASS INDEX DOCD: CPT | Mod: CPTII,S$GLB,, | Performed by: INTERNAL MEDICINE

## 2022-06-22 PROCEDURE — 99999 PR PBB SHADOW E&M-EST. PATIENT-LVL V: CPT | Mod: PBBFAC,,, | Performed by: INTERNAL MEDICINE

## 2022-06-22 PROCEDURE — 1159F PR MEDICATION LIST DOCUMENTED IN MEDICAL RECORD: ICD-10-PCS | Mod: CPTII,S$GLB,, | Performed by: INTERNAL MEDICINE

## 2022-06-22 PROCEDURE — 3008F PR BODY MASS INDEX (BMI) DOCUMENTED: ICD-10-PCS | Mod: CPTII,S$GLB,, | Performed by: INTERNAL MEDICINE

## 2022-06-22 PROCEDURE — 3078F DIAST BP <80 MM HG: CPT | Mod: CPTII,S$GLB,, | Performed by: INTERNAL MEDICINE

## 2022-06-22 PROCEDURE — 3052F HG A1C>EQUAL 8.0%<EQUAL 9.0%: CPT | Mod: CPTII,S$GLB,, | Performed by: INTERNAL MEDICINE

## 2022-06-22 PROCEDURE — 3288F PR FALLS RISK ASSESSMENT DOCUMENTED: ICD-10-PCS | Mod: CPTII,S$GLB,, | Performed by: INTERNAL MEDICINE

## 2022-06-22 PROCEDURE — 3075F PR MOST RECENT SYSTOLIC BLOOD PRESS GE 130-139MM HG: ICD-10-PCS | Mod: CPTII,S$GLB,, | Performed by: INTERNAL MEDICINE

## 2022-06-22 PROCEDURE — 3078F PR MOST RECENT DIASTOLIC BLOOD PRESSURE < 80 MM HG: ICD-10-PCS | Mod: CPTII,S$GLB,, | Performed by: INTERNAL MEDICINE

## 2022-06-22 PROCEDURE — 4010F ACE/ARB THERAPY RXD/TAKEN: CPT | Mod: CPTII,S$GLB,, | Performed by: INTERNAL MEDICINE

## 2022-06-22 PROCEDURE — 99204 PR OFFICE/OUTPT VISIT, NEW, LEVL IV, 45-59 MIN: ICD-10-PCS | Mod: S$GLB,,, | Performed by: INTERNAL MEDICINE

## 2022-06-22 NOTE — PROGRESS NOTES
Subjective:   Patient ID:  Jaimie Luque is a 72 y.o. female who presents for evaluation of No chief complaint on file.   This is a pleasant patient presenting to the office today with evidence of hypertension, diabetes obesity and sleep apnea.  The patient had recent colonoscopy without a problem.  EKG showed left axis deviation she is concerned about the meaning of this.  Overall stable no exertional symptoms.    EKG does not show any ST T wave changes.  Patient has no exertional chest pain or shortness of breath.  Lung exam and physical exam findings today are normal.  We will plan on follow-up in a year for review if there are any changes she will let us know.  Otherwise stable.  Intermediate cardiovascular risk but the patient is already on atorvastatin and aspirin therapy.  She will continue all medications.    Family History   Problem Relation Age of Onset    Heart disease Father     Diabetes Brother     Melanoma Neg Hx     Lupus Neg Hx      Past Medical History:   Diagnosis Date    Diabetes mellitus type II     Hyperlipidemia     Hypertension     Hypothyroid     TALIA (obstructive sleep apnea)     on CPAP    Osteopenia     Psoriasis      Social History     Socioeconomic History    Marital status:    Tobacco Use    Smoking status: Never Smoker    Smokeless tobacco: Never Used   Substance and Sexual Activity    Alcohol use: Yes     Comment: rare    Drug use: No    Sexual activity: Yes     Partners: Male   Social History Narrative    . Lives with spouse. Has 3 children. Patient retired as  for [employer].      Social Determinants of Health     Financial Resource Strain: Low Risk     Difficulty of Paying Living Expenses: Not hard at all   Food Insecurity: No Food Insecurity    Worried About Running Out of Food in the Last Year: Never true    Ran Out of Food in the Last Year: Never true   Transportation Needs: No Transportation Needs    Lack of Transportation  (Medical): No    Lack of Transportation (Non-Medical): No   Physical Activity: Insufficiently Active    Days of Exercise per Week: 1 day    Minutes of Exercise per Session: 10 min   Stress: No Stress Concern Present    Feeling of Stress : Only a little   Social Connections: Unknown    Frequency of Communication with Friends and Family: Once a week    Frequency of Social Gatherings with Friends and Family: More than three times a week    Active Member of Clubs or Organizations: Yes    Attends Club or Organization Meetings: More than 4 times per year    Marital Status:    Housing Stability: Low Risk     Unable to Pay for Housing in the Last Year: No    Number of Places Lived in the Last Year: 1    Unstable Housing in the Last Year: No     Current Outpatient Medications on File Prior to Visit   Medication Sig Dispense Refill    aspirin 81 mg Tab Take by mouth. 1 Tablet Oral 2 times weekly       atorvastatin (LIPITOR) 20 MG tablet Take 1 tablet (20 mg total) by mouth once daily. 90 tablet 3    blood sugar diagnostic Strp To check BG 2 times daily, to use with AccuChek Varsha Plus  Dx:  E11.9 200 strip 3    blood-glucose meter,continuous (DEXCOM G6 ) Misc Use daily. 1 each 0    blood-glucose sensor (DEXCOM G6 SENSOR) Gretta Use 1 every 10 days. 9 each 3    blood-glucose transmitter (DEXCOM G6 TRANSMITTER) Gretta Use 1 every 90 days. 1 each 3    cholecalciferol, vitamin D3, 2,000 unit Tab Take by mouth. 1 Tablet Oral Every day      diphenhydrAMINE-acetaminophen (TYLENOL PM)  mg Tab Take 1 tablet by mouth nightly as needed. 30 tablet 11    dulaglutide (TRULICITY) 4.5 mg/0.5 mL pen injector Inject 4.5 mg into the skin once a week. 12 pen 3    FLUAD QUAD 2021-22,65Y UP,,PF, 60 mcg (15 mcg x 4)/0.5 mL Syrg       gabapentin (NEURONTIN) 300 MG capsule TAKE 1 CAPSULE BY MOUTH THREE TIMES DAILY 270 capsule 3    glipiZIDE (GLUCOTROL) 5 MG tablet Take 1 tablet (5 mg total) by mouth once  "daily. 90 tablet 3    insulin (LANTUS SOLOSTAR U-100 INSULIN) glargine 100 units/mL (3mL) SubQ pen Inject 18 Units into the skin once daily. 18 mL 3    insulin lispro 100 unit/mL pen Inject 12 Units into the skin 3 (three) times daily with meals. 33 mL 3    lancets (ACCU-CHEK SOFTCLIX LANCETS) Misc Check blood sugar twice daily.  Accuchek Multiclix drum lancets, [facility]  Dx:  E11.9 200 each 3    levothyroxine (SYNTHROID) 100 MCG tablet TAKE 1 TABLET(100 MCG) BY MOUTH BEFORE BREAKFAST 90 tablet 3    lisinopriL (PRINIVIL,ZESTRIL) 2.5 MG tablet TAKE 1 TABLET(2.5 MG) BY MOUTH EVERY DAY 90 tablet 3    meclizine (ANTIVERT) 25 mg tablet Take 1 tablet by mouth as needed.       meloxicam (MOBIC) 15 MG tablet TAKE 1 TABLET(15 MG) BY MOUTH EVERY DAY 30 tablet 2    pen needle, diabetic (BD ULTRA-FINE LAURA PEN NEEDLE) 32 gauge x 5/32" Ndle 1 each by Misc.(Non-Drug; Combo Route) route 4 (four) times daily with meals and nightly. 300 each 3     No current facility-administered medications on file prior to visit.     Review of patient's allergies indicates:   Allergen Reactions    Adhesive tape-silicones      Other reaction(s): skin irritation to area    Penicillins        Review of Systems   Constitutional: Negative for chills, diaphoresis, night sweats, weight gain and weight loss.   HENT: Negative for congestion, hoarse voice, sore throat and stridor.    Eyes: Negative for double vision and pain.   Cardiovascular: Negative for chest pain, claudication, cyanosis, dyspnea on exertion, irregular heartbeat, leg swelling, near-syncope, orthopnea, palpitations, paroxysmal nocturnal dyspnea and syncope.   Respiratory: Negative for cough, hemoptysis, shortness of breath, sleep disturbances due to breathing, snoring, sputum production and wheezing.    Endocrine: Negative for cold intolerance, heat intolerance and polydipsia.   Hematologic/Lymphatic: Negative for bleeding problem. Does not bruise/bleed easily.   Skin: " Negative for color change, dry skin and rash.   Musculoskeletal: Negative for joint swelling and muscle cramps.   Gastrointestinal: Negative for bloating, abdominal pain, constipation, diarrhea, dysphagia, melena, nausea and vomiting.   Genitourinary: Negative for flank pain and urgency.   Neurological: Negative for dizziness, focal weakness, headaches, light-headedness, loss of balance, seizures and weakness.   Psychiatric/Behavioral: Negative for altered mental status and memory loss. The patient is not nervous/anxious.          Objective:     Physical Exam  Eyes:      Pupils: Pupils are equal, round, and reactive to light.   Neck:      Trachea: No tracheal deviation.   Cardiovascular:      Rate and Rhythm: Normal rate and regular rhythm.      Pulses: Intact distal pulses.           Carotid pulses are 2+ on the right side and 2+ on the left side.       Radial pulses are 2+ on the right side and 2+ on the left side.        Femoral pulses are 2+ on the right side and 2+ on the left side.       Popliteal pulses are 2+ on the right side and 2+ on the left side.        Dorsalis pedis pulses are 2+ on the right side and 2+ on the left side.        Posterior tibial pulses are 2+ on the right side and 2+ on the left side.      Heart sounds: Normal heart sounds. No murmur heard.    No friction rub. No gallop.   Pulmonary:      Effort: Pulmonary effort is normal. No respiratory distress.      Breath sounds: Normal breath sounds. No stridor. No wheezing or rales.   Chest:      Chest wall: No tenderness.   Abdominal:      General: There is no distension.      Tenderness: There is no abdominal tenderness. There is no rebound.   Musculoskeletal:         General: No tenderness.      Cervical back: Normal range of motion.   Skin:     General: Skin is warm and dry.   Neurological:      Mental Status: She is alert and oriented to person, place, and time.           EKG sinus rhythm with left axis deviation no changesThis EKG was  personally reviewed by myself, I agree with the interpretation..  No ST T wave changes.   Assessment:     1. Multiple lung nodules on CT    2. Hyperlipidemia associated with type 2 diabetes mellitus    3. Acquired hypothyroidism    4. Severe obesity with body mass index (BMI) of 35.0 to 35.9 and comorbidity    5. Uncontrolled type 2 diabetes mellitus with hyperglycemia          Plan:   Impression 1. Hyperlipidemia stable and treated.  On atorvastatin.  2. Hypertension stable and treated will continue on lisinopril.  3. Intermediate cardiovascular risk the patient is treated with aspirin and atorvastatin will continue follow blood pressure control blood sugar control overall stable and she understands the risk of ongoing increased blood sugars.  Patient also needs to lose weight and continue on sleep apnea protocol.  Follow-up evaluation 1 year for review sooner if there are acute recurrence symptoms no reason to do any testing further this time or would change therapy.

## 2022-06-23 ENCOUNTER — EXTERNAL HOME HEALTH (OUTPATIENT)
Dept: HOME HEALTH SERVICES | Facility: HOSPITAL | Age: 72
End: 2022-06-23
Payer: MEDICARE

## 2022-06-23 ENCOUNTER — HOSPITAL ENCOUNTER (OUTPATIENT)
Dept: RADIOLOGY | Facility: HOSPITAL | Age: 72
Discharge: HOME | End: 2022-06-23
Attending: INTERNAL MEDICINE
Payer: MEDICARE

## 2022-06-23 DIAGNOSIS — Z12.31 ENCOUNTER FOR SCREENING MAMMOGRAM FOR MALIGNANT NEOPLASM OF BREAST: ICD-10-CM

## 2022-06-23 PROCEDURE — 77063 MAMMO DIGITAL SCREENING BILAT WITH TOMO: ICD-10-PCS | Mod: 26,,, | Performed by: RADIOLOGY

## 2022-06-23 PROCEDURE — 77063 BREAST TOMOSYNTHESIS BI: CPT | Mod: 26,,, | Performed by: RADIOLOGY

## 2022-06-23 PROCEDURE — 77067 SCR MAMMO BI INCL CAD: CPT | Mod: 26,,, | Performed by: RADIOLOGY

## 2022-06-23 PROCEDURE — 77067 MAMMO DIGITAL SCREENING BILAT WITH TOMO: ICD-10-PCS | Mod: 26,,, | Performed by: RADIOLOGY

## 2022-06-23 PROCEDURE — 77063 BREAST TOMOSYNTHESIS BI: CPT | Mod: TC

## 2022-06-28 ENCOUNTER — IMMUNIZATION (OUTPATIENT)
Dept: PHARMACY | Facility: CLINIC | Age: 72
End: 2022-06-28
Payer: MEDICARE

## 2022-06-28 DIAGNOSIS — Z23 NEED FOR VACCINATION: Primary | ICD-10-CM

## 2022-07-07 ENCOUNTER — PATIENT MESSAGE (OUTPATIENT)
Dept: DIABETES | Facility: CLINIC | Age: 72
End: 2022-07-07
Payer: MEDICARE

## 2022-07-13 ENCOUNTER — CLINICAL SUPPORT (OUTPATIENT)
Dept: DIABETES | Facility: CLINIC | Age: 72
End: 2022-07-13
Payer: MEDICARE

## 2022-07-13 VITALS — BODY MASS INDEX: 37.72 KG/M2 | HEIGHT: 67 IN | WEIGHT: 240.31 LBS

## 2022-07-13 DIAGNOSIS — E11.65 UNCONTROLLED TYPE 2 DIABETES MELLITUS WITH HYPERGLYCEMIA: Primary | ICD-10-CM

## 2022-07-13 PROCEDURE — G0108 DIAB MANAGE TRN  PER INDIV: HCPCS | Mod: S$GLB,,, | Performed by: DIETITIAN, REGISTERED

## 2022-07-13 PROCEDURE — 99999 PR PBB SHADOW E&M-EST. PATIENT-LVL II: ICD-10-PCS | Mod: PBBFAC,,, | Performed by: DIETITIAN, REGISTERED

## 2022-07-13 PROCEDURE — G0108 PR DIAB MANAGE TRN  PER INDIV: ICD-10-PCS | Mod: S$GLB,,, | Performed by: DIETITIAN, REGISTERED

## 2022-07-13 PROCEDURE — 99999 PR PBB SHADOW E&M-EST. PATIENT-LVL II: CPT | Mod: PBBFAC,,, | Performed by: DIETITIAN, REGISTERED

## 2022-07-13 RX ORDER — SEMAGLUTIDE 2.68 MG/ML
2 INJECTION, SOLUTION SUBCUTANEOUS WEEKLY
Qty: 3 ML | Refills: 5 | Status: SHIPPED | OUTPATIENT
Start: 2022-07-13 | End: 2022-10-25 | Stop reason: SDUPTHER

## 2022-07-13 NOTE — PROGRESS NOTES
"Diabetes Care Specialist Progress Note  Author: Aminata Mack, LU  Date: 7/13/2022    Program Intake  Reason for Diabetes Program Visit:: Intervention  Type of Intervention:: Individual  Individual: Education  Education: Self-Management Skill Review, Nutrition and Meal Planning    Lab Results   Component Value Date    HGBA1C 8.3 (H) 06/13/2022     CURRENT DM MEDICATIONS:   · Glipizide, 5 mg - only with heavy snack at night (rarely taking)  · Trulicity, 4.5 mg weekly   · Lantus, 16 units mid afternoon   · Humalog, 10-14 units AC - based on meals               10 units with small / 12 units with regular / 14 units with heavier carb meal TID      Clinical    Weight: 109 kg (240 lb 4.8 oz)   Height: 5' 7" (170.2 cm)   Body mass index is 37.64 kg/m².  Wt gain of 2 lbs since last visit on 4/8/2022    Nutritional Status  Meal Plan 24 Hour Recall - Breakfast: strawberries, grapes, cantaloupe and pecans, and a few malted milk balls; watger  Meal Plan 24 Hour Recall - Lunch: spaghetti and meatballs; water  Meal Plan 24 Hour Recall - Dinner: sausage, egg and chees croissant; whole milk     Physical activity/Exercise:   Pool aerobics twice a week   Ends in Sept (7 wk program) in an outside pool     SMBG: using Dexcom CGM     Clarity report in media:  Reporting period: June 30 - July 13  Device used:     Sensor usage: 100%  Days with CGM data: 14/14  -----------------------------  Glucose Details  Average glucose: 171 mg/dL  Standard deviation: 43 mg/dL  -----------------------------  65% Time in range   29% High  5% Very high  <1% Low  0% Very low  -----------------------------    Obeservations/patterns:    Fasting BG in the 150s-170s   Some variance in postprandial response    Pt stated that she sometimes takes Humalog 5 min before eating rather than 15 min before       Today's interventions were provided through individual discussion, instruction, and written materials were provided.      Patient verbalized " understanding of instruction and written materials.  Pt was able to return back demonstration of instructions today. Patient understood key points, needs reinforcement and further instruction.     Diabetes Self-Management Care Plan:    Today's Diabetes Self-Management Care Plan was developed with Jaimie's input. Jaimie has agreed to work toward the following goal(s) to improve his/her overall diabetes control.      Care Plan: Diabetes Management   Updates made since 6/13/2022 12:00 AM      Problem: Healthy Eating       Goal: Eat 3 meals daily with 30-45g/2-3 servings of Carbohydrate per meal and snacks should include <15 grams of carb.    Start Date: 4/18/2022   Expected End Date: 10/18/2022   This Visit's Progress: On track   Priority: Medium   Barriers: No Barriers Identified   Note:    Pt is limiting carb intake  Discussed how to determine size of meal for insulin dosing.   Small meal - 2 carb servings  Regular meal - 3-4 carb servings  Heavy meal - 5+ servings     Problem: Medications       Goal: Patient Agrees to take Diabetes Medication(s) as prescribed.    Start Date: 4/18/2022   Expected End Date: 10/18/2022   This Visit's Progress: On track   Priority: High   Barriers: No Barriers Identified   Note:    Prior authorization for Ozempic was approved but pt needs to pick Rx up.   Asked Lucero Mckay to send Rx to the [place] pharmacy since it was not noted in med list. Gave pt a coupon card for Ozempic.   Discussed Lorraine Holliday's instructions for insulin with start of Ozempic          Follow Up Plan     Follow up in about 3 months (around 10/13/2022).    Today's care plan and follow up schedule was discussed with patient.  Jaimie verbalized understanding of the care plan, goals, and agrees to follow up plan.        The patient was encouraged to communicate with his/her health care provider/physician and care team regarding his/her condition(s) and treatment.  I provided the patient with my contact information  today and encouraged to contact me via phone or [facility] Patient Portal as needed.     Length of Visit   Total Time: 60 Minutes

## 2022-07-17 ENCOUNTER — PATIENT MESSAGE (OUTPATIENT)
Dept: DIABETES | Facility: CLINIC | Age: 72
End: 2022-07-17
Payer: MEDICARE

## 2022-07-18 RX ORDER — DULAGLUTIDE 4.5 MG/.5ML
4.5 INJECTION, SOLUTION SUBCUTANEOUS
Qty: 4 PEN | Refills: 11 | Status: SHIPPED | OUTPATIENT
Start: 2022-07-18 | End: 2022-10-28 | Stop reason: SDUPTHER

## 2022-07-20 NOTE — PROGRESS NOTES
Subjective:      Patient ID: Jaimie Luque is a 72 y.o. female.    Chief Complaint: Pre-op Exam      HPI  Patient is here for follow up of medical problems and preoperative evaluation for cataract surgery with Dr. Pierce, 8/10/22.  No history of problems with anesthesia.  No bleeding diathesis.  No exertional cp/sob/palp.  No significant renal or hepatic disease.  On lantus 18u and prandial insulin, trulicity, prn glipizide.  Doing water aerobics for exercise, no wt loss.      Past Medical History:   Diagnosis Date    Diabetes mellitus type II     Hyperlipidemia     Hypertension     Hypothyroid     TALIA (obstructive sleep apnea)     on CPAP    Osteopenia     Psoriasis      Past Surgical History:   Procedure Laterality Date    COLONOSCOPY N/A 5/2/2017    Procedure: COLONOSCOPY;  Surgeon: Noe Penn III, MD;  [facility];  Service: Endoscopy;  Laterality: N/A;    COLONOSCOPY N/A 5/4/2022    Procedure: COLONOSCOPY;  Surgeon: Elodia Swain MD;  Location: [facility] ENDO;  Service: Endoscopy;  Laterality: N/A;    HYSTERECTOMY      OOPHORECTOMY      ROBOTIC ASSISTED HYSTERECTOMY       MEDICATION:  lisinopriL (PRINIVIL,ZESTRIL) 2.5 MG tablet      levothyroxine (SYNTHROID) 100 MCG tablet      lancets (ACCU-CHEK SOFTCLIX LANCETS) Misc      gabapentin (NEURONTIN) 300 MG capsule           cholecalciferol, vitamin D3, 2,000 unit Tab           aspirin 81 mg Tab                   diphenhydrAMINE-acetaminophen (TYLENOL PM)  mg Tab 1 tablet, Nightly PRN      insulin lispro 100 unit/mL pen 12 Units, 3 times daily with meals      insulin (LANTUS SOLOSTAR U-100 INSULIN) glargine 100 units/mL (3mL) SubQ pen 18 Units, Daily     atorvastatin (LIPITOR) 20 MG tablet 20 mg, Daily      dulaglutide (TRULICITY) 4.5 mg/0.5 mL pen injector 4.5 mg, Every 7 days      glipiZIDE (GLUCOTROL) 5 MG tablet 5 mg, Daily PRN.            ALLERGIES:  Review of patient's allergies indicates:   Allergen Reactions    Adhesive  tape-silicones      Other reaction(s): skin irritation to area    Penicillins          Updated/ annual due 9/22:  HM: 9/21 fluvax, 2/21 covid vaccines/booster, 4/17 HAV#2, 3/15 [id number], 3/14 [id number], 10/18 booster at pharm TDaP, 11/12 zoster, 2019 Shingrix x2, 9/21 BMD rep 5y, 5/17 Cscope rep 5y, 6/22 mmg(q2), 2/3/21 Eye Dr. Roly Olivares , 9/16 HCV neg, 10/21 CT lung repeat due 1y with Pulm Dr. Schumacher.     Review of Systems   Constitutional: Negative for chills, diaphoresis and fever.   Respiratory: Negative for cough and shortness of breath.    Cardiovascular: Negative for chest pain, palpitations and leg swelling.   Gastrointestinal: Negative for blood in stool, constipation, diarrhea, nausea and vomiting.   Genitourinary: Negative for dysuria, frequency and hematuria.   Psychiatric/Behavioral: The patient is not nervous/anxious.          Objective:   /80 (BP Location: Left arm)   Pulse 97   Temp 98.3 °F (36.8 °C) (Tympanic)   Wt 109.5 kg (241 lb 6.5 oz)   SpO2 96%   BMI 37.81 kg/m²     Physical Exam  Constitutional:       Appearance: She is well-developed.   Neck:      Thyroid: No thyroid mass.      Vascular: No carotid bruit.   Cardiovascular:      Rate and Rhythm: Normal rate and regular rhythm.      Heart sounds: No murmur heard.    No friction rub. No gallop.   Pulmonary:      Effort: Pulmonary effort is normal.      Breath sounds: Normal breath sounds. No wheezing or rales.   Abdominal:      General: Bowel sounds are normal.      Palpations: Abdomen is soft. There is no mass.      Tenderness: There is no abdominal tenderness.   Musculoskeletal:      Cervical back: Neck supple.   Lymphadenopathy:      Cervical: No cervical adenopathy.   Neurological:      Mental Status: She is alert and oriented to person, place, and time.        Latest Reference Range & Units 06/13/22 08:50   Hemoglobin A1C External 4.0 - 5.6 % 8.3 (H)   Estimated Avg Glucose 68 - 131 mg/dL 192 (H)         Assessment:       1.  Preop cardiovascular exam    2. Cortical age-related cataract, unspecified laterality    3. Uncontrolled type 2 diabetes mellitus with hyperglycemia    4. Acquired hypothyroidism    5. Hyperlipidemia associated with type 2 diabetes mellitus    6. TALIA on CPAP    7. Severe obesity with body mass index (BMI) of 35.0 to 35.9 and comorbidity          Plan:     Preop cardiovascular exam, Cortical age-related cataract, unspecified laterality, 8/10/22 and 8/24/22.  Pt is clear for anesthesia and surgery with Thierno Index Class II, low risk for perioperative complications.     Uncontrolled type 2 diabetes mellitus with hyperglycemia- improving.  HALF lantus dose night before surgery, no insulin/glucotrol on morning of surgery.    Acquired hypothyroidism- Clinically stable, continue present treatment.    Hyperlipidemia associated with type 2 diabetes mellitus- lab sched in Oct.    TALIA on CPAP, doing well.    Severe obesity with body mass index (BMI) of 35.0 to 35.9 and comorbidity

## 2022-07-28 ENCOUNTER — PATIENT MESSAGE (OUTPATIENT)
Dept: FAMILY MEDICINE | Facility: CLINIC | Age: 72
End: 2022-07-28
Payer: MEDICARE

## 2022-08-01 ENCOUNTER — OFFICE VISIT (OUTPATIENT)
Dept: FAMILY MEDICINE | Facility: CLINIC | Age: 72
End: 2022-08-01
Payer: MEDICARE

## 2022-08-01 VITALS
HEART RATE: 97 BPM | OXYGEN SATURATION: 96 % | SYSTOLIC BLOOD PRESSURE: 116 MMHG | BODY MASS INDEX: 37.81 KG/M2 | TEMPERATURE: 98 F | DIASTOLIC BLOOD PRESSURE: 80 MMHG | WEIGHT: 241.38 LBS

## 2022-08-01 DIAGNOSIS — H25.019 CORTICAL AGE-RELATED CATARACT, UNSPECIFIED LATERALITY: ICD-10-CM

## 2022-08-01 DIAGNOSIS — G47.33 OSA ON CPAP: ICD-10-CM

## 2022-08-01 DIAGNOSIS — Z01.810 PREOP CARDIOVASCULAR EXAM: Primary | ICD-10-CM

## 2022-08-01 DIAGNOSIS — E03.9 ACQUIRED HYPOTHYROIDISM: ICD-10-CM

## 2022-08-01 DIAGNOSIS — E11.65 UNCONTROLLED TYPE 2 DIABETES MELLITUS WITH HYPERGLYCEMIA: ICD-10-CM

## 2022-08-01 DIAGNOSIS — E66.01 SEVERE OBESITY WITH BODY MASS INDEX (BMI) OF 35.0 TO 35.9 AND COMORBIDITY: ICD-10-CM

## 2022-08-01 DIAGNOSIS — E78.5 HYPERLIPIDEMIA ASSOCIATED WITH TYPE 2 DIABETES MELLITUS: ICD-10-CM

## 2022-08-01 DIAGNOSIS — E11.69 HYPERLIPIDEMIA ASSOCIATED WITH TYPE 2 DIABETES MELLITUS: ICD-10-CM

## 2022-08-01 PROCEDURE — 3074F SYST BP LT 130 MM HG: CPT | Mod: CPTII,S$GLB,, | Performed by: INTERNAL MEDICINE

## 2022-08-01 PROCEDURE — 1159F MED LIST DOCD IN RCRD: CPT | Mod: CPTII,S$GLB,, | Performed by: INTERNAL MEDICINE

## 2022-08-01 PROCEDURE — 1159F PR MEDICATION LIST DOCUMENTED IN MEDICAL RECORD: ICD-10-PCS | Mod: CPTII,S$GLB,, | Performed by: INTERNAL MEDICINE

## 2022-08-01 PROCEDURE — 4010F ACE/ARB THERAPY RXD/TAKEN: CPT | Mod: CPTII,S$GLB,, | Performed by: INTERNAL MEDICINE

## 2022-08-01 PROCEDURE — 3079F PR MOST RECENT DIASTOLIC BLOOD PRESSURE 80-89 MM HG: ICD-10-PCS | Mod: CPTII,S$GLB,, | Performed by: INTERNAL MEDICINE

## 2022-08-01 PROCEDURE — 3008F PR BODY MASS INDEX (BMI) DOCUMENTED: ICD-10-PCS | Mod: CPTII,S$GLB,, | Performed by: INTERNAL MEDICINE

## 2022-08-01 PROCEDURE — 99999 PR PBB SHADOW E&M-EST. PATIENT-LVL IV: CPT | Mod: PBBFAC,,, | Performed by: INTERNAL MEDICINE

## 2022-08-01 PROCEDURE — 99214 OFFICE O/P EST MOD 30 MIN: CPT | Mod: S$GLB,,, | Performed by: INTERNAL MEDICINE

## 2022-08-01 PROCEDURE — 1126F AMNT PAIN NOTED NONE PRSNT: CPT | Mod: CPTII,S$GLB,, | Performed by: INTERNAL MEDICINE

## 2022-08-01 PROCEDURE — 99214 PR OFFICE/OUTPT VISIT, EST, LEVL IV, 30-39 MIN: ICD-10-PCS | Mod: S$GLB,,, | Performed by: INTERNAL MEDICINE

## 2022-08-01 PROCEDURE — 3052F PR MOST RECENT HEMOGLOBIN A1C LEVEL 8.0 - < 9.0%: ICD-10-PCS | Mod: CPTII,S$GLB,, | Performed by: INTERNAL MEDICINE

## 2022-08-01 PROCEDURE — 3008F BODY MASS INDEX DOCD: CPT | Mod: CPTII,S$GLB,, | Performed by: INTERNAL MEDICINE

## 2022-08-01 PROCEDURE — 3288F PR FALLS RISK ASSESSMENT DOCUMENTED: ICD-10-PCS | Mod: CPTII,S$GLB,, | Performed by: INTERNAL MEDICINE

## 2022-08-01 PROCEDURE — 3052F HG A1C>EQUAL 8.0%<EQUAL 9.0%: CPT | Mod: CPTII,S$GLB,, | Performed by: INTERNAL MEDICINE

## 2022-08-01 PROCEDURE — 3288F FALL RISK ASSESSMENT DOCD: CPT | Mod: CPTII,S$GLB,, | Performed by: INTERNAL MEDICINE

## 2022-08-01 PROCEDURE — 3079F DIAST BP 80-89 MM HG: CPT | Mod: CPTII,S$GLB,, | Performed by: INTERNAL MEDICINE

## 2022-08-01 PROCEDURE — 3074F PR MOST RECENT SYSTOLIC BLOOD PRESSURE < 130 MM HG: ICD-10-PCS | Mod: CPTII,S$GLB,, | Performed by: INTERNAL MEDICINE

## 2022-08-01 PROCEDURE — 1101F PT FALLS ASSESS-DOCD LE1/YR: CPT | Mod: CPTII,S$GLB,, | Performed by: INTERNAL MEDICINE

## 2022-08-01 PROCEDURE — 1126F PR PAIN SEVERITY QUANTIFIED, NO PAIN PRESENT: ICD-10-PCS | Mod: CPTII,S$GLB,, | Performed by: INTERNAL MEDICINE

## 2022-08-01 PROCEDURE — 99999 PR PBB SHADOW E&M-EST. PATIENT-LVL IV: ICD-10-PCS | Mod: PBBFAC,,, | Performed by: INTERNAL MEDICINE

## 2022-08-01 PROCEDURE — 4010F PR ACE/ARB THEARPY RXD/TAKEN: ICD-10-PCS | Mod: CPTII,S$GLB,, | Performed by: INTERNAL MEDICINE

## 2022-08-01 PROCEDURE — 1101F PR PT FALLS ASSESS DOC 0-1 FALLS W/OUT INJ PAST YR: ICD-10-PCS | Mod: CPTII,S$GLB,, | Performed by: INTERNAL MEDICINE

## 2022-08-12 ENCOUNTER — OFFICE VISIT (OUTPATIENT)
Dept: SPORTS MEDICINE | Facility: CLINIC | Age: 72
End: 2022-08-12
Payer: MEDICARE

## 2022-08-12 VITALS — RESPIRATION RATE: 20 BRPM | HEIGHT: 67 IN | BODY MASS INDEX: 38.44 KG/M2 | WEIGHT: 244.94 LBS

## 2022-08-12 DIAGNOSIS — M17.11 PRIMARY OSTEOARTHRITIS OF RIGHT KNEE: Primary | ICD-10-CM

## 2022-08-12 PROCEDURE — 3052F PR MOST RECENT HEMOGLOBIN A1C LEVEL 8.0 - < 9.0%: ICD-10-PCS | Mod: CPTII,S$GLB,, | Performed by: STUDENT IN AN ORGANIZED HEALTH CARE EDUCATION/TRAINING PROGRAM

## 2022-08-12 PROCEDURE — 3008F BODY MASS INDEX DOCD: CPT | Mod: CPTII,S$GLB,, | Performed by: STUDENT IN AN ORGANIZED HEALTH CARE EDUCATION/TRAINING PROGRAM

## 2022-08-12 PROCEDURE — 1101F PT FALLS ASSESS-DOCD LE1/YR: CPT | Mod: CPTII,S$GLB,, | Performed by: STUDENT IN AN ORGANIZED HEALTH CARE EDUCATION/TRAINING PROGRAM

## 2022-08-12 PROCEDURE — 1159F PR MEDICATION LIST DOCUMENTED IN MEDICAL RECORD: ICD-10-PCS | Mod: CPTII,S$GLB,, | Performed by: STUDENT IN AN ORGANIZED HEALTH CARE EDUCATION/TRAINING PROGRAM

## 2022-08-12 PROCEDURE — 1126F AMNT PAIN NOTED NONE PRSNT: CPT | Mod: CPTII,S$GLB,, | Performed by: STUDENT IN AN ORGANIZED HEALTH CARE EDUCATION/TRAINING PROGRAM

## 2022-08-12 PROCEDURE — 99999 PR PBB SHADOW E&M-EST. PATIENT-LVL IV: CPT | Mod: PBBFAC,,, | Performed by: STUDENT IN AN ORGANIZED HEALTH CARE EDUCATION/TRAINING PROGRAM

## 2022-08-12 PROCEDURE — 99214 OFFICE O/P EST MOD 30 MIN: CPT | Mod: S$GLB,,, | Performed by: STUDENT IN AN ORGANIZED HEALTH CARE EDUCATION/TRAINING PROGRAM

## 2022-08-12 PROCEDURE — 1101F PR PT FALLS ASSESS DOC 0-1 FALLS W/OUT INJ PAST YR: ICD-10-PCS | Mod: CPTII,S$GLB,, | Performed by: STUDENT IN AN ORGANIZED HEALTH CARE EDUCATION/TRAINING PROGRAM

## 2022-08-12 PROCEDURE — 99999 PR PBB SHADOW E&M-EST. PATIENT-LVL IV: ICD-10-PCS | Mod: PBBFAC,,, | Performed by: STUDENT IN AN ORGANIZED HEALTH CARE EDUCATION/TRAINING PROGRAM

## 2022-08-12 PROCEDURE — 99214 PR OFFICE/OUTPT VISIT, EST, LEVL IV, 30-39 MIN: ICD-10-PCS | Mod: S$GLB,,, | Performed by: STUDENT IN AN ORGANIZED HEALTH CARE EDUCATION/TRAINING PROGRAM

## 2022-08-12 PROCEDURE — 4010F PR ACE/ARB THEARPY RXD/TAKEN: ICD-10-PCS | Mod: CPTII,S$GLB,, | Performed by: STUDENT IN AN ORGANIZED HEALTH CARE EDUCATION/TRAINING PROGRAM

## 2022-08-12 PROCEDURE — 3288F FALL RISK ASSESSMENT DOCD: CPT | Mod: CPTII,S$GLB,, | Performed by: STUDENT IN AN ORGANIZED HEALTH CARE EDUCATION/TRAINING PROGRAM

## 2022-08-12 PROCEDURE — 3008F PR BODY MASS INDEX (BMI) DOCUMENTED: ICD-10-PCS | Mod: CPTII,S$GLB,, | Performed by: STUDENT IN AN ORGANIZED HEALTH CARE EDUCATION/TRAINING PROGRAM

## 2022-08-12 PROCEDURE — 1126F PR PAIN SEVERITY QUANTIFIED, NO PAIN PRESENT: ICD-10-PCS | Mod: CPTII,S$GLB,, | Performed by: STUDENT IN AN ORGANIZED HEALTH CARE EDUCATION/TRAINING PROGRAM

## 2022-08-12 PROCEDURE — 3288F PR FALLS RISK ASSESSMENT DOCUMENTED: ICD-10-PCS | Mod: CPTII,S$GLB,, | Performed by: STUDENT IN AN ORGANIZED HEALTH CARE EDUCATION/TRAINING PROGRAM

## 2022-08-12 PROCEDURE — 3052F HG A1C>EQUAL 8.0%<EQUAL 9.0%: CPT | Mod: CPTII,S$GLB,, | Performed by: STUDENT IN AN ORGANIZED HEALTH CARE EDUCATION/TRAINING PROGRAM

## 2022-08-12 PROCEDURE — 4010F ACE/ARB THERAPY RXD/TAKEN: CPT | Mod: CPTII,S$GLB,, | Performed by: STUDENT IN AN ORGANIZED HEALTH CARE EDUCATION/TRAINING PROGRAM

## 2022-08-12 PROCEDURE — 1159F MED LIST DOCD IN RCRD: CPT | Mod: CPTII,S$GLB,, | Performed by: STUDENT IN AN ORGANIZED HEALTH CARE EDUCATION/TRAINING PROGRAM

## 2022-08-12 NOTE — PATIENT INSTRUCTIONS
Assessment:  Jaimie Luque is a 72 y.o. female   Chief Complaint   Patient presents with    Right Knee - Pain       Encounter Diagnosis   Name Primary?    Primary osteoarthritis of right knee Yes        Plan:  Reviewed your x-rays with you today and discussed pertinent findings.  Please reach out to us through [employer] messages if you have any questions or concerns. We will gladly answer you in a timely manner.   Try gentle stretching in bed prior to leaving bed to warm up the joints  Continue with low impact activity, such as water therapy, stationary bike and walking.    General Arthritis info:    -shiny white stuff at end of a chicken bones is cartilage    -arthritis is wearing away of the cartilage that lines the end of your bones    -osteoarthritis is thought to be a wear and tear phenomenon    -symptoms are due to inflammation of joint causing stiffness, aching, and sometimes swelling    -occasionally sharp pain will occur causing a give way sensation    -Risk factors: genetic, weight, female > male, age    Treatment options:    -maintain healthy weight (every pound is 4 pounds of pressure on the knee)    -daily moderate exercise (walk, bike, swim 30 minutes per day) to keep joints moving    -daily strengthening exercises (through therapy or on own) to keep muscles supporting joint healthy and strong    -glucosamine 1500mg daily (look for USP label on bottle)    -tylenol as needed for pain (follow directions on the bottle)    -anti-inflammatory medication such as alleve may be helpful- take 1-2 tabs twice daily for 7 days. If it helps your pain, continue. If you do not feel any change, you may stop and then take it as needed.    (you may be given a once daily anti-inflammatory such as MOBIC. If given, avoid other anti-inflammatory medications such as advil, ibuprofen, motrin, naprosyn, alleve, etc)    -if swollen and painful, ice, decrease activity, and take anti-inflammatory daily for 5-7 days and if no  relief call your doctor for further options    -consider cortisone injection (every 3-4 months at most)- anti- inflammatory steroid medication that can be injected directly into the joint to reduce inflammation    -consider hyaluronic acid injections (eufflexxa, hyalgan, synvisc, supartz) (every 6 months at most)- protein injection that helps decrease pain and irritability in the joint. It is best used to help prolong intervals between cortisone injections to minimize steroid injections. These are currently approved for knee injections. Discuss with your doctor if other joint involved. Call to seek approval prior to the injections.    -long-term treatment may include a total joint replacement (keep diary of good days and bad days, then evaluate as to when you are ready)     Supplements for pain, inflammation, arthritis.    Glucosamine sulfate/chondroitin sulfate has been shown to be safe and effective for knee arthritis (and possibly other joints affected with arthritis). This is an over the counter medication/supplement and usually needs to be taken for 1-2 months before results are seen. If you do not see any results after this time, consider stopping it. The dose is usually found on the bottle, and is 1500mg to start (first 1-2 months) then you can back down to 1000 mg (current recommendations are 1500mg per day, all at once).    Some people can have stomach problems with this and if you do, you may stop taking it or divide up the doses. If you are ALLERGIC TO SHELLFISH, please do not take. Follow the instructions on the bottle.    Other supplements that have been shown to help with joint and muscle pain include:    (Unless otherwise noted, as these supplements are not FDA controlled, please follow the recommendations on the bottle)    Turmeric 500mg PO BID    Flax seed oil    Avocado soybean unsaponifiables    EMILIE-e    MSM    Cherry juice extract (tart)    James Yang    Diaalex    If there are any  concerns about taking these supplements with other medications you may already be taking, you can speak to your pharmacist, physician, health care provider, or research other medical information available to you. Side effects and interactions are possible with any supplement or medication - be safe!     Follow-up: As needed or sooner if there are any problems between now and then.    Thank you for choosing [facility] and Dr. Sarwat Rodriguez for your orthopedic & sports medicine care. It is our goal to provide you with exceptional care that will help keep you healthy, active, and get you back in the game.    Please do not hesitate to reach out to us via email, phone, or MyChart with any questions, concerns, or feedback.    If you felt that you received exemplary care today, please consider leaving us feedback on [employer] at:  [email or web address]    If you are experiencing pain/discomfort ,or have questions after 5pm and would like to be connected to the [facility] on-call team, please call this number and specify which Sports Medicine provider is treating you: [phone]

## 2022-08-12 NOTE — PROGRESS NOTES
Patient ID: Jaimie Luque  YOB: 1950  MRN: [id number]    Chief Complaint: Pain of the Right Knee      Referred By: *** for ***    History of Present Illness: Jaimie Luque is a right-hand dominant 72 y.o. female who presents today with Pain of the Right Knee c/o 0/10 pain. She states the pain increases up to a 5/10 when standing up from a seated position or lying down. The pain also worsens when walking, standing, and at night. She states that tylenol, meloxicam, gabapentin, and water aerobics have helped the pain. The pain started 3 years ago.     The patient is active in none.  Occupation: Retired    ***    Past Medical History:   Past Medical History:   Diagnosis Date    Diabetes mellitus type II     Hyperlipidemia     Hypertension     Hypothyroid     TALIA (obstructive sleep apnea)     on CPAP    Osteopenia     Psoriasis      Past Surgical History:   Procedure Laterality Date    cataract surgery Left 08/10/2022    COLONOSCOPY N/A 5/2/2017    Procedure: COLONOSCOPY;  Surgeon: Noe Penn III, MD;  Location: [facility] ENDO;  Service: Endoscopy;  Laterality: N/A;    COLONOSCOPY N/A 5/4/2022    Procedure: COLONOSCOPY;  Surgeon: Elodia Swain MD;  Location: [facility] ENDO;  Service: Endoscopy;  Laterality: N/A;    HYSTERECTOMY      OOPHORECTOMY      ROBOTIC ASSISTED HYSTERECTOMY       Family History   Problem Relation Age of Onset    Heart disease Father     Diabetes Brother     Melanoma Neg Hx     Lupus Neg Hx      Social History     Socioeconomic History    Marital status:    Tobacco Use    Smoking status: Never Smoker    Smokeless tobacco: Never Used   Substance and Sexual Activity    Alcohol use: Yes     Comment: rare    Drug use: No    Sexual activity: Yes     Partners: Male   Social History Narrative    . Lives with spouse. Has 3 children. Patient retired as  for [employer].      Social Determinants of Health     Financial Resource  Strain: Low Risk     Difficulty of Paying Living Expenses: Not hard at all   Food Insecurity: No Food Insecurity    Worried About Running Out of Food in the Last Year: Never true    Ran Out of Food in the Last Year: Never true   Transportation Needs: No Transportation Needs    Lack of Transportation (Medical): No    Lack of Transportation (Non-Medical): No   Physical Activity: Insufficiently Active    Days of Exercise per Week: 2 days    Minutes of Exercise per Session: 50 min   Stress: No Stress Concern Present    Feeling of Stress : Only a little   Social Connections: Unknown    Frequency of Communication with Friends and Family: Once a week    Frequency of Social Gatherings with Friends and Family: More than three times a week    Active Member of Clubs or Organizations: Yes    Attends Club or Organization Meetings: More than 4 times per year    Marital Status:    Housing Stability: Low Risk     Unable to Pay for Housing in the Last Year: No    Number of Places Lived in the Last Year: 1    Unstable Housing in the Last Year: No     Medication List with Changes/Refills   Current Medications    ASPIRIN 81 MG TAB    Take by mouth. 1 Tablet Oral 2 times weekly     ATORVASTATIN (LIPITOR) 20 MG TABLET    Take 1 tablet (20 mg total) by mouth once daily.    BLOOD SUGAR DIAGNOSTIC STRP    To check BG 2 times daily, to use with AccuChek Varsha Plus  Dx:  E11.9    BLOOD-GLUCOSE METER,CONTINUOUS (DEXCOM G6 ) MISC    Use daily.    BLOOD-GLUCOSE SENSOR (DEXCOM G6 SENSOR) LEX    Use 1 every 10 days.    BLOOD-GLUCOSE TRANSMITTER (DEXCOM G6 TRANSMITTER) LEX    Use 1 every 90 days.    CHOLECALCIFEROL, VITAMIN D3, 2,000 UNIT TAB    Take by mouth. 1 Tablet Oral Every day    DIPHENHYDRAMINE-ACETAMINOPHEN (TYLENOL PM)  MG TAB    Take 1 tablet by mouth nightly as needed.    DULAGLUTIDE (TRULICITY) 4.5 MG/0.5 ML PEN INJECTOR    Inject 4.5 mg into the skin every 7 days.    GABAPENTIN (NEURONTIN) 300 MG  "CAPSULE    TAKE 1 CAPSULE BY MOUTH THREE TIMES DAILY    GLIPIZIDE (GLUCOTROL) 5 MG TABLET    Take 1 tablet (5 mg total) by mouth once daily.    INSULIN (LANTUS SOLOSTAR U-100 INSULIN) GLARGINE 100 UNITS/ML (3ML) SUBQ PEN    Inject 18 Units into the skin once daily.    INSULIN LISPRO 100 UNIT/ML PEN    Inject 12 Units into the skin 3 (three) times daily with meals.    LANCETS (ACCU-CHEK SOFTCLIX LANCETS) MISC    Check blood sugar twice daily.  Accuchek Multiclix drum lancets[facility]  Dx:  E11.9    LEVOTHYROXINE (SYNTHROID) 100 MCG TABLET    TAKE 1 TABLET(100 MCG) BY MOUTH BEFORE BREAKFAST    LISINOPRIL (PRINIVIL,ZESTRIL) 2.5 MG TABLET    TAKE 1 TABLET(2.5 MG) BY MOUTH EVERY DAY    MECLIZINE (ANTIVERT) 25 MG TABLET    Take 1 tablet by mouth as needed.     PEN NEEDLE, DIABETIC (BD ULTRA-FINE LAURA PEN NEEDLE) 32 GAUGE X 5/32" NDLE    1 each by Misc.(Non-Drug; Combo Route) route 4 (four) times daily with meals and nightly.    SEMAGLUTIDE (OZEMPIC) 2 MG/DOSE (8 MG/3 ML) PNIJ    Inject 2 mg into the skin once a week.     Review of patient's allergies indicates:   Allergen Reactions    Adhesive tape-silicones      Other reaction(s): skin irritation to area    Penicillins        Physical Exam:   Body mass index is 38.36 kg/m².    GENERAL: Well appearing, in no acute distress.  HEAD: Normocephalic and atraumatic.  ENT: External ears and nose grossly normal.  EYES: EOMI bilaterally  PULMONARY: Respirations are grossly even and non-labored.  NEURO: Awake, alert, and oriented x 3.  SKIN: No obvious rashes appreciated.  PSYCH: Mood & affect are appropriate.    Detailed MSK exam:     ***    Imaging:  Mammo Digital Screening Bilat w/ Brant  Narrative: Result:  Mammo Digital Screening Bilat w/ Brant    History:  Patient is 72 y.o. and is seen for a screening mammogram.    Films Compared:  Compared to: 06/16/2020 Mammo Digital Screening Bilat w/ Brant, 06/28/2018   Mammo Digital Screening Bilat with Tomosynthesis CAD, and " 06/15/2016 Mammo   Digital Screening Bilat with Tomosynthesis_CAD     Findings:   This procedure was performed using tomosynthesis.   Computer-aided detection was utilized in the interpretation of this   examination.    The breasts are heterogeneously dense, which may obscure small masses.   There is no evidence of suspicious masses, microcalcifications or   architectural distortion.  Impression:    No mammographic evidence of malignancy.    BI-RADS Category 1: Negative    Recommendation:  Routine screening mammogram in 1 year is recommended.    Your estimated lifetime risk of breast cancer (to age 85) based on   Tyrer-Cuzick risk assessment model is 5.76 %.  According to the American   Cancer Society, patients with a lifetime breast cancer risk of 20% or   higher might benefit from supplemental screening tests. ??     ***    Relevant imaging results were reviewed and interpreted by me and per my read ***.  This was discussed with the patient and / or family today.     Assessment:  Jaimie Luque is a 72 y.o. female ***    There are no diagnoses linked to this encounter.     A copy of today's visit note has been sent to the referring provider.       Sarwat Rodriguez MD    Disclaimer: This note was prepared using a voice recognition system and is likely to have sound alike errors within the text.

## 2022-08-12 NOTE — PROGRESS NOTES
Patient ID: Jaimie Luque  YOB: 1950  MRN: [id number]    Chief Complaint: Pain of the Right Knee      Referred By: Dawna Palma MD for right knee pain    History of Present Illness: Jaimie Luque is a right-hand dominant 72 y.o. female who presents today with Pain of the Right Knee c/o 0/10 pain. She states the pain increases up to a 5/10 when standing up from a seated position or lying down. The pain also worsens when walking, standing, and at night. She states that tylenol, meloxicam, gabapentin, and water aerobics have helped the pain. The pain started 3 years ago.     The patient is active in water aerobics.  Occupation: Retired    Seen by Dr. Palma in the past not having any right knee pain today pain will increase when rising from a seated to standing position at times she continues with water aerobics twice a week and feels like it has helped significantly.    Past Medical History:   Past Medical History:   Diagnosis Date    Diabetes mellitus type II     Hyperlipidemia     Hypertension     Hypothyroid     TALIA (obstructive sleep apnea)     on CPAP    Osteopenia     Psoriasis      Past Surgical History:   Procedure Laterality Date    cataract surgery Left 08/10/2022    COLONOSCOPY N/A 5/2/2017    Procedure: COLONOSCOPY;  Surgeon: Noe Penn III, MD;  Location: [facility] ENDO;  Service: Endoscopy;  Laterality: N/A;    COLONOSCOPY N/A 5/4/2022    Procedure: COLONOSCOPY;  Surgeon: Elodia Swain MD;  Location: [facility] ENDO;  Service: Endoscopy;  Laterality: N/A;    HYSTERECTOMY      OOPHORECTOMY      ROBOTIC ASSISTED HYSTERECTOMY       Family History   Problem Relation Age of Onset    Heart disease Father     Diabetes Brother     Melanoma Neg Hx     Lupus Neg Hx      Social History     Socioeconomic History    Marital status:    Tobacco Use    Smoking status: Never Smoker    Smokeless tobacco: Never Used   Substance and Sexual Activity    Alcohol use: Yes      Comment: rare    Drug use: No    Sexual activity: Yes     Partners: Male   Social History Narrative    . Lives with spouse. Has 3 children. Patient retired as  for [employer].      Social Determinants of Health     Financial Resource Strain: Low Risk     Difficulty of Paying Living Expenses: Not hard at all   Food Insecurity: No Food Insecurity    Worried About Running Out of Food in the Last Year: Never true    Ran Out of Food in the Last Year: Never true   Transportation Needs: No Transportation Needs    Lack of Transportation (Medical): No    Lack of Transportation (Non-Medical): No   Physical Activity: Insufficiently Active    Days of Exercise per Week: 2 days    Minutes of Exercise per Session: 50 min   Stress: No Stress Concern Present    Feeling of Stress : Only a little   Social Connections: Unknown    Frequency of Communication with Friends and Family: Once a week    Frequency of Social Gatherings with Friends and Family: More than three times a week    Active Member of Clubs or Organizations: Yes    Attends Club or Organization Meetings: More than 4 times per year    Marital Status:    Housing Stability: Low Risk     Unable to Pay for Housing in the Last Year: No    Number of Places Lived in the Last Year: 1    Unstable Housing in the Last Year: No     Medication List with Changes/Refills   Current Medications    ASPIRIN 81 MG TAB    Take by mouth. 1 Tablet Oral 2 times weekly     ATORVASTATIN (LIPITOR) 20 MG TABLET    Take 1 tablet (20 mg total) by mouth once daily.    BLOOD SUGAR DIAGNOSTIC STRP    To check BG 2 times daily, to use with AccuChek Varsha Plus  Dx:  E11.9    BLOOD-GLUCOSE METER,CONTINUOUS (DEXCOM G6 ) MISC    Use daily.    BLOOD-GLUCOSE SENSOR (DEXCOM G6 SENSOR) LEX    Use 1 every 10 days.    BLOOD-GLUCOSE TRANSMITTER (DEXCOM G6 TRANSMITTER) LEX    Use 1 every 90 days.    CHOLECALCIFEROL, VITAMIN D3, 2,000 UNIT TAB    Take by mouth.  "1 Tablet Oral Every day    DIPHENHYDRAMINE-ACETAMINOPHEN (TYLENOL PM)  MG TAB    Take 1 tablet by mouth nightly as needed.    DULAGLUTIDE (TRULICITY) 4.5 MG/0.5 ML PEN INJECTOR    Inject 4.5 mg into the skin every 7 days.    GABAPENTIN (NEURONTIN) 300 MG CAPSULE    TAKE 1 CAPSULE BY MOUTH THREE TIMES DAILY    GLIPIZIDE (GLUCOTROL) 5 MG TABLET    Take 1 tablet (5 mg total) by mouth once daily.    INSULIN (LANTUS SOLOSTAR U-100 INSULIN) GLARGINE 100 UNITS/ML (3ML) SUBQ PEN    Inject 18 Units into the skin once daily.    INSULIN LISPRO 100 UNIT/ML PEN    Inject 12 Units into the skin 3 (three) times daily with meals.    LANCETS (ACCU-CHEK SOFTCLIX LANCETS) MISC    Check blood sugar twice daily.  Accuchek Multiclix drum lancets, [facility]  Dx:  E11.9    LEVOTHYROXINE (SYNTHROID) 100 MCG TABLET    TAKE 1 TABLET(100 MCG) BY MOUTH BEFORE BREAKFAST    LISINOPRIL (PRINIVIL,ZESTRIL) 2.5 MG TABLET    TAKE 1 TABLET(2.5 MG) BY MOUTH EVERY DAY    MECLIZINE (ANTIVERT) 25 MG TABLET    Take 1 tablet by mouth as needed.     PEN NEEDLE, DIABETIC (BD ULTRA-FINE LAURA PEN NEEDLE) 32 GAUGE X 5/32" NDLE    1 each by Misc.(Non-Drug; Combo Route) route 4 (four) times daily with meals and nightly.    SEMAGLUTIDE (OZEMPIC) 2 MG/DOSE (8 MG/3 ML) PNIJ    Inject 2 mg into the skin once a week.     Review of patient's allergies indicates:   Allergen Reactions    Adhesive tape-silicones      Other reaction(s): skin irritation to area    Penicillins        Physical Exam:   Body mass index is 38.36 kg/m².    GENERAL: Well appearing, in no acute distress.  HEAD: Normocephalic and atraumatic.  ENT: External ears and nose grossly normal.  EYES: EOMI bilaterally  PULMONARY: Respirations are grossly even and non-labored.  NEURO: Awake, alert, and oriented x 3.  SKIN: No obvious rashes appreciated.  PSYCH: Mood & affect are appropriate.    Detailed MSK exam:     Crepitance with active range of motion of the right knee no effusion " appreciated  Knee flexion limited 4 finger breadths R vs L  Full ext    Imaging:  No new imaging    Assessment:  Jaimie Luque is a 72 y.o. female presents today for bilateral knee pain right worse than left most consistent with some early arthritic changes.  She continues to do low-impact activity discussed stationary bike verses riding her bike around the neighborhood including walking as tolerated.  Discussed red flags such as swelling or persistent or worsening pain inside the knee with any activity.  Applauded her on continuing with her water aerobics and discussed continue motion will be good for the longevity of her knee.  At this time she is not having very much symptoms defer any intra-articular injections at this time and follow up with me as needed in the future.  Gave her supplements for osteoarthritis as she could try as well.    Primary osteoarthritis of right knee       I spent a total of 45 minutes on the day of the visit.  This includes face to face time and non-face to face time preparing to see the patient (eg, review of tests), obtaining and/or reviewing separately obtained history, documenting clinical information in the electronic or other health record, independently interpreting results and communicating results to the patient/family/caregiver, or care coordinator.    A copy of today's visit note has been sent to the referring provider.       Sarwat Rodriguez MD    Disclaimer: This note was prepared using a voice recognition system and is likely to have sound alike errors within the text.

## 2022-08-15 NOTE — PROGRESS NOTES
PCP: Jessica Luna MD    Subjective:     Chief Complaint: Diabetes - Established Patient    HISTORY OF PRESENT ILLNESS: 72 y.o.   female presenting for diabetes management visit.   The patient's last visit with me was on 6/17/2022.  Patient has had Type II diabetes since 20 or more years.  Pertinent to decision making is the following comorbidities: HLD, Hypothyroidism and Obesity by BMI  Patient has the following Diabetes complications: without complications  She has attended diabetes education in the past.     Patient's most recent A1c of 8.3% was completed 2 months ago.   Patient states since Her last A1c Her blood glucose levels have been high  throughout the day .   Patient monitors blood glucose 4 times per day and Continuously with personal CGM Dexcom.   Patient blood glucose monitoring device will be uploaded into [employer] today.        Patients records show some baseline near hypoglycemia throughout the day or after meals with postprandial hyperglycemia today after regular carb dose. Some skipping insulin if pre meal is low euglycemic. Patient does have some food logs / dosing logs at home.     Patient endorses the following diabetes related symptoms: None.   Patient is due today for the following diabetes-related health maintenance standards: Urine Microalbumin/creatinine ratio. Sched in Oct.  She denies any recent hospital admissions or emergency room visits. Patient denies history of DKA.   She voices having hypoglycemia as above..   Patient's concerns today include glycemic control.   Patient medication regimen is as below.      CURRENT DM MEDICATIONS:    Lantus 16 units daily qhs   Trulicity 4.5 mg weekly    Glipizide 5 mg before bed time snack    Humalog 10 units with small / 12 units with regular / 14 units with heavier carb meal TID wm    Patient has failed the following Diabetes medications:    Metformin - GI    Invokana - coverage   Jardiance - yeast  infection   Glyburide    Victoza    Basal - Basaglar   Ozempic 2 mg - Stopped due to backorder      Labs Reviewed.       Lab Results   Component Value Date    CPEPTIDE 2.35 04/01/2021     Lab Results   Component Value Date    GLUTAMICACID 0.00 05/25/2017          //  Weight: 110.9 kg (244 lb 7.8 oz), Body mass index is 38.29 kg/m².  Her blood sugar in clinic today is:    Lab Results   Component Value Date    POCGLU 129 (A) 06/17/2022       Review of Systems   Constitutional: Negative for activity change, appetite change, chills and fever.   HENT: Negative for dental problem, mouth sores, nosebleeds, sore throat and trouble swallowing.    Eyes: Negative for pain and discharge.   Respiratory: Negative for shortness of breath, wheezing and stridor.    Cardiovascular: Negative for chest pain, palpitations and leg swelling.   Gastrointestinal: Negative for abdominal pain, diarrhea, nausea and vomiting.   Endocrine: Negative for polydipsia, polyphagia and polyuria.   Genitourinary: Negative for dysuria, frequency and urgency.   Musculoskeletal: Negative for joint swelling and myalgias.   Skin: Negative for rash and wound.   Neurological: Negative for dizziness, syncope, weakness and headaches.   Psychiatric/Behavioral: Negative for behavioral problems and dysphoric mood.         Diabetes Management Status  Statin: Taking  ACE/ARB: Taking    Screening or Prevention Patient's value Goal Complete/Controlled?   HgA1C Testing and Control   Lab Results   Component Value Date    HGBA1C 8.3 (H) 06/13/2022      Annually/Less than 8% No   Lipid profile : 09/15/2021 Annually Yes   LDL control Lab Results   Component Value Date    LDLCALC 61.4 (L) 09/15/2021    Annually/Less than 100 mg/dl  Yes   Nephropathy screening Lab Results   Component Value Date    MICALBCREAT Unable to calculate 09/15/2021     Lab Results   Component Value Date    PROTEINUA Negative 04/01/2021    Annually Yes   Blood pressure BP Readings from Last 1  Encounters:   08/17/22 121/72    Less than 140/90 Yes   Dilated retinal exam : 02/09/2022 Annually Yes    Foot exam   : 08/17/2022 Annually Yes     ACTIVITY LEVEL: Moderately Active. Discussed activities, benefits, methods, and precautions.  MEAL PLANNING: Patient reports number of meals per day to be 3 and number of snacks per day to be 2.   Patient is encouraged to carb count and consume no more than 30 - 45 grams of carbohydrates in each meal and 15 grams of carbohydrates in each snack.     Social History     Socioeconomic History    Marital status:    Tobacco Use    Smoking status: Never Smoker    Smokeless tobacco: Never Used   Substance and Sexual Activity    Alcohol use: Yes     Comment: rare    Drug use: No    Sexual activity: Yes     Partners: Male   Social History Narrative    . Lives with spouse. Has 3 children. Patient retired as  for [employer].      Social Determinants of Health     Financial Resource Strain: Low Risk     Difficulty of Paying Living Expenses: Not hard at all   Food Insecurity: No Food Insecurity    Worried About Running Out of Food in the Last Year: Never true    Ran Out of Food in the Last Year: Never true   Transportation Needs: No Transportation Needs    Lack of Transportation (Medical): No    Lack of Transportation (Non-Medical): No   Physical Activity: Insufficiently Active    Days of Exercise per Week: 2 days    Minutes of Exercise per Session: 50 min   Stress: No Stress Concern Present    Feeling of Stress : Only a little   Social Connections: Unknown    Frequency of Communication with Friends and Family: Once a week    Frequency of Social Gatherings with Friends and Family: More than three times a week    Active Member of Clubs or Organizations: Yes    Attends Club or Organization Meetings: More than 4 times per year    Marital Status:    Housing Stability: Low Risk     Unable to Pay for Housing in the Last Year: No     Number of Places Lived in the Last Year: 1    Unstable Housing in the Last Year: No     Past Medical History:   Diagnosis Date    Diabetes mellitus type II     Hyperlipidemia     Hypertension     Hypothyroid     TALIA (obstructive sleep apnea)     on CPAP    Osteopenia     Psoriasis        Objective:        Physical Exam  Constitutional:       General: She is not in acute distress.     Appearance: She is well-developed. She is not diaphoretic.   HENT:      Head: Normocephalic and atraumatic.      Right Ear: External ear normal.      Left Ear: External ear normal.      Nose: Nose normal.   Eyes:      General:         Right eye: No discharge.         Left eye: No discharge.      Pupils: Pupils are equal, round, and reactive to light.   Cardiovascular:      Rate and Rhythm: Normal rate and regular rhythm.      Heart sounds: Normal heart sounds.   Pulmonary:      Effort: Pulmonary effort is normal.      Breath sounds: Normal breath sounds.   Abdominal:      Palpations: Abdomen is soft.   Musculoskeletal:         General: Normal range of motion.      Cervical back: Normal range of motion and neck supple.   Skin:     General: Skin is warm and dry.      Capillary Refill: Capillary refill takes less than 2 seconds.   Neurological:      Mental Status: She is alert and oriented to person, place, and time.      Motor: No abnormal muscle tone.      Coordination: Coordination normal.   Psychiatric:         Behavior: Behavior normal.         Thought Content: Thought content normal.         Judgment: Judgment normal.           Assessment / Plan:     Uncontrolled type 2 diabetes mellitus with hyperglycemia    Hyperlipidemia associated with type 2 diabetes mellitus    Acquired hypothyroidism    TALIA on CPAP    Obesity (BMI 30-39.9)      Additional Plan Details:    - POCT Glucose  - Encouraged continuation of lifestyle changes including regular exercise and limiting carbohydrates to 30-45 grams per meal threes times daily and 15  grams per snack with a limit of two daily.   - Encouraged continued monitoring of blood glucose with maintenance of 4 times daily and Continuously with personal CGM Dexcom.    - Current DM Medication Regimen: Change Lantus 14 units daily. Continue Trulicity 4.5 mg weekly. Continue Glipizide 5 mg before night time snack if eating one. Change Humalog 8 units with small / 14 units with regular / 16 units with heavier carb meal TID wm. ADJUST INSULIN FREQUENTLY BASED ON BLOOD SUGAR.   - Health Maintenance standards addressed today: Urine Microalbumin / Creatinine Ratio scheduled  - Nursing Visit: Patient is age 79 or younger with an A1c of 7.5 or greater and qualifies for nursing visit, but will defer for now. .   - Follow up in 1 months with A1c prior.     Humalog Correction Dosing every 3 hours as needed OUTSIDE OF EATING  If  - 250, may take 2 units of Humalog  If  - 300, may take 3 units of Humalog   If  - 350, may take 4 units of Humalog  If  - 400, may take 5 units of Humalog    If +, may take 6 units of Humalog     Blakeney McKnight, PA-C [facility]        A total of 30 minutes was spent in face to face time, of which over 50 % was spent in counseling patient on disease process, complications, treatment, and side effects of medications.

## 2022-08-17 ENCOUNTER — OFFICE VISIT (OUTPATIENT)
Dept: DIABETES | Facility: CLINIC | Age: 72
End: 2022-08-17
Payer: MEDICARE

## 2022-08-17 ENCOUNTER — PATIENT MESSAGE (OUTPATIENT)
Dept: DIABETES | Facility: CLINIC | Age: 72
End: 2022-08-17

## 2022-08-17 VITALS
BODY MASS INDEX: 38.29 KG/M2 | HEART RATE: 87 BPM | SYSTOLIC BLOOD PRESSURE: 121 MMHG | WEIGHT: 244.5 LBS | DIASTOLIC BLOOD PRESSURE: 72 MMHG

## 2022-08-17 DIAGNOSIS — E78.5 HYPERLIPIDEMIA ASSOCIATED WITH TYPE 2 DIABETES MELLITUS: ICD-10-CM

## 2022-08-17 DIAGNOSIS — E66.9 OBESITY (BMI 30-39.9): ICD-10-CM

## 2022-08-17 DIAGNOSIS — G47.33 OSA ON CPAP: ICD-10-CM

## 2022-08-17 DIAGNOSIS — E11.65 UNCONTROLLED TYPE 2 DIABETES MELLITUS WITH HYPERGLYCEMIA: Primary | ICD-10-CM

## 2022-08-17 DIAGNOSIS — E11.69 HYPERLIPIDEMIA ASSOCIATED WITH TYPE 2 DIABETES MELLITUS: ICD-10-CM

## 2022-08-17 DIAGNOSIS — E03.9 ACQUIRED HYPOTHYROIDISM: ICD-10-CM

## 2022-08-17 PROCEDURE — 3008F BODY MASS INDEX DOCD: CPT | Mod: CPTII,S$GLB,, | Performed by: PHYSICIAN ASSISTANT

## 2022-08-17 PROCEDURE — 95251 PR GLUCOSE MONITOR, 72 HOUR, PHYS INTERP: ICD-10-PCS | Mod: S$GLB,,, | Performed by: PHYSICIAN ASSISTANT

## 2022-08-17 PROCEDURE — 1159F MED LIST DOCD IN RCRD: CPT | Mod: CPTII,S$GLB,, | Performed by: PHYSICIAN ASSISTANT

## 2022-08-17 PROCEDURE — 1101F PT FALLS ASSESS-DOCD LE1/YR: CPT | Mod: CPTII,S$GLB,, | Performed by: PHYSICIAN ASSISTANT

## 2022-08-17 PROCEDURE — 3288F PR FALLS RISK ASSESSMENT DOCUMENTED: ICD-10-PCS | Mod: CPTII,S$GLB,, | Performed by: PHYSICIAN ASSISTANT

## 2022-08-17 PROCEDURE — 3074F PR MOST RECENT SYSTOLIC BLOOD PRESSURE < 130 MM HG: ICD-10-PCS | Mod: CPTII,S$GLB,, | Performed by: PHYSICIAN ASSISTANT

## 2022-08-17 PROCEDURE — 99214 OFFICE O/P EST MOD 30 MIN: CPT | Mod: S$GLB,,, | Performed by: PHYSICIAN ASSISTANT

## 2022-08-17 PROCEDURE — 3078F DIAST BP <80 MM HG: CPT | Mod: CPTII,S$GLB,, | Performed by: PHYSICIAN ASSISTANT

## 2022-08-17 PROCEDURE — 1159F PR MEDICATION LIST DOCUMENTED IN MEDICAL RECORD: ICD-10-PCS | Mod: CPTII,S$GLB,, | Performed by: PHYSICIAN ASSISTANT

## 2022-08-17 PROCEDURE — 99999 PR PBB SHADOW E&M-EST. PATIENT-LVL IV: CPT | Mod: PBBFAC,,, | Performed by: PHYSICIAN ASSISTANT

## 2022-08-17 PROCEDURE — 3008F PR BODY MASS INDEX (BMI) DOCUMENTED: ICD-10-PCS | Mod: CPTII,S$GLB,, | Performed by: PHYSICIAN ASSISTANT

## 2022-08-17 PROCEDURE — 3288F FALL RISK ASSESSMENT DOCD: CPT | Mod: CPTII,S$GLB,, | Performed by: PHYSICIAN ASSISTANT

## 2022-08-17 PROCEDURE — 3074F SYST BP LT 130 MM HG: CPT | Mod: CPTII,S$GLB,, | Performed by: PHYSICIAN ASSISTANT

## 2022-08-17 PROCEDURE — 1101F PR PT FALLS ASSESS DOC 0-1 FALLS W/OUT INJ PAST YR: ICD-10-PCS | Mod: CPTII,S$GLB,, | Performed by: PHYSICIAN ASSISTANT

## 2022-08-17 PROCEDURE — 95251 CONT GLUC MNTR ANALYSIS I&R: CPT | Mod: S$GLB,,, | Performed by: PHYSICIAN ASSISTANT

## 2022-08-17 PROCEDURE — 3052F HG A1C>EQUAL 8.0%<EQUAL 9.0%: CPT | Mod: CPTII,S$GLB,, | Performed by: PHYSICIAN ASSISTANT

## 2022-08-17 PROCEDURE — 4010F PR ACE/ARB THEARPY RXD/TAKEN: ICD-10-PCS | Mod: CPTII,S$GLB,, | Performed by: PHYSICIAN ASSISTANT

## 2022-08-17 PROCEDURE — 99999 PR PBB SHADOW E&M-EST. PATIENT-LVL IV: ICD-10-PCS | Mod: PBBFAC,,, | Performed by: PHYSICIAN ASSISTANT

## 2022-08-17 PROCEDURE — 3052F PR MOST RECENT HEMOGLOBIN A1C LEVEL 8.0 - < 9.0%: ICD-10-PCS | Mod: CPTII,S$GLB,, | Performed by: PHYSICIAN ASSISTANT

## 2022-08-17 PROCEDURE — 4010F ACE/ARB THERAPY RXD/TAKEN: CPT | Mod: CPTII,S$GLB,, | Performed by: PHYSICIAN ASSISTANT

## 2022-08-17 PROCEDURE — 99214 PR OFFICE/OUTPT VISIT, EST, LEVL IV, 30-39 MIN: ICD-10-PCS | Mod: S$GLB,,, | Performed by: PHYSICIAN ASSISTANT

## 2022-08-17 PROCEDURE — 3078F PR MOST RECENT DIASTOLIC BLOOD PRESSURE < 80 MM HG: ICD-10-PCS | Mod: CPTII,S$GLB,, | Performed by: PHYSICIAN ASSISTANT

## 2022-08-17 RX ORDER — OFLOXACIN 3 MG/ML
SOLUTION/ DROPS OPHTHALMIC
COMMUNITY
Start: 2022-08-11 | End: 2022-11-17

## 2022-08-17 RX ORDER — PREDNISOLONE ACETATE 10 MG/ML
SUSPENSION/ DROPS OPHTHALMIC
COMMUNITY
Start: 2022-08-11 | End: 2022-11-17

## 2022-08-17 NOTE — PATIENT INSTRUCTIONS
CURRENT DM MEDICATIONS:   Lantus 14 units daily at night  Trulicity 4.5 mg weekly   Glipizide 5 mg before bed time snack   Humalog 8 units with small / 14 units with regular / 16 units with heavier carb meal TID wm

## 2022-08-25 ENCOUNTER — PATIENT MESSAGE (OUTPATIENT)
Dept: FAMILY MEDICINE | Facility: CLINIC | Age: 72
End: 2022-08-25
Payer: MEDICARE

## 2022-08-26 ENCOUNTER — PATIENT MESSAGE (OUTPATIENT)
Dept: FAMILY MEDICINE | Facility: CLINIC | Age: 72
End: 2022-08-26
Payer: MEDICARE

## 2022-09-01 ENCOUNTER — PATIENT OUTREACH (OUTPATIENT)
Dept: ADMINISTRATIVE | Facility: HOSPITAL | Age: 72
End: 2022-09-01
Payer: MEDICARE

## 2022-09-01 NOTE — PROGRESS NOTES
[facility] Report: Per chart review, patient has a lab appointment scheduled on 10/13/22 for recheck of CMP and urine micro.

## 2022-09-08 ENCOUNTER — PATIENT MESSAGE (OUTPATIENT)
Dept: DIABETES | Facility: CLINIC | Age: 72
End: 2022-09-08
Payer: MEDICARE

## 2022-09-16 NOTE — PROGRESS NOTES
PCP: Jessica Luna MD    Subjective:     Chief Complaint: Diabetes - Established Patient    HISTORY OF PRESENT ILLNESS: 72 y.o.   female presenting for diabetes management visit.   The patient's last visit with me was on 8/17/2022.  Patient has had Type II diabetes since 20 or more years.  Pertinent to decision making is the following comorbidities: HLD, Hypothyroidism and Obesity by BMI  Patient has the following Diabetes complications: without complications  She has attended diabetes education in the past.     Patient's most recent A1c of 8.3% was completed 3 months ago.   Patient states since Her last A1c Her blood glucose levels have been high  throughout the day .   Patient monitors blood glucose 4 times per day and Continuously with personal CGM Dexcom.   Patient blood glucose monitoring device will be uploaded into [employer] today.        Patients records show some baseline near hypoglycemia throughout the day especially on days where she is not eating and some postprandial hyperglycemia - admits not dosing 15 mins before.     Patient endorses the following diabetes related symptoms:  None .   Patient is due today for the following diabetes-related health maintenance standards: Lipid panel, Urine Microalbumin/creatinine ratio, A1c, and Influenza Vaccine.   She denies any recent hospital admissions or emergency room visits. Patient denies history of DKA.   She voices having hypoglycemia as above..   Patient's concerns today include glycemic control.   Patient medication regimen is as below.      CURRENT DM MEDICATIONS:   Lantus 14 units daily qhs  Trulicity 4.5 mg weekly - 1 wk left then switching to Ozempic 2 mg weekly   Glipizide 5 mg before bed time snack   Humalog 8 units with small / 12 units with regular / 14 units with heavier carb meal TID wm  Humalog correction dosing every 3 hours as below    Humalog Correction Dosing every 3 hours as needed OUTSIDE OF EATING  If  - 250, may  take 2 units of Humalog  If  - 300, may take 3 units of Humalog   If  - 350, may take 4 units of Humalog  If  - 400, may take 5 units of Humalog    If +, may take 6 units of Humalog     Patient has failed the following Diabetes medications:   Metformin - GI   Invokana - coverage  Jardiance - yeast infection  Glyburide   Victoza   Basal - Basaglar  Ozempic 2 mg - Stopped due to backorder      Labs Reviewed.       Lab Results   Component Value Date    CPEPTIDE 2.35 04/01/2021     Lab Results   Component Value Date    GLUTAMICACID 0.00 05/25/2017          //   , There is no height or weight on file to calculate BMI.  Her blood sugar in clinic today is:    Lab Results   Component Value Date    POCGLU 129 (A) 06/17/2022       Review of Systems   Constitutional:  Negative for activity change, appetite change, chills and fever.   HENT:  Negative for dental problem, mouth sores, nosebleeds, sore throat and trouble swallowing.    Eyes:  Negative for pain and discharge.   Respiratory:  Negative for shortness of breath, wheezing and stridor.    Cardiovascular:  Negative for chest pain, palpitations and leg swelling.   Gastrointestinal:  Negative for abdominal pain, diarrhea, nausea and vomiting.   Endocrine: Negative for polydipsia, polyphagia and polyuria.   Genitourinary:  Negative for dysuria, frequency and urgency.   Musculoskeletal:  Negative for joint swelling and myalgias.   Skin:  Negative for rash and wound.   Neurological:  Negative for dizziness, syncope, weakness and headaches.   Psychiatric/Behavioral:  Negative for behavioral problems and dysphoric mood.        Diabetes Management Status  Statin: Taking  ACE/ARB: Taking    Screening or Prevention Patient's value Goal Complete/Controlled?   HgA1C Testing and Control   Lab Results   Component Value Date    HGBA1C 8.3 (H) 06/13/2022      Annually/Less than 8% No   Lipid profile : 09/15/2021 Annually Yes   LDL control Lab Results   Component  Value Date    LDLCALC 61.4 (L) 09/15/2021    Annually/Less than 100 mg/dl  Yes   Nephropathy screening Lab Results   Component Value Date    MICALBCREAT Unable to calculate 09/15/2021     Lab Results   Component Value Date    PROTEINUA Negative 04/01/2021    Annually Yes   Blood pressure BP Readings from Last 1 Encounters:   08/17/22 121/72    Less than 140/90 Yes   Dilated retinal exam : 06/29/2022 Annually Yes    Foot exam   : 08/17/2022 Annually Yes     ACTIVITY LEVEL: Moderately Active. Discussed activities, benefits, methods, and precautions.  MEAL PLANNING: Patient reports number of meals per day to be 3 and number of snacks per day to be 2.   Patient is encouraged to carb count and consume no more than 30 - 45 grams of carbohydrates in each meal and 15 grams of carbohydrates in each snack.     Social History     Socioeconomic History    Marital status:    Tobacco Use    Smoking status: Never    Smokeless tobacco: Never   Substance and Sexual Activity    Alcohol use: Yes     Comment: rare    Drug use: No    Sexual activity: Yes     Partners: Male   Social History Narrative    . Lives with spouse. Has 3 children. Patient retired as  for [employer].      Social Determinants of Health     Financial Resource Strain: Low Risk     Difficulty of Paying Living Expenses: Not hard at all   Food Insecurity: No Food Insecurity    Worried About Running Out of Food in the Last Year: Never true    Ran Out of Food in the Last Year: Never true   Transportation Needs: No Transportation Needs    Lack of Transportation (Medical): No    Lack of Transportation (Non-Medical): No   Physical Activity: Insufficiently Active    Days of Exercise per Week: 2 days    Minutes of Exercise per Session: 50 min   Stress: No Stress Concern Present    Feeling of Stress : Only a little   Social Connections: Unknown    Frequency of Communication with Friends and Family: Once a week    Frequency of Social Gatherings  with Friends and Family: More than three times a week    Active Member of Clubs or Organizations: Yes    Attends Club or Organization Meetings: More than 4 times per year    Marital Status:    Housing Stability: Low Risk     Unable to Pay for Housing in the Last Year: No    Number of Places Lived in the Last Year: 1    Unstable Housing in the Last Year: No     Past Medical History:   Diagnosis Date    Diabetes mellitus type II     Hyperlipidemia     Hypertension     Hypothyroid     TALIA (obstructive sleep apnea)     on CPAP    Osteopenia     Psoriasis        Objective:        Physical Exam  Constitutional:       General: She is not in acute distress.     Appearance: She is well-developed. She is not diaphoretic.   HENT:      Head: Normocephalic and atraumatic.      Right Ear: External ear normal.      Left Ear: External ear normal.      Nose: Nose normal.   Eyes:      General:         Right eye: No discharge.         Left eye: No discharge.      Pupils: Pupils are equal, round, and reactive to light.   Cardiovascular:      Rate and Rhythm: Normal rate and regular rhythm.      Heart sounds: Normal heart sounds.   Pulmonary:      Effort: Pulmonary effort is normal.      Breath sounds: Normal breath sounds.   Abdominal:      Palpations: Abdomen is soft.   Musculoskeletal:         General: Normal range of motion.      Cervical back: Normal range of motion and neck supple.   Skin:     General: Skin is warm and dry.      Capillary Refill: Capillary refill takes less than 2 seconds.   Neurological:      Mental Status: She is alert and oriented to person, place, and time.      Motor: No abnormal muscle tone.      Coordination: Coordination normal.   Psychiatric:         Behavior: Behavior normal.         Thought Content: Thought content normal.         Judgment: Judgment normal.         Assessment / Plan:     Uncontrolled type 2 diabetes mellitus with hyperglycemia    Hyperlipidemia associated with type 2 diabetes  mellitus    Acquired hypothyroidism    TALIA on CPAP    Obesity (BMI 30-39.9)    Additional Plan Details:    - POCT Glucose  - Encouraged continuation of lifestyle changes including regular exercise and limiting carbohydrates to 30-45 grams per meal threes times daily and 15 grams per snack with a limit of two daily.   - Encouraged continued monitoring of blood glucose with maintenance of 4 times daily and Continuously with personal CGM Dexcom.    - Current DM Medication Regimen: Change Lantus 12 units daily then 9 units qhs with Ozempic change. Change Trulicity to Ozempic 2 mg weekly. Continue Glipizide 5 mg before night time snack if eating one. Change Humalog 8 units with small / 14 units with regular / 16 units with heavier carb meal TID wm. ADJUST INSULIN FREQUENTLY BASED ON BLOOD SUGAR.   - Health Maintenance standards addressed today: Lipid panel to be scheduled today, Urine Microalbumin / Creatinine Ratio scheduled, A1c to be scheduled, and Flu Shot - patient would like to complete outside of [facility]  - Nursing Visit: Patient is age 79 or younger with an A1c of 7.5 or greater and  qualifies for nursing visit, but will defer for now.  .   - Follow up in 6 weeks with A1c prior.     Humalog Correction Dosing every 3 hours as needed OUTSIDE OF EATING  If  - 250, may take 2 units of Humalog  If  - 300, may take 3 units of Humalog   If  - 350, may take 4 units of Humalog  If  - 400, may take 5 units of Humalog    If +, may take 6 units of Humalog     Blakeney McKnight, PA-C [facility]        A total of 30 minutes was spent in face to face time, of which over 50 % was spent in counseling patient on disease process, complications, treatment, and side effects of medications.

## 2022-09-19 ENCOUNTER — OFFICE VISIT (OUTPATIENT)
Dept: DIABETES | Facility: CLINIC | Age: 72
End: 2022-09-19
Payer: MEDICARE

## 2022-09-19 VITALS
HEART RATE: 90 BPM | SYSTOLIC BLOOD PRESSURE: 127 MMHG | BODY MASS INDEX: 38.76 KG/M2 | WEIGHT: 246.94 LBS | HEIGHT: 67 IN | RESPIRATION RATE: 18 BRPM | DIASTOLIC BLOOD PRESSURE: 68 MMHG

## 2022-09-19 DIAGNOSIS — E66.9 OBESITY (BMI 30-39.9): ICD-10-CM

## 2022-09-19 DIAGNOSIS — E78.5 HYPERLIPIDEMIA ASSOCIATED WITH TYPE 2 DIABETES MELLITUS: ICD-10-CM

## 2022-09-19 DIAGNOSIS — E11.69 HYPERLIPIDEMIA ASSOCIATED WITH TYPE 2 DIABETES MELLITUS: ICD-10-CM

## 2022-09-19 DIAGNOSIS — G47.33 OSA ON CPAP: ICD-10-CM

## 2022-09-19 DIAGNOSIS — E11.65 UNCONTROLLED TYPE 2 DIABETES MELLITUS WITH HYPERGLYCEMIA: Primary | ICD-10-CM

## 2022-09-19 DIAGNOSIS — E03.9 ACQUIRED HYPOTHYROIDISM: ICD-10-CM

## 2022-09-19 PROCEDURE — 3052F PR MOST RECENT HEMOGLOBIN A1C LEVEL 8.0 - < 9.0%: ICD-10-PCS | Mod: CPTII,S$GLB,, | Performed by: PHYSICIAN ASSISTANT

## 2022-09-19 PROCEDURE — 1101F PT FALLS ASSESS-DOCD LE1/YR: CPT | Mod: CPTII,S$GLB,, | Performed by: PHYSICIAN ASSISTANT

## 2022-09-19 PROCEDURE — 3008F PR BODY MASS INDEX (BMI) DOCUMENTED: ICD-10-PCS | Mod: CPTII,S$GLB,, | Performed by: PHYSICIAN ASSISTANT

## 2022-09-19 PROCEDURE — 3074F SYST BP LT 130 MM HG: CPT | Mod: CPTII,S$GLB,, | Performed by: PHYSICIAN ASSISTANT

## 2022-09-19 PROCEDURE — 95251 PR GLUCOSE MONITOR, 72 HOUR, PHYS INTERP: ICD-10-PCS | Mod: S$GLB,,, | Performed by: PHYSICIAN ASSISTANT

## 2022-09-19 PROCEDURE — 99999 PR PBB SHADOW E&M-EST. PATIENT-LVL III: ICD-10-PCS | Mod: PBBFAC,,, | Performed by: PHYSICIAN ASSISTANT

## 2022-09-19 PROCEDURE — 99999 PR PBB SHADOW E&M-EST. PATIENT-LVL III: CPT | Mod: PBBFAC,,, | Performed by: PHYSICIAN ASSISTANT

## 2022-09-19 PROCEDURE — 3074F PR MOST RECENT SYSTOLIC BLOOD PRESSURE < 130 MM HG: ICD-10-PCS | Mod: CPTII,S$GLB,, | Performed by: PHYSICIAN ASSISTANT

## 2022-09-19 PROCEDURE — 3288F PR FALLS RISK ASSESSMENT DOCUMENTED: ICD-10-PCS | Mod: CPTII,S$GLB,, | Performed by: PHYSICIAN ASSISTANT

## 2022-09-19 PROCEDURE — 4010F PR ACE/ARB THEARPY RXD/TAKEN: ICD-10-PCS | Mod: CPTII,S$GLB,, | Performed by: PHYSICIAN ASSISTANT

## 2022-09-19 PROCEDURE — 1126F PR PAIN SEVERITY QUANTIFIED, NO PAIN PRESENT: ICD-10-PCS | Mod: CPTII,S$GLB,, | Performed by: PHYSICIAN ASSISTANT

## 2022-09-19 PROCEDURE — 3078F DIAST BP <80 MM HG: CPT | Mod: CPTII,S$GLB,, | Performed by: PHYSICIAN ASSISTANT

## 2022-09-19 PROCEDURE — 99214 OFFICE O/P EST MOD 30 MIN: CPT | Mod: S$GLB,,, | Performed by: PHYSICIAN ASSISTANT

## 2022-09-19 PROCEDURE — 99214 PR OFFICE/OUTPT VISIT, EST, LEVL IV, 30-39 MIN: ICD-10-PCS | Mod: S$GLB,,, | Performed by: PHYSICIAN ASSISTANT

## 2022-09-19 PROCEDURE — 95251 CONT GLUC MNTR ANALYSIS I&R: CPT | Mod: S$GLB,,, | Performed by: PHYSICIAN ASSISTANT

## 2022-09-19 PROCEDURE — 1101F PR PT FALLS ASSESS DOC 0-1 FALLS W/OUT INJ PAST YR: ICD-10-PCS | Mod: CPTII,S$GLB,, | Performed by: PHYSICIAN ASSISTANT

## 2022-09-19 PROCEDURE — 3008F BODY MASS INDEX DOCD: CPT | Mod: CPTII,S$GLB,, | Performed by: PHYSICIAN ASSISTANT

## 2022-09-19 PROCEDURE — 1126F AMNT PAIN NOTED NONE PRSNT: CPT | Mod: CPTII,S$GLB,, | Performed by: PHYSICIAN ASSISTANT

## 2022-09-19 PROCEDURE — 3078F PR MOST RECENT DIASTOLIC BLOOD PRESSURE < 80 MM HG: ICD-10-PCS | Mod: CPTII,S$GLB,, | Performed by: PHYSICIAN ASSISTANT

## 2022-09-19 PROCEDURE — 3288F FALL RISK ASSESSMENT DOCD: CPT | Mod: CPTII,S$GLB,, | Performed by: PHYSICIAN ASSISTANT

## 2022-09-19 PROCEDURE — 3052F HG A1C>EQUAL 8.0%<EQUAL 9.0%: CPT | Mod: CPTII,S$GLB,, | Performed by: PHYSICIAN ASSISTANT

## 2022-09-19 PROCEDURE — 4010F ACE/ARB THERAPY RXD/TAKEN: CPT | Mod: CPTII,S$GLB,, | Performed by: PHYSICIAN ASSISTANT

## 2022-09-19 NOTE — PATIENT INSTRUCTIONS
CURRENT DM MEDICATIONS:   Lantus 12 units daily at night  Trulicity 4.5 mg weekly   Glipizide 5 mg before bed time snack   Humalog 8 units with small / 14 units with regular / 16 units with heavier carb meal TID wm  Humalog correction dosing every 3 hours as below    Humalog Correction Dosing every 3 hours as needed OUTSIDE OF EATING  If  - 250, may take 2 units of Humalog  If  - 300, may take 3 units of Humalog   If  - 350, may take 4 units of Humalog  If  - 400, may take 5 units of Humalog    If +, may take 6 units of Humalog         With Ozempic change:   Lantus 9 units daily at night  Ozempic 2 mg weekly   Glipizide 5 mg before bed time snack   Humalog 8 units with small / 14 units with regular / 16 units with heavier carb meal TID wm  Humalog correction dosing every 3 hours as below    Humalog Correction Dosing every 3 hours as needed OUTSIDE OF EATING  If  - 250, may take 2 units of Humalog  If  - 300, may take 3 units of Humalog   If  - 350, may take 4 units of Humalog  If  - 400, may take 5 units of Humalog    If +, may take 6 units of Humalog

## 2022-10-12 ENCOUNTER — TELEPHONE (OUTPATIENT)
Dept: DIABETES | Facility: CLINIC | Age: 72
End: 2022-10-12
Payer: MEDICARE

## 2022-10-12 ENCOUNTER — PATIENT MESSAGE (OUTPATIENT)
Dept: DIABETES | Facility: CLINIC | Age: 72
End: 2022-10-12
Payer: MEDICARE

## 2022-10-12 NOTE — TELEPHONE ENCOUNTER
LM regarding pt's appt for diabetes education that was scheduled for Oct. 17 at the [place] [facility] with myself. Since I am now at the [facility] on Mondays, moved pt's appt to next available at the [place] [facility] - Nov 2.

## 2022-10-13 ENCOUNTER — LAB VISIT (OUTPATIENT)
Dept: LAB | Facility: HOSPITAL | Age: 72
End: 2022-10-13
Attending: INTERNAL MEDICINE
Payer: MEDICARE

## 2022-10-13 DIAGNOSIS — E03.9 ACQUIRED HYPOTHYROIDISM: ICD-10-CM

## 2022-10-13 DIAGNOSIS — E78.5 HYPERLIPIDEMIA ASSOCIATED WITH TYPE 2 DIABETES MELLITUS: ICD-10-CM

## 2022-10-13 DIAGNOSIS — E11.69 HYPERLIPIDEMIA ASSOCIATED WITH TYPE 2 DIABETES MELLITUS: ICD-10-CM

## 2022-10-13 DIAGNOSIS — E11.65 UNCONTROLLED TYPE 2 DIABETES MELLITUS WITH HYPERGLYCEMIA: ICD-10-CM

## 2022-10-13 LAB
ALBUMIN SERPL BCP-MCNC: 3.4 G/DL (ref 3.5–5.2)
ALP SERPL-CCNC: 71 U/L (ref 55–135)
ALT SERPL W/O P-5'-P-CCNC: 13 U/L (ref 10–44)
ANION GAP SERPL CALC-SCNC: 8 MMOL/L (ref 8–16)
AST SERPL-CCNC: 14 U/L (ref 10–40)
BASOPHILS # BLD AUTO: 0.06 K/UL (ref 0–0.2)
BASOPHILS NFR BLD: 0.9 % (ref 0–1.9)
BILIRUB SERPL-MCNC: 1 MG/DL (ref 0.1–1)
BUN SERPL-MCNC: 11 MG/DL (ref 8–23)
CALCIUM SERPL-MCNC: 9.2 MG/DL (ref 8.7–10.5)
CHLORIDE SERPL-SCNC: 103 MMOL/L (ref 95–110)
CHOLEST SERPL-MCNC: 138 MG/DL (ref 120–199)
CHOLEST/HDLC SERPL: 1.9 {RATIO} (ref 2–5)
CO2 SERPL-SCNC: 27 MMOL/L (ref 23–29)
CREAT SERPL-MCNC: 1 MG/DL (ref 0.5–1.4)
DIFFERENTIAL METHOD: ABNORMAL
EOSINOPHIL # BLD AUTO: 0.1 K/UL (ref 0–0.5)
EOSINOPHIL NFR BLD: 1.9 % (ref 0–8)
ERYTHROCYTE [DISTWIDTH] IN BLOOD BY AUTOMATED COUNT: 15.1 % (ref 11.5–14.5)
EST. GFR  (NO RACE VARIABLE): 59.5 ML/MIN/1.73 M^2
ESTIMATED AVG GLUCOSE: 157 MG/DL (ref 68–131)
GLUCOSE SERPL-MCNC: 208 MG/DL (ref 70–110)
HBA1C MFR BLD: 7.1 % (ref 4–5.6)
HCT VFR BLD AUTO: 38.3 % (ref 37–48.5)
HDLC SERPL-MCNC: 72 MG/DL (ref 40–75)
HDLC SERPL: 52.2 % (ref 20–50)
HGB BLD-MCNC: 12 G/DL (ref 12–16)
IMM GRANULOCYTES # BLD AUTO: 0.03 K/UL (ref 0–0.04)
IMM GRANULOCYTES NFR BLD AUTO: 0.5 % (ref 0–0.5)
LDLC SERPL CALC-MCNC: 51.2 MG/DL (ref 63–159)
LYMPHOCYTES # BLD AUTO: 2.2 K/UL (ref 1–4.8)
LYMPHOCYTES NFR BLD: 34.1 % (ref 18–48)
MCH RBC QN AUTO: 27.8 PG (ref 27–31)
MCHC RBC AUTO-ENTMCNC: 31.3 G/DL (ref 32–36)
MCV RBC AUTO: 89 FL (ref 82–98)
MONOCYTES # BLD AUTO: 0.5 K/UL (ref 0.3–1)
MONOCYTES NFR BLD: 7.9 % (ref 4–15)
NEUTROPHILS # BLD AUTO: 3.5 K/UL (ref 1.8–7.7)
NEUTROPHILS NFR BLD: 54.7 % (ref 38–73)
NONHDLC SERPL-MCNC: 66 MG/DL
NRBC BLD-RTO: 0 /100 WBC
PLATELET # BLD AUTO: 228 K/UL (ref 150–450)
PMV BLD AUTO: 10.9 FL (ref 9.2–12.9)
POTASSIUM SERPL-SCNC: 4.8 MMOL/L (ref 3.5–5.1)
PROT SERPL-MCNC: 6.3 G/DL (ref 6–8.4)
RBC # BLD AUTO: 4.31 M/UL (ref 4–5.4)
SODIUM SERPL-SCNC: 138 MMOL/L (ref 136–145)
T4 FREE SERPL-MCNC: 1.16 NG/DL (ref 0.71–1.51)
TRIGL SERPL-MCNC: 74 MG/DL (ref 30–150)
TSH SERPL DL<=0.005 MIU/L-ACNC: 5.52 UIU/ML (ref 0.4–4)
WBC # BLD AUTO: 6.36 K/UL (ref 3.9–12.7)

## 2022-10-13 PROCEDURE — 84443 ASSAY THYROID STIM HORMONE: CPT | Performed by: INTERNAL MEDICINE

## 2022-10-13 PROCEDURE — 36415 COLL VENOUS BLD VENIPUNCTURE: CPT | Performed by: INTERNAL MEDICINE

## 2022-10-13 PROCEDURE — 80061 LIPID PANEL: CPT | Performed by: INTERNAL MEDICINE

## 2022-10-13 PROCEDURE — 83036 HEMOGLOBIN GLYCOSYLATED A1C: CPT | Performed by: INTERNAL MEDICINE

## 2022-10-13 PROCEDURE — 84439 ASSAY OF FREE THYROXINE: CPT | Performed by: INTERNAL MEDICINE

## 2022-10-13 PROCEDURE — 85025 COMPLETE CBC W/AUTO DIFF WBC: CPT | Performed by: INTERNAL MEDICINE

## 2022-10-13 PROCEDURE — 80053 COMPREHEN METABOLIC PANEL: CPT | Performed by: INTERNAL MEDICINE

## 2022-10-25 ENCOUNTER — PATIENT MESSAGE (OUTPATIENT)
Dept: DIABETES | Facility: CLINIC | Age: 72
End: 2022-10-25
Payer: MEDICARE

## 2022-10-25 DIAGNOSIS — E11.65 UNCONTROLLED TYPE 2 DIABETES MELLITUS WITH HYPERGLYCEMIA: ICD-10-CM

## 2022-10-25 RX ORDER — SEMAGLUTIDE 2.68 MG/ML
2 INJECTION, SOLUTION SUBCUTANEOUS WEEKLY
Qty: 3 ML | Refills: 5 | Status: SHIPPED | OUTPATIENT
Start: 2022-10-25 | End: 2022-10-26 | Stop reason: SDUPTHER

## 2022-10-26 RX ORDER — SEMAGLUTIDE 2.68 MG/ML
2 INJECTION, SOLUTION SUBCUTANEOUS WEEKLY
Qty: 9 ML | Refills: 3 | Status: SHIPPED | OUTPATIENT
Start: 2022-10-26 | End: 2022-10-28 | Stop reason: ALTCHOICE

## 2022-10-28 ENCOUNTER — CLINICAL SUPPORT (OUTPATIENT)
Dept: DIABETES | Facility: CLINIC | Age: 72
End: 2022-10-28
Payer: MEDICARE

## 2022-10-28 ENCOUNTER — TELEPHONE (OUTPATIENT)
Dept: DIABETES | Facility: CLINIC | Age: 72
End: 2022-10-28
Payer: MEDICARE

## 2022-10-28 ENCOUNTER — PATIENT MESSAGE (OUTPATIENT)
Dept: DIABETES | Facility: CLINIC | Age: 72
End: 2022-10-28

## 2022-10-28 RX ORDER — DULAGLUTIDE 4.5 MG/.5ML
4.5 INJECTION, SOLUTION SUBCUTANEOUS
Qty: 12 PEN | Refills: 3 | Status: SHIPPED | OUTPATIENT
Start: 2022-10-28 | End: 2023-02-03

## 2022-10-28 NOTE — TELEPHONE ENCOUNTER
----- Message from Lorraine Holliday PA-C sent at 10/28/2022  8:50 AM CDT -----  Patient using dexcom and not sharing. See if she wants to come for a nurse visit today to download dexcom or if she would rather come in person on Monday instead of audio. If she wants audio, can also download Mon early am

## 2022-10-28 NOTE — TELEPHONE ENCOUNTER
Called pt as per provider's request to schedule a nurse visit for pt to come in to download device. Appointment scheduled. Pt verbalized understanding.

## 2022-10-31 ENCOUNTER — OFFICE VISIT (OUTPATIENT)
Dept: DIABETES | Facility: CLINIC | Age: 72
End: 2022-10-31
Payer: MEDICARE

## 2022-10-31 DIAGNOSIS — G47.33 OSA ON CPAP: ICD-10-CM

## 2022-10-31 DIAGNOSIS — E66.9 OBESITY (BMI 30-39.9): ICD-10-CM

## 2022-10-31 DIAGNOSIS — E03.9 ACQUIRED HYPOTHYROIDISM: ICD-10-CM

## 2022-10-31 DIAGNOSIS — E11.69 HYPERLIPIDEMIA ASSOCIATED WITH TYPE 2 DIABETES MELLITUS: ICD-10-CM

## 2022-10-31 DIAGNOSIS — E78.5 HYPERLIPIDEMIA ASSOCIATED WITH TYPE 2 DIABETES MELLITUS: ICD-10-CM

## 2022-10-31 DIAGNOSIS — E11.65 UNCONTROLLED TYPE 2 DIABETES MELLITUS WITH HYPERGLYCEMIA: Primary | ICD-10-CM

## 2022-10-31 PROCEDURE — 3061F PR NEG MICROALBUMINURIA RESULT DOCUMENTED/REVIEW: ICD-10-PCS | Mod: CPTII,,, | Performed by: PHYSICIAN ASSISTANT

## 2022-10-31 PROCEDURE — 3061F NEG MICROALBUMINURIA REV: CPT | Mod: CPTII,,, | Performed by: PHYSICIAN ASSISTANT

## 2022-10-31 PROCEDURE — 3051F PR MOST RECENT HEMOGLOBIN A1C LEVEL 7.0 - < 8.0%: ICD-10-PCS | Mod: CPTII,,, | Performed by: PHYSICIAN ASSISTANT

## 2022-10-31 PROCEDURE — 3066F PR DOCUMENTATION OF TREATMENT FOR NEPHROPATHY: ICD-10-PCS | Mod: CPTII,,, | Performed by: PHYSICIAN ASSISTANT

## 2022-10-31 PROCEDURE — 4010F PR ACE/ARB THEARPY RXD/TAKEN: ICD-10-PCS | Mod: CPTII,,, | Performed by: PHYSICIAN ASSISTANT

## 2022-10-31 PROCEDURE — 3066F NEPHROPATHY DOC TX: CPT | Mod: CPTII,,, | Performed by: PHYSICIAN ASSISTANT

## 2022-10-31 PROCEDURE — 99442 PR PHYSICIAN TELEPHONE EVALUATION 11-20 MIN: CPT | Mod: 95,,, | Performed by: PHYSICIAN ASSISTANT

## 2022-10-31 PROCEDURE — 99442 PR PHYSICIAN TELEPHONE EVALUATION 11-20 MIN: ICD-10-PCS | Mod: 95,,, | Performed by: PHYSICIAN ASSISTANT

## 2022-10-31 PROCEDURE — 3051F HG A1C>EQUAL 7.0%<8.0%: CPT | Mod: CPTII,,, | Performed by: PHYSICIAN ASSISTANT

## 2022-10-31 PROCEDURE — 4010F ACE/ARB THERAPY RXD/TAKEN: CPT | Mod: CPTII,,, | Performed by: PHYSICIAN ASSISTANT

## 2022-10-31 NOTE — PATIENT INSTRUCTIONS
CURRENT DM MEDICATIONS:   Lantus 12 units daily   Trulicity 4.5 mg weekly - switched back last week due to Ozempic 2 mg backorder  Glipizide 5 mg before bed time snack   Humalog 8 units with small / 14 units with regular / 16 units with heavier carb meal TID wm  Humalog correction dosing every 3 hours as below    Humalog Correction Dosing every 3 hours as needed OUTSIDE OF EATING  If  - 250, may take 2 units of Humalog  If  - 300, may take 3 units of Humalog   If  - 350, may take 4 units of Humalog  If  - 400, may take 5 units of Humalog    If +, may take 6 units of Humalog

## 2022-10-31 NOTE — PROGRESS NOTES
PCP: Jessica Luna MD    Subjective:     Chief Complaint: Diabetes - Established Patient    Established Patient - Audio Only Telehealth Visit     The patient location is: Home  The chief complaint leading to consultation is: Diabetes follow up  Visit type: Virtual visit with audio only (telephone)  Total Time Spent with Patient: 16 minutes     The reason for the audio only service rather than synchronous audio and video virtual visit was related to technical difficulties or patient preference/necessity.     Each patient to whom I provide medical services by telemedicine is:  (1) informed of the relationship between the physician and patient and the respective role of any other health care provider with respect to management of the patient; and (2) notified that they may decline to receive medical services by telemedicine and may withdraw from such care at any time. Patient verbally consented to receive this service via voice-only telephone call.    This service was not originating from a related E/M service provided within the previous 7 days nor will  to an E/M service or procedure within the next 24 hours or my soonest available appointment.  Prevailing standard of care was able to be met in this audio-only visit.         HISTORY OF PRESENT ILLNESS: 72 y.o.   female presenting for diabetes management visit.   The patient's last visit with me was on 9/19/2022.  Patient has had Type II diabetes since 20 or more years.  Pertinent to decision making is the following comorbidities: HLD, Hypothyroidism and Obesity by BMI  Patient has the following Diabetes complications: without complications  She has attended diabetes education in the past.     Patient's most recent A1c of 7.1% was completed 2 weeks ago.   Patient states since Her last A1c Her blood glucose levels have been high  throughout the day .   Patient monitors blood glucose 4 times per day and Continuously with personal CGM Dexcom.    Patient blood glucose monitoring device will be uploaded into [employer] [facility] today.        Patients records show some baseline hyperglycemia with missing GLP-1 dose. Patient just restarted Trulicity and reported some mild baseline hyperglycemia. Some postprandial hypoglycemia but patient not able to tell me at what dose.     Patient endorses the following diabetes related symptoms:  None .   Patient is due today for the following diabetes-related health maintenance standards: COVID-19 Vaccine .   She denies any recent hospital admissions or emergency room visits. Patient denies history of DKA.   She voices having hypoglycemia as above..   Patient's concerns today include glycemic control.   Patient medication regimen is as below.      CURRENT DM MEDICATIONS:   Lantus 9 units daily   Trulicity 4.5 mg weekly - switched back last week due to Ozempic 2 mg backorder  Glipizide 5 mg before bed time snack   Humalog 8 units with small / 14 units with regular / 16 units with heavier carb meal TID wm  Humalog correction dosing every 3 hours as below    Humalog Correction Dosing every 3 hours as needed OUTSIDE OF EATING  If  - 250, may take 2 units of Humalog  If  - 300, may take 3 units of Humalog   If  - 350, may take 4 units of Humalog  If  - 400, may take 5 units of Humalog    If +, may take 6 units of Humalog     Patient has failed the following Diabetes medications:   Metformin - GI   Invokana - coverage  Jardiance - yeast infection  Glyburide   Victoza   Basal - Basaglar  Ozempic 2 mg - Stopped due to backorder      Labs Reviewed.       Lab Results   Component Value Date    CPEPTIDE 2.35 04/01/2021     Lab Results   Component Value Date    GLUTAMICACID 0.00 05/25/2017          //   , There is no height or weight on file to calculate BMI.  Her blood sugar in clinic today is:    Lab Results   Component Value Date    POCGLU 129 (A) 06/17/2022       Review of Systems   Constitutional:   Negative for activity change, appetite change, chills and fever.   HENT:  Negative for dental problem, mouth sores, nosebleeds, sore throat and trouble swallowing.    Eyes:  Negative for pain and discharge.   Respiratory:  Negative for shortness of breath, wheezing and stridor.    Cardiovascular:  Negative for chest pain, palpitations and leg swelling.   Gastrointestinal:  Negative for abdominal pain, diarrhea, nausea and vomiting.   Endocrine: Negative for polydipsia, polyphagia and polyuria.   Genitourinary:  Negative for dysuria, frequency and urgency.   Musculoskeletal:  Negative for joint swelling and myalgias.   Skin:  Negative for rash and wound.   Neurological:  Negative for dizziness, syncope, weakness and headaches.   Psychiatric/Behavioral:  Negative for behavioral problems and dysphoric mood.        Diabetes Management Status  Statin: Taking  ACE/ARB: Taking    Screening or Prevention Patient's value Goal Complete/Controlled?   HgA1C Testing and Control   Lab Results   Component Value Date    HGBA1C 7.1 (H) 10/13/2022      Annually/Less than 8% No   Lipid profile : 10/13/2022 Annually Yes   LDL control Lab Results   Component Value Date    LDLCALC 51.2 (L) 10/13/2022    Annually/Less than 100 mg/dl  Yes   Nephropathy screening Lab Results   Component Value Date    MICALBCREAT 3.5 10/13/2022     Lab Results   Component Value Date    PROTEINUA Negative 04/01/2021    Annually Yes   Blood pressure BP Readings from Last 1 Encounters:   09/19/22 127/68    Less than 140/90 Yes   Dilated retinal exam : 06/29/2022 Annually Yes    Foot exam   : 08/17/2022 Annually Yes     ACTIVITY LEVEL: Moderately Active. Discussed activities, benefits, methods, and precautions.  MEAL PLANNING: Patient reports number of meals per day to be 3 and number of snacks per day to be 2.   Patient is encouraged to carb count and consume no more than 30 - 45 grams of carbohydrates in each meal and 15 grams of carbohydrates in each snack.      Social History     Socioeconomic History    Marital status:    Tobacco Use    Smoking status: Never    Smokeless tobacco: Never   Substance and Sexual Activity    Alcohol use: Yes     Comment: rare    Drug use: No    Sexual activity: Yes     Partners: Male   Social History Narrative    . Lives with spouse. Has 3 children. Patient retired as  for [employer].      Social Determinants of Health     Financial Resource Strain: Low Risk     Difficulty of Paying Living Expenses: Not hard at all   Food Insecurity: No Food Insecurity    Worried About Running Out of Food in the Last Year: Never true    Ran Out of Food in the Last Year: Never true   Transportation Needs: No Transportation Needs    Lack of Transportation (Medical): No    Lack of Transportation (Non-Medical): No   Physical Activity: Insufficiently Active    Days of Exercise per Week: 2 days    Minutes of Exercise per Session: 50 min   Stress: No Stress Concern Present    Feeling of Stress : Only a little   Social Connections: Unknown    Frequency of Communication with Friends and Family: Once a week    Frequency of Social Gatherings with Friends and Family: More than three times a week    Active Member of Clubs or Organizations: Yes    Attends Club or Organization Meetings: More than 4 times per year    Marital Status:    Housing Stability: Low Risk     Unable to Pay for Housing in the Last Year: No    Number of Places Lived in the Last Year: 1    Unstable Housing in the Last Year: No     Past Medical History:   Diagnosis Date    Diabetes mellitus type II     Hyperlipidemia     Hypertension     Hypothyroid     TALIA (obstructive sleep apnea)     on CPAP    Osteopenia     Psoriasis        Objective:        Physical Exam  Neurological:      Mental Status: She is alert and oriented to person, place, and time. Mental status is at baseline.   Psychiatric:         Mood and Affect: Mood normal.         Behavior: Behavior normal.          Thought Content: Thought content normal.         Judgment: Judgment normal.         Assessment / Plan:     Uncontrolled type 2 diabetes mellitus with hyperglycemia    Hyperlipidemia associated with type 2 diabetes mellitus    Acquired hypothyroidism    TALIA on CPAP    Obesity (BMI 30-39.9)    Additional Plan Details:    - POCT Glucose  - Encouraged continuation of lifestyle changes including regular exercise and limiting carbohydrates to 30-45 grams per meal threes times daily and 15 grams per snack with a limit of two daily.   - Encouraged continued monitoring of blood glucose with maintenance of 4 times daily and Continuously with personal CGM Dexcom.    - Current DM Medication Regimen: Change Lantus 12 units daily. Continue Trulicity 4.5 mg weekly. Continue Glipizide 5 mg before night time snack if eating one. Continue Humalog 8 units with small / 14 units with regular / 16 units with heavier carb meal TID wm. ADJUST INSULIN FREQUENTLY BASED ON BLOOD SUGAR.   - Health Maintenance standards addressed today: COVID - 19 Vaccine - patient will schedule outside of [facility]   - Nursing Visit: Patient is age 78 or younger with an A1c of 7.5 or greater and  qualifies for nursing visit, but will defer for now.  .   - Follow up in 6 weeks with A1c prior.     Humalog Correction Dosing every 3 hours as needed OUTSIDE OF EATING  If  - 250, may take 2 units of Humalog  If  - 300, may take 3 units of Humalog   If  - 350, may take 4 units of Humalog  If  - 400, may take 5 units of Humalog    If +, may take 6 units of Humalog     Blakeney McKnight, PA-C [facility]                                           no radiation

## 2022-11-02 ENCOUNTER — CLINICAL SUPPORT (OUTPATIENT)
Dept: DIABETES | Facility: CLINIC | Age: 72
End: 2022-11-02
Payer: MEDICARE

## 2022-11-02 VITALS — HEIGHT: 67 IN | BODY MASS INDEX: 38.47 KG/M2 | WEIGHT: 245.13 LBS

## 2022-11-02 DIAGNOSIS — E11.65 UNCONTROLLED TYPE 2 DIABETES MELLITUS WITH HYPERGLYCEMIA: Primary | ICD-10-CM

## 2022-11-02 PROCEDURE — 99999 PR PBB SHADOW E&M-EST. PATIENT-LVL II: ICD-10-PCS | Mod: PBBFAC,,, | Performed by: DIETITIAN, REGISTERED

## 2022-11-02 PROCEDURE — 99999 PR PBB SHADOW E&M-EST. PATIENT-LVL II: CPT | Mod: PBBFAC,,, | Performed by: DIETITIAN, REGISTERED

## 2022-11-02 PROCEDURE — G0108 DIAB MANAGE TRN  PER INDIV: HCPCS | Mod: S$GLB,,, | Performed by: DIETITIAN, REGISTERED

## 2022-11-02 PROCEDURE — G0108 PR DIAB MANAGE TRN  PER INDIV: ICD-10-PCS | Mod: S$GLB,,, | Performed by: DIETITIAN, REGISTERED

## 2022-11-02 NOTE — LETTER
November 3, 2022      Lorraine Holliday PA-C  [address]  [place] [address]         Patient: Jaimie Luque   MR Number: [id number]   YOB: 1950   Date of Visit: 11/2/2022       Dear Dr. Holliday:    Thank you for referring Jaimie for diabetes self-management education and support services. She has completed all components of our Diabetes Management Program. Below is a summary of her clinical outcomes and goal progress.    Patient Outcomes:    A1c Status:   Lab Results   Component Value Date    HGBA1C 7.1 (H) 10/13/2022    HGBA1C 8.3 (H) 06/13/2022       Care Plan: Diabetes Management   Updates made since 10/4/2022 12:00 AM        Problem: Healthy Eating         Goal: Eat 3 meals daily with 30-45g/2-3 servings of Carbohydrate per meal and snacks should include <15 grams of carb.    Start Date: 4/18/2022   Expected End Date: 10/18/2022   This Visit's Progress: Not met   Recent Progress: On track   Priority: Medium   Barriers: No Barriers Identified   Note:    Pt is sometime limiting carb intake, but will often have snack foods as a meal. Reviewed meals for yesterday and breakfast was >100 grams of carbs.   Used food models to show portion sizes and reviewed how to add total carbs per meal for better BG control and possible wt loss.     Discussed how to determine size of meal for insulin dosing.   Small meal - 2 carb servings  Regular meal - 3-4 carb servings  Heavy meal - 5+ servings       Problem: Medications         Goal: Patient Agrees to take Diabetes Medication(s) as prescribed.    Start Date: 4/18/2022   Expected End Date: 10/18/2022   This Visit's Progress: Not met   Recent Progress: On track   Priority: High   Barriers: No Barriers Identified   Note:    Pt was on Ozempic for 1 month, but now back to Trulicity 4.5 mg due to back order of Ozempic. She notices that she was doing better on Ozempic 2 mg.   Since she is back on Trulicity, insulin doses were adjusted highter.   She  usually takes Humalog before meals, but sometimes right before rather than 10 min before. Other times, she will take insulin ahead of her meal and get distracted and not eat within 15 min and BG will drop.   Pt will work on being consistent with timing of insulin.   She will work on determining dose of Humalog based on size of meal and use correction when needed.          Follow up:   · Jaimie to attend medical appointments as scheduled  · Jaimie to update you on her DM education progress as needed      If you have questions, please do not hesitate to call me. I look forward to providing additional education and support as needed.    Sincerely,    Aminata Mack RD  Diabetes Care and

## 2022-11-02 NOTE — PATIENT INSTRUCTIONS
Small meal - 2 carb servings (30 grams)  Regular meal - 3-4 carb servings (45-60 grams)  Heavy meal - 5+ servings (>60 grams)    Humalo units for small carb meal  14 units for medium carb meal  16 units for high carb meal      WDL

## 2022-11-02 NOTE — PROGRESS NOTES
"Diabetes Care Specialist Follow-up Note  Author: Aminata Mack RD  Date: 11/3/2022    Program Intake  Reason for Diabetes Program Visit:: Post Program Follow-Up  Type of Intervention:: Individual  Individual: Education  Education: Self-Management Skill Review, Nutrition and Meal Planning  Type of Follow-Up: 6 month    Lab Results   Component Value Date    HGBA1C 7.1 (H) 10/13/2022     A1c Pre Diabetes Care Specialist Intervention:  9.1%    CURRENT DM MEDICATIONS:   Glipizide, 5 mg - only with heavy snack at night (rarely taking)  Trulicity, 4.5 mg weekly   Lantus, 12 units mid afternoon   Humalog, 8-16 units AC - based on meals               8 units with small / 14 units with regular / 16 units with heavier carb meal TID      Correction Dosing every 3 hours as needed OUTSIDE OF EATING  If  - 250, may take 2 units of Humalog  If  - 300, may take 3 units of Humalog   If  - 350, may take 4 units of Humalog  If  - 400, may take 5 units of Humalog                          If +, may take 6 units of Humalog         Clinical    Weight: 111.2 kg (245 lb 2.4 oz)   Height: 5' 7" (170.2 cm)   Body mass index is 38.4 kg/m².  Wt gain of 5 lbs since last visit on 7/13/2022    Nutritional Status  Meal Plan 24 Hour Recall - Breakfast: 2 scones, satsuma; water  -  took 14 units Humalog  //  today, sausage, egg and cheese croissant; water  Meal Plan 24 Hour Recall - Lunch: 1 scone, 1 satsuma; water - took 12 units Humalog  Meal Plan 24 Hour Recall - Dinner: 8p - Life cereal w/whole milk, Halloween candy  -  took 12-14 units Humalog    Physical activity/Exercise:   During the summer, pt did water aerobics in an outside pool  Insurance will cover a place with an indoor pool - she will look into that     SMBG: Dexcom CGM           During today's follow-up visit,  the following areas required further assessment and content was provided/reviewed.    Based on today's diabetes care assessment, the following " areas of need were identified:        Diabetes Self-Management Skills 4/18/2022   Diabetes Disease Process/Treatment Options    Nutrition/Healthy Eating See care plan      Physical Activity/Exercise Discussed physical activity as it relates to insulin resistance and weight loss.      Medication See care plan      Home Blood Glucose Monitoring Discussed Dexcom Clarity report     Acute Complications    Chronic Complications    Psychosocial/Coping         Today's interventions were provided through individual discussion, instruction, and written materials were provided.    Patient verbalized understanding of instruction and written materials.  Pt was able to return back demonstration of instructions today. Patient understood key points, needs reinforcement and further instruction.     Diabetes Self-Management Care Plan Review:  Diabetes Self-Management Care Plan Review and Evaluation of Progress:    During today's follow-up Jaimie's Diabetes Self-Management Care Plan progress was reviewed and progress was evaluated including his/her input. Jaimie has agreed to continue his/her journey to improve/maintain overall diabetes control by continuing to set health goals. See care plan progress below.      Care Plan: Diabetes Management   Updates made since 10/4/2022 12:00 AM        Problem: Healthy Eating         Goal: Eat 3 meals daily with 30-45g/2-3 servings of Carbohydrate per meal and snacks should include <15 grams of carb.    Start Date: 4/18/2022   Expected End Date: 10/18/2022   This Visit's Progress: Not met   Recent Progress: On track   Priority: Medium   Barriers: No Barriers Identified   Note:    Pt is sometime limiting carb intake, but will often have snack foods as a meal. Reviewed meals for yesterday and breakfast was >100 grams of carbs.   Used food models to show portion sizes and reviewed how to add total carbs per meal for better BG control and possible wt loss.     Discussed how to determine size of meal for  insulin dosing.   Small meal - 2 carb servings  Regular meal - 3-4 carb servings  Heavy meal - 5+ servings       Problem: Medications         Goal: Patient Agrees to take Diabetes Medication(s) as prescribed.    Start Date: 4/18/2022   Expected End Date: 10/18/2022   This Visit's Progress: Not met   Recent Progress: On track   Priority: High   Barriers: No Barriers Identified   Note:    Pt was on Ozempic for 1 month, but now back to Trulicity 4.5 mg due to back order of Ozempic. She notices that she was doing better on Ozempic 2 mg.   Since she is back on Trulicity, insulin doses were adjusted highter.   She usually takes Humalog before meals, but sometimes right before rather than 10 min before. Other times, she will take insulin ahead of her meal and get distracted and not eat within 15 min and BG will drop.   Pt will work on being consistent with timing of insulin.   She will work on determining dose of Humalog based on size of meal and use correction when needed.            Follow Up Plan     Follow up for assessment with new referral as needed.    Today's care plan and follow up schedule was discussed with patient.  Jaimie verbalized understanding of the care plan, goals, and agrees to follow up plan.        The patient was encouraged to communicate with his/her health care provider/physician and care team regarding his/her condition(s) and treatment.  I provided the patient with my contact information today and encouraged to contact me via phone or [facility]'s Patient Portal as needed.

## 2022-11-10 NOTE — PROGRESS NOTES
"Subjective:      Patient ID: Jaimie Luque is a 72 y.o. female.    Chief Complaint: Annual visit      HPI  Here for f/u medical problems and preventive exam.  Poor energy.  Was doing pool exercising.  Walking is restricted by knee pain.  AC breakfast sugars 150-160s.  1-2x per week, gets a low reading in 50s.  No f/c/sw/cough.  No cp/sob/palp.  BMs and urine normal.  60oz water daily.  Still needs lilliam for PHN.  Intol of jardiance, with persistent candidal infx.    HM: 10/22 fluvax, 2/21 covid vaccines/booster, 4/17 HAV#2, 3/15 [id number], 3/14 [id number], 11/22 today [id number], 10/18 booster at pharm TDaP, 11/12 zoster, 2019 Shingrix x2, 9/21 BMD rep 5y, 5/17 Cscope rep 5y, 6/22 mmg(q2), 2/22 Eye doctor, 9/16 HCV neg, 10/21 CT lung repeat due 1y with Pulm Dr. Schumacher.     Review of Systems   Constitutional:  Negative for appetite change, chills, diaphoresis and fever.   HENT:  Negative for congestion, ear pain, rhinorrhea, sinus pressure and sore throat.    Respiratory:  Negative for cough, chest tightness and shortness of breath.    Cardiovascular:  Negative for chest pain and palpitations.   Gastrointestinal:  Negative for blood in stool, constipation, diarrhea, nausea and vomiting.   Genitourinary:  Negative for dysuria, frequency, hematuria, menstrual problem, urgency and vaginal discharge.   Musculoskeletal:  Negative for arthralgias.   Skin:  Negative for rash.   Neurological:  Negative for dizziness and headaches.   Psychiatric/Behavioral:  Negative for sleep disturbance. The patient is not nervous/anxious.        Objective:   /70   Pulse 67   Temp 97.7 °F (36.5 °C) (Oral)   Resp 18   Ht 5' 7" (1.702 m)   Wt 111.4 kg (245 lb 8 oz)   SpO2 97%   BMI 38.45 kg/m²     Physical Exam  Constitutional:       Appearance: She is well-developed.   HENT:      Right Ear: External ear normal. Tympanic membrane is not injected.      Left Ear: External ear normal. Tympanic membrane is not injected.   Eyes:      " Conjunctiva/sclera: Conjunctivae normal.   Neck:      Thyroid: No thyromegaly.   Cardiovascular:      Rate and Rhythm: Normal rate and regular rhythm.      Pulses:           Dorsalis pedis pulses are 2+ on the right side and 2+ on the left side.        Posterior tibial pulses are 2+ on the right side and 2+ on the left side.      Heart sounds: No murmur heard.    No friction rub. No gallop.   Pulmonary:      Effort: Pulmonary effort is normal.      Breath sounds: Normal breath sounds. No wheezing or rales.   Chest:   Breasts:     Right: No mass, nipple discharge, skin change or tenderness.      Left: No mass, nipple discharge, skin change or tenderness.   Abdominal:      General: Bowel sounds are normal.      Palpations: Abdomen is soft. There is no mass.      Tenderness: There is no abdominal tenderness.   Musculoskeletal:      Cervical back: Normal range of motion and neck supple.   Feet:      Right foot:      Protective Sensation: 10 sites tested.  10 sites sensed.      Skin integrity: No ulcer, blister, skin breakdown, erythema, warmth, callus or dry skin.      Left foot:      Protective Sensation: 10 sites tested.  10 sites sensed.      Skin integrity: No ulcer, blister, skin breakdown, erythema, warmth, callus or dry skin.   Lymphadenopathy:      Cervical: No cervical adenopathy.   Skin:     General: Skin is warm.      Findings: No rash.   Neurological:      Mental Status: She is alert and oriented to person, place, and time.          Latest Reference Range & Units 10/13/22 07:55 10/13/22 08:03   WBC 3.90 - 12.70 K/uL  6.36   RBC 4.00 - 5.40 M/uL  4.31   Hemoglobin 12.0 - 16.0 g/dL  12.0   Hematocrit 37.0 - 48.5 %  38.3   MCV 82 - 98 fL  89   MCH 27.0 - 31.0 pg  27.8   MCHC 32.0 - 36.0 g/dL  31.3 (L)   RDW 11.5 - 14.5 %  15.1 (H)   Platelets 150 - 450 K/uL  228   MPV 9.2 - 12.9 fL  10.9   Gran % 38.0 - 73.0 %  54.7   Lymph % 18.0 - 48.0 %  34.1   Mono % 4.0 - 15.0 %  7.9   Eosinophil % 0.0 - 8.0 %  1.9    Basophil % 0.0 - 1.9 %  0.9   Immature Granulocytes 0.0 - 0.5 %  0.5   Gran # (ANC) 1.8 - 7.7 K/uL  3.5   Lymph # 1.0 - 4.8 K/uL  2.2   Mono # 0.3 - 1.0 K/uL  0.5   Eos # 0.0 - 0.5 K/uL  0.1   Baso # 0.00 - 0.20 K/uL  0.06   Immature Grans (Abs) 0.00 - 0.04 K/uL  0.03   nRBC 0 /100 WBC  0   Differential Method   Automated   Sodium 136 - 145 mmol/L  138   Potassium 3.5 - 5.1 mmol/L  4.8   Chloride 95 - 110 mmol/L  103   CO2 23 - 29 mmol/L  27   Anion Gap 8 - 16 mmol/L  8   BUN 8 - 23 mg/dL  11   Creatinine 0.5 - 1.4 mg/dL  1.0   eGFR >60 mL/min/1.73 m^2  59.5 !   Glucose 70 - 110 mg/dL  208 (H)   Calcium 8.7 - 10.5 mg/dL  9.2   Alkaline Phosphatase 55 - 135 U/L  71   PROTEIN TOTAL 6.0 - 8.4 g/dL  6.3   Albumin 3.5 - 5.2 g/dL  3.4 (L)   BILIRUBIN TOTAL 0.1 - 1.0 mg/dL  1.0   AST 10 - 40 U/L  14   ALT 10 - 44 U/L  13   Cholesterol 120 - 199 mg/dL  138   HDL 40 - 75 mg/dL  72   HDL/Cholesterol Ratio 20.0 - 50.0 %  52.2 (H)   LDL Cholesterol External 63.0 - 159.0 mg/dL  51.2 (L)   Non-HDL Cholesterol mg/dL  66   Total Cholesterol/HDL Ratio 2.0 - 5.0   1.9 (L)   Triglycerides 30 - 150 mg/dL  74   Hemoglobin A1C External 4.0 - 5.6 %  7.1 (H)   Estimated Avg Glucose 68 - 131 mg/dL  157 (H)   TSH 0.400 - 4.000 uIU/mL  5.518 (H)   Free T4 0.71 - 1.51 ng/dL  1.16   Creatinine, Urine 15.0 - 325.0 mg/dL 201.0    Microalbumin, Urine ug/mL 7.0    MICROALB/CREAT RATIO 0.0 - 30.0 ug/mg 3.5      Assessment:       1. Acquired hypothyroidism    2. Hyperlipidemia associated with type 2 diabetes mellitus    3. Uncontrolled type 2 diabetes mellitus with hyperglycemia    4. Severe obesity with body mass index (BMI) of 35.0 to 35.9 and comorbidity    5. TALIA on CPAP    6. Multiple lung nodules on CT    7. PHN (postherpetic neuralgia)    8. Encounter for preventive health examination    9. Solitary pulmonary nodule    10. Psoriasis    11. Stage 3a chronic kidney disease    12. Controlled type 2 diabetes mellitus with stage 3 chronic kidney  disease, with long-term current use of insulin          Plan:     Acquired hypothyroidism- increase dose, recheck 3mo.  -     TSH; Future; Expected date: 11/17/2022  -     levothyroxine (SYNTHROID) 112 MCG tablet; Take 1 tablet (112 mcg total) by mouth before breakfast.  Dispense: 90 tablet; Refill: 3    Hyperlipidemia associated with type 2 diabetes mellitus- cont statin, doing well.  Increase activity as tolerated.    Uncontrolled type 2 diabetes mellitus with CKD 3-  -     Hemoglobin A1C; Future; Expected date: 11/17/2022    Severe obesity with body mass index (BMI) of 35.0 to 35.9 and comorbidity- if not better after increase synthroid, poss contrave type rx.  Discussed seeing PT here for quad strengthening to help knee pain.    TALIA on CPAP, doing well, cont.    Multiple lung nodules on CT- repeat CT.  -     CT Chest Without Contrast; Future; Expected date: 11/17/2022    PHN (postherpetic neuralgia)- cont gabapentin.    Encounter for preventive health examination- [id number] now.    Solitary pulmonary nodule  -     CT Chest Without Contrast; Future; Expected date: 11/17/2022    Psoriasis, no flare now.    Stage 3a chronic kidney disease  -     Basic Metabolic Panel; Future; Expected date: 11/17/2022

## 2022-11-17 ENCOUNTER — OFFICE VISIT (OUTPATIENT)
Dept: PRIMARY CARE CLINIC | Facility: CLINIC | Age: 72
End: 2022-11-17
Payer: MEDICARE

## 2022-11-17 ENCOUNTER — TELEPHONE (OUTPATIENT)
Dept: PRIMARY CARE CLINIC | Facility: CLINIC | Age: 72
End: 2022-11-17

## 2022-11-17 VITALS
OXYGEN SATURATION: 97 % | HEIGHT: 67 IN | SYSTOLIC BLOOD PRESSURE: 116 MMHG | TEMPERATURE: 98 F | RESPIRATION RATE: 18 BRPM | HEART RATE: 67 BPM | WEIGHT: 245.5 LBS | DIASTOLIC BLOOD PRESSURE: 70 MMHG | BODY MASS INDEX: 38.53 KG/M2

## 2022-11-17 DIAGNOSIS — E78.5 HYPERLIPIDEMIA ASSOCIATED WITH TYPE 2 DIABETES MELLITUS: ICD-10-CM

## 2022-11-17 DIAGNOSIS — N18.30 CONTROLLED TYPE 2 DIABETES MELLITUS WITH STAGE 3 CHRONIC KIDNEY DISEASE, WITH LONG-TERM CURRENT USE OF INSULIN: ICD-10-CM

## 2022-11-17 DIAGNOSIS — E03.9 ACQUIRED HYPOTHYROIDISM: Primary | ICD-10-CM

## 2022-11-17 DIAGNOSIS — E11.65 UNCONTROLLED TYPE 2 DIABETES MELLITUS WITH HYPERGLYCEMIA: ICD-10-CM

## 2022-11-17 DIAGNOSIS — E11.69 HYPERLIPIDEMIA ASSOCIATED WITH TYPE 2 DIABETES MELLITUS: ICD-10-CM

## 2022-11-17 DIAGNOSIS — B02.29 PHN (POSTHERPETIC NEURALGIA): ICD-10-CM

## 2022-11-17 DIAGNOSIS — R91.8 MULTIPLE LUNG NODULES ON CT: ICD-10-CM

## 2022-11-17 DIAGNOSIS — R91.1 SOLITARY PULMONARY NODULE: ICD-10-CM

## 2022-11-17 DIAGNOSIS — G47.33 OSA ON CPAP: ICD-10-CM

## 2022-11-17 DIAGNOSIS — E11.22 CONTROLLED TYPE 2 DIABETES MELLITUS WITH STAGE 3 CHRONIC KIDNEY DISEASE, WITH LONG-TERM CURRENT USE OF INSULIN: ICD-10-CM

## 2022-11-17 DIAGNOSIS — Z79.4 CONTROLLED TYPE 2 DIABETES MELLITUS WITH STAGE 3 CHRONIC KIDNEY DISEASE, WITH LONG-TERM CURRENT USE OF INSULIN: ICD-10-CM

## 2022-11-17 DIAGNOSIS — L40.9 PSORIASIS: ICD-10-CM

## 2022-11-17 DIAGNOSIS — N18.31 STAGE 3A CHRONIC KIDNEY DISEASE: ICD-10-CM

## 2022-11-17 DIAGNOSIS — E66.01 SEVERE OBESITY WITH BODY MASS INDEX (BMI) OF 35.0 TO 35.9 AND COMORBIDITY: ICD-10-CM

## 2022-11-17 DIAGNOSIS — Z00.00 ENCOUNTER FOR PREVENTIVE HEALTH EXAMINATION: ICD-10-CM

## 2022-11-17 DIAGNOSIS — Z23 ENCOUNTER FOR ADMINISTRATION OF VACCINE: Primary | ICD-10-CM

## 2022-11-17 PROCEDURE — 1159F PR MEDICATION LIST DOCUMENTED IN MEDICAL RECORD: ICD-10-PCS | Mod: CPTII,S$GLB,, | Performed by: INTERNAL MEDICINE

## 2022-11-17 PROCEDURE — 99499 UNLISTED E&M SERVICE: CPT | Mod: S$GLB,,, | Performed by: INTERNAL MEDICINE

## 2022-11-17 PROCEDURE — 3288F PR FALLS RISK ASSESSMENT DOCUMENTED: ICD-10-PCS | Mod: CPTII,S$GLB,, | Performed by: INTERNAL MEDICINE

## 2022-11-17 PROCEDURE — 99999 PR PBB SHADOW E&M-EST. PATIENT-LVL V: ICD-10-PCS | Mod: PBBFAC,,, | Performed by: INTERNAL MEDICINE

## 2022-11-17 PROCEDURE — 3051F HG A1C>EQUAL 7.0%<8.0%: CPT | Mod: CPTII,S$GLB,, | Performed by: INTERNAL MEDICINE

## 2022-11-17 PROCEDURE — 99214 PR OFFICE/OUTPT VISIT, EST, LEVL IV, 30-39 MIN: ICD-10-PCS | Mod: 25,S$GLB,, | Performed by: INTERNAL MEDICINE

## 2022-11-17 PROCEDURE — 3078F PR MOST RECENT DIASTOLIC BLOOD PRESSURE < 80 MM HG: ICD-10-PCS | Mod: CPTII,S$GLB,, | Performed by: INTERNAL MEDICINE

## 2022-11-17 PROCEDURE — 99499 RISK ADDL DX/OHS AUDIT: ICD-10-PCS | Mod: S$GLB,,, | Performed by: INTERNAL MEDICINE

## 2022-11-17 PROCEDURE — 90677 PNEUMOCOCCAL CONJUGATE VACCINE 20-VALENT: ICD-10-PCS | Mod: S$GLB,,, | Performed by: INTERNAL MEDICINE

## 2022-11-17 PROCEDURE — 3074F SYST BP LT 130 MM HG: CPT | Mod: CPTII,S$GLB,, | Performed by: INTERNAL MEDICINE

## 2022-11-17 PROCEDURE — 1159F MED LIST DOCD IN RCRD: CPT | Mod: CPTII,S$GLB,, | Performed by: INTERNAL MEDICINE

## 2022-11-17 PROCEDURE — 3061F NEG MICROALBUMINURIA REV: CPT | Mod: CPTII,S$GLB,, | Performed by: INTERNAL MEDICINE

## 2022-11-17 PROCEDURE — G0009 ADMIN PNEUMOCOCCAL VACCINE: HCPCS | Mod: S$GLB,,, | Performed by: INTERNAL MEDICINE

## 2022-11-17 PROCEDURE — 1101F PR PT FALLS ASSESS DOC 0-1 FALLS W/OUT INJ PAST YR: ICD-10-PCS | Mod: CPTII,S$GLB,, | Performed by: INTERNAL MEDICINE

## 2022-11-17 PROCEDURE — 3066F PR DOCUMENTATION OF TREATMENT FOR NEPHROPATHY: ICD-10-PCS | Mod: CPTII,S$GLB,, | Performed by: INTERNAL MEDICINE

## 2022-11-17 PROCEDURE — 3078F DIAST BP <80 MM HG: CPT | Mod: CPTII,S$GLB,, | Performed by: INTERNAL MEDICINE

## 2022-11-17 PROCEDURE — 4010F PR ACE/ARB THEARPY RXD/TAKEN: ICD-10-PCS | Mod: CPTII,S$GLB,, | Performed by: INTERNAL MEDICINE

## 2022-11-17 PROCEDURE — 90677 PCV20 VACCINE IM: CPT | Mod: S$GLB,,, | Performed by: INTERNAL MEDICINE

## 2022-11-17 PROCEDURE — G0009 PNEUMOCOCCAL CONJUGATE VACCINE 20-VALENT: ICD-10-PCS | Mod: S$GLB,,, | Performed by: INTERNAL MEDICINE

## 2022-11-17 PROCEDURE — 99214 OFFICE O/P EST MOD 30 MIN: CPT | Mod: 25,S$GLB,, | Performed by: INTERNAL MEDICINE

## 2022-11-17 PROCEDURE — 3008F BODY MASS INDEX DOCD: CPT | Mod: CPTII,S$GLB,, | Performed by: INTERNAL MEDICINE

## 2022-11-17 PROCEDURE — 1101F PT FALLS ASSESS-DOCD LE1/YR: CPT | Mod: CPTII,S$GLB,, | Performed by: INTERNAL MEDICINE

## 2022-11-17 PROCEDURE — 1126F AMNT PAIN NOTED NONE PRSNT: CPT | Mod: CPTII,S$GLB,, | Performed by: INTERNAL MEDICINE

## 2022-11-17 PROCEDURE — 3066F NEPHROPATHY DOC TX: CPT | Mod: CPTII,S$GLB,, | Performed by: INTERNAL MEDICINE

## 2022-11-17 PROCEDURE — 4010F ACE/ARB THERAPY RXD/TAKEN: CPT | Mod: CPTII,S$GLB,, | Performed by: INTERNAL MEDICINE

## 2022-11-17 PROCEDURE — 3288F FALL RISK ASSESSMENT DOCD: CPT | Mod: CPTII,S$GLB,, | Performed by: INTERNAL MEDICINE

## 2022-11-17 PROCEDURE — 3061F PR NEG MICROALBUMINURIA RESULT DOCUMENTED/REVIEW: ICD-10-PCS | Mod: CPTII,S$GLB,, | Performed by: INTERNAL MEDICINE

## 2022-11-17 PROCEDURE — 3008F PR BODY MASS INDEX (BMI) DOCUMENTED: ICD-10-PCS | Mod: CPTII,S$GLB,, | Performed by: INTERNAL MEDICINE

## 2022-11-17 PROCEDURE — 99999 PR PBB SHADOW E&M-EST. PATIENT-LVL V: CPT | Mod: PBBFAC,,, | Performed by: INTERNAL MEDICINE

## 2022-11-17 PROCEDURE — 1126F PR PAIN SEVERITY QUANTIFIED, NO PAIN PRESENT: ICD-10-PCS | Mod: CPTII,S$GLB,, | Performed by: INTERNAL MEDICINE

## 2022-11-17 PROCEDURE — 3051F PR MOST RECENT HEMOGLOBIN A1C LEVEL 7.0 - < 8.0%: ICD-10-PCS | Mod: CPTII,S$GLB,, | Performed by: INTERNAL MEDICINE

## 2022-11-17 PROCEDURE — 3074F PR MOST RECENT SYSTOLIC BLOOD PRESSURE < 130 MM HG: ICD-10-PCS | Mod: CPTII,S$GLB,, | Performed by: INTERNAL MEDICINE

## 2022-11-17 RX ORDER — LEVOTHYROXINE SODIUM 112 UG/1
100 TABLET ORAL
Qty: 90 TABLET | Refills: 3 | Status: SHIPPED | OUTPATIENT
Start: 2022-11-17 | End: 2023-08-08 | Stop reason: SDUPTHER

## 2022-11-25 ENCOUNTER — HOSPITAL ENCOUNTER (OUTPATIENT)
Dept: RADIOLOGY | Facility: HOSPITAL | Age: 72
Discharge: HOME | End: 2022-11-25
Attending: INTERNAL MEDICINE
Payer: MEDICARE

## 2022-11-25 ENCOUNTER — PATIENT MESSAGE (OUTPATIENT)
Dept: PRIMARY CARE CLINIC | Facility: CLINIC | Age: 72
End: 2022-11-25
Payer: MEDICARE

## 2022-11-25 DIAGNOSIS — R91.8 MULTIPLE LUNG NODULES ON CT: Primary | ICD-10-CM

## 2022-11-25 DIAGNOSIS — R91.8 MULTIPLE LUNG NODULES ON CT: ICD-10-CM

## 2022-11-25 DIAGNOSIS — R91.1 SOLITARY PULMONARY NODULE: ICD-10-CM

## 2022-11-25 PROCEDURE — 71250 CT THORAX DX C-: CPT | Mod: 26,,, | Performed by: RADIOLOGY

## 2022-11-25 PROCEDURE — 71250 CT THORAX DX C-: CPT | Mod: TC

## 2022-11-25 PROCEDURE — 71250 CT CHEST WITHOUT CONTRAST: ICD-10-PCS | Mod: 26,,, | Performed by: RADIOLOGY

## 2022-11-29 ENCOUNTER — OFFICE VISIT (OUTPATIENT)
Dept: PULMONOLOGY | Facility: CLINIC | Age: 72
End: 2022-11-29
Payer: MEDICARE

## 2022-11-29 ENCOUNTER — PATIENT MESSAGE (OUTPATIENT)
Dept: PULMONOLOGY | Facility: CLINIC | Age: 72
End: 2022-11-29

## 2022-11-29 ENCOUNTER — LAB VISIT (OUTPATIENT)
Dept: LAB | Facility: HOSPITAL | Age: 72
End: 2022-11-29
Attending: INTERNAL MEDICINE
Payer: MEDICARE

## 2022-11-29 VITALS
WEIGHT: 244.69 LBS | HEIGHT: 67 IN | OXYGEN SATURATION: 96 % | DIASTOLIC BLOOD PRESSURE: 80 MMHG | HEART RATE: 77 BPM | RESPIRATION RATE: 18 BRPM | BODY MASS INDEX: 38.4 KG/M2 | SYSTOLIC BLOOD PRESSURE: 122 MMHG

## 2022-11-29 DIAGNOSIS — T17.500A MUCUS PLUGGING OF BRONCHI: ICD-10-CM

## 2022-11-29 DIAGNOSIS — R94.2 OBSTRUCTIVE PATTERN PRESENT ON PULMONARY FUNCTION TESTING: ICD-10-CM

## 2022-11-29 DIAGNOSIS — R91.8 GROUND GLASS OPACITY PRESENT ON IMAGING OF LUNG: ICD-10-CM

## 2022-11-29 DIAGNOSIS — R91.8 MULTIPLE LUNG NODULES ON CT: Primary | ICD-10-CM

## 2022-11-29 DIAGNOSIS — R91.8 MULTIPLE LUNG NODULES ON CT: ICD-10-CM

## 2022-11-29 PROCEDURE — 36415 COLL VENOUS BLD VENIPUNCTURE: CPT | Performed by: INTERNAL MEDICINE

## 2022-11-29 PROCEDURE — 1160F PR REVIEW ALL MEDS BY PRESCRIBER/CLIN PHARMACIST DOCUMENTED: ICD-10-PCS | Mod: CPTII,S$GLB,, | Performed by: INTERNAL MEDICINE

## 2022-11-29 PROCEDURE — 1160F RVW MEDS BY RX/DR IN RCRD: CPT | Mod: CPTII,S$GLB,, | Performed by: INTERNAL MEDICINE

## 2022-11-29 PROCEDURE — 86140 C-REACTIVE PROTEIN: CPT | Performed by: INTERNAL MEDICINE

## 2022-11-29 PROCEDURE — 1159F MED LIST DOCD IN RCRD: CPT | Mod: CPTII,S$GLB,, | Performed by: INTERNAL MEDICINE

## 2022-11-29 PROCEDURE — 86431 RHEUMATOID FACTOR QUANT: CPT | Performed by: INTERNAL MEDICINE

## 2022-11-29 PROCEDURE — 3066F NEPHROPATHY DOC TX: CPT | Mod: CPTII,S$GLB,, | Performed by: INTERNAL MEDICINE

## 2022-11-29 PROCEDURE — 3051F HG A1C>EQUAL 7.0%<8.0%: CPT | Mod: CPTII,S$GLB,, | Performed by: INTERNAL MEDICINE

## 2022-11-29 PROCEDURE — 82164 ANGIOTENSIN I ENZYME TEST: CPT | Performed by: INTERNAL MEDICINE

## 2022-11-29 PROCEDURE — 3074F SYST BP LT 130 MM HG: CPT | Mod: CPTII,S$GLB,, | Performed by: INTERNAL MEDICINE

## 2022-11-29 PROCEDURE — 3008F BODY MASS INDEX DOCD: CPT | Mod: CPTII,S$GLB,, | Performed by: INTERNAL MEDICINE

## 2022-11-29 PROCEDURE — 86612 BLASTOMYCES ANTIBODY: CPT | Performed by: INTERNAL MEDICINE

## 2022-11-29 PROCEDURE — 3061F PR NEG MICROALBUMINURIA RESULT DOCUMENTED/REVIEW: ICD-10-PCS | Mod: CPTII,S$GLB,, | Performed by: INTERNAL MEDICINE

## 2022-11-29 PROCEDURE — 86038 ANTINUCLEAR ANTIBODIES: CPT | Performed by: INTERNAL MEDICINE

## 2022-11-29 PROCEDURE — 99999 PR PBB SHADOW E&M-EST. PATIENT-LVL IV: ICD-10-PCS | Mod: PBBFAC,,, | Performed by: INTERNAL MEDICINE

## 2022-11-29 PROCEDURE — 86036 ANCA SCREEN EACH ANTIBODY: CPT | Performed by: INTERNAL MEDICINE

## 2022-11-29 PROCEDURE — 86235 NUCLEAR ANTIGEN ANTIBODY: CPT | Mod: 59 | Performed by: INTERNAL MEDICINE

## 2022-11-29 PROCEDURE — 3008F PR BODY MASS INDEX (BMI) DOCUMENTED: ICD-10-PCS | Mod: CPTII,S$GLB,, | Performed by: INTERNAL MEDICINE

## 2022-11-29 PROCEDURE — 99215 PR OFFICE/OUTPT VISIT, EST, LEVL V, 40-54 MIN: ICD-10-PCS | Mod: S$GLB,,, | Performed by: INTERNAL MEDICINE

## 2022-11-29 PROCEDURE — 4010F PR ACE/ARB THEARPY RXD/TAKEN: ICD-10-PCS | Mod: CPTII,S$GLB,, | Performed by: INTERNAL MEDICINE

## 2022-11-29 PROCEDURE — 99999 PR PBB SHADOW E&M-EST. PATIENT-LVL IV: CPT | Mod: PBBFAC,,, | Performed by: INTERNAL MEDICINE

## 2022-11-29 PROCEDURE — 4010F ACE/ARB THERAPY RXD/TAKEN: CPT | Mod: CPTII,S$GLB,, | Performed by: INTERNAL MEDICINE

## 2022-11-29 PROCEDURE — 3079F DIAST BP 80-89 MM HG: CPT | Mod: CPTII,S$GLB,, | Performed by: INTERNAL MEDICINE

## 2022-11-29 PROCEDURE — 3288F FALL RISK ASSESSMENT DOCD: CPT | Mod: CPTII,S$GLB,, | Performed by: INTERNAL MEDICINE

## 2022-11-29 PROCEDURE — 99215 OFFICE O/P EST HI 40 MIN: CPT | Mod: S$GLB,,, | Performed by: INTERNAL MEDICINE

## 2022-11-29 PROCEDURE — 86039 ANTINUCLEAR ANTIBODIES (ANA): CPT | Performed by: INTERNAL MEDICINE

## 2022-11-29 PROCEDURE — 3061F NEG MICROALBUMINURIA REV: CPT | Mod: CPTII,S$GLB,, | Performed by: INTERNAL MEDICINE

## 2022-11-29 PROCEDURE — 3288F PR FALLS RISK ASSESSMENT DOCUMENTED: ICD-10-PCS | Mod: CPTII,S$GLB,, | Performed by: INTERNAL MEDICINE

## 2022-11-29 PROCEDURE — 3079F PR MOST RECENT DIASTOLIC BLOOD PRESSURE 80-89 MM HG: ICD-10-PCS | Mod: CPTII,S$GLB,, | Performed by: INTERNAL MEDICINE

## 2022-11-29 PROCEDURE — 82785 ASSAY OF IGE: CPT | Performed by: INTERNAL MEDICINE

## 2022-11-29 PROCEDURE — 85652 RBC SED RATE AUTOMATED: CPT | Performed by: INTERNAL MEDICINE

## 2022-11-29 PROCEDURE — 3066F PR DOCUMENTATION OF TREATMENT FOR NEPHROPATHY: ICD-10-PCS | Mod: CPTII,S$GLB,, | Performed by: INTERNAL MEDICINE

## 2022-11-29 PROCEDURE — 1159F PR MEDICATION LIST DOCUMENTED IN MEDICAL RECORD: ICD-10-PCS | Mod: CPTII,S$GLB,, | Performed by: INTERNAL MEDICINE

## 2022-11-29 PROCEDURE — 3074F PR MOST RECENT SYSTOLIC BLOOD PRESSURE < 130 MM HG: ICD-10-PCS | Mod: CPTII,S$GLB,, | Performed by: INTERNAL MEDICINE

## 2022-11-29 PROCEDURE — 1101F PT FALLS ASSESS-DOCD LE1/YR: CPT | Mod: CPTII,S$GLB,, | Performed by: INTERNAL MEDICINE

## 2022-11-29 PROCEDURE — 1101F PR PT FALLS ASSESS DOC 0-1 FALLS W/OUT INJ PAST YR: ICD-10-PCS | Mod: CPTII,S$GLB,, | Performed by: INTERNAL MEDICINE

## 2022-11-29 PROCEDURE — 3051F PR MOST RECENT HEMOGLOBIN A1C LEVEL 7.0 - < 8.0%: ICD-10-PCS | Mod: CPTII,S$GLB,, | Performed by: INTERNAL MEDICINE

## 2022-11-29 NOTE — H&P (VIEW-ONLY)
Pulmonary Outpatient Follow Up Visit     Subjective:       Patient ID: Jaimie Luque is a 72 y.o. female.    Chief Complaint: Sleep Apnea    HPI      72 year-old female patient presenting for follow-up.      11/29/2022 she was referred for follow-up after her CT scan of the chest without contrast 11/25/2022 shows new lung nodules and mosaic pattern ground-glass opacities.      I previously started in 2018 her on inhalers for moderate obstructive pattern on PFT however she did not notice a benefit and discontinued treatment shortly after.      Denies productive cough wheezing weight loss fever or chills.  Does not have pets at home.  Not aware of family history of autoimmune disorders    She was initially evaluated 2018 for pulmonary nodules and the plan was to repeat CAT scan of chest in 3 months from the initial CAT scan.      Her initial CAT scan was performed as a workup of weight loss.  Patient has gained couple of pounds recently.  Malignancy workup has been negative so far.      Denies smoking, denies a family history of asthma or emphysema.      Not optimally compliant with CPAP therapy  Review of Systems   Constitutional:  Negative for fever, chills and night sweats.   HENT:  Positive for congestion.    Eyes:  Negative for redness.   Respiratory:  Positive for shortness of breath and dyspnea on extertion. Negative for cough, sputum production and wheezing.    Cardiovascular:  Negative for chest pain.   Genitourinary:  Negative for hematuria.   Endocrine: Hypothyroid.  Diabetes mellitus.    Musculoskeletal:         Diabetes mellitus   Skin:         psoriasis   Gastrointestinal:  Negative for abdominal pain.   Neurological:  Negative for syncope.        Neuralgia   Hematological:  Negative for adenopathy.   Psychiatric/Behavioral:  Positive for sleep disturbance. The patient is not nervous/anxious.        Outpatient Encounter Medications as of 11/29/2022   Medication  Sig Dispense Refill    aspirin 81 mg Tab Take by mouth. 1 Tablet Oral 2 times weekly       atorvastatin (LIPITOR) 20 MG tablet Take 1 tablet (20 mg total) by mouth once daily. 90 tablet 3    blood sugar diagnostic Strp To check BG 2 times daily, to use with AccuChek Varsha Plus  Dx:  E11.9 200 strip 3    blood-glucose meter,continuous (DEXCOM G6 ) Misc Use daily. 1 each 0    blood-glucose sensor (DEXCOM G6 SENSOR) Gretta Use 1 every 10 days. 9 each 3    blood-glucose transmitter (DEXCOM G6 TRANSMITTER) Gretta Use 1 every 90 days. 1 each 3    cholecalciferol, vitamin D3, 2,000 unit Tab Take by mouth. 1 Tablet Oral Every day      diphenhydrAMINE-acetaminophen (TYLENOL PM)  mg Tab Take 1 tablet by mouth nightly as needed. 30 tablet 11    dulaglutide (TRULICITY) 4.5 mg/0.5 mL pen injector Inject 4.5 mg into the skin every 7 days. 12 pen 3    gabapentin (NEURONTIN) 300 MG capsule TAKE 1 CAPSULE BY MOUTH THREE TIMES DAILY 270 capsule 3    insulin (LANTUS SOLOSTAR U-100 INSULIN) glargine 100 units/mL (3mL) SubQ pen Inject 18 Units into the skin once daily. 18 mL 3    insulin lispro 100 unit/mL pen Inject 12 Units into the skin 3 (three) times daily with meals. 33 mL 3    lancets (ACCU-CHEK SOFTCLIX LANCETS) Misc Check blood sugar twice daily.  Accuchek Multiclix drum lancets, [facility]  Dx:  E11.9 200 each 3    levothyroxine (SYNTHROID) 112 MCG tablet Take 1 tablet (112 mcg total) by mouth before breakfast. 90 tablet 3    lisinopriL (PRINIVIL,ZESTRIL) 2.5 MG tablet TAKE 1 TABLET(2.5 MG) BY MOUTH EVERY DAY 90 tablet 3    meclizine (ANTIVERT) 25 mg tablet Take 1 tablet by mouth as needed.       glipiZIDE (GLUCOTROL) 5 MG tablet Take 1 tablet (5 mg total) by mouth once daily. 90 tablet 3     No facility-administered encounter medications on file as of 11/29/2022.       Objective:          Physical Exam   Constitutional: She is oriented to person, place, and time. She appears well-developed and well-nourished.    HENT:   Head: Normocephalic.   Cardiovascular: Normal rate and regular rhythm.   Pulmonary/Chest: Normal expansion and effort normal. She has decreased breath sounds. She has no wheezes. She has no rales.   Abdominal: Soft.   Musculoskeletal:         General: No tenderness or edema.      Cervical back: Neck supple.   Lymphadenopathy:     She has no cervical adenopathy.   Neurological: She is alert and oriented to person, place, and time.   Skin: Skin is warm.   Psychiatric: She has a normal mood and affect.     Laboratory    Lab Results   Component Value Date    WBC 6.36 10/13/2022    HGB 12.0 10/13/2022    HCT 38.3 10/13/2022    MCV 89 10/13/2022     10/13/2022     BMP  Lab Results   Component Value Date     10/13/2022    K 4.8 10/13/2022     10/13/2022    CO2 27 10/13/2022    BUN 11 10/13/2022    CREATININE 1.0 10/13/2022    CALCIUM 9.2 10/13/2022    ANIONGAP 8 10/13/2022    ESTGFRAFRICA >60.0 09/15/2021    EGFRNONAA >60.0 09/15/2021     BNP  @LABRCNTIP(BNP,BNPTRIAGEBLO)@  Lab Results   Component Value Date    TSH 5.518 (H) 10/13/2022     ABG  @LABRCNTIP(PH,PO2,PCO2,HCO3,BE)@    Diagnostic Results:      PFT  personally reviewed, moderate obstruction noted, no significant bronchodilation, air trapping noted, minimal decrease of DLCO to 76%.    CT chest with contrast personally reviewed      Stable lung nodules were noted.  Bilateral apical scarring noted.  Left lower lobe emphysematous bleb noted.  Tree-in-bud opacity noted.    CT scan of the chest 11/25/2022       COMPARISON:  Multiple prior CT exams, most recent 10/20/2021     FINDINGS:  Thoracic aorta and great vessels unchanged.  Heart unchanged.  Trace pericardial fluid remains.  No pleural effusion.  No pathologically enlarged axillary, mediastinal or hilar lymph nodes.     Lungs demonstrate patchy ground-glass findings in a mosaic attenuation pattern.  Previously noted left lung nodules hyper soft.  There is however a new 8 mm nodule  within the left upper lobe, sequence 4 image 140 of 8.  Smaller adjacent 4 mm nodule, sequence 4 image 140.  New linearly oriented nodule within the lingula, sequence 4 image 277.  Other tiny sub 4 mm nodules within left lung stable.  Multiple right lung nodules are stable or less conspicuous.  Large nodule again noted within the right middle lobe measuring approximately 11 mm.  Increased/new nodules and linear orientation within right middle lobe some tree-in-bud appearance.     Visualized upper abdominal structures demonstrate no acute abnormality.  No acute osseous abnormality.     Impression:     Patchy ground-glass opacities versus mosaic attenuation.  Multiple pulmonary nodules, stable or decreased in size/resolved within the bilateral lungs.  New similar nodules present within the left upper lobe and inferolateral right middle lobe.  Nodules again suggest possible mucous plugging or infectious/inflammatory etiology.              Assessment/Plan:   Multiple lung nodules on CT  -     Ambulatory referral/consult to Pulmonology  -     IgE; Future; Expected date: 11/29/2022  -     RADHA Screen w/Reflex; Future; Expected date: 11/29/2022  -     Rheumatoid Factor; Future; Expected date: 11/29/2022  -     Angiotensin Converting Enzyme; Future; Expected date: 11/29/2022  -     Sedimentation rate; Future; Expected date: 11/29/2022  -     C-Reactive Protein; Future; Expected date: 11/29/2022  -     FUNGAL IMMUNODIFFUSION - BLOOD; Future; Expected date: 11/29/2022  -     Case Request Endoscopy: Bronchoscopy  -     Anti-Neutrophilic Cytoplasmic Antibody; Future; Expected date: 11/29/2022  -     Vital signs; Standing  -     Verify informed consent; Standing  -     Assess per unit routine; Standing  -     Insert peripheral IV if not present; Standing  -     Remove glasses and/or dentures; Standing  -     Undress from the waist up; Standing  -     Transport patient on stretcher with oxygen available; Standing  -     Notify  Physician; Standing  -     Pulse Oximetry Q4H; Standing  -     Full code; Standing  -     Place in Outpatient; Standing    Obstructive pattern present on pulmonary function testing  -     Complete PFT with bronchodilator; Future; Expected date: 12/13/2022    Ground glass opacity present on imaging of lung  -     Complete PFT with bronchodilator; Future; Expected date: 12/13/2022  -     IgE; Future; Expected date: 11/29/2022  -     RADHA Screen w/Reflex; Future; Expected date: 11/29/2022  -     Rheumatoid Factor; Future; Expected date: 11/29/2022  -     Angiotensin Converting Enzyme; Future; Expected date: 11/29/2022  -     Sedimentation rate; Future; Expected date: 11/29/2022  -     C-Reactive Protein; Future; Expected date: 11/29/2022  -     FUNGAL IMMUNODIFFUSION - BLOOD; Future; Expected date: 11/29/2022  -     Case Request Endoscopy: Bronchoscopy  -     Anti-Neutrophilic Cytoplasmic Antibody; Future; Expected date: 11/29/2022    Mucus plugging of bronchi  -     Complete PFT with bronchodilator; Future; Expected date: 12/13/2022  -     IgE; Future; Expected date: 11/29/2022  -     Sedimentation rate; Future; Expected date: 11/29/2022  -     C-Reactive Protein; Future; Expected date: 11/29/2022  -     Case Request Endoscopy: Bronchoscopy      Rule out unusual infections.  Proceed with bronchoscopy with BAL.  Discussed with patient risk of procedure agreeable to proceed with BAL.      Repeat PFT.      Send vasculitic/fungal serologies.      Further recommendation to follow above workup.      Follow up in about 1 month (around 12/29/2022).    This note was prepared using voice recognition system and is likely to have sound alike errors that may have been overlooked even after proof reading.  Please call me with any questions    Discussed diagnosis, its evaluation, treatment and usual course. All questions answered.    Thank you for the courtesy of participating in the care of this patient    Corky Luna MD

## 2022-11-29 NOTE — PROGRESS NOTES
Pulmonary Outpatient Follow Up Visit     Subjective:       Patient ID: Jaimie Luque is a 72 y.o. female.    Chief Complaint: Sleep Apnea    HPI      72 year-old female patient presenting for follow-up.      11/29/2022 she was referred for follow-up after her CT scan of the chest without contrast 11/25/2022 shows new lung nodules and mosaic pattern ground-glass opacities.      I previously started in 2018 her on inhalers for moderate obstructive pattern on PFT however she did not notice a benefit and discontinued treatment shortly after.      Denies productive cough wheezing weight loss fever or chills.  Does not have pets at home.  Not aware of family history of autoimmune disorders    She was initially evaluated 2018 for pulmonary nodules and the plan was to repeat CAT scan of chest in 3 months from the initial CAT scan.      Her initial CAT scan was performed as a workup of weight loss.  Patient has gained couple of pounds recently.  Malignancy workup has been negative so far.      Denies smoking, denies a family history of asthma or emphysema.      Not optimally compliant with CPAP therapy  Review of Systems   Constitutional:  Negative for fever, chills and night sweats.   HENT:  Positive for congestion.    Eyes:  Negative for redness.   Respiratory:  Positive for shortness of breath and dyspnea on extertion. Negative for cough, sputum production and wheezing.    Cardiovascular:  Negative for chest pain.   Genitourinary:  Negative for hematuria.   Endocrine: Hypothyroid.  Diabetes mellitus.    Musculoskeletal:         Diabetes mellitus   Skin:         psoriasis   Gastrointestinal:  Negative for abdominal pain.   Neurological:  Negative for syncope.        Neuralgia   Hematological:  Negative for adenopathy.   Psychiatric/Behavioral:  Positive for sleep disturbance. The patient is not nervous/anxious.        Outpatient Encounter Medications as of 11/29/2022   Medication  Sig Dispense Refill    aspirin 81 mg Tab Take by mouth. 1 Tablet Oral 2 times weekly       atorvastatin (LIPITOR) 20 MG tablet Take 1 tablet (20 mg total) by mouth once daily. 90 tablet 3    blood sugar diagnostic Strp To check BG 2 times daily, to use with AccuChek Varsha Plus  Dx:  E11.9 200 strip 3    blood-glucose meter,continuous (DEXCOM G6 ) Misc Use daily. 1 each 0    blood-glucose sensor (DEXCOM G6 SENSOR) Gretta Use 1 every 10 days. 9 each 3    blood-glucose transmitter (DEXCOM G6 TRANSMITTER) Gretta Use 1 every 90 days. 1 each 3    cholecalciferol, vitamin D3, 2,000 unit Tab Take by mouth. 1 Tablet Oral Every day      diphenhydrAMINE-acetaminophen (TYLENOL PM)  mg Tab Take 1 tablet by mouth nightly as needed. 30 tablet 11    dulaglutide (TRULICITY) 4.5 mg/0.5 mL pen injector Inject 4.5 mg into the skin every 7 days. 12 pen 3    gabapentin (NEURONTIN) 300 MG capsule TAKE 1 CAPSULE BY MOUTH THREE TIMES DAILY 270 capsule 3    insulin (LANTUS SOLOSTAR U-100 INSULIN) glargine 100 units/mL (3mL) SubQ pen Inject 18 Units into the skin once daily. 18 mL 3    insulin lispro 100 unit/mL pen Inject 12 Units into the skin 3 (three) times daily with meals. 33 mL 3    lancets (ACCU-CHEK SOFTCLIX LANCETS) Misc Check blood sugar twice daily.  Accuchek Multiclix drum lancets, [facility]  Dx:  E11.9 200 each 3    levothyroxine (SYNTHROID) 112 MCG tablet Take 1 tablet (112 mcg total) by mouth before breakfast. 90 tablet 3    lisinopriL (PRINIVIL,ZESTRIL) 2.5 MG tablet TAKE 1 TABLET(2.5 MG) BY MOUTH EVERY DAY 90 tablet 3    meclizine (ANTIVERT) 25 mg tablet Take 1 tablet by mouth as needed.       glipiZIDE (GLUCOTROL) 5 MG tablet Take 1 tablet (5 mg total) by mouth once daily. 90 tablet 3     No facility-administered encounter medications on file as of 11/29/2022.       Objective:          Physical Exam   Constitutional: She is oriented to person, place, and time. She appears well-developed and well-nourished.    HENT:   Head: Normocephalic.   Cardiovascular: Normal rate and regular rhythm.   Pulmonary/Chest: Normal expansion and effort normal. She has decreased breath sounds. She has no wheezes. She has no rales.   Abdominal: Soft.   Musculoskeletal:         General: No tenderness or edema.      Cervical back: Neck supple.   Lymphadenopathy:     She has no cervical adenopathy.   Neurological: She is alert and oriented to person, place, and time.   Skin: Skin is warm.   Psychiatric: She has a normal mood and affect.     Laboratory    Lab Results   Component Value Date    WBC 6.36 10/13/2022    HGB 12.0 10/13/2022    HCT 38.3 10/13/2022    MCV 89 10/13/2022     10/13/2022     BMP  Lab Results   Component Value Date     10/13/2022    K 4.8 10/13/2022     10/13/2022    CO2 27 10/13/2022    BUN 11 10/13/2022    CREATININE 1.0 10/13/2022    CALCIUM 9.2 10/13/2022    ANIONGAP 8 10/13/2022    ESTGFRAFRICA >60.0 09/15/2021    EGFRNONAA >60.0 09/15/2021     BNP  @LABRCNTIP(BNP,BNPTRIAGEBLO)@  Lab Results   Component Value Date    TSH 5.518 (H) 10/13/2022     ABG  @LABRCNTIP(PH,PO2,PCO2,HCO3,BE)@    Diagnostic Results:      PFT  personally reviewed, moderate obstruction noted, no significant bronchodilation, air trapping noted, minimal decrease of DLCO to 76%.    CT chest with contrast personally reviewed      Stable lung nodules were noted.  Bilateral apical scarring noted.  Left lower lobe emphysematous bleb noted.  Tree-in-bud opacity noted.    CT scan of the chest 11/25/2022       COMPARISON:  Multiple prior CT exams, most recent 10/20/2021     FINDINGS:  Thoracic aorta and great vessels unchanged.  Heart unchanged.  Trace pericardial fluid remains.  No pleural effusion.  No pathologically enlarged axillary, mediastinal or hilar lymph nodes.     Lungs demonstrate patchy ground-glass findings in a mosaic attenuation pattern.  Previously noted left lung nodules hyper soft.  There is however a new 8 mm nodule  within the left upper lobe, sequence 4 image 140 of 8.  Smaller adjacent 4 mm nodule, sequence 4 image 140.  New linearly oriented nodule within the lingula, sequence 4 image 277.  Other tiny sub 4 mm nodules within left lung stable.  Multiple right lung nodules are stable or less conspicuous.  Large nodule again noted within the right middle lobe measuring approximately 11 mm.  Increased/new nodules and linear orientation within right middle lobe some tree-in-bud appearance.     Visualized upper abdominal structures demonstrate no acute abnormality.  No acute osseous abnormality.     Impression:     Patchy ground-glass opacities versus mosaic attenuation.  Multiple pulmonary nodules, stable or decreased in size/resolved within the bilateral lungs.  New similar nodules present within the left upper lobe and inferolateral right middle lobe.  Nodules again suggest possible mucous plugging or infectious/inflammatory etiology.              Assessment/Plan:   Multiple lung nodules on CT  -     Ambulatory referral/consult to Pulmonology  -     IgE; Future; Expected date: 11/29/2022  -     RADHA Screen w/Reflex; Future; Expected date: 11/29/2022  -     Rheumatoid Factor; Future; Expected date: 11/29/2022  -     Angiotensin Converting Enzyme; Future; Expected date: 11/29/2022  -     Sedimentation rate; Future; Expected date: 11/29/2022  -     C-Reactive Protein; Future; Expected date: 11/29/2022  -     FUNGAL IMMUNODIFFUSION - BLOOD; Future; Expected date: 11/29/2022  -     Case Request Endoscopy: Bronchoscopy  -     Anti-Neutrophilic Cytoplasmic Antibody; Future; Expected date: 11/29/2022  -     Vital signs; Standing  -     Verify informed consent; Standing  -     Assess per unit routine; Standing  -     Insert peripheral IV if not present; Standing  -     Remove glasses and/or dentures; Standing  -     Undress from the waist up; Standing  -     Transport patient on stretcher with oxygen available; Standing  -     Notify  Physician; Standing  -     Pulse Oximetry Q4H; Standing  -     Full code; Standing  -     Place in Outpatient; Standing    Obstructive pattern present on pulmonary function testing  -     Complete PFT with bronchodilator; Future; Expected date: 12/13/2022    Ground glass opacity present on imaging of lung  -     Complete PFT with bronchodilator; Future; Expected date: 12/13/2022  -     IgE; Future; Expected date: 11/29/2022  -     RADHA Screen w/Reflex; Future; Expected date: 11/29/2022  -     Rheumatoid Factor; Future; Expected date: 11/29/2022  -     Angiotensin Converting Enzyme; Future; Expected date: 11/29/2022  -     Sedimentation rate; Future; Expected date: 11/29/2022  -     C-Reactive Protein; Future; Expected date: 11/29/2022  -     FUNGAL IMMUNODIFFUSION - BLOOD; Future; Expected date: 11/29/2022  -     Case Request Endoscopy: Bronchoscopy  -     Anti-Neutrophilic Cytoplasmic Antibody; Future; Expected date: 11/29/2022    Mucus plugging of bronchi  -     Complete PFT with bronchodilator; Future; Expected date: 12/13/2022  -     IgE; Future; Expected date: 11/29/2022  -     Sedimentation rate; Future; Expected date: 11/29/2022  -     C-Reactive Protein; Future; Expected date: 11/29/2022  -     Case Request Endoscopy: Bronchoscopy      Rule out unusual infections.  Proceed with bronchoscopy with BAL.  Discussed with patient risk of procedure agreeable to proceed with BAL.      Repeat PFT.      Send vasculitic/fungal serologies.      Further recommendation to follow above workup.      Follow up in about 1 month (around 12/29/2022).    This note was prepared using voice recognition system and is likely to have sound alike errors that may have been overlooked even after proof reading.  Please call me with any questions    Discussed diagnosis, its evaluation, treatment and usual course. All questions answered.    Thank you for the courtesy of participating in the care of this patient    Corky Luna MD

## 2022-11-30 LAB
CRP SERPL-MCNC: 2.5 MG/L (ref 0–8.2)
ERYTHROCYTE [SEDIMENTATION RATE] IN BLOOD BY PHOTOMETRIC METHOD: 45 MM/HR (ref 0–36)
IGE SERPL-ACNC: <35 IU/ML (ref 0–100)
RHEUMATOID FACT SERPL-ACNC: <13 IU/ML (ref 0–15)

## 2022-12-01 LAB
ANA PATTERN 1: NORMAL
ANA PATTERN 2: NORMAL
ANA SER QL IF: POSITIVE
ANA TITER 2: NORMAL
ANA TITR SER IF: NORMAL {TITER}

## 2022-12-02 DIAGNOSIS — R76.8 ANA POSITIVE: ICD-10-CM

## 2022-12-02 DIAGNOSIS — R91.8 MULTIPLE LUNG NODULES ON CT: Primary | ICD-10-CM

## 2022-12-02 DIAGNOSIS — R70.0 ESR RAISED: ICD-10-CM

## 2022-12-02 LAB
ACE SERPL-CCNC: 16 U/L (ref 16–85)
ANTI SM ANTIBODY: 0.37 RATIO (ref 0–0.99)
ANTI SM/RNP ANTIBODY: 0.29 RATIO (ref 0–0.99)
ANTI-SM INTERPRETATION: NEGATIVE
ANTI-SM/RNP INTERPRETATION: NEGATIVE
ANTI-SSA ANTIBODY: 0.25 RATIO (ref 0–0.99)
ANTI-SSA INTERPRETATION: NEGATIVE
ANTI-SSB ANTIBODY: 0.11 RATIO (ref 0–0.99)
ANTI-SSB INTERPRETATION: NEGATIVE
DSDNA AB SER-ACNC: NORMAL [IU]/ML

## 2022-12-05 ENCOUNTER — PATIENT MESSAGE (OUTPATIENT)
Dept: PRIMARY CARE CLINIC | Facility: CLINIC | Age: 72
End: 2022-12-05
Payer: MEDICARE

## 2022-12-05 DIAGNOSIS — M17.11 PRIMARY OSTEOARTHRITIS OF RIGHT KNEE: Primary | ICD-10-CM

## 2022-12-05 LAB
ANCA AB TITR SER IF: NORMAL TITER
P-ANCA TITR SER IF: NORMAL TITER

## 2022-12-06 LAB
ASPERGILLUS AB SER QL ID: NOT DETECTED
B DERMAT AB SER QL ID: NOT DETECTED
C IMMITIS AB SER QL ID: NOT DETECTED
H CAPSUL AB SER QL ID: NOT DETECTED

## 2022-12-08 ENCOUNTER — CLINICAL SUPPORT (OUTPATIENT)
Dept: REHABILITATION | Facility: HOSPITAL | Age: 72
End: 2022-12-08
Payer: MEDICARE

## 2022-12-08 DIAGNOSIS — M25.60 DECREASED RANGE OF MOTION WITH DECREASED STRENGTH: ICD-10-CM

## 2022-12-08 DIAGNOSIS — R53.1 DECREASED RANGE OF MOTION WITH DECREASED STRENGTH: ICD-10-CM

## 2022-12-08 DIAGNOSIS — M17.11 PRIMARY OSTEOARTHRITIS OF RIGHT KNEE: ICD-10-CM

## 2022-12-08 DIAGNOSIS — R68.89 DECREASED FUNCTIONAL ACTIVITY TOLERANCE: ICD-10-CM

## 2022-12-08 DIAGNOSIS — Z74.09 DECREASED FUNCTIONAL MOBILITY AND ENDURANCE: ICD-10-CM

## 2022-12-08 PROCEDURE — 97162 PT EVAL MOD COMPLEX 30 MIN: CPT | Mod: PN

## 2022-12-08 PROCEDURE — 97110 THERAPEUTIC EXERCISES: CPT | Mod: PN

## 2022-12-08 NOTE — PROGRESS NOTES
Procedure instruction reviewed with pt. Place, time, nothing to eat or drink after midnight Wednesday 12/14/22, no chewing gum or sucking on hard candy, take BP medication with sip of water on am of procedure, do not take diabetic medication or any other medications on am of procedure, have someone to drive them home.  Pt verbalized understanding.

## 2022-12-08 NOTE — PLAN OF CARE
[facility]   Physical Therapy Initial Evaluation     Date: 12/8/2022   Name: Jaimie Luque  Clinic Number: [id number]    Therapy Diagnosis:   Encounter Diagnoses   Name Primary?    Primary osteoarthritis of right knee     Decreased functional mobility and endurance     Decreased range of motion with decreased strength     Decreased functional activity tolerance      Physician: Marina Ling MD    Physician Orders: PT Eval and Treat   Medical Diagnosis from Referral: Primary osteoarthritis of right knee  Evaluation Date: 12/8/2022  Authorization Period Expiration: 12/31/22  Plan of Care Expiration: 2/8/22  Progress Note Due: 1/8/22  Visit # / Visits authorized: 1/ 1   FOTO: 1/3    Precautions: Diabetes and HTN, Osteopenia      Time In: 10:05 am   Time Out: 11:09 am  Total Appointment Time (timed & untimed codes): 55 minutes      SUBJECTIVE     Date of onset: 3-4 years    History of current condition - Jaimie reports: She has been having knee pain and issues with strength for the past 3-4 years. This past summer did water aerobics class, and did feel good.  Does have right knee pain and feels like gait and balance is off.  She reports that she is taking gabapentin for shingles residual pain and notes that she does have less pain when she takes it.  She reports that she has had some therapy previously and did not follow through with exercise's.  Feel more limited by leg weakness then pain. Does have some stiffness and when she first starts walking, her legs feel tight and improves with time.    Activity level: Since June has felt limited in her walking, and nervous about walking for extended distances/community.     Falls: none    Imaging, x-rays: FINDINGS:  There is no right knee fracture.  Mild tricompartmental right knee osteoarthritis.  No osseous erosion or suspicious osseous lesion.  No right knee effusion.  No acute soft tissue abnormality identified.  Atherosclerotic  calcifications seen posterior to the right knee.  Limited evaluation of the left knee also reveals mild tricompartmental osteoarthritis.    Prior Therapy: yes  Social History: patient lives with their spouse. (He has been walking more lately)  Occupation: retired  Prior Level of Function: walking/standing ~ 5 min  Current Level of Function: standing/walking less then 5 min    Pain:  Current 0/10, worst 3/10, best 0/10   Location: right knee    Description: Aching  Aggravating Factors: Standing up and sitting down.  Easing Factors: rest    Patients goals: Find out what's appropriate for her to do.     Medical History:   Past Medical History:   Diagnosis Date    Diabetes mellitus type II     Hyperlipidemia     Hypertension     Hypothyroid     TALIA (obstructive sleep apnea)     on CPAP    Osteopenia     Psoriasis        Surgical History:   Jaimie Luque  has a past surgical history that includes Robotic assisted hysterectomy; Hysterectomy; Colonoscopy (N/A, 5/2/2017); Oophorectomy; Colonoscopy (N/A, 5/4/2022); and cataract surgery (Left, 08/10/2022).    Medications:   Jaimie has a current medication list which includes the following prescription(s): aspirin, atorvastatin, blood sugar diagnostic, dexcom g6 , dexcom g6 sensor, dexcom g6 transmitter, cholecalciferol (vitamin d3), diphenhydramine-acetaminophen, trulicity, gabapentin, glipizide, lantus solostar u-100 insulin, insulin lispro, lancets, levothyroxine, lisinopril, and meclizine.    Allergies:   Review of patient's allergies indicates:   Allergen Reactions    Adhesive tape-silicones      Other reaction(s): skin irritation to area    Penicillins           OBJECTIVE       Posture:  Pt presents with postural abnormalities which include:    [x] Forward Head   [x] Increased Lumbar Lordosis   [x] Rounded Shoulder   [] Flat Back Posture   [] Increased Thoracic Kyphosis [] Pes Planus   [] Increased Trunk Sway  [x] Valgus knee position   [] Increased Trunk  Rotation  [] Varus knee position   [] Increased cervical lordosis [] Other:    Sensation:    Sensation to light touch over UE's is  [] Intact [] Impaired [x] Defer  Sensation to light touch over LE's is  [x] Intact [] Impaired [] Defer    Reflexes:  Patellar   [x] Defer [] Normal [] Hyper []  Hypo   Achilles  [x] Defer [] Normal [] Hyper []  Hypo  Tricep  [x] Defer [] Normal [] Hyper []  Hypo  Brachioradialis [x] Defer [] Normal [] Hyper []  Hypo      Gait Analysis: The patient ambulated with the following assistive device: none; the pt presents with the following gait abnormalities: trendelenburg, decreased step length on right, decreased stance time on right, decreased knee flexion on right, and antalgic gait.      Balance  Right   (seconds) Left  (seconds) Pain/dysfunction Noted   Tandem Stance Unable to get to position  20 sec + trunk drop, poor trunk control   Single Leg Stance 1 sec 4 sec + trunk drop, poor trunk control             Functional Tests  Outcome Norms   30 second Sit to Stand 7x Age Male Female   60-64 <14 <12   65-69 <12 <11   70-74 <12 <10   75-79 <11 <10   80-84 <10 <9   85-89 <8 <8   90-93 <7 <4      Five Time Sit to Stand 21.12 sec 60s: <11.4 sec  70s: <12.6 sec  80s: 14.8 sec        Range of Motion:    Knee AROM/PROM Right Left Pain/Dysfunction with Movement   Knee Flexion (135º) NT NT    Knee Extension (0º) in sitting Limited  Limited          AROM:  Motion: Movement Results:   Cervical Flexion  2 fingers from chest   Cervical Extension 25%   Cervical Rotation 75%   Upper Extremity IR reach (Medial Rotation) waist   Upper Extremity ER reach (External Rotation) C/T JUNCTION   Multi-Segmental Flexion feet   Multi-Segmental Extension 25%   Multi-Segmental Rotation 50% with LOB with Rot to left    Arms Down Deep Squat 50% al weight thru left LE        Strength:    L/E MMT Right  (spine) Left Pain/Dysfunction with Movement   Hip Flexion  2+/5 2+/5 + Trunk shift   Hip Extension  NT NT    Hip  Abduction  NT NT    Knee Extension 3+/5 4-/5 + Trunk shift   Knee Flexion 3+/5 4-/5 + Trunk shift   Hip IR 3/5 3+/5 + Trunk shift   Hip ER 3-/5 3+/5 + Trunk shift   Ankle DF 4/5 4/5    Ankle PF 4/5 4/5            TREATMENT     Total Treatment time (time-based codes) separate from Evaluation: 15 minutes      Jaimie received the treatments listed below:      therapeutic exercises to develop strength, endurance, ROM, and core stabilization for 15 minutes including:     Intervention 12/8/2022  Parameters   clamshells, SLR, bridge, long arc quad, standing heel/toe raises, water exercise's, sitting marches x                                 PATIENT EDUCATION AND HOME EXERCISES     Education provided:   - Pt was instructed in HEP to address the deficits listed above and to address overall condition and quality of life. Pt was encouraged to participate in cardiovascular exercise and wellness exercise in fitness center with assistance of health  at [address].   - Patient was educated on all the above exercise prior/during/after for proper posture, positioning, and execution for safe performance with home exercise program.    Written Home Exercises Provided: yes. Exercises were reviewed and Jaimie was able to demonstrate them prior to the end of the session.  Jaimie demonstrated good  understanding of the education provided. See EMR under Patient Instructions for exercises provided during therapy sessions.    ASSESSMENT     Jaimie is a 72 y.o. female referred to outpatient Physical Therapy with a medical diagnosis of Primary osteoarthritis of right knee. The patient presents with signs and symptoms consistent with diagnosis along with general deconditioning and impairments which include weakness, impaired endurance, impaired self care skills, impaired functional mobility, gait instability, impaired balance, decreased lower extremity function, pain, decreased ROM, and impaired joint extensibility.    Patient  will  require follow up with physical therapy to monitor and establish safe and effective exercise program      Patient prognosis is Good.   Patient will benefit from skilled outpatient Physical Therapy to address the deficits stated above and in the chart below, provide patient /family education, and to maximize patientt's level of independence.     Plan of care discussed with patient: Yes  Patient's spiritual, cultural and educational needs considered and patient is agreeable to the plan of care and goals as stated below:     Anticipated Barriers for therapy: co-morbidities, sedentary lifestyle, chronicity of condition, adherence to treatment plan, and coping style      Medical Necessity is demonstrated by the following:     History  Co-morbidities and personal factors that may impact the plan of care Co-morbidities:   Diabetes mellitus type II       Hyperlipidemia      Hypertension      Hypothyroid      TALIA (obstructive sleep apnea)       on CPAP    Osteopenia      Psoriasis     sedentary lifestyle, chronicity of condition, adherence to treatment plan, and coping style    Personal Factors:   coping style  lifestyle     high   Examination  Body Structures and Functions, activity limitations and participation restrictions that may impact the plan of care Body Regions:   lower extremities  trunk    Body Systems:    ROM  strength  balance  gait  transfers  transitions  edema  joint mobility, muscle tone, muscle length    Participation Restrictions:   See current level of function listed above     Activity limitations:   Learning and applying knowledge  no deficits    General Tasks and Commands  no deficits    Communication  no deficits    Mobility  lifting and carrying objects  walking  step    Self care  washing oneself (bathing, drying, washing hands)  toileting  dressing    Domestic Life  shopping  cooking  doing house work (cleaning house, washing dishes, laundry)  assisting others    Interactions/Relationships  no  deficits    Life Areas  no deficits    Community and Social Life  community life  recreation and leisure         high   Clinical Presentation evolving clinical presentation with changing clinical characteristics moderate   Decision Making/ Complexity Score: moderate         Goals:    Pt will be independent with self management of his/her condition with home exercise program, posture, positioning and improved body mechanics.  2.   Pt will safely transition to and participate in wellness/fitness program.     PLAN   Plan of care Certification: 12/8/2022 to 2/8/23.    Outpatient Physical Therapy 1 times/  in ~3 week(s) to include the following interventions: Gait Training, Manual Therapy, Neuromuscular Re-ed, Patient Education, Self Care, Therapeutic Activities, and Therapeutic Exercise to establish safe and effective exercise program that patient can perform independently. Health  will contact patient either by phone or in person weekly to monitor exercise program, answer questions regarding exercises and encourage follow up in fitness center. Health  will communicate any concerns with treating therapist and will recommend follow up with physical therapist as needed.         Ashley Manuel, PT      I CERTIFY THE NEED FOR THESE SERVICES FURNISHED UNDER THIS PLAN OF TREATMENT AND WHILE UNDER MY CARE   Physician's comments:     Physician's Signature: ___________________________________________________

## 2022-12-11 PROBLEM — R53.1 DECREASED RANGE OF MOTION WITH DECREASED STRENGTH: Status: ACTIVE | Noted: 2022-12-11

## 2022-12-11 PROBLEM — M25.60 DECREASED RANGE OF MOTION WITH DECREASED STRENGTH: Status: ACTIVE | Noted: 2022-12-11

## 2022-12-11 PROBLEM — R68.89 DECREASED FUNCTIONAL ACTIVITY TOLERANCE: Status: ACTIVE | Noted: 2022-12-11

## 2022-12-11 PROBLEM — Z74.09 DECREASED FUNCTIONAL MOBILITY AND ENDURANCE: Status: ACTIVE | Noted: 2022-12-11

## 2022-12-13 ENCOUNTER — PATIENT MESSAGE (OUTPATIENT)
Dept: DIABETES | Facility: CLINIC | Age: 72
End: 2022-12-13

## 2022-12-13 ENCOUNTER — OFFICE VISIT (OUTPATIENT)
Dept: DIABETES | Facility: CLINIC | Age: 72
End: 2022-12-13
Payer: MEDICARE

## 2022-12-13 DIAGNOSIS — E11.65 UNCONTROLLED TYPE 2 DIABETES MELLITUS WITH HYPERGLYCEMIA: Primary | ICD-10-CM

## 2022-12-13 DIAGNOSIS — E78.5 HYPERLIPIDEMIA ASSOCIATED WITH TYPE 2 DIABETES MELLITUS: ICD-10-CM

## 2022-12-13 DIAGNOSIS — E03.9 ACQUIRED HYPOTHYROIDISM: ICD-10-CM

## 2022-12-13 DIAGNOSIS — G47.33 OSA ON CPAP: ICD-10-CM

## 2022-12-13 DIAGNOSIS — E11.69 HYPERLIPIDEMIA ASSOCIATED WITH TYPE 2 DIABETES MELLITUS: ICD-10-CM

## 2022-12-13 DIAGNOSIS — E66.9 OBESITY (BMI 30-39.9): ICD-10-CM

## 2022-12-13 PROCEDURE — 3061F NEG MICROALBUMINURIA REV: CPT | Mod: CPTII,95,, | Performed by: PHYSICIAN ASSISTANT

## 2022-12-13 PROCEDURE — 3051F HG A1C>EQUAL 7.0%<8.0%: CPT | Mod: CPTII,95,, | Performed by: PHYSICIAN ASSISTANT

## 2022-12-13 PROCEDURE — 3066F NEPHROPATHY DOC TX: CPT | Mod: CPTII,95,, | Performed by: PHYSICIAN ASSISTANT

## 2022-12-13 PROCEDURE — 3066F PR DOCUMENTATION OF TREATMENT FOR NEPHROPATHY: ICD-10-PCS | Mod: CPTII,95,, | Performed by: PHYSICIAN ASSISTANT

## 2022-12-13 PROCEDURE — 4010F ACE/ARB THERAPY RXD/TAKEN: CPT | Mod: CPTII,95,, | Performed by: PHYSICIAN ASSISTANT

## 2022-12-13 PROCEDURE — 99214 OFFICE O/P EST MOD 30 MIN: CPT | Mod: 95,,, | Performed by: PHYSICIAN ASSISTANT

## 2022-12-13 PROCEDURE — 3051F PR MOST RECENT HEMOGLOBIN A1C LEVEL 7.0 - < 8.0%: ICD-10-PCS | Mod: CPTII,95,, | Performed by: PHYSICIAN ASSISTANT

## 2022-12-13 PROCEDURE — 3061F PR NEG MICROALBUMINURIA RESULT DOCUMENTED/REVIEW: ICD-10-PCS | Mod: CPTII,95,, | Performed by: PHYSICIAN ASSISTANT

## 2022-12-13 PROCEDURE — 99214 PR OFFICE/OUTPT VISIT, EST, LEVL IV, 30-39 MIN: ICD-10-PCS | Mod: 95,,, | Performed by: PHYSICIAN ASSISTANT

## 2022-12-13 PROCEDURE — 4010F PR ACE/ARB THEARPY RXD/TAKEN: ICD-10-PCS | Mod: CPTII,95,, | Performed by: PHYSICIAN ASSISTANT

## 2022-12-13 RX ORDER — SEMAGLUTIDE 2.68 MG/ML
2 INJECTION, SOLUTION SUBCUTANEOUS
Qty: 9 ML | Refills: 3 | Status: SHIPPED | OUTPATIENT
Start: 2022-12-13 | End: 2022-12-16 | Stop reason: SDUPTHER

## 2022-12-13 NOTE — PROGRESS NOTES
PCP: Jessica Luna MD    Subjective:     Chief Complaint: Diabetes - Established Patient    TELEMEDICINE VISIT:     The patient location is: Home  The chief complaint leading to consultation is: Diabetes Follow up  Visit type: Virtual visit with synchronous audio and video  Total time spent with patient: 15  Each patient to whom he or she provides medical services by telemedicine is:  (1) informed of the relationship between the physician and patient and the respective role of any other health care provider with respect to management of the patient; and (2) notified that he or she may decline to receive medical services by telemedicine and may withdraw from such care at any time.    Notes: N/A        HISTORY OF PRESENT ILLNESS: 72 y.o.   female presenting for diabetes management visit.   The patient's last visit with me was on 10/31/2022.   Patient has had Type II diabetes since 20 or more years.  Pertinent to decision making is the following comorbidities: HLD, Hypothyroidism and Obesity by BMI  Patient has the following Diabetes complications: without complications  She has attended diabetes education in the past.     Patient's most recent A1c of 7.1% was completed 2 months ago.   Patient states since Her last A1c Her blood glucose levels have been high  throughout the day .   Patient monitors blood glucose 4 times per day and Continuously with personal CGM Dexcom.   Patient blood glucose monitoring device will be uploaded into [employer] [facility] today.        Patients records show some baseline hyperglycemia and some postprandial hyperglycemia.     Patient endorses the following diabetes related symptoms:  None .   Patient is due today for the following diabetes-related health maintenance standards: COVID-19 Vaccine .   She denies any recent hospital admissions or emergency room visits. Patient denies history of DKA.   She voices having hypoglycemia as above..   Patient's concerns today include glycemic  control.   Patient medication regimen is as below.      CURRENT DM MEDICATIONS:   Lantus 12 units daily   Trulicity 4.5 mg weekly - 1 more for Wed  Glipizide 5 mg before bed time snack   Humalog 8 units with small / 14 units with regular / 16 units with heavier carb meal TID wm  Humalog correction dosing every 3 hours as below    Humalog Correction Dosing every 3 hours as needed OUTSIDE OF EATING  If  - 250, may take 2 units of Humalog  If  - 300, may take 3 units of Humalog   If  - 350, may take 4 units of Humalog  If  - 400, may take 5 units of Humalog    If +, may take 6 units of Humalog     Patient has failed the following Diabetes medications:   Metformin - GI   Invokana - coverage  Jardiance - yeast infection  Glyburide   Victoza   Basal - Basaglar  Ozempic 2 mg - Stopped due to backorder      Labs Reviewed.       Lab Results   Component Value Date    CPEPTIDE 2.35 04/01/2021     Lab Results   Component Value Date    GLUTAMICACID 0.00 05/25/2017          //   , There is no height or weight on file to calculate BMI.  Her blood sugar in clinic today is:    Lab Results   Component Value Date    POCGLU 129 (A) 06/17/2022       Review of Systems   Constitutional:  Negative for activity change, appetite change, chills and fever.   HENT:  Negative for dental problem, mouth sores, nosebleeds, sore throat and trouble swallowing.    Eyes:  Negative for pain and discharge.   Respiratory:  Negative for shortness of breath, wheezing and stridor.    Cardiovascular:  Negative for chest pain, palpitations and leg swelling.   Gastrointestinal:  Negative for abdominal pain, diarrhea, nausea and vomiting.   Endocrine: Negative for polydipsia, polyphagia and polyuria.   Genitourinary:  Negative for dysuria, frequency and urgency.   Musculoskeletal:  Negative for joint swelling and myalgias.   Skin:  Negative for rash and wound.   Neurological:  Negative for dizziness, syncope, weakness and headaches.    Psychiatric/Behavioral:  Negative for behavioral problems and dysphoric mood.        Diabetes Management Status  Statin: Taking  ACE/ARB: Taking    Screening or Prevention Patient's value Goal Complete/Controlled?   HgA1C Testing and Control   Lab Results   Component Value Date    HGBA1C 7.1 (H) 10/13/2022      Annually/Less than 8% No   Lipid profile : 10/13/2022 Annually Yes   LDL control Lab Results   Component Value Date    LDLCALC 51.2 (L) 10/13/2022    Annually/Less than 100 mg/dl  Yes   Nephropathy screening Lab Results   Component Value Date    MICALBCREAT 3.5 10/13/2022     Lab Results   Component Value Date    PROTEINUA Negative 04/01/2021    Annually Yes   Blood pressure BP Readings from Last 1 Encounters:   11/29/22 122/80    Less than 140/90 Yes   Dilated retinal exam : 06/29/2022 Annually Yes    Foot exam   : 11/17/2022 Annually Yes     ACTIVITY LEVEL: Moderately Active. Discussed activities, benefits, methods, and precautions.  MEAL PLANNING: Patient reports number of meals per day to be 3 and number of snacks per day to be 2.   Patient is encouraged to carb count and consume no more than 30 - 45 grams of carbohydrates in each meal and 15 grams of carbohydrates in each snack.     Social History     Socioeconomic History    Marital status:    Tobacco Use    Smoking status: Never    Smokeless tobacco: Never   Substance and Sexual Activity    Alcohol use: Yes     Comment: rare    Drug use: No    Sexual activity: Yes     Partners: Male   Social History Narrative    . Lives with spouse. Has 3 children. Patient retired as  for [employer].      Social Determinants of Health     Financial Resource Strain: Low Risk     Difficulty of Paying Living Expenses: Not hard at all   Food Insecurity: No Food Insecurity    Worried About Running Out of Food in the Last Year: Never true    Ran Out of Food in the Last Year: Never true   Transportation Needs: No Transportation Needs    Lack  of Transportation (Medical): No    Lack of Transportation (Non-Medical): No   Physical Activity: Insufficiently Active    Days of Exercise per Week: 2 days    Minutes of Exercise per Session: 50 min   Stress: No Stress Concern Present    Feeling of Stress : Only a little   Social Connections: Unknown    Frequency of Communication with Friends and Family: Once a week    Frequency of Social Gatherings with Friends and Family: More than three times a week    Active Member of Clubs or Organizations: Yes    Attends Club or Organization Meetings: More than 4 times per year    Marital Status:    Housing Stability: Low Risk     Unable to Pay for Housing in the Last Year: No    Number of Places Lived in the Last Year: 1    Unstable Housing in the Last Year: No     Past Medical History:   Diagnosis Date    Diabetes mellitus type II     Hyperlipidemia     Hypertension     Hypothyroid     TALIA (obstructive sleep apnea)     on CPAP    Osteopenia     Psoriasis        Objective:        Physical Exam  Constitutional:       General: She is not in acute distress.     Appearance: She is well-developed. She is not diaphoretic.   HENT:      Head: Normocephalic and atraumatic.      Right Ear: External ear normal.      Left Ear: External ear normal.      Nose: Nose normal.   Eyes:      General:         Right eye: No discharge.         Left eye: No discharge.   Pulmonary:      Effort: Pulmonary effort is normal. No respiratory distress.      Breath sounds: No stridor.   Musculoskeletal:         General: Normal range of motion.      Cervical back: Normal range of motion.   Skin:     Coloration: Skin is not pale.   Neurological:      Mental Status: She is alert and oriented to person, place, and time. Mental status is at baseline.      Motor: No abnormal muscle tone.      Coordination: Coordination normal.   Psychiatric:         Mood and Affect: Mood normal.         Behavior: Behavior normal.         Thought Content: Thought content  normal.         Judgment: Judgment normal.         Assessment / Plan:     Uncontrolled type 2 diabetes mellitus with hyperglycemia  -     Discontinue: semaglutide (OZEMPIC) 2 mg/dose (8 mg/3 mL) PnIj; Inject 2 mg into the skin every 7 days.  Dispense: 9 mL; Refill: 3    Hyperlipidemia associated with type 2 diabetes mellitus    Acquired hypothyroidism    TALIA on CPAP    Obesity (BMI 30-39.9)      Additional Plan Details:    - POCT Glucose  - Encouraged continuation of lifestyle changes including regular exercise and limiting carbohydrates to 30-45 grams per meal threes times daily and 15 grams per snack with a limit of two daily.   - Encouraged continued monitoring of blood glucose with maintenance of 4 times daily and Continuously with personal CGM Dexcom.    - Current DM Medication Regimen: Change Lantus 12 units daily. Change Trulicity to Ozempic 2 mg weekly. Continue Glipizide 5 mg before night time snack if eating one. Continue Humalog 8 units with small / 14 units with regular / 16 units with heavier carb meal TID wm. ADJUST INSULIN FREQUENTLY BASED ON BLOOD SUGAR.   - Health Maintenance standards addressed today: COVID - 19 Vaccine - patient will schedule outside of [facility]   - Nursing Visit: Patient is age 79 or younger with an A1c of 7.5 or greater and  qualifies for nursing visit, but will defer for now.  .   - Follow up in 6 weeks with A1c prior.     CURRENT DM MEDICATIONS:   Lantus 15 units daily   Trulicity 4.5 mg weekly   Glipizide 5 mg before bed time snack   Humalog 8 units with small / 16 units with regular / 18 units with heavier carb meal TID wm, 5 units snack  Humalog correction dosing every 3 hours as below    Humalog Correction Dosing every 3 hours as needed OUTSIDE OF EATING  If  - 250, may take 2 units of Humalog  If  - 300, may take 3 units of Humalog   If  - 350, may take 4 units of Humalog  If  - 400, may take 5 units of Humalog    If +, may take 6 units of  Humalog     If able to switch to Ozempic next week - change medication regimen as following:   Lantus 12 units daily   Ozempic 2 mg weekly   Glipizide 5 mg before bed time snack   Humalog 8 units with small / 14 units with regular / 16 units with heavier carb meal TID wm / 5 units with snack  Humalog correction dosing every 3 hours as below    Humalog Correction Dosing every 3 hours as needed OUTSIDE OF EATING  If  - 250, may take 2 units of Humalog  If  - 300, may take 3 units of Humalog   If  - 350, may take 4 units of Humalog  If  - 400, may take 5 units of Humalog    If +, may take 6 units of Humalog     Blakeney McKnight, PA-C [facility]

## 2022-12-13 NOTE — PATIENT INSTRUCTIONS
CURRENT DM MEDICATIONS:   Lantus 15 units daily   Trulicity 4.5 mg weekly   Glipizide 5 mg before bed time snack   Humalog 8 units with small / 16 units with regular / 18 units with heavier carb meal TID wm, 5 units snack  Humalog correction dosing every 3 hours as below    Humalog Correction Dosing every 3 hours as needed OUTSIDE OF EATING  If  - 250, may take 2 units of Humalog  If  - 300, may take 3 units of Humalog   If  - 350, may take 4 units of Humalog  If  - 400, may take 5 units of Humalog    If +, may take 6 units of Humalog       If able to switch to Ozempic next week - change medication regimen as following:   Lantus 12 units daily   Ozempic 2 mg weekly   Glipizide 5 mg before bed time snack   Humalog 8 units with small / 14 units with regular / 16 units with heavier carb meal TID wm / 5 units with snack  Humalog correction dosing every 3 hours as below    Humalog Correction Dosing every 3 hours as needed OUTSIDE OF EATING  If  - 250, may take 2 units of Humalog  If  - 300, may take 3 units of Humalog   If  - 350, may take 4 units of Humalog  If  - 400, may take 5 units of Humalog    If +, may take 6 units of Humalog

## 2022-12-15 ENCOUNTER — HOSPITAL ENCOUNTER (OUTPATIENT)
Facility: HOSPITAL | Age: 72
Discharge: HOME | End: 2022-12-15
Attending: INTERNAL MEDICINE | Admitting: INTERNAL MEDICINE
Payer: MEDICARE

## 2022-12-15 ENCOUNTER — ANESTHESIA (OUTPATIENT)
Dept: ENDOSCOPY | Facility: HOSPITAL | Age: 72
End: 2022-12-15
Payer: MEDICARE

## 2022-12-15 ENCOUNTER — ANESTHESIA EVENT (OUTPATIENT)
Dept: ENDOSCOPY | Facility: HOSPITAL | Age: 72
End: 2022-12-15
Payer: MEDICARE

## 2022-12-15 DIAGNOSIS — R91.8 ABNORMAL CT SCAN OF LUNG: Primary | ICD-10-CM

## 2022-12-15 LAB
APPEARANCE FLD: CLEAR
BODY FLD TYPE: NORMAL
COLOR FLD: COLORLESS
GLUCOSE SERPL-MCNC: 160 MG/DL (ref 70–110)
LYMPHOCYTES NFR FLD MANUAL: 4 %
MONOS+MACROS NFR FLD MANUAL: 88 %
NEUTROPHILS NFR FLD MANUAL: 8 %
POCT GLUCOSE: 160 MG/DL (ref 70–110)
WBC # FLD: 47 /CU MM

## 2022-12-15 PROCEDURE — 87116 MYCOBACTERIA CULTURE: CPT | Performed by: INTERNAL MEDICINE

## 2022-12-15 PROCEDURE — 25000003 PHARM REV CODE 250: Performed by: NURSE ANESTHETIST, CERTIFIED REGISTERED

## 2022-12-15 PROCEDURE — 89051 BODY FLUID CELL COUNT: CPT | Performed by: INTERNAL MEDICINE

## 2022-12-15 PROCEDURE — 63600175 PHARM REV CODE 636 W HCPCS: Performed by: NURSE ANESTHETIST, CERTIFIED REGISTERED

## 2022-12-15 PROCEDURE — 88305 TISSUE EXAM BY PATHOLOGIST: ICD-10-PCS | Mod: 26,,, | Performed by: PATHOLOGY

## 2022-12-15 PROCEDURE — 31624 DX BRONCHOSCOPE/LAVAGE: CPT | Mod: RT,,, | Performed by: INTERNAL MEDICINE

## 2022-12-15 PROCEDURE — 37000008 HC ANESTHESIA 1ST 15 MINUTES: Performed by: INTERNAL MEDICINE

## 2022-12-15 PROCEDURE — 87205 SMEAR GRAM STAIN: CPT | Performed by: INTERNAL MEDICINE

## 2022-12-15 PROCEDURE — 88112 CYTOPATH CELL ENHANCE TECH: CPT | Mod: 26,,, | Performed by: PATHOLOGY

## 2022-12-15 PROCEDURE — 31624 DX BRONCHOSCOPE/LAVAGE: CPT | Performed by: INTERNAL MEDICINE

## 2022-12-15 PROCEDURE — 87102 FUNGUS ISOLATION CULTURE: CPT | Performed by: INTERNAL MEDICINE

## 2022-12-15 PROCEDURE — 87210 SMEAR WET MOUNT SALINE/INK: CPT | Performed by: INTERNAL MEDICINE

## 2022-12-15 PROCEDURE — 88305 TISSUE EXAM BY PATHOLOGIST: CPT | Performed by: PATHOLOGY

## 2022-12-15 PROCEDURE — 37000009 HC ANESTHESIA EA ADD 15 MINS: Performed by: INTERNAL MEDICINE

## 2022-12-15 PROCEDURE — 31624 PR BRONCHOSCOPY,DIAG2STIC W LAVAGE: ICD-10-PCS | Mod: RT,,, | Performed by: INTERNAL MEDICINE

## 2022-12-15 PROCEDURE — 87206 SMEAR FLUORESCENT/ACID STAI: CPT | Performed by: INTERNAL MEDICINE

## 2022-12-15 PROCEDURE — 25000003 PHARM REV CODE 250: Performed by: INTERNAL MEDICINE

## 2022-12-15 PROCEDURE — 88112 PR  CYTOPATH, CELL ENHANCE TECH: ICD-10-PCS | Mod: 26,,, | Performed by: PATHOLOGY

## 2022-12-15 PROCEDURE — 88112 CYTOPATH CELL ENHANCE TECH: CPT | Performed by: PATHOLOGY

## 2022-12-15 PROCEDURE — 88305 TISSUE EXAM BY PATHOLOGIST: CPT | Mod: 26,,, | Performed by: PATHOLOGY

## 2022-12-15 PROCEDURE — 82962 GLUCOSE BLOOD TEST: CPT | Performed by: INTERNAL MEDICINE

## 2022-12-15 PROCEDURE — 87015 SPECIMEN INFECT AGNT CONCNTJ: CPT | Performed by: INTERNAL MEDICINE

## 2022-12-15 PROCEDURE — 87070 CULTURE OTHR SPECIMN AEROBIC: CPT | Performed by: INTERNAL MEDICINE

## 2022-12-15 RX ORDER — LIDOCAINE HYDROCHLORIDE 10 MG/ML
INJECTION, SOLUTION EPIDURAL; INFILTRATION; INTRACAUDAL; PERINEURAL
Status: DISCONTINUED | OUTPATIENT
Start: 2022-12-15 | End: 2022-12-15

## 2022-12-15 RX ORDER — PROPOFOL 10 MG/ML
VIAL (ML) INTRAVENOUS
Status: DISCONTINUED | OUTPATIENT
Start: 2022-12-15 | End: 2022-12-15

## 2022-12-15 RX ORDER — LIDOCAINE HYDROCHLORIDE 10 MG/ML
INJECTION INFILTRATION; PERINEURAL
Status: DISCONTINUED | OUTPATIENT
Start: 2022-12-15 | End: 2022-12-15 | Stop reason: HOSPADM

## 2022-12-15 RX ORDER — ROCURONIUM BROMIDE 10 MG/ML
INJECTION, SOLUTION INTRAVENOUS
Status: DISCONTINUED | OUTPATIENT
Start: 2022-12-15 | End: 2022-12-15

## 2022-12-15 RX ORDER — ONDANSETRON 2 MG/ML
INJECTION INTRAMUSCULAR; INTRAVENOUS
Status: DISCONTINUED | OUTPATIENT
Start: 2022-12-15 | End: 2022-12-15

## 2022-12-15 RX ADMIN — SUGAMMADEX 200 MG: 100 INJECTION, SOLUTION INTRAVENOUS at 12:12

## 2022-12-15 RX ADMIN — LIDOCAINE HYDROCHLORIDE 50 MG: 10 INJECTION, SOLUTION EPIDURAL; INFILTRATION; INTRACAUDAL; PERINEURAL at 12:12

## 2022-12-15 RX ADMIN — PROPOFOL 140 MG: 10 INJECTION, EMULSION INTRAVENOUS at 12:12

## 2022-12-15 RX ADMIN — ONDANSETRON 4 MG: 2 INJECTION, SOLUTION INTRAMUSCULAR; INTRAVENOUS at 12:12

## 2022-12-15 RX ADMIN — ROCURONIUM BROMIDE 50 MG: 10 INJECTION, SOLUTION INTRAVENOUS at 12:12

## 2022-12-15 NOTE — TRANSFER OF CARE
"Anesthesia Transfer of Care Note    Patient: Jaimie Luque    Procedure(s) Performed: Procedure(s) (LRB):  Bronchoscopy (Bilateral)    Patient location: PACU    Anesthesia Type: general    Transport from OR: Transported from OR on room air with adequate spontaneous ventilation    Post pain: adequate analgesia    Post assessment: no apparent anesthetic complications    Post vital signs: stable    Level of consciousness: awake    Nausea/Vomiting: no nausea/vomiting    Complications: none    Transfer of care protocol was followed      Last vitals:   Visit Vitals  BP (!) 141/63   Pulse 100   Temp 37.1 °C (98.8 °F)   Resp 18   Ht 5' 7" (1.702 m)   Wt 108.9 kg (240 lb)   SpO2 99%   BMI 37.59 kg/m²     "

## 2022-12-15 NOTE — ANESTHESIA PREPROCEDURE EVALUATION
12/15/2022  Jaimie Luque is a 72 y.o., female.      Pre-op Assessment    I have reviewed the Patient Summary Reports.     I have reviewed the Nursing Notes. I have reviewed the NPO Status.   I have reviewed the Medications.     Review of Systems  Anesthesia Hx:  No problems with previous Anesthesia    Social:  Non-Smoker    Hematology/Oncology:  Hematology Normal   Oncology Normal     EENT/Dental:EENT/Dental Normal   Cardiovascular:   Hypertension, well controlled hyperlipidemia    Pulmonary:   Sleep Apnea, CPAP    Renal/:   Chronic Renal Disease    Hepatic/GI:  Hepatic/GI Normal    Musculoskeletal:  Musculoskeletal Normal    Neurological:  Neurology Normal    Endocrine:   Diabetes, well controlled, type 2 Hypothyroidism    Dermatological:  Skin Normal    Psych:  Psychiatric Normal           Physical Exam  General: Well nourished    Airway:  Mallampati: II   Mouth Opening: Normal  TM Distance: Normal  Tongue: Normal  Neck ROM: Normal ROM    Dental:  Intact    Chest/Lungs:  Clear to auscultation    Heart:  Rate: Normal        Anesthesia Plan  Type of Anesthesia, risks & benefits discussed:    Anesthesia Type: MAC  Intra-op Monitoring Plan: Standard ASA Monitors  Induction:  IV  Informed Consent: Informed consent signed with the Patient and all parties understand the risks and agree with anesthesia plan.  All questions answered. Patient consented to blood products? Yes  ASA Score: 3    Ready For Surgery From Anesthesia Perspective.     .

## 2022-12-15 NOTE — ANESTHESIA RELEASE NOTE
"Anesthesia Release from PACU Note    Patient: Jaimie Luque    Procedure(s) Performed: Procedure(s) (LRB):  Bronchoscopy (Bilateral)    Anesthesia type: general    Post pain: Adequate analgesia    Post assessment: no apparent anesthetic complications    Last Vitals:   Visit Vitals  BP (!) 141/63   Pulse 100   Temp 37.1 °C (98.8 °F)   Resp 18   Ht 5' 7" (1.702 m)   Wt 108.9 kg (240 lb)   SpO2 99%   BMI 37.59 kg/m²       Post vital signs: stable    Level of consciousness: awake    Nausea/Vomiting: no nausea/no vomiting    Complications: none    Airway Patency: patent    Respiratory: unassisted    Cardiovascular: stable and blood pressure at baseline    Hydration: euvolemic  "

## 2022-12-15 NOTE — INTERVAL H&P NOTE
The patient has been examined and the H&P has been reviewed:    I concur with the findings and no changes have occurred since H&P was written.    Procedure risks, benefits and alternative options discussed and understood by patient/family.    Abnormal CT chest bronchoscopy and BAL

## 2022-12-15 NOTE — DISCHARGE SUMMARY
[facility] ([facility])  Pulmonology  Discharge Summary      Patient Name: Jaimie Luque  MRN: [id number]  Admission Date: 12/15/2022  Hospital Length of Stay: 0 days  Discharge Date and Time:  12/15/2022 12:34 PM  Attending Physician: Corky Luna MD   Discharging Provider: Corky Luna MD  Primary Care Provider: Jessica Luna MD    HPI:  Abnormal CT scan of the chest    Procedure(s) (LRB):  Bronchoscopy (Bilateral)    Indwelling Lines/Drains at Time of Discharge:   Lines/Drains/Airways       None                   Hospital Course:  BAL right middle lobe  Significant Imaging:  I have reviewed all pertinent imaging results/findings within the past 24 hours.  CT: I have reviewed all pertinent results/findings within the past 24 hours and my personal findings are:  Right middle lobe abnormality    Pending Diagnostic Studies:       None          Final Active Diagnoses:    Diagnosis Date Noted POA    Abnormal CT scan of lung [R91.8] 02/14/2018 Yes      Problems Resolved During this Admission:       Discharged Condition: good    Disposition: home       Follow Up:   Follow-up Information       Corky Luna MD Follow up in 7 week(s).    Specialty: Pulmonary Disease  Contact information:  [address] [place] [address] DR Jessika CHANG [id number] [phone]                           Patient Instructions:   You might experience low-grade fever take Tylenol 650 mg every 8 hours as needed  Medications:  Reconciled Home Medications:      Medication List        CONTINUE taking these medications      aspirin 81 mg Tab  Take by mouth. 1 Tablet Oral 2 times weekly     atorvastatin 20 MG tablet  Commonly known as: LIPITOR  Take 1 tablet (20 mg total) by mouth once daily.     blood sugar diagnostic Strp  To check BG 2 times daily, to use with AccuChek Varsha Plus  Dx:  E11.9     cholecalciferol (vitamin D3) 50 mcg (2,000 unit) Tab  Commonly known as: VITAMIN D3  Take by mouth. 1 Tablet Oral Every day     DEXCOM G6   Misc  Generic drug: blood-glucose meter,continuous  Use daily.     DEXCOM G6 SENSOR Gretta  Generic drug: blood-glucose sensor  Use 1 every 10 days.     DEXCOM G6 TRANSMITTER Gretta  Generic drug: blood-glucose transmitter  Use 1 every 90 days.     gabapentin 300 MG capsule  Commonly known as: NEURONTIN  TAKE 1 CAPSULE BY MOUTH THREE TIMES DAILY     glipiZIDE 5 MG tablet  Commonly known as: GLUCOTROL  Take 1 tablet (5 mg total) by mouth once daily.     insulin lispro 100 unit/mL pen  Inject 12 Units into the skin 3 (three) times daily with meals.     lancets Misc  Commonly known as: ACCU-CHEK SOFTCLIX LANCETS  Check blood sugar twice daily.  Accuchek Multiclix drum lancets, [employer]  Dx:  E11.9     LANTUS SOLOSTAR U-100 INSULIN glargine 100 units/mL SubQ pen  Generic drug: insulin  Inject 18 Units into the skin once daily.     levothyroxine 112 MCG tablet  Commonly known as: SYNTHROID  Take 1 tablet (112 mcg total) by mouth before breakfast.     lisinopriL 2.5 MG tablet  Commonly known as: PRINIVIL,ZESTRIL  TAKE 1 TABLET(2.5 MG) BY MOUTH EVERY DAY     meclizine 25 mg tablet  Commonly known as: ANTIVERT  Take 1 tablet by mouth as needed.     OZEMPIC 2 mg/dose (8 mg/3 mL) Pnij  Generic drug: semaglutide  Inject 2 mg into the skin every 7 days.     TRULICITY 4.5 mg/0.5 mL pen injector  Generic drug: dulaglutide  Inject 4.5 mg into the skin every 7 days.            ASK your doctor about these medications      diphenhydrAMINE-acetaminophen  mg Tab  Commonly known as: TYLENOL PM  Take 1 tablet by mouth nightly as needed.              Corky Luna MD  Pulmonology  [facility] ([facility])

## 2022-12-15 NOTE — ANESTHESIA PROCEDURE NOTES
Intubation    Date/Time: 12/15/2022 12:12 PM  Performed by: Blair Rouse CRNA  Authorized by: Martir Melchor MD     Intubation:     Induction:  Intravenous    Intubated:  Postinduction    Mask Ventilation:  Easy mask    Attempts:  1    Attempted By:  CRNA    Method of Intubation:  Direct    Blade:  Edgar 3    Laryngeal View Grade: Grade IIA - cords partially seen      Difficult Airway Encountered?: No      Complications:  None    Airway Device:  Supraglottic airway/LMA    Airway Device Size:  8.5    Style/Cuff Inflation:  Cuffed (inflated to minimal occlusive pressure)    Inflation Amount (mL):  8    Tube secured:  20    Secured at:  The lips    Placement Verified By:  Capnometry    Complicating Factors:  None    Findings Post-Intubation:  BS equal bilateral

## 2022-12-15 NOTE — ANESTHESIA POSTPROCEDURE EVALUATION
Anesthesia Post Evaluation    Patient: Jaimie Luque    Procedure(s) Performed: Procedure(s) (LRB):  Bronchoscopy (Bilateral)    Final Anesthesia Type: general      Patient location during evaluation: PACU  Patient participation: Yes- Able to Participate  Level of consciousness: awake and alert  Post-procedure vital signs: reviewed and stable  Pain management: adequate  Airway patency: patent    PONV status at discharge: No PONV  Anesthetic complications: no      Cardiovascular status: blood pressure returned to baseline  Respiratory status: unassisted  Hydration status: euvolemic  Follow-up not needed.          Vitals Value Taken Time   /66 12/15/22 1241   Temp 98 12/15/22 1241   Pulse 66 12/15/22 1241   Resp 12 12/15/22 1241   SpO2 98 12/15/22 1241         No case tracking events are documented in the log.      Pain/Abbey Score: No data recorded

## 2022-12-16 ENCOUNTER — PATIENT MESSAGE (OUTPATIENT)
Dept: DIABETES | Facility: CLINIC | Age: 72
End: 2022-12-16
Payer: MEDICARE

## 2022-12-16 ENCOUNTER — PATIENT MESSAGE (OUTPATIENT)
Dept: PULMONOLOGY | Facility: CLINIC | Age: 72
End: 2022-12-16
Payer: MEDICARE

## 2022-12-16 VITALS
BODY MASS INDEX: 37.67 KG/M2 | TEMPERATURE: 98 F | DIASTOLIC BLOOD PRESSURE: 58 MMHG | SYSTOLIC BLOOD PRESSURE: 115 MMHG | WEIGHT: 240 LBS | HEIGHT: 67 IN | RESPIRATION RATE: 18 BRPM | HEART RATE: 84 BPM | OXYGEN SATURATION: 95 %

## 2022-12-16 DIAGNOSIS — E11.65 UNCONTROLLED TYPE 2 DIABETES MELLITUS WITH HYPERGLYCEMIA: ICD-10-CM

## 2022-12-16 LAB
KOH PREP SPEC: NORMAL
PATH INTERP FLD-IMP: NORMAL

## 2022-12-16 RX ORDER — SEMAGLUTIDE 2.68 MG/ML
2 INJECTION, SOLUTION SUBCUTANEOUS
Qty: 9 ML | Refills: 3 | Status: SHIPPED | OUTPATIENT
Start: 2022-12-16 | End: 2023-07-24 | Stop reason: ALTCHOICE

## 2022-12-19 LAB
BACTERIA SPEC AEROBE CULT: NO GROWTH
FINAL PATHOLOGIC DIAGNOSIS: NORMAL
GRAM STN SPEC: NORMAL
GRAM STN SPEC: NORMAL
Lab: NORMAL

## 2022-12-21 ENCOUNTER — PATIENT MESSAGE (OUTPATIENT)
Dept: DIABETES | Facility: CLINIC | Age: 72
End: 2022-12-21
Payer: MEDICARE

## 2022-12-21 ENCOUNTER — CLINICAL SUPPORT (OUTPATIENT)
Dept: PULMONOLOGY | Facility: CLINIC | Age: 72
End: 2022-12-21
Payer: MEDICARE

## 2022-12-21 ENCOUNTER — OFFICE VISIT (OUTPATIENT)
Dept: PULMONOLOGY | Facility: CLINIC | Age: 72
End: 2022-12-21
Payer: MEDICARE

## 2022-12-21 VITALS
OXYGEN SATURATION: 97 % | RESPIRATION RATE: 18 BRPM | SYSTOLIC BLOOD PRESSURE: 122 MMHG | HEIGHT: 67 IN | HEART RATE: 80 BPM | DIASTOLIC BLOOD PRESSURE: 80 MMHG | BODY MASS INDEX: 39.24 KG/M2 | WEIGHT: 250 LBS

## 2022-12-21 DIAGNOSIS — R91.8 GROUND GLASS OPACITY PRESENT ON IMAGING OF LUNG: ICD-10-CM

## 2022-12-21 DIAGNOSIS — R76.8 ANA POSITIVE: ICD-10-CM

## 2022-12-21 DIAGNOSIS — R70.0 ESR RAISED: ICD-10-CM

## 2022-12-21 DIAGNOSIS — R94.2 OBSTRUCTIVE PATTERN PRESENT ON PULMONARY FUNCTION TESTING: ICD-10-CM

## 2022-12-21 DIAGNOSIS — G47.33 OSA ON CPAP: ICD-10-CM

## 2022-12-21 DIAGNOSIS — T17.500A MUCUS PLUGGING OF BRONCHI: ICD-10-CM

## 2022-12-21 DIAGNOSIS — R94.2 OBSTRUCTIVE PATTERN PRESENT ON PULMONARY FUNCTION TESTING: Primary | ICD-10-CM

## 2022-12-21 LAB
BRPFT: NORMAL
DLCO ADJ PRE: 14.95 ML/(MIN*MMHG)
DLCO SINGLE BREATH LLN: 16.93
DLCO SINGLE BREATH PRE REF: 66 %
DLCO SINGLE BREATH REF: 22.66
DLCOC SBVA LLN: 2.87
DLCOC SBVA PRE REF: 102.6 %
DLCOC SBVA REF: 4.17
DLCOC SINGLE BREATH LLN: 16.93
DLCOC SINGLE BREATH PRE REF: 66 %
DLCOC SINGLE BREATH REF: 22.66
DLCOVA LLN: 2.87
DLCOVA PRE REF: 102.6 %
DLCOVA PRE: 4.28 ML/(MIN*MMHG*L)
DLCOVA REF: 4.17
DLVAADJ PRE: 4.28 ML/(MIN*MMHG*L)
ERV LLN: -16449.36
ERV PRE REF: 111.6 %
ERV REF: 0.64
FEF 25 75 CHG: 23.1 %
FEF 25 75 LLN: 0.82
FEF 25 75 POST REF: 43.7 %
FEF 25 75 PRE REF: 35.5 %
FEF 25 75 REF: 1.85
FET100 CHG: -10.1 %
FEV1 CHG: 6.1 %
FEV1 FVC CHG: 5.2 %
FEV1 FVC LLN: 64
FEV1 FVC POST REF: 84.9 %
FEV1 FVC PRE REF: 80.7 %
FEV1 FVC REF: 77
FEV1 LLN: 1.65
FEV1 POST REF: 68.5 %
FEV1 PRE REF: 64.5 %
FEV1 REF: 2.3
FRCPLETH LLN: 2.06
FRCPLETH PREREF: 121.6 %
FRCPLETH REF: 2.88
FVC CHG: 0.9 %
FVC LLN: 2.17
FVC POST REF: 79.8 %
FVC PRE REF: 79.1 %
FVC REF: 3.01
IVC PRE: 1.96 L
IVC SINGLE BREATH LLN: 2.17
IVC SINGLE BREATH PRE REF: 65 %
IVC SINGLE BREATH REF: 3.01
MVV LLN: 78
MVV PRE REF: 65.5 %
MVV REF: 92
PEF CHG: 8.8 %
PEF LLN: 4
PEF POST REF: 98.1 %
PEF PRE REF: 90.2 %
PEF REF: 5.87
POST FEF 25 75: 0.81 L/S
POST FET 100: 8.74 SEC
POST FEV1 FVC: 65.61 %
POST FEV1: 1.58 L
POST FVC: 2.4 L
POST PEF: 5.76 L/S
PRE DLCO: 14.95 ML/(MIN*MMHG)
PRE ERV: 0.71 L
PRE FEF 25 75: 0.66 L/S
PRE FET 100: 9.72 SEC
PRE FEV1 FVC: 62.37 %
PRE FEV1: 1.49 L
PRE FRC PL: 3.5 L
PRE FVC: 2.38 L
PRE MVV: 60 L/MIN
PRE PEF: 5.3 L/S
PRE RV: 2.56 L
PRE TLC: 5.05 L
RAW LLN: 3.06
RAW PRE REF: 116.8 %
RAW PRE: 3.57 CMH2O*S/L
RAW REF: 3.06
RV LLN: 1.67
RV PRE REF: 113.9 %
RV REF: 2.24
RVTLC LLN: 34
RVTLC PRE REF: 115.6 %
RVTLC PRE: 50.61 %
RVTLC REF: 44
TLC LLN: 4.44
TLC PRE REF: 93 %
TLC REF: 5.43
VA PRE: 3.5 L
VA SINGLE BREATH LLN: 5.28
VA SINGLE BREATH PRE REF: 66.2 %
VA SINGLE BREATH REF: 5.28
VC LLN: 2.17
VC PRE REF: 82.8 %
VC PRE: 2.5 L
VC REF: 3.01
VTGRAWPRE: 3.28 L

## 2022-12-21 PROCEDURE — 3288F PR FALLS RISK ASSESSMENT DOCUMENTED: ICD-10-PCS | Mod: CPTII,S$GLB,, | Performed by: INTERNAL MEDICINE

## 2022-12-21 PROCEDURE — 99999 PR PBB SHADOW E&M-EST. PATIENT-LVL IV: CPT | Mod: PBBFAC,,, | Performed by: INTERNAL MEDICINE

## 2022-12-21 PROCEDURE — 1101F PR PT FALLS ASSESS DOC 0-1 FALLS W/OUT INJ PAST YR: ICD-10-PCS | Mod: CPTII,S$GLB,, | Performed by: INTERNAL MEDICINE

## 2022-12-21 PROCEDURE — 99214 PR OFFICE/OUTPT VISIT, EST, LEVL IV, 30-39 MIN: ICD-10-PCS | Mod: 25,S$GLB,, | Performed by: INTERNAL MEDICINE

## 2022-12-21 PROCEDURE — 99214 OFFICE O/P EST MOD 30 MIN: CPT | Mod: 25,S$GLB,, | Performed by: INTERNAL MEDICINE

## 2022-12-21 PROCEDURE — 3061F NEG MICROALBUMINURIA REV: CPT | Mod: CPTII,S$GLB,, | Performed by: INTERNAL MEDICINE

## 2022-12-21 PROCEDURE — 99999 PR PBB SHADOW E&M-EST. PATIENT-LVL IV: ICD-10-PCS | Mod: PBBFAC,,, | Performed by: INTERNAL MEDICINE

## 2022-12-21 PROCEDURE — 1160F PR REVIEW ALL MEDS BY PRESCRIBER/CLIN PHARMACIST DOCUMENTED: ICD-10-PCS | Mod: CPTII,S$GLB,, | Performed by: INTERNAL MEDICINE

## 2022-12-21 PROCEDURE — 4010F ACE/ARB THERAPY RXD/TAKEN: CPT | Mod: CPTII,S$GLB,, | Performed by: INTERNAL MEDICINE

## 2022-12-21 PROCEDURE — 3079F PR MOST RECENT DIASTOLIC BLOOD PRESSURE 80-89 MM HG: ICD-10-PCS | Mod: CPTII,S$GLB,, | Performed by: INTERNAL MEDICINE

## 2022-12-21 PROCEDURE — 1160F RVW MEDS BY RX/DR IN RCRD: CPT | Mod: CPTII,S$GLB,, | Performed by: INTERNAL MEDICINE

## 2022-12-21 PROCEDURE — 3051F HG A1C>EQUAL 7.0%<8.0%: CPT | Mod: CPTII,S$GLB,, | Performed by: INTERNAL MEDICINE

## 2022-12-21 PROCEDURE — 94060 EVALUATION OF WHEEZING: CPT | Mod: S$GLB,,, | Performed by: INTERNAL MEDICINE

## 2022-12-21 PROCEDURE — 3066F NEPHROPATHY DOC TX: CPT | Mod: CPTII,S$GLB,, | Performed by: INTERNAL MEDICINE

## 2022-12-21 PROCEDURE — 1159F MED LIST DOCD IN RCRD: CPT | Mod: CPTII,S$GLB,, | Performed by: INTERNAL MEDICINE

## 2022-12-21 PROCEDURE — 3288F FALL RISK ASSESSMENT DOCD: CPT | Mod: CPTII,S$GLB,, | Performed by: INTERNAL MEDICINE

## 2022-12-21 PROCEDURE — 4010F PR ACE/ARB THEARPY RXD/TAKEN: ICD-10-PCS | Mod: CPTII,S$GLB,, | Performed by: INTERNAL MEDICINE

## 2022-12-21 PROCEDURE — 94729 DIFFUSING CAPACITY: CPT | Mod: S$GLB,,, | Performed by: INTERNAL MEDICINE

## 2022-12-21 PROCEDURE — 94729 PR C02/MEMBANE DIFFUSE CAPACITY: ICD-10-PCS | Mod: S$GLB,,, | Performed by: INTERNAL MEDICINE

## 2022-12-21 PROCEDURE — 3074F PR MOST RECENT SYSTOLIC BLOOD PRESSURE < 130 MM HG: ICD-10-PCS | Mod: CPTII,S$GLB,, | Performed by: INTERNAL MEDICINE

## 2022-12-21 PROCEDURE — 3079F DIAST BP 80-89 MM HG: CPT | Mod: CPTII,S$GLB,, | Performed by: INTERNAL MEDICINE

## 2022-12-21 PROCEDURE — 94726 PULM FUNCT TST PLETHYSMOGRAP: ICD-10-PCS | Mod: S$GLB,,, | Performed by: INTERNAL MEDICINE

## 2022-12-21 PROCEDURE — 94726 PLETHYSMOGRAPHY LUNG VOLUMES: CPT | Mod: S$GLB,,, | Performed by: INTERNAL MEDICINE

## 2022-12-21 PROCEDURE — 3074F SYST BP LT 130 MM HG: CPT | Mod: CPTII,S$GLB,, | Performed by: INTERNAL MEDICINE

## 2022-12-21 PROCEDURE — 3061F PR NEG MICROALBUMINURIA RESULT DOCUMENTED/REVIEW: ICD-10-PCS | Mod: CPTII,S$GLB,, | Performed by: INTERNAL MEDICINE

## 2022-12-21 PROCEDURE — 3051F PR MOST RECENT HEMOGLOBIN A1C LEVEL 7.0 - < 8.0%: ICD-10-PCS | Mod: CPTII,S$GLB,, | Performed by: INTERNAL MEDICINE

## 2022-12-21 PROCEDURE — 1159F PR MEDICATION LIST DOCUMENTED IN MEDICAL RECORD: ICD-10-PCS | Mod: CPTII,S$GLB,, | Performed by: INTERNAL MEDICINE

## 2022-12-21 PROCEDURE — 3008F BODY MASS INDEX DOCD: CPT | Mod: CPTII,S$GLB,, | Performed by: INTERNAL MEDICINE

## 2022-12-21 PROCEDURE — 1101F PT FALLS ASSESS-DOCD LE1/YR: CPT | Mod: CPTII,S$GLB,, | Performed by: INTERNAL MEDICINE

## 2022-12-21 PROCEDURE — 94060 PR EVAL OF BRONCHOSPASM: ICD-10-PCS | Mod: S$GLB,,, | Performed by: INTERNAL MEDICINE

## 2022-12-21 PROCEDURE — 3008F PR BODY MASS INDEX (BMI) DOCUMENTED: ICD-10-PCS | Mod: CPTII,S$GLB,, | Performed by: INTERNAL MEDICINE

## 2022-12-21 PROCEDURE — 3066F PR DOCUMENTATION OF TREATMENT FOR NEPHROPATHY: ICD-10-PCS | Mod: CPTII,S$GLB,, | Performed by: INTERNAL MEDICINE

## 2022-12-21 RX ORDER — DEXTROSE 4 G
1 TABLET,CHEWABLE ORAL DAILY
Qty: 1 EACH | Refills: 1 | Status: SHIPPED | OUTPATIENT
Start: 2022-12-21 | End: 2023-01-17 | Stop reason: SDUPTHER

## 2022-12-21 NOTE — PROGRESS NOTES
Pulmonary Outpatient Follow Up Visit     Subjective:       Patient ID: Jaimie Luque is a 72 y.o. female.    Chief Complaint: Sleep Apnea      HPI      72 year-old female patient presenting for post bronch follow-up and to discuss test results.      12/21/2022 BAL unremarkable BAL WBC and differential unremarkable.  PFT shows worsening restrictive pattern and decrease in DLCO.      Patient feels she is not in need for inhalers and did not feel a difference previously.  Not agreeable to try inhalers at the moment.          11/29/2022 she was referred for follow-up after her CT scan of the chest without contrast 11/25/2022 shows new lung nodules and mosaic pattern ground-glass opacities.      I previously started in 2018 her on inhalers for moderate obstructive pattern on PFT however she did not notice a benefit and discontinued treatment shortly after.      Denies productive cough wheezing weight loss fever or chills.  Does not have pets at home.  Not aware of family history of autoimmune disorders    She was initially evaluated 2018 for pulmonary nodules and the plan was to repeat CAT scan of chest in 3 months from the initial CAT scan.      Her initial CAT scan was performed as a workup of weight loss.  Patient has gained couple of pounds recently.  Malignancy workup has been negative so far.      Denies smoking, denies a family history of asthma or emphysema.      Not optimally compliant with CPAP therapy  Review of Systems   Constitutional:  Negative for fever, chills and night sweats.   HENT:  Negative for hearing loss.    Eyes:  Negative for redness.   Respiratory:  Positive for shortness of breath. Negative for cough, sputum production and wheezing.    Cardiovascular:  Negative for chest pain.   Genitourinary:  Negative for hematuria.   Endocrine: Hypothyroid.  Diabetes mellitus.    Musculoskeletal:         Diabetes mellitus   Skin:         psoriasis    Gastrointestinal:  Negative for abdominal pain.   Neurological:  Negative for syncope.        Neuralgia   Hematological:  Negative for adenopathy.   Psychiatric/Behavioral:  Positive for sleep disturbance. The patient is not nervous/anxious.        Outpatient Encounter Medications as of 12/21/2022   Medication Sig Dispense Refill    aspirin 81 mg Tab Take by mouth. 1 Tablet Oral 2 times weekly       atorvastatin (LIPITOR) 20 MG tablet Take 1 tablet (20 mg total) by mouth once daily. 90 tablet 3    blood sugar diagnostic Strp To check BG 2 times daily, to use with AccuChek Varsha Plus  Dx:  E11.9 200 strip 3    blood sugar diagnostic Strp 1 each by Misc.(Non-Drug; Combo Route) route 4 (four) times daily. 300 each 3    blood-glucose meter Misc 1 each by Misc.(Non-Drug; Combo Route) route once daily. 1 each 1    blood-glucose meter,continuous (DEXCOM G6 ) Misc Use daily. 1 each 0    blood-glucose sensor (DEXCOM G6 SENSOR) Gretta Use 1 every 10 days. 9 each 3    blood-glucose transmitter (DEXCOM G6 TRANSMITTER) Gretta Use 1 every 90 days. 1 each 3    cholecalciferol, vitamin D3, 2,000 unit Tab Take by mouth. 1 Tablet Oral Every day      dulaglutide (TRULICITY) 4.5 mg/0.5 mL pen injector Inject 4.5 mg into the skin every 7 days. 12 pen 3    gabapentin (NEURONTIN) 300 MG capsule TAKE 1 CAPSULE BY MOUTH THREE TIMES DAILY 270 capsule 3    insulin (LANTUS SOLOSTAR U-100 INSULIN) glargine 100 units/mL (3mL) SubQ pen Inject 18 Units into the skin once daily. 18 mL 3    insulin lispro 100 unit/mL pen Inject 12 Units into the skin 3 (three) times daily with meals. 33 mL 3    lancets (ACCU-CHEK SOFTCLIX LANCETS) Misc Check blood sugar twice daily.  Accuchek Multiclix drum lancets, [employer]  Dx:  E11.9 200 each 3    levothyroxine (SYNTHROID) 112 MCG tablet Take 1 tablet (112 mcg total) by mouth before breakfast. 90 tablet 3    lisinopriL (PRINIVIL,ZESTRIL) 2.5 MG tablet TAKE 1 TABLET(2.5 MG) BY MOUTH EVERY DAY 90  tablet 3    meclizine (ANTIVERT) 25 mg tablet Take 1 tablet by mouth as needed.       semaglutide (OZEMPIC) 2 mg/dose (8 mg/3 mL) PnIj Inject 2 mg into the skin every 7 days. 9 mL 3    glipiZIDE (GLUCOTROL) 5 MG tablet Take 1 tablet (5 mg total) by mouth once daily. 90 tablet 3    [DISCONTINUED] diphenhydrAMINE-acetaminophen (TYLENOL PM)  mg Tab Take 1 tablet by mouth nightly as needed. 30 tablet 11     No facility-administered encounter medications on file as of 12/21/2022.       Objective:          Physical Exam   Constitutional: She is oriented to person, place, and time. She appears well-developed and well-nourished.   HENT:   Head: Normocephalic.   Cardiovascular: Normal rate and regular rhythm.   Pulmonary/Chest: Normal expansion and effort normal. She has decreased breath sounds. She has no wheezes. She has no rales.   Abdominal: Soft.   Musculoskeletal:         General: No tenderness or edema.      Cervical back: Neck supple.   Lymphadenopathy:     She has no cervical adenopathy.   Neurological: She is alert and oriented to person, place, and time.   Skin: Skin is warm.   Psychiatric: She has a normal mood and affect.     Laboratory    Lab Results   Component Value Date    WBC 6.36 10/13/2022    HGB 12.0 10/13/2022    HCT 38.3 10/13/2022    MCV 89 10/13/2022     10/13/2022     BMP  Lab Results   Component Value Date     10/13/2022    K 4.8 10/13/2022     10/13/2022    CO2 27 10/13/2022    BUN 11 10/13/2022    CREATININE 1.0 10/13/2022    CALCIUM 9.2 10/13/2022    ANIONGAP 8 10/13/2022    ESTGFRAFRICA >60.0 09/15/2021    EGFRNONAA >60.0 09/15/2021     BNP  @LABRCNTIP(BNP,BNPTRIAGEBLO)@  Lab Results   Component Value Date    TSH 5.518 (H) 10/13/2022      Latest Reference Range & Units 12/15/22 12:41   Fluid Color  Colorless   Fluid Appearance  Clear   WBC, Body Fluid /cu mm 47   Body Fluid Type  Bronchial Wash   Segs, Fluid % 8   Lymphs, Fluid % 4   Monocytes/Macrophages, Fluid % 88      Diagnostic Results:    PFT 12/21/2022 worsening obstruction moderate no broncho reactivity mild DLCO reduction mild hyperinflation    CT scan of the chest 11/25/2022       COMPARISON:  Multiple prior CT exams, most recent 10/20/2021     FINDINGS:  Thoracic aorta and great vessels unchanged.  Heart unchanged.  Trace pericardial fluid remains.  No pleural effusion.  No pathologically enlarged axillary, mediastinal or hilar lymph nodes.     Lungs demonstrate patchy ground-glass findings in a mosaic attenuation pattern.  Previously noted left lung nodules hyper soft.  There is however a new 8 mm nodule within the left upper lobe, sequence 4 image 140 of 8.  Smaller adjacent 4 mm nodule, sequence 4 image 140.  New linearly oriented nodule within the lingula, sequence 4 image 277.  Other tiny sub 4 mm nodules within left lung stable.  Multiple right lung nodules are stable or less conspicuous.  Large nodule again noted within the right middle lobe measuring approximately 11 mm.  Increased/new nodules and linear orientation within right middle lobe some tree-in-bud appearance.     Visualized upper abdominal structures demonstrate no acute abnormality.  No acute osseous abnormality.     Impression:     Patchy ground-glass opacities versus mosaic attenuation.  Multiple pulmonary nodules, stable or decreased in size/resolved within the bilateral lungs.  New similar nodules present within the left upper lobe and inferolateral right middle lobe.  Nodules again suggest possible mucous plugging or infectious/inflammatory etiology.              Assessment/Plan:   Obstructive pattern present on pulmonary function testing    Ground glass opacity present on imaging of lung    TALIA on CPAP    RADHA positive    ESR raised          Not agreeable for inhalation treatment at the moment.   She reports being asymptomatic    Advised patient to call and request inhalation treatment in case of SOB congestion coughing worsening.      Await  final microbiology results.      Referred to rheumatology for abnormal ESR RADHA test.          Follow up in about 3 months (around 3/21/2023).    This note was prepared using voice recognition system and is likely to have sound alike errors that may have been overlooked even after proof reading.  Please call me with any questions    Discussed diagnosis, its evaluation, treatment and usual course. All questions answered.    Thank you for the courtesy of participating in the care of this patient    Corky Luna MD

## 2022-12-27 ENCOUNTER — TELEPHONE (OUTPATIENT)
Dept: DIABETES | Facility: CLINIC | Age: 72
End: 2022-12-27
Payer: MEDICARE

## 2022-12-28 NOTE — PROGRESS NOTES
"    RHEUMATOLOGY OUTPATIENT CLINIC NOTE    Subjective:       Patient ID: Jaimie Luque is a 72 y.o. female.    Chief Complaint: eval + RADHA w/ neg JERMAN, elevated sed rate of 45    Pt referred by pulmonary for further eval + RADHA and elevated sed rate. Initial referral to pulmonary for abnormal lung CT. No significant findings on bronchoscopy. Dx w/ obstructive pattern on PFT and ground glass opacity on CT of lungs. Not on any treatment, denies lung sx at this time.    She has had skin psoriasis since age 9- has improved on its own over time. When she was in her 20s she she quit drinking orange juice which improved her skin psoriasis. She does not regularly use topicals for her skin. No known family hx of AI disorder. She does have stiffness in her bilateral knees, R>L. Her right leg is weaker than the left- she has seen PT and was assigned home exercise. Denies lumbar spine issues. Not much knee pain. No stiffness or swelling of small joints. No raynaud'd, no hx serositis.  Rheumatologic review of systems otherwise negative. Physical exam shows no synovitis to small joints. No dactylitis.       Past Medical History:   Diagnosis Date    Diabetes mellitus type II     Hyperlipidemia     Hypertension     Hypothyroid     TALIA (obstructive sleep apnea)     on CPAP    Osteopenia     Psoriasis      Social History     Tobacco Use    Smoking status: Never    Smokeless tobacco: Never   Substance Use Topics    Alcohol use: Yes     Comment: rare     Review of patient's allergies indicates:   Allergen Reactions    Adhesive tape-silicones      Other reaction(s): skin irritation to area    Penicillins        Objective:   /69   Pulse 86   Ht 5' 7" (1.702 m)   Wt 112.4 kg (247 lb 12.8 oz)   BMI 38.81 kg/m²     Immunization History   Administered Date(s) Administered    COVID-19, MRNA, LN-S, PF (Pfizer) (Gray Cap) 06/28/2022    COVID-19, MRNA, LN-S, PF (Pfizer) (Purple Cap) 01/05/2021, 01/26/2021, 10/15/2021    Hepatitis A, " Adult 09/21/2016, 04/04/2017    Influenza 12/12/2006, 11/08/2007, 11/13/2008, 10/08/2009, 10/13/2010, 10/19/2011, 10/31/2013, 10/02/2018, 09/29/2022    Influenza (FLUAD) - Quadrivalent - Adjuvanted - PF *Preferred* (65+) 09/27/2021, 10/03/2022    Influenza - High Dose - PF (65 years and older) 10/24/2014, 10/26/2015, 09/25/2016, 10/12/2017, 10/02/2018, 10/22/2019    Influenza - Intradermal - Quadrivalent - PF 10/01/2012    Influenza - Quadrivalent - High Dose - PF (65 years and older) 09/21/2020    Influenza A (H1N1) 2009 Monovalent - IM 02/23/2010    Influenza Split 10/31/2013    Pneumococcal Conjugate - 13 Valent 03/11/2015    Pneumococcal Conjugate - 20 Valent 04/20/2022, 11/17/2022    Pneumococcal Polysaccharide - 23 Valent 02/14/2007, 03/18/2014    Tdap 09/09/2009, 10/02/2018    Zoster 11/09/2012    Zoster Recombinant 05/02/2019, 07/06/2019       Current Outpatient Medications   Medication Instructions    aspirin 81 mg Tab Oral, 1 Tablet Oral 2 times weekly     atorvastatin (LIPITOR) 20 mg, Oral, Daily    blood sugar diagnostic Strp To check BG 2 times daily, to use with AccuChek Varsha Plus  Dx:  E11.9    blood sugar diagnostic Strp 1 each, Misc.(Non-Drug; Combo Route), 4 times daily    blood-glucose meter Misc 1 each, Misc.(Non-Drug; Combo Route), Daily    blood-glucose meter,continuous (DEXCOM G6 ) Misc Use daily.    blood-glucose sensor (DEXCOM G6 SENSOR) Gretta Use 1 every 10 days.    blood-glucose transmitter (DEXCOM G6 TRANSMITTER) Gretta Use 1 every 90 days.    cholecalciferol, vitamin D3, 2,000 unit Tab Take by mouth. 1 Tablet Oral Every day    gabapentin (NEURONTIN) 300 MG capsule TAKE 1 CAPSULE BY MOUTH THREE TIMES DAILY    glipiZIDE (GLUCOTROL) 5 mg, Oral, Daily    insulin lispro 12 Units, Subcutaneous, 3 times daily with meals    lancets (ACCU-CHEK SOFTCLIX LANCETS) Misc Check blood sugar twice daily.  Accuchek Multiclix drum lancets, [employer]  Dx:  E11.9    LANTUS SOLOSTAR U-100 INSULIN  18 Units, Subcutaneous, Daily    levothyroxine (SYNTHROID) 112 mcg, Oral, Before breakfast    lisinopriL (PRINIVIL,ZESTRIL) 2.5 MG tablet TAKE 1 TABLET(2.5 MG) BY MOUTH EVERY DAY    meclizine (ANTIVERT) 25 mg tablet 1 tablet, Oral, As needed (PRN)    OZEMPIC 2 mg, Subcutaneous, Every 7 days    TRULICITY 4.5 mg, Subcutaneous, Every 7 days        BIOLOGIC LABS  Lab Results   Component Value Date    TBGOLDPLUS Negative 10/28/2019      Lab Results   Component Value Date    HEPCAB Negative 09/14/2016        RHEUM LABS  Lab Results   Component Value Date    CRP 2.5 11/29/2022     Lab Results   Component Value Date    SEDRATE 45 (H) 11/29/2022     RADHA: + RADHA w/ neg JERMAN checked 11/2022, titer 1:160  RF/CCP: normal RF 11/2022, ccp not checked  ACE: WNL 11/2022      Assessment:     1. ILD (interstitial lung disease)    2. Abnormal CT scan of lung    3. Multiple lung nodules on CT    4. RADHA positive    5. ESR raised        I have reviewed the following:     Details / Date    [x]   Labs     []   Micro     []   Pathology     [x]   Imaging Chest imaging    []   Cardiology Procedures     [x]   Other Pulmonary referral         Plan:     Check additional lab to r/o CTD assoc ILD. No evidence of lupus at this time.  Cont to f/u w/ pulmonary as scheduled for obstructive lung disease  lab results performed to date reviewed and pertinent findings discussed with the patient in detail  Will det f/u after review of labs      60 minutes of total time spent on the encounter, which includes face to face time and non-face to face time preparing to see the patient (eg, review of tests), Obtaining and/or reviewing separately obtained history, Documenting clinical information in the electronic or other health record, Independently interpreting results (not separately reported) and communicating results to the patient/family/caregiver, or Care coordination (not separately reported).         Reba Ness PA-C  [facility]  RHEUMATOLOGY 4TH FL  [facility], [facility]

## 2022-12-29 ENCOUNTER — LAB VISIT (OUTPATIENT)
Dept: LAB | Facility: HOSPITAL | Age: 72
End: 2022-12-29
Attending: PHYSICIAN ASSISTANT
Payer: MEDICARE

## 2022-12-29 ENCOUNTER — OFFICE VISIT (OUTPATIENT)
Dept: RHEUMATOLOGY | Facility: CLINIC | Age: 72
End: 2022-12-29
Payer: MEDICARE

## 2022-12-29 VITALS
HEART RATE: 86 BPM | BODY MASS INDEX: 38.89 KG/M2 | DIASTOLIC BLOOD PRESSURE: 69 MMHG | WEIGHT: 247.81 LBS | SYSTOLIC BLOOD PRESSURE: 123 MMHG | HEIGHT: 67 IN

## 2022-12-29 DIAGNOSIS — R91.8 ABNORMAL CT SCAN OF LUNG: ICD-10-CM

## 2022-12-29 DIAGNOSIS — R91.8 MULTIPLE LUNG NODULES ON CT: ICD-10-CM

## 2022-12-29 DIAGNOSIS — J84.9 ILD (INTERSTITIAL LUNG DISEASE): ICD-10-CM

## 2022-12-29 DIAGNOSIS — R76.8 ANA POSITIVE: ICD-10-CM

## 2022-12-29 DIAGNOSIS — R70.0 ESR RAISED: ICD-10-CM

## 2022-12-29 DIAGNOSIS — J84.9 ILD (INTERSTITIAL LUNG DISEASE): Primary | ICD-10-CM

## 2022-12-29 LAB
ALBUMIN SERPL BCP-MCNC: 3.2 G/DL (ref 3.5–5.2)
ALP SERPL-CCNC: 80 U/L (ref 55–135)
ALT SERPL W/O P-5'-P-CCNC: 15 U/L (ref 10–44)
ANION GAP SERPL CALC-SCNC: 7 MMOL/L (ref 8–16)
AST SERPL-CCNC: 16 U/L (ref 10–40)
BASOPHILS # BLD AUTO: 0.06 K/UL (ref 0–0.2)
BASOPHILS NFR BLD: 0.9 % (ref 0–1.9)
BILIRUB SERPL-MCNC: 0.8 MG/DL (ref 0.1–1)
BUN SERPL-MCNC: 12 MG/DL (ref 8–23)
CALCIUM SERPL-MCNC: 8.9 MG/DL (ref 8.7–10.5)
CCP AB SER IA-ACNC: <0.5 U/ML
CHLORIDE SERPL-SCNC: 105 MMOL/L (ref 95–110)
CO2 SERPL-SCNC: 26 MMOL/L (ref 23–29)
CREAT SERPL-MCNC: 1 MG/DL (ref 0.5–1.4)
CRP SERPL-MCNC: 1.5 MG/L (ref 0–8.2)
DIFFERENTIAL METHOD: ABNORMAL
EOSINOPHIL # BLD AUTO: 0.2 K/UL (ref 0–0.5)
EOSINOPHIL NFR BLD: 2.2 % (ref 0–8)
ERYTHROCYTE [DISTWIDTH] IN BLOOD BY AUTOMATED COUNT: 15.2 % (ref 11.5–14.5)
ERYTHROCYTE [SEDIMENTATION RATE] IN BLOOD BY PHOTOMETRIC METHOD: 11 MM/HR (ref 0–36)
EST. GFR  (NO RACE VARIABLE): 59 ML/MIN/1.73 M^2
GLUCOSE SERPL-MCNC: 199 MG/DL (ref 70–110)
HCT VFR BLD AUTO: 38.5 % (ref 37–48.5)
HGB BLD-MCNC: 12.5 G/DL (ref 12–16)
IMM GRANULOCYTES # BLD AUTO: 0.02 K/UL (ref 0–0.04)
IMM GRANULOCYTES NFR BLD AUTO: 0.3 % (ref 0–0.5)
LYMPHOCYTES # BLD AUTO: 2.1 K/UL (ref 1–4.8)
LYMPHOCYTES NFR BLD: 30.8 % (ref 18–48)
MCH RBC QN AUTO: 26.7 PG (ref 27–31)
MCHC RBC AUTO-ENTMCNC: 32.5 G/DL (ref 32–36)
MCV RBC AUTO: 82 FL (ref 82–98)
MONOCYTES # BLD AUTO: 0.7 K/UL (ref 0.3–1)
MONOCYTES NFR BLD: 10.1 % (ref 4–15)
NEUTROPHILS # BLD AUTO: 3.9 K/UL (ref 1.8–7.7)
NEUTROPHILS NFR BLD: 55.7 % (ref 38–73)
NRBC BLD-RTO: 0 /100 WBC
PLATELET # BLD AUTO: 201 K/UL (ref 150–450)
PMV BLD AUTO: 11.7 FL (ref 9.2–12.9)
POTASSIUM SERPL-SCNC: 4.4 MMOL/L (ref 3.5–5.1)
PROT SERPL-MCNC: 6.6 G/DL (ref 6–8.4)
RBC # BLD AUTO: 4.69 M/UL (ref 4–5.4)
SODIUM SERPL-SCNC: 138 MMOL/L (ref 136–145)
WBC # BLD AUTO: 6.91 K/UL (ref 3.9–12.7)

## 2022-12-29 PROCEDURE — 3288F FALL RISK ASSESSMENT DOCD: CPT | Mod: CPTII,S$GLB,, | Performed by: PHYSICIAN ASSISTANT

## 2022-12-29 PROCEDURE — 99999 PR PBB SHADOW E&M-EST. PATIENT-LVL IV: ICD-10-PCS | Mod: PBBFAC,,, | Performed by: PHYSICIAN ASSISTANT

## 2022-12-29 PROCEDURE — 1159F MED LIST DOCD IN RCRD: CPT | Mod: CPTII,S$GLB,, | Performed by: PHYSICIAN ASSISTANT

## 2022-12-29 PROCEDURE — 3061F PR NEG MICROALBUMINURIA RESULT DOCUMENTED/REVIEW: ICD-10-PCS | Mod: CPTII,S$GLB,, | Performed by: PHYSICIAN ASSISTANT

## 2022-12-29 PROCEDURE — 3008F PR BODY MASS INDEX (BMI) DOCUMENTED: ICD-10-PCS | Mod: CPTII,S$GLB,, | Performed by: PHYSICIAN ASSISTANT

## 2022-12-29 PROCEDURE — 3066F PR DOCUMENTATION OF TREATMENT FOR NEPHROPATHY: ICD-10-PCS | Mod: CPTII,S$GLB,, | Performed by: PHYSICIAN ASSISTANT

## 2022-12-29 PROCEDURE — 99499 RISK ADDL DX/OHS AUDIT: ICD-10-PCS | Mod: S$GLB,,, | Performed by: PHYSICIAN ASSISTANT

## 2022-12-29 PROCEDURE — 1125F AMNT PAIN NOTED PAIN PRSNT: CPT | Mod: CPTII,S$GLB,, | Performed by: PHYSICIAN ASSISTANT

## 2022-12-29 PROCEDURE — 99205 OFFICE O/P NEW HI 60 MIN: CPT | Mod: S$GLB,,, | Performed by: PHYSICIAN ASSISTANT

## 2022-12-29 PROCEDURE — 3008F BODY MASS INDEX DOCD: CPT | Mod: CPTII,S$GLB,, | Performed by: PHYSICIAN ASSISTANT

## 2022-12-29 PROCEDURE — 1159F PR MEDICATION LIST DOCUMENTED IN MEDICAL RECORD: ICD-10-PCS | Mod: CPTII,S$GLB,, | Performed by: PHYSICIAN ASSISTANT

## 2022-12-29 PROCEDURE — 3051F HG A1C>EQUAL 7.0%<8.0%: CPT | Mod: CPTII,S$GLB,, | Performed by: PHYSICIAN ASSISTANT

## 2022-12-29 PROCEDURE — 86200 CCP ANTIBODY: CPT | Performed by: PHYSICIAN ASSISTANT

## 2022-12-29 PROCEDURE — 3051F PR MOST RECENT HEMOGLOBIN A1C LEVEL 7.0 - < 8.0%: ICD-10-PCS | Mod: CPTII,S$GLB,, | Performed by: PHYSICIAN ASSISTANT

## 2022-12-29 PROCEDURE — 1125F PR PAIN SEVERITY QUANTIFIED, PAIN PRESENT: ICD-10-PCS | Mod: CPTII,S$GLB,, | Performed by: PHYSICIAN ASSISTANT

## 2022-12-29 PROCEDURE — 1101F PT FALLS ASSESS-DOCD LE1/YR: CPT | Mod: CPTII,S$GLB,, | Performed by: PHYSICIAN ASSISTANT

## 2022-12-29 PROCEDURE — 3078F DIAST BP <80 MM HG: CPT | Mod: CPTII,S$GLB,, | Performed by: PHYSICIAN ASSISTANT

## 2022-12-29 PROCEDURE — 86235 NUCLEAR ANTIGEN ANTIBODY: CPT | Mod: 59 | Performed by: PHYSICIAN ASSISTANT

## 2022-12-29 PROCEDURE — 86140 C-REACTIVE PROTEIN: CPT | Performed by: PHYSICIAN ASSISTANT

## 2022-12-29 PROCEDURE — 3074F PR MOST RECENT SYSTOLIC BLOOD PRESSURE < 130 MM HG: ICD-10-PCS | Mod: CPTII,S$GLB,, | Performed by: PHYSICIAN ASSISTANT

## 2022-12-29 PROCEDURE — 3288F PR FALLS RISK ASSESSMENT DOCUMENTED: ICD-10-PCS | Mod: CPTII,S$GLB,, | Performed by: PHYSICIAN ASSISTANT

## 2022-12-29 PROCEDURE — 1101F PR PT FALLS ASSESS DOC 0-1 FALLS W/OUT INJ PAST YR: ICD-10-PCS | Mod: CPTII,S$GLB,, | Performed by: PHYSICIAN ASSISTANT

## 2022-12-29 PROCEDURE — 4010F ACE/ARB THERAPY RXD/TAKEN: CPT | Mod: CPTII,S$GLB,, | Performed by: PHYSICIAN ASSISTANT

## 2022-12-29 PROCEDURE — 99205 PR OFFICE/OUTPT VISIT, NEW, LEVL V, 60-74 MIN: ICD-10-PCS | Mod: S$GLB,,, | Performed by: PHYSICIAN ASSISTANT

## 2022-12-29 PROCEDURE — 99499 UNLISTED E&M SERVICE: CPT | Mod: S$GLB,,, | Performed by: PHYSICIAN ASSISTANT

## 2022-12-29 PROCEDURE — 3074F SYST BP LT 130 MM HG: CPT | Mod: CPTII,S$GLB,, | Performed by: PHYSICIAN ASSISTANT

## 2022-12-29 PROCEDURE — 85652 RBC SED RATE AUTOMATED: CPT | Performed by: PHYSICIAN ASSISTANT

## 2022-12-29 PROCEDURE — 99999 PR PBB SHADOW E&M-EST. PATIENT-LVL IV: CPT | Mod: PBBFAC,,, | Performed by: PHYSICIAN ASSISTANT

## 2022-12-29 PROCEDURE — 3066F NEPHROPATHY DOC TX: CPT | Mod: CPTII,S$GLB,, | Performed by: PHYSICIAN ASSISTANT

## 2022-12-29 PROCEDURE — 3061F NEG MICROALBUMINURIA REV: CPT | Mod: CPTII,S$GLB,, | Performed by: PHYSICIAN ASSISTANT

## 2022-12-29 PROCEDURE — 3078F PR MOST RECENT DIASTOLIC BLOOD PRESSURE < 80 MM HG: ICD-10-PCS | Mod: CPTII,S$GLB,, | Performed by: PHYSICIAN ASSISTANT

## 2022-12-29 PROCEDURE — 85025 COMPLETE CBC W/AUTO DIFF WBC: CPT | Performed by: PHYSICIAN ASSISTANT

## 2022-12-29 PROCEDURE — 4010F PR ACE/ARB THEARPY RXD/TAKEN: ICD-10-PCS | Mod: CPTII,S$GLB,, | Performed by: PHYSICIAN ASSISTANT

## 2022-12-29 PROCEDURE — 80053 COMPREHEN METABOLIC PANEL: CPT | Performed by: PHYSICIAN ASSISTANT

## 2023-01-05 ENCOUNTER — CLINICAL SUPPORT (OUTPATIENT)
Dept: REHABILITATION | Facility: HOSPITAL | Age: 73
End: 2023-01-05
Payer: MEDICARE

## 2023-01-05 DIAGNOSIS — M25.60 DECREASED RANGE OF MOTION WITH DECREASED STRENGTH: ICD-10-CM

## 2023-01-05 DIAGNOSIS — R68.89 DECREASED FUNCTIONAL ACTIVITY TOLERANCE: ICD-10-CM

## 2023-01-05 DIAGNOSIS — Z74.09 DECREASED FUNCTIONAL MOBILITY AND ENDURANCE: Primary | ICD-10-CM

## 2023-01-05 DIAGNOSIS — R53.1 DECREASED RANGE OF MOTION WITH DECREASED STRENGTH: ICD-10-CM

## 2023-01-05 PROCEDURE — 97110 THERAPEUTIC EXERCISES: CPT | Mod: PN

## 2023-01-05 PROCEDURE — 97112 NEUROMUSCULAR REEDUCATION: CPT | Mod: PN

## 2023-01-05 NOTE — PROGRESS NOTES
[facility]   Physical Therapy Treatment Note     Name: Jaimie Luque  Clinic Number: [id number]    Therapy Diagnosis:   Encounter Diagnoses   Name Primary?    Decreased functional mobility and endurance Yes    Decreased range of motion with decreased strength     Decreased functional activity tolerance      Physician: Marina Ling MD    Visit Date: 1/5/2023    Physician Orders: PT Eval and Treat   Medical Diagnosis from Referral: Primary osteoarthritis of right knee  Evaluation Date: 12/8/2022  Authorization Period Expiration: 12/31/22  Plan of Care Expiration: 2/8/22  Progress Note Due: 1/8/22  Visit # / Visits authorized: 1/ 1     PTA Visit #: 0/5     Precautions: Diabetes and HTN, Osteopenia    Time In: 10:08 am  Time Out: 11:05  Total Billable Time: 53 minutes      SUBJECTIVE     Pt reports: did not exercise much due to holidays, did try and get up and down without hands some but did not always work.  Having some inner thigh pain on right thigh.    She was not compliant with home exercise program.  Response to previous treatment: no change  Functional change: no change    Pain: 1/10  Location: bilateral knee      OBJECTIVE     Objective Measures updated at progress report unless specified.       TREATMENT     Jaimie received the treatments listed below:      therapeutic exercises to develop strength, endurance, ROM, and core stabilization for 45 minutes including:    Intervention 1/5/2023  Parameters   Nu-step x 9 min, L1   SLR x 3x left, 3x right with leg unable to lift off mat   bridge x 10x/2 set   Sidelying clamshell x 10x on right - only feeling in calf   LAQ x 10x/2 set   Seated marching x 10x/2 set   Heel/toe raise x 10x/ 1 set   Overhead med ball with PPT x 10x    Quadriceps set x 10x, 10 sec right leg    SAQ x 5x, 3 set right leg only   Supine clamshell x Red band 10x/ 5 sec; 2 sets   Sitting hip adduction isometric x 10x/10sec       neuromuscular re-education  activities to improve: Balance, Coordination, and Proprioception for 8 minutes. The following activities were included:    Intervention 1/5/2023  Parameters   Side stepping x 10 steps 2 laps   FW/BW x 6 steps/ 1 set   SL balance     Sit to stand                        Plan for Next Visit: progress as tolerated        PATIENT EDUCATION AND HOME EXERCISES     Home Exercises Provided and Patient Education Provided     Education provided:   - Importance of regular exercise and activity    Written Home Exercises Provided: yes. Exercises were reviewed and Jaimie was able to demonstrate them prior to the end of the session.  Jaimie demonstrated good  understanding of the education provided. See EMR under Patient Instructions for exercises provided during therapy sessions      ASSESSMENT     Patient continues to have significant weakness in right LE, she was encouraged to participate with home exercise program and to go to gym for aquatic therapy as previously planned.  She was unable to perform SLR on right side but was able to perform small range SAQ and quadriceps set.      Jaimie Is progressing well towards her goals.   Pt prognosis is Good.     Pt will continue to benefit from skilled outpatient physical therapy to address the deficits listed in the problem list box on initial evaluation, provide pt/family education and to maximize pt's level of independence in the home and community environment.     Pt's spiritual, cultural and educational needs considered and pt agreeable to plan of care and goals.     Anticipated barriers to physical therapy: co-morbidities, sedentary lifestyle, chronicity of condition, adherence to treatment plan, and coping style        Goals:     Pt will be independent with self management of his/her condition with home exercise program, posture, positioning and improved body mechanics.  2.   Pt will safely transition to and participate in wellness/fitness program.     PLAN     Plan of care  Certification: 12/8/2022 to 2/8/23.     Will see patient for one more visit in ~2 weeks.  At that time if she has still not been consistent with home exercise program may need to move to more formal therapy to monitor more closely.    Ashley Manuel, PT

## 2023-01-15 ENCOUNTER — PATIENT MESSAGE (OUTPATIENT)
Dept: DIABETES | Facility: CLINIC | Age: 73
End: 2023-01-15
Payer: MEDICARE

## 2023-01-15 DIAGNOSIS — E11.65 TYPE 2 DIABETES MELLITUS WITH HYPERGLYCEMIA, WITH LONG-TERM CURRENT USE OF INSULIN: ICD-10-CM

## 2023-01-15 DIAGNOSIS — Z79.4 TYPE 2 DIABETES MELLITUS WITH HYPERGLYCEMIA, WITH LONG-TERM CURRENT USE OF INSULIN: ICD-10-CM

## 2023-01-17 ENCOUNTER — PATIENT MESSAGE (OUTPATIENT)
Dept: DIABETES | Facility: CLINIC | Age: 73
End: 2023-01-17
Payer: MEDICARE

## 2023-01-17 RX ORDER — ATORVASTATIN CALCIUM 20 MG/1
TABLET, FILM COATED ORAL
Qty: 90 TABLET | Refills: 2 | Status: SHIPPED | OUTPATIENT
Start: 2023-01-17 | End: 2023-10-05

## 2023-01-17 RX ORDER — DEXTROSE 4 G
1 TABLET,CHEWABLE ORAL DAILY
Qty: 1 EACH | Refills: 1 | Status: SHIPPED | OUTPATIENT
Start: 2023-01-17 | End: 2023-11-30

## 2023-01-17 RX ORDER — LANCETS
EACH MISCELLANEOUS
Qty: 300 EACH | Refills: 3 | Status: SHIPPED | OUTPATIENT
Start: 2023-01-17 | End: 2024-01-30

## 2023-01-17 NOTE — TELEPHONE ENCOUNTER
Printed Insurance preferred Meter and supplies Rx to be sent to [facility].     Lorraine Holliday PA-C  Diabetes Management

## 2023-01-18 LAB
ANTI-PM/SCL AB: <20 UNITS
ANTI-SS-A 52 KD AB, IGG: <20 UNITS
EJ AB SER QL: NEGATIVE
ENA JO1 AB SER IA-ACNC: <20 UNITS
ENA SM+RNP AB SER IA-ACNC: 21 UNITS
FIBRILLARIN (U3 RNP): NEGATIVE
FUNGUS SPEC CULT: NORMAL
KU AB SER QL: NEGATIVE
MDA-5 (P140): <20 UNITS
MI2 AB SER QL: NEGATIVE
NXP-2 (P140): <20 UNITS
OJ AB SER QL: NEGATIVE
PL12 AB SER QL: NEGATIVE
PL7 AB SER QL: NEGATIVE
SRP AB SERPL QL: NEGATIVE
TIF1 GAMMA (P155/140): <20 UNITS
U2 SNRNP: NEGATIVE

## 2023-01-18 NOTE — TELEPHONE ENCOUNTER
Refill Decision Note   Jaimie Luque  is requesting a refill authorization.  Brief Assessment and Rationale for Refill:  Approve     Medication Therapy Plan:       Medication Reconciliation Completed: No   Comments:     No Care Gaps recommended.     Note composed:6:19 PM 01/17/2023

## 2023-01-18 NOTE — TELEPHONE ENCOUNTER
No new care gaps identified.  [facility] Embedded Care Gaps. Reference number: [id number]. 1/17/2023   6:06:46 PM CST

## 2023-01-19 ENCOUNTER — CLINICAL SUPPORT (OUTPATIENT)
Dept: REHABILITATION | Facility: HOSPITAL | Age: 73
End: 2023-01-19
Payer: MEDICARE

## 2023-01-19 DIAGNOSIS — Z74.09 DECREASED FUNCTIONAL MOBILITY AND ENDURANCE: Primary | ICD-10-CM

## 2023-01-19 DIAGNOSIS — R68.89 DECREASED FUNCTIONAL ACTIVITY TOLERANCE: ICD-10-CM

## 2023-01-19 DIAGNOSIS — M25.60 DECREASED RANGE OF MOTION WITH DECREASED STRENGTH: ICD-10-CM

## 2023-01-19 DIAGNOSIS — R53.1 DECREASED RANGE OF MOTION WITH DECREASED STRENGTH: ICD-10-CM

## 2023-01-19 PROCEDURE — 97112 NEUROMUSCULAR REEDUCATION: CPT | Mod: PN

## 2023-01-19 PROCEDURE — 97110 THERAPEUTIC EXERCISES: CPT | Mod: PN

## 2023-01-19 NOTE — PROGRESS NOTES
[facility]   Physical Therapy Treatment Note     Name: Jaimie Luque  Clinic Number: [id number]    Therapy Diagnosis:   Encounter Diagnoses   Name Primary?    Decreased functional mobility and endurance Yes    Decreased range of motion with decreased strength     Decreased functional activity tolerance        Physician: Marina Ling MD    Visit Date: 1/19/2023    Physician Orders: PT Eval and Treat   Medical Diagnosis from Referral: Primary osteoarthritis of right knee  Evaluation Date: 12/8/2022  Authorization Period Expiration: 12/31/22  Plan of Care Expiration: 2/8/22  Progress Note Due: 2/8/22  Visit # / Visits authorized: 2/6 (+1)    PTA Visit #: 0/5     Precautions: Diabetes and HTN, Osteopenia    Time In: 9:10 am  Time Out: 10:10 am  Total Billable Time: 53 minutes      SUBJECTIVE     Pt reports: has been able to do some exercise's her pain is 0.5/10 today.  She notes that she is sore in her thigh muscle's. Some soreness in knees, anteriorly, reported today. Advised patient that heat is Ok to use on knees.    She was not compliant with home exercise program.  Response to previous treatment: no change  Functional change: no change    Pain: 0.5/10  Location: bilateral knee      OBJECTIVE     Objective Measures updated at progress report unless specified.        Strength:     L/E MMT Right  evaluation Left  evaluation Pain/Dysfunction with Movement Right/Left    Hip Flexion  2+/5 2+/5 + Trunk shift 3/5; 3+/5   Hip Extension  NT NT   NT   Hip Abduction  NT NT   NT   Knee Extension 3+/5 4-/5 + Trunk shift NT   Knee Flexion 3+/5 4-/5 + Trunk shift NT   Hip IR 3/5 3+/5 + Trunk shift NT   Hip ER 3-/5 3+/5 + Trunk shift NT   Ankle DF 4/5 4/5   NT   Ankle PF 4/5 4/5   NT        TREATMENT     Jaimie received the treatments listed below:      therapeutic exercises to develop strength, endurance, ROM, and core stabilization for 38 minutes including:    Intervention 1/19/2023   Parameters   Nu-step x 9 min, L1   SLR x 10x left, 5x right with decreased ROM   bridge x 10x/2 set   Sidelying clamshell x 5x/ 2 sets on right - only feeling in calf   LAQ x 10x/2 set   Seated marching with cross punch x 10x/2 set   Heel/toe raise  10x/ 1 set   Overhead med ball with PPT x 10x    Quadriceps set x 2 min, 10 sec right leg    SAQ x 5x, 2 set right leg only   Supine clamshell x Red band 5 sec hold; 2min   Supine hip adduction isometric x 2 min; 5 sec       neuromuscular re-education activities to improve: Balance, Coordination, and Proprioception for 15 minutes. The following activities were included:    Intervention 1/19/2023  Parameters   Side stepping x 10 steps 1 laps with band at knees   FW/BW x 6 steps/ 1 set with band at knees   SL balance     Sit to stand x 5x with band at knees   Standing march x 10x with band at knees   Standing hip extension  x 10x with band at knees            Plan for Next Visit: progress as tolerated        PATIENT EDUCATION AND HOME EXERCISES     Home Exercises Provided and Patient Education Provided     Education provided:   - Importance of regular exercise and activity    Written Home Exercises Provided: yes. Exercises were reviewed and Jaimie was able to demonstrate them prior to the end of the session.  Jaimie demonstrated good  understanding of the education provided. See EMR under Patient Instructions for exercises provided during therapy sessions      ASSESSMENT     Patient continues to have weakness in right LE, however much improved since last visit.  She has reported some compliance with home exercise program and was encouraged to participate with a few exercise's each day to improve strength and endurance.  She requires cues to avoid substitution with exercise's today.      Jaimie Is progressing well towards her goals.   Pt prognosis is Good.     Pt will continue to benefit from skilled outpatient physical therapy to address the deficits listed in the problem  list box on initial evaluation, provide pt/family education and to maximize pt's level of independence in the home and community environment.     Pt's spiritual, cultural and educational needs considered and pt agreeable to plan of care and goals.     Anticipated barriers to physical therapy: co-morbidities, sedentary lifestyle, chronicity of condition, adherence to treatment plan, and coping style        Goals:     Patient will be independent with self management of his/her condition with home exercise program, posture, positioning and improved body mechanics. 1/19/23, progressing  2.   Patient will safely transition to and participate in wellness/fitness program. 1/19/23, progressing    PLAN     Plan of care Certification: 12/8/2022 to 2/8/23.     Will monitor patient by phone next week for consistency with home exercise program - at that time will either transition to Wellness/home exercise program or move to [place] for more consistent treatment sessions if patient requires further treatment.    Ashley Manuel, PT

## 2023-01-23 ENCOUNTER — TELEPHONE (OUTPATIENT)
Dept: RHEUMATOLOGY | Facility: CLINIC | Age: 73
End: 2023-01-23
Payer: MEDICARE

## 2023-01-23 NOTE — PROGRESS NOTES
"    RHEUMATOLOGY OUTPATIENT CLINIC NOTE    Subjective:       Patient ID: Jaimie Luque is a 73 y.o. female.    Chief Complaint: f/u lab results- + anti-U1-RNP ab    Pt referred by pulmonary for further eval + RADHA and elevated sed rate. Initial referral to pulmonary for abnormal lung CT. No significant findings on bronchoscopy. Dx w/ obstructive pattern on PFT and ground glass opacity on CT of lungs. Not on any treatment, denies lung sx at this time.    12/2022- PFT w/ worsening restrictive pattern and decrease in DLCO    She has had skin psoriasis since age 9- has improved on its own over time. When she was in her 20s she she quit drinking orange juice which improved her skin psoriasis. She does not regularly use topicals for her skin. No known family hx of AI disorder. She does have stiffness in her bilateral knees, R>L. Her right leg is weaker than the left- she has seen PT and was assigned home exercise. Denies lumbar spine issues. Not much knee pain. No stiffness or swelling of small joints. No raynaud'd, no hx serositis. Rheumatologic review of systems otherwise negative. Physical exam shows no synovitis to small joints. No dactylitis. Right 3rd and 5th MCP thickened. No sicca sx. Poss evidence of sclerotic changes to hands- seen best w/ fist formation. + longitudinal nail ridging to all fingernails.    Crp has been normal, sed rate normalized. No echo.      Past Medical History:   Diagnosis Date    Diabetes mellitus type II     Hyperlipidemia     Hypertension     Hypothyroid     TALIA (obstructive sleep apnea)     on CPAP    Osteopenia     Psoriasis      Social History     Tobacco Use    Smoking status: Never    Smokeless tobacco: Never   Substance Use Topics    Alcohol use: Yes     Comment: rare     Review of patient's allergies indicates:   Allergen Reactions    Adhesive tape-silicones      Other reaction(s): skin irritation to area    Penicillins        Objective:   /64   Pulse 96   Ht 5' 7" (1.702 " m)   Wt 112 kg (247 lb)   BMI 38.69 kg/m²     Immunization History   Administered Date(s) Administered    COVID-19, MRNA, LN-S, PF (Pfizer) (Gray Cap) 06/28/2022    COVID-19, MRNA, LN-S, PF (Pfizer) (Purple Cap) 01/05/2021, 01/26/2021, 10/15/2021    Hepatitis A, Adult 09/21/2016, 04/04/2017    Influenza 12/12/2006, 11/08/2007, 11/13/2008, 10/08/2009, 10/13/2010, 10/19/2011, 10/31/2013, 10/02/2018, 09/29/2022    Influenza (FLUAD) - Quadrivalent - Adjuvanted - PF *Preferred* (65+) 09/27/2021, 10/03/2022    Influenza - High Dose - PF (65 years and older) 10/24/2014, 10/26/2015, 09/25/2016, 10/12/2017, 10/02/2018, 10/22/2019    Influenza - Intradermal - Quadrivalent - PF 10/01/2012    Influenza - Quadrivalent - High Dose - PF (65 years and older) 09/21/2020    Influenza A (H1N1) 2009 Monovalent - IM 02/23/2010    Influenza Split 10/31/2013    Pneumococcal Conjugate - 13 Valent 03/11/2015    Pneumococcal Conjugate - 20 Valent 04/20/2022, 11/17/2022    Pneumococcal Polysaccharide - 23 Valent 02/14/2007, 03/18/2014    Tdap 09/09/2009, 10/02/2018    Zoster 11/09/2012    Zoster Recombinant 05/02/2019, 07/06/2019       Current Outpatient Medications   Medication Instructions    aspirin 81 mg Tab Oral, 1 Tablet Oral 2 times weekly     atorvastatin (LIPITOR) 20 MG tablet TAKE 1 TABLET(20 MG) BY MOUTH EVERY DAY    blood sugar diagnostic Strp 1 each, Misc.(Non-Drug; Combo Route), 4 times daily    blood sugar diagnostic Strp To check BG 3 times daily.  Dx:  E11.9    blood-glucose meter Misc 1 each, Misc.(Non-Drug; Combo Route), Daily    blood-glucose meter,continuous (DEXCOM G6 ) Misc Use daily.    blood-glucose sensor (DEXCOM G6 SENSOR) Gretta Use 1 every 10 days.    blood-glucose transmitter (DEXCOM G6 TRANSMITTER) Gretta Use 1 every 90 days.    cholecalciferol, vitamin D3, 2,000 unit Tab Take by mouth. 1 Tablet Oral Every day    gabapentin (NEURONTIN) 300 MG capsule TAKE 1 CAPSULE BY MOUTH THREE TIMES DAILY    glipiZIDE  (GLUCOTROL) 5 mg, Oral, Daily    insulin lispro 12 Units, Subcutaneous, 3 times daily with meals    lancets (ACCU-CHEK SOFTCLIX LANCETS) Misc Check blood sugar 3 times daily.   Dx:  E11.9    LANTUS SOLOSTAR U-100 INSULIN 18 Units, Subcutaneous, Daily    levothyroxine (SYNTHROID) 112 mcg, Oral, Before breakfast    lisinopriL (PRINIVIL,ZESTRIL) 2.5 MG tablet TAKE 1 TABLET(2.5 MG) BY MOUTH EVERY DAY    meclizine (ANTIVERT) 25 mg tablet 1 tablet, Oral, As needed (PRN)    OZEMPIC 2 mg, Subcutaneous, Every 7 days    TRULICITY 4.5 mg, Subcutaneous, Every 7 days        BIOLOGIC LABS  Lab Results   Component Value Date    TBGOLDPLUS Negative 10/28/2019      Lab Results   Component Value Date    HEPCAB Negative 09/14/2016        RHEUM LABS  Lab Results   Component Value Date    CRP 1.5 12/29/2022     Lab Results   Component Value Date    SEDRATE 11 12/29/2022     RADHA: + RADHA w/ neg JERMAN checked 11/2022, titer 1:160  RF/CCP: normal RF 11/2022, ccp not checked  ACE: WNL 11/2022                  Assessment:     1. ILD (interstitial lung disease)    2. RADHA positive    3. Abnormal CT scan of lung    4. Anti-RNP antibodies present        I have reviewed the following:     Details / Date    [x]   Labs     []   Micro     []   Pathology     [x]   Imaging Chest imaging    []   Cardiology Procedures     [x]   Other Pulmonary OV notes         Plan:     Check additional lab today- anti-scleroderma, c3/c4. Complex case w/ features of MCTD  Will reivew case with Dr. Humphries and notify patient of further recommendations. Possibly consider starting cellcept  Cont to f/u w/ pulmonary as scheduled for obstructive lung disease  lab results performed to date reviewed and pertinent findings discussed with the patient in detail  Will det f/u after review of labs    ADDENDUM 1/31/23  Case reviewed in detail with Dr. Humphries. Agrees w/ dx interstitial lung disease due to connective tissue disease and start CellCept based on worsening lung  functions. Plan to go ahead with diagnosis of MCTD and start CellCept. Will need CBC in 2 and 4 weeks.      80 minutes of total time spent on the encounter, which includes face to face time and non-face to face time preparing to see the patient (eg, review of tests), Obtaining and/or reviewing separately obtained history, Documenting clinical information in the electronic or other health record, Independently interpreting results (not separately reported) and communicating results to the patient/family/caregiver, or Care coordination (not separately reported).         Reba Ness PA-C  [facility] RHE[facility]TH [facility], [place]

## 2023-01-23 NOTE — TELEPHONE ENCOUNTER
----- Message from Reba Ness PA-C sent at 1/20/2023  2:14 PM CST -----  Please schedule virtual or in person visit to review labs results

## 2023-01-24 ENCOUNTER — LAB VISIT (OUTPATIENT)
Dept: LAB | Facility: HOSPITAL | Age: 73
End: 2023-01-24
Attending: PHYSICIAN ASSISTANT
Payer: MEDICARE

## 2023-01-24 ENCOUNTER — OFFICE VISIT (OUTPATIENT)
Dept: RHEUMATOLOGY | Facility: CLINIC | Age: 73
End: 2023-01-24
Payer: MEDICARE

## 2023-01-24 VITALS
SYSTOLIC BLOOD PRESSURE: 120 MMHG | HEIGHT: 67 IN | DIASTOLIC BLOOD PRESSURE: 64 MMHG | BODY MASS INDEX: 38.77 KG/M2 | WEIGHT: 247 LBS | HEART RATE: 96 BPM

## 2023-01-24 DIAGNOSIS — R76.8 ANTI-RNP ANTIBODIES PRESENT: ICD-10-CM

## 2023-01-24 DIAGNOSIS — M35.1 MCTD (MIXED CONNECTIVE TISSUE DISEASE): ICD-10-CM

## 2023-01-24 DIAGNOSIS — R76.8 ANA POSITIVE: ICD-10-CM

## 2023-01-24 DIAGNOSIS — J84.9 ILD (INTERSTITIAL LUNG DISEASE): ICD-10-CM

## 2023-01-24 DIAGNOSIS — R91.8 ABNORMAL CT SCAN OF LUNG: ICD-10-CM

## 2023-01-24 DIAGNOSIS — J84.9 ILD (INTERSTITIAL LUNG DISEASE): Primary | ICD-10-CM

## 2023-01-24 LAB
ALBUMIN SERPL BCP-MCNC: 3.2 G/DL (ref 3.5–5.2)
ALP SERPL-CCNC: 77 U/L (ref 55–135)
ALT SERPL W/O P-5'-P-CCNC: 12 U/L (ref 10–44)
ANION GAP SERPL CALC-SCNC: 10 MMOL/L (ref 8–16)
AST SERPL-CCNC: 13 U/L (ref 10–40)
BASOPHILS # BLD AUTO: 0.05 K/UL (ref 0–0.2)
BASOPHILS NFR BLD: 0.7 % (ref 0–1.9)
BILIRUB SERPL-MCNC: 0.8 MG/DL (ref 0.1–1)
BUN SERPL-MCNC: 13 MG/DL (ref 8–23)
C3 SERPL-MCNC: 132 MG/DL (ref 50–180)
C4 SERPL-MCNC: 30 MG/DL (ref 11–44)
CALCIUM SERPL-MCNC: 8.9 MG/DL (ref 8.7–10.5)
CHLORIDE SERPL-SCNC: 108 MMOL/L (ref 95–110)
CO2 SERPL-SCNC: 23 MMOL/L (ref 23–29)
CREAT SERPL-MCNC: 0.9 MG/DL (ref 0.5–1.4)
CREAT UR-MCNC: 247 MG/DL (ref 15–325)
CRP SERPL-MCNC: 1.2 MG/L (ref 0–8.2)
DIFFERENTIAL METHOD: ABNORMAL
EOSINOPHIL # BLD AUTO: 0.1 K/UL (ref 0–0.5)
EOSINOPHIL NFR BLD: 1.7 % (ref 0–8)
ERYTHROCYTE [DISTWIDTH] IN BLOOD BY AUTOMATED COUNT: 16.1 % (ref 11.5–14.5)
ERYTHROCYTE [SEDIMENTATION RATE] IN BLOOD BY PHOTOMETRIC METHOD: 8 MM/HR (ref 0–36)
EST. GFR  (NO RACE VARIABLE): >60 ML/MIN/1.73 M^2
GLUCOSE SERPL-MCNC: 133 MG/DL (ref 70–110)
HCT VFR BLD AUTO: 39.7 % (ref 37–48.5)
HGB BLD-MCNC: 12.7 G/DL (ref 12–16)
IMM GRANULOCYTES # BLD AUTO: 0.02 K/UL (ref 0–0.04)
IMM GRANULOCYTES NFR BLD AUTO: 0.3 % (ref 0–0.5)
LYMPHOCYTES # BLD AUTO: 2.1 K/UL (ref 1–4.8)
LYMPHOCYTES NFR BLD: 30.5 % (ref 18–48)
MCH RBC QN AUTO: 26.3 PG (ref 27–31)
MCHC RBC AUTO-ENTMCNC: 32 G/DL (ref 32–36)
MCV RBC AUTO: 82 FL (ref 82–98)
MONOCYTES # BLD AUTO: 0.5 K/UL (ref 0.3–1)
MONOCYTES NFR BLD: 7.3 % (ref 4–15)
NEUTROPHILS # BLD AUTO: 4.2 K/UL (ref 1.8–7.7)
NEUTROPHILS NFR BLD: 59.5 % (ref 38–73)
NRBC BLD-RTO: 0 /100 WBC
PLATELET # BLD AUTO: 208 K/UL (ref 150–450)
PMV BLD AUTO: 10.7 FL (ref 9.2–12.9)
POTASSIUM SERPL-SCNC: 4.4 MMOL/L (ref 3.5–5.1)
PROT SERPL-MCNC: 6.4 G/DL (ref 6–8.4)
PROT UR-MCNC: 11 MG/DL (ref 0–15)
PROT/CREAT UR: 0.04 MG/G{CREAT} (ref 0–0.2)
RBC # BLD AUTO: 4.82 M/UL (ref 4–5.4)
SODIUM SERPL-SCNC: 141 MMOL/L (ref 136–145)
WBC # BLD AUTO: 6.99 K/UL (ref 3.9–12.7)

## 2023-01-24 PROCEDURE — 1126F AMNT PAIN NOTED NONE PRSNT: CPT | Mod: CPTII,S$GLB,, | Performed by: PHYSICIAN ASSISTANT

## 2023-01-24 PROCEDURE — 1126F PR PAIN SEVERITY QUANTIFIED, NO PAIN PRESENT: ICD-10-PCS | Mod: CPTII,S$GLB,, | Performed by: PHYSICIAN ASSISTANT

## 2023-01-24 PROCEDURE — 36415 COLL VENOUS BLD VENIPUNCTURE: CPT | Performed by: PHYSICIAN ASSISTANT

## 2023-01-24 PROCEDURE — 3074F SYST BP LT 130 MM HG: CPT | Mod: CPTII,S$GLB,, | Performed by: PHYSICIAN ASSISTANT

## 2023-01-24 PROCEDURE — 86140 C-REACTIVE PROTEIN: CPT | Performed by: PHYSICIAN ASSISTANT

## 2023-01-24 PROCEDURE — 86160 COMPLEMENT ANTIGEN: CPT | Mod: 59 | Performed by: PHYSICIAN ASSISTANT

## 2023-01-24 PROCEDURE — 3078F DIAST BP <80 MM HG: CPT | Mod: CPTII,S$GLB,, | Performed by: PHYSICIAN ASSISTANT

## 2023-01-24 PROCEDURE — 1101F PR PT FALLS ASSESS DOC 0-1 FALLS W/OUT INJ PAST YR: ICD-10-PCS | Mod: CPTII,S$GLB,, | Performed by: PHYSICIAN ASSISTANT

## 2023-01-24 PROCEDURE — 99215 PR OFFICE/OUTPT VISIT, EST, LEVL V, 40-54 MIN: ICD-10-PCS | Mod: S$GLB,,, | Performed by: PHYSICIAN ASSISTANT

## 2023-01-24 PROCEDURE — 99999 PR PBB SHADOW E&M-EST. PATIENT-LVL V: CPT | Mod: PBBFAC,,, | Performed by: PHYSICIAN ASSISTANT

## 2023-01-24 PROCEDURE — 3008F BODY MASS INDEX DOCD: CPT | Mod: CPTII,S$GLB,, | Performed by: PHYSICIAN ASSISTANT

## 2023-01-24 PROCEDURE — 1159F MED LIST DOCD IN RCRD: CPT | Mod: CPTII,S$GLB,, | Performed by: PHYSICIAN ASSISTANT

## 2023-01-24 PROCEDURE — 85025 COMPLETE CBC W/AUTO DIFF WBC: CPT | Performed by: PHYSICIAN ASSISTANT

## 2023-01-24 PROCEDURE — 1159F PR MEDICATION LIST DOCUMENTED IN MEDICAL RECORD: ICD-10-PCS | Mod: CPTII,S$GLB,, | Performed by: PHYSICIAN ASSISTANT

## 2023-01-24 PROCEDURE — 99999 PR PBB SHADOW E&M-EST. PATIENT-LVL V: ICD-10-PCS | Mod: PBBFAC,,, | Performed by: PHYSICIAN ASSISTANT

## 2023-01-24 PROCEDURE — 84156 ASSAY OF PROTEIN URINE: CPT | Performed by: PHYSICIAN ASSISTANT

## 2023-01-24 PROCEDURE — 3288F FALL RISK ASSESSMENT DOCD: CPT | Mod: CPTII,S$GLB,, | Performed by: PHYSICIAN ASSISTANT

## 2023-01-24 PROCEDURE — 3078F PR MOST RECENT DIASTOLIC BLOOD PRESSURE < 80 MM HG: ICD-10-PCS | Mod: CPTII,S$GLB,, | Performed by: PHYSICIAN ASSISTANT

## 2023-01-24 PROCEDURE — 99499 RISK ADDL DX/OHS AUDIT: ICD-10-PCS | Mod: S$GLB,,, | Performed by: PHYSICIAN ASSISTANT

## 2023-01-24 PROCEDURE — 99215 OFFICE O/P EST HI 40 MIN: CPT | Mod: S$GLB,,, | Performed by: PHYSICIAN ASSISTANT

## 2023-01-24 PROCEDURE — 85652 RBC SED RATE AUTOMATED: CPT | Performed by: PHYSICIAN ASSISTANT

## 2023-01-24 PROCEDURE — 3008F PR BODY MASS INDEX (BMI) DOCUMENTED: ICD-10-PCS | Mod: CPTII,S$GLB,, | Performed by: PHYSICIAN ASSISTANT

## 2023-01-24 PROCEDURE — 99499 UNLISTED E&M SERVICE: CPT | Mod: S$GLB,,, | Performed by: PHYSICIAN ASSISTANT

## 2023-01-24 PROCEDURE — 86235 NUCLEAR ANTIGEN ANTIBODY: CPT | Performed by: PHYSICIAN ASSISTANT

## 2023-01-24 PROCEDURE — 86160 COMPLEMENT ANTIGEN: CPT | Performed by: PHYSICIAN ASSISTANT

## 2023-01-24 PROCEDURE — 3288F PR FALLS RISK ASSESSMENT DOCUMENTED: ICD-10-PCS | Mod: CPTII,S$GLB,, | Performed by: PHYSICIAN ASSISTANT

## 2023-01-24 PROCEDURE — 3074F PR MOST RECENT SYSTOLIC BLOOD PRESSURE < 130 MM HG: ICD-10-PCS | Mod: CPTII,S$GLB,, | Performed by: PHYSICIAN ASSISTANT

## 2023-01-24 PROCEDURE — 80053 COMPREHEN METABOLIC PANEL: CPT | Performed by: PHYSICIAN ASSISTANT

## 2023-01-24 PROCEDURE — 1101F PT FALLS ASSESS-DOCD LE1/YR: CPT | Mod: CPTII,S$GLB,, | Performed by: PHYSICIAN ASSISTANT

## 2023-01-26 ENCOUNTER — PATIENT MESSAGE (OUTPATIENT)
Dept: REHABILITATION | Facility: HOSPITAL | Age: 73
End: 2023-01-26
Payer: MEDICARE

## 2023-01-26 ENCOUNTER — TELEPHONE (OUTPATIENT)
Dept: REHABILITATION | Facility: HOSPITAL | Age: 73
End: 2023-01-26
Payer: MEDICARE

## 2023-01-26 NOTE — TELEPHONE ENCOUNTER
Called patient to check status with home exercise program and determine if she is doing well with home exercise program or if she needs more formal Physical Therapy.  Left message and asked for return call at the [place] or message via [employer].  Ashley Manuel, PT

## 2023-01-27 LAB — ENA SCL70 AB SER-ACNC: 6 UNITS

## 2023-01-31 ENCOUNTER — OFFICE VISIT (OUTPATIENT)
Dept: RHEUMATOLOGY | Facility: CLINIC | Age: 73
End: 2023-01-31
Payer: MEDICARE

## 2023-01-31 DIAGNOSIS — M35.1 MCTD (MIXED CONNECTIVE TISSUE DISEASE): ICD-10-CM

## 2023-01-31 DIAGNOSIS — J84.9 ILD (INTERSTITIAL LUNG DISEASE): Primary | ICD-10-CM

## 2023-01-31 PROCEDURE — 99499 NO LOS: ICD-10-PCS | Mod: 95,,, | Performed by: PHYSICIAN ASSISTANT

## 2023-01-31 PROCEDURE — 99499 UNLISTED E&M SERVICE: CPT | Mod: 95,,, | Performed by: PHYSICIAN ASSISTANT

## 2023-01-31 RX ORDER — MYCOPHENOLATE MOFETIL 500 MG/1
500 TABLET ORAL 2 TIMES DAILY
Qty: 60 TABLET | Refills: 1 | Status: SHIPPED | OUTPATIENT
Start: 2023-01-31 | End: 2023-02-17

## 2023-01-31 NOTE — PROGRESS NOTES
Established Patient - Audio Only Telehealth Visit     The patient location is: [place]  The chief complaint leading to consultation is: ILD  Visit type: Virtual visit with audio only (telephone)  Total time spent with patient: 5 min       The reason for the audio only service rather than synchronous audio and video virtual visit was related to technical difficulties or patient preference/necessity.     Each patient to whom I provide medical services by telemedicine is:  (1) informed of the relationship between the physician and patient and the respective role of any other health care provider with respect to management of the patient; and (2) notified that they may decline to receive medical services by telemedicine and may withdraw from such care at any time. Patient verbally consented to receive this service via voice-only telephone call.       HPI:  PE findings and labs reviewed w/ Dr. Humphries. Agree on dx MCTD w/ ILD.     Assessment and plan:     Start cellcept for ILD- pt willing to start.  Check CBC in 2 and 4 weeks time         This service was not originating from a related E/M service provided within the previous 7 days nor will  to an E/M service or procedure within the next 24 hours or my soonest available appointment.  Prevailing standard of care was able to be met in this audio-only visit.

## 2023-02-01 ENCOUNTER — TELEPHONE (OUTPATIENT)
Dept: DIABETES | Facility: CLINIC | Age: 73
End: 2023-02-01
Payer: MEDICARE

## 2023-02-01 ENCOUNTER — PATIENT MESSAGE (OUTPATIENT)
Dept: RHEUMATOLOGY | Facility: CLINIC | Age: 73
End: 2023-02-01
Payer: MEDICARE

## 2023-02-01 NOTE — TELEPHONE ENCOUNTER
----- Message from Lorraine Holliday PA-C sent at 2/1/2023  1:24 PM CST -----  Please call pt to schedule nurse visit this pm or tomorrow for Dexcom  download at [facility]

## 2023-02-02 ENCOUNTER — CLINICAL SUPPORT (OUTPATIENT)
Dept: DIABETES | Facility: CLINIC | Age: 73
End: 2023-02-02
Payer: MEDICARE

## 2023-02-03 ENCOUNTER — PATIENT MESSAGE (OUTPATIENT)
Dept: DIABETES | Facility: CLINIC | Age: 73
End: 2023-02-03

## 2023-02-03 ENCOUNTER — TELEPHONE (OUTPATIENT)
Dept: PRIMARY CARE CLINIC | Facility: CLINIC | Age: 73
End: 2023-02-03
Payer: MEDICARE

## 2023-02-03 ENCOUNTER — OFFICE VISIT (OUTPATIENT)
Dept: DIABETES | Facility: CLINIC | Age: 73
End: 2023-02-03
Payer: MEDICARE

## 2023-02-03 DIAGNOSIS — Z79.4 TYPE 2 DIABETES MELLITUS WITH HYPERGLYCEMIA, WITH LONG-TERM CURRENT USE OF INSULIN: Primary | ICD-10-CM

## 2023-02-03 DIAGNOSIS — E78.5 HYPERLIPIDEMIA ASSOCIATED WITH TYPE 2 DIABETES MELLITUS: ICD-10-CM

## 2023-02-03 DIAGNOSIS — E11.69 HYPERLIPIDEMIA ASSOCIATED WITH TYPE 2 DIABETES MELLITUS: ICD-10-CM

## 2023-02-03 DIAGNOSIS — G47.33 OSA ON CPAP: ICD-10-CM

## 2023-02-03 DIAGNOSIS — E03.9 ACQUIRED HYPOTHYROIDISM: ICD-10-CM

## 2023-02-03 DIAGNOSIS — E11.65 TYPE 2 DIABETES MELLITUS WITH HYPERGLYCEMIA, WITH LONG-TERM CURRENT USE OF INSULIN: Primary | ICD-10-CM

## 2023-02-03 DIAGNOSIS — E66.9 OBESITY (BMI 30-39.9): ICD-10-CM

## 2023-02-03 LAB
ACID FAST MOD KINY STN SPEC: NORMAL
MYCOBACTERIUM SPEC QL CULT: NORMAL

## 2023-02-03 PROCEDURE — 99214 PR OFFICE/OUTPT VISIT, EST, LEVL IV, 30-39 MIN: ICD-10-PCS | Mod: 95,,, | Performed by: PHYSICIAN ASSISTANT

## 2023-02-03 PROCEDURE — 99214 OFFICE O/P EST MOD 30 MIN: CPT | Mod: 95,,, | Performed by: PHYSICIAN ASSISTANT

## 2023-02-03 RX ORDER — INSULIN LISPRO 100 [IU]/ML
INJECTION, SOLUTION INTRAVENOUS; SUBCUTANEOUS
Qty: 90 ML | Refills: 3 | Status: SHIPPED | OUTPATIENT
Start: 2023-02-03

## 2023-02-03 NOTE — PROGRESS NOTES
PCP: Jessica Luna MD    Subjective:     Chief Complaint: Diabetes - Established Patient    TELEMEDICINE VISIT:     The patient location is: Home  The chief complaint leading to consultation is: Diabetes Follow up  Visit type: Virtual visit with synchronous audio and video  Total time spent with patient: 25  Each patient to whom he or she provides medical services by telemedicine is:  (1) informed of the relationship between the physician and patient and the respective role of any other health care provider with respect to management of the patient; and (2) notified that he or she may decline to receive medical services by telemedicine and may withdraw from such care at any time.    Notes: N/A        HISTORY OF PRESENT ILLNESS: 73 y.o.   female presenting for diabetes management visit.   The patient's last visit with me was on 10/31/2022.   Patient has had Type II diabetes since 20 or more years.  Pertinent to decision making is the following comorbidities: HLD, Hypothyroidism and Obesity by BMI  Patient has the following Diabetes complications: without complications  She has attended diabetes education in the past.     Patient's most recent A1c of 7.1% was completed 4 months ago.   Patient states since Her last A1c Her blood glucose levels have been high  throughout the day .   Patient monitors blood glucose 4 times per day and Continuously with personal CGM Dexcom.   Patient blood glucose monitoring device will be uploaded into [employer] [facility] today.        Patients records show some baseline hyperglycemia and some postprandial hyperglycemia especially over the last 2 weeks. Patient is now taking Cellcept over last few days.     Patient endorses the following diabetes related symptoms:  None .   Patient is due today for the following diabetes-related health maintenance standards: COVID-19 Vaccine .   She denies any recent hospital admissions or emergency room visits. Patient denies history of DKA.    She voices having hypoglycemia as above..   Patient's concerns today include glycemic control.   Patient medication regimen is as below.      CURRENT DM MEDICATIONS:   Lantus 12 units daily   Ozempic 2 mg weekly   Glipizide 5 mg before bed time snack   Humalog 8 units with small / 16 units with regular / 18 units with heavier carb meal TID wm  Humalog correction dosing every 3 hours as below    Humalog Correction Dosing every 3 hours as needed OUTSIDE OF EATING  If  - 250, may take 2 units of Humalog  If  - 300, may take 3 units of Humalog   If  - 350, may take 4 units of Humalog  If  - 400, may take 5 units of Humalog    If +, may take 6 units of Humalog     Patient has failed the following Diabetes medications:   Metformin - GI   Invokana - coverage  Jardiance - yeast infection  Glyburide   Victoza   Basal - Basaglar  Ozempic 2 mg - Stopped due to backorder      Labs Reviewed.       Lab Results   Component Value Date    CPEPTIDE 2.35 04/01/2021     Lab Results   Component Value Date    GLUTAMICACID 0.00 05/25/2017          //   , There is no height or weight on file to calculate BMI.  Her blood sugar in clinic today is:    Lab Results   Component Value Date    POCGLU 160 (A) 12/15/2022       Review of Systems   Constitutional:  Negative for activity change, appetite change, chills and fever.   HENT:  Negative for dental problem, mouth sores, nosebleeds, sore throat and trouble swallowing.    Eyes:  Negative for pain and discharge.   Respiratory:  Negative for shortness of breath, wheezing and stridor.    Cardiovascular:  Negative for chest pain, palpitations and leg swelling.   Gastrointestinal:  Negative for abdominal pain, diarrhea, nausea and vomiting.   Endocrine: Negative for polydipsia, polyphagia and polyuria.   Genitourinary:  Negative for dysuria, frequency and urgency.   Musculoskeletal:  Negative for joint swelling and myalgias.   Skin:  Negative for rash and wound.    Neurological:  Negative for dizziness, syncope, weakness and headaches.   Psychiatric/Behavioral:  Negative for behavioral problems and dysphoric mood.        Diabetes Management Status  Statin: Taking  ACE/ARB: Taking    Screening or Prevention Patient's value Goal Complete/Controlled?   HgA1C Testing and Control   Lab Results   Component Value Date    HGBA1C 7.1 (H) 10/13/2022      Annually/Less than 8% No   Lipid profile : 10/13/2022 Annually Yes   LDL control Lab Results   Component Value Date    LDLCALC 51.2 (L) 10/13/2022    Annually/Less than 100 mg/dl  Yes   Nephropathy screening Lab Results   Component Value Date    MICALBCREAT 3.5 10/13/2022     Lab Results   Component Value Date    PROTEINUA Negative 04/01/2021    Annually Yes   Blood pressure BP Readings from Last 1 Encounters:   01/24/23 120/64    Less than 140/90 Yes   Dilated retinal exam : 06/29/2022 Annually Yes    Foot exam   : 11/17/2022 Annually Yes     ACTIVITY LEVEL: Moderately Active. Discussed activities, benefits, methods, and precautions.  MEAL PLANNING: Patient reports number of meals per day to be 3 and number of snacks per day to be 2.   Patient is encouraged to carb count and consume no more than 30 - 45 grams of carbohydrates in each meal and 15 grams of carbohydrates in each snack.     Social History     Socioeconomic History    Marital status:    Tobacco Use    Smoking status: Never    Smokeless tobacco: Never   Substance and Sexual Activity    Alcohol use: Yes     Comment: rare    Drug use: No    Sexual activity: Yes     Partners: Male   Social History Narrative    . Lives with spouse. Has 3 children. Patient retired as  for [employer].      Social Determinants of Health     Financial Resource Strain: Low Risk     Difficulty of Paying Living Expenses: Not hard at all   Food Insecurity: No Food Insecurity    Worried About Running Out of Food in the Last Year: Never true    Ran Out of Food in the Last  Year: Never true   Transportation Needs: No Transportation Needs    Lack of Transportation (Medical): No    Lack of Transportation (Non-Medical): No   Physical Activity: Insufficiently Active    Days of Exercise per Week: 2 days    Minutes of Exercise per Session: 50 min   Stress: No Stress Concern Present    Feeling of Stress : Only a little   Social Connections: Unknown    Frequency of Communication with Friends and Family: Once a week    Frequency of Social Gatherings with Friends and Family: More than three times a week    Active Member of Clubs or Organizations: Yes    Attends Club or Organization Meetings: More than 4 times per year    Marital Status:    Housing Stability: Low Risk     Unable to Pay for Housing in the Last Year: No    Number of Places Lived in the Last Year: 1    Unstable Housing in the Last Year: No     Past Medical History:   Diagnosis Date    Diabetes mellitus type II     Hyperlipidemia     Hypertension     Hypothyroid     TALIA (obstructive sleep apnea)     on CPAP    Osteopenia     Psoriasis        Objective:        Physical Exam  Constitutional:       General: She is not in acute distress.     Appearance: She is well-developed. She is not diaphoretic.   HENT:      Head: Normocephalic and atraumatic.      Right Ear: External ear normal.      Left Ear: External ear normal.      Nose: Nose normal.   Eyes:      General:         Right eye: No discharge.         Left eye: No discharge.   Pulmonary:      Effort: Pulmonary effort is normal. No respiratory distress.      Breath sounds: No stridor.   Musculoskeletal:         General: Normal range of motion.      Cervical back: Normal range of motion.   Skin:     Coloration: Skin is not pale.   Neurological:      Mental Status: She is alert and oriented to person, place, and time. Mental status is at baseline.      Motor: No abnormal muscle tone.      Coordination: Coordination normal.   Psychiatric:         Mood and Affect: Mood normal.          Behavior: Behavior normal.         Thought Content: Thought content normal.         Judgment: Judgment normal.         Assessment / Plan:     Type 2 diabetes mellitus with hyperglycemia, with long-term current use of insulin  -     insulin lispro 100 unit/mL pen; Titrate up to 100 units daily as instructed.  Dispense: 90 mL; Refill: 3    Hyperlipidemia associated with type 2 diabetes mellitus    Acquired hypothyroidism    TALIA on CPAP    Obesity (BMI 30-39.9)      Additional Plan Details:    - POCT Glucose  - Encouraged continuation of lifestyle changes including regular exercise and limiting carbohydrates to 30-45 grams per meal threes times daily and 15 grams per snack with a limit of two daily.   - Encouraged continued monitoring of blood glucose with maintenance of 4 times daily and Continuously with personal CGM Dexcom.    - Current DM Medication Regimen: Change Lantus 15 units daily. Continue Ozempic 2 mg weekly. Continue Glipizide 5 mg before night time snack if eating one. Continue Humalog 8 units with small / 18 units with regular / 22 units with heavier carb meal TID wm. ADJUST INSULIN FREQUENTLY BASED ON BLOOD SUGAR.   - Health Maintenance standards addressed today: COVID - 19 Vaccine - patient will schedule outside of [facility]   - Nursing Visit: Patient is age 79 or younger with an A1c of 7.5 or greater and  qualifies for nursing visit, but will defer for now.  .   - Follow up in 3 weeks with A1c prior.     CURRENT DM MEDICATIONS:   Lantus 15 units daily   Ozempic 2 mg weekly   Glipizide 5 mg before bed time snack   Humalog 8 units with small / 18 units with regular / 22 units with heavier carb meal TID wm  Humalog correction dosing every 3 hours as below    Humalog Correction Dosing every 3 hours as needed OUTSIDE OF EATING  If  - 250, may take 2 units of Humalog  If  - 300, may take 3 units of Humalog   If  - 350, may take 4 units of Humalog  If  - 400, may take 5 units of  Humalog    If +, may take 6 units of Humalog     Blakeney McKnight, PA-C [facility]

## 2023-02-03 NOTE — PATIENT INSTRUCTIONS
CURRENT DM MEDICATIONS:   Lantus 15 units daily   Ozempic 2 mg weekly   Glipizide 5 mg before bed time snack   Humalog 8 units with small / 18 units with regular / 22 units with heavier carb meal TID wm  Humalog correction dosing every 3 hours as below    Humalog Correction Dosing every 3 hours as needed OUTSIDE OF EATING  If  - 250, may take 2 units of Humalog  If  - 300, may take 3 units of Humalog   If  - 350, may take 4 units of Humalog  If  - 400, may take 5 units of Humalog    If +, may take 6 units of Humalog

## 2023-02-03 NOTE — Clinical Note
"2/24 virtual 730a "Dex  - NV 2/23" NV 2/23 at 930a at [facility] "Download Dex " Change 2/21 labs to add on 2/28 labs "

## 2023-02-03 NOTE — TELEPHONE ENCOUNTER
"----- Message from Lorraine Holliday PA-C sent at 2/3/2023  9:45 AM CST -----  2/24 virtual 730a "Dex  - NV 2/23"  NV 2/23 at 930a at [facility] "Download Dex "  Change 2/21 labs to add on 2/28 labs   "

## 2023-02-10 RX ORDER — LISINOPRIL 2.5 MG/1
TABLET ORAL
Qty: 90 TABLET | Refills: 3 | Status: SHIPPED | OUTPATIENT
Start: 2023-02-10 | End: 2024-02-02

## 2023-02-10 NOTE — TELEPHONE ENCOUNTER
No new care gaps identified.  [facility] Embedded Care Gaps. Reference number: [id number]. 2/10/2023   7:30:18 AM CST

## 2023-02-10 NOTE — TELEPHONE ENCOUNTER
Refill Decision Note   Jaimie Luque  is requesting a refill authorization.  Brief Assessment and Rationale for Refill:  Approve     Medication Therapy Plan:       Medication Reconciliation Completed: No   Comments:     No Care Gaps recommended.     Note composed:1:53 PM 02/10/2023

## 2023-02-14 ENCOUNTER — LAB VISIT (OUTPATIENT)
Dept: LAB | Facility: HOSPITAL | Age: 73
End: 2023-02-14
Attending: PHYSICIAN ASSISTANT
Payer: MEDICARE

## 2023-02-14 ENCOUNTER — PATIENT MESSAGE (OUTPATIENT)
Dept: RHEUMATOLOGY | Facility: CLINIC | Age: 73
End: 2023-02-14
Payer: MEDICARE

## 2023-02-14 DIAGNOSIS — J84.9 ILD (INTERSTITIAL LUNG DISEASE): ICD-10-CM

## 2023-02-14 DIAGNOSIS — J84.9 ILD (INTERSTITIAL LUNG DISEASE): Primary | ICD-10-CM

## 2023-02-14 LAB
BASOPHILS # BLD AUTO: 0.06 K/UL (ref 0–0.2)
BASOPHILS NFR BLD: 1 % (ref 0–1.9)
DIFFERENTIAL METHOD: ABNORMAL
EOSINOPHIL # BLD AUTO: 0.1 K/UL (ref 0–0.5)
EOSINOPHIL NFR BLD: 2.4 % (ref 0–8)
ERYTHROCYTE [DISTWIDTH] IN BLOOD BY AUTOMATED COUNT: 16.7 % (ref 11.5–14.5)
HCT VFR BLD AUTO: 37.2 % (ref 37–48.5)
HGB BLD-MCNC: 12 G/DL (ref 12–16)
IMM GRANULOCYTES # BLD AUTO: 0.01 K/UL (ref 0–0.04)
IMM GRANULOCYTES NFR BLD AUTO: 0.2 % (ref 0–0.5)
LYMPHOCYTES # BLD AUTO: 1.9 K/UL (ref 1–4.8)
LYMPHOCYTES NFR BLD: 32.4 % (ref 18–48)
MCH RBC QN AUTO: 26.3 PG (ref 27–31)
MCHC RBC AUTO-ENTMCNC: 32.3 G/DL (ref 32–36)
MCV RBC AUTO: 82 FL (ref 82–98)
MONOCYTES # BLD AUTO: 0.6 K/UL (ref 0.3–1)
MONOCYTES NFR BLD: 10.9 % (ref 4–15)
NEUTROPHILS # BLD AUTO: 3.1 K/UL (ref 1.8–7.7)
NEUTROPHILS NFR BLD: 53.1 % (ref 38–73)
NRBC BLD-RTO: 0 /100 WBC
PLATELET # BLD AUTO: 195 K/UL (ref 150–450)
PMV BLD AUTO: 10.6 FL (ref 9.2–12.9)
RBC # BLD AUTO: 4.56 M/UL (ref 4–5.4)
WBC # BLD AUTO: 5.77 K/UL (ref 3.9–12.7)

## 2023-02-14 PROCEDURE — 36415 COLL VENOUS BLD VENIPUNCTURE: CPT | Performed by: PHYSICIAN ASSISTANT

## 2023-02-14 PROCEDURE — 85025 COMPLETE CBC W/AUTO DIFF WBC: CPT | Performed by: PHYSICIAN ASSISTANT

## 2023-02-17 RX ORDER — MYCOPHENOLATE MOFETIL 500 MG/1
1000 TABLET ORAL 2 TIMES DAILY
Qty: 120 TABLET | Refills: 2 | Status: SHIPPED | OUTPATIENT
Start: 2023-02-17 | End: 2023-03-12 | Stop reason: SDUPTHER

## 2023-02-20 ENCOUNTER — PATIENT MESSAGE (OUTPATIENT)
Dept: DIABETES | Facility: CLINIC | Age: 73
End: 2023-02-20
Payer: MEDICARE

## 2023-02-23 ENCOUNTER — CLINICAL SUPPORT (OUTPATIENT)
Dept: DIABETES | Facility: CLINIC | Age: 73
End: 2023-02-23
Payer: MEDICARE

## 2023-02-23 NOTE — PROGRESS NOTES
Pt came into clinic to have dexcom  downloaded. Download was successful and uploaded to pt's chart.

## 2023-02-24 ENCOUNTER — OFFICE VISIT (OUTPATIENT)
Dept: DIABETES | Facility: CLINIC | Age: 73
End: 2023-02-24
Payer: MEDICARE

## 2023-02-24 DIAGNOSIS — E11.69 HYPERLIPIDEMIA ASSOCIATED WITH TYPE 2 DIABETES MELLITUS: ICD-10-CM

## 2023-02-24 DIAGNOSIS — E78.5 HYPERLIPIDEMIA ASSOCIATED WITH TYPE 2 DIABETES MELLITUS: ICD-10-CM

## 2023-02-24 DIAGNOSIS — E66.9 OBESITY (BMI 30-39.9): ICD-10-CM

## 2023-02-24 DIAGNOSIS — E11.65 TYPE 2 DIABETES MELLITUS WITH HYPERGLYCEMIA, WITH LONG-TERM CURRENT USE OF INSULIN: Primary | ICD-10-CM

## 2023-02-24 DIAGNOSIS — G47.33 OSA ON CPAP: ICD-10-CM

## 2023-02-24 DIAGNOSIS — E03.9 ACQUIRED HYPOTHYROIDISM: ICD-10-CM

## 2023-02-24 DIAGNOSIS — Z79.4 TYPE 2 DIABETES MELLITUS WITH HYPERGLYCEMIA, WITH LONG-TERM CURRENT USE OF INSULIN: Primary | ICD-10-CM

## 2023-02-24 PROCEDURE — 99214 OFFICE O/P EST MOD 30 MIN: CPT | Mod: 95,,, | Performed by: PHYSICIAN ASSISTANT

## 2023-02-24 PROCEDURE — 99214 PR OFFICE/OUTPT VISIT, EST, LEVL IV, 30-39 MIN: ICD-10-PCS | Mod: 95,,, | Performed by: PHYSICIAN ASSISTANT

## 2023-02-24 PROCEDURE — 4010F PR ACE/ARB THEARPY RXD/TAKEN: ICD-10-PCS | Mod: CPTII,95,, | Performed by: PHYSICIAN ASSISTANT

## 2023-02-24 PROCEDURE — 4010F ACE/ARB THERAPY RXD/TAKEN: CPT | Mod: CPTII,95,, | Performed by: PHYSICIAN ASSISTANT

## 2023-02-24 NOTE — PATIENT INSTRUCTIONS
CURRENT DM MEDICATIONS:   Lantus 12 units daily   Ozempic 2 mg weekly   Glipizide 5 mg before bed time snack   Humalog 8 units with small / 20 units with regular / 24 units with heavier carb meal / 5 units with snack TID wm  Humalog correction dosing every 3 hours as below    Humalog Correction Dosing every 3 hours as needed OUTSIDE OF EATING  If  - 250, may take 2 units of Humalog  If  - 300, may take 4 units of Humalog   If  - 350, may take 6 units of Humalog  If  - 400, may take 8 units of Humalog    If +, may take 10 units of Humalog

## 2023-02-24 NOTE — PROGRESS NOTES
PCP: Jessica Luna MD    Subjective:     Chief Complaint: Diabetes - Established Patient    TELEMEDICINE VISIT:     The patient location is: Home  The chief complaint leading to consultation is: Diabetes Follow up  Visit type: Virtual visit with synchronous audio and video  Total time spent with patient: 24  Each patient to whom he or she provides medical services by telemedicine is:  (1) informed of the relationship between the physician and patient and the respective role of any other health care provider with respect to management of the patient; and (2) notified that he or she may decline to receive medical services by telemedicine and may withdraw from such care at any time.    Notes: N/A        HISTORY OF PRESENT ILLNESS: 73 y.o.   female presenting for diabetes management visit.   The patient's last visit with me was on 2/3/2023.  Patient has had Type II diabetes since 20 or more years.  Pertinent to decision making is the following comorbidities: HLD, Hypothyroidism and Obesity by BMI  Patient has the following Diabetes complications: without complications  She has attended diabetes education in the past.     Patient's most recent A1c of 7.1% was completed 4 months ago.   Patient states since Her last A1c Her blood glucose levels have been high  throughout the day .   Patient monitors blood glucose 4 times per day and Continuously with personal CGM Dexcom.   Patient blood glucose monitoring device will be uploaded into [employer] [facility] today.        Patients records show some undetermined hypoglycemia throughout the day and some postprandial hyperglycemia following meals.     Patient endorses the following diabetes related symptoms:  None .   Patient is due today for the following diabetes-related health maintenance standards: COVID-19 Vaccine .   She denies any recent hospital admissions or emergency room visits. Patient denies history of DKA.   She voices having hypoglycemia as above..    Patient's concerns today include glycemic control. Of note, patient is having Cellcept titrated up over last month which may be contributing to hyperglycemia.   Patient medication regimen is as below.      CURRENT DM MEDICATIONS:   Lantus 15 units daily   Ozempic 2 mg weekly   Glipizide 5 mg before bed time snack   Humalog 8 units with small / 18 units with regular / 22 units with heavier carb meal TID wm  Humalog correction dosing every 3 hours as below    Humalog Correction Dosing every 3 hours as needed OUTSIDE OF EATING  If  - 250, may take 2 units of Humalog  If  - 300, may take 3 units of Humalog   If  - 350, may take 4 units of Humalog  If  - 400, may take 5 units of Humalog    If +, may take 6 units of Humalog     Patient has failed the following Diabetes medications:   Metformin - GI   Invokana - coverage  Jardiance - yeast infection  Glyburide   Victoza   Basal - Basaglar  Ozempic 2 mg - Stopped due to backorder      Labs Reviewed.       Lab Results   Component Value Date    CPEPTIDE 2.35 04/01/2021     Lab Results   Component Value Date    GLUTAMICACID 0.00 05/25/2017          //   , There is no height or weight on file to calculate BMI.  Her blood sugar in clinic today is:    Lab Results   Component Value Date    POCGLU 160 (A) 12/15/2022       Review of Systems   Constitutional:  Negative for activity change, appetite change, chills and fever.   HENT:  Negative for dental problem, mouth sores, nosebleeds, sore throat and trouble swallowing.    Eyes:  Negative for pain and discharge.   Respiratory:  Negative for shortness of breath, wheezing and stridor.    Cardiovascular:  Negative for chest pain, palpitations and leg swelling.   Gastrointestinal:  Negative for abdominal pain, diarrhea, nausea and vomiting.   Endocrine: Negative for polydipsia, polyphagia and polyuria.   Genitourinary:  Negative for dysuria, frequency and urgency.   Musculoskeletal:  Negative for joint  swelling and myalgias.   Skin:  Negative for rash and wound.   Neurological:  Negative for dizziness, syncope, weakness and headaches.   Psychiatric/Behavioral:  Negative for behavioral problems and dysphoric mood.        Diabetes Management Status  Statin: Taking  ACE/ARB: Taking    Screening or Prevention Patient's value Goal Complete/Controlled?   HgA1C Testing and Control   Lab Results   Component Value Date    HGBA1C 7.1 (H) 10/13/2022      Annually/Less than 8% No   Lipid profile : 10/13/2022 Annually Yes   LDL control Lab Results   Component Value Date    LDLCALC 51.2 (L) 10/13/2022    Annually/Less than 100 mg/dl  Yes   Nephropathy screening Lab Results   Component Value Date    MICALBCREAT 3.5 10/13/2022     Lab Results   Component Value Date    PROTEINUA Negative 04/01/2021    Annually Yes   Blood pressure BP Readings from Last 1 Encounters:   01/24/23 120/64    Less than 140/90 Yes   Dilated retinal exam : 06/29/2022 Annually Yes    Foot exam   : 11/17/2022 Annually Yes     ACTIVITY LEVEL: Moderately Active. Discussed activities, benefits, methods, and precautions.  MEAL PLANNING: Patient reports number of meals per day to be 3 and number of snacks per day to be 2.   Patient is encouraged to carb count and consume no more than 30 - 45 grams of carbohydrates in each meal and 15 grams of carbohydrates in each snack.     Social History     Socioeconomic History    Marital status:    Tobacco Use    Smoking status: Never    Smokeless tobacco: Never   Substance and Sexual Activity    Alcohol use: Yes     Comment: rare    Drug use: No    Sexual activity: Yes     Partners: Male   Social History Narrative    . Lives with spouse. Has 3 children. Patient retired as  for [employer].      Social Determinants of Health     Financial Resource Strain: Low Risk     Difficulty of Paying Living Expenses: Not hard at all   Food Insecurity: No Food Insecurity    Worried About Running Out of Food  in the Last Year: Never true    Ran Out of Food in the Last Year: Never true   Transportation Needs: No Transportation Needs    Lack of Transportation (Medical): No    Lack of Transportation (Non-Medical): No   Physical Activity: Insufficiently Active    Days of Exercise per Week: 2 days    Minutes of Exercise per Session: 50 min   Stress: No Stress Concern Present    Feeling of Stress : Only a little   Social Connections: Unknown    Frequency of Communication with Friends and Family: Once a week    Frequency of Social Gatherings with Friends and Family: More than three times a week    Active Member of Clubs or Organizations: Yes    Attends Club or Organization Meetings: More than 4 times per year    Marital Status:    Housing Stability: Low Risk     Unable to Pay for Housing in the Last Year: No    Number of Places Lived in the Last Year: 1    Unstable Housing in the Last Year: No     Past Medical History:   Diagnosis Date    Diabetes mellitus type II     Hyperlipidemia     Hypertension     Hypothyroid     TALIA (obstructive sleep apnea)     on CPAP    Osteopenia     Psoriasis        Objective:        Physical Exam  Constitutional:       General: She is not in acute distress.     Appearance: She is well-developed. She is not diaphoretic.   HENT:      Head: Normocephalic and atraumatic.      Right Ear: External ear normal.      Left Ear: External ear normal.      Nose: Nose normal.   Eyes:      General:         Right eye: No discharge.         Left eye: No discharge.   Pulmonary:      Effort: Pulmonary effort is normal. No respiratory distress.      Breath sounds: No stridor.   Musculoskeletal:         General: Normal range of motion.      Cervical back: Normal range of motion.   Skin:     Coloration: Skin is not pale.   Neurological:      Mental Status: She is alert and oriented to person, place, and time. Mental status is at baseline.      Motor: No abnormal muscle tone.      Coordination: Coordination  normal.   Psychiatric:         Mood and Affect: Mood normal.         Behavior: Behavior normal.         Thought Content: Thought content normal.         Judgment: Judgment normal.         Assessment / Plan:     Type 2 diabetes mellitus with hyperglycemia, with long-term current use of insulin    Hyperlipidemia associated with type 2 diabetes mellitus    Acquired hypothyroidism    TALIA on CPAP    Obesity (BMI 30-39.9)        Additional Plan Details:    - POCT Glucose  - Encouraged continuation of lifestyle changes including regular exercise and limiting carbohydrates to 30-45 grams per meal threes times daily and 15 grams per snack with a limit of two daily.   - Encouraged continued monitoring of blood glucose with maintenance of 4 times daily and Continuously with personal CGM Dexcom.    - Current DM Medication Regimen: Change Lantus 12 units daily. Continue Ozempic 2 mg weekly. Continue Glipizide 5 mg before night time snack if eating one. Continue Humalog 8 units with small / 20 units with regular / 24 units with heavier carb meal / 5 units with snack TID wm. ADJUST INSULIN FREQUENTLY BASED ON BLOOD SUGAR.   - Health Maintenance standards addressed today: COVID - 19 Vaccine - patient will schedule outside of [facility]   - Nursing Visit: Patient is age 78 or younger with an A1c of 7.5 or greater and  qualifies for nursing visit, but will defer for now.  .   - Follow up in  4 weeks with A1c prior.     CURRENT DM MEDICATIONS:   Lantus 12 units daily   Ozempic 2 mg weekly   Glipizide 5 mg before bed time snack   Humalog 8 units with small / 20 units with regular / 24 units with heavier carb meal / 5 units with snack TID wm  Humalog correction dosing every 3 hours as below    Humalog Correction Dosing every 3 hours as needed OUTSIDE OF EATING  If  - 250, may take 2 units of Humalog  If  - 300, may take 4 units of Humalog   If  - 350, may take 6 units of Humalog  If  - 400, may take 8 units of  Humalog    If +, may take 10 units of Humalog      Blakeney McKnight, PA-C [facility]

## 2023-02-28 ENCOUNTER — LAB VISIT (OUTPATIENT)
Dept: LAB | Facility: HOSPITAL | Age: 73
End: 2023-02-28
Attending: INTERNAL MEDICINE
Payer: MEDICARE

## 2023-02-28 DIAGNOSIS — Z79.4 TYPE 2 DIABETES MELLITUS WITH HYPERGLYCEMIA, WITH LONG-TERM CURRENT USE OF INSULIN: ICD-10-CM

## 2023-02-28 DIAGNOSIS — E11.65 UNCONTROLLED TYPE 2 DIABETES MELLITUS WITH HYPERGLYCEMIA: ICD-10-CM

## 2023-02-28 DIAGNOSIS — N18.31 STAGE 3A CHRONIC KIDNEY DISEASE: ICD-10-CM

## 2023-02-28 DIAGNOSIS — J84.9 ILD (INTERSTITIAL LUNG DISEASE): ICD-10-CM

## 2023-02-28 DIAGNOSIS — E11.65 TYPE 2 DIABETES MELLITUS WITH HYPERGLYCEMIA, WITH LONG-TERM CURRENT USE OF INSULIN: ICD-10-CM

## 2023-02-28 DIAGNOSIS — E03.9 ACQUIRED HYPOTHYROIDISM: ICD-10-CM

## 2023-02-28 LAB
ALBUMIN SERPL BCP-MCNC: 3.2 G/DL (ref 3.5–5.2)
ALP SERPL-CCNC: 68 U/L (ref 55–135)
ALT SERPL W/O P-5'-P-CCNC: 7 U/L (ref 10–44)
ANION GAP SERPL CALC-SCNC: 9 MMOL/L (ref 8–16)
ANION GAP SERPL CALC-SCNC: 9 MMOL/L (ref 8–16)
AST SERPL-CCNC: 12 U/L (ref 10–40)
BASOPHILS # BLD AUTO: 0.05 K/UL (ref 0–0.2)
BASOPHILS NFR BLD: 0.8 % (ref 0–1.9)
BILIRUB SERPL-MCNC: 0.8 MG/DL (ref 0.1–1)
BUN SERPL-MCNC: 11 MG/DL (ref 8–23)
BUN SERPL-MCNC: 11 MG/DL (ref 8–23)
CALCIUM SERPL-MCNC: 8.9 MG/DL (ref 8.7–10.5)
CALCIUM SERPL-MCNC: 8.9 MG/DL (ref 8.7–10.5)
CHLORIDE SERPL-SCNC: 107 MMOL/L (ref 95–110)
CHLORIDE SERPL-SCNC: 107 MMOL/L (ref 95–110)
CO2 SERPL-SCNC: 22 MMOL/L (ref 23–29)
CO2 SERPL-SCNC: 22 MMOL/L (ref 23–29)
CREAT SERPL-MCNC: 0.9 MG/DL (ref 0.5–1.4)
CREAT SERPL-MCNC: 0.9 MG/DL (ref 0.5–1.4)
CRP SERPL-MCNC: 1.6 MG/L (ref 0–8.2)
DIFFERENTIAL METHOD: ABNORMAL
EOSINOPHIL # BLD AUTO: 0.1 K/UL (ref 0–0.5)
EOSINOPHIL NFR BLD: 1.5 % (ref 0–8)
ERYTHROCYTE [DISTWIDTH] IN BLOOD BY AUTOMATED COUNT: 17.2 % (ref 11.5–14.5)
ERYTHROCYTE [SEDIMENTATION RATE] IN BLOOD BY PHOTOMETRIC METHOD: 19 MM/HR (ref 0–36)
EST. GFR  (NO RACE VARIABLE): >60 ML/MIN/1.73 M^2
EST. GFR  (NO RACE VARIABLE): >60 ML/MIN/1.73 M^2
ESTIMATED AVG GLUCOSE: 154 MG/DL (ref 68–131)
GLUCOSE SERPL-MCNC: 187 MG/DL (ref 70–110)
GLUCOSE SERPL-MCNC: 187 MG/DL (ref 70–110)
HBA1C MFR BLD: 7 % (ref 4–5.6)
HCT VFR BLD AUTO: 38.6 % (ref 37–48.5)
HGB BLD-MCNC: 12.5 G/DL (ref 12–16)
IMM GRANULOCYTES # BLD AUTO: 0.01 K/UL (ref 0–0.04)
IMM GRANULOCYTES NFR BLD AUTO: 0.2 % (ref 0–0.5)
LYMPHOCYTES # BLD AUTO: 1.8 K/UL (ref 1–4.8)
LYMPHOCYTES NFR BLD: 31.1 % (ref 18–48)
MCH RBC QN AUTO: 26.7 PG (ref 27–31)
MCHC RBC AUTO-ENTMCNC: 32.4 G/DL (ref 32–36)
MCV RBC AUTO: 83 FL (ref 82–98)
MONOCYTES # BLD AUTO: 0.5 K/UL (ref 0.3–1)
MONOCYTES NFR BLD: 8.8 % (ref 4–15)
NEUTROPHILS # BLD AUTO: 3.4 K/UL (ref 1.8–7.7)
NEUTROPHILS NFR BLD: 57.6 % (ref 38–73)
NRBC BLD-RTO: 0 /100 WBC
PLATELET # BLD AUTO: 203 K/UL (ref 150–450)
PMV BLD AUTO: 10.5 FL (ref 9.2–12.9)
POTASSIUM SERPL-SCNC: 4.5 MMOL/L (ref 3.5–5.1)
POTASSIUM SERPL-SCNC: 4.5 MMOL/L (ref 3.5–5.1)
PROT SERPL-MCNC: 6.5 G/DL (ref 6–8.4)
RBC # BLD AUTO: 4.68 M/UL (ref 4–5.4)
SODIUM SERPL-SCNC: 138 MMOL/L (ref 136–145)
SODIUM SERPL-SCNC: 138 MMOL/L (ref 136–145)
TSH SERPL DL<=0.005 MIU/L-ACNC: 2.03 UIU/ML (ref 0.4–4)
WBC # BLD AUTO: 5.91 K/UL (ref 3.9–12.7)

## 2023-02-28 PROCEDURE — 85652 RBC SED RATE AUTOMATED: CPT | Performed by: PHYSICIAN ASSISTANT

## 2023-02-28 PROCEDURE — 86140 C-REACTIVE PROTEIN: CPT | Performed by: PHYSICIAN ASSISTANT

## 2023-02-28 PROCEDURE — 83036 HEMOGLOBIN GLYCOSYLATED A1C: CPT | Performed by: PHYSICIAN ASSISTANT

## 2023-02-28 PROCEDURE — 84443 ASSAY THYROID STIM HORMONE: CPT | Performed by: INTERNAL MEDICINE

## 2023-02-28 PROCEDURE — 36415 COLL VENOUS BLD VENIPUNCTURE: CPT | Performed by: INTERNAL MEDICINE

## 2023-02-28 PROCEDURE — 80053 COMPREHEN METABOLIC PANEL: CPT | Performed by: PHYSICIAN ASSISTANT

## 2023-02-28 PROCEDURE — 85025 COMPLETE CBC W/AUTO DIFF WBC: CPT | Performed by: PHYSICIAN ASSISTANT

## 2023-03-08 ENCOUNTER — OFFICE VISIT (OUTPATIENT)
Dept: PRIMARY CARE CLINIC | Facility: CLINIC | Age: 73
End: 2023-03-08
Payer: MEDICARE

## 2023-03-08 ENCOUNTER — PATIENT MESSAGE (OUTPATIENT)
Dept: PRIMARY CARE CLINIC | Facility: CLINIC | Age: 73
End: 2023-03-08

## 2023-03-08 VITALS
TEMPERATURE: 98 F | OXYGEN SATURATION: 97 % | HEART RATE: 88 BPM | BODY MASS INDEX: 38.25 KG/M2 | WEIGHT: 244.19 LBS | SYSTOLIC BLOOD PRESSURE: 120 MMHG | DIASTOLIC BLOOD PRESSURE: 79 MMHG

## 2023-03-08 DIAGNOSIS — Z00.00 ENCOUNTER FOR PREVENTIVE HEALTH EXAMINATION: Primary | ICD-10-CM

## 2023-03-08 DIAGNOSIS — M35.9 INTERSTITIAL LUNG DISEASE DUE TO CONNECTIVE TISSUE DISEASE: ICD-10-CM

## 2023-03-08 DIAGNOSIS — E11.22 CONTROLLED TYPE 2 DIABETES MELLITUS WITH STAGE 3 CHRONIC KIDNEY DISEASE, WITH LONG-TERM CURRENT USE OF INSULIN: ICD-10-CM

## 2023-03-08 DIAGNOSIS — N18.30 CONTROLLED TYPE 2 DIABETES MELLITUS WITH STAGE 3 CHRONIC KIDNEY DISEASE, WITH LONG-TERM CURRENT USE OF INSULIN: ICD-10-CM

## 2023-03-08 DIAGNOSIS — E11.22 TYPE 2 DIABETES MELLITUS WITH STAGE 3A CHRONIC KIDNEY DISEASE, WITH LONG-TERM CURRENT USE OF INSULIN: ICD-10-CM

## 2023-03-08 DIAGNOSIS — E11.69 HYPERLIPIDEMIA ASSOCIATED WITH TYPE 2 DIABETES MELLITUS: ICD-10-CM

## 2023-03-08 DIAGNOSIS — M17.11 OSTEOARTHRITIS OF RIGHT KNEE, UNSPECIFIED OSTEOARTHRITIS TYPE: ICD-10-CM

## 2023-03-08 DIAGNOSIS — E66.01 SEVERE OBESITY WITH BODY MASS INDEX (BMI) OF 35.0 TO 35.9 AND COMORBIDITY: ICD-10-CM

## 2023-03-08 DIAGNOSIS — R91.8 MULTIPLE LUNG NODULES ON CT: ICD-10-CM

## 2023-03-08 DIAGNOSIS — R26.9 ABNORMALITY OF GAIT AND MOBILITY: ICD-10-CM

## 2023-03-08 DIAGNOSIS — Z74.09 DECREASED FUNCTIONAL MOBILITY AND ENDURANCE: ICD-10-CM

## 2023-03-08 DIAGNOSIS — N18.31 TYPE 2 DIABETES MELLITUS WITH STAGE 3A CHRONIC KIDNEY DISEASE, WITH LONG-TERM CURRENT USE OF INSULIN: ICD-10-CM

## 2023-03-08 DIAGNOSIS — Z79.4 TYPE 2 DIABETES MELLITUS WITH STAGE 3A CHRONIC KIDNEY DISEASE, WITH LONG-TERM CURRENT USE OF INSULIN: ICD-10-CM

## 2023-03-08 DIAGNOSIS — M85.80 OSTEOPENIA, UNSPECIFIED LOCATION: ICD-10-CM

## 2023-03-08 DIAGNOSIS — Z79.4 CONTROLLED TYPE 2 DIABETES MELLITUS WITH STAGE 3 CHRONIC KIDNEY DISEASE, WITH LONG-TERM CURRENT USE OF INSULIN: ICD-10-CM

## 2023-03-08 DIAGNOSIS — G47.33 OSA ON CPAP: ICD-10-CM

## 2023-03-08 DIAGNOSIS — E78.5 HYPERLIPIDEMIA ASSOCIATED WITH TYPE 2 DIABETES MELLITUS: ICD-10-CM

## 2023-03-08 DIAGNOSIS — E11.36 CATARACT ASSOCIATED WITH TYPE 2 DIABETES MELLITUS: ICD-10-CM

## 2023-03-08 DIAGNOSIS — J84.89 INTERSTITIAL LUNG DISEASE DUE TO CONNECTIVE TISSUE DISEASE: ICD-10-CM

## 2023-03-08 DIAGNOSIS — E03.9 ACQUIRED HYPOTHYROIDISM: ICD-10-CM

## 2023-03-08 PROBLEM — M17.9 OSTEOARTHRITIS OF KNEE: Status: ACTIVE | Noted: 2023-03-08

## 2023-03-08 PROCEDURE — 99999 PR PBB SHADOW E&M-EST. PATIENT-LVL IV: CPT | Mod: PBBFAC,,, | Performed by: NURSE PRACTITIONER

## 2023-03-08 PROCEDURE — 3008F BODY MASS INDEX DOCD: CPT | Mod: CPTII,S$GLB,, | Performed by: NURSE PRACTITIONER

## 2023-03-08 PROCEDURE — 3288F FALL RISK ASSESSMENT DOCD: CPT | Mod: CPTII,S$GLB,, | Performed by: NURSE PRACTITIONER

## 2023-03-08 PROCEDURE — 1159F PR MEDICATION LIST DOCUMENTED IN MEDICAL RECORD: ICD-10-PCS | Mod: CPTII,S$GLB,, | Performed by: NURSE PRACTITIONER

## 2023-03-08 PROCEDURE — 1101F PT FALLS ASSESS-DOCD LE1/YR: CPT | Mod: CPTII,S$GLB,, | Performed by: NURSE PRACTITIONER

## 2023-03-08 PROCEDURE — 3078F PR MOST RECENT DIASTOLIC BLOOD PRESSURE < 80 MM HG: ICD-10-PCS | Mod: CPTII,S$GLB,, | Performed by: NURSE PRACTITIONER

## 2023-03-08 PROCEDURE — 3008F PR BODY MASS INDEX (BMI) DOCUMENTED: ICD-10-PCS | Mod: CPTII,S$GLB,, | Performed by: NURSE PRACTITIONER

## 2023-03-08 PROCEDURE — 3051F HG A1C>EQUAL 7.0%<8.0%: CPT | Mod: CPTII,S$GLB,, | Performed by: NURSE PRACTITIONER

## 2023-03-08 PROCEDURE — 3288F PR FALLS RISK ASSESSMENT DOCUMENTED: ICD-10-PCS | Mod: CPTII,S$GLB,, | Performed by: NURSE PRACTITIONER

## 2023-03-08 PROCEDURE — 4010F ACE/ARB THERAPY RXD/TAKEN: CPT | Mod: CPTII,S$GLB,, | Performed by: NURSE PRACTITIONER

## 2023-03-08 PROCEDURE — G0439 PPPS, SUBSEQ VISIT: HCPCS | Mod: S$GLB,,, | Performed by: NURSE PRACTITIONER

## 2023-03-08 PROCEDURE — 1170F PR FUNCTIONAL STATUS ASSESSED: ICD-10-PCS | Mod: CPTII,S$GLB,, | Performed by: NURSE PRACTITIONER

## 2023-03-08 PROCEDURE — 3051F PR MOST RECENT HEMOGLOBIN A1C LEVEL 7.0 - < 8.0%: ICD-10-PCS | Mod: CPTII,S$GLB,, | Performed by: NURSE PRACTITIONER

## 2023-03-08 PROCEDURE — 1126F AMNT PAIN NOTED NONE PRSNT: CPT | Mod: CPTII,S$GLB,, | Performed by: NURSE PRACTITIONER

## 2023-03-08 PROCEDURE — 3074F PR MOST RECENT SYSTOLIC BLOOD PRESSURE < 130 MM HG: ICD-10-PCS | Mod: CPTII,S$GLB,, | Performed by: NURSE PRACTITIONER

## 2023-03-08 PROCEDURE — 3078F DIAST BP <80 MM HG: CPT | Mod: CPTII,S$GLB,, | Performed by: NURSE PRACTITIONER

## 2023-03-08 PROCEDURE — G0439 PR MEDICARE ANNUAL WELLNESS SUBSEQUENT VISIT: ICD-10-PCS | Mod: S$GLB,,, | Performed by: NURSE PRACTITIONER

## 2023-03-08 PROCEDURE — 1159F MED LIST DOCD IN RCRD: CPT | Mod: CPTII,S$GLB,, | Performed by: NURSE PRACTITIONER

## 2023-03-08 PROCEDURE — 99999 PR PBB SHADOW E&M-EST. PATIENT-LVL IV: ICD-10-PCS | Mod: PBBFAC,,, | Performed by: NURSE PRACTITIONER

## 2023-03-08 PROCEDURE — 1170F FXNL STATUS ASSESSED: CPT | Mod: CPTII,S$GLB,, | Performed by: NURSE PRACTITIONER

## 2023-03-08 PROCEDURE — 1126F PR PAIN SEVERITY QUANTIFIED, NO PAIN PRESENT: ICD-10-PCS | Mod: CPTII,S$GLB,, | Performed by: NURSE PRACTITIONER

## 2023-03-08 PROCEDURE — 1101F PR PT FALLS ASSESS DOC 0-1 FALLS W/OUT INJ PAST YR: ICD-10-PCS | Mod: CPTII,S$GLB,, | Performed by: NURSE PRACTITIONER

## 2023-03-08 PROCEDURE — 4010F PR ACE/ARB THEARPY RXD/TAKEN: ICD-10-PCS | Mod: CPTII,S$GLB,, | Performed by: NURSE PRACTITIONER

## 2023-03-08 PROCEDURE — 3074F SYST BP LT 130 MM HG: CPT | Mod: CPTII,S$GLB,, | Performed by: NURSE PRACTITIONER

## 2023-03-08 NOTE — ASSESSMENT & PLAN NOTE
Chronic, stable - pt is asymptomatic  Continue with Cellcept  Follow up with Pulmonology and Rheumatology

## 2023-03-08 NOTE — ASSESSMENT & PLAN NOTE
Lab Results   Component Value Date    HGBA1C 7.0 (H) 02/28/2023    HGBA1C 7.0 (H) 02/28/2023    HGBA1C 7.0 (H) 02/28/2023   chronic, monitor  Pt uses Dexcom  Continue lantus, lispro insulin and Ozempic  Creatinine and GFR are stable  Continue ACEi  Follow up with PCP and Diabetes Provider

## 2023-03-08 NOTE — ASSESSMENT & PLAN NOTE
Needs colonoscopy 5/2023  Referral ordered    Review for Opioid Screening: Pt does not have Rx for Opioids      Review for Substance Use Disorders: Patient does not use illicit substances as reported

## 2023-03-08 NOTE — ASSESSMENT & PLAN NOTE
Lab Results   Component Value Date    TSH 2.029 02/28/2023   chronic and stable  Continue levothyroxine  Follow up with PCP

## 2023-03-08 NOTE — ASSESSMENT & PLAN NOTE
Chronic, stable   Latest Reference Range & Units Most Recent   Cholesterol 120 - 199 mg/dL 138  10/13/22 08:03   HDL 40 - 75 mg/dL 72  10/13/22 08:03   HDL/Cholesterol Ratio 20.0 - 50.0 % 52.2 (H)  10/13/22 08:03   LDL Cholesterol External 63.0 - 159.0 mg/dL 51.2 (L)  10/13/22 08:03   Non-HDL Cholesterol mg/dL 66  10/13/22 08:03   Total Cholesterol/HDL Ratio 2.0 - 5.0  1.9 (L)  10/13/22 08:03   Triglycerides 30 - 150 mg/dL 74  10/13/22 08:03   Chronic, monitor  Continue atorvastatin  Follow up with PCP

## 2023-03-08 NOTE — PROGRESS NOTES
Jaimie Luque presented for a follow-up Medicare AWV today. The following components were reviewed and updated:    Medical history  Family History  Social history  Allergies and Current Medications  Health Risk Assessment  Health Maintenance  Care Team    **See Completed Assessments for Annual Wellness visit with in the encounter summary    The following assessments were completed:  Depression Screening  Cognitive function Screening  Timed Get Up Test  Whisper Test  Nutrition Screening  PAQ      Vitals:    03/08/23 1056   BP: 120/79   BP Location: Right arm   Patient Position: Sitting   Pulse: 88   Temp: 98.3 °F (36.8 °C)   TempSrc: Oral   SpO2: 97%   Weight: 110.8 kg (244 lb 3.2 oz)     Body mass index is 38.25 kg/m².           Review of Systems   Constitutional:  Negative for activity change, appetite change, fatigue and fever.   HENT:  Positive for sneezing. Negative for nasal congestion, dental problem, hearing loss and trouble swallowing.    Eyes: Negative.    Respiratory:  Negative for cough and shortness of breath.    Cardiovascular:  Positive for leg swelling. Negative for chest pain and palpitations.   Gastrointestinal:  Negative for abdominal pain, blood in stool, constipation, diarrhea, nausea, vomiting and fecal incontinence.   Genitourinary:  Negative for dysuria, frequency and hematuria.   Musculoskeletal:  Positive for arthralgias and gait problem. Negative for back pain and neck pain.   Integumentary:  Negative.   Neurological:  Positive for dizziness, numbness and headaches. Negative for weakness.   Psychiatric/Behavioral:  Negative for confusion and dysphoric mood. The patient is not nervous/anxious.     Physical Exam  Vitals reviewed.   Constitutional:       Appearance: She is well-developed. She is obese.   HENT:      Head: Normocephalic and atraumatic.      Nose: Nose normal.   Eyes:      Conjunctiva/sclera: Conjunctivae normal.   Neck:      Vascular: No carotid bruit.   Cardiovascular:       Rate and Rhythm: Normal rate and regular rhythm.      Heart sounds: Normal heart sounds. No murmur heard.  Pulmonary:      Effort: Pulmonary effort is normal.      Breath sounds: Normal breath sounds. No rales.   Abdominal:      General: Bowel sounds are normal. There is no distension.      Palpations: Abdomen is soft.      Tenderness: There is no abdominal tenderness.   Musculoskeletal:         General: No tenderness. Normal range of motion.      Cervical back: Normal range of motion and neck supple.      Right lower leg: No edema.      Left lower leg: No edema.   Skin:     General: Skin is warm and dry.   Neurological:      General: No focal deficit present.      Mental Status: She is alert and oriented to person, place, and time.   Psychiatric:         Behavior: Behavior normal.         Thought Content: Thought content normal.          Health Maintenance         Date Due Completion Date    COVID-19 Vaccine (5 - Booster for Pfizer series) 08/23/2022 6/28/2022    Colorectal Cancer Screening 05/04/2023 5/4/2022    Eye Exam 06/29/2023 6/29/2022    Override on 2/9/2022: Done    Override on 2/3/2021: Done    Hemoglobin A1c 08/28/2023 2/28/2023    Diabetes Urine Screening 10/13/2023 10/13/2022    Lipid Panel 10/13/2023 10/13/2022    Foot Exam 11/17/2023 11/17/2022    Override on 5/25/2020: Done    Low Dose Statin 01/24/2024 1/24/2023    Mammogram 06/23/2024 6/23/2022    DEXA Scan 09/20/2026 9/20/2021    TETANUS VACCINE 10/02/2028 10/2/2018              PROBLEM LIST ITEMS ADDRESSED THIS VISIT:    1. Encounter for preventive health examination  Assessment & Plan:  Needs colonoscopy 5/2023  Referral ordered    Review for Opioid Screening: Pt does not have Rx for Opioids      Review for Substance Use Disorders: Patient does not use illicit substances as reported     Orders:  -     Ambulatory referral/consult to Endo Procedure ; Future; Expected date: 03/09/2023    2. Acquired hypothyroidism  Assessment & Plan:  Lab  Results   Component Value Date    TSH 2.029 02/28/2023   chronic and stable  Continue levothyroxine  Follow up with PCP      3. Hyperlipidemia associated with type 2 diabetes mellitus  Assessment & Plan:  Chronic, stable   Latest Reference Range & Units Most Recent   Cholesterol 120 - 199 mg/dL 138  10/13/22 08:03   HDL 40 - 75 mg/dL 72  10/13/22 08:03   HDL/Cholesterol Ratio 20.0 - 50.0 % 52.2 (H)  10/13/22 08:03   LDL Cholesterol External 63.0 - 159.0 mg/dL 51.2 (L)  10/13/22 08:03   Non-HDL Cholesterol mg/dL 66  10/13/22 08:03   Total Cholesterol/HDL Ratio 2.0 - 5.0  1.9 (L)  10/13/22 08:03   Triglycerides 30 - 150 mg/dL 74  10/13/22 08:03   Chronic, monitor  Continue atorvastatin  Follow up with PCP          4. Controlled type 2 diabetes mellitus with stage 3 chronic kidney disease, with long-term current use of insulin  Assessment & Plan:  Lab Results   Component Value Date    HGBA1C 7.0 (H) 02/28/2023    HGBA1C 7.0 (H) 02/28/2023    HGBA1C 7.0 (H) 02/28/2023   chronic, monitor  Pt uses Dexcom  Continue lantus, lispro insulin and Ozempic  Creatinine and GFR are stable  Continue ACEi  Follow up with PCP and Diabetes Provider      5. TALIA on CPAP  Assessment & Plan:  Chronic, stable  Follow up with Pulmonology      6. Multiple lung nodules on CT  Assessment & Plan:  Chronic, stable  Pt is asymptomatic  Follow up with Pulmonology      7. Decreased functional mobility and endurance  Assessment & Plan:  Pt with chronic right knee pain - osteoarthritis  Complete PT/OT within in the last month  She performs isometric exercises at home  Follow up with PCP      8. Severe obesity with body mass index (BMI) of 35.0 to 35.9 and comorbidity  Assessment & Plan:  Chronic, monitor  Diabetic diet and exercise if possible  Continue Ozempic  Follow up with PcP      9. Interstitial lung disease due to connective tissue disease  Overview:  12/22 - Moderate obstruction according to PFT    Assessment & Plan:  Chronic, stable - pt is  asymptomatic  Continue with Cellcept  Follow up with Pulmonology and Rheumatology      10. Osteopenia, unspecified location  Assessment & Plan:  Chronic, stable  Seen on Dexa Scan - 3/2021  Continue cholecalciferol  Follow up with PCP        11. Cataract associated with type 2 diabetes mellitus  Assessment & Plan:  stable  S/P bilateral cataract extraction with placement of IOL  Follow up with Opthalmology      12. Osteoarthritis of right knee, unspecified osteoarthritis type  Assessment & Plan:  Chronic, stable  Continue exercise and PT/OT  Follow up with PCP      13. Abnormality of gait and mobility  Assessment & Plan:  Chronic, stable  Just completed a course of PT/OT  Follow up with PCP      14. Type 2 diabetes mellitus with stage 3a chronic kidney disease, with long-term current use of insulin  Assessment & Plan:  Lab Results   Component Value Date    HGBA1C 7.0 (H) 02/28/2023    HGBA1C 7.0 (H) 02/28/2023    HGBA1C 7.0 (H) 02/28/2023   chronic, monitor  Pt uses Dexcom  Continue lantus, lispro insulin and Ozempic  Creatinine and GFR are stable  Continue ACEi  Follow up with PCP and Diabetes Provider                     Provided Jaimie with a 5-10 year written screening schedule and personal prevention plan. Recommendations were developed using the USPSTF age appropriate recommendations. Education, counseling, and referrals were provided as needed.  After Visit Summary printed and given to patient which includes a list of additional screenings\tests needed.    Future Appointments   Date Time Provider Department Center   3/16/2023  1:45 PM [facility]   3/22/2023  1:00 PM Corky Luna MD [facility] PULMSVC [place]   3/22/2023  1:30 PM DIABETES MANAGEMENT NURSE, [facility]ET[facility] [place]   3/23/2023  9:30 AM Lorraine Holliday PA-C [facility] DIABETE [place]   5/25/2023 10:00 AM Vesta Grant PA-C [facility] RHEUM [place]   5/31/2023  1:00 PM Blair Franco,  MD [facility] CARDIO [facility]              DAREK Rojas, NP-C  [facility] [address] [place] [address]        I offered to discuss advanced care planning, including how to pick a person who would make decisions for you if you were unable to make them for yourself, called a health care power of , and what kind of decisions you might make such as use of life sustaining treatments such as ventilators and tube feeding when faced with a life limiting illness recorded on a living will that they will need to know. (How you want to be cared for as you near the end of your natural life)     X Patient is interested in learning more about how to make advanced directives.  I provided them paperwork and offered to discuss this with them.

## 2023-03-08 NOTE — ASSESSMENT & PLAN NOTE
Pt with chronic right knee pain - osteoarthritis  Complete PT/OT within in the last month  She performs isometric exercises at home  Follow up with PCP

## 2023-03-09 ENCOUNTER — PATIENT MESSAGE (OUTPATIENT)
Dept: REHABILITATION | Facility: HOSPITAL | Age: 73
End: 2023-03-09
Payer: MEDICARE

## 2023-03-14 ENCOUNTER — PATIENT MESSAGE (OUTPATIENT)
Dept: DIABETES | Facility: CLINIC | Age: 73
End: 2023-03-14
Payer: MEDICARE

## 2023-03-14 DIAGNOSIS — E11.22 TYPE 2 DIABETES MELLITUS WITH STAGE 3A CHRONIC KIDNEY DISEASE, WITH LONG-TERM CURRENT USE OF INSULIN: ICD-10-CM

## 2023-03-14 DIAGNOSIS — E11.36 CATARACT ASSOCIATED WITH TYPE 2 DIABETES MELLITUS: Primary | ICD-10-CM

## 2023-03-14 DIAGNOSIS — Z79.4 TYPE 2 DIABETES MELLITUS WITH STAGE 3A CHRONIC KIDNEY DISEASE, WITH LONG-TERM CURRENT USE OF INSULIN: ICD-10-CM

## 2023-03-14 DIAGNOSIS — N18.31 TYPE 2 DIABETES MELLITUS WITH STAGE 3A CHRONIC KIDNEY DISEASE, WITH LONG-TERM CURRENT USE OF INSULIN: ICD-10-CM

## 2023-03-14 RX ORDER — PEN NEEDLE, DIABETIC 29 G X1/2"
1 NEEDLE, DISPOSABLE MISCELLANEOUS
Qty: 450 EACH | Refills: 3 | Status: SHIPPED | OUTPATIENT
Start: 2023-03-14 | End: 2023-03-15

## 2023-03-15 ENCOUNTER — PATIENT MESSAGE (OUTPATIENT)
Dept: DIABETES | Facility: CLINIC | Age: 73
End: 2023-03-15
Payer: MEDICARE

## 2023-03-15 DIAGNOSIS — N18.31 TYPE 2 DIABETES MELLITUS WITH STAGE 3A CHRONIC KIDNEY DISEASE, WITH LONG-TERM CURRENT USE OF INSULIN: Primary | ICD-10-CM

## 2023-03-15 DIAGNOSIS — Z79.4 TYPE 2 DIABETES MELLITUS WITH STAGE 3A CHRONIC KIDNEY DISEASE, WITH LONG-TERM CURRENT USE OF INSULIN: Primary | ICD-10-CM

## 2023-03-15 DIAGNOSIS — E11.22 TYPE 2 DIABETES MELLITUS WITH STAGE 3A CHRONIC KIDNEY DISEASE, WITH LONG-TERM CURRENT USE OF INSULIN: Primary | ICD-10-CM

## 2023-03-15 RX ORDER — PEN NEEDLE, DIABETIC 30 GX3/16"
1 NEEDLE, DISPOSABLE MISCELLANEOUS
Qty: 300 EACH | Refills: 3 | Status: SHIPPED | OUTPATIENT
Start: 2023-03-15 | End: 2023-05-23

## 2023-03-20 ENCOUNTER — CLINICAL SUPPORT (OUTPATIENT)
Dept: DIABETES | Facility: CLINIC | Age: 73
End: 2023-03-20
Payer: MEDICARE

## 2023-03-21 ENCOUNTER — OFFICE VISIT (OUTPATIENT)
Dept: DIABETES | Facility: CLINIC | Age: 73
End: 2023-03-21
Payer: MEDICARE

## 2023-03-21 DIAGNOSIS — E11.36 CATARACT ASSOCIATED WITH TYPE 2 DIABETES MELLITUS: ICD-10-CM

## 2023-03-21 DIAGNOSIS — N18.31 TYPE 2 DIABETES MELLITUS WITH STAGE 3A CHRONIC KIDNEY DISEASE, WITH LONG-TERM CURRENT USE OF INSULIN: Primary | ICD-10-CM

## 2023-03-21 DIAGNOSIS — E03.9 ACQUIRED HYPOTHYROIDISM: ICD-10-CM

## 2023-03-21 DIAGNOSIS — E11.22 TYPE 2 DIABETES MELLITUS WITH STAGE 3A CHRONIC KIDNEY DISEASE, WITH LONG-TERM CURRENT USE OF INSULIN: Primary | ICD-10-CM

## 2023-03-21 DIAGNOSIS — E11.69 HYPERLIPIDEMIA ASSOCIATED WITH TYPE 2 DIABETES MELLITUS: ICD-10-CM

## 2023-03-21 DIAGNOSIS — Z79.4 TYPE 2 DIABETES MELLITUS WITH STAGE 3A CHRONIC KIDNEY DISEASE, WITH LONG-TERM CURRENT USE OF INSULIN: Primary | ICD-10-CM

## 2023-03-21 DIAGNOSIS — G47.33 OSA ON CPAP: ICD-10-CM

## 2023-03-21 DIAGNOSIS — E66.9 OBESITY (BMI 30-39.9): ICD-10-CM

## 2023-03-21 DIAGNOSIS — E78.5 HYPERLIPIDEMIA ASSOCIATED WITH TYPE 2 DIABETES MELLITUS: ICD-10-CM

## 2023-03-21 PROCEDURE — 3051F HG A1C>EQUAL 7.0%<8.0%: CPT | Mod: CPTII,95,, | Performed by: PHYSICIAN ASSISTANT

## 2023-03-21 PROCEDURE — 3051F PR MOST RECENT HEMOGLOBIN A1C LEVEL 7.0 - < 8.0%: ICD-10-PCS | Mod: CPTII,95,, | Performed by: PHYSICIAN ASSISTANT

## 2023-03-21 PROCEDURE — 4010F PR ACE/ARB THEARPY RXD/TAKEN: ICD-10-PCS | Mod: CPTII,95,, | Performed by: PHYSICIAN ASSISTANT

## 2023-03-21 PROCEDURE — 4010F ACE/ARB THERAPY RXD/TAKEN: CPT | Mod: CPTII,95,, | Performed by: PHYSICIAN ASSISTANT

## 2023-03-21 PROCEDURE — 99214 OFFICE O/P EST MOD 30 MIN: CPT | Mod: 95,,, | Performed by: PHYSICIAN ASSISTANT

## 2023-03-21 PROCEDURE — 99214 PR OFFICE/OUTPT VISIT, EST, LEVL IV, 30-39 MIN: ICD-10-PCS | Mod: 95,,, | Performed by: PHYSICIAN ASSISTANT

## 2023-03-21 NOTE — PROGRESS NOTES
PCP: Jessica Luna MD    Subjective:     Chief Complaint: Diabetes - Established Patient    TELEMEDICINE VISIT:     The patient location is: Home  The chief complaint leading to consultation is: Diabetes Follow up  Visit type: Virtual visit with synchronous audio and video  Total time spent with patient: 22  Each patient to whom he or she provides medical services by telemedicine is:  (1) informed of the relationship between the physician and patient and the respective role of any other health care provider with respect to management of the patient; and (2) notified that he or she may decline to receive medical services by telemedicine and may withdraw from such care at any time.    Notes: N/A        HISTORY OF PRESENT ILLNESS: 73 y.o.   female presenting for diabetes management visit.   The patient's last visit with me was on 2/24/2023.  Patient has had Type II diabetes since 20 or more years.  Pertinent to decision making is the following comorbidities: HLD, Hypothyroidism and Obesity by BMI  Patient has the following Diabetes complications: without complications  She has attended diabetes education in the past.     Patient's most recent A1c of 7.0% was completed 1 months ago.   Patient states since Her last A1c Her blood glucose levels have been high  throughout the day .   Patient monitors blood glucose 4 times per day and Continuously with personal CGM Dexcom.   Patient blood glucose monitoring device will be uploaded into [employer] today.        Patients records show some hypoglycemia throughout the day and some postprandial hyperglycemia following meals.     Patient endorses the following diabetes related symptoms:  None .   Patient is due today for the following diabetes-related health maintenance standards: COVID-19 Vaccine .   She denies any recent hospital admissions or emergency room visits. Patient denies history of DKA.   She voices having hypoglycemia as above..   Patient's concerns  today include glycemic control. Of note, patient is having Cellcept titrated up over last month which may be contributing to hyperglycemia.   Patient medication regimen is as below.      CURRENT DM MEDICATIONS:   Lantus 12 units daily   Ozempic 2 mg weekly   Glipizide 5 mg before bed time snack   Humalog 8 units with small / 20 units with regular / 24 units with heavier carb meal / 5 units with snack TID wm  Humalog correction dosing every 3 hours as below    Humalog Correction Dosing every 3 hours as needed OUTSIDE OF EATING  If  - 250, may take 2 units of Humalog  If  - 300, may take 4 units of Humalog   If  - 350, may take 6 units of Humalog  If  - 400, may take 8 units of Humalog  If +, may take 10 units of Humalog      Patient has failed the following Diabetes medications:   Metformin - GI   Invokana - coverage  Jardiance - yeast infection  Glyburide   Victoza   Basal - Basaglar  Ozempic 2 mg - Stopped due to backorder      Labs Reviewed.       Lab Results   Component Value Date    CPEPTIDE 2.35 04/01/2021     Lab Results   Component Value Date    GLUTAMICACID 0.00 05/25/2017          //   , There is no height or weight on file to calculate BMI.  Her blood sugar in clinic today is:    Lab Results   Component Value Date    POCGLU 160 (A) 12/15/2022       Review of Systems   Constitutional:  Negative for activity change, appetite change, chills and fever.   HENT:  Negative for dental problem, mouth sores, nosebleeds, sore throat and trouble swallowing.    Eyes:  Negative for pain and discharge.   Respiratory:  Negative for shortness of breath, wheezing and stridor.    Cardiovascular:  Negative for chest pain, palpitations and leg swelling.   Gastrointestinal:  Negative for abdominal pain, diarrhea, nausea and vomiting.   Endocrine: Negative for polydipsia, polyphagia and polyuria.   Genitourinary:  Negative for dysuria, frequency and urgency.   Musculoskeletal:  Negative for joint  swelling and myalgias.   Skin:  Negative for rash and wound.   Neurological:  Negative for dizziness, syncope, weakness and headaches.   Psychiatric/Behavioral:  Negative for behavioral problems and dysphoric mood.        Diabetes Management Status  Statin: Taking  ACE/ARB: Taking    Screening or Prevention Patient's value Goal Complete/Controlled?   HgA1C Testing and Control   Lab Results   Component Value Date    HGBA1C 7.0 (H) 02/28/2023    HGBA1C 7.0 (H) 02/28/2023    HGBA1C 7.0 (H) 02/28/2023      Annually/Less than 8% No   Lipid profile : 10/13/2022 Annually Yes   LDL control Lab Results   Component Value Date    LDLCALC 51.2 (L) 10/13/2022    Annually/Less than 100 mg/dl  Yes   Nephropathy screening Lab Results   Component Value Date    MICALBCREAT 3.5 10/13/2022     Lab Results   Component Value Date    PROTEINUA Negative 04/01/2021    Annually Yes   Blood pressure BP Readings from Last 1 Encounters:   03/08/23 120/79    Less than 140/90 Yes   Dilated retinal exam : 06/29/2022 Annually Yes    Foot exam   : 11/17/2022 Annually Yes     ACTIVITY LEVEL: Moderately Active. Discussed activities, benefits, methods, and precautions.  MEAL PLANNING: Patient reports number of meals per day to be 3 and number of snacks per day to be 2.   Patient is encouraged to carb count and consume no more than 30 - 45 grams of carbohydrates in each meal and 15 grams of carbohydrates in each snack.     Social History     Socioeconomic History    Marital status:    Tobacco Use    Smoking status: Never    Smokeless tobacco: Never   Substance and Sexual Activity    Alcohol use: Yes     Comment: rare    Drug use: No    Sexual activity: Yes     Partners: Male   Social History Narrative    . Lives with spouse. Has 3 children. Patient retired as  for [employer].      Social Determinants of Health     Financial Resource Strain: Low Risk     Difficulty of Paying Living Expenses: Not hard at all   Food  Insecurity: No Food Insecurity    Worried About Running Out of Food in the Last Year: Never true    Ran Out of Food in the Last Year: Never true   Transportation Needs: No Transportation Needs    Lack of Transportation (Medical): No    Lack of Transportation (Non-Medical): No   Physical Activity: Insufficiently Active    Days of Exercise per Week: 4 days    Minutes of Exercise per Session: 10 min   Stress: No Stress Concern Present    Feeling of Stress : Only a little   Social Connections: Socially Integrated    Frequency of Communication with Friends and Family: Twice a week    Frequency of Social Gatherings with Friends and Family: Three times a week    Attends Rastafari Services: More than 4 times per year    Active Member of Clubs or Organizations: Yes    Attends Club or Organization Meetings: More than 4 times per year    Marital Status:    Housing Stability: Low Risk     Unable to Pay for Housing in the Last Year: No    Number of Places Lived in the Last Year: 1    Unstable Housing in the Last Year: No     Past Medical History:   Diagnosis Date    Diabetes mellitus type II     Hyperlipidemia     Hypertension     Hypothyroid     TALIA (obstructive sleep apnea)     on CPAP    Osteopenia     Psoriasis        Objective:        Physical Exam  Constitutional:       General: She is not in acute distress.     Appearance: She is well-developed. She is not diaphoretic.   HENT:      Head: Normocephalic and atraumatic.      Right Ear: External ear normal.      Left Ear: External ear normal.      Nose: Nose normal.   Eyes:      General:         Right eye: No discharge.         Left eye: No discharge.   Pulmonary:      Effort: Pulmonary effort is normal. No respiratory distress.      Breath sounds: No stridor.   Musculoskeletal:         General: Normal range of motion.      Cervical back: Normal range of motion.   Skin:     Coloration: Skin is not pale.   Neurological:      Mental Status: She is alert and oriented to  person, place, and time. Mental status is at baseline.      Motor: No abnormal muscle tone.      Coordination: Coordination normal.   Psychiatric:         Mood and Affect: Mood normal.         Behavior: Behavior normal.         Thought Content: Thought content normal.         Judgment: Judgment normal.         Assessment / Plan:     Type 2 diabetes mellitus with stage 3a chronic kidney disease, with long-term current use of insulin    Cataract associated with type 2 diabetes mellitus    Hyperlipidemia associated with type 2 diabetes mellitus    Acquired hypothyroidism    TALIA on CPAP    Obesity (BMI 30-39.9)        Additional Plan Details:    - POCT Glucose  - Encouraged continuation of lifestyle changes including regular exercise and limiting carbohydrates to 30-45 grams per meal threes times daily and 15 grams per snack with a limit of two daily.   - Encouraged continued monitoring of blood glucose with maintenance of 4 times daily and Continuously with personal CGM Dexcom.    - Current DM Medication Regimen: Change Lantus 10 units daily. Continue Ozempic 2 mg weekly. Continue Glipizide 5 mg before night time snack if eating one. Continue Humalog 14 units with small / 20 units with regular / 24 units with heavier carb meal / 8 units with snack TID wm. ADJUST INSULIN FREQUENTLY BASED ON BLOOD SUGAR.   - Health Maintenance standards addressed today: COVID - 19 Vaccine - patient will schedule outside of [facility]   - Nursing Visit: Patient is age 79 or younger with an A1c of 7.5 or greater and  qualifies for nursing visit, but will defer for now.  .   - Follow up in  8 weeks with A1c prior.    CURRENT DM MEDICATIONS:   Lantus 10 units daily   Ozempic 2 mg weekly   Glipizide 5 mg before bed time snack   Humalog 14 units with small / 20 units with regular / 24 units with heavier carb meal / 8 units with snack TID wm  Humalog correction dosing every 3 hours as below    Humalog Correction Dosing every 3 hours as needed  OUTSIDE OF EATING  If  - 250, may take 2 units of Humalog  If  - 300, may take 4 units of Humalog   If  - 350, may take 6 units of Humalog  If  - 400, may take 8 units of Humalog  If +, may take 10 units of Humalog      Blakeney McKnight, PA-C [facility]

## 2023-03-21 NOTE — PATIENT INSTRUCTIONS
CURRENT DM MEDICATIONS:   Lantus 10 units daily   Ozempic 2 mg weekly   Glipizide 5 mg before bed time snack   Humalog 14 units with small / 20 units with regular / 24 units with heavier carb meal / 8 units with snack TID wm  Humalog correction dosing every 3 hours as below    Humalog Correction Dosing every 3 hours as needed OUTSIDE OF EATING  If  - 250, may take 2 units of Humalog  If  - 300, may take 4 units of Humalog   If  - 350, may take 6 units of Humalog  If  - 400, may take 8 units of Humalog  If +, may take 10 units of Humalog

## 2023-05-23 DIAGNOSIS — J84.9 ILD (INTERSTITIAL LUNG DISEASE): ICD-10-CM

## 2023-05-23 DIAGNOSIS — B02.29 PHN (POSTHERPETIC NEURALGIA): ICD-10-CM

## 2023-05-24 ENCOUNTER — PATIENT MESSAGE (OUTPATIENT)
Dept: PRIMARY CARE CLINIC | Facility: CLINIC | Age: 73
End: 2023-05-24
Payer: MEDICARE

## 2023-05-24 RX ORDER — MYCOPHENOLATE MOFETIL 500 MG/1
1000 TABLET ORAL 2 TIMES DAILY
Qty: 120 TABLET | Refills: 2 | OUTPATIENT
Start: 2023-05-24 | End: 2024-05-23

## 2023-05-24 RX ORDER — GABAPENTIN 300 MG/1
300 CAPSULE ORAL 3 TIMES DAILY
Qty: 270 CAPSULE | Refills: 3 | Status: SHIPPED | OUTPATIENT
Start: 2023-05-24

## 2023-05-25 ENCOUNTER — OFFICE VISIT (OUTPATIENT)
Dept: RHEUMATOLOGY | Facility: CLINIC | Age: 73
End: 2023-05-25
Payer: MEDICARE

## 2023-05-25 ENCOUNTER — LAB VISIT (OUTPATIENT)
Dept: LAB | Facility: HOSPITAL | Age: 73
End: 2023-05-25
Attending: PHYSICIAN ASSISTANT
Payer: MEDICARE

## 2023-05-25 ENCOUNTER — PATIENT MESSAGE (OUTPATIENT)
Dept: RHEUMATOLOGY | Facility: CLINIC | Age: 73
End: 2023-05-25

## 2023-05-25 ENCOUNTER — CLINICAL SUPPORT (OUTPATIENT)
Dept: DIABETES | Facility: CLINIC | Age: 73
End: 2023-05-25
Payer: MEDICARE

## 2023-05-25 VITALS
BODY MASS INDEX: 37.65 KG/M2 | HEART RATE: 123 BPM | SYSTOLIC BLOOD PRESSURE: 141 MMHG | WEIGHT: 239.88 LBS | DIASTOLIC BLOOD PRESSURE: 78 MMHG | HEIGHT: 67 IN

## 2023-05-25 DIAGNOSIS — Z79.899 HIGH RISK MEDICATION USE: ICD-10-CM

## 2023-05-25 DIAGNOSIS — J84.9 INTERSTITIAL PULMONARY DISEASE, UNSPECIFIED: ICD-10-CM

## 2023-05-25 DIAGNOSIS — R76.8 ANA POSITIVE: ICD-10-CM

## 2023-05-25 DIAGNOSIS — M35.1 MCTD (MIXED CONNECTIVE TISSUE DISEASE): ICD-10-CM

## 2023-05-25 DIAGNOSIS — J84.9 ILD (INTERSTITIAL LUNG DISEASE): ICD-10-CM

## 2023-05-25 DIAGNOSIS — N18.31 TYPE 2 DIABETES MELLITUS WITH STAGE 3A CHRONIC KIDNEY DISEASE, WITH LONG-TERM CURRENT USE OF INSULIN: ICD-10-CM

## 2023-05-25 DIAGNOSIS — M25.562 CHRONIC PAIN OF BOTH KNEES: ICD-10-CM

## 2023-05-25 DIAGNOSIS — R91.8 MULTIPLE LUNG NODULES ON CT: ICD-10-CM

## 2023-05-25 DIAGNOSIS — G89.29 CHRONIC PAIN OF BOTH KNEES: ICD-10-CM

## 2023-05-25 DIAGNOSIS — Z79.4 TYPE 2 DIABETES MELLITUS WITH STAGE 3A CHRONIC KIDNEY DISEASE, WITH LONG-TERM CURRENT USE OF INSULIN: ICD-10-CM

## 2023-05-25 DIAGNOSIS — D84.9 IMMUNOCOMPROMISED: ICD-10-CM

## 2023-05-25 DIAGNOSIS — M17.0 PRIMARY OSTEOARTHRITIS OF BOTH KNEES: ICD-10-CM

## 2023-05-25 DIAGNOSIS — Z51.81 MEDICATION MONITORING ENCOUNTER: ICD-10-CM

## 2023-05-25 DIAGNOSIS — E11.22 TYPE 2 DIABETES MELLITUS WITH STAGE 3A CHRONIC KIDNEY DISEASE, WITH LONG-TERM CURRENT USE OF INSULIN: ICD-10-CM

## 2023-05-25 DIAGNOSIS — J84.9 ILD (INTERSTITIAL LUNG DISEASE): Primary | ICD-10-CM

## 2023-05-25 DIAGNOSIS — M25.561 CHRONIC PAIN OF BOTH KNEES: ICD-10-CM

## 2023-05-25 LAB
ALBUMIN SERPL BCP-MCNC: 3.4 G/DL (ref 3.5–5.2)
ALP SERPL-CCNC: 65 U/L (ref 55–135)
ALT SERPL W/O P-5'-P-CCNC: 8 U/L (ref 10–44)
ANION GAP SERPL CALC-SCNC: 8 MMOL/L (ref 8–16)
AST SERPL-CCNC: 14 U/L (ref 10–40)
BASOPHILS # BLD AUTO: 0.04 K/UL (ref 0–0.2)
BASOPHILS NFR BLD: 0.6 % (ref 0–1.9)
BILIRUB SERPL-MCNC: 0.8 MG/DL (ref 0.1–1)
BUN SERPL-MCNC: 16 MG/DL (ref 8–23)
CALCIUM SERPL-MCNC: 8.9 MG/DL (ref 8.7–10.5)
CHLORIDE SERPL-SCNC: 104 MMOL/L (ref 95–110)
CO2 SERPL-SCNC: 26 MMOL/L (ref 23–29)
CREAT SERPL-MCNC: 1.1 MG/DL (ref 0.5–1.4)
CRP SERPL-MCNC: 0.8 MG/L (ref 0–8.2)
DIFFERENTIAL METHOD: ABNORMAL
EOSINOPHIL # BLD AUTO: 0.1 K/UL (ref 0–0.5)
EOSINOPHIL NFR BLD: 1 % (ref 0–8)
ERYTHROCYTE [DISTWIDTH] IN BLOOD BY AUTOMATED COUNT: 16.3 % (ref 11.5–14.5)
ERYTHROCYTE [SEDIMENTATION RATE] IN BLOOD BY PHOTOMETRIC METHOD: 4 MM/HR (ref 0–36)
EST. GFR  (NO RACE VARIABLE): 53 ML/MIN/1.73 M^2
ESTIMATED AVG GLUCOSE: 148 MG/DL (ref 68–131)
GLUCOSE SERPL-MCNC: 201 MG/DL (ref 70–110)
HBA1C MFR BLD: 6.8 % (ref 4–5.6)
HCT VFR BLD AUTO: 41.8 % (ref 37–48.5)
HGB BLD-MCNC: 13.6 G/DL (ref 12–16)
IMM GRANULOCYTES # BLD AUTO: 0.02 K/UL (ref 0–0.04)
IMM GRANULOCYTES NFR BLD AUTO: 0.3 % (ref 0–0.5)
LYMPHOCYTES # BLD AUTO: 1.6 K/UL (ref 1–4.8)
LYMPHOCYTES NFR BLD: 22 % (ref 18–48)
MCH RBC QN AUTO: 27.6 PG (ref 27–31)
MCHC RBC AUTO-ENTMCNC: 32.5 G/DL (ref 32–36)
MCV RBC AUTO: 85 FL (ref 82–98)
MONOCYTES # BLD AUTO: 0.6 K/UL (ref 0.3–1)
MONOCYTES NFR BLD: 8.4 % (ref 4–15)
NEUTROPHILS # BLD AUTO: 4.8 K/UL (ref 1.8–7.7)
NEUTROPHILS NFR BLD: 67.7 % (ref 38–73)
NRBC BLD-RTO: 0 /100 WBC
PLATELET # BLD AUTO: 202 K/UL (ref 150–450)
PMV BLD AUTO: 11.1 FL (ref 9.2–12.9)
POTASSIUM SERPL-SCNC: 4.4 MMOL/L (ref 3.5–5.1)
PROT SERPL-MCNC: 6.6 G/DL (ref 6–8.4)
RBC # BLD AUTO: 4.92 M/UL (ref 4–5.4)
SODIUM SERPL-SCNC: 138 MMOL/L (ref 136–145)
WBC # BLD AUTO: 7.14 K/UL (ref 3.9–12.7)

## 2023-05-25 PROCEDURE — 3008F PR BODY MASS INDEX (BMI) DOCUMENTED: ICD-10-PCS | Mod: CPTII,S$GLB,, | Performed by: PHYSICIAN ASSISTANT

## 2023-05-25 PROCEDURE — 3288F PR FALLS RISK ASSESSMENT DOCUMENTED: ICD-10-PCS | Mod: CPTII,S$GLB,, | Performed by: PHYSICIAN ASSISTANT

## 2023-05-25 PROCEDURE — 1160F RVW MEDS BY RX/DR IN RCRD: CPT | Mod: CPTII,S$GLB,, | Performed by: PHYSICIAN ASSISTANT

## 2023-05-25 PROCEDURE — 1101F PT FALLS ASSESS-DOCD LE1/YR: CPT | Mod: CPTII,S$GLB,, | Performed by: PHYSICIAN ASSISTANT

## 2023-05-25 PROCEDURE — 1160F PR REVIEW ALL MEDS BY PRESCRIBER/CLIN PHARMACIST DOCUMENTED: ICD-10-PCS | Mod: CPTII,S$GLB,, | Performed by: PHYSICIAN ASSISTANT

## 2023-05-25 PROCEDURE — 99214 PR OFFICE/OUTPT VISIT, EST, LEVL IV, 30-39 MIN: ICD-10-PCS | Mod: S$GLB,,, | Performed by: PHYSICIAN ASSISTANT

## 2023-05-25 PROCEDURE — 1101F PR PT FALLS ASSESS DOC 0-1 FALLS W/OUT INJ PAST YR: ICD-10-PCS | Mod: CPTII,S$GLB,, | Performed by: PHYSICIAN ASSISTANT

## 2023-05-25 PROCEDURE — 99214 OFFICE O/P EST MOD 30 MIN: CPT | Mod: S$GLB,,, | Performed by: PHYSICIAN ASSISTANT

## 2023-05-25 PROCEDURE — 3051F HG A1C>EQUAL 7.0%<8.0%: CPT | Mod: CPTII,S$GLB,, | Performed by: PHYSICIAN ASSISTANT

## 2023-05-25 PROCEDURE — 1159F MED LIST DOCD IN RCRD: CPT | Mod: CPTII,S$GLB,, | Performed by: PHYSICIAN ASSISTANT

## 2023-05-25 PROCEDURE — 3078F DIAST BP <80 MM HG: CPT | Mod: CPTII,S$GLB,, | Performed by: PHYSICIAN ASSISTANT

## 2023-05-25 PROCEDURE — 3078F PR MOST RECENT DIASTOLIC BLOOD PRESSURE < 80 MM HG: ICD-10-PCS | Mod: CPTII,S$GLB,, | Performed by: PHYSICIAN ASSISTANT

## 2023-05-25 PROCEDURE — 99999 PR PBB SHADOW E&M-EST. PATIENT-LVL IV: ICD-10-PCS | Mod: PBBFAC,,, | Performed by: PHYSICIAN ASSISTANT

## 2023-05-25 PROCEDURE — 36415 COLL VENOUS BLD VENIPUNCTURE: CPT | Performed by: PHYSICIAN ASSISTANT

## 2023-05-25 PROCEDURE — 85025 COMPLETE CBC W/AUTO DIFF WBC: CPT | Performed by: PHYSICIAN ASSISTANT

## 2023-05-25 PROCEDURE — 80053 COMPREHEN METABOLIC PANEL: CPT | Performed by: PHYSICIAN ASSISTANT

## 2023-05-25 PROCEDURE — 4010F ACE/ARB THERAPY RXD/TAKEN: CPT | Mod: CPTII,S$GLB,, | Performed by: PHYSICIAN ASSISTANT

## 2023-05-25 PROCEDURE — 3008F BODY MASS INDEX DOCD: CPT | Mod: CPTII,S$GLB,, | Performed by: PHYSICIAN ASSISTANT

## 2023-05-25 PROCEDURE — 1125F PR PAIN SEVERITY QUANTIFIED, PAIN PRESENT: ICD-10-PCS | Mod: CPTII,S$GLB,, | Performed by: PHYSICIAN ASSISTANT

## 2023-05-25 PROCEDURE — 99999 PR PBB SHADOW E&M-EST. PATIENT-LVL IV: CPT | Mod: PBBFAC,,, | Performed by: PHYSICIAN ASSISTANT

## 2023-05-25 PROCEDURE — 3051F PR MOST RECENT HEMOGLOBIN A1C LEVEL 7.0 - < 8.0%: ICD-10-PCS | Mod: CPTII,S$GLB,, | Performed by: PHYSICIAN ASSISTANT

## 2023-05-25 PROCEDURE — 3077F PR MOST RECENT SYSTOLIC BLOOD PRESSURE >= 140 MM HG: ICD-10-PCS | Mod: CPTII,S$GLB,, | Performed by: PHYSICIAN ASSISTANT

## 2023-05-25 PROCEDURE — 3077F SYST BP >= 140 MM HG: CPT | Mod: CPTII,S$GLB,, | Performed by: PHYSICIAN ASSISTANT

## 2023-05-25 PROCEDURE — 1159F PR MEDICATION LIST DOCUMENTED IN MEDICAL RECORD: ICD-10-PCS | Mod: CPTII,S$GLB,, | Performed by: PHYSICIAN ASSISTANT

## 2023-05-25 PROCEDURE — 85652 RBC SED RATE AUTOMATED: CPT | Performed by: PHYSICIAN ASSISTANT

## 2023-05-25 PROCEDURE — 99499 UNLISTED E&M SERVICE: CPT | Mod: S$GLB,,, | Performed by: PHYSICIAN ASSISTANT

## 2023-05-25 PROCEDURE — 99499 RISK ADDL DX/OHS AUDIT: ICD-10-PCS | Mod: S$GLB,,, | Performed by: PHYSICIAN ASSISTANT

## 2023-05-25 PROCEDURE — 86140 C-REACTIVE PROTEIN: CPT | Performed by: PHYSICIAN ASSISTANT

## 2023-05-25 PROCEDURE — 3288F FALL RISK ASSESSMENT DOCD: CPT | Mod: CPTII,S$GLB,, | Performed by: PHYSICIAN ASSISTANT

## 2023-05-25 PROCEDURE — 1125F AMNT PAIN NOTED PAIN PRSNT: CPT | Mod: CPTII,S$GLB,, | Performed by: PHYSICIAN ASSISTANT

## 2023-05-25 PROCEDURE — 4010F PR ACE/ARB THEARPY RXD/TAKEN: ICD-10-PCS | Mod: CPTII,S$GLB,, | Performed by: PHYSICIAN ASSISTANT

## 2023-05-25 PROCEDURE — 83036 HEMOGLOBIN GLYCOSYLATED A1C: CPT | Performed by: PHYSICIAN ASSISTANT

## 2023-05-25 RX ORDER — MYCOPHENOLATE MOFETIL 500 MG/1
1000 TABLET ORAL 2 TIMES DAILY
Qty: 120 TABLET | Refills: 2 | Status: SHIPPED | OUTPATIENT
Start: 2023-05-25 | End: 2023-08-03

## 2023-05-25 NOTE — Clinical Note
Labs reviewed after she left.  Needs CBC/CMP in one month.  Also, chest ct was 6 mos ago.  Rad recommended f/u CT.  Lets have her do that as well. Kristina

## 2023-05-25 NOTE — PROGRESS NOTES
Subjective:      Patient ID: Jaimie Luque is a 73 y.o. female.    Chief Complaint: No chief complaint on file.      HPI   Jaimie Luque  is a 73 y.o. female here for follow-up on mixed connective tissue disease with ILD.  Previously seen by 1 of my colleagues.  She was started on CellCept and titrated up to 1000 mg b.i.d..  She has now been on CellCept for about 4 months.  Breathing is stable.  She is tolerating it well without side effects.    She was diagnosed with ILD by Dr. Ivey based on PFT and CT chest.  She was noted to have positive RADHA as well as elevated sed rate.  Further workup showed positive U1 RNP.  She is due for follow-up with pulmonology.    No complaints of tender swollen joints today.  She does have occasional pain in her knees.  Previously saw Orthopedics and diagnosed with osteoarthritis.  Corticosteroid injections deferred previously.      Also with personal history of skin psoriasis a young age.  Over time symptoms improved on its own.  She relates improved psoriasis symptoms in her 20s when she quit drinking orange juice.    Patient denies fevers, chills, photosensitivity, eye pain, chest pain, hematuria, blood in the stool, rash, sicca symptoms, raynauds, finger ulcerations.  Rheumatologic systems otherwise negative.    Serologies/Labs:  +RADHA 1:160 homogenous and speckled, neg profile  +U1RNP, o/w Myomarker Panel 3 negative  Neg ANCA, scl 70  Neg CCP, RF  Current Treatment:  Cellcept 1000 mg bid  Previous Treatment:   na      Current Outpatient Medications:     aspirin 81 mg Tab, Take by mouth. 1 Tablet Oral 2 times weekly , Disp: , Rfl:     atorvastatin (LIPITOR) 20 MG tablet, TAKE 1 TABLET(20 MG) BY MOUTH EVERY DAY, Disp: 90 tablet, Rfl: 2    blood sugar diagnostic Strp, 1 each by Misc.(Non-Drug; Combo Route) route 4 (four) times daily., Disp: 300 each, Rfl: 3    blood-glucose meter Misc, 1 each by Misc.(Non-Drug; Combo Route) route once daily., Disp: 1 each, Rfl: 1     blood-glucose meter,continuous (DEXCOM G6 ) Misc, Use daily., Disp: 1 each, Rfl: 0    blood-glucose sensor (DEXCOM G6 SENSOR) Gretta, Use 1 every 10 days., Disp: 9 each, Rfl: 3    blood-glucose transmitter (DEXCOM G6 TRANSMITTER) Gretta, Use 1 every 90 days., Disp: 1 each, Rfl: 3    cholecalciferol, vitamin D3, 2,000 unit Tab, Take by mouth. 1 Tablet Oral Every day, Disp: , Rfl:     gabapentin (NEURONTIN) 300 MG capsule, Take 1 capsule (300 mg total) by mouth 3 (three) times daily., Disp: 270 capsule, Rfl: 3    insulin lispro 100 unit/mL pen, Titrate up to 100 units daily as instructed., Disp: 90 mL, Rfl: 3    lancets (ACCU-CHEK SOFTCLIX LANCETS) Misc, Check blood sugar 3 times daily.   Dx:  E11.9, Disp: 300 each, Rfl: 3    levothyroxine (SYNTHROID) 112 MCG tablet, Take 1 tablet (112 mcg total) by mouth before breakfast., Disp: 90 tablet, Rfl: 3    lisinopriL (PRINIVIL,ZESTRIL) 2.5 MG tablet, TAKE 1 TABLET(2.5 MG) BY MOUTH EVERY DAY, Disp: 90 tablet, Rfl: 3    semaglutide (OZEMPIC) 2 mg/dose (8 mg/3 mL) PnIj, Inject 2 mg into the skin every 7 days., Disp: 9 mL, Rfl: 3    insulin (LANTUS SOLOSTAR U-100 INSULIN) glargine 100 units/mL (3mL) SubQ pen, Inject 18 Units into the skin once daily., Disp: 18 mL, Rfl: 3    mycophenolate (CELLCEPT) 500 mg Tab, Take 2 tablets (1,000 mg total) by mouth 2 (two) times daily., Disp: 120 tablet, Rfl: 2    Past Medical History:   Diagnosis Date    Diabetes mellitus type II     Hyperlipidemia     Hypertension     Hypothyroid     TALIA (obstructive sleep apnea)     on CPAP    Osteopenia     Psoriasis      Family History   Problem Relation Age of Onset    Heart disease Father     Diabetes Brother     Melanoma Neg Hx     Lupus Neg Hx      Social History     Socioeconomic History    Marital status:    Tobacco Use    Smoking status: Never    Smokeless tobacco: Never   Substance and Sexual Activity    Alcohol use: Yes     Comment: rare    Drug use: No    Sexual activity: Yes      "Partners: Male   Social History Narrative    . Lives with spouse. Has 3 children. Patient retired as  for [employer].      Social Determinants of Health     Financial Resource Strain: Low Risk     Difficulty of Paying Living Expenses: Not hard at all   Food Insecurity: No Food Insecurity    Worried About Running Out of Food in the Last Year: Never true    Ran Out of Food in the Last Year: Never true   Transportation Needs: No Transportation Needs    Lack of Transportation (Medical): No    Lack of Transportation (Non-Medical): No   Physical Activity: Insufficiently Active    Days of Exercise per Week: 4 days    Minutes of Exercise per Session: 10 min   Stress: No Stress Concern Present    Feeling of Stress : Only a little   Social Connections: Socially Integrated    Frequency of Communication with Friends and Family: Twice a week    Frequency of Social Gatherings with Friends and Family: Three times a week    Attends Confucianism Services: More than 4 times per year    Active Member of Clubs or Organizations: Yes    Attends Club or Organization Meetings: More than 4 times per year    Marital Status:    Housing Stability: Low Risk     Unable to Pay for Housing in the Last Year: No    Number of Places Lived in the Last Year: 1    Unstable Housing in the Last Year: No     Review of patient's allergies indicates:   Allergen Reactions    Adhesive tape-silicones      Other reaction(s): skin irritation to area    Penicillins        Objective:   BP (!) 141/78 (BP Location: Right arm, Patient Position: Sitting, BP Method: Large (Automatic))   Pulse (!) 123   Ht 5' 7" (1.702 m)   Wt 108.8 kg (239 lb 13.8 oz)   BMI 37.57 kg/m²   Immunization History   Administered Date(s) Administered    COVID-19, MRNA, LN-S, PF (Pfizer) (Gray Cap) 06/28/2022    COVID-19, MRNA, LN-S, PF (Pfizer) (Purple Cap) 01/05/2021, 01/26/2021, 10/15/2021    Hepatitis A, Adult 09/21/2016, 04/04/2017    Influenza 12/12/2006, " 11/08/2007, 11/13/2008, 10/08/2009, 10/13/2010, 10/19/2011, 10/31/2013, 10/02/2018, 09/29/2022    Influenza (FLUAD) - Quadrivalent - Adjuvanted - PF *Preferred* (65+) 09/27/2021, 10/03/2022    Influenza - High Dose - PF (65 years and older) 10/24/2014, 10/26/2015, 09/25/2016, 10/12/2017, 10/02/2018, 10/22/2019    Influenza - Intradermal - Quadrivalent - PF 10/01/2012    Influenza - Quadrivalent - High Dose - PF (65 years and older) 09/21/2020    Influenza A (H1N1) 2009 Monovalent - IM 02/23/2010    Influenza Split 10/31/2013    Pneumococcal Conjugate - 13 Valent 03/11/2015    Pneumococcal Conjugate - 20 Valent 04/20/2022, 11/17/2022    Pneumococcal Polysaccharide - 23 Valent 02/14/2007, 03/18/2014    Tdap 09/09/2009, 10/02/2018    Zoster 11/09/2012    Zoster Recombinant 05/02/2019, 07/06/2019       Physical Exam   Constitutional: She is oriented to person, place, and time. No distress.   HENT:   Head: Normocephalic and atraumatic.   Pulmonary/Chest: Effort normal.   Abdominal: She exhibits no distension.   Musculoskeletal:         General: No swelling or tenderness. Normal range of motion.      Cervical back: Normal range of motion.   Lymphadenopathy:     She has no cervical adenopathy.   Neurological: She is alert and oriented to person, place, and time.   Skin: Skin is warm and dry. No rash noted.   Psychiatric: Mood normal.   Nursing note and vitals reviewed.    No synovitis, dactylitis, enthesitis  Longitudinal nail ridging noted  Mild tenderness to palpation medial compartment right greater than left knee.  No effusion range of motion intact.      Recent Results (from the past 672 hour(s))   CBC Auto Differential    Collection Time: 05/25/23 11:05 AM   Result Value Ref Range    WBC 7.14 3.90 - 12.70 K/uL    RBC 4.92 4.00 - 5.40 M/uL    Hemoglobin 13.6 12.0 - 16.0 g/dL    Hematocrit 41.8 37.0 - 48.5 %    MCV 85 82 - 98 fL    MCH 27.6 27.0 - 31.0 pg    MCHC 32.5 32.0 - 36.0 g/dL    RDW 16.3 (H) 11.5 - 14.5 %     Platelets 202 150 - 450 K/uL    MPV 11.1 9.2 - 12.9 fL    Immature Granulocytes 0.3 0.0 - 0.5 %    Gran # (ANC) 4.8 1.8 - 7.7 K/uL    Immature Grans (Abs) 0.02 0.00 - 0.04 K/uL    Lymph # 1.6 1.0 - 4.8 K/uL    Mono # 0.6 0.3 - 1.0 K/uL    Eos # 0.1 0.0 - 0.5 K/uL    Baso # 0.04 0.00 - 0.20 K/uL    nRBC 0 0 /100 WBC    Gran % 67.7 38.0 - 73.0 %    Lymph % 22.0 18.0 - 48.0 %    Mono % 8.4 4.0 - 15.0 %    Eosinophil % 1.0 0.0 - 8.0 %    Basophil % 0.6 0.0 - 1.9 %    Differential Method Automated    Comprehensive Metabolic Panel    Collection Time: 05/25/23 11:05 AM   Result Value Ref Range    Sodium 138 136 - 145 mmol/L    Potassium 4.4 3.5 - 5.1 mmol/L    Chloride 104 95 - 110 mmol/L    CO2 26 23 - 29 mmol/L    Glucose 201 (H) 70 - 110 mg/dL    BUN 16 8 - 23 mg/dL    Creatinine 1.1 0.5 - 1.4 mg/dL    Calcium 8.9 8.7 - 10.5 mg/dL    Total Protein 6.6 6.0 - 8.4 g/dL    Albumin 3.4 (L) 3.5 - 5.2 g/dL    Total Bilirubin 0.8 0.1 - 1.0 mg/dL    Alkaline Phosphatase 65 55 - 135 U/L    AST 14 10 - 40 U/L    ALT 8 (L) 10 - 44 U/L    Anion Gap 8 8 - 16 mmol/L    eGFR 53 (A) >60 mL/min/1.73 m^2   C-Reactive Protein    Collection Time: 05/25/23 11:05 AM   Result Value Ref Range    CRP 0.8 0.0 - 8.2 mg/L         Lab Results   Component Value Date    TBGOLDPLUS Negative 10/28/2019      Lab Results   Component Value Date    HEPCAB Negative 09/14/2016        Imaging  I have personally reviewed images and reports as below.  I agree with the interpretation.  CT Chest Without Contrast  Narrative: EXAMINATION:  CT CHEST WITHOUT CONTRAST    CLINICAL HISTORY:  Abnormal xray - lung nodule, >= 1 cm; Other nonspecific abnormal finding of lung field    TECHNIQUE:  Low dose axial images, sagittal and coronal reformations were obtained from the thoracic inlet to the lung bases. Contrast was not administered.    COMPARISON:  Multiple prior CT exams, most recent 10/20/2021    FINDINGS:  Thoracic aorta and great vessels unchanged.  Heart unchanged.   Trace pericardial fluid remains.  No pleural effusion.  No pathologically enlarged axillary, mediastinal or hilar lymph nodes.    Lungs demonstrate patchy ground-glass findings in a mosaic attenuation pattern.  Previously noted left lung nodules hyper soft.  There is however a new 8 mm nodule within the left upper lobe, sequence 4 image 140 of 8.  Smaller adjacent 4 mm nodule, sequence 4 image 140.  New linearly oriented nodule within the lingula, sequence 4 image 277.  Other tiny sub 4 mm nodules within left lung stable.  Multiple right lung nodules are stable or less conspicuous.  Large nodule again noted within the right middle lobe measuring approximately 11 mm.  Increased/new nodules and linear orientation within right middle lobe some tree-in-bud appearance.    Visualized upper abdominal structures demonstrate no acute abnormality.  No acute osseous abnormality.  Impression: Patchy ground-glass opacities versus mosaic attenuation.  Multiple pulmonary nodules, stable or decreased in size/resolved within the bilateral lungs.  New similar nodules present within the left upper lobe and inferolateral right middle lobe.  Nodules again suggest possible mucous plugging or infectious/inflammatory etiology.  CT follow-up recommended.    Electronically signed by: Shade Morejon MD  Date:    11/25/2022  Time:    14:06      X-ray Knee Ortho Right  Order: [id number]  Status: Final result     Visible to patient: Yes (seen)     Next appt: 05/26/2023 at 08:00 AM in Diabetes (Lorraine Holliday PA-C)     Dx: Chronic pain of right knee     0 Result Notes  Details    Reading Physician Reading Date Result Priority   Amilcar Rolle MD  [phone] 1/4/2022 Routine     Narrative & Impression  EXAMINATION:  XR KNEE ORTHO RIGHT     CLINICAL HISTORY:  Pain in right knee     TECHNIQUE:  AP standing of both knees, Merchant views of both knees as well as a lateral view of the right knee were performed.     COMPARISON:  Knee  radiographs February 1, 2007     FINDINGS:  There is no right knee fracture.  Mild tricompartmental right knee osteoarthritis.  No osseous erosion or suspicious osseous lesion.  No right knee effusion.  No acute soft tissue abnormality identified.  Atherosclerotic calcifications seen posterior to the right knee.     Limited evaluation of the left knee also reveals mild tricompartmental osteoarthritis.     Impression:     As above.        Electronically signed by: Amilcar Villalobos  Date:                                            01/04/2022  Time:                                           10:28       PFT also reviewed as below  12/2022- PFT w/ worsening restrictive pattern and decrease in DLCO    Assessment:     1. ILD (interstitial lung disease)    2. MCTD (mixed connective tissue disease)    3. RADHA positive    4. Type 2 diabetes mellitus with stage 3a chronic kidney disease, with long-term current use of insulin    5. Interstitial pulmonary disease, unspecified    6. Multiple lung nodules on CT    7. Immunocompromised    8. High risk medication use    9. Medication monitoring encounter    10. Primary osteoarthritis of both knees    11. Chronic pain of both knees            Plan:     Diagnoses and all orders for this visit:    ILD (interstitial lung disease)  -     CBC Auto Differential; Standing  -     Comprehensive Metabolic Panel; Standing  -     Sedimentation rate; Standing  -     C-Reactive Protein; Standing  -     mycophenolate (CELLCEPT) 500 mg Tab; Take 2 tablets (1,000 mg total) by mouth 2 (two) times daily.    MCTD (mixed connective tissue disease)  -     CBC Auto Differential; Standing  -     Comprehensive Metabolic Panel; Standing  -     Sedimentation rate; Standing  -     C-Reactive Protein; Standing    RADHA positive  -     CBC Auto Differential; Standing  -     Comprehensive Metabolic Panel; Standing  -     Sedimentation rate; Standing  -     C-Reactive Protein; Standing    Type 2 diabetes mellitus with  stage 3a chronic kidney disease, with long-term current use of insulin  -     HEMOGLOBIN A1C; Future    Interstitial pulmonary disease, unspecified  -     CT Chest Without Contrast; Future    Multiple lung nodules on CT    Immunocompromised    High risk medication use    Medication monitoring encounter    Primary osteoarthritis of both knees    Chronic pain of both knees        MCTD w ILD  Labs ordered/reviewed today  CMP - slight decrease in GFR since feb - recheck in 1 mos and at f/u  CBC - stable - recheck in 1 mos and at f/u  CRP wnl  Hgb A1c and ESR pending  C/w cellcept - refill sent  SOB stable per pt report  Due for f/u w Dr. Ivey - advised pt to schedule  CT Chest reviewed - f/u CT recommended - will order  Bilat knee pain and OA  Discussed risks and benefits of corticosteroid injection.  Patient deferred  We will order updated hemoglobin A1c.  Has follow-up with diabetes management tomorrow  Consider viscosupplementation in the future  Kellgren Balbir scale 3 bilat knees  Continue with home exercise program for strengthening  Drug therapy requiring intensive monitoring for toxicity  High Risk Medication Monitoring encounter  No current medication related issues, no evidence of toxicity  I ordered labs for toxicity monitoring, have personally reviewed the findings, and discussed them with the patient.  Pending labs will be sent via the portal  Compromised immune system secondary to autoimmune disease and/or use of immunosuppressive drugs.  Monitor carefully for infections.  Advised patient to get immediate medical care if any infection arises.  Also advised strict adherence age-appropriate vaccinations and cancer screenings with PCP.  Patient advised to hold DMARD and/or biologic therapy for signs of infection or for surgery. If you are unsure what to do please call our office for instruction.[facility] [phone]  Return to clinic: 3 mos, reg 4 prior; CBC/CMP in one mos    Follow  up in about 3 months (around 8/25/2023).    The patient understands, chooses and consents to this plan and accepts all the risks which include but are not limited to the risks mentioned above.     Disclaimer: This note was prepared using a voice recognition system and is likely to have sound alike errors within the text.

## 2023-05-26 ENCOUNTER — TELEPHONE (OUTPATIENT)
Dept: RHEUMATOLOGY | Facility: CLINIC | Age: 73
End: 2023-05-26
Payer: MEDICARE

## 2023-05-26 ENCOUNTER — OFFICE VISIT (OUTPATIENT)
Dept: DIABETES | Facility: CLINIC | Age: 73
End: 2023-05-26
Payer: MEDICARE

## 2023-05-26 DIAGNOSIS — E66.9 OBESITY (BMI 30-39.9): ICD-10-CM

## 2023-05-26 DIAGNOSIS — E78.5 HYPERLIPIDEMIA ASSOCIATED WITH TYPE 2 DIABETES MELLITUS: ICD-10-CM

## 2023-05-26 DIAGNOSIS — E03.9 ACQUIRED HYPOTHYROIDISM: ICD-10-CM

## 2023-05-26 DIAGNOSIS — G47.33 OSA ON CPAP: ICD-10-CM

## 2023-05-26 DIAGNOSIS — N18.31 TYPE 2 DIABETES MELLITUS WITH STAGE 3A CHRONIC KIDNEY DISEASE, WITH LONG-TERM CURRENT USE OF INSULIN: Primary | ICD-10-CM

## 2023-05-26 DIAGNOSIS — E11.22 TYPE 2 DIABETES MELLITUS WITH STAGE 3A CHRONIC KIDNEY DISEASE, WITH LONG-TERM CURRENT USE OF INSULIN: Primary | ICD-10-CM

## 2023-05-26 DIAGNOSIS — E11.69 HYPERLIPIDEMIA ASSOCIATED WITH TYPE 2 DIABETES MELLITUS: ICD-10-CM

## 2023-05-26 DIAGNOSIS — E11.36 CATARACT ASSOCIATED WITH TYPE 2 DIABETES MELLITUS: ICD-10-CM

## 2023-05-26 DIAGNOSIS — Z79.4 TYPE 2 DIABETES MELLITUS WITH STAGE 3A CHRONIC KIDNEY DISEASE, WITH LONG-TERM CURRENT USE OF INSULIN: Primary | ICD-10-CM

## 2023-05-26 PROCEDURE — 99214 OFFICE O/P EST MOD 30 MIN: CPT | Mod: 95,,, | Performed by: PHYSICIAN ASSISTANT

## 2023-05-26 PROCEDURE — 4010F PR ACE/ARB THEARPY RXD/TAKEN: ICD-10-PCS | Mod: CPTII,95,, | Performed by: PHYSICIAN ASSISTANT

## 2023-05-26 PROCEDURE — 3044F PR MOST RECENT HEMOGLOBIN A1C LEVEL <7.0%: ICD-10-PCS | Mod: CPTII,95,, | Performed by: PHYSICIAN ASSISTANT

## 2023-05-26 PROCEDURE — 99214 PR OFFICE/OUTPT VISIT, EST, LEVL IV, 30-39 MIN: ICD-10-PCS | Mod: 95,,, | Performed by: PHYSICIAN ASSISTANT

## 2023-05-26 PROCEDURE — 4010F ACE/ARB THERAPY RXD/TAKEN: CPT | Mod: CPTII,95,, | Performed by: PHYSICIAN ASSISTANT

## 2023-05-26 PROCEDURE — 3044F HG A1C LEVEL LT 7.0%: CPT | Mod: CPTII,95,, | Performed by: PHYSICIAN ASSISTANT

## 2023-05-26 NOTE — TELEPHONE ENCOUNTER
----- Message from Vesta Grant PA-C sent at 5/25/2023 12:53 PM CDT -----  Labs reviewed after she left.  Needs CBC/CMP in one month.  Also, chest ct was 6 mos ago.  Rad recommended f/u CT.  Lets have her do that as well.  Kristina

## 2023-05-26 NOTE — TELEPHONE ENCOUNTER
Sched appts per provider. 1st attempt to contact patient to go over scheduled ct and labs. No answer. Lvm for pt to return call to clinic to discuss-DD

## 2023-05-26 NOTE — PATIENT INSTRUCTIONS
CURRENT DM MEDICATIONS:   Lantus 9 units daily   Ozempic 2 mg weekly   Glipizide 5 mg before bed time snack   Humalog 14 units with small / 18 units with regular / 24 units with heavier carb meal / 8 units with snack TID wm  Humalog correction dosing every 3 hours as below    Humalog Correction Dosing every 3 hours as needed OUTSIDE OF EATING  If  - 250, may take 2 units of Humalog  If  - 300, may take 4 units of Humalog   If  - 350, may take 6 units of Humalog  If  - 400, may take 8 units of Humalog  If +, may take 10 units of Humalog

## 2023-05-26 NOTE — PROGRESS NOTES
PCP: Jessica Luna MD    Subjective:     Chief Complaint: Diabetes - Established Patient    TELEMEDICINE VISIT:     The patient location is: Home  The chief complaint leading to consultation is: Diabetes Follow up  Visit type: Virtual visit with synchronous audio and video  Total time spent with patient: 12  Each patient to whom he or she provides medical services by telemedicine is:  (1) informed of the relationship between the physician and patient and the respective role of any other health care provider with respect to management of the patient; and (2) notified that he or she may decline to receive medical services by telemedicine and may withdraw from such care at any time.    Notes: N/A        HISTORY OF PRESENT ILLNESS: 73 y.o.   female presenting for diabetes management visit.   The patient's last visit with me was on 3/21/2023.  Patient has had Type II diabetes since 20 or more years.  Pertinent to decision making is the following comorbidities: HLD, Hypothyroidism and Obesity by BMI  Patient has the following Diabetes complications: without complications  She has attended diabetes education in the past.     Patient's most recent A1c of 6.8% was completed 1 days ago.   Patient states since Her last A1c Her blood glucose levels have been high  throughout the day .   Patient monitors blood glucose 4 times per day and Continuously with personal CGM Dexcom.   Patient blood glucose monitoring device will be uploaded into [facility] today.        Patients records show  some hypoglycemia throughout the day due to delaying meal and some hypoglycemia following 20 units dosing.     Patient endorses the following diabetes related symptoms:  None .   Patient is due today for the following diabetes-related health maintenance standards: COVID-19 Vaccine .   She denies any recent hospital admissions or emergency room visits. Patient denies history of DKA.   She voices having hypoglycemia as above..    Patient's concerns today include glycemic control. Of note, patient is having Cellcept titrated up over last month which may be contributing to hyperglycemia. First dose with meal in am and 2nd dose usually 6 or 7p - not always with meal.   Patient medication regimen is as below.      CURRENT DM MEDICATIONS:   Lantus 10 units daily   Ozempic 2 mg weekly   Glipizide 5 mg before bed time snack   Humalog 14 units with small / 20 units with regular / 24 units with heavier carb meal / 8 units with snack TID wm  Humalog correction dosing every 3 hours as below    Humalog Correction Dosing every 3 hours as needed OUTSIDE OF EATING  If  - 250, may take 2 units of Humalog  If  - 300, may take 4 units of Humalog   If  - 350, may take 6 units of Humalog  If  - 400, may take 8 units of Humalog  If +, may take 10 units of Humalog       Patient has failed the following Diabetes medications:   Metformin - GI   Invokana - coverage  Jardiance - yeast infection  Glyburide   Victoza   Basal - Basaglar  Ozempic 2 mg - Stopped due to backorder      Labs Reviewed.       Lab Results   Component Value Date    CPEPTIDE 2.35 04/01/2021     Lab Results   Component Value Date    GLUTAMICACID 0.00 05/25/2017          //   , There is no height or weight on file to calculate BMI.  Her blood sugar in clinic today is:    Lab Results   Component Value Date    POCGLU 160 (A) 12/15/2022       Review of Systems   Constitutional:  Negative for activity change, appetite change, chills and fever.   HENT:  Negative for dental problem, mouth sores, nosebleeds, sore throat and trouble swallowing.    Eyes:  Negative for pain and discharge.   Respiratory:  Negative for shortness of breath, wheezing and stridor.    Cardiovascular:  Negative for chest pain, palpitations and leg swelling.   Gastrointestinal:  Negative for abdominal pain, diarrhea, nausea and vomiting.   Endocrine: Negative for polydipsia, polyphagia and polyuria.    Genitourinary:  Negative for dysuria, frequency and urgency.   Musculoskeletal:  Negative for joint swelling and myalgias.   Skin:  Negative for rash and wound.   Neurological:  Negative for dizziness, syncope, weakness and headaches.   Psychiatric/Behavioral:  Negative for behavioral problems and dysphoric mood.        Diabetes Management Status  Statin: Taking  ACE/ARB: Taking    Screening or Prevention Patient's value Goal Complete/Controlled?   HgA1C Testing and Control   Lab Results   Component Value Date    HGBA1C 6.8 (H) 05/25/2023      Annually/Less than 8% No   Lipid profile : 10/13/2022 Annually Yes   LDL control Lab Results   Component Value Date    LDLCALC 51.2 (L) 10/13/2022    Annually/Less than 100 mg/dl  Yes   Nephropathy screening Lab Results   Component Value Date    MICALBCREAT 3.5 10/13/2022     Lab Results   Component Value Date    PROTEINUA Negative 04/01/2021    Annually Yes   Blood pressure BP Readings from Last 1 Encounters:   05/25/23 (!) 141/78    Less than 140/90 Yes   Dilated retinal exam : 06/29/2022 Annually Yes    Foot exam   : 11/17/2022 Annually Yes     ACTIVITY LEVEL: Moderately Active. Discussed activities, benefits, methods, and precautions.  MEAL PLANNING: Patient reports number of meals per day to be 3 and number of snacks per day to be 2.   Patient is encouraged to carb count and consume no more than 30 - 45 grams of carbohydrates in each meal and 15 grams of carbohydrates in each snack.     Social History     Socioeconomic History    Marital status:    Tobacco Use    Smoking status: Never    Smokeless tobacco: Never   Substance and Sexual Activity    Alcohol use: Yes     Comment: rare    Drug use: No    Sexual activity: Yes     Partners: Male   Social History Narrative    . Lives with spouse. Has 3 children. Patient retired as  for [employer].      Social Determinants of Health     Financial Resource Strain: Low Risk     Difficulty of Paying  Living Expenses: Not hard at all   Food Insecurity: No Food Insecurity    Worried About Running Out of Food in the Last Year: Never true    Ran Out of Food in the Last Year: Never true   Transportation Needs: No Transportation Needs    Lack of Transportation (Medical): No    Lack of Transportation (Non-Medical): No   Physical Activity: Insufficiently Active    Days of Exercise per Week: 4 days    Minutes of Exercise per Session: 10 min   Stress: No Stress Concern Present    Feeling of Stress : Only a little   Social Connections: Socially Integrated    Frequency of Communication with Friends and Family: Twice a week    Frequency of Social Gatherings with Friends and Family: Three times a week    Attends Worship Services: More than 4 times per year    Active Member of Clubs or Organizations: Yes    Attends Club or Organization Meetings: More than 4 times per year    Marital Status:    Housing Stability: Low Risk     Unable to Pay for Housing in the Last Year: No    Number of Places Lived in the Last Year: 1    Unstable Housing in the Last Year: No     Past Medical History:   Diagnosis Date    Diabetes mellitus type II     Hyperlipidemia     Hypertension     Hypothyroid     TALIA (obstructive sleep apnea)     on CPAP    Osteopenia     Psoriasis        Objective:        Physical Exam  Constitutional:       General: She is not in acute distress.     Appearance: She is well-developed. She is not diaphoretic.   HENT:      Head: Normocephalic and atraumatic.      Right Ear: External ear normal.      Left Ear: External ear normal.      Nose: Nose normal.   Eyes:      General:         Right eye: No discharge.         Left eye: No discharge.   Pulmonary:      Effort: Pulmonary effort is normal. No respiratory distress.      Breath sounds: No stridor.   Musculoskeletal:         General: Normal range of motion.      Cervical back: Normal range of motion.   Skin:     Coloration: Skin is not pale.   Neurological:       Mental Status: She is alert and oriented to person, place, and time. Mental status is at baseline.      Motor: No abnormal muscle tone.      Coordination: Coordination normal.   Psychiatric:         Mood and Affect: Mood normal.         Behavior: Behavior normal.         Thought Content: Thought content normal.         Judgment: Judgment normal.         Assessment / Plan:     Type 2 diabetes mellitus with stage 3a chronic kidney disease, with long-term current use of insulin    Cataract associated with type 2 diabetes mellitus    Hyperlipidemia associated with type 2 diabetes mellitus    Acquired hypothyroidism    TALIA on CPAP    Obesity (BMI 30-39.9)        Additional Plan Details:    - POCT Glucose  - Encouraged continuation of lifestyle changes including regular exercise and limiting carbohydrates to 30-45 grams per meal threes times daily and 15 grams per snack with a limit of two daily.   - Encouraged continued monitoring of blood glucose with maintenance of 4 times daily and Continuously with personal CGM Dexcom.    - Current DM Medication Regimen: Change Lantus 9 units daily. Continue Ozempic 2 mg weekly. Continue Glipizide 5 mg before night time snack if eating one. Continue Humalog 14 units with small / 18 units with regular / 24 units with heavier carb meal / 8 units with snack TID wm. ADJUST INSULIN FREQUENTLY BASED ON BLOOD SUGAR.   - Health Maintenance standards addressed today: COVID - 19 Vaccine - patient will schedule outside of [facility]   - Nursing Visit: Patient is age 79 or younger with an A1c of 7.5 or greater and  qualifies for nursing visit, but will defer for now.  .   - Follow up in  8 weeks with A1c prior.    CURRENT DM MEDICATIONS:   Lantus 9 units daily   Ozempic 2 mg weekly   Glipizide 5 mg before bed time snack   Humalog 14 units with small / 18 units with regular / 24 units with heavier carb meal / 8 units with snack TID wm  Humalog correction dosing every 3 hours as below    Humalog  Correction Dosing every 3 hours as needed OUTSIDE OF EATING  If  - 250, may take 2 units of Humalog  If  - 300, may take 4 units of Humalog   If  - 350, may take 6 units of Humalog  If  - 400, may take 8 units of Humalog  If +, may take 10 units of Humalog       Blakeney McKnight, PA-C [facility]

## 2023-05-30 ENCOUNTER — OFFICE VISIT (OUTPATIENT)
Dept: PRIMARY CARE CLINIC | Facility: CLINIC | Age: 73
End: 2023-05-30
Payer: MEDICARE

## 2023-05-30 VITALS
HEART RATE: 78 BPM | OXYGEN SATURATION: 98 % | TEMPERATURE: 98 F | WEIGHT: 241.81 LBS | SYSTOLIC BLOOD PRESSURE: 110 MMHG | DIASTOLIC BLOOD PRESSURE: 60 MMHG | BODY MASS INDEX: 37.95 KG/M2 | HEIGHT: 67 IN

## 2023-05-30 DIAGNOSIS — Z12.11 COLON CANCER SCREENING: Primary | ICD-10-CM

## 2023-05-30 DIAGNOSIS — N18.31 TYPE 2 DIABETES MELLITUS WITH STAGE 3A CHRONIC KIDNEY DISEASE, WITH LONG-TERM CURRENT USE OF INSULIN: ICD-10-CM

## 2023-05-30 DIAGNOSIS — Z79.4 TYPE 2 DIABETES MELLITUS WITH STAGE 3A CHRONIC KIDNEY DISEASE, WITH LONG-TERM CURRENT USE OF INSULIN: ICD-10-CM

## 2023-05-30 DIAGNOSIS — E78.5 HYPERLIPIDEMIA ASSOCIATED WITH TYPE 2 DIABETES MELLITUS: ICD-10-CM

## 2023-05-30 DIAGNOSIS — E11.22 TYPE 2 DIABETES MELLITUS WITH STAGE 3A CHRONIC KIDNEY DISEASE, WITH LONG-TERM CURRENT USE OF INSULIN: ICD-10-CM

## 2023-05-30 DIAGNOSIS — G47.33 OSA ON CPAP: ICD-10-CM

## 2023-05-30 DIAGNOSIS — E03.9 ACQUIRED HYPOTHYROIDISM: ICD-10-CM

## 2023-05-30 DIAGNOSIS — E11.69 HYPERLIPIDEMIA ASSOCIATED WITH TYPE 2 DIABETES MELLITUS: ICD-10-CM

## 2023-05-30 DIAGNOSIS — J43.9 BLEB, LUNG: ICD-10-CM

## 2023-05-30 DIAGNOSIS — R94.31 ABNORMAL EKG: Primary | ICD-10-CM

## 2023-05-30 DIAGNOSIS — M17.11 OSTEOARTHRITIS OF RIGHT KNEE, UNSPECIFIED OSTEOARTHRITIS TYPE: ICD-10-CM

## 2023-05-30 PROCEDURE — 99999 PR PBB SHADOW E&M-EST. PATIENT-LVL V: ICD-10-PCS | Mod: PBBFAC,,, | Performed by: FAMILY MEDICINE

## 2023-05-30 PROCEDURE — 3074F SYST BP LT 130 MM HG: CPT | Mod: CPTII,S$GLB,, | Performed by: FAMILY MEDICINE

## 2023-05-30 PROCEDURE — 99214 PR OFFICE/OUTPT VISIT, EST, LEVL IV, 30-39 MIN: ICD-10-PCS | Mod: S$GLB,,, | Performed by: FAMILY MEDICINE

## 2023-05-30 PROCEDURE — 3078F PR MOST RECENT DIASTOLIC BLOOD PRESSURE < 80 MM HG: ICD-10-PCS | Mod: CPTII,S$GLB,, | Performed by: FAMILY MEDICINE

## 2023-05-30 PROCEDURE — 3008F PR BODY MASS INDEX (BMI) DOCUMENTED: ICD-10-PCS | Mod: CPTII,S$GLB,, | Performed by: FAMILY MEDICINE

## 2023-05-30 PROCEDURE — 3044F PR MOST RECENT HEMOGLOBIN A1C LEVEL <7.0%: ICD-10-PCS | Mod: CPTII,S$GLB,, | Performed by: FAMILY MEDICINE

## 2023-05-30 PROCEDURE — 4010F PR ACE/ARB THEARPY RXD/TAKEN: ICD-10-PCS | Mod: CPTII,S$GLB,, | Performed by: FAMILY MEDICINE

## 2023-05-30 PROCEDURE — 1101F PR PT FALLS ASSESS DOC 0-1 FALLS W/OUT INJ PAST YR: ICD-10-PCS | Mod: CPTII,S$GLB,, | Performed by: FAMILY MEDICINE

## 2023-05-30 PROCEDURE — 1159F MED LIST DOCD IN RCRD: CPT | Mod: CPTII,S$GLB,, | Performed by: FAMILY MEDICINE

## 2023-05-30 PROCEDURE — 3074F PR MOST RECENT SYSTOLIC BLOOD PRESSURE < 130 MM HG: ICD-10-PCS | Mod: CPTII,S$GLB,, | Performed by: FAMILY MEDICINE

## 2023-05-30 PROCEDURE — 1101F PT FALLS ASSESS-DOCD LE1/YR: CPT | Mod: CPTII,S$GLB,, | Performed by: FAMILY MEDICINE

## 2023-05-30 PROCEDURE — 1160F RVW MEDS BY RX/DR IN RCRD: CPT | Mod: CPTII,S$GLB,, | Performed by: FAMILY MEDICINE

## 2023-05-30 PROCEDURE — 1159F PR MEDICATION LIST DOCUMENTED IN MEDICAL RECORD: ICD-10-PCS | Mod: CPTII,S$GLB,, | Performed by: FAMILY MEDICINE

## 2023-05-30 PROCEDURE — 3078F DIAST BP <80 MM HG: CPT | Mod: CPTII,S$GLB,, | Performed by: FAMILY MEDICINE

## 2023-05-30 PROCEDURE — 3008F BODY MASS INDEX DOCD: CPT | Mod: CPTII,S$GLB,, | Performed by: FAMILY MEDICINE

## 2023-05-30 PROCEDURE — 4010F ACE/ARB THERAPY RXD/TAKEN: CPT | Mod: CPTII,S$GLB,, | Performed by: FAMILY MEDICINE

## 2023-05-30 PROCEDURE — 3288F FALL RISK ASSESSMENT DOCD: CPT | Mod: CPTII,S$GLB,, | Performed by: FAMILY MEDICINE

## 2023-05-30 PROCEDURE — 3044F HG A1C LEVEL LT 7.0%: CPT | Mod: CPTII,S$GLB,, | Performed by: FAMILY MEDICINE

## 2023-05-30 PROCEDURE — 99214 OFFICE O/P EST MOD 30 MIN: CPT | Mod: S$GLB,,, | Performed by: FAMILY MEDICINE

## 2023-05-30 PROCEDURE — 99999 PR PBB SHADOW E&M-EST. PATIENT-LVL V: CPT | Mod: PBBFAC,,, | Performed by: FAMILY MEDICINE

## 2023-05-30 PROCEDURE — 1160F PR REVIEW ALL MEDS BY PRESCRIBER/CLIN PHARMACIST DOCUMENTED: ICD-10-PCS | Mod: CPTII,S$GLB,, | Performed by: FAMILY MEDICINE

## 2023-05-30 PROCEDURE — 3288F PR FALLS RISK ASSESSMENT DOCUMENTED: ICD-10-PCS | Mod: CPTII,S$GLB,, | Performed by: FAMILY MEDICINE

## 2023-05-30 NOTE — ASSESSMENT & PLAN NOTE
Has done PT; not in the habit of doing home exercises; offered [facility]  or return to PT--declines but for now but will try to do more of the home exercise

## 2023-05-30 NOTE — PROGRESS NOTES
Subjective:   Patient ID:  Jaimie Luque is a 73 y.o. female who presents for follow-up of No chief complaint on file.  This is a pleasant patient presenting to the office today with evidence of hypertension, diabetes obesity and sleep apnea.  The patient had recent colonoscopy without a problem.  EKG showed left axis deviation she is concerned about the meaning of this.  Overall stable no exertional symptoms.     EKG does not show any ST T wave changes.  Patient has no exertional chest pain or shortness of breath.  Lung exam and physical exam findings today are normal.  We will plan on follow-up in a year for review if there are any changes she will let us know.  Otherwise stable.  Intermediate cardiovascular risk but the patient is already on atorvastatin and aspirin therapy.  She will continue all medications.     Units 6 d ago  (5/25/23) 3 mo ago  (2/28/23) 3 mo ago  (2/28/23) 3 mo ago  (2/28/23) 7 mo ago  (10/13/22) 11 mo ago  (6/13/22) 1 yr ago  (3/11/22)    Hemoglobin A1C 4.0 - 5.6 % 6.8 High   7.0 High  CM  7.0 High  CM  7.0 High  CM  7.1 High  CM  8.3 High  CM  9.1 High        Ref Range & Units 7 mo ago 1 yr ago 2 yr ago 3 yr ago 4 yr ago 5 yr ago 6 yr ago   Cholesterol 120 - 199 mg/dL 138  125 CM  136 CM  138 CM  143 CM  143 CM  134 CM    Comment: The National Cholesterol Education Program (NCEP) has set the   following guidelines (reference ranges) for Cholesterol:   Optimal.....................<200 mg/dL   Borderline High.............200-239 mg/dL   High........................> or = 240 mg/dL    Triglycerides 30 - 150 mg/dL 74  63 CM  67 CM  63 CM  94 CM  115 CM  84 CM    Comment: The National Cholesterol Education Program (NCEP) has set the   following guidelines (reference values) for triglycerides:   Normal......................<150 mg/dL   Borderline High.............150-199 mg/dL   High........................200-499 mg/dL    HDL 40 - 75 mg/dL 72  51 CM  60 CM  65 CM  60 CM  61 CM  53 CM     Comment: The National Cholesterol Education Program (NCEP) has set the   following guidelines (reference values) for HDL Cholesterol:   Low...............<40 mg/dL   Optimal...........>60 mg/dL    LDL Cholesterol 63.0 - 159.0 mg/dL 51.2 Low   61.4 Low  CM  62.6 Low  CM  60.4 Low  CM  64.2 CM  59.0 Low  CM  64.2 CM    Comment: The National Cholesterol Education Program (NCEP) has set the   following guidelines (reference values) for LDL Cholesterol       05/31/2023:   I have not seen the patient 1 year.  She is been doing well.  History of morbid obesity diabetes and hypertension stable this time no exertional symptoms EKG shows sinus rhythm left anterior fascicular block otherwise stable.  Patient states that she has sleep apnea but is intermittent using any devices.  Clinically she seems to be stable for this reason cardiac echo should be done this year.      Review of Systems   Constitutional: Negative for chills, diaphoresis, night sweats, weight gain and weight loss.   HENT:  Negative for congestion, hoarse voice, sore throat and stridor.    Eyes:  Negative for double vision and pain.   Cardiovascular:  Negative for chest pain, claudication, cyanosis, dyspnea on exertion, irregular heartbeat, leg swelling, near-syncope, orthopnea, palpitations, paroxysmal nocturnal dyspnea and syncope.   Respiratory:  Negative for cough, hemoptysis, shortness of breath, sleep disturbances due to breathing, snoring, sputum production and wheezing.    Endocrine: Negative for cold intolerance, heat intolerance and polydipsia.   Hematologic/Lymphatic: Negative for bleeding problem. Does not bruise/bleed easily.   Skin:  Negative for color change, dry skin and rash.   Musculoskeletal:  Negative for joint swelling and muscle cramps.   Gastrointestinal:  Negative for bloating, abdominal pain, constipation, diarrhea, dysphagia, melena, nausea and vomiting.   Genitourinary:  Negative for flank pain and urgency.   Neurological:   Negative for dizziness, focal weakness, headaches, light-headedness, loss of balance, seizures and weakness.   Psychiatric/Behavioral:  Negative for altered mental status and memory loss. The patient is not nervous/anxious.    Family History   Problem Relation Age of Onset    Heart disease Father     Diabetes Brother     Melanoma Neg Hx     Lupus Neg Hx      Past Medical History:   Diagnosis Date    Diabetes mellitus type II     Hyperlipidemia     Hypertension     Hypothyroid     TALIA (obstructive sleep apnea)     on CPAP    Osteopenia     Psoriasis      Social History     Socioeconomic History    Marital status:    Tobacco Use    Smoking status: Never    Smokeless tobacco: Never   Substance and Sexual Activity    Alcohol use: Yes     Comment: rare    Drug use: No    Sexual activity: Yes     Partners: Male   Social History Narrative    . Lives with spouse. Has 3 children. Patient retired as  for [employer].      Social Determinants of Health     Financial Resource Strain: Low Risk     Difficulty of Paying Living Expenses: Not hard at all   Food Insecurity: No Food Insecurity    Worried About Running Out of Food in the Last Year: Never true    Ran Out of Food in the Last Year: Never true   Transportation Needs: No Transportation Needs    Lack of Transportation (Medical): No    Lack of Transportation (Non-Medical): No   Physical Activity: Insufficiently Active    Days of Exercise per Week: 1 day    Minutes of Exercise per Session: 10 min   Stress: No Stress Concern Present    Feeling of Stress : Not at all   Social Connections: Socially Integrated    Frequency of Communication with Friends and Family: More than three times a week    Frequency of Social Gatherings with Friends and Family: More than three times a week    Attends Che[employer] Services: More than 4 times per year    Active Member of Clubs or Organizations: Yes    Attends Club or Organization Meetings: More than 4 times per year     Marital Status:    Housing Stability: Low Risk     Unable to Pay for Housing in the Last Year: No    Number of Places Lived in the Last Year: 1    Unstable Housing in the Last Year: No     Current Outpatient Medications on File Prior to Visit   Medication Sig Dispense Refill    aspirin 81 mg Tab Take by mouth. 1 Tablet Oral 2 times weekly       atorvastatin (LIPITOR) 20 MG tablet TAKE 1 TABLET(20 MG) BY MOUTH EVERY DAY 90 tablet 2    blood sugar diagnostic Strp 1 each by Misc.(Non-Drug; Combo Route) route 4 (four) times daily. 300 each 3    blood-glucose meter Misc 1 each by Misc.(Non-Drug; Combo Route) route once daily. 1 each 1    blood-glucose meter,continuous (DEXCOM G6 ) Misc Use daily. 1 each 0    blood-glucose sensor (DEXCOM G6 SENSOR) Gretta Use 1 every 10 days. 9 each 3    blood-glucose transmitter (DEXCOM G6 TRANSMITTER) Gretta Use 1 every 90 days. 1 each 3    cholecalciferol, vitamin D3, 2,000 unit Tab Take by mouth. 1 Tablet Oral Every day      gabapentin (NEURONTIN) 300 MG capsule Take 1 capsule (300 mg total) by mouth 3 (three) times daily. 270 capsule 3    insulin (LANTUS SOLOSTAR U-100 INSULIN) glargine 100 units/mL (3mL) SubQ pen Inject 18 Units into the skin once daily. 18 mL 3    insulin lispro 100 unit/mL pen Titrate up to 100 units daily as instructed. 90 mL 3    lancets (ACCU-CHEK SOFTCLIX LANCETS) Misc Check blood sugar 3 times daily.   Dx:  E11.9 300 each 3    levothyroxine (SYNTHROID) 112 MCG tablet Take 1 tablet (112 mcg total) by mouth before breakfast. 90 tablet 3    lisinopriL (PRINIVIL,ZESTRIL) 2.5 MG tablet TAKE 1 TABLET(2.5 MG) BY MOUTH EVERY DAY 90 tablet 3    mycophenolate (CELLCEPT) 500 mg Tab Take 2 tablets (1,000 mg total) by mouth 2 (two) times daily. 120 tablet 2    semaglutide (OZEMPIC) 2 mg/dose (8 mg/3 mL) PnIj Inject 2 mg into the skin every 7 days. 9 mL 3     No current facility-administered medications on file prior to visit.     Review of patient's  allergies indicates:   Allergen Reactions    Adhesive tape-silicones      Other reaction(s): skin irritation to area    Penicillins        Objective:     Physical Exam  Eyes:      Pupils: Pupils are equal, round, and reactive to light.   Neck:      Trachea: No tracheal deviation.   Cardiovascular:      Rate and Rhythm: Normal rate and regular rhythm.      Pulses: Intact distal pulses.           Carotid pulses are 2+ on the right side and 2+ on the left side.       Radial pulses are 2+ on the right side and 2+ on the left side.        Femoral pulses are 2+ on the right side and 2+ on the left side.       Popliteal pulses are 2+ on the right side and 2+ on the left side.        Dorsalis pedis pulses are 2+ on the right side and 2+ on the left side.        Posterior tibial pulses are 2+ on the right side and 2+ on the left side.      Heart sounds: Normal heart sounds. No murmur heard.    No friction rub. No gallop.   Pulmonary:      Effort: Pulmonary effort is normal. No respiratory distress.      Breath sounds: Normal breath sounds. No stridor. No wheezing or rales.   Chest:      Chest wall: No tenderness.   Abdominal:      General: There is no distension.      Tenderness: There is no abdominal tenderness. There is no rebound.   Musculoskeletal:         General: No tenderness.      Cervical back: Normal range of motion.   Skin:     General: Skin is warm and dry.   Neurological:      Mental Status: She is alert and oriented to person, place, and time.   EKG today shows normal sinus rhythm left anterior fascicular blockThis EKG was personally reviewed by myself, I agree with the interpretation.  Assessment:     1. Abnormal EKG    2. Severe obesity with body mass index (BMI) of 35.0 to 35.9 and comorbidity    3. Acquired hypothyroidism    4. Uncontrolled type 2 diabetes mellitus with hyperglycemia    5. Multiple lung nodules on CT    6. Hyperlipidemia associated with type 2 diabetes mellitus        Plan:     Abnormal  EKG    Severe obesity with body mass index (BMI) of 35.0 to 35.9 and comorbidity    Acquired hypothyroidism    Uncontrolled type 2 diabetes mellitus with hyperglycemia    Multiple lung nodules on CT    Hyperlipidemia associated with type 2 diabetes mellitus      Impression 1 abnormal EKG consistent left anterior fascicular block   2. Diabetes improving  3. Hyperlipidemia stable current medications including statin medications   4. Sleep apnea stable due to this and echo should be done this year.  Clinically otherwise stable no edema today.  Will comment on the results of the echo follow-up evaluation again in 1 year.  She continues all current medications.  Stable this time.

## 2023-05-30 NOTE — ASSESSMENT & PLAN NOTE
Lab Results   Component Value Date    HGBA1C 6.8 (H) 05/25/2023     Managed by endocrine diabetes clinic; reports poor compliance with prandial insulin; control at goal despite this

## 2023-05-30 NOTE — PATIENT INSTRUCTIONS
If you are feeling unwell, we'd like to be the first ones to know here at [facility]! Please give us a call. Same day appointments are our top priority to keep you well and out of the emergency rooms and hospitals. Call [phone] for our direct line. After hours advice is always available. Please call [phone] after hours to speak to the on-call team.     Please call if you'd like to meet with our  or physical therapist.

## 2023-05-30 NOTE — ASSESSMENT & PLAN NOTE
Lab Results   Component Value Date    TSH 2.029 02/28/2023     Euthyroid on levothyroxine 112 mcg daily

## 2023-05-30 NOTE — PROGRESS NOTES
"Subjective:       Patient ID: Jaimie Luque is a 73 y.o. female.    Chief Complaint: Follow-up (No issues or concerns)    HPI:     Here for f/u medical problems and preventive exam.  Still getting little exercise due to chronic knee pain. Blood sugars have been in goal range; recent a1c 6.8.  No f/c/sw/cough.  No cp/sob/palp.  BMs and urine normal.  60oz water daily. Sometimes feels off balance or "just off"; no falls but occasionally uses walker and shower chair that she ordered online.    Still needs lilliam for PHN and finds it helpful for managing knee pain.  Intol of jardiance, with persistent candidal infx.    HM: 10/22 fluvax, 2/21 covid vaccines/booster, 4/17 HAV#2, 3/15 [id number], 3/14 [id number], 11/22 today [id number], 10/18 booster at pharm T[facility], 11/12 zoster, 2019 Shingrix x2, 9/21 BMD rep 5y, 5/17 Cscope rep 5y, 6/22 mmg(q2), 8/22 cataract surgery with Javier Pierce MD, annual eye exam 4/25/23 [facility] Scha[facility], 9/16 HCV neg, 11/22 CT lung with Pulm Dr. Schumacher.       Objective:     Vitals:    05/30/23 1055   BP: 110/60   Pulse: 78   Temp: 97.8 °F (36.6 °C)     Wt Readings from Last 3 Encounters:   05/30/23 109.7 kg (241 lb 12.8 oz)   05/25/23 108.8 kg (239 lb 13.8 oz)   03/08/23 110.8 kg (244 lb 3.2 oz)     Temp Readings from Last 3 Encounters:   05/30/23 97.8 °F (36.6 °C) (Oral)   03/08/23 98.3 °F (36.8 °C) (Oral)   12/15/22 97.9 °F (36.6 °C) (Temporal)     BP Readings from Last 3 Encounters:   05/30/23 110/60   05/25/23 (!) 141/78   03/08/23 120/79     Pulse Readings from Last 3 Encounters:   05/30/23 78   05/25/23 (!) 123   03/08/23 88          Physical Exam  Vitals and nursing note reviewed.   Constitutional:       Appearance: She is well-developed. She is obese. She is not ill-appearing.   HENT:      Head: Normocephalic and atraumatic.   Eyes:      Pupils: Pupils are equal, round, and reactive to light.   Cardiovascular:      Rate and Rhythm: Normal rate and regular rhythm. "   Pulmonary:      Effort: Pulmonary effort is normal. No respiratory distress.      Breath sounds: Normal breath sounds. No stridor. No wheezing, rhonchi or rales.   Abdominal:      Palpations: Abdomen is soft. There is no mass.      Tenderness: There is no abdominal tenderness.   Musculoskeletal:         General: No deformity. Normal range of motion.      Cervical back: Normal range of motion and neck supple.      Left lower leg: No edema.   Skin:     General: Skin is warm and dry.   Neurological:      General: No focal deficit present.      Mental Status: She is alert and oriented to person, place, and time.      Sensory: No sensory deficit.      Motor: No weakness.      Gait: Gait normal.   Psychiatric:         Mood and Affect: Mood normal.         Behavior: Behavior normal.         Thought Content: Thought content normal.         Judgment: Judgment normal.         Lab Results   Component Value Date    HGBA1C 6.8 (H) 05/25/2023        Latest Reference Range & Units 05/25/23 11:05   WBC 3.90 - 12.70 K/uL 7.14   RBC 4.00 - 5.40 M/uL 4.92   Hemoglobin 12.0 - 16.0 g/dL 13.6   Hematocrit 37.0 - 48.5 % 41.8   MCV 82 - 98 fL 85   MCH 27.0 - 31.0 pg 27.6   MCHC 32.0 - 36.0 g/dL 32.5   RDW 11.5 - 14.5 % 16.3 (H)   Platelets 150 - 450 K/uL 202   MPV 9.2 - 12.9 fL 11.1   Gran % 38.0 - 73.0 % 67.7   Lymph % 18.0 - 48.0 % 22.0   Mono % 4.0 - 15.0 % 8.4   Eosinophil % 0.0 - 8.0 % 1.0   Basophil % 0.0 - 1.9 % 0.6   Immature Granulocytes 0.0 - 0.5 % 0.3   Gran # (ANC) 1.8 - 7.7 K/uL 4.8   Lymph # 1.0 - 4.8 K/uL 1.6   Mono # 0.3 - 1.0 K/uL 0.6   Eos # 0.0 - 0.5 K/uL 0.1   Baso # 0.00 - 0.20 K/uL 0.04   Immature Grans (Abs) 0.00 - 0.04 K/uL 0.02   nRBC 0 /100 WBC 0   Differential Method  Automated   Sed Rate 0 - 36 mm/Hr 4   Sodium 136 - 145 mmol/L 138   Potassium 3.5 - 5.1 mmol/L 4.4   Chloride 95 - 110 mmol/L 104   CO2 23 - 29 mmol/L 26   Anion Gap 8 - 16 mmol/L 8   BUN 8 - 23 mg/dL 16   Creatinine 0.5 - 1.4 mg/dL 1.1   eGFR >60  mL/min/1.73 m^2 53 !   Glucose 70 - 110 mg/dL 201 (H)   Calcium 8.7 - 10.5 mg/dL 8.9   Alkaline Phosphatase 55 - 135 U/L 65   PROTEIN TOTAL 6.0 - 8.4 g/dL 6.6   Albumin 3.5 - 5.2 g/dL 3.4 (L)   BILIRUBIN TOTAL 0.1 - 1.0 mg/dL 0.8   AST 10 - 40 U/L 14   ALT 10 - 44 U/L 8 (L)   CRP 0.0 - 8.2 mg/L 0.8   Hemoglobin A1C External 4.0 - 5.6 % 6.8 (H)   Estimated Avg Glucose 68 - 131 mg/dL 148 (H)      Latest Reference Range & Units 10/13/22 08:03   Cholesterol 120 - 199 mg/dL 138   HDL 40 - 75 mg/dL 72   HDL/Cholesterol Ratio 20.0 - 50.0 % 52.2 (H)   LDL Cholesterol External 63.0 - 159.0 mg/dL 51.2 (L)   Non-HDL Cholesterol mg/dL 66   Total Cholesterol/HDL Ratio 2.0 - 5.0  1.9 (L)   Triglycerides 30 - 150 mg/dL 74         Assessment:       1. Colon cancer screening    2. Type 2 diabetes mellitus with stage 3a chronic kidney disease, with long-term current use of insulin    3. Osteoarthritis of right knee, unspecified osteoarthritis type    4. TALIA on CPAP    5. Hyperlipidemia associated with type 2 diabetes mellitus    6. Bleb, lung    7. Acquired hypothyroidism        Plan:           Problem List Items Addressed This Visit          Pulmonary    Bleb, lung    Current Assessment & Plan     Surveillance per pulmonology; asymptomatic            Cardiac/Vascular    Hyperlipidemia associated with type 2 diabetes mellitus    Current Assessment & Plan     LDL at goal  Lab Results   Component Value Date    LDLCALC 51.2 (L) 10/13/2022                 Endocrine    Acquired hypothyroidism    Current Assessment & Plan     Lab Results   Component Value Date    TSH 2.029 02/28/2023   Euthyroid on levothyroxine 112 mcg daily         Type 2 diabetes mellitus with stage 3a chronic kidney disease, with long-term current use of insulin    Current Assessment & Plan     Lab Results   Component Value Date    HGBA1C 6.8 (H) 05/25/2023   Managed by endocrine diabetes clinic; reports poor compliance with prandial insulin; control at goal despite  this            Orthopedic    Osteoarthritis of knee    Current Assessment & Plan     Has done PT; not in the habit of doing home exercises; offered [employer]  or return to PT--declines but for now but will try to do more of the home exercise            Other    TALIA on CPAP    Current Assessment & Plan     Compliant with cpap          Other Visit Diagnoses       Colon cancer screening    -  Primary    Relevant Orders    Ambulatory referral/consult to Endo Procedure           RTC 6 months  Advance Care Planning     Date: 05/30/2023  Provided with educational materials and discussed today. Revisit next time.

## 2023-05-31 ENCOUNTER — HOSPITAL ENCOUNTER (OUTPATIENT)
Dept: CARDIOLOGY | Facility: HOSPITAL | Age: 73
Discharge: HOME | End: 2023-05-31
Attending: INTERNAL MEDICINE
Payer: MEDICARE

## 2023-05-31 ENCOUNTER — OFFICE VISIT (OUTPATIENT)
Dept: CARDIOLOGY | Facility: CLINIC | Age: 73
End: 2023-05-31
Payer: MEDICARE

## 2023-05-31 VITALS
HEIGHT: 67 IN | BODY MASS INDEX: 37.84 KG/M2 | DIASTOLIC BLOOD PRESSURE: 66 MMHG | SYSTOLIC BLOOD PRESSURE: 112 MMHG | HEART RATE: 80 BPM | WEIGHT: 241.63 LBS | OXYGEN SATURATION: 96 % | BODY MASS INDEX: 37.92 KG/M2 | WEIGHT: 241.63 LBS

## 2023-05-31 DIAGNOSIS — R94.31 ABNORMAL EKG: ICD-10-CM

## 2023-05-31 DIAGNOSIS — R91.8 MULTIPLE LUNG NODULES ON CT: ICD-10-CM

## 2023-05-31 DIAGNOSIS — E03.9 ACQUIRED HYPOTHYROIDISM: ICD-10-CM

## 2023-05-31 DIAGNOSIS — G47.33 OSA (OBSTRUCTIVE SLEEP APNEA): Primary | ICD-10-CM

## 2023-05-31 DIAGNOSIS — E66.01 SEVERE OBESITY WITH BODY MASS INDEX (BMI) OF 35.0 TO 35.9 AND COMORBIDITY: ICD-10-CM

## 2023-05-31 DIAGNOSIS — E78.5 HYPERLIPIDEMIA ASSOCIATED WITH TYPE 2 DIABETES MELLITUS: ICD-10-CM

## 2023-05-31 DIAGNOSIS — E11.69 HYPERLIPIDEMIA ASSOCIATED WITH TYPE 2 DIABETES MELLITUS: ICD-10-CM

## 2023-05-31 DIAGNOSIS — E11.65 UNCONTROLLED TYPE 2 DIABETES MELLITUS WITH HYPERGLYCEMIA: ICD-10-CM

## 2023-05-31 PROCEDURE — 3008F BODY MASS INDEX DOCD: CPT | Mod: CPTII,S$GLB,, | Performed by: INTERNAL MEDICINE

## 2023-05-31 PROCEDURE — 3008F PR BODY MASS INDEX (BMI) DOCUMENTED: ICD-10-PCS | Mod: CPTII,S$GLB,, | Performed by: INTERNAL MEDICINE

## 2023-05-31 PROCEDURE — 1160F PR REVIEW ALL MEDS BY PRESCRIBER/CLIN PHARMACIST DOCUMENTED: ICD-10-PCS | Mod: CPTII,S$GLB,, | Performed by: INTERNAL MEDICINE

## 2023-05-31 PROCEDURE — 4010F ACE/ARB THERAPY RXD/TAKEN: CPT | Mod: CPTII,S$GLB,, | Performed by: INTERNAL MEDICINE

## 2023-05-31 PROCEDURE — 99214 PR OFFICE/OUTPT VISIT, EST, LEVL IV, 30-39 MIN: ICD-10-PCS | Mod: S$GLB,,, | Performed by: INTERNAL MEDICINE

## 2023-05-31 PROCEDURE — 3044F HG A1C LEVEL LT 7.0%: CPT | Mod: CPTII,S$GLB,, | Performed by: INTERNAL MEDICINE

## 2023-05-31 PROCEDURE — 93005 ELECTROCARDIOGRAM TRACING: CPT

## 2023-05-31 PROCEDURE — 1101F PT FALLS ASSESS-DOCD LE1/YR: CPT | Mod: CPTII,S$GLB,, | Performed by: INTERNAL MEDICINE

## 2023-05-31 PROCEDURE — 1126F AMNT PAIN NOTED NONE PRSNT: CPT | Mod: CPTII,S$GLB,, | Performed by: INTERNAL MEDICINE

## 2023-05-31 PROCEDURE — 93010 EKG 12-LEAD: ICD-10-PCS | Mod: ,,, | Performed by: INTERNAL MEDICINE

## 2023-05-31 PROCEDURE — 1159F PR MEDICATION LIST DOCUMENTED IN MEDICAL RECORD: ICD-10-PCS | Mod: CPTII,S$GLB,, | Performed by: INTERNAL MEDICINE

## 2023-05-31 PROCEDURE — 3078F DIAST BP <80 MM HG: CPT | Mod: CPTII,S$GLB,, | Performed by: INTERNAL MEDICINE

## 2023-05-31 PROCEDURE — 3044F PR MOST RECENT HEMOGLOBIN A1C LEVEL <7.0%: ICD-10-PCS | Mod: CPTII,S$GLB,, | Performed by: INTERNAL MEDICINE

## 2023-05-31 PROCEDURE — 99999 PR PBB SHADOW E&M-EST. PATIENT-LVL IV: CPT | Mod: PBBFAC,,, | Performed by: INTERNAL MEDICINE

## 2023-05-31 PROCEDURE — 1126F PR PAIN SEVERITY QUANTIFIED, NO PAIN PRESENT: ICD-10-PCS | Mod: CPTII,S$GLB,, | Performed by: INTERNAL MEDICINE

## 2023-05-31 PROCEDURE — 99214 OFFICE O/P EST MOD 30 MIN: CPT | Mod: S$GLB,,, | Performed by: INTERNAL MEDICINE

## 2023-05-31 PROCEDURE — 3074F SYST BP LT 130 MM HG: CPT | Mod: CPTII,S$GLB,, | Performed by: INTERNAL MEDICINE

## 2023-05-31 PROCEDURE — 1159F MED LIST DOCD IN RCRD: CPT | Mod: CPTII,S$GLB,, | Performed by: INTERNAL MEDICINE

## 2023-05-31 PROCEDURE — 1160F RVW MEDS BY RX/DR IN RCRD: CPT | Mod: CPTII,S$GLB,, | Performed by: INTERNAL MEDICINE

## 2023-05-31 PROCEDURE — 93010 ELECTROCARDIOGRAM REPORT: CPT | Mod: ,,, | Performed by: INTERNAL MEDICINE

## 2023-05-31 PROCEDURE — 4010F PR ACE/ARB THEARPY RXD/TAKEN: ICD-10-PCS | Mod: CPTII,S$GLB,, | Performed by: INTERNAL MEDICINE

## 2023-05-31 PROCEDURE — 3288F PR FALLS RISK ASSESSMENT DOCUMENTED: ICD-10-PCS | Mod: CPTII,S$GLB,, | Performed by: INTERNAL MEDICINE

## 2023-05-31 PROCEDURE — 3074F PR MOST RECENT SYSTOLIC BLOOD PRESSURE < 130 MM HG: ICD-10-PCS | Mod: CPTII,S$GLB,, | Performed by: INTERNAL MEDICINE

## 2023-05-31 PROCEDURE — 1101F PR PT FALLS ASSESS DOC 0-1 FALLS W/OUT INJ PAST YR: ICD-10-PCS | Mod: CPTII,S$GLB,, | Performed by: INTERNAL MEDICINE

## 2023-05-31 PROCEDURE — 99999 PR PBB SHADOW E&M-EST. PATIENT-LVL IV: ICD-10-PCS | Mod: PBBFAC,,, | Performed by: INTERNAL MEDICINE

## 2023-05-31 PROCEDURE — 3078F PR MOST RECENT DIASTOLIC BLOOD PRESSURE < 80 MM HG: ICD-10-PCS | Mod: CPTII,S$GLB,, | Performed by: INTERNAL MEDICINE

## 2023-05-31 PROCEDURE — 3288F FALL RISK ASSESSMENT DOCD: CPT | Mod: CPTII,S$GLB,, | Performed by: INTERNAL MEDICINE

## 2023-06-12 ENCOUNTER — HOSPITAL ENCOUNTER (OUTPATIENT)
Dept: CARDIOLOGY | Facility: HOSPITAL | Age: 73
Discharge: HOME | End: 2023-06-12
Attending: INTERNAL MEDICINE
Payer: MEDICARE

## 2023-06-12 VITALS
SYSTOLIC BLOOD PRESSURE: 112 MMHG | WEIGHT: 241 LBS | HEIGHT: 67 IN | BODY MASS INDEX: 37.83 KG/M2 | DIASTOLIC BLOOD PRESSURE: 66 MMHG

## 2023-06-12 DIAGNOSIS — E11.69 HYPERLIPIDEMIA ASSOCIATED WITH TYPE 2 DIABETES MELLITUS: ICD-10-CM

## 2023-06-12 DIAGNOSIS — E66.01 SEVERE OBESITY WITH BODY MASS INDEX (BMI) OF 35.0 TO 35.9 AND COMORBIDITY: ICD-10-CM

## 2023-06-12 DIAGNOSIS — G47.33 OSA (OBSTRUCTIVE SLEEP APNEA): ICD-10-CM

## 2023-06-12 DIAGNOSIS — E03.9 ACQUIRED HYPOTHYROIDISM: ICD-10-CM

## 2023-06-12 DIAGNOSIS — E78.5 HYPERLIPIDEMIA ASSOCIATED WITH TYPE 2 DIABETES MELLITUS: ICD-10-CM

## 2023-06-12 DIAGNOSIS — E11.65 UNCONTROLLED TYPE 2 DIABETES MELLITUS WITH HYPERGLYCEMIA: ICD-10-CM

## 2023-06-12 DIAGNOSIS — R91.8 MULTIPLE LUNG NODULES ON CT: ICD-10-CM

## 2023-06-12 DIAGNOSIS — R94.31 ABNORMAL EKG: ICD-10-CM

## 2023-06-12 PROBLEM — Z00.00 ENCOUNTER FOR PREVENTIVE HEALTH EXAMINATION: Status: RESOLVED | Noted: 2023-03-08 | Resolved: 2023-06-12

## 2023-06-12 LAB
AORTIC ROOT ANNULUS: 2.62 CM
ASCENDING AORTA: 3.05 CM
AV INDEX (PROSTH): 0.91
AV MEAN GRADIENT: 4 MMHG
AV PEAK GRADIENT: 6 MMHG
AV VALVE AREA: 2.88 CM2
AV VELOCITY RATIO: 0.93
BSA FOR ECHO PROCEDURE: 2.27 M2
CV ECHO LV RWT: 0.43 CM
DOP CALC AO PEAK VEL: 1.25 M/S
DOP CALC AO VTI: 30.3 CM
DOP CALC LVOT AREA: 3.2 CM2
DOP CALC LVOT DIAMETER: 2.01 CM
DOP CALC LVOT PEAK VEL: 1.16 M/S
DOP CALC LVOT STROKE VOLUME: 87.22 CM3
DOP CALC RVOT PEAK VEL: 0.64 M/S
DOP CALC RVOT VTI: 13.1 CM
DOP CALCLVOT PEAK VEL VTI: 27.5 CM
E WAVE DECELERATION TIME: 260.86 MSEC
E/A RATIO: 0.99
E/E' RATIO: 8.33 M/S
ECHO LV POSTERIOR WALL: 1 CM (ref 0.6–1.1)
EJECTION FRACTION: 60 %
FRACTIONAL SHORTENING: 30 % (ref 28–44)
INTERVENTRICULAR SEPTUM: 1.09 CM (ref 0.6–1.1)
IVC DIAMETER: 1.92 CM
IVRT: 91.34 MSEC
LA MAJOR: 4.99 CM
LA MINOR: 4.03 CM
LA WIDTH: 3.4 CM
LEFT ATRIUM SIZE: 3.07 CM
LEFT ATRIUM VOLUME INDEX MOD: 22.1 ML/M2
LEFT ATRIUM VOLUME INDEX: 18.1 ML/M2
LEFT ATRIUM VOLUME MOD: 48.37 CM3
LEFT ATRIUM VOLUME: 39.56 CM3
LEFT INTERNAL DIMENSION IN SYSTOLE: 3.29 CM (ref 2.1–4)
LEFT VENTRICLE DIASTOLIC VOLUME INDEX: 46.19 ML/M2
LEFT VENTRICLE DIASTOLIC VOLUME: 101.16 ML
LEFT VENTRICLE MASS INDEX: 79 G/M2
LEFT VENTRICLE SYSTOLIC VOLUME INDEX: 20 ML/M2
LEFT VENTRICLE SYSTOLIC VOLUME: 43.78 ML
LEFT VENTRICULAR INTERNAL DIMENSION IN DIASTOLE: 4.68 CM (ref 3.5–6)
LEFT VENTRICULAR MASS: 173.48 G
LV LATERAL E/E' RATIO: 8.33 M/S
LV SEPTAL E/E' RATIO: 8.33 M/S
LVOT MG: 3.06 MMHG
LVOT MV: 0.82 CM/S
MV PEAK A VEL: 0.76 M/S
MV PEAK E VEL: 0.75 M/S
MV STENOSIS PRESSURE HALF TIME: 75.65 MS
MV VALVE AREA P 1/2 METHOD: 2.91 CM2
PISA TR MAX VEL: 2.45 M/S
PV MEAN GRADIENT: 0.74 MMHG
PV PEAK VELOCITY: 0.82 CM/S
RA MAJOR: 4.48 CM
RA PRESSURE: 3 MMHG
RA WIDTH: 3.55 CM
RIGHT VENTRICULAR END-DIASTOLIC DIMENSION: 3.22 CM
SINUS: 2.9 CM
STJ: 2.66 CM
TDI LATERAL: 0.09 M/S
TDI SEPTAL: 0.09 M/S
TDI: 0.09 M/S
TR MAX PG: 24 MMHG
TV REST PULMONARY ARTERY PRESSURE: 27 MMHG

## 2023-06-12 PROCEDURE — 93306 TTE W/DOPPLER COMPLETE: CPT

## 2023-06-12 PROCEDURE — 93306 ECHO (CUPID ONLY): ICD-10-PCS | Mod: 26,,, | Performed by: INTERNAL MEDICINE

## 2023-06-12 PROCEDURE — 93306 TTE W/DOPPLER COMPLETE: CPT | Mod: 26,,, | Performed by: INTERNAL MEDICINE

## 2023-06-13 ENCOUNTER — TELEPHONE (OUTPATIENT)
Dept: CARDIOLOGY | Facility: CLINIC | Age: 73
End: 2023-06-13
Payer: MEDICARE

## 2023-06-13 NOTE — TELEPHONE ENCOUNTER
I have attempted without success to contact this patient by phone to discuss echo results and I left a message on answering machine.      ----- Message from Blair Franco MD sent at 6/12/2023  8:44 PM CDT -----  Normal echo  ----- Message -----  From: Aaron Savage MD  Sent: 6/12/2023   5:12 PM CDT  To: Blair Franco MD

## 2023-06-15 ENCOUNTER — PATIENT MESSAGE (OUTPATIENT)
Dept: RHEUMATOLOGY | Facility: CLINIC | Age: 73
End: 2023-06-15
Payer: MEDICARE

## 2023-07-05 ENCOUNTER — HOSPITAL ENCOUNTER (OUTPATIENT)
Dept: RADIOLOGY | Facility: HOSPITAL | Age: 73
Discharge: HOME | End: 2023-07-05
Attending: PHYSICIAN ASSISTANT
Payer: MEDICARE

## 2023-07-05 DIAGNOSIS — J84.9 INTERSTITIAL PULMONARY DISEASE, UNSPECIFIED: ICD-10-CM

## 2023-07-05 PROCEDURE — 71250 CT THORAX DX C-: CPT | Mod: TC

## 2023-07-05 PROCEDURE — 71250 CT THORAX DX C-: CPT | Mod: 26,,, | Performed by: RADIOLOGY

## 2023-07-05 PROCEDURE — 71250 CT CHEST WITHOUT CONTRAST: ICD-10-PCS | Mod: 26,,, | Performed by: RADIOLOGY

## 2023-07-07 ENCOUNTER — PATIENT MESSAGE (OUTPATIENT)
Dept: INFECTIOUS DISEASES | Facility: CLINIC | Age: 73
End: 2023-07-07
Payer: MEDICARE

## 2023-07-21 ENCOUNTER — CLINICAL SUPPORT (OUTPATIENT)
Dept: DIABETES | Facility: CLINIC | Age: 73
End: 2023-07-21
Payer: MEDICARE

## 2023-07-21 DIAGNOSIS — N18.31 TYPE 2 DIABETES MELLITUS WITH STAGE 3A CHRONIC KIDNEY DISEASE, WITH LONG-TERM CURRENT USE OF INSULIN: Primary | ICD-10-CM

## 2023-07-21 DIAGNOSIS — Z79.4 TYPE 2 DIABETES MELLITUS WITH STAGE 3A CHRONIC KIDNEY DISEASE, WITH LONG-TERM CURRENT USE OF INSULIN: Primary | ICD-10-CM

## 2023-07-21 DIAGNOSIS — E11.22 TYPE 2 DIABETES MELLITUS WITH STAGE 3A CHRONIC KIDNEY DISEASE, WITH LONG-TERM CURRENT USE OF INSULIN: Primary | ICD-10-CM

## 2023-07-24 ENCOUNTER — PATIENT MESSAGE (OUTPATIENT)
Dept: DIABETES | Facility: CLINIC | Age: 73
End: 2023-07-24

## 2023-07-24 ENCOUNTER — OFFICE VISIT (OUTPATIENT)
Dept: DIABETES | Facility: CLINIC | Age: 73
End: 2023-07-24
Payer: MEDICARE

## 2023-07-24 ENCOUNTER — TELEPHONE (OUTPATIENT)
Dept: DIABETES | Facility: CLINIC | Age: 73
End: 2023-07-24

## 2023-07-24 DIAGNOSIS — Z79.4 TYPE 2 DIABETES MELLITUS WITH STAGE 3A CHRONIC KIDNEY DISEASE, WITH LONG-TERM CURRENT USE OF INSULIN: Primary | ICD-10-CM

## 2023-07-24 DIAGNOSIS — E11.69 HYPERLIPIDEMIA ASSOCIATED WITH TYPE 2 DIABETES MELLITUS: ICD-10-CM

## 2023-07-24 DIAGNOSIS — E11.22 TYPE 2 DIABETES MELLITUS WITH STAGE 3A CHRONIC KIDNEY DISEASE, WITH LONG-TERM CURRENT USE OF INSULIN: Primary | ICD-10-CM

## 2023-07-24 DIAGNOSIS — E03.9 ACQUIRED HYPOTHYROIDISM: ICD-10-CM

## 2023-07-24 DIAGNOSIS — G47.33 OSA ON CPAP: ICD-10-CM

## 2023-07-24 DIAGNOSIS — E78.5 HYPERLIPIDEMIA ASSOCIATED WITH TYPE 2 DIABETES MELLITUS: ICD-10-CM

## 2023-07-24 DIAGNOSIS — E11.36 CATARACT ASSOCIATED WITH TYPE 2 DIABETES MELLITUS: ICD-10-CM

## 2023-07-24 DIAGNOSIS — N18.31 TYPE 2 DIABETES MELLITUS WITH STAGE 3A CHRONIC KIDNEY DISEASE, WITH LONG-TERM CURRENT USE OF INSULIN: Primary | ICD-10-CM

## 2023-07-24 DIAGNOSIS — E66.9 OBESITY (BMI 30-39.9): ICD-10-CM

## 2023-07-24 PROCEDURE — 95251 CONT GLUC MNTR ANALYSIS I&R: CPT | Mod: NDTC,S$GLB,, | Performed by: PHYSICIAN ASSISTANT

## 2023-07-24 PROCEDURE — 95251 PR GLUCOSE MONITOR, 72 HOUR, PHYS INTERP: ICD-10-PCS | Mod: NDTC,S$GLB,, | Performed by: PHYSICIAN ASSISTANT

## 2023-07-24 PROCEDURE — 4010F PR ACE/ARB THEARPY RXD/TAKEN: ICD-10-PCS | Mod: CPTII,95,, | Performed by: PHYSICIAN ASSISTANT

## 2023-07-24 PROCEDURE — 99214 OFFICE O/P EST MOD 30 MIN: CPT | Mod: 95,,, | Performed by: PHYSICIAN ASSISTANT

## 2023-07-24 PROCEDURE — 99214 PR OFFICE/OUTPT VISIT, EST, LEVL IV, 30-39 MIN: ICD-10-PCS | Mod: 95,,, | Performed by: PHYSICIAN ASSISTANT

## 2023-07-24 PROCEDURE — 3044F PR MOST RECENT HEMOGLOBIN A1C LEVEL <7.0%: ICD-10-PCS | Mod: CPTII,95,, | Performed by: PHYSICIAN ASSISTANT

## 2023-07-24 PROCEDURE — 4010F ACE/ARB THERAPY RXD/TAKEN: CPT | Mod: CPTII,95,, | Performed by: PHYSICIAN ASSISTANT

## 2023-07-24 PROCEDURE — 3044F HG A1C LEVEL LT 7.0%: CPT | Mod: CPTII,95,, | Performed by: PHYSICIAN ASSISTANT

## 2023-07-24 RX ORDER — BLOOD-GLUCOSE SENSOR
1 EACH MISCELLANEOUS
Qty: 9 EACH | Refills: 3 | Status: SHIPPED | OUTPATIENT
Start: 2023-07-24 | End: 2024-07-23

## 2023-07-24 RX ORDER — BLOOD-GLUCOSE,RECEIVER,CONT
1 EACH MISCELLANEOUS ONCE
Qty: 1 EACH | Refills: 0 | Status: SHIPPED | OUTPATIENT
Start: 2023-07-24 | End: 2023-07-24

## 2023-07-24 RX ORDER — DULAGLUTIDE 4.5 MG/.5ML
4.5 INJECTION, SOLUTION SUBCUTANEOUS
Qty: 4 PEN | Refills: 11 | Status: SHIPPED | OUTPATIENT
Start: 2023-07-24 | End: 2023-08-24 | Stop reason: ALTCHOICE

## 2023-07-24 NOTE — PATIENT INSTRUCTIONS
CURRENT DM MEDICATIONS:   Lantus 9 units daily   Trulicity 4.5 mg weekly - Ozempic on backorder  Glipizide 5 mg before bed time snack   Humalog 14 units with small / 18 units with regular / 24 units with heavier carb meal / 8 units with snack TID wm  Humalog correction dosing every 3 hours as below    Humalog Correction Dosing every 3 hours as needed OUTSIDE OF EATING  If  - 250, may take 2 units of Humalog  If  - 300, may take 4 units of Humalog   If  - 350, may take 6 units of Humalog  If  - 400, may take 8 units of Humalog  If +, may take 10 units of Humalog       Note: If blood sugar is in 70s - 80s before meal, have 2 peppermints or sip of juice to bring blood sugar up 20-30 points then take full meal time dose, wait 10 mins, and eat    SENDING DEXCOM G7 to [facility] - call once received for set up

## 2023-07-24 NOTE — PROGRESS NOTES
PCP: Jessica Luna MD    Subjective:     Chief Complaint: Diabetes - Established Patient    TELEMEDICINE VISIT:     The patient location is: Home  The chief complaint leading to consultation is: Diabetes Follow up  Visit type: Virtual visit with synchronous audio and video  Total time spent with patient: 18  Each patient to whom he or she provides medical services by telemedicine is:  (1) informed of the relationship between the physician and patient and the respective role of any other health care provider with respect to management of the patient; and (2) notified that he or she may decline to receive medical services by telemedicine and may withdraw from such care at any time.    Notes: N/A        HISTORY OF PRESENT ILLNESS: 73 y.o.   female presenting for diabetes management visit.   The patient's last visit with me was on 5/26/2023.  Patient has had Type II diabetes since 20 or more years.  Pertinent to decision making is the following comorbidities: HLD, Hypothyroidism and Obesity by BMI  Patient has the following Diabetes complications: without complications  She has attended diabetes education in the past.     Patient's most recent A1c of 6.8% was completed 2 months ago.   Patient states since Her last A1c Her blood glucose levels have been high  throughout the day .   Patient monitors blood glucose 4 times per day and Continuously with personal CGM Dexcom.   Patient blood glucose monitoring device will be uploaded into [employer] today.        Patients records shows postprandial hyperglycemia throughout the day but pt admits only giving small meal dose with full meal if pre meal BG 70 - 90s.     Patient endorses the following diabetes related symptoms:  None .   Patient is due today for the following diabetes-related health maintenance standards: Eye Exam, COVID-19 Vaccine , and Colonoscopy . Completed at [facility].   She denies any recent hospital admissions or emergency room  visits. Patient denies history of DKA.   She voices having hypoglycemia as above..   Patient's concerns today include glycemic control. Of note, patient is having Cellcept titrated up over last month which may be contributing to hyperglycemia. First dose with meal in am and 2nd dose usually 6 or 7p - not always with meal.   Patient medication regimen is as below.      CURRENT DM MEDICATIONS:   Lantus 9 units daily   Ozempic 2 mg weekly   Glipizide 5 mg before bed time snack   Humalog 14 units with small / 18 units with regular / 24 units with heavier carb meal / 8 units with snack TID wm  Humalog correction dosing every 3 hours as below    Humalog Correction Dosing every 3 hours as needed OUTSIDE OF EATING  If  - 250, may take 2 units of Humalog  If  - 300, may take 4 units of Humalog   If  - 350, may take 6 units of Humalog  If  - 400, may take 8 units of Humalog  If +, may take 10 units of Humalog       Patient has failed the following Diabetes medications:   Metformin - GI   Invokana - coverage  Jardiance - yeast infection  Glyburide   Victoza   Basal - Basaglar  Ozempic 2 mg - Stopped due to backorder      Labs Reviewed.       Lab Results   Component Value Date    CPEPTIDE 2.35 04/01/2021     Lab Results   Component Value Date    GLUTAMICACID 0.00 05/25/2017          //   , There is no height or weight on file to calculate BMI.  Her blood sugar in clinic today is:    Lab Results   Component Value Date    POCGLU 160 (A) 12/15/2022       Review of Systems   Constitutional:  Negative for activity change, appetite change, chills and fever.   HENT:  Negative for dental problem, mouth sores, nosebleeds, sore throat and trouble swallowing.    Eyes:  Negative for pain and discharge.   Respiratory:  Negative for shortness of breath, wheezing and stridor.    Cardiovascular:  Negative for chest pain, palpitations and leg swelling.   Gastrointestinal:  Negative for abdominal pain, diarrhea,  nausea and vomiting.   Endocrine: Negative for polydipsia, polyphagia and polyuria.   Genitourinary:  Negative for dysuria, frequency and urgency.   Musculoskeletal:  Negative for joint swelling and myalgias.   Skin:  Negative for rash and wound.   Neurological:  Negative for dizziness, syncope, weakness and headaches.   Psychiatric/Behavioral:  Negative for behavioral problems and dysphoric mood.        Diabetes Management Status  Statin: Taking  ACE/ARB: Taking    Screening or Prevention Patient's value Goal Complete/Controlled?   HgA1C Testing and Control   Lab Results   Component Value Date    HGBA1C 6.8 (H) 05/25/2023      Annually/Less than 8% No   Lipid profile : 10/13/2022 Annually Yes   LDL control Lab Results   Component Value Date    LDLCALC 51.2 (L) 10/13/2022    Annually/Less than 100 mg/dl  Yes   Nephropathy screening Lab Results   Component Value Date    MICALBCREAT 3.5 10/13/2022     Lab Results   Component Value Date    PROTEINUA Negative 04/01/2021    Annually Yes   Blood pressure BP Readings from Last 1 Encounters:   06/12/23 112/66    Less than 140/90 Yes   Dilated retinal exam : 08/24/2022 Annually Yes    Foot exam   : 11/17/2022 Annually Yes     ACTIVITY LEVEL: Moderately Active. Discussed activities, benefits, methods, and precautions.  MEAL PLANNING: Patient reports number of meals per day to be 3 and number of snacks per day to be 2.   Patient is encouraged to carb count and consume no more than 30 - 45 grams of carbohydrates in each meal and 15 grams of carbohydrates in each snack.     Social History     Socioeconomic History    Marital status:    Tobacco Use    Smoking status: Never    Smokeless tobacco: Never   Substance and Sexual Activity    Alcohol use: Yes     Comment: rare    Drug use: No    Sexual activity: Yes     Partners: Male   Social History Narrative    . Lives with spouse. Has 3 children. Patient retired as  for [employer].      Social  Determinants of Health     Financial Resource Strain: Low Risk     Difficulty of Paying Living Expenses: Not hard at all   Food Insecurity: No Food Insecurity    Worried About Running Out of Food in the Last Year: Never true    Ran Out of Food in the Last Year: Never true   Transportation Needs: No Transportation Needs    Lack of Transportation (Medical): No    Lack of Transportation (Non-Medical): No   Physical Activity: Insufficiently Active    Days of Exercise per Week: 1 day    Minutes of Exercise per Session: 10 min   Stress: No Stress Concern Present    Feeling of Stress : Not at all   Social Connections: Socially Integrated    Frequency of Communication with Friends and Family: More than three times a week    Frequency of Social Gatherings with Friends and Family: More than three times a week    Attends [employer] Services: More than 4 times per year    Active Member of Clubs or Organizations: Yes    Attends Club or Organization Meetings: More than 4 times per year    Marital Status:    Housing Stability: Low Risk     Unable to Pay for Housing in the Last Year: No    Number of Places Lived in the Last Year: 1    Unstable Housing in the Last Year: No     Past Medical History:   Diagnosis Date    Diabetes mellitus type II     Hyperlipidemia     Hypertension     Hypothyroid     TALIA (obstructive sleep apnea)     on CPAP    Osteopenia     Psoriasis        Objective:        Physical Exam  Constitutional:       General: She is not in acute distress.     Appearance: She is well-developed. She is not diaphoretic.   HENT:      Head: Normocephalic and atraumatic.      Right Ear: External ear normal.      Left Ear: External ear normal.      Nose: Nose normal.   Eyes:      General:         Right eye: No discharge.         Left eye: No discharge.   Pulmonary:      Effort: Pulmonary effort is normal. No respiratory distress.      Breath sounds: No stridor.   Musculoskeletal:         General: Normal range of motion.       Cervical back: Normal range of motion.   Skin:     Coloration: Skin is not pale.   Neurological:      Mental Status: She is alert and oriented to person, place, and time. Mental status is at baseline.      Motor: No abnormal muscle tone.      Coordination: Coordination normal.   Psychiatric:         Mood and Affect: Mood normal.         Behavior: Behavior normal.         Thought Content: Thought content normal.         Judgment: Judgment normal.         Assessment / Plan:     Type 2 diabetes mellitus with stage 3a chronic kidney disease, with long-term current use of insulin    Cataract associated with type 2 diabetes mellitus    Hyperlipidemia associated with type 2 diabetes mellitus    Acquired hypothyroidism    TALIA on CPAP    Obesity (BMI 30-39.9)        Additional Plan Details:    - POCT Glucose  - Encouraged continuation of lifestyle changes including regular exercise and limiting carbohydrates to 30-45 grams per meal threes times daily and 15 grams per snack with a limit of two daily.   - Encouraged continued monitoring of blood glucose with maintenance of 4 times daily and Continuously with personal CGM Dexcom. Dex G7 Rx.    - Current DM Medication Regimen: Continue Lantus 9 units daily. Change Ozempic to Trulicity 4.5 mg weekly due to backorder. Continue Glipizide 5 mg before night time snack if eating one. Continue Humalog 14 units with small / 18 units with regular / 24 units with heavier carb meal / 8 units with snack TID wm. ADJUST INSULIN FREQUENTLY BASED ON BLOOD SUGAR. Focus on treating mild near low then dosing full meal dose.   - Health Maintenance standards addressed today: Eye Exam - completed outside of [facility]; will sign ROR today for records, COVID - 19 Vaccine - patient will schedule outside of [facility] , and Colonoscopy - deferred to PCP  - Nursing Visit: Patient is age 79 or younger with an A1c of 7.5 or greater and  qualifies for nursing visit, but will defer for now.  .   - Follow up in 4  weeks with A1c prior.    CURRENT DM MEDICATIONS:   Lantus 9 units daily   Trulicity 4.5 mg weekly - Ozempic on backorder  Glipizide 5 mg before bed time snack   Humalog 14 units with small / 18 units with regular / 24 units with heavier carb meal / 8 units with snack TID wm  Humalog correction dosing every 3 hours as below    Humalog Correction Dosing every 3 hours as needed OUTSIDE OF EATING  If  - 250, may take 2 units of Humalog  If  - 300, may take 4 units of Humalog   If  - 350, may take 6 units of Humalog  If  - 400, may take 8 units of Humalog  If +, may take 10 units of Humalog           Blakeney McKnight, PA-C [facility]

## 2023-07-24 NOTE — LETTER
AUTHORIZATION FOR RELEASE OF   CONFIDENTIAL INFORMATION        We are seeing Jaimie Luque, date of birth 1950, in the clinic at [address]. Jessica Luna MD is the patient's PCP. Jaimie Luque has an outstanding lab/procedure at the time we reviewed her chart. In order to help keep her health information updated, she has authorized us to request the following medical record(s):        (  )  MAMMOGRAM                                      (  )  COLONOSCOPY      (  )  PAP SMEAR                                          (  )  OUTSIDE LAB RESULTS     (  )  DEXA SCAN                                          ( X )  DIABETIC EYE EXAM            (  )  FOOT EXAM                                          (  )  ENTIRE RECORD     (  )  OUTSIDE IMMUNIZATIONS                 (  )  _______________         Please fax records to [facility], Blakeney Mcknight,PA, [phone]     If you have any questions, please contact Mark Eason           Patient Name: Jaimie Luque  : 1950  Patient Phone #: [phone]

## 2023-07-24 NOTE — Clinical Note
"Dex G7 prior auth  Eye records from [facility]  [place] 8/24 before labs - "Dex download"  8/24 1p call "Review Dex download" "

## 2023-07-27 ENCOUNTER — PATIENT MESSAGE (OUTPATIENT)
Dept: DIABETES | Facility: CLINIC | Age: 73
End: 2023-07-27
Payer: MEDICARE

## 2023-07-31 ENCOUNTER — CLINICAL SUPPORT (OUTPATIENT)
Dept: DIABETES | Facility: CLINIC | Age: 73
End: 2023-07-31
Payer: MEDICARE

## 2023-07-31 ENCOUNTER — PATIENT MESSAGE (OUTPATIENT)
Dept: DIABETES | Facility: CLINIC | Age: 73
End: 2023-07-31

## 2023-07-31 VITALS — WEIGHT: 246.25 LBS | BODY MASS INDEX: 38.57 KG/M2

## 2023-07-31 DIAGNOSIS — N18.31 TYPE 2 DIABETES MELLITUS WITH STAGE 3A CHRONIC KIDNEY DISEASE, WITH LONG-TERM CURRENT USE OF INSULIN: ICD-10-CM

## 2023-07-31 DIAGNOSIS — E11.22 TYPE 2 DIABETES MELLITUS WITH STAGE 3A CHRONIC KIDNEY DISEASE, WITH LONG-TERM CURRENT USE OF INSULIN: ICD-10-CM

## 2023-07-31 DIAGNOSIS — Z79.4 TYPE 2 DIABETES MELLITUS WITH STAGE 3A CHRONIC KIDNEY DISEASE, WITH LONG-TERM CURRENT USE OF INSULIN: ICD-10-CM

## 2023-07-31 DIAGNOSIS — E11.22 TYPE 2 DIABETES MELLITUS WITH STAGE 3A CHRONIC KIDNEY DISEASE, WITH LONG-TERM CURRENT USE OF INSULIN: Primary | ICD-10-CM

## 2023-07-31 DIAGNOSIS — N18.31 TYPE 2 DIABETES MELLITUS WITH STAGE 3A CHRONIC KIDNEY DISEASE, WITH LONG-TERM CURRENT USE OF INSULIN: Primary | ICD-10-CM

## 2023-07-31 DIAGNOSIS — Z79.4 TYPE 2 DIABETES MELLITUS WITH STAGE 3A CHRONIC KIDNEY DISEASE, WITH LONG-TERM CURRENT USE OF INSULIN: Primary | ICD-10-CM

## 2023-07-31 PROCEDURE — G0108 DIAB MANAGE TRN  PER INDIV: HCPCS | Mod: S$GLB,,, | Performed by: DIETITIAN, REGISTERED

## 2023-07-31 PROCEDURE — 99999 PR PBB SHADOW E&M-EST. PATIENT-LVL IV: CPT | Mod: PBBFAC,,, | Performed by: DIETITIAN, REGISTERED

## 2023-07-31 PROCEDURE — 99999 PR PBB SHADOW E&M-EST. PATIENT-LVL IV: ICD-10-PCS | Mod: PBBFAC,,, | Performed by: DIETITIAN, REGISTERED

## 2023-07-31 PROCEDURE — G0108 PR DIAB MANAGE TRN  PER INDIV: ICD-10-PCS | Mod: S$GLB,,, | Performed by: DIETITIAN, REGISTERED

## 2023-07-31 NOTE — PROGRESS NOTES
Diabetes Care Specialist Progress Note  Author: Kelin Rolle RD, CDE  Date: 7/31/2023    Program Intake  Reason for Diabetes Program Visit:: Initial Diabetes Assessment (07/31/2023 Lorraine Holliday PA-C)  Type of Intervention:: Individual  Device Training: Personal CGM (Dexcom G7)  Current diabetes risk level:: high  In the last 12 months, have you::  (pt denies recent ER/hosp visits)    Lab Results   Component Value Date    HGBA1C 6.8 (H) 05/25/2023       Clinical    Problem Review  Active comorbidities affecting diabetes self-care.: yes (HLD, CKD3, Hypothyroidism, Obesity by BMI, TALIA/cpap)    Clinical Assessment  Current Diabetes Treatment: Diet, Injectable, Oral Medication (Lantus 9 units daily. Trulicity 4.5mg weekly. Humalog ac 14units small meal, 18units medium meal, 24units large meal, 8units snack + s/s. Pt reports dosing Humalog about 2x/d and missing other doses. Pt reports not dosing Glipizide.)  Have you ever experienced hypoglycemia (low blood sugar)?: yes (Pt reports symptoms w/ BG 75.)  In the last month, how often have you experienced low blood sugar?: once every other week  Are you able to tell when your blood sugar is low?: Yes  What symptoms do you experience?: Dizzy/Light-headed, Shaky  Have you ever been hospitalized because your blood sugar was too low?: no  How do you treat hypoglycemia (low blood sugar)?: 5-6 pieces of hard candy  Have you ever experienced hyperglycemia (high blood sugar)?: no (Pt denies symptoms.)    Medication Information  How many days a week do you miss your medications?: 3 or more  Do you sometimes have difficulty refilling your medications?: No  Medication adherence impacting ability to self-manage diabetes?: Yes    Labs  Do you have regular lab work to monitor your medications?: Yes  Type of Regular Lab Work: A1c, Cholesterol, BMP (pro/cr ratio)  Where do you get your labs drawn?: [facility]  Lab Compliance Barriers: No    Nutritional Status  Meal Plan 24 Hour  Recall: Breakfast, Lunch, Dinner, Snack  Meal Plan 24 Hour Recall - Breakfast: special k, milk  Meal Plan 24 Hour Recall - Dinner: almond butter & jelly sandwich  Meal Plan 24 Hour Recall - Snack: dessert type snacks  Change in appetite?: No  Recent Changes in Weight: Weight Loss  Was weight loss intentional or unintentional?: Unintentional (~5lbs since 11/22)  Current nutritional status an area of need that is impacting patient's ability to self-manage diabetes?: Yes    Additional Social History    Support  Does anyone support you with your diabetes care?: no  Is Support an area impacting ability to self-manage diabetes?: No    Access to Mass Media & Technology  Does the patient have access to any of the following devices or technologies?: Smart phone, Internet Access  Media or technology needs impacting ability to self-manage diabetes?: No    Cognitive/Behavioral Health  Alert and Oriented: Yes  Difficulty Thinking: No  Requires Prompting: No  Requires assistance for routine expression?: No  Cognitive or behavioral barriers impacting ability to self-manage diabetes?: No    Culture/Mandaen  Culture or Jewish beliefs that may impact ability to access healthcare: No    Communication  Language preference: English  Hearing Problems: No  Vision Problems: Yes  Vision problem type:: Decreased Vision  Vision Assistance: Glasses  Communication needs impacting ability to self-manage diabetes?: No    Health Literacy  Preferred Learning Method: Face to Face, Demonstration, Hands On  How often do you need to have someone help you read instructions, pamphlets, or written material from your doctor or pharmacy?: Never  Health literacy needs impacting ability to self-manage diabetes?: No      Diabetes Self-Management Skills Assessment    Diabetes Disease Process/Treatment Options  Patient/caregiver able to state what happens when someone has diabetes.: yes  Patient/caregiver knows what type of diabetes they have.: yes  Diabetes  Type : Type II  Diabetes Disease Process/Treatment Options: Skills Assessment Completed: Yes  Assessment indicates:: Adequate understanding  Area of need?: No    Nutrition/Healthy Eating  Challenges to healthy eating:: snacking between meals and at night (lack of meal prep)  Method of carbohydrate measurement:: no method  Patient can identify foods that impact blood sugar.: yes  Patient-identified foods:: sweets, starchy vegetables (corn, peas, beans), starches (bread, pasta, rice, cereal), soda, fruit/fruit juice  Nutrition/Healthy Eating Skills Assessment Completed:: Yes  Assessment indicates:: Instruction Needed, Knowledge deficit  Area of need?: Yes    Physical Activity/Exercise  Physical Activity/Exercise Skills Assessment Completed: : No  Deffered due to:: Time    Medications  Patient is able to describe current diabetes management routine.: yes  Diabetes management routine:: insulin, injectable medications, diet  Patient is able to identify current diabetes medications, dosages, and appropriate timing of medications.: no (Pt reports occs not matching meal size to bolus insulin dose.)  Patient understands the purpose of the medications taken for diabetes.: yes  Patient reports problems or concerns with current medication regimen.: yes  Medication regimen problems/concerns:: concerned about side effects  Medication Skills Assessment Completed:: Yes  Assessment indicates:: Instruction Needed  Area of need?: Yes    Home Blood Glucose Monitoring  Patient states that blood sugar is checked at home daily.: yes  Monitoring Method:: personal continuous glucose monitor  Personal CGM type:: Dexcom G6 using   Patient is able to use personal CGM appropriately.: no (Pt reports episodes of sensor inaccuracy but not consistently verifying w/ meter. She reports concerns related to insurance processing meter supplies. Pt in clinic today for Dexcom G7 traninig on provided  and mobile yuri.)  CGM Report reviewed?:  yes (Dexcom report reviewed w/ pt & saved media.)  Home Blood Glucose Monitoring Skills Assessment Completed: : Yes  Assessment indicates:: Instruction Needed, Knowledge deficit  Area of need?: Yes    Acute Complications  Patient is able to identify types of acute complications: Yes  Patient Identified:: Hypoglycemia, Hyperglycemia  Patient is able to state the basic meaning of hypoglycemia?: Yes  Able to state the blood sugar range for hypoglycemia?: yes  Patient stated range:: <75  Patient can identify general symptoms of hypoglycemia: yes  Patient identified:: shakiness, dizziness  Able to state proper treatment of hypoglycemia?: yes  Patient identified:: 1/2 can soda/fruit juice, 5-6 pieces of hard candy  Patient is able to state the basic meaning of hyperglycemia?: Yes  Able to state the blood sugar range for hyperglycemia?: no (see comments)  Patient stated range:: Pt has Dexcom high alert set to 280.  Patient able to state proper treatment of hyperglycemia?: yes  Patient identified:: monitor blood sugar, take medication as recommended  Acute Complications Skills Assessment Completed: : Yes  Assessment indicates:: Instruction Needed  Area of need?: Yes    Chronic Complications  Chronic Complications Skills Assessment Completed: : No  Deferred due to:: Time    Psychosocial/Coping  Psychosocial/Coping Skills Assessment Completed: : No  Deffered due to:: Time    Assessment Summary and Plan  Based on today's diabetes care assessment, the following areas of need were identified:      Social 7/31/2023   Support No   Access to Mass Media/Tech No   Cognitive/Behavioral Health No   Culture/Pentecostal No   Communication No   Health Literacy No      Clinical 7/31/2023   Medication Adherence Yes - Assisted by lowering Dexcom high alert to notify pt sooner of hyperglycemia patterns. Reviewed ADA BG/SG target goals.    Lab Compliance No   Nutritional Status Yes - see care plan      Diabetes Self-Management Skills 7/31/2023    Diabetes Disease Process/Treatment Options No   Nutrition/Healthy Eating Yes - see care plan   Medication Yes   Provided review of current diabetes medication action, dosage, frequency, side effects, and storage guidelines. Discussed guidelines for preventing, detecting and treating hypoglycemia and hyperglycemia. Reviewed the importance of meal and medication timing with diabetes mediations for prevention of acute complications and maximum drug benefit.  Assisted with meal planning support to assist with meal size bolus estimation.   Instructed pt on how to enter bolus dose into Dexcom history.   Home Blood Glucose Monitoring Yes - see care plan   Acute Complications Yes  Discussed prevention, identification signs/symptoms and treatment of hyperglycemia and hypoglycemia and when to contact clinic.          Today's interventions were provided through individual discussion, instruction, and written materials were provided.    Patient verbalized understanding of instruction and written materials.  Pt was able to return back demonstration of instructions today. Patient understood key points, needs reinforcement and further instruction.     Diabetes Self-Management Care Plan:  Today's Diabetes Self-Management Care Plan was developed with Jaimie's input. Jaimie has agreed to work toward the following goal(s) to improve his/her overall diabetes control.      Care Plan: Diabetes Management   Updates made since 7/1/2023 12:00 AM        Problem: Healthy Eating         Goal: Eat 3 meals daily - manage carb 45grams/meal, 5-15grams/snack.    Start Date: 7/31/2023   Expected End Date: 7/31/2024   Priority: Medium   Barriers: Other (comments)   Note:    Provided meal planning / recipe resources.        Task: Reviewed the sources and role of Carbohydrate, Protein, and Fat and how each nutrient impacts blood sugar. Completed 7/31/2023        Task: Recommended replacing beverages containing high sugar content with noncaloric/sugar  "free options and/or water.         Task: Review the importance of balancing carbohydrates with each meal using portion control techniques to count servings of carbohydrate and label reading to identify serving size and amount of total carbs per serving. Completed 7/31/2023        Task: Provided Sample plate method and reviewed the use of the plate to estimate amounts of carbohydrate per meal. Completed 7/31/2023        Problem: Blood Glucose Self-Monitoring         Goal: Patient agrees to use Dexcom G7 & backup meter as directed.    Start Date: 7/31/2023   Expected End Date: 7/31/2024   Priority: High   Barriers: Other (comments)   Note:    Message to provider Ann-Marie for support on Rx "insurance preferred meter/supplies."    DEXCOM G7 CGM TRAINING  Dexcom MaintenanceNet7 & Clarity mobile apps downloaded to phone. Education was provided using "Quick Start Guide" and demo device per Dexcom protocol. [facility]arity clinic data share set-up. Pt requested provided  set up as well.      Overview:  5 minute glucose reading updates, trending arrows, BG graph screens, reports screen,  connectivity and functions.   Menus: Trend graph, start sensor, enter BG, events, alerts, settings, replace sensor, stop sensor, and shutdown   Settings:                          * Urgent Low: 55 mg/dL                          * Urgent Low Soon: On                          * Low Alert: 75 mg/dL; Snooze 15                          * High Alert: 250 mg/dL - pt requested                           * Rise Rate: Off                          * Fall Rate: Off                          * Signal Loss: On                          * Temporary Sensor Issue: On     Reviewed additional setting options.  Pt paired sensor with .    Reviewed where to find sensor insertion time and expiration date.   Reviewed appropriate calibration techniques.  Reviewed sensor site selection. Pt selected and prepped site using aseptic technique, inserted sensor, " applied overlay tape and started session.   Reviewed sensor removal from site. Discussed times to remove sensor per  guidelines include MRI or diatherapy.   Patient able to demonstrate without difficulty. Encouraged to review manual and/or training videos prior to starting another sensor.   Reviewed problem solving aspects of sensor transmission/variables that can disrupt RF transmission.  range 20 feet, but the first 3 hrs keep within 3 feet of transmitter.  Pt instructed on lag time of interstitial fluid from CBG and was advised to tx hypoglycemia and dose insulin based on SMBG values.  Dexcom technical support contact number given and examples of when to contact them discussed.            Task: Reviewed the importance of self-monitoring blood glucose and keeping logs. Completed 7/31/2023        Task: Reviewed appropriate timing and frequency to SMBG, education on parameters on when to notify provider and advised patient to bring logs to all appts with PCP/Endocrinologist/Diabetes Care Specialist. Completed 7/31/2023          Follow Up Plan   Follow up in about 2 months (around 9/30/2023).  -review Dexcom reports  -eval goals    Today's care plan and follow up schedule was discussed with patient.  Jaimie verbalized understanding of the care plan, goals, and agrees to follow up plan.        The patient was encouraged to communicate with his/her health care provider/physician and care team regarding his/her condition(s) and treatment.  I provided the patient with my contact information today and encouraged to contact me via phone or [facility] Patient Portal as needed.     Length of Visit   Total Time: 90 Minutes

## 2023-08-03 ENCOUNTER — PATIENT MESSAGE (OUTPATIENT)
Dept: PULMONOLOGY | Facility: CLINIC | Age: 73
End: 2023-08-03

## 2023-08-03 ENCOUNTER — OFFICE VISIT (OUTPATIENT)
Dept: PULMONOLOGY | Facility: CLINIC | Age: 73
End: 2023-08-03
Payer: MEDICARE

## 2023-08-03 VITALS
DIASTOLIC BLOOD PRESSURE: 62 MMHG | SYSTOLIC BLOOD PRESSURE: 98 MMHG | RESPIRATION RATE: 17 BRPM | HEIGHT: 67 IN | WEIGHT: 246.5 LBS | HEART RATE: 108 BPM | BODY MASS INDEX: 38.69 KG/M2 | OXYGEN SATURATION: 93 %

## 2023-08-03 DIAGNOSIS — R91.8 ABNORMAL CT SCAN OF LUNG: ICD-10-CM

## 2023-08-03 DIAGNOSIS — G47.33 OSA ON CPAP: Primary | ICD-10-CM

## 2023-08-03 DIAGNOSIS — E66.01 SEVERE OBESITY WITH BODY MASS INDEX (BMI) OF 35.0 TO 35.9 AND COMORBIDITY: ICD-10-CM

## 2023-08-03 PROCEDURE — 99499 UNLISTED E&M SERVICE: CPT | Mod: S$GLB,,, | Performed by: INTERNAL MEDICINE

## 2023-08-03 PROCEDURE — 3288F FALL RISK ASSESSMENT DOCD: CPT | Mod: CPTII,S$GLB,, | Performed by: INTERNAL MEDICINE

## 2023-08-03 PROCEDURE — 3044F HG A1C LEVEL LT 7.0%: CPT | Mod: CPTII,S$GLB,, | Performed by: INTERNAL MEDICINE

## 2023-08-03 PROCEDURE — 99999 PR PBB SHADOW E&M-EST. PATIENT-LVL IV: ICD-10-PCS | Mod: PBBFAC,,, | Performed by: INTERNAL MEDICINE

## 2023-08-03 PROCEDURE — 1159F MED LIST DOCD IN RCRD: CPT | Mod: CPTII,S$GLB,, | Performed by: INTERNAL MEDICINE

## 2023-08-03 PROCEDURE — 99499 RISK ADDL DX/OHS AUDIT: ICD-10-PCS | Mod: S$GLB,,, | Performed by: INTERNAL MEDICINE

## 2023-08-03 PROCEDURE — 3288F PR FALLS RISK ASSESSMENT DOCUMENTED: ICD-10-PCS | Mod: CPTII,S$GLB,, | Performed by: INTERNAL MEDICINE

## 2023-08-03 PROCEDURE — 1160F RVW MEDS BY RX/DR IN RCRD: CPT | Mod: CPTII,S$GLB,, | Performed by: INTERNAL MEDICINE

## 2023-08-03 PROCEDURE — 99999 PR PBB SHADOW E&M-EST. PATIENT-LVL IV: CPT | Mod: PBBFAC,,, | Performed by: INTERNAL MEDICINE

## 2023-08-03 PROCEDURE — 1159F PR MEDICATION LIST DOCUMENTED IN MEDICAL RECORD: ICD-10-PCS | Mod: CPTII,S$GLB,, | Performed by: INTERNAL MEDICINE

## 2023-08-03 PROCEDURE — 3044F PR MOST RECENT HEMOGLOBIN A1C LEVEL <7.0%: ICD-10-PCS | Mod: CPTII,S$GLB,, | Performed by: INTERNAL MEDICINE

## 2023-08-03 PROCEDURE — 3074F PR MOST RECENT SYSTOLIC BLOOD PRESSURE < 130 MM HG: ICD-10-PCS | Mod: CPTII,S$GLB,, | Performed by: INTERNAL MEDICINE

## 2023-08-03 PROCEDURE — 3074F SYST BP LT 130 MM HG: CPT | Mod: CPTII,S$GLB,, | Performed by: INTERNAL MEDICINE

## 2023-08-03 PROCEDURE — 3078F DIAST BP <80 MM HG: CPT | Mod: CPTII,S$GLB,, | Performed by: INTERNAL MEDICINE

## 2023-08-03 PROCEDURE — 4010F PR ACE/ARB THEARPY RXD/TAKEN: ICD-10-PCS | Mod: CPTII,S$GLB,, | Performed by: INTERNAL MEDICINE

## 2023-08-03 PROCEDURE — 1160F PR REVIEW ALL MEDS BY PRESCRIBER/CLIN PHARMACIST DOCUMENTED: ICD-10-PCS | Mod: CPTII,S$GLB,, | Performed by: INTERNAL MEDICINE

## 2023-08-03 PROCEDURE — 3078F PR MOST RECENT DIASTOLIC BLOOD PRESSURE < 80 MM HG: ICD-10-PCS | Mod: CPTII,S$GLB,, | Performed by: INTERNAL MEDICINE

## 2023-08-03 PROCEDURE — 99215 PR OFFICE/OUTPT VISIT, EST, LEVL V, 40-54 MIN: ICD-10-PCS | Mod: S$GLB,,, | Performed by: INTERNAL MEDICINE

## 2023-08-03 PROCEDURE — 99215 OFFICE O/P EST HI 40 MIN: CPT | Mod: S$GLB,,, | Performed by: INTERNAL MEDICINE

## 2023-08-03 PROCEDURE — 3008F PR BODY MASS INDEX (BMI) DOCUMENTED: ICD-10-PCS | Mod: CPTII,S$GLB,, | Performed by: INTERNAL MEDICINE

## 2023-08-03 PROCEDURE — 3008F BODY MASS INDEX DOCD: CPT | Mod: CPTII,S$GLB,, | Performed by: INTERNAL MEDICINE

## 2023-08-03 PROCEDURE — 1101F PT FALLS ASSESS-DOCD LE1/YR: CPT | Mod: CPTII,S$GLB,, | Performed by: INTERNAL MEDICINE

## 2023-08-03 PROCEDURE — 4010F ACE/ARB THERAPY RXD/TAKEN: CPT | Mod: CPTII,S$GLB,, | Performed by: INTERNAL MEDICINE

## 2023-08-03 PROCEDURE — 1101F PR PT FALLS ASSESS DOC 0-1 FALLS W/OUT INJ PAST YR: ICD-10-PCS | Mod: CPTII,S$GLB,, | Performed by: INTERNAL MEDICINE

## 2023-08-03 NOTE — PROGRESS NOTES
Subjective:     Patient ID: Jaimie Luque is a 73 y.o. female.    Chief Complaint:  Follow up for Obstructive Sleep Apnea and abnormal CT of chest (asymptomatic)    HPI Switched Continuous Positive Airway Pressure machine - new machine download    Obstructive Sleep Apnea on Continuous Positive Airway Pressure:  Patient is using CPAP as prescribed and benefiting from therapy. Patient has complaints of none  Using wisp mask  Not using chin strap      Abnormal CT of Chest:    73-year-old nonsmoker of cigarettes to seen back in follow-up examination for evaluation of abnormalities on her CT scan of the chest.  On prior studies there was a pattern of abnormalities that suggested bronchiectasis or bronchiolitis.  At this time the patient is completely asymptomatic.  She has no complaints of fever chills, no cough or chest congestion, no pleuritic chest pain.  She is noted to have abnormal pulmonary function studies consistent with an obstructive defect on prior lung function tests but the patient is asymptomatic.  She really performs strenuous activities and has no complaints of wheezing or shortness of breath. Overall she feels well.  No c/o chest congestion   No problems with weight loss  No cough or chest congestion.  Overall the pattern of abnormalities on repeated CT scans of the chest would suggest that the patient may be having silent aspiration, however her history does not clearly support this process.     Abnormal CT of Chest :  Nonsmoker who has had a number of CT scans performed for weight loss  Abnormal CT scan with mucous plugging and new nodular infiltrates on prior studies.  Today she is aymptomatic.  She today denies a cough, denies wheezing however she admits having shortness of breath sometimes on exertion.   She was initially evaluated  for pulmonary nodules and had a repeat CAT scan of chest was performed that demonstrated nodular infiltrates - unchanged from prior studies     Her initial CAT scan  was performed as a workup of weight loss.  Patient has gained couple of pounds recently.  Malignancy workup has been negative so far.   She does have obstructive sleep apnea and she is on compliant with CPAP.   Patient's weight is stable recently.  Malignancy workup has been negative so far.   Denies smoking, denies a family history of asthma or emphysema.     I have offered bronchoscopy to this patient for further evaluation of the abnormalities on CT scan of her chest but after consideration of the risks involved with the procedure and the patient's present lack of any symptoms she is were reluctant to undergo this procedure at this time.    Past Medical History:   Diagnosis Date    Diabetes mellitus type II     Hyperlipidemia     Hypertension     Hypothyroid     TALIA (obstructive sleep apnea)     on CPAP    Osteopenia     Psoriasis      Past Surgical History:   Procedure Laterality Date    BRONCHOSCOPY Bilateral 12/15/2022    Procedure: Bronchoscopy;  Surgeon: Corky Luna MD;  Location: [facility];  Service: Pulmonary;  Laterality: Bilateral;    cataract surgery Left 08/10/2022    COLONOSCOPY N/A 5/2/2017    Procedure: COLONOSCOPY;  Surgeon: Noe Penn III, MD;  Location: [facility];  Service: Endoscopy;  Laterality: N/A;    COLONOSCOPY N/A 5/4/2022    Procedure: COLONOSCOPY;  Surgeon: Elodia Swain MD;  Location: [facility];  Service: Endoscopy;  Laterality: N/A;    HYSTERECTOMY      OOPHORECTOMY      ROBOTIC ASSISTED HYSTERECTOMY       Review of patient's allergies indicates:   Allergen Reactions    Adhesive tape-silicones      Other reaction(s): skin irritation to area    Penicillins      Current Outpatient Medications on File Prior to Visit   Medication Sig Dispense Refill    aspirin 81 mg Tab Take by mouth. 1 Tablet Oral 2 times weekly       atorvastatin (LIPITOR) 20 MG tablet TAKE 1 TABLET(20 MG) BY MOUTH EVERY DAY 90 tablet 2    blood sugar diagnostic Strp 1 each by Misc.(Non-Drug; Combo Route)  route 4 (four) times daily. 300 each 3    blood-glucose meter Misc 1 each by Misc.(Non-Drug; Combo Route) route once daily. 1 each 1    blood-glucose meter,continuous (DEXCOM G7 ) Misc 1 each by Misc.(Non-Drug; Combo Route) route once. for 1 dose 1 each 0    blood-glucose sensor (DEXCOM G7 SENSOR) Gretta 1 each by Misc.(Non-Drug; Combo Route) route every 10 days. 9 each 3    blood-glucose transmitter (DEXCOM G6 TRANSMITTER) Gretta Use 1 every 90 days. 1 each 3    cholecalciferol, vitamin D3, 2,000 unit Tab Take by mouth. 1 Tablet Oral Every day      dulaglutide (TRULICITY) 4.5 mg/0.5 mL pen injector Inject 4.5 mg into the skin every 7 days. 4 pen 11    gabapentin (NEURONTIN) 300 MG capsule Take 1 capsule (300 mg total) by mouth 3 (three) times daily. 270 capsule 3    insulin (LANTUS SOLOSTAR U-100 INSULIN) glargine 100 units/mL (3mL) SubQ pen Inject 18 Units into the skin once daily. 18 mL 3    insulin lispro 100 unit/mL pen Titrate up to 100 units daily as instructed. 90 mL 3    lancets (ACCU-CHEK SOFTCLIX LANCETS) Misc Check blood sugar 3 times daily.   Dx:  E11.9 300 each 3    levothyroxine (SYNTHROID) 112 MCG tablet Take 1 tablet (112 mcg total) by mouth before breakfast. 90 tablet 3    lisinopriL (PRINIVIL,ZESTRIL) 2.5 MG tablet TAKE 1 TABLET(2.5 MG) BY MOUTH EVERY DAY 90 tablet 3    [DISCONTINUED] mycophenolate (CELLCEPT) 500 mg Tab Take 2 tablets (1,000 mg total) by mouth 2 (two) times daily. 120 tablet 2     No current facility-administered medications on file prior to visit.     Social History     Socioeconomic History    Marital status:    Tobacco Use    Smoking status: Never    Smokeless tobacco: Never   Substance and Sexual Activity    Alcohol use: Yes     Comment: rare    Drug use: No    Sexual activity: Yes     Partners: Male   Social History Narrative    . Lives with spouse. Has 3 children. Patient retired as  for [employer].      Social Determinants of Health      Financial Resource Strain: Low Risk  (5/30/2023)    Overall Financial Resource Strain (CARDIA)     Difficulty of Paying Living Expenses: Not hard at all   Food Insecurity: No Food Insecurity (5/30/2023)    Hunger Vital Sign     Worried About Running Out of Food in the Last Year: Never true     Ran Out of Food in the Last Year: Never true   Transportation Needs: No Transportation Needs (5/30/2023)    PRAPARE - Transportation     Lack of Transportation (Medical): No     Lack of Transportation (Non-Medical): No   Physical Activity: Insufficiently Active (5/30/2023)    Exercise Vital Sign     Days of Exercise per Week: 1 day     Minutes of Exercise per Session: 10 min   Stress: No Stress Concern Present (5/30/2023)    [employer] - Occupational Stress Questionnaire     Feeling of Stress : Not at all   Social Connections: Socially Integrated (5/30/2023)    Social Connection and Isolation Panel [NHANES]     Frequency of Communication with Friends and Family: More than three times a week     Frequency of Social Gatherings with Friends and Family: More than three times a week     Attends Christian Services: More than 4 times per year     Active Member of Clubs or Organizations: Yes     Attends Club or Organization Meetings: More than 4 times per year     Marital Status:    Housing Stability: Low Risk  (5/30/2023)    Housing Stability Vital Sign     Unable to Pay for Housing in the Last Year: No     Number of Places Lived in the Last Year: 1     Unstable Housing in the Last Year: No     Family History   Problem Relation Age of Onset    Heart disease Father     Diabetes Brother     Melanoma Neg Hx     Lupus Neg Hx        Review of Systems   Constitutional:  Positive for fatigue.   HENT: Negative.     Respiratory:  Positive for apnea, shortness of breath and dyspnea on extertion.    Cardiovascular: Negative.    Genitourinary: Negative.    Endocrine: endocrine negative    Musculoskeletal:  "Negative.    Skin: Negative.    Gastrointestinal: Negative.    Neurological: Negative.    Psychiatric/Behavioral:  Positive for sleep disturbance.        Objective:      BP 98/62   Pulse 108   Resp 17   Ht 5' 7" (1.702 m)   Wt 111.8 kg (246 lb 7.6 oz)   SpO2 (!) 93%   BMI 38.60 kg/m²   Physical Exam  Vitals and nursing note reviewed.   Constitutional:       Appearance: She is well-developed. She is obese.   HENT:      Head: Normocephalic and atraumatic.      Nose: Nose normal.   Eyes:      Conjunctiva/sclera: Conjunctivae normal.      Pupils: Pupils are equal, round, and reactive to light.   Neck:      Thyroid: No thyromegaly.      Vascular: No JVD.      Trachea: No tracheal deviation.   Cardiovascular:      Rate and Rhythm: Normal rate and regular rhythm.      Heart sounds: Normal heart sounds.   Pulmonary:      Effort: Pulmonary effort is normal. No respiratory distress.      Breath sounds: Normal breath sounds. No wheezing or rales.   Chest:      Chest wall: No tenderness.   Abdominal:      General: Bowel sounds are normal.      Palpations: Abdomen is soft.   Musculoskeletal:         General: Normal range of motion.      Cervical back: Neck supple.   Lymphadenopathy:      Cervical: No cervical adenopathy.   Skin:     General: Skin is warm and dry.   Neurological:      Mental Status: She is alert and oriented to person, place, and time.       Personal Diagnostic Review  Multiple CT scans reviewed:    CT Chest Without Contrast  Narrative: EXAMINATION:  CT CHEST WITHOUT CONTRAST    CLINICAL HISTORY:  Interstitial lung disease; Interstitial pulmonary disease, unspecified    TECHNIQUE:  Low dose axial images, sagittal and coronal reformations were obtained from the thoracic inlet to the lung bases. Contrast was not administered.    COMPARISON:  CT chest 11/25/2022    FINDINGS:  Mild coronary artery calcification.  No cardiomegaly, aortic aneurysm or adenopathy.    Unchanged wedge-shaped low density within the " posterior segment of the right hepatic lobe.  This is unchanged compared to prior CT chest performed 2021.    No additional abnormality below the diaphragm.    Unchanged biapical scarring.  Unchanged 11 mm nodule within the right middle lobe.  Additional smaller pulmonary nodules noted bilaterally, unchanged.    No interstitial thickening, ground-glass or airspace opacities.  Unchanged mucous plugging within the anterior segment of the right upper lobe.  No pneumothorax.  Impression: Multiple pulmonary nodules.  The largest cyst within the right middle lobe and is unchanged compared to prior CT chest performed 2021.    No interstitial prominence or acute infiltrate.    Scattered mucous plugging which appears similar to the previous exam.    Additional findings as above.    Electronically signed by: Roly Gerber  Date:    2023  Time:    10:03      Office Spirometry Results:     No flowsheet data found.  Pulmonary Studies Review 8/3/2023   SpO2 93   Height 67   Weight 3943.59   BMI (Calculated) 38.6   Predicted Distance 205.72   Predicted Distance Meters (Calculated) 342.38         Sleep Disorder Center  [facility] IT -NIGHT INTERPRET AT ION REPORT  Patient Name: Jaimie Luque Subject Code: [id number] Study Date: 2014  Page 1  Patient Name: Jaimie Luque Date of Report: 14 Date of PS2014  [facility] No.: [id number] : 1950 Time of PSG: 10:19 pm - 5:00 am  Sex: Female Age: 64 Weight: 251.0 lbs Height: 5  7.5  Type of PSG: Split night re-evaluation and titration  REASONS FOR REFERRAL: Ms Luque is a 64 year old female, referred for polysomnography by Elizabeth LeJeune, APRN,  to determine if she still needs CPAP, and if so, what the optimal pressure is. She has been using CPAP reliably and benefitting  greatly from it, but her machine is now broken and she needs a new one. Dr. Jessica Ward is the patients primary care  physician.  STUDY PARAMETERS: This study was performed in  two stages. The first part of the night involved analysis of the patient's  sleep pattern while breathing unassisted. The study was performed with a sleep technologist in attendance for the entire test  period. Video monitoring was carried out throughout the study, and the routine laboratory clinical parameters were recorded:  NOTE: The polysomnography electrophysiological record for this patient has been reviewed in its entirety by Dr. Blackwell.  SUMMARY STATEMENTS:  INTERPRETATION:  DIAGNOSTIC IMPRESSIONS  327.23 Borderline Obstructive Sleep Apnea, Adult (OSAHS)  307.44 Probable Behaviorally Induced Insufficient Sleep Syndrome  V69.4 Inadequate Sleep Hygiene  RECOMMENDATIONS:  PRIMARY TREATMENT RECOMMENDATIONS Treat, or refer to Sleep Disorders Center.  1. The diagnostic polysomnography revealed a borderline obstructive sleep apnea / hypopnea syndrome, with an Apnea +  Hypopnea Index = 5.9 events / hr asleep, (5.5 event - related arousals / hr asleep) and moderate oxygen desaturation during  events (mean SaO2 = 94 %; lowest SaO2 = 86 %; waking baseline SaO2 = 96 %). Persistent, loud snoring was noted.  2. CPAP was initiated at 1:15 am, when the CPAP re - titration criterion was met (20 or > events, or A + H I = or > 10, within  2 hrs sleep before 1:30 am).  The titration polysomnography revealed that 10 cm H2O pressure (C - Flex 3, humidity 2), the effective pressure most  completely tested, was optimal, as it reduced the rate of respiratory events 61.0 % to A + H Index = 2.3 events / hr asleep in  0.9 hrs sleep (0.18 hrs REM sleep). The overall A + H Index was 1.6 / hr asleep, with only 0.4 respiratory event - related  arousals / hr asleep for the titration trial. The mean SaO2 during events remained moderate at 93.7 %; lowest SaO2 during  events = 90 %, waking baseline SaO2 = 96 %). Snoring was nearly eliminated. Lower or higher pressure also could be  successful (6 cm A + H I = 0.0, with 0.18 hrs REM sleep in  0.69 hrs sleep; 11 cm A + H I = 3.5, with 0.08 hrs REM sleep in  0.55 hrs sleep), or autoCPAP (4 cm - 20 cm) could be tried if necessary. A standard ResMed Mirage FX nasal CPAP mask  was used and was not well - tolerated.(reduced sleep time due to delayed sleep onset, high rate of spontaneous transient  arousals). A chin strap was required to reduce the effects of oral breathing. Please see PAP trial outcomes table below.  Therapy Device Titration Chart  Treatment  Level TIB REM Non-REM Obs. Aydin. Mixed RERA Total AHI RDI Max. Min. Mean  (cm. H2O) Apnea Apnea Apnea Hyp SpO2% SpO2% SpO2%  CPAP 6 1:49:47 0:10:29 0:31:00 0 0 0 0 0 0.0 0.0 96.0 90.0 93.1  CPAP 8 0:26:05 0:11:01 0:13:33 0 0 0 0 0 0.0 0.0 95.0 91.0 93.0  CPAP 10 0:52:26 0:04:59 0:47:27 0 2 0 0 0 2.3 2.3 96.0 91.0 93.4  CPAP 11 0:36:22 0:05:31 0:28:21 0 1 1 0 0 3.5 3.5 96.0 92.0 94.2  TIME  (hrs:min:sec)  (AASM) APNEA & RERA AASM 3% Desat OXIMETRY  3. Weight loss to the normal range is recommended as it can decrease respiratory events and snoring in overweight patients.  4. Given the patients long and positive experience with CPAP, an habituation program is not needed, but care to provide the  correct mask should be taken.  [facility]  Sleep Disorder Center  [facility] IT -NIGHT INTERPRET AT ION REPORT  Patient Name: Jaimie Luque Subject Code: [id number] Study Date: 8/13/2014  Page 2  5. The following changes in sleep hygiene / sleep - related behavior are recommended after medical treatments are successful   Set time for sleep to number of hours of sleep needed for adequate daytime functioning (7.5 to 9.0 hrs / night).   Regular bedtimes and wake times, including weekends: Total sleep time / night should not be more than one hour more  than usual, and bedtime or wake time should not be more than one hour earlier or later than usual.   Do not attempt to make up lost sleep by extending sleep periods.   Avoid naps; none longer than 20 min or  later than mid - afternoon.  SECONDARY TREATMENT RECOMMENDATIONS Treat, or refer to [facility] if problems are not satisfactorily resolved by the above.  1. 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 Consider treatment of PLM disorder only if PLMS symptoms are sufficiently bothersome to the patient.  2. Follow - up inquiry regarding frequency, duration and nature of reported sleep loss (possible role of OSAHS; r/o voluntary  sleep restriction).  Thank you for referring this patient to the [facility].  Keaton Blackwell, Ph.D., ABPP; Diplomate, American Board of Sleep Medicine  DIAGNOSTIC ANALYSIS (Part One)  SLEEP ARCHITECTURE & STAGING: The diagnostic portion of the study began at 10:19:27 PM and ended at 1:08:50 AM, for  a recording time of 169.4 minutes. The sleep period lasted 166.8 minutes (2.8 hrs) and the total sleep time (TST) was 162.3  minutes (2.7 hrs), which resulted in a sleep efficiency (TST·TRT) of 95.8 %. The sleep latency (SL) was 2.6 minutes, and the  latency to the first occurrence of Stage R was 68.5 minutes. There were 1 Stage R periods observed on this study night, 3  awakenings (i.e. transitions to Stage W from any sleep stage), and 74 total stage transitions.  Stage Distribution ( in min.)  4.5  18.0  78.3  51.0  15.0  WASO N1 N2 N3 R  Sleep Stage (%TS      Assessment:       Abnormal CT scan of lung  Asymtomatic  Multiple CT scans since 2018 with stable nodules  No further evaluation needed    TALIA on CPAP  -     CPAP/BIPAP SUPPLIES    Abnormal CT scan of lung    Severe obesity with body mass index (BMI) of 35.0 to 35.9 and comorbidity          Outpatient Encounter Medications as of 8/3/2023   Medication Sig Dispense Refill    aspirin 81 mg Tab Take by mouth. 1 Tablet  Oral 2 times weekly       atorvastatin (LIPITOR) 20 MG tablet TAKE 1 TABLET(20 MG) BY MOUTH EVERY DAY 90 tablet 2    blood sugar diagnostic Strp 1 each by Misc.(Non-Drug; Combo Route) route 4 (four) times daily. 300 each 3    blood-glucose meter Misc 1 each by Misc.(Non-Drug; Combo Route) route once daily. 1 each 1    blood-glucose meter,continuous (DEXCOM G7 ) Misc 1 each by Misc.(Non-Drug; Combo Route) route once. for 1 dose 1 each 0    blood-glucose sensor (DEXCOM G7 SENSOR) Gretta 1 each by Misc.(Non-Drug; Combo Route) route every 10 days. 9 each 3    blood-glucose transmitter (DEXCOM G6 TRANSMITTER) Gretta Use 1 every 90 days. 1 each 3    cholecalciferol, vitamin D3, 2,000 unit Tab Take by mouth. 1 Tablet Oral Every day      dulaglutide (TRULICITY) 4.5 mg/0.5 mL pen injector Inject 4.5 mg into the skin every 7 days. 4 pen 11    gabapentin (NEURONTIN) 300 MG capsule Take 1 capsule (300 mg total) by mouth 3 (three) times daily. 270 capsule 3    insulin (LANTUS SOLOSTAR U-100 INSULIN) glargine 100 units/mL (3mL) SubQ pen Inject 18 Units into the skin once daily. 18 mL 3    insulin lispro 100 unit/mL pen Titrate up to 100 units daily as instructed. 90 mL 3    lancets (ACCU-CHEK SOFTCLIX LANCETS) Misc Check blood sugar 3 times daily.   Dx:  E11.9 300 each 3    levothyroxine (SYNTHROID) 112 MCG tablet Take 1 tablet (112 mcg total) by mouth before breakfast. 90 tablet 3    lisinopriL (PRINIVIL,ZESTRIL) 2.5 MG tablet TAKE 1 TABLET(2.5 MG) BY MOUTH EVERY DAY 90 tablet 3    [DISCONTINUED] blood-glucose meter,continuous (DEXCOM G6 ) Misc Use daily. 1 each 0    [DISCONTINUED] blood-glucose sensor (DEXCOM G6 SENSOR) Gretta Use 1 every 10 days. 9 each 3    [DISCONTINUED] mycophenolate (CELLCEPT) 500 mg Tab Take 2 tablets (1,000 mg total) by mouth 2 (two) times daily. 120 tablet 2    [DISCONTINUED] semaglutide (OZEMPIC) 2 mg/dose (8 mg/3 mL) PnIj Inject 2 mg into the skin every 7 days. 9 mL 3     No  facility-administered encounter medications on file as of 8/3/2023.     Plan:       Requested Prescriptions      No prescriptions requested or ordered in this encounter     Problem List Items Addressed This Visit       Abnormal CT scan of lung    Current Assessment & Plan     Asymtomatic  Multiple CT scans since 2018 with stable nodules  No further evaluation needed         TALIA on CPAP - Primary    Relevant Orders    CPAP/BIPAP SUPPLIES    Severe obesity with body mass index (BMI) of 35.0 to 35.9 and comorbidity        Follow up in about 1 year (around 8/3/2024) for CPAP download - annual review.    MEDICAL DECISION MAKING: Moderate to high complexity.  Overall, the multiple problems listed are of moderate to high severity that may impact quality of life and activities of daily living. Side effects of medications, treatment plan as well as options and alternatives reviewed and discussed with patient. There was counseling of patient concerning these issues.    Total time spent in counseling and coordination of care - 40  minutes of total time spent on the encounter, which includes face to face time and non-face to face time preparing to see the patient (eg, review of tests), Obtaining and/or reviewing separately obtained history, Documenting clinical information in the electronic or other health record, Independently interpreting results (not separately reported) and communicating results to the patient/family/caregiver, or Care coordination (not separately reported).    Time was used in discussion of prognosis, risks, benefits of treatment, instructions and compliance with regimen . Discussion with other physicians and/or health care providers - home health or for use of durable medical equipment (oxygen, nebulizers, CPAP, BiPAP) occurred.

## 2023-08-08 DIAGNOSIS — E03.9 ACQUIRED HYPOTHYROIDISM: ICD-10-CM

## 2023-08-08 RX ORDER — LEVOTHYROXINE SODIUM 112 UG/1
100 TABLET ORAL
Qty: 90 TABLET | Refills: 3 | Status: SHIPPED | OUTPATIENT
Start: 2023-08-08

## 2023-08-16 ENCOUNTER — PATIENT MESSAGE (OUTPATIENT)
Dept: DIABETES | Facility: CLINIC | Age: 73
End: 2023-08-16
Payer: MEDICARE

## 2023-08-19 DIAGNOSIS — E11.65 UNCONTROLLED TYPE 2 DIABETES MELLITUS WITH HYPERGLYCEMIA: ICD-10-CM

## 2023-08-21 RX ORDER — INSULIN GLARGINE 100 [IU]/ML
18 INJECTION, SOLUTION SUBCUTANEOUS DAILY
Qty: 18 ML | Refills: 3 | OUTPATIENT
Start: 2023-08-21 | End: 2024-08-20

## 2023-08-21 RX ORDER — INSULIN GLARGINE 100 [IU]/ML
INJECTION, SOLUTION SUBCUTANEOUS
Qty: 6 ML | Refills: 11 | Status: SHIPPED | OUTPATIENT
Start: 2023-08-21 | End: 2023-11-30 | Stop reason: SDUPTHER

## 2023-08-23 ENCOUNTER — PATIENT MESSAGE (OUTPATIENT)
Dept: DIABETES | Facility: CLINIC | Age: 73
End: 2023-08-23
Payer: MEDICARE

## 2023-08-24 ENCOUNTER — OFFICE VISIT (OUTPATIENT)
Dept: DIABETES | Facility: CLINIC | Age: 73
End: 2023-08-24
Payer: MEDICARE

## 2023-08-24 DIAGNOSIS — E03.9 ACQUIRED HYPOTHYROIDISM: ICD-10-CM

## 2023-08-24 DIAGNOSIS — Z79.4 TYPE 2 DIABETES MELLITUS WITH STAGE 3A CHRONIC KIDNEY DISEASE, WITH LONG-TERM CURRENT USE OF INSULIN: Primary | ICD-10-CM

## 2023-08-24 DIAGNOSIS — E11.22 TYPE 2 DIABETES MELLITUS WITH STAGE 3A CHRONIC KIDNEY DISEASE, WITH LONG-TERM CURRENT USE OF INSULIN: Primary | ICD-10-CM

## 2023-08-24 DIAGNOSIS — N18.31 TYPE 2 DIABETES MELLITUS WITH STAGE 3A CHRONIC KIDNEY DISEASE, WITH LONG-TERM CURRENT USE OF INSULIN: Primary | ICD-10-CM

## 2023-08-24 DIAGNOSIS — E78.5 HYPERLIPIDEMIA ASSOCIATED WITH TYPE 2 DIABETES MELLITUS: ICD-10-CM

## 2023-08-24 DIAGNOSIS — E11.36 CATARACT ASSOCIATED WITH TYPE 2 DIABETES MELLITUS: ICD-10-CM

## 2023-08-24 DIAGNOSIS — E66.9 OBESITY (BMI 30-39.9): ICD-10-CM

## 2023-08-24 DIAGNOSIS — E11.69 HYPERLIPIDEMIA ASSOCIATED WITH TYPE 2 DIABETES MELLITUS: ICD-10-CM

## 2023-08-24 DIAGNOSIS — G47.33 OSA ON CPAP: ICD-10-CM

## 2023-08-24 PROCEDURE — 3044F HG A1C LEVEL LT 7.0%: CPT | Mod: CPTII,95,, | Performed by: PHYSICIAN ASSISTANT

## 2023-08-24 PROCEDURE — 4010F PR ACE/ARB THEARPY RXD/TAKEN: ICD-10-PCS | Mod: CPTII,95,, | Performed by: PHYSICIAN ASSISTANT

## 2023-08-24 PROCEDURE — 4010F ACE/ARB THERAPY RXD/TAKEN: CPT | Mod: CPTII,95,, | Performed by: PHYSICIAN ASSISTANT

## 2023-08-24 PROCEDURE — 95251 CONT GLUC MNTR ANALYSIS I&R: CPT | Mod: NDTC,,, | Performed by: PHYSICIAN ASSISTANT

## 2023-08-24 PROCEDURE — 95251 PR GLUCOSE MONITOR, 72 HOUR, PHYS INTERP: ICD-10-PCS | Mod: NDTC,,, | Performed by: PHYSICIAN ASSISTANT

## 2023-08-24 PROCEDURE — 3044F PR MOST RECENT HEMOGLOBIN A1C LEVEL <7.0%: ICD-10-PCS | Mod: CPTII,95,, | Performed by: PHYSICIAN ASSISTANT

## 2023-08-24 PROCEDURE — 99214 PR OFFICE/OUTPT VISIT, EST, LEVL IV, 30-39 MIN: ICD-10-PCS | Mod: 95,,, | Performed by: PHYSICIAN ASSISTANT

## 2023-08-24 PROCEDURE — 99214 OFFICE O/P EST MOD 30 MIN: CPT | Mod: 95,,, | Performed by: PHYSICIAN ASSISTANT

## 2023-08-24 RX ORDER — SEMAGLUTIDE 2.68 MG/ML
2 INJECTION, SOLUTION SUBCUTANEOUS
Qty: 9 ML | Refills: 3 | Status: SHIPPED | OUTPATIENT
Start: 2023-08-24

## 2023-08-24 NOTE — PROGRESS NOTES
PCP: Jessica Ward MD    Subjective:     Chief Complaint: Diabetes - Established Patient    TELEMEDICINE VISIT:     The patient location is: Home  The chief complaint leading to consultation is: Diabetes Follow up  Visit type: Virtual visit with synchronous audio and video  Total time spent with patient: 20  Each patient to whom he or she provides medical services by telemedicine is:  (1) informed of the relationship between the physician and patient and the respective role of any other health care provider with respect to management of the patient; and (2) notified that he or she may decline to receive medical services by telemedicine and may withdraw from such care at any time.    Notes: N/A        HISTORY OF PRESENT ILLNESS: 73 y.o.   female presenting for diabetes management visit.   The patient's last visit with me was on 7/24/2023.   Patient has had Type II diabetes since 20 or more years.  Pertinent to decision making is the following comorbidities: HLD, Hypothyroidism and Obesity by BMI  Patient has the following Diabetes complications: without complications  She has attended diabetes education in the past.     Patient's most recent A1c of 6.8% was completed 3 months ago.   Patient states since Her last A1c Her blood glucose levels have been high  throughout the day .   Patient monitors blood glucose 4 times per day and Continuously with personal CGM Dexcom.   Patient blood glucose monitoring device will be uploaded into [employer] [facility] today.        Patients records shows postprandial hyperglycemia throughout the day with baseline mild hyperglycemia.     Patient endorses the following diabetes related symptoms:  None .   Patient is due today for the following diabetes-related health maintenance standards: Eye Exam, COVID-19 Vaccine , and Colonoscopy . Completed at [facility].   She denies any recent hospital admissions or emergency room visits. Patient denies history of DKA.   She  voices having hypoglycemia as above..   Patient's concerns today include glycemic control. Of note, patient is having Cellcept titrated up over last month which may be contributing to hyperglycemia. First dose with meal in am and 2nd dose usually 6 or 7p - not always with meal.   Patient medication regimen is as below.      CURRENT DM MEDICATIONS:   Lantus 9 units daily   Trulicity 4.5 mg weekly   Glipizide 5 mg before bed time snack   Humalog 14 units with small / 18 units with regular / 24 units with heavier carb meal / 8 units with snack TID wm  Humalog correction dosing every 3 hours as below    Humalog Correction Dosing every 3 hours as needed OUTSIDE OF EATING  If  - 250, may take 2 units of Humalog  If  - 300, may take 4 units of Humalog   If  - 350, may take 6 units of Humalog  If  - 400, may take 8 units of Humalog  If +, may take 10 units of Humalog         Patient has failed the following Diabetes medications:   Metformin - GI   Invokana - coverage  Jardiance - yeast infection  Glyburide   Victoza   Basal - Basaglar  Ozempic 2 mg - Stopped due to backorder      Labs Reviewed.       Lab Results   Component Value Date    CPEPTIDE 2.35 04/01/2021     Lab Results   Component Value Date    GLUTAMICACID 0.00 05/25/2017          //   , There is no height or weight on file to calculate BMI.  Her blood sugar in clinic today is:    Lab Results   Component Value Date    POCGLU 160 (A) 12/15/2022       Review of Systems   Constitutional:  Negative for activity change, appetite change, chills and fever.   HENT:  Negative for dental problem, mouth sores, nosebleeds, sore throat and trouble swallowing.    Eyes:  Negative for pain and discharge.   Respiratory:  Negative for shortness of breath, wheezing and stridor.    Cardiovascular:  Negative for chest pain, palpitations and leg swelling.   Gastrointestinal:  Negative for abdominal pain, diarrhea, nausea and vomiting.   Endocrine: Negative  for polydipsia, polyphagia and polyuria.   Genitourinary:  Negative for dysuria, frequency and urgency.   Musculoskeletal:  Negative for joint swelling and myalgias.   Skin:  Negative for rash and wound.   Neurological:  Negative for dizziness, syncope, weakness and headaches.   Psychiatric/Behavioral:  Negative for behavioral problems and dysphoric mood.          Diabetes Management Status  Statin: Taking  ACE/ARB: Taking    Screening or Prevention Patient's value Goal Complete/Controlled?   HgA1C Testing and Control   Lab Results   Component Value Date    HGBA1C 6.8 (H) 05/25/2023      Annually/Less than 8% No   Lipid profile : 10/13/2022 Annually Yes   LDL control Lab Results   Component Value Date    LDLCALC 51.2 (L) 10/13/2022    Annually/Less than 100 mg/dl  Yes   Nephropathy screening Lab Results   Component Value Date    MICALBCREAT 3.5 10/13/2022     Lab Results   Component Value Date    PROTEINUA Negative 04/01/2021    Annually Yes   Blood pressure BP Readings from Last 1 Encounters:   08/03/23 98/62    Less than 140/90 Yes   Dilated retinal exam : 08/24/2022 Annually Yes    Foot exam   : 11/17/2022 Annually Yes     ACTIVITY LEVEL: Moderately Active. Discussed activities, benefits, methods, and precautions.  MEAL PLANNING: Patient reports number of meals per day to be 3 and number of snacks per day to be 2.   Patient is encouraged to carb count and consume no more than 30 - 45 grams of carbohydrates in each meal and 15 grams of carbohydrates in each snack.     Social History     Socioeconomic History    Marital status:    Tobacco Use    Smoking status: Never    Smokeless tobacco: Never   Substance and Sexual Activity    Alcohol use: Yes     Comment: rare    Drug use: No    Sexual activity: Yes     Partners: Male   Social History Narrative    . Lives with spouse. Has 3 children. Patient retired as  for [employer].      Social Determinants of Health     Financial Resource  Strain: Low Risk  (5/30/2023)    Overall Financial Resource Strain (CARDIA)     Difficulty of Paying Living Expenses: Not hard at all   Food Insecurity: No Food Insecurity (5/30/2023)    Hunger Vital Sign     Worried About Running Out of Food in the Last Year: Never true     Ran Out of Food in the Last Year: Never true   Transportation Needs: No Transportation Needs (5/30/2023)    PRAPARE - Transportation     Lack of Transportation (Medical): No     Lack of Transportation (Non-Medical): No   Physical Activity: Insufficiently Active (5/30/2023)    Exercise Vital Sign     Days of Exercise per Week: 1 day     Minutes of Exercise per Session: 10 min   Stress: No Stress Concern Present (5/30/2023)    [employer] - Occupational Stress Questionnaire     Feeling of Stress : Not at all   Social Connections: Socially Integrated (5/30/2023)    Social Connection and Isolation Panel [NHANES]     Frequency of Communication with Friends and Family: More than three times a week     Frequency of Social Gatherings with Friends and Family: More than three times a week     Attends Moravian Services: More than 4 times per year     Active Member of Clubs or Organizations: Yes     Attends Club or Organization Meetings: More than 4 times per year     Marital Status:    Housing Stability: Low Risk  (5/30/2023)    Housing Stability Vital Sign     Unable to Pay for Housing in the Last Year: No     Number of Places Lived in the Last Year: 1     Unstable Housing in the Last Year: No     Past Medical History:   Diagnosis Date    Diabetes mellitus type II     Hyperlipidemia     Hypertension     Hypothyroid     TALIA (obstructive sleep apnea)     on CPAP    Osteopenia     Psoriasis        Objective:        Physical Exam  Constitutional:       General: She is not in acute distress.     Appearance: She is well-developed. She is not diaphoretic.   HENT:      Head: Normocephalic and atraumatic.      Right Ear:  External ear normal.      Left Ear: External ear normal.      Nose: Nose normal.   Eyes:      General:         Right eye: No discharge.         Left eye: No discharge.   Pulmonary:      Effort: Pulmonary effort is normal. No respiratory distress.      Breath sounds: No stridor.   Musculoskeletal:         General: Normal range of motion.      Cervical back: Normal range of motion.   Skin:     Coloration: Skin is not pale.   Neurological:      Mental Status: She is alert and oriented to person, place, and time. Mental status is at baseline.      Motor: No abnormal muscle tone.      Coordination: Coordination normal.   Psychiatric:         Mood and Affect: Mood normal.         Behavior: Behavior normal.         Thought Content: Thought content normal.         Judgment: Judgment normal.         Assessment / Plan:     Type 2 diabetes mellitus with stage 3a chronic kidney disease, with long-term current use of insulin  -     semaglutide (OZEMPIC) 2 mg/dose (8 mg/3 mL) PnIj; Inject 2 mg into the skin every 7 days.  Dispense: 9 mL; Refill: 3    Cataract associated with type 2 diabetes mellitus    Hyperlipidemia associated with type 2 diabetes mellitus    Acquired hypothyroidism    TALIA on CPAP    Obesity (BMI 30-39.9)        Additional Plan Details:    - POCT Glucose  - Encouraged continuation of lifestyle changes including regular exercise and limiting carbohydrates to 30-45 grams per meal threes times daily and 15 grams per snack with a limit of two daily.   - Encouraged continued monitoring of blood glucose with maintenance of 4 times daily and Continuously with personal CGM Dexcom.    - Current DM Medication Regimen: Change Lantus 10 units daily. Continue Trulicity 4.5 mg weekly due to backorder - will put in reorder for Ozempic 2 mg weekly. Continue Glipizide 5 mg before night time snack if eating one. Change Humalog 14 units with small / 18 units with regular / 22 units with heavier carb meal / 8 units with snack TID  wm. ADJUST INSULIN FREQUENTLY BASED ON BLOOD SUGAR.   - Health Maintenance standards addressed today: Eye Exam - completed outside of [facility]; will sign ROR today for records, COVID - 19 Vaccine - patient will schedule outside of [facility] , and Colonoscopy - deferred to PCP  - Nursing Visit: Patient is age 79 or younger with an A1c of 7.5 or greater and  qualifies for nursing visit, but will defer for now.  .   - Follow up in 8 weeks with A1c prior.    CURRENT DM MEDICATIONS:   Lantus 10 units daily   Trulicity 4.5 mg weekly   Glipizide 5 mg before bed time snack   Humalog 14 units with small / 18 units with regular / 22 units with heavier carb meal / 8 units with snack TID wm  Humalog correction dosing every 3 hours as below    Humalog Correction Dosing every 3 hours as needed OUTSIDE OF EATING  If  - 250, may take 2 units of Humalog  If  - 300, may take 4 units of Humalog   If  - 350, may take 6 units of Humalog  If  - 400, may take 8 units of Humalog  If +, may take 10 units of Humalog             Blakeney McKnight, PA-C [facility]

## 2023-08-24 NOTE — PATIENT INSTRUCTIONS
CURRENT DM MEDICATIONS:   Lantus 10 units daily   Trulicity 4.5 mg weekly   Glipizide 5 mg before bed time snack   Humalog 14 units with small / 18 units with regular / 22 units with heavier carb meal / 8 units with snack TID wm  Humalog correction dosing every 3 hours as below    Humalog Correction Dosing every 3 hours as needed OUTSIDE OF EATING  If  - 250, may take 2 units of Humalog  If  - 300, may take 4 units of Humalog   If  - 350, may take 6 units of Humalog  If  - 400, may take 8 units of Humalog  If +, may take 10 units of Humalog

## 2023-08-25 ENCOUNTER — PATIENT MESSAGE (OUTPATIENT)
Dept: PRIMARY CARE CLINIC | Facility: CLINIC | Age: 73
End: 2023-08-25
Payer: MEDICARE

## 2023-09-13 ENCOUNTER — OFFICE VISIT (OUTPATIENT)
Dept: RHEUMATOLOGY | Facility: CLINIC | Age: 73
End: 2023-09-13
Payer: MEDICARE

## 2023-09-13 VITALS
SYSTOLIC BLOOD PRESSURE: 118 MMHG | HEART RATE: 109 BPM | DIASTOLIC BLOOD PRESSURE: 63 MMHG | HEIGHT: 67 IN | BODY MASS INDEX: 38.27 KG/M2 | WEIGHT: 243.81 LBS

## 2023-09-13 DIAGNOSIS — M35.1 MCTD (MIXED CONNECTIVE TISSUE DISEASE): Primary | ICD-10-CM

## 2023-09-13 DIAGNOSIS — D84.9 IMMUNOCOMPROMISED: ICD-10-CM

## 2023-09-13 DIAGNOSIS — M17.0 PRIMARY OSTEOARTHRITIS OF BOTH KNEES: ICD-10-CM

## 2023-09-13 DIAGNOSIS — R91.8 MULTIPLE LUNG NODULES ON CT: ICD-10-CM

## 2023-09-13 DIAGNOSIS — R76.8 ANA POSITIVE: ICD-10-CM

## 2023-09-13 DIAGNOSIS — J84.9 ILD (INTERSTITIAL LUNG DISEASE): ICD-10-CM

## 2023-09-13 PROCEDURE — 99999 PR PBB SHADOW E&M-EST. PATIENT-LVL III: CPT | Mod: PBBFAC,,, | Performed by: PHYSICIAN ASSISTANT

## 2023-09-13 PROCEDURE — 3074F PR MOST RECENT SYSTOLIC BLOOD PRESSURE < 130 MM HG: ICD-10-PCS | Mod: CPTII,S$GLB,, | Performed by: PHYSICIAN ASSISTANT

## 2023-09-13 PROCEDURE — 3288F FALL RISK ASSESSMENT DOCD: CPT | Mod: CPTII,S$GLB,, | Performed by: PHYSICIAN ASSISTANT

## 2023-09-13 PROCEDURE — 1126F AMNT PAIN NOTED NONE PRSNT: CPT | Mod: CPTII,S$GLB,, | Performed by: PHYSICIAN ASSISTANT

## 2023-09-13 PROCEDURE — 1101F PR PT FALLS ASSESS DOC 0-1 FALLS W/OUT INJ PAST YR: ICD-10-PCS | Mod: CPTII,S$GLB,, | Performed by: PHYSICIAN ASSISTANT

## 2023-09-13 PROCEDURE — 4010F ACE/ARB THERAPY RXD/TAKEN: CPT | Mod: CPTII,S$GLB,, | Performed by: PHYSICIAN ASSISTANT

## 2023-09-13 PROCEDURE — 3288F PR FALLS RISK ASSESSMENT DOCUMENTED: ICD-10-PCS | Mod: CPTII,S$GLB,, | Performed by: PHYSICIAN ASSISTANT

## 2023-09-13 PROCEDURE — 1101F PT FALLS ASSESS-DOCD LE1/YR: CPT | Mod: CPTII,S$GLB,, | Performed by: PHYSICIAN ASSISTANT

## 2023-09-13 PROCEDURE — 3044F PR MOST RECENT HEMOGLOBIN A1C LEVEL <7.0%: ICD-10-PCS | Mod: CPTII,S$GLB,, | Performed by: PHYSICIAN ASSISTANT

## 2023-09-13 PROCEDURE — 3044F HG A1C LEVEL LT 7.0%: CPT | Mod: CPTII,S$GLB,, | Performed by: PHYSICIAN ASSISTANT

## 2023-09-13 PROCEDURE — 1126F PR PAIN SEVERITY QUANTIFIED, NO PAIN PRESENT: ICD-10-PCS | Mod: CPTII,S$GLB,, | Performed by: PHYSICIAN ASSISTANT

## 2023-09-13 PROCEDURE — 99999 PR PBB SHADOW E&M-EST. PATIENT-LVL III: ICD-10-PCS | Mod: PBBFAC,,, | Performed by: PHYSICIAN ASSISTANT

## 2023-09-13 PROCEDURE — 3078F DIAST BP <80 MM HG: CPT | Mod: CPTII,S$GLB,, | Performed by: PHYSICIAN ASSISTANT

## 2023-09-13 PROCEDURE — 1159F MED LIST DOCD IN RCRD: CPT | Mod: CPTII,S$GLB,, | Performed by: PHYSICIAN ASSISTANT

## 2023-09-13 PROCEDURE — 3008F BODY MASS INDEX DOCD: CPT | Mod: CPTII,S$GLB,, | Performed by: PHYSICIAN ASSISTANT

## 2023-09-13 PROCEDURE — 99213 OFFICE O/P EST LOW 20 MIN: CPT | Mod: S$GLB,,, | Performed by: PHYSICIAN ASSISTANT

## 2023-09-13 PROCEDURE — 3008F PR BODY MASS INDEX (BMI) DOCUMENTED: ICD-10-PCS | Mod: CPTII,S$GLB,, | Performed by: PHYSICIAN ASSISTANT

## 2023-09-13 PROCEDURE — 3078F PR MOST RECENT DIASTOLIC BLOOD PRESSURE < 80 MM HG: ICD-10-PCS | Mod: CPTII,S$GLB,, | Performed by: PHYSICIAN ASSISTANT

## 2023-09-13 PROCEDURE — 1160F PR REVIEW ALL MEDS BY PRESCRIBER/CLIN PHARMACIST DOCUMENTED: ICD-10-PCS | Mod: CPTII,S$GLB,, | Performed by: PHYSICIAN ASSISTANT

## 2023-09-13 PROCEDURE — 3074F SYST BP LT 130 MM HG: CPT | Mod: CPTII,S$GLB,, | Performed by: PHYSICIAN ASSISTANT

## 2023-09-13 PROCEDURE — 99213 PR OFFICE/OUTPT VISIT, EST, LEVL III, 20-29 MIN: ICD-10-PCS | Mod: S$GLB,,, | Performed by: PHYSICIAN ASSISTANT

## 2023-09-13 PROCEDURE — 4010F PR ACE/ARB THEARPY RXD/TAKEN: ICD-10-PCS | Mod: CPTII,S$GLB,, | Performed by: PHYSICIAN ASSISTANT

## 2023-09-13 PROCEDURE — 1159F PR MEDICATION LIST DOCUMENTED IN MEDICAL RECORD: ICD-10-PCS | Mod: CPTII,S$GLB,, | Performed by: PHYSICIAN ASSISTANT

## 2023-09-13 PROCEDURE — 1160F RVW MEDS BY RX/DR IN RCRD: CPT | Mod: CPTII,S$GLB,, | Performed by: PHYSICIAN ASSISTANT

## 2023-09-13 NOTE — PROGRESS NOTES
Subjective:      Patient ID: Jaimie Luque is a 73 y.o. female.    Chief Complaint: Interstitial Lung Disease      HPI   Jaimie Luque  is a 73 y.o. female here for follow-up on mixed connective tissue disease with ILD.  One of my colleagues started CellCept and titrated up to 1000 mg b.i.d..  She tolerated it well.  However, patient was concerned about potential side effects and breathing was stable, so she self DC'ed in June.     She was diagnosed with ILD by Dr. Ivey based on PFT and CT chest.  She was noted to have positive RADHA as well as elevated sed rate.  Further workup showed positive U1 RNP.  Recent Pulm f/u w CT was stable.  No interstitial processes noted.    No complaints of tender swollen joints today.  She does have occasional pain in her knees.  Previously saw Orthopedics and diagnosed with osteoarthritis.  Corticosteroid injections deferred previously.      Also with personal history of skin psoriasis a young age.  Over time symptoms improved on its own.  She relates improved psoriasis symptoms in her 20s when she quit drinking orange juice.  Flares occasionally if she has some OJ.  Not bad enough to see derm pert pt.    Patient denies fevers, chills, photosensitivity, eye pain, chest pain, hematuria, blood in the stool, rash, sicca symptoms, raynauds, finger ulcerations.  Rheumatologic systems otherwise negative.    Serologies/Labs:  +RADHA 1:160 homogenous and speckled, neg profile  +U1RNP, o/w Myomarker Panel 3 negative  Neg ANCA, scl 70  Neg CCP, RF  Current Treatment:  na  Previous Treatment:   Cellcept 1000 mg bid - self DCed      Current Outpatient Medications:     aspirin 81 mg Tab, Take by mouth. 1 Tablet Oral 2 times weekly , Disp: , Rfl:     atorvastatin (LIPITOR) 20 MG tablet, TAKE 1 TABLET(20 MG) BY MOUTH EVERY DAY, Disp: 90 tablet, Rfl: 2    blood sugar diagnostic Strp, 1 each by Misc.(Non-Drug; Combo Route) route 4 (four) times daily., Disp: 300 each, Rfl: 3    blood-glucose  meter Misc, 1 each by Misc.(Non-Drug; Combo Route) route once daily., Disp: 1 each, Rfl: 1    blood-glucose sensor (DEXCOM G7 SENSOR) Gretta, 1 each by Misc.(Non-Drug; Combo Route) route every 10 days., Disp: 9 each, Rfl: 3    cholecalciferol, vitamin D3, 2,000 unit Tab, Take by mouth. 1 Tablet Oral Every day, Disp: , Rfl:     gabapentin (NEURONTIN) 300 MG capsule, Take 1 capsule (300 mg total) by mouth 3 (three) times daily., Disp: 270 capsule, Rfl: 3    insulin (LANTUS SOLOSTAR U-100 INSULIN) glargine 100 units/mL SubQ pen, ADMINISTER 18 UNITS UNDER THE SKIN EVERY DAY, Disp: 6 mL, Rfl: 11    insulin lispro 100 unit/mL pen, Titrate up to 100 units daily as instructed., Disp: 90 mL, Rfl: 3    lancets (ACCU-CHEK SOFTCLIX LANCETS) Misc, Check blood sugar 3 times daily.   Dx:  E11.9, Disp: 300 each, Rfl: 3    levothyroxine (SYNTHROID) 112 MCG tablet, Take 1 tablet (112 mcg total) by mouth before breakfast., Disp: 90 tablet, Rfl: 3    lisinopriL (PRINIVIL,ZESTRIL) 2.5 MG tablet, TAKE 1 TABLET(2.5 MG) BY MOUTH EVERY DAY, Disp: 90 tablet, Rfl: 3    semaglutide (OZEMPIC) 2 mg/dose (8 mg/3 mL) PnIj, Inject 2 mg into the skin every 7 days., Disp: 9 mL, Rfl: 3    blood-glucose meter,continuous (DEXCOM G7 ) Misc, 1 each by Misc.(Non-Drug; Combo Route) route once. for 1 dose, Disp: 1 each, Rfl: 0    blood-glucose transmitter (DEXCOM G6 TRANSMITTER) Gretta, Use 1 every 90 days., Disp: 1 each, Rfl: 3    Past Medical History:   Diagnosis Date    Diabetes mellitus type II     Hyperlipidemia     Hypertension     Hypothyroid     TALIA (obstructive sleep apnea)     on CPAP    Osteopenia     Psoriasis      Family History   Problem Relation Age of Onset    Heart disease Father     Diabetes Brother     Melanoma Neg Hx     Lupus Neg Hx      Social History     Socioeconomic History    Marital status:    Tobacco Use    Smoking status: Never    Smokeless tobacco: Never   Substance and Sexual Activity    Alcohol use: Yes     Comment:  rare    Drug use: No    Sexual activity: Yes     Partners: Male   Social History Narrative    . Lives with spouse. Has 3 children. Patient retired as  for [employer].      Social Determinants of Health     Financial Resource Strain: Low Risk  (5/30/2023)    Overall Financial Resource Strain (CARDIA)     Difficulty of Paying Living Expenses: Not hard at all   Food Insecurity: No Food Insecurity (5/30/2023)    Hunger Vital Sign     Worried About Running Out of Food in the Last Year: Never true     Ran Out of Food in the Last Year: Never true   Transportation Needs: No Transportation Needs (5/30/2023)    PRAPARE - Transportation     Lack of Transportation (Medical): No     Lack of Transportation (Non-Medical): No   Physical Activity: Insufficiently Active (5/30/2023)    Exercise Vital Sign     Days of Exercise per Week: 1 day     Minutes of Exercise per Session: 10 min   Stress: No Stress Concern Present (5/30/2023)    [employer] - Occupational Stress Questionnaire     Feeling of Stress : Not at all   Social Connections: Socially Integrated (5/30/2023)    Social Connection and Isolation Panel [NHANES]     Frequency of Communication with Friends and Family: More than three times a week     Frequency of Social Gatherings with Friends and Family: More than three times a week     Attends Sabianist Services: More than 4 times per year     Active Member of Clubs or Organizations: Yes     Attends Club or Organization Meetings: More than 4 times per year     Marital Status:    Housing Stability: Low Risk  (5/30/2023)    Housing Stability Vital Sign     Unable to Pay for Housing in the Last Year: No     Number of Places Lived in the Last Year: 1     Unstable Housing in the Last Year: No     Review of patient's allergies indicates:   Allergen Reactions    Adhesive tape-silicones      Other reaction(s): skin irritation to area    Penicillins        Objective:   BP  "118/63   Pulse 109   Ht 5' 7" (1.702 m)   Wt 110.6 kg (243 lb 13.3 oz)   BMI 38.19 kg/m²   Immunization History   Administered Date(s) Administered    COVID-19, MRNA, LN-S, PF (Pfizer) (Gray Cap) 06/28/2022    COVID-19, MRNA, LN-S, PF (Pfizer) (Purple Cap) 01/05/2021, 01/26/2021, 10/15/2021    Hepatitis A, Adult 09/21/2016, 04/04/2017    Influenza 12/12/2006, 11/08/2007, 11/13/2008, 10/08/2009, 10/13/2010, 10/19/2011, 10/31/2013, 10/02/2018, 09/29/2022    Influenza (FLUAD) - Quadrivalent - Adjuvanted - PF *Preferred* (65+) 09/27/2021, 10/03/2022    Influenza - High Dose - PF (65 years and older) 10/24/2014, 10/26/2015, 09/25/2016, 10/12/2017, 10/02/2018, 10/22/2019    Influenza - Intradermal - Quadrivalent - PF 10/01/2012    Influenza - Quadrivalent - High Dose - PF (65 years and older) 09/21/2020    Influenza A (H1N1) 2009 Monovalent - IM 02/23/2010    Influenza Split 10/31/2013    Pneumococcal Conjugate - 13 Valent 03/11/2015    Pneumococcal Conjugate - 20 Valent 04/20/2022, 11/17/2022    Pneumococcal Polysaccharide - 23 Valent 02/14/2007, 03/18/2014    Tdap 09/09/2009, 10/02/2018    Zoster 11/09/2012    Zoster Recombinant 05/02/2019, 07/06/2019       Physical Exam   Constitutional: She is oriented to person, place, and time. No distress.   HENT:   Head: Normocephalic and atraumatic.   Pulmonary/Chest: Effort normal.   Abdominal: She exhibits no distension.   Musculoskeletal:         General: No swelling or tenderness. Normal range of motion.      Cervical back: Normal range of motion.   Lymphadenopathy:     She has no cervical adenopathy.   Neurological: She is alert and oriented to person, place, and time.   Skin: Skin is warm and dry. No rash noted.   Psychiatric: Mood normal.   Nursing note and vitals reviewed.      No synovitis, dactylitis, enthesitis  Longitudinal nail ridging noted  Mild tenderness to palpation medial compartment right greater than left knee.  No effusion range of motion intact.      No " results found for this or any previous visit (from the past 672 hour(s)).        Lab Results   Component Value Date    TBGOLDPLUS Negative 10/28/2019      Lab Results   Component Value Date    HEPCAB Negative 09/14/2016        Imaging  I have personally reviewed images and reports as below.  I agree with the interpretation.  CT Chest Without Contrast  Narrative: EXAMINATION:  CT CHEST WITHOUT CONTRAST    CLINICAL HISTORY:  Interstitial lung disease; Interstitial pulmonary disease, unspecified    TECHNIQUE:  Low dose axial images, sagittal and coronal reformations were obtained from the thoracic inlet to the lung bases. Contrast was not administered.    COMPARISON:  CT chest 11/25/2022    FINDINGS:  Mild coronary artery calcification.  No cardiomegaly, aortic aneurysm or adenopathy.    Unchanged wedge-shaped low density within the posterior segment of the right hepatic lobe.  This is unchanged compared to prior CT chest performed 01/19/2021.    No additional abnormality below the diaphragm.    Unchanged biapical scarring.  Unchanged 11 mm nodule within the right middle lobe.  Additional smaller pulmonary nodules noted bilaterally, unchanged.    No interstitial thickening, ground-glass or airspace opacities.  Unchanged mucous plugging within the anterior segment of the right upper lobe.  No pneumothorax.  Impression: Multiple pulmonary nodules.  The largest cyst within the right middle lobe and is unchanged compared to prior CT chest performed 01/19/2021.    No interstitial prominence or acute infiltrate.    Scattered mucous plugging which appears similar to the previous exam.    Additional findings as above.    Electronically signed by: Roly Gerber  Date:    07/05/2023  Time:    10:03      PFT also reviewed as below  12/2022- PFT w/ worsening restrictive pattern and decrease in DLCO    Assessment:     No diagnosis found.          Plan:     There are no diagnoses linked to this encounter.      MCTD w ILD  Labs  in 1 mos for Reg 4 and rna polymerase  Stable off cellcept -   will monitor  Pt to notify office if sxs worsen  SOB stable per pt report  F/u pulm as planned  CT stable las visit w Dr. Schumacher  Due for f/u in 8/2024  Consider repeat CT/PFT in 1 year  Bilat knee pain and OA  A1c < 7%w  Can consider CSI vs viscosupplementation in the future w ortho  States pain not bad enough for inj at this time  Kellgren Balbir scale 3 bilat knees  Continue with home exercise program for strengthening  Compromised immune system secondary to autoimmune disease and/or use of immunosuppressive drugs.  Monitor carefully for infections.  Advised patient to get immediate medical care if any infection arises.  Also advised strict adherence age-appropriate vaccinations and cancer screenings with PCP.  Return to clinic:  6 mos, reg 4 prior    No follow-ups on file.    The patient understands, chooses and consents to this plan and accepts all the risks which include but are not limited to the risks mentioned above.     Disclaimer: This note was prepared using a voice recognition system and is likely to have sound alike errors within the text.

## 2023-09-15 ENCOUNTER — PATIENT MESSAGE (OUTPATIENT)
Dept: RHEUMATOLOGY | Facility: CLINIC | Age: 73
End: 2023-09-15
Payer: MEDICARE

## 2023-09-15 NOTE — TELEPHONE ENCOUNTER
Spoke to patient rescheduled labs and appointment to 03/14/2023 at the [facility]. Asked patient to be here for labs at 7:00 AM

## 2023-09-20 ENCOUNTER — PATIENT MESSAGE (OUTPATIENT)
Dept: PRIMARY CARE CLINIC | Facility: CLINIC | Age: 73
End: 2023-09-20
Payer: MEDICARE

## 2023-09-28 ENCOUNTER — CLINICAL SUPPORT (OUTPATIENT)
Dept: DIABETES | Facility: CLINIC | Age: 73
End: 2023-09-28
Payer: MEDICARE

## 2023-09-28 DIAGNOSIS — Z79.4 TYPE 2 DIABETES MELLITUS WITH STAGE 3A CHRONIC KIDNEY DISEASE, WITH LONG-TERM CURRENT USE OF INSULIN: Primary | ICD-10-CM

## 2023-09-28 DIAGNOSIS — N18.31 TYPE 2 DIABETES MELLITUS WITH STAGE 3A CHRONIC KIDNEY DISEASE, WITH LONG-TERM CURRENT USE OF INSULIN: Primary | ICD-10-CM

## 2023-09-28 DIAGNOSIS — E11.22 TYPE 2 DIABETES MELLITUS WITH STAGE 3A CHRONIC KIDNEY DISEASE, WITH LONG-TERM CURRENT USE OF INSULIN: Primary | ICD-10-CM

## 2023-09-28 PROCEDURE — 95249 CONT GLUC MNTR PT PROV EQP: CPT | Mod: NDTC,S$GLB,, | Performed by: DIETITIAN, REGISTERED

## 2023-09-28 PROCEDURE — G0108 PR DIAB MANAGE TRN  PER INDIV: ICD-10-PCS | Mod: 95,,, | Performed by: DIETITIAN, REGISTERED

## 2023-09-28 PROCEDURE — 95249 PR GLUCOSE MONITORING, 72 HRS, SUB-Q SENSOR, PATIENT PROVIDED: ICD-10-PCS | Mod: NDTC,S$GLB,, | Performed by: DIETITIAN, REGISTERED

## 2023-09-28 PROCEDURE — G0108 DIAB MANAGE TRN  PER INDIV: HCPCS | Mod: 95,,, | Performed by: DIETITIAN, REGISTERED

## 2023-09-28 NOTE — PROGRESS NOTES
Diabetes Care Specialist Virtual Visit Note       The patient location is: home in [place]  The chief complaint leading to consultation is: Diabetes  Visit type: audiovisual  Total time spent with patient: 60 min   Each patient to whom he or she provides medical services by telemedicine is:  (1) informed of the relationship between the physician and patient and the respective role of any other health care provider with respect to management of the patient; and (2) notified that he or she may decline to receive medical services by telemedicine and may withdraw from such care at any time.      Diabetes Care Specialist Progress Note  Author: Kelin Rolle RD, CDE  Date: 9/28/2023    Program Intake  Reason for Diabetes Program Visit:: Intervention (07/31/2023 Lorraine Holliday PA-C)  Type of Intervention:: Individual  Education: Self-Management Skill Review  Device Training:  (Dexcom G7 training fu)  Current diabetes risk level:: high  In the last 12 months, have you::  (pt denies recent ER/hosp visits)    Lab Results   Component Value Date    HGBA1C 6.8 (H) 05/25/2023       Clinical    Problem Review  Active comorbidities affecting diabetes self-care.:  (HLD, CKD3, Hypothyroidism, Obesity by BMI, TALIA/cpap)    Clinical Assessment  Current Diabetes Treatment:  (Lantus 9 units daily. Ozempic 2mg weekly. Humalog ac 14units small meal (pt reports using 8-10units), 18units medium meal (pt reports using 14units), 24units large meal (pt reports using 18-20units), 8units snack (pt isn't dosing for snacks) + s/s.)  Have you ever experienced hypoglycemia (low blood sugar)?: yes  In the last month, how often have you experienced low blood sugar?: more than once a week  Are you able to tell when your blood sugar is low?: Yes (Dexcom alerts)  What symptoms do you experience?: Dizzy/Light-headed  Have you ever been hospitalized because your blood sugar was too low?: no  How do you treat hypoglycemia (low blood sugar)?: 1/2 can  soda/fruit juice, 5-6 pieces of hard candy  Have you ever experienced hyperglycemia (high blood sugar)?: no (Noted Dexcom high alert set 280. Pt isn't using correction dosing because unaware and not prompted by Dexcom.)    Medication Information  How many days a week do you miss your medications?: 3 or more (missing snack and correction dosing, occs late bolus dosing)  Do you sometimes have difficulty refilling your medications?: No  Medication adherence impacting ability to self-manage diabetes?: Yes    Labs  Do you have regular lab work to monitor your medications?: Yes  Type of Regular Lab Work: A1c, Cholesterol, BMP (pro/cr ratio)  Where do you get your labs drawn?: [facility]  Lab Compliance Barriers: No    Nutritional Status  Diet:  (Pt reports 2meals, 1-2snacks daily. Excess carb, sat fat from refined starches and snacks. Inadequate non-starchy vegetables. No use MR.)  Meal Plan 24 Hour Recall - Breakfast: none  Meal Plan 24 Hour Recall - Lunch: popover (puff pastry)  Meal Plan 24 Hour Recall - Dinner: chicken, popover (puff pastry)  Meal Plan 24 Hour Recall - Snack: ice cream - maria c 2/3c; santiago: water, occs reg coke  Change in appetite?: No  Was weight loss intentional or unintentional?:  (fluct but overall stable past sev mos)  Current nutritional status an area of need that is impacting patient's ability to self-manage diabetes?: Yes    Diabetes Self-Management Skills Assessment    Nutrition/Healthy Eating  Challenges to healthy eating::  (refined starches and snacks)  Method of carbohydrate measurement:: no method (pt admits to not using meal planning tools)  Patient can identify foods that impact blood sugar.: yes  Patient-identified foods:: sweets, starchy vegetables (corn, peas, beans), starches (bread, pasta, rice, cereal), soda, fruit/fruit juice  Nutrition/Healthy Eating Skills Assessment Completed:: Yes  Assessment indicates:: Adequate understanding  Area of need?: Yes    Physical  "Activity/Exercise  Patient's daily activity level:: sedentary  Patient formally exercises outside of work.: no (lacks motivation; knee issues with long distance walking)  Patient can identify reasons why exercise/physical activity is important in diabetes management.: no  Physical Activity/Exercise Skills Assessment Completed: : Yes  Assessment indicates:: Instruction Needed, Knowledge deficit  Area of need?: Yes    Medications  Patient is able to describe current diabetes management routine.: yes  Diabetes management routine:: insulin, injectable medications  Patient is able to identify current diabetes medications, dosages, and appropriate timing of medications.: no  Patient understands the purpose of the medications taken for diabetes.: yes  Patient reports problems or concerns with current medication regimen.: yes  Medication regimen problems/concerns:: concerned about side effects  Medication Skills Assessment Completed:: Yes  Assessment indicates:: Instruction Needed, Knowledge deficit  Area of need?: Yes    Home Blood Glucose Monitoring  Patient states that blood sugar is checked at home daily.: yes  Monitoring Method:: personal continuous glucose monitor  Personal CGM type:: Dexcom G6  Patient is able to use personal CGM appropriately.: yes (Pt needs to lower high alert so that it can prompt her to use bolus correction.)  CGM Report reviewed?: yes (Dexcom report reviewed w/ pt and saved media.)  Home Blood Glucose Monitoring Skills Assessment Completed: : Yes  Assessment indicates:: Instruction Needed  Area of need?: Yes    Chronic Complications  Patient can identify major chronic complications of diabetes.:  (PT with difficulty verbalizing types of complications and needed freq prompting.)  Stated chronic complications::  (friend had "foot problems" due to poor circulation)  Patient can identify ways to prevent or delay diabetes complications.: yes  Stated ways to prevent complications:: controlling blood " sugar, having regular diabetic eye exams, healthy eating and regular activity  Patient is aware that having diabetes increases risk of heart disease?: Yes  Patient able to state risk factors for heart disease?: No  Patient is taking statin?: Yes  Chronic Complications Skills Assessment Completed: : Yes  Assessment indicates:: Instruction Needed  Area of need?: Yes    Assessment Summary and Plan  Based on today's diabetes care assessment, the following areas of need were identified:          7/31/2023    12:01 AM   Social   Support No   Access to Mass Media/Tech No   Cognitive/Behavioral Health No   Culture/Hinduism No   Communication No   Health Literacy No            9/28/2023    12:01 AM   Clinical   Medication Adherence Yes -see care plan   Lab Compliance No   Nutritional Status Yes -see care plan            9/28/2023    12:01 AM   Diabetes Self-Management Skills   Nutrition/Healthy Eating Yes -see care plan   Physical Activity/Exercise Yes  Discussed importance of daily physical activity with review of benefits, methods, guidelines and precautions.   Medication Yes -see care plan; also messaged provider to notify of overall lower BG patterns and pt reduction of bolus doses   Home Blood Glucose Monitoring Yes -see care plan   Chronic Complications Yes  Reviewed diabetes complications and preventative screenings including annual dilated eye exams, regular dental exams, and daily foot self-exams. Reviewed foot care guidelines.   Discussed current A1c, Blood Pressure, and Cholesterol results (ABC's of Diabetes) and ADA target guidelines.      Today's interventions were provided through individual discussion, instruction, and written materials were provided.    Patient verbalized understanding of instruction and written materials.  Pt was able to return back demonstration of instructions today. Patient understood key points, needs reinforcement and further instruction.     Diabetes Self-Management Care Plan:  Today's  Diabetes Self-Management Care Plan was developed with Jaimie's input. Jaimie has agreed to work toward the following goal(s) to improve his/her overall diabetes control.      Care Plan: Diabetes Management   Updates made since 8/29/2023 12:00 AM        Problem: Healthy Eating         Goal: Eat 3 meals daily - manage carb 45grams/meal, 5-15grams/snack.    Start Date: 7/31/2023   Expected End Date: 7/31/2024   This Visit's Progress: On track   Priority: Medium   Barriers: Lack of Motivation to Change   Note:    Pt agrees to review and use tools previously provided. Also provided online recipe resources.     Pt verbalized need to improve lifestyle but difficulty with motivation. She doesn't appear to be motivated by resulting complications associated with diabetes. Noted A1C is improving and encouraged overall progress.        Problem: Blood Glucose Self-Monitoring         Goal: Patient agrees to use Dexcom G7 & backup meter as directed.    Start Date: 7/31/2023   Expected End Date: 7/31/2024   This Visit's Progress: On track   Priority: High   Barriers: Other (comments)   Note:    Encouraged progress. Reviewed Dexcom reports w/ pt in detail.    Recommended pt to lower high alert from 280 to 200-220 range so that she will be more aware of highs and use bolus correction dose. Instructed best to dose meal/snack 10-15min before meals. However if she forgets, then needs to use correction dose and not allow BG to remain elevated for long periods. Pt verbalized understanding.         Follow Up Plan   Follow up in about 3 months (around 12/28/2023). Eval to close encounter.  -review dexcom reports  -eval goals  -review A1C updates  -admin diabetes distress scale    Today's care plan and follow up schedule was discussed with patient.  Jaimie verbalized understanding of the care plan, goals, and agrees to follow up plan.        The patient was encouraged to communicate with his/her health care provider/physician and care team  regarding his/her condition(s) and treatment.  I provided the patient with my contact information today and encouraged to contact me via phone or [facility] Patient Portal as needed.     Length of Visit   Total Time: 60 Minutes

## 2023-09-28 NOTE — Clinical Note
Wicho Peters, Met w/ Ms Jaimie today. She's doing well w/ Dexcom use. Requested her to lower high alert from 280 to 200-220 range to assist in prompting correction dosing. She's missing some meal/snack doses and not correcting.   Also since she's switch to Ozempic, her BG levels have decreased. She's reduced meal boluses as noted below, and I wanted to share with you. Her next visit w/ you is end Oct. Thanks! Ang  Lantus 9 units daily. Ozempic 2mg weekly. Humalog ac 14units small meal (pt reports using 8-10units), 18units medium meal (pt reports using 14units), 24units large meal (pt reports using 18-20units), 8units snack (pt isn't dosing for snacks) + s/s.

## 2023-10-05 RX ORDER — ATORVASTATIN CALCIUM 20 MG/1
TABLET, FILM COATED ORAL
Qty: 90 TABLET | Refills: 0 | Status: SHIPPED | OUTPATIENT
Start: 2023-10-05 | End: 2023-12-28

## 2023-10-05 NOTE — TELEPHONE ENCOUNTER
No care due was identified.  [facility] Embedded Care Due Messages. Reference number: [id number].   10/05/2023 1:19:37 PM CDT

## 2023-10-05 NOTE — TELEPHONE ENCOUNTER
Refill Decision Note   Jaimie Ene  is requesting a refill authorization.  Brief Assessment and Rationale for Refill:  Approve     Medication Therapy Plan:         Comments:     Note composed:3:41 PM 10/05/2023

## 2023-10-12 ENCOUNTER — PATIENT MESSAGE (OUTPATIENT)
Dept: DIABETES | Facility: CLINIC | Age: 73
End: 2023-10-12
Payer: MEDICARE

## 2023-10-12 ENCOUNTER — TELEPHONE (OUTPATIENT)
Dept: DIABETES | Facility: CLINIC | Age: 73
End: 2023-10-12
Payer: MEDICARE

## 2023-10-12 LAB
LEFT EYE DM RETINOPATHY: NEGATIVE
RIGHT EYE DM RETINOPATHY: NEGATIVE

## 2023-10-12 NOTE — LETTER
AUTHORIZATION FOR RELEASE OF   CONFIDENTIAL INFORMATION    Dear [facility] ,    We are seeing Jaimie Luque, date of birth 1950, in the clinic at [facility]. Jessica Ward MD is the patient's PCP. Jaimie Luque has an outstanding lab/procedure at the time we reviewed her chart. In order to help keep her health information updated, she has authorized us to request the following medical record(s):        (  )  MAMMOGRAM                                      (  )  COLONOSCOPY      (  )  PAP SMEAR                                          (  )  OUTSIDE LAB RESULTS     (  )  DEXA SCAN                                          ( X )  EYE EXAM            (  )  FOOT EXAM                                          (  )  ENTIRE RECORD     (  )  OUTSIDE IMMUNIZATIONS                 (  )  _______________         Please fax records to Lorraine Barreto NP @ [phone].     If you have any questions, please contact Flavio Salas at [phone].           Patient Name: Jaimie Luque  : 1950  Patient Phone #: [phone]

## 2023-10-25 ENCOUNTER — OFFICE VISIT (OUTPATIENT)
Dept: DIABETES | Facility: CLINIC | Age: 73
End: 2023-10-25
Payer: MEDICARE

## 2023-10-25 ENCOUNTER — LAB VISIT (OUTPATIENT)
Dept: LAB | Facility: HOSPITAL | Age: 73
End: 2023-10-25
Attending: PHYSICIAN ASSISTANT
Payer: MEDICARE

## 2023-10-25 VITALS
DIASTOLIC BLOOD PRESSURE: 71 MMHG | HEIGHT: 67 IN | BODY MASS INDEX: 38.65 KG/M2 | WEIGHT: 246.25 LBS | HEART RATE: 67 BPM | SYSTOLIC BLOOD PRESSURE: 125 MMHG

## 2023-10-25 DIAGNOSIS — E11.36 CATARACT ASSOCIATED WITH TYPE 2 DIABETES MELLITUS: ICD-10-CM

## 2023-10-25 DIAGNOSIS — M35.1 MCTD (MIXED CONNECTIVE TISSUE DISEASE): ICD-10-CM

## 2023-10-25 DIAGNOSIS — G47.33 OSA ON CPAP: ICD-10-CM

## 2023-10-25 DIAGNOSIS — E11.69 HYPERLIPIDEMIA ASSOCIATED WITH TYPE 2 DIABETES MELLITUS: ICD-10-CM

## 2023-10-25 DIAGNOSIS — E78.5 HYPERLIPIDEMIA ASSOCIATED WITH TYPE 2 DIABETES MELLITUS: ICD-10-CM

## 2023-10-25 DIAGNOSIS — R76.8 ANA POSITIVE: ICD-10-CM

## 2023-10-25 DIAGNOSIS — N18.31 TYPE 2 DIABETES MELLITUS WITH STAGE 3A CHRONIC KIDNEY DISEASE, WITH LONG-TERM CURRENT USE OF INSULIN: Primary | ICD-10-CM

## 2023-10-25 DIAGNOSIS — Z79.4 TYPE 2 DIABETES MELLITUS WITH STAGE 3A CHRONIC KIDNEY DISEASE, WITH LONG-TERM CURRENT USE OF INSULIN: Primary | ICD-10-CM

## 2023-10-25 DIAGNOSIS — E66.9 OBESITY (BMI 30-39.9): ICD-10-CM

## 2023-10-25 DIAGNOSIS — E03.9 ACQUIRED HYPOTHYROIDISM: ICD-10-CM

## 2023-10-25 DIAGNOSIS — E11.22 TYPE 2 DIABETES MELLITUS WITH STAGE 3A CHRONIC KIDNEY DISEASE, WITH LONG-TERM CURRENT USE OF INSULIN: Primary | ICD-10-CM

## 2023-10-25 DIAGNOSIS — J84.9 ILD (INTERSTITIAL LUNG DISEASE): ICD-10-CM

## 2023-10-25 LAB
ALBUMIN SERPL BCP-MCNC: 3.1 G/DL (ref 3.5–5.2)
ALP SERPL-CCNC: 74 U/L (ref 55–135)
ALT SERPL W/O P-5'-P-CCNC: 15 U/L (ref 10–44)
ANION GAP SERPL CALC-SCNC: 9 MMOL/L (ref 8–16)
AST SERPL-CCNC: 14 U/L (ref 10–40)
BASOPHILS # BLD AUTO: 0.05 K/UL (ref 0–0.2)
BASOPHILS NFR BLD: 0.9 % (ref 0–1.9)
BILIRUB SERPL-MCNC: 0.6 MG/DL (ref 0.1–1)
BUN SERPL-MCNC: 14 MG/DL (ref 8–23)
CALCIUM SERPL-MCNC: 8.8 MG/DL (ref 8.7–10.5)
CHLORIDE SERPL-SCNC: 108 MMOL/L (ref 95–110)
CO2 SERPL-SCNC: 22 MMOL/L (ref 23–29)
CREAT SERPL-MCNC: 1 MG/DL (ref 0.5–1.4)
CRP SERPL-MCNC: 3.7 MG/L (ref 0–8.2)
DIFFERENTIAL METHOD: ABNORMAL
EOSINOPHIL # BLD AUTO: 0.2 K/UL (ref 0–0.5)
EOSINOPHIL NFR BLD: 3.8 % (ref 0–8)
ERYTHROCYTE [DISTWIDTH] IN BLOOD BY AUTOMATED COUNT: 14.7 % (ref 11.5–14.5)
ERYTHROCYTE [SEDIMENTATION RATE] IN BLOOD BY PHOTOMETRIC METHOD: 26 MM/HR (ref 0–36)
EST. GFR  (NO RACE VARIABLE): 59 ML/MIN/1.73 M^2
GLUCOSE SERPL-MCNC: 109 MG/DL (ref 70–110)
GLUCOSE SERPL-MCNC: 188 MG/DL (ref 70–110)
HCT VFR BLD AUTO: 39.2 % (ref 37–48.5)
HGB BLD-MCNC: 12.7 G/DL (ref 12–16)
IMM GRANULOCYTES # BLD AUTO: 0.02 K/UL (ref 0–0.04)
IMM GRANULOCYTES NFR BLD AUTO: 0.3 % (ref 0–0.5)
LYMPHOCYTES # BLD AUTO: 2 K/UL (ref 1–4.8)
LYMPHOCYTES NFR BLD: 34.1 % (ref 18–48)
MCH RBC QN AUTO: 27.7 PG (ref 27–31)
MCHC RBC AUTO-ENTMCNC: 32.4 G/DL (ref 32–36)
MCV RBC AUTO: 85 FL (ref 82–98)
MONOCYTES # BLD AUTO: 0.7 K/UL (ref 0.3–1)
MONOCYTES NFR BLD: 11.9 % (ref 4–15)
NEUTROPHILS # BLD AUTO: 2.8 K/UL (ref 1.8–7.7)
NEUTROPHILS NFR BLD: 49 % (ref 38–73)
NRBC BLD-RTO: 0 /100 WBC
PLATELET # BLD AUTO: 185 K/UL (ref 150–450)
PMV BLD AUTO: 11.4 FL (ref 9.2–12.9)
POTASSIUM SERPL-SCNC: 4.2 MMOL/L (ref 3.5–5.1)
PROT SERPL-MCNC: 6.6 G/DL (ref 6–8.4)
RBC # BLD AUTO: 4.59 M/UL (ref 4–5.4)
SODIUM SERPL-SCNC: 139 MMOL/L (ref 136–145)
WBC # BLD AUTO: 5.8 K/UL (ref 3.9–12.7)

## 2023-10-25 PROCEDURE — 86140 C-REACTIVE PROTEIN: CPT | Performed by: PHYSICIAN ASSISTANT

## 2023-10-25 PROCEDURE — 1159F PR MEDICATION LIST DOCUMENTED IN MEDICAL RECORD: ICD-10-PCS | Mod: CPTII,S$GLB,, | Performed by: PHYSICIAN ASSISTANT

## 2023-10-25 PROCEDURE — 99999 PR PBB SHADOW E&M-EST. PATIENT-LVL IV: ICD-10-PCS | Mod: PBBFAC,,, | Performed by: PHYSICIAN ASSISTANT

## 2023-10-25 PROCEDURE — 80053 COMPREHEN METABOLIC PANEL: CPT | Performed by: PHYSICIAN ASSISTANT

## 2023-10-25 PROCEDURE — 1101F PT FALLS ASSESS-DOCD LE1/YR: CPT | Mod: CPTII,S$GLB,, | Performed by: PHYSICIAN ASSISTANT

## 2023-10-25 PROCEDURE — 99214 PR OFFICE/OUTPT VISIT, EST, LEVL IV, 30-39 MIN: ICD-10-PCS | Mod: S$GLB,,, | Performed by: PHYSICIAN ASSISTANT

## 2023-10-25 PROCEDURE — 99214 OFFICE O/P EST MOD 30 MIN: CPT | Mod: S$GLB,,, | Performed by: PHYSICIAN ASSISTANT

## 2023-10-25 PROCEDURE — 83520 IMMUNOASSAY QUANT NOS NONAB: CPT | Performed by: PHYSICIAN ASSISTANT

## 2023-10-25 PROCEDURE — 1126F AMNT PAIN NOTED NONE PRSNT: CPT | Mod: CPTII,S$GLB,, | Performed by: PHYSICIAN ASSISTANT

## 2023-10-25 PROCEDURE — 3008F BODY MASS INDEX DOCD: CPT | Mod: CPTII,S$GLB,, | Performed by: PHYSICIAN ASSISTANT

## 2023-10-25 PROCEDURE — 3288F PR FALLS RISK ASSESSMENT DOCUMENTED: ICD-10-PCS | Mod: CPTII,S$GLB,, | Performed by: PHYSICIAN ASSISTANT

## 2023-10-25 PROCEDURE — 3078F DIAST BP <80 MM HG: CPT | Mod: CPTII,S$GLB,, | Performed by: PHYSICIAN ASSISTANT

## 2023-10-25 PROCEDURE — 4010F PR ACE/ARB THEARPY RXD/TAKEN: ICD-10-PCS | Mod: CPTII,S$GLB,, | Performed by: PHYSICIAN ASSISTANT

## 2023-10-25 PROCEDURE — 3044F HG A1C LEVEL LT 7.0%: CPT | Mod: CPTII,S$GLB,, | Performed by: PHYSICIAN ASSISTANT

## 2023-10-25 PROCEDURE — 99999 PR PBB SHADOW E&M-EST. PATIENT-LVL IV: CPT | Mod: PBBFAC,,, | Performed by: PHYSICIAN ASSISTANT

## 2023-10-25 PROCEDURE — 1159F MED LIST DOCD IN RCRD: CPT | Mod: CPTII,S$GLB,, | Performed by: PHYSICIAN ASSISTANT

## 2023-10-25 PROCEDURE — 82962 GLUCOSE BLOOD TEST: CPT | Mod: S$GLB,,, | Performed by: PHYSICIAN ASSISTANT

## 2023-10-25 PROCEDURE — 85652 RBC SED RATE AUTOMATED: CPT | Performed by: PHYSICIAN ASSISTANT

## 2023-10-25 PROCEDURE — 1101F PR PT FALLS ASSESS DOC 0-1 FALLS W/OUT INJ PAST YR: ICD-10-PCS | Mod: CPTII,S$GLB,, | Performed by: PHYSICIAN ASSISTANT

## 2023-10-25 PROCEDURE — 95251 CONT GLUC MNTR ANALYSIS I&R: CPT | Mod: S$GLB,,, | Performed by: PHYSICIAN ASSISTANT

## 2023-10-25 PROCEDURE — 95251 PR GLUCOSE MONITOR, 72 HOUR, PHYS INTERP: ICD-10-PCS | Mod: S$GLB,,, | Performed by: PHYSICIAN ASSISTANT

## 2023-10-25 PROCEDURE — 3288F FALL RISK ASSESSMENT DOCD: CPT | Mod: CPTII,S$GLB,, | Performed by: PHYSICIAN ASSISTANT

## 2023-10-25 PROCEDURE — 3078F PR MOST RECENT DIASTOLIC BLOOD PRESSURE < 80 MM HG: ICD-10-PCS | Mod: CPTII,S$GLB,, | Performed by: PHYSICIAN ASSISTANT

## 2023-10-25 PROCEDURE — 3044F PR MOST RECENT HEMOGLOBIN A1C LEVEL <7.0%: ICD-10-PCS | Mod: CPTII,S$GLB,, | Performed by: PHYSICIAN ASSISTANT

## 2023-10-25 PROCEDURE — 3008F PR BODY MASS INDEX (BMI) DOCUMENTED: ICD-10-PCS | Mod: CPTII,S$GLB,, | Performed by: PHYSICIAN ASSISTANT

## 2023-10-25 PROCEDURE — 3074F PR MOST RECENT SYSTOLIC BLOOD PRESSURE < 130 MM HG: ICD-10-PCS | Mod: CPTII,S$GLB,, | Performed by: PHYSICIAN ASSISTANT

## 2023-10-25 PROCEDURE — 85025 COMPLETE CBC W/AUTO DIFF WBC: CPT | Performed by: PHYSICIAN ASSISTANT

## 2023-10-25 PROCEDURE — 1126F PR PAIN SEVERITY QUANTIFIED, NO PAIN PRESENT: ICD-10-PCS | Mod: CPTII,S$GLB,, | Performed by: PHYSICIAN ASSISTANT

## 2023-10-25 PROCEDURE — 4010F ACE/ARB THERAPY RXD/TAKEN: CPT | Mod: CPTII,S$GLB,, | Performed by: PHYSICIAN ASSISTANT

## 2023-10-25 PROCEDURE — 82962 POCT GLUCOSE, HAND-HELD DEVICE: ICD-10-PCS | Mod: S$GLB,,, | Performed by: PHYSICIAN ASSISTANT

## 2023-10-25 PROCEDURE — 3074F SYST BP LT 130 MM HG: CPT | Mod: CPTII,S$GLB,, | Performed by: PHYSICIAN ASSISTANT

## 2023-10-25 PROCEDURE — 36415 COLL VENOUS BLD VENIPUNCTURE: CPT | Performed by: PHYSICIAN ASSISTANT

## 2023-10-25 NOTE — PROGRESS NOTES
PCP: Jessica Ward MD    Subjective:     Chief Complaint: Diabetes - Established Patient    HISTORY OF PRESENT ILLNESS: 73 y.o.   female presenting for diabetes management visit.   The patient's last visit with me was on 8/24/2023.   Patient has had Type II diabetes since 20 or more years.  Pertinent to decision making is the following comorbidities: HLD, Hypothyroidism and Obesity by BMI  Patient has the following Diabetes complications: without complications  She has attended diabetes education in the past.     Patient's most recent A1c of 6.8% was completed 5 months ago.   Patient states since Her last A1c Her blood glucose levels have been high  throughout the day .   Patient monitors blood glucose 4 times per day and Continuously with personal CGM Dexcom.   Patient blood glucose monitoring device will be uploaded into [employer] today.        Patients records shows postprandial hyperglycemia after HC meals and snacks with baseline mild hyperglycemia.     Patient endorses the following diabetes related symptoms:  None .   Patient is due today for the following diabetes-related health maintenance standards: Lipid panel, Urine Microalbumin/creatinine ratio, A1c, and Colonoscopy    She denies any recent hospital admissions or emergency room visits. Patient denies history of DKA.   She voices having hypoglycemia as above..   Patient's concerns today include glycemic control. Of note, patient is having Cellcept titrated up over last month which may be contributing to hyperglycemia. First dose with meal in am and 2nd dose usually 6 or 7p - not always with meal.   Patient medication regimen is as below.      CURRENT DM MEDICATIONS:   Lantus 9 units daily   Ozempic 2 mg weekly   Glipizide 5 mg before bed time snack - not using  Humalog 10 units with small / 12 units with regular / 14 units with heavier carb meal / 0 units with snack TID wm  Humalog correction dosing every 3 hours as below    Humalog  Correction Dosing every 3 hours as needed OUTSIDE OF EATING  If  - 250, may take 2 units of Humalog  If  - 300, may take 4 units of Humalog   If  - 350, may take 6 units of Humalog  If  - 400, may take 8 units of Humalog  If +, may take 10 units of Humalog      Patient has failed the following Diabetes medications:   Metformin - GI   Invokana - coverage  Jardiance - yeast infection  Glyburide   Victoza   Basal - Basaglar  Ozempic 2 mg - Stopped due to backorder      Labs Reviewed.       Lab Results   Component Value Date    CPEPTIDE 2.35 04/01/2021     Lab Results   Component Value Date    GLUTAMICACID 0.00 05/25/2017          //   , There is no height or weight on file to calculate BMI.  Her blood sugar in clinic today is:    Lab Results   Component Value Date    POCGLU 160 (A) 12/15/2022       Review of Systems   Constitutional:  Negative for activity change, appetite change, chills and fever.   HENT:  Negative for dental problem, mouth sores, nosebleeds, sore throat and trouble swallowing.    Eyes:  Negative for pain and discharge.   Respiratory:  Negative for shortness of breath, wheezing and stridor.    Cardiovascular:  Negative for chest pain, palpitations and leg swelling.   Gastrointestinal:  Negative for abdominal pain, diarrhea, nausea and vomiting.   Endocrine: Negative for polydipsia, polyphagia and polyuria.   Genitourinary:  Negative for dysuria, frequency and urgency.   Musculoskeletal:  Negative for joint swelling and myalgias.   Skin:  Negative for rash and wound.   Neurological:  Negative for dizziness, syncope, weakness and headaches.   Psychiatric/Behavioral:  Negative for behavioral problems and dysphoric mood.          Diabetes Management Status  Statin: Taking  ACE/ARB: Taking    Screening or Prevention Patient's value Goal Complete/Controlled?   HgA1C Testing and Control   Lab Results   Component Value Date    HGBA1C 6.8 (H) 05/25/2023      Annually/Less than 8%  No   Lipid profile : 10/13/2022 Annually Yes   LDL control Lab Results   Component Value Date    LDLCALC 51.2 (L) 10/13/2022    Annually/Less than 100 mg/dl  Yes   Nephropathy screening Lab Results   Component Value Date    MICALBCREAT 3.5 10/13/2022     Lab Results   Component Value Date    PROTEINUA Negative 04/01/2021    Annually Yes   Blood pressure BP Readings from Last 1 Encounters:   09/13/23 118/63    Less than 140/90 Yes   Dilated retinal exam : 10/12/2023 Annually Yes    Foot exam   : 11/17/2022 Annually Yes     ACTIVITY LEVEL: Moderately Active. Discussed activities, benefits, methods, and precautions.  MEAL PLANNING: Patient reports number of meals per day to be 3 and number of snacks per day to be 2.   Patient is encouraged to carb count and consume no more than 30 - 45 grams of carbohydrates in each meal and 15 grams of carbohydrates in each snack.     Social History     Socioeconomic History    Marital status:    Tobacco Use    Smoking status: Never    Smokeless tobacco: Never   Substance and Sexual Activity    Alcohol use: Yes     Comment: rare    Drug use: No    Sexual activity: Yes     Partners: Male   Social History Narrative    . Lives with spouse. Has 3 children. Patient retired as  for [employer].      Social Determinants of Health     Financial Resource Strain: Low Risk  (5/30/2023)    Overall Financial Resource Strain (CARDIA)     Difficulty of Paying Living Expenses: Not hard at all   Food Insecurity: No Food Insecurity (5/30/2023)    Hunger Vital Sign     Worried About Running Out of Food in the Last Year: Never true     Ran Out of Food in the Last Year: Never true   Transportation Needs: No Transportation Needs (5/30/2023)    PRAPARE - Transportation     Lack of Transportation (Medical): No     Lack of Transportation (Non-Medical): No   Physical Activity: Insufficiently Active (5/30/2023)    Exercise Vital Sign     Days of Exercise per Week: 1 day     Minutes  of Exercise per Session: 10 min   Stress: No Stress Concern Present (5/30/2023)    [employer] [employer] - Occupational Stress Questionnaire     Feeling of Stress : Not at all   Social Connections: Socially Integrated (5/30/2023)    Social Connection and Isolation Panel [NHANES]     Frequency of Communication with Friends and Family: More than three times a week     Frequency of Social Gatherings with Friends and Family: More than three times a week     Attends [employer] Services: More than 4 times per year     Active Member of Clubs or Organizations: Yes     Attends Club or Organization Meetings: More than 4 times per year     Marital Status:    Housing Stability: Low Risk  (5/30/2023)    Housing Stability Vital Sign     Unable to Pay for Housing in the Last Year: No     Number of Places Lived in the Last Year: 1     Unstable Housing in the Last Year: No     Past Medical History:   Diagnosis Date    Diabetes mellitus type II     Hyperlipidemia     Hypertension     Hypothyroid     TALIA (obstructive sleep apnea)     on CPAP    Osteopenia     Psoriasis        Objective:        Physical Exam  Constitutional:       General: She is not in acute distress.     Appearance: She is well-developed. She is not diaphoretic.   HENT:      Head: Normocephalic and atraumatic.      Right Ear: External ear normal.      Left Ear: External ear normal.      Nose: Nose normal.   Eyes:      General:         Right eye: No discharge.         Left eye: No discharge.      Pupils: Pupils are equal, round, and reactive to light.   Cardiovascular:      Rate and Rhythm: Normal rate and regular rhythm.      Heart sounds: Normal heart sounds.   Pulmonary:      Effort: Pulmonary effort is normal.      Breath sounds: Normal breath sounds.   Abdominal:      Palpations: Abdomen is soft.   Musculoskeletal:         General: Normal range of motion.      Cervical back: Normal range of motion and neck supple.   Skin:     General:  Skin is warm and dry.      Capillary Refill: Capillary refill takes less than 2 seconds.   Neurological:      Mental Status: She is alert and oriented to person, place, and time.      Motor: No abnormal muscle tone.      Coordination: Coordination normal.   Psychiatric:         Behavior: Behavior normal.         Thought Content: Thought content normal.         Judgment: Judgment normal.           Assessment / Plan:     Type 2 diabetes mellitus with stage 3a chronic kidney disease, with long-term current use of insulin  -     POCT Glucose, Hand-Held Device  -     Lipid Panel; Future; Expected date: 10/25/2023  -     Hemoglobin A1C; Standing  -     Microalbumin/Creatinine Ratio, Urine; Future; Expected date: 10/25/2023  -     TSH; Future; Expected date: 10/25/2023    Cataract associated with type 2 diabetes mellitus    Hyperlipidemia associated with type 2 diabetes mellitus    Acquired hypothyroidism    TALIA on CPAP    Obesity (BMI 30-39.9)        Additional Plan Details:    - POCT Glucose  - Encouraged continuation of lifestyle changes including regular exercise and limiting carbohydrates to 30-45 grams per meal threes times daily and 15 grams per snack with a limit of two daily.   - Encouraged continued monitoring of blood glucose with maintenance of 4 times daily and Continuously with personal CGM Dexcom.    - Current DM Medication Regimen: Change Lantus 10 units daily. Continue TOzempic 2 mg weekly. Stop Glipizide. Change Humalog 10 units with small / 12 units with regular / 16 units with heavier carb meal / 5 units with snack TID wm. ADJUST INSULIN FREQUENTLY BASED ON BLOOD SUGAR.    -  Fasting labs sched  - Health Maintenance standards addressed today: Lipid panel to be scheduled today, Urine Microalbumin / Creatinine Ratio scheduled, A1c to be scheduled, and Colonoscopy - deferred to PCP  - Nursing Visit: Patient is below goal range for nursing visit for age group and  qualifies for nursing visit, but will defer  for now.  .   - Follow up in 6 weeks with A1c prior.    CURRENT DM MEDICATIONS:   Lantus 10 units daily   Ozempic 2 mg weekly   Glipizide - STOP   Humalog 10 units with small / 12 units with regular / 16 units with heavier carb meal / 5 units with night time snack TID wm  Humalog correction dosing every 3 hours as below    Humalog Correction Dosing every 3 hours as needed OUTSIDE OF EATING  If  - 250, may take 2 units of Humalog  If  - 300, may take 4 units of Humalog   If  - 350, may take 6 units of Humalog  If  - 400, may take 8 units of Humalog  If +, may take 10 units of Humalog          Blakeney McKnight, PA-C [facility]    A total of 30 minutes was spent in face to face time, of which over 50 % was spent in counseling patient on disease process, complications, treatment, and side effects of medications.

## 2023-10-25 NOTE — PATIENT INSTRUCTIONS
CURRENT DM MEDICATIONS:   Lantus 10 units daily   Ozempic 2 mg weekly   Glipizide - STOP   Humalog 10 units with small / 12 units with regular / 16 units with heavier carb meal / 5 units with night time snack TID wm  Humalog correction dosing every 3 hours as below    Humalog Correction Dosing every 3 hours as needed OUTSIDE OF EATING  If  - 250, may take 2 units of Humalog  If  - 300, may take 4 units of Humalog   If  - 350, may take 6 units of Humalog  If  - 400, may take 8 units of Humalog  If +, may take 10 units of Humalog

## 2023-11-09 LAB — RNAP III AB SER-ACNC: <20 UNITS

## 2023-11-15 ENCOUNTER — PATIENT MESSAGE (OUTPATIENT)
Dept: PULMONOLOGY | Facility: CLINIC | Age: 73
End: 2023-11-15
Payer: MEDICARE

## 2023-11-17 ENCOUNTER — LAB VISIT (OUTPATIENT)
Dept: LAB | Facility: HOSPITAL | Age: 73
End: 2023-11-17
Attending: INTERNAL MEDICINE
Payer: MEDICARE

## 2023-11-17 DIAGNOSIS — E11.22 TYPE 2 DIABETES MELLITUS WITH STAGE 3A CHRONIC KIDNEY DISEASE, WITH LONG-TERM CURRENT USE OF INSULIN: ICD-10-CM

## 2023-11-17 DIAGNOSIS — N18.31 TYPE 2 DIABETES MELLITUS WITH STAGE 3A CHRONIC KIDNEY DISEASE, WITH LONG-TERM CURRENT USE OF INSULIN: ICD-10-CM

## 2023-11-17 DIAGNOSIS — Z79.4 TYPE 2 DIABETES MELLITUS WITH STAGE 3A CHRONIC KIDNEY DISEASE, WITH LONG-TERM CURRENT USE OF INSULIN: ICD-10-CM

## 2023-11-17 LAB
ALBUMIN/CREAT UR: 2.7 UG/MG (ref 0–30)
CHOLEST SERPL-MCNC: 121 MG/DL (ref 120–199)
CHOLEST/HDLC SERPL: 2 {RATIO} (ref 2–5)
CREAT UR-MCNC: 219 MG/DL (ref 15–325)
ESTIMATED AVG GLUCOSE: 157 MG/DL (ref 68–131)
HBA1C MFR BLD: 7.1 % (ref 4–5.6)
HDLC SERPL-MCNC: 62 MG/DL (ref 40–75)
HDLC SERPL: 51.2 % (ref 20–50)
LDLC SERPL CALC-MCNC: 49 MG/DL (ref 63–159)
MICROALBUMIN UR DL<=1MG/L-MCNC: 6 UG/ML
NONHDLC SERPL-MCNC: 59 MG/DL
TRIGL SERPL-MCNC: 50 MG/DL (ref 30–150)
TSH SERPL DL<=0.005 MIU/L-ACNC: 1.56 UIU/ML (ref 0.4–4)

## 2023-11-17 PROCEDURE — 83036 HEMOGLOBIN GLYCOSYLATED A1C: CPT | Performed by: PHYSICIAN ASSISTANT

## 2023-11-17 PROCEDURE — 80061 LIPID PANEL: CPT | Performed by: PHYSICIAN ASSISTANT

## 2023-11-17 PROCEDURE — 36415 COLL VENOUS BLD VENIPUNCTURE: CPT | Performed by: PHYSICIAN ASSISTANT

## 2023-11-17 PROCEDURE — 84443 ASSAY THYROID STIM HORMONE: CPT | Performed by: PHYSICIAN ASSISTANT

## 2023-11-17 PROCEDURE — 82043 UR ALBUMIN QUANTITATIVE: CPT | Performed by: PHYSICIAN ASSISTANT

## 2023-11-23 NOTE — PROGRESS NOTES
"Subjective:      Patient ID: Jaimie Luque is a 73 y.o. female.    Chief Complaint: Follow-up (6 Month f/u)      HPI  Here for f/u medical problems and preventive exam.  Sometimes exercising with walking.  Knee pain comes and goes- doesn't want Ortho eval at this time.  No exertional cp/sob/palp.  No f/c/sw/cough.  BMs normal.  Urine normal.    AC breakfast sugars 150 range.    HM: 10/23 fluvax, 10/23 RSV, 2/21 covid vaccines/booster, 4/17 HAV#2, 3/15 [id number], 3/14 [id number], 11/22 [id number], 10/18 booster at pharm TDaP, 11/12 zoster, 2019 Shingrix x2, 9/21 BMD rep 5y, 5/22 Cscope REPEAT 1y, 6/22 mmg(q2), 4/23 Eye doctor, 9/16 HCV neg, 7/23 CT lung with Pulm Dr. Schumacher.  Addendum: 12/23 Cologuard negative.     Review of Systems   Constitutional:  Negative for appetite change, chills, diaphoresis and fever.   HENT:  Negative for congestion, ear pain, rhinorrhea, sinus pressure and sore throat.    Respiratory:  Negative for cough, chest tightness and shortness of breath.    Cardiovascular:  Negative for chest pain and palpitations.   Gastrointestinal:  Negative for blood in stool, constipation, diarrhea, nausea and vomiting.   Genitourinary:  Negative for dysuria, frequency, hematuria, menstrual problem, urgency and vaginal discharge.   Musculoskeletal:  Negative for arthralgias.   Skin:  Negative for rash.   Neurological:  Negative for dizziness and headaches.   Psychiatric/Behavioral:  Negative for sleep disturbance. The patient is not nervous/anxious.          Objective:   BP (!) 112/54 (BP Location: Right arm, Patient Position: Sitting)   Pulse (!) 120   Temp 98.2 °F (36.8 °C) (Oral)   Ht 5' 7" (1.702 m)   Wt 112.4 kg (247 lb 14.4 oz)   SpO2 97%   BMI 38.83 kg/m²     Physical Exam  Constitutional:       Appearance: She is well-developed.   HENT:      Right Ear: External ear normal. Tympanic membrane is not injected.      Left Ear: External ear normal. Tympanic membrane is not injected.   Eyes:      " Conjunctiva/sclera: Conjunctivae normal.   Neck:      Thyroid: No thyromegaly.   Cardiovascular:      Rate and Rhythm: Normal rate and regular rhythm.      Pulses:           Dorsalis pedis pulses are 2+ on the right side and 2+ on the left side.        Posterior tibial pulses are 2+ on the right side and 2+ on the left side.      Heart sounds: No murmur heard.     No friction rub. No gallop.   Pulmonary:      Effort: Pulmonary effort is normal.      Breath sounds: Normal breath sounds. No wheezing or rales.   Abdominal:      General: Bowel sounds are normal.      Palpations: Abdomen is soft. There is no mass.      Tenderness: There is no abdominal tenderness.   Musculoskeletal:      Cervical back: Normal range of motion and neck supple.   Feet:      Right foot:      Protective Sensation: 10 sites tested.  10 sites sensed.      Skin integrity: No ulcer, blister, skin breakdown, erythema, warmth, callus or dry skin.      Left foot:      Protective Sensation: 10 sites tested.  10 sites sensed.      Skin integrity: No ulcer, blister, skin breakdown, erythema, warmth, callus or dry skin.   Lymphadenopathy:      Cervical: No cervical adenopathy.   Skin:     General: Skin is warm.      Findings: No rash.   Neurological:      Mental Status: She is alert and oriented to person, place, and time.        Latest Reference Range & Units 10/25/23 07:45 11/17/23 07:56   WBC 3.90 - 12.70 K/uL 5.80    RBC 4.00 - 5.40 M/uL 4.59    Hemoglobin 12.0 - 16.0 g/dL 12.7    Hematocrit 37.0 - 48.5 % 39.2    MCV 82 - 98 fL 85    MCH 27.0 - 31.0 pg 27.7    MCHC 32.0 - 36.0 g/dL 32.4    RDW 11.5 - 14.5 % 14.7 (H)    Platelet Count 150 - 450 K/uL 185    MPV 9.2 - 12.9 fL 11.4    Gran % 38.0 - 73.0 % 49.0    Lymph % 18.0 - 48.0 % 34.1    Mono % 4.0 - 15.0 % 11.9    Eosinophil % 0.0 - 8.0 % 3.8    Basophil % 0.0 - 1.9 % 0.9    Immature Granulocytes 0.0 - 0.5 % 0.3    Gran # (ANC) 1.8 - 7.7 K/uL 2.8    Lymph # 1.0 - 4.8 K/uL 2.0    Mono # 0.3 - 1.0  K/uL 0.7    Eos # 0.0 - 0.5 K/uL 0.2    Baso # 0.00 - 0.20 K/uL 0.05    Immature Grans (Abs) 0.00 - 0.04 K/uL 0.02    nRBC 0 /100 WBC 0    Differential Method  Automated    Sed Rate 0 - 36 mm/Hr 26    Sodium 136 - 145 mmol/L 139    Potassium 3.5 - 5.1 mmol/L 4.2    Chloride 95 - 110 mmol/L 108    CO2 23 - 29 mmol/L 22 (L)    Anion Gap 8 - 16 mmol/L 9    BUN 8 - 23 mg/dL 14    Creatinine 0.5 - 1.4 mg/dL 1.0    eGFR >60 mL/min/1.73 m^2 59 !    Glucose 70 - 110 mg/dL 188 (H)    Calcium 8.7 - 10.5 mg/dL 8.8    ALP 55 - 135 U/L 74    PROTEIN TOTAL 6.0 - 8.4 g/dL 6.6    Albumin 3.5 - 5.2 g/dL 3.1 (L)    BILIRUBIN TOTAL 0.1 - 1.0 mg/dL 0.6    AST 10 - 40 U/L 14    ALT 10 - 44 U/L 15    CRP 0.0 - 8.2 mg/L 3.7    Cholesterol Total 120 - 199 mg/dL  121   HDL 40 - 75 mg/dL  62   HDL/Cholesterol Ratio 20.0 - 50.0 %  51.2 (H)   Non-HDL Cholesterol mg/dL  59   Total Cholesterol/HDL Ratio 2.0 - 5.0   2.0   Triglycerides 30 - 150 mg/dL  50   LDL Cholesterol 63.0 - 159.0 mg/dL  49.0 (L)   Hemoglobin A1C External 4.0 - 5.6 %  7.1 (H)   Estimated Avg Glucose 68 - 131 mg/dL  157 (H)   TSH 0.400 - 4.000 uIU/mL  1.557        [facility] Reference Range & Units 11/17/23 08:49   Creatinine, Urine 15.0 - 325.0 mg/dL 219.0   Urine Microalbumin ug/mL 6.0   MICROALB/CREAT RATIO 0.0 - 30.0 ug/mg 2.7     Assessment:       1. Type 2 diabetes mellitus with stage 3a chronic kidney disease, with long-term current use of insulin    2. Acquired hypothyroidism    3. Hyperlipidemia associated with type 2 diabetes mellitus    4. TALIA on CPAP    5. PHN (postherpetic neuralgia)    6. Severe obesity with body mass index (BMI) of 35.0 to 35.9 and comorbidity    7. Encounter for preventive health examination    8. Aortic atherosclerosis    9. Tachycardia    10. Screen for colon cancer    11. Uncontrolled type 2 diabetes mellitus with hyperglycemia          Plan:     Type 2 diabetes mellitus with stage 3a chronic kidney disease, with long-term current use of  insulin- doing well, cont rx, try exercise- INTOL of SGLT2i.    Acquired hypothyroidism- Clinically stable, continue present treatment.    Hyperlipidemia associated with type 2 diabetes mellitus, Aortic atherosclerosis, Severe obesity with body mass index (BMI) of 35.0 to 35.9 and comorbidity- LDL at goal, try some exercise like stationary bike.    TALIA on CPAP- to try nasal cushion.    PHN (postherpetic neuralgia), still needs gabapentin or has burning pain.    Encounter for preventive health examination- MMG next year.    Tachycardia- EKG nsr, rate 77.  -     IN OFFICE EKG 12-LEAD (to Muse)    Screen for colon cancer  -     Cologuard Screening (Multitarget Stool DNA); Future; Expected date: 11/30/2023    RTC 2 mo to make sure exercising.

## 2023-11-30 ENCOUNTER — PATIENT OUTREACH (OUTPATIENT)
Dept: ADMINISTRATIVE | Facility: OTHER | Age: 73
End: 2023-11-30
Payer: MEDICARE

## 2023-11-30 ENCOUNTER — OFFICE VISIT (OUTPATIENT)
Dept: PRIMARY CARE CLINIC | Facility: CLINIC | Age: 73
End: 2023-11-30
Payer: MEDICARE

## 2023-11-30 VITALS
DIASTOLIC BLOOD PRESSURE: 54 MMHG | HEIGHT: 67 IN | WEIGHT: 247.88 LBS | OXYGEN SATURATION: 97 % | HEART RATE: 120 BPM | SYSTOLIC BLOOD PRESSURE: 112 MMHG | BODY MASS INDEX: 38.91 KG/M2 | TEMPERATURE: 98 F

## 2023-11-30 DIAGNOSIS — E03.9 ACQUIRED HYPOTHYROIDISM: ICD-10-CM

## 2023-11-30 DIAGNOSIS — Z74.09 DECREASED FUNCTIONAL MOBILITY AND ENDURANCE: ICD-10-CM

## 2023-11-30 DIAGNOSIS — I70.0 AORTIC ATHEROSCLEROSIS: ICD-10-CM

## 2023-11-30 DIAGNOSIS — N18.31 TYPE 2 DIABETES MELLITUS WITH STAGE 3A CHRONIC KIDNEY DISEASE, WITH LONG-TERM CURRENT USE OF INSULIN: Primary | ICD-10-CM

## 2023-11-30 DIAGNOSIS — E11.69 HYPERLIPIDEMIA ASSOCIATED WITH TYPE 2 DIABETES MELLITUS: ICD-10-CM

## 2023-11-30 DIAGNOSIS — Z00.00 ENCOUNTER FOR PREVENTIVE HEALTH EXAMINATION: ICD-10-CM

## 2023-11-30 DIAGNOSIS — Z12.11 SCREEN FOR COLON CANCER: ICD-10-CM

## 2023-11-30 DIAGNOSIS — E11.22 TYPE 2 DIABETES MELLITUS WITH STAGE 3A CHRONIC KIDNEY DISEASE, WITH LONG-TERM CURRENT USE OF INSULIN: Primary | ICD-10-CM

## 2023-11-30 DIAGNOSIS — E78.5 HYPERLIPIDEMIA ASSOCIATED WITH TYPE 2 DIABETES MELLITUS: ICD-10-CM

## 2023-11-30 DIAGNOSIS — Z79.4 TYPE 2 DIABETES MELLITUS WITH STAGE 3A CHRONIC KIDNEY DISEASE, WITH LONG-TERM CURRENT USE OF INSULIN: Primary | ICD-10-CM

## 2023-11-30 DIAGNOSIS — R00.0 TACHYCARDIA: ICD-10-CM

## 2023-11-30 DIAGNOSIS — E66.01 SEVERE OBESITY WITH BODY MASS INDEX (BMI) OF 35.0 TO 35.9 AND COMORBIDITY: ICD-10-CM

## 2023-11-30 DIAGNOSIS — B02.29 PHN (POSTHERPETIC NEURALGIA): ICD-10-CM

## 2023-11-30 DIAGNOSIS — G47.33 OSA ON CPAP: ICD-10-CM

## 2023-11-30 DIAGNOSIS — E11.65 UNCONTROLLED TYPE 2 DIABETES MELLITUS WITH HYPERGLYCEMIA: ICD-10-CM

## 2023-11-30 PROCEDURE — 3066F PR DOCUMENTATION OF TREATMENT FOR NEPHROPATHY: ICD-10-PCS | Mod: CPTII,S$GLB,, | Performed by: INTERNAL MEDICINE

## 2023-11-30 PROCEDURE — 3051F PR MOST RECENT HEMOGLOBIN A1C LEVEL 7.0 - < 8.0%: ICD-10-PCS | Mod: CPTII,S$GLB,, | Performed by: INTERNAL MEDICINE

## 2023-11-30 PROCEDURE — 3061F NEG MICROALBUMINURIA REV: CPT | Mod: CPTII,S$GLB,, | Performed by: INTERNAL MEDICINE

## 2023-11-30 PROCEDURE — 3078F DIAST BP <80 MM HG: CPT | Mod: CPTII,S$GLB,, | Performed by: INTERNAL MEDICINE

## 2023-11-30 PROCEDURE — 3074F PR MOST RECENT SYSTOLIC BLOOD PRESSURE < 130 MM HG: ICD-10-PCS | Mod: CPTII,S$GLB,, | Performed by: INTERNAL MEDICINE

## 2023-11-30 PROCEDURE — 1126F AMNT PAIN NOTED NONE PRSNT: CPT | Mod: CPTII,S$GLB,, | Performed by: INTERNAL MEDICINE

## 2023-11-30 PROCEDURE — 4010F PR ACE/ARB THEARPY RXD/TAKEN: ICD-10-PCS | Mod: CPTII,S$GLB,, | Performed by: INTERNAL MEDICINE

## 2023-11-30 PROCEDURE — 4010F ACE/ARB THERAPY RXD/TAKEN: CPT | Mod: CPTII,S$GLB,, | Performed by: INTERNAL MEDICINE

## 2023-11-30 PROCEDURE — 99999 PR PBB SHADOW E&M-EST. PATIENT-LVL III: CPT | Mod: PBBFAC,,, | Performed by: INTERNAL MEDICINE

## 2023-11-30 PROCEDURE — 3074F SYST BP LT 130 MM HG: CPT | Mod: CPTII,S$GLB,, | Performed by: INTERNAL MEDICINE

## 2023-11-30 PROCEDURE — 3051F HG A1C>EQUAL 7.0%<8.0%: CPT | Mod: CPTII,S$GLB,, | Performed by: INTERNAL MEDICINE

## 2023-11-30 PROCEDURE — 3066F NEPHROPATHY DOC TX: CPT | Mod: CPTII,S$GLB,, | Performed by: INTERNAL MEDICINE

## 2023-11-30 PROCEDURE — 1101F PT FALLS ASSESS-DOCD LE1/YR: CPT | Mod: CPTII,S$GLB,, | Performed by: INTERNAL MEDICINE

## 2023-11-30 PROCEDURE — 3288F FALL RISK ASSESSMENT DOCD: CPT | Mod: CPTII,S$GLB,, | Performed by: INTERNAL MEDICINE

## 2023-11-30 PROCEDURE — 3288F PR FALLS RISK ASSESSMENT DOCUMENTED: ICD-10-PCS | Mod: CPTII,S$GLB,, | Performed by: INTERNAL MEDICINE

## 2023-11-30 PROCEDURE — 2023F DILAT RTA XM W/O RTNOPTHY: CPT | Mod: CPTII,S$GLB,, | Performed by: INTERNAL MEDICINE

## 2023-11-30 PROCEDURE — 1159F MED LIST DOCD IN RCRD: CPT | Mod: CPTII,S$GLB,, | Performed by: INTERNAL MEDICINE

## 2023-11-30 PROCEDURE — 3078F PR MOST RECENT DIASTOLIC BLOOD PRESSURE < 80 MM HG: ICD-10-PCS | Mod: CPTII,S$GLB,, | Performed by: INTERNAL MEDICINE

## 2023-11-30 PROCEDURE — 1126F PR PAIN SEVERITY QUANTIFIED, NO PAIN PRESENT: ICD-10-PCS | Mod: CPTII,S$GLB,, | Performed by: INTERNAL MEDICINE

## 2023-11-30 PROCEDURE — 3061F PR NEG MICROALBUMINURIA RESULT DOCUMENTED/REVIEW: ICD-10-PCS | Mod: CPTII,S$GLB,, | Performed by: INTERNAL MEDICINE

## 2023-11-30 PROCEDURE — 1101F PR PT FALLS ASSESS DOC 0-1 FALLS W/OUT INJ PAST YR: ICD-10-PCS | Mod: CPTII,S$GLB,, | Performed by: INTERNAL MEDICINE

## 2023-11-30 PROCEDURE — 99999 PR PBB SHADOW E&M-EST. PATIENT-LVL III: ICD-10-PCS | Mod: PBBFAC,,, | Performed by: INTERNAL MEDICINE

## 2023-11-30 PROCEDURE — 1159F PR MEDICATION LIST DOCUMENTED IN MEDICAL RECORD: ICD-10-PCS | Mod: CPTII,S$GLB,, | Performed by: INTERNAL MEDICINE

## 2023-11-30 PROCEDURE — 99214 OFFICE O/P EST MOD 30 MIN: CPT | Mod: S$GLB,,, | Performed by: INTERNAL MEDICINE

## 2023-11-30 PROCEDURE — 2023F PR DILATED RETINAL EXAM W/O EVID OF RETINOPATHY: ICD-10-PCS | Mod: CPTII,S$GLB,, | Performed by: INTERNAL MEDICINE

## 2023-11-30 PROCEDURE — 3008F PR BODY MASS INDEX (BMI) DOCUMENTED: ICD-10-PCS | Mod: CPTII,S$GLB,, | Performed by: INTERNAL MEDICINE

## 2023-11-30 PROCEDURE — 99214 PR OFFICE/OUTPT VISIT, EST, LEVL IV, 30-39 MIN: ICD-10-PCS | Mod: S$GLB,,, | Performed by: INTERNAL MEDICINE

## 2023-11-30 PROCEDURE — 3008F BODY MASS INDEX DOCD: CPT | Mod: CPTII,S$GLB,, | Performed by: INTERNAL MEDICINE

## 2023-11-30 RX ORDER — INSULIN GLARGINE 100 [IU]/ML
10 INJECTION, SOLUTION SUBCUTANEOUS DAILY
Qty: 6 ML | Refills: 11
Start: 2023-11-30

## 2023-11-30 NOTE — PROGRESS NOTES
CHW - Initial Contact    This Community Health Worker completed the Social Determinant of Health questionnaire with patient during clinic visit today.    Pt identified barriers of most importance are none at this time.  Referrals to community agencies completed with patient consent outside of [facility] include: No outside referrals at this time.  Referrals were put through [facility] - no  Support and Services: None  Other information discussed the patient needs help with. No other information was discussed the patient needs help with.   Follow up required: No  No future outreach task assigned    SDOH was done on this patient she do not need any help. Please read notes.

## 2023-12-04 ENCOUNTER — OFFICE VISIT (OUTPATIENT)
Dept: DIABETES | Facility: CLINIC | Age: 73
End: 2023-12-04
Payer: MEDICARE

## 2023-12-04 VITALS
HEART RATE: 62 BPM | BODY MASS INDEX: 38.85 KG/M2 | WEIGHT: 247.56 LBS | DIASTOLIC BLOOD PRESSURE: 70 MMHG | SYSTOLIC BLOOD PRESSURE: 126 MMHG | HEIGHT: 67 IN

## 2023-12-04 DIAGNOSIS — E78.5 HYPERLIPIDEMIA ASSOCIATED WITH TYPE 2 DIABETES MELLITUS: ICD-10-CM

## 2023-12-04 DIAGNOSIS — E66.9 OBESITY (BMI 30-39.9): ICD-10-CM

## 2023-12-04 DIAGNOSIS — N18.31 TYPE 2 DIABETES MELLITUS WITH STAGE 3A CHRONIC KIDNEY DISEASE, WITH LONG-TERM CURRENT USE OF INSULIN: Primary | ICD-10-CM

## 2023-12-04 DIAGNOSIS — E11.69 HYPERLIPIDEMIA ASSOCIATED WITH TYPE 2 DIABETES MELLITUS: ICD-10-CM

## 2023-12-04 DIAGNOSIS — G47.33 OSA ON CPAP: ICD-10-CM

## 2023-12-04 DIAGNOSIS — E11.36 CATARACT ASSOCIATED WITH TYPE 2 DIABETES MELLITUS: ICD-10-CM

## 2023-12-04 DIAGNOSIS — Z79.4 TYPE 2 DIABETES MELLITUS WITH STAGE 3A CHRONIC KIDNEY DISEASE, WITH LONG-TERM CURRENT USE OF INSULIN: Primary | ICD-10-CM

## 2023-12-04 DIAGNOSIS — E11.22 TYPE 2 DIABETES MELLITUS WITH STAGE 3A CHRONIC KIDNEY DISEASE, WITH LONG-TERM CURRENT USE OF INSULIN: Primary | ICD-10-CM

## 2023-12-04 DIAGNOSIS — E03.9 ACQUIRED HYPOTHYROIDISM: ICD-10-CM

## 2023-12-04 LAB — GLUCOSE SERPL-MCNC: 174 MG/DL (ref 70–110)

## 2023-12-04 PROCEDURE — 95251 PR GLUCOSE MONITOR, 72 HOUR, PHYS INTERP: ICD-10-PCS | Mod: S$GLB,,, | Performed by: PHYSICIAN ASSISTANT

## 2023-12-04 PROCEDURE — 3008F PR BODY MASS INDEX (BMI) DOCUMENTED: ICD-10-PCS | Mod: CPTII,S$GLB,, | Performed by: PHYSICIAN ASSISTANT

## 2023-12-04 PROCEDURE — 3074F PR MOST RECENT SYSTOLIC BLOOD PRESSURE < 130 MM HG: ICD-10-PCS | Mod: CPTII,S$GLB,, | Performed by: PHYSICIAN ASSISTANT

## 2023-12-04 PROCEDURE — 1126F PR PAIN SEVERITY QUANTIFIED, NO PAIN PRESENT: ICD-10-PCS | Mod: CPTII,S$GLB,, | Performed by: PHYSICIAN ASSISTANT

## 2023-12-04 PROCEDURE — 3288F FALL RISK ASSESSMENT DOCD: CPT | Mod: CPTII,S$GLB,, | Performed by: PHYSICIAN ASSISTANT

## 2023-12-04 PROCEDURE — 3008F BODY MASS INDEX DOCD: CPT | Mod: CPTII,S$GLB,, | Performed by: PHYSICIAN ASSISTANT

## 2023-12-04 PROCEDURE — 3078F PR MOST RECENT DIASTOLIC BLOOD PRESSURE < 80 MM HG: ICD-10-PCS | Mod: CPTII,S$GLB,, | Performed by: PHYSICIAN ASSISTANT

## 2023-12-04 PROCEDURE — 3051F HG A1C>EQUAL 7.0%<8.0%: CPT | Mod: CPTII,S$GLB,, | Performed by: PHYSICIAN ASSISTANT

## 2023-12-04 PROCEDURE — 1159F PR MEDICATION LIST DOCUMENTED IN MEDICAL RECORD: ICD-10-PCS | Mod: CPTII,S$GLB,, | Performed by: PHYSICIAN ASSISTANT

## 2023-12-04 PROCEDURE — 99999 PR PBB SHADOW E&M-EST. PATIENT-LVL IV: CPT | Mod: PBBFAC,,, | Performed by: PHYSICIAN ASSISTANT

## 2023-12-04 PROCEDURE — 99999 PR PBB SHADOW E&M-EST. PATIENT-LVL IV: ICD-10-PCS | Mod: PBBFAC,,, | Performed by: PHYSICIAN ASSISTANT

## 2023-12-04 PROCEDURE — 3078F DIAST BP <80 MM HG: CPT | Mod: CPTII,S$GLB,, | Performed by: PHYSICIAN ASSISTANT

## 2023-12-04 PROCEDURE — 3066F NEPHROPATHY DOC TX: CPT | Mod: CPTII,S$GLB,, | Performed by: PHYSICIAN ASSISTANT

## 2023-12-04 PROCEDURE — 3051F PR MOST RECENT HEMOGLOBIN A1C LEVEL 7.0 - < 8.0%: ICD-10-PCS | Mod: CPTII,S$GLB,, | Performed by: PHYSICIAN ASSISTANT

## 2023-12-04 PROCEDURE — 4010F PR ACE/ARB THEARPY RXD/TAKEN: ICD-10-PCS | Mod: CPTII,S$GLB,, | Performed by: PHYSICIAN ASSISTANT

## 2023-12-04 PROCEDURE — 3061F PR NEG MICROALBUMINURIA RESULT DOCUMENTED/REVIEW: ICD-10-PCS | Mod: CPTII,S$GLB,, | Performed by: PHYSICIAN ASSISTANT

## 2023-12-04 PROCEDURE — 4010F ACE/ARB THERAPY RXD/TAKEN: CPT | Mod: CPTII,S$GLB,, | Performed by: PHYSICIAN ASSISTANT

## 2023-12-04 PROCEDURE — 95251 CONT GLUC MNTR ANALYSIS I&R: CPT | Mod: S$GLB,,, | Performed by: PHYSICIAN ASSISTANT

## 2023-12-04 PROCEDURE — 1159F MED LIST DOCD IN RCRD: CPT | Mod: CPTII,S$GLB,, | Performed by: PHYSICIAN ASSISTANT

## 2023-12-04 PROCEDURE — 99214 PR OFFICE/OUTPT VISIT, EST, LEVL IV, 30-39 MIN: ICD-10-PCS | Mod: S$GLB,,, | Performed by: PHYSICIAN ASSISTANT

## 2023-12-04 PROCEDURE — 1126F AMNT PAIN NOTED NONE PRSNT: CPT | Mod: CPTII,S$GLB,, | Performed by: PHYSICIAN ASSISTANT

## 2023-12-04 PROCEDURE — 3288F PR FALLS RISK ASSESSMENT DOCUMENTED: ICD-10-PCS | Mod: CPTII,S$GLB,, | Performed by: PHYSICIAN ASSISTANT

## 2023-12-04 PROCEDURE — 3061F NEG MICROALBUMINURIA REV: CPT | Mod: CPTII,S$GLB,, | Performed by: PHYSICIAN ASSISTANT

## 2023-12-04 PROCEDURE — 3074F SYST BP LT 130 MM HG: CPT | Mod: CPTII,S$GLB,, | Performed by: PHYSICIAN ASSISTANT

## 2023-12-04 PROCEDURE — 99214 OFFICE O/P EST MOD 30 MIN: CPT | Mod: S$GLB,,, | Performed by: PHYSICIAN ASSISTANT

## 2023-12-04 PROCEDURE — 1101F PT FALLS ASSESS-DOCD LE1/YR: CPT | Mod: CPTII,S$GLB,, | Performed by: PHYSICIAN ASSISTANT

## 2023-12-04 PROCEDURE — 82962 POCT GLUCOSE, HAND-HELD DEVICE: ICD-10-PCS | Mod: S$GLB,,, | Performed by: PHYSICIAN ASSISTANT

## 2023-12-04 PROCEDURE — 1101F PR PT FALLS ASSESS DOC 0-1 FALLS W/OUT INJ PAST YR: ICD-10-PCS | Mod: CPTII,S$GLB,, | Performed by: PHYSICIAN ASSISTANT

## 2023-12-04 PROCEDURE — 82962 GLUCOSE BLOOD TEST: CPT | Mod: S$GLB,,, | Performed by: PHYSICIAN ASSISTANT

## 2023-12-04 PROCEDURE — 3066F PR DOCUMENTATION OF TREATMENT FOR NEPHROPATHY: ICD-10-PCS | Mod: CPTII,S$GLB,, | Performed by: PHYSICIAN ASSISTANT

## 2023-12-04 NOTE — PROGRESS NOTES
PCP: Jessica Ward MD    Subjective:     Chief Complaint: Diabetes - Established Patient    HISTORY OF PRESENT ILLNESS: 73 y.o.   female presenting for diabetes management visit.   The patient's last visit with me was on 10/25/2023.   Patient has had Type II diabetes since 20 or more years.  Pertinent to decision making is the following comorbidities: HLD, Hypothyroidism and Obesity by BMI  Patient has the following Diabetes complications: without complications  She has attended diabetes education in the past.     Patient's most recent A1c of 7.1% was completed 2 weeks ago.   Patient states since Her last A1c Her blood glucose levels have been high  throughout the day .   Patient monitors blood glucose 4 times per day and Continuously with personal CGM Dexcom.   Patient blood glucose monitoring device will be uploaded into [employer] today.        Patients records shows postprandial hyperglycemia but unclear after what size meal and hyperglycemia present in am with fasting and taking Levothyroxine with H20 alone.     Patient endorses the following diabetes related symptoms:  None .   Patient is due today for the following diabetes-related health maintenance standards:  Colonoscopy and RSV vaccine . Completed RSV vaccine outside of [facility] and Cologuard is ordered.   She denies any recent hospital admissions or emergency room visits. Patient denies history of DKA.   She voices having hypoglycemia as above..   Patient's concerns today include glycemic control.    Patient medication regimen is as below.      CURRENT DM MEDICATIONS:   Lantus 10 units daily   Ozempic 2 mg weekly   Humalog 10 units with small / 12 units with regular / 14 units with heavier carb meal / 5 units with night time snack TID wm  Humalog correction dosing every 3 hours as below    Humalog Correction Dosing every 3 hours as needed OUTSIDE OF EATING  If  - 250, may take 2 units of Humalog  If  - 300, may take 4 units  "of Humalog   If  - 350, may take 6 units of Humalog  If  - 400, may take 8 units of Humalog  If +, may take 10 units of Humalog      Patient has failed the following Diabetes medications:   Metformin - GI   Invokana - coverage  Jardiance - yeast infection  Glyburide / Glipizide  Victoza   Basal - Basaglar  Trulicity - GLP-1 escalation      Labs Reviewed.       Lab Results   Component Value Date    CPEPTIDE 2.35 04/01/2021     Lab Results   Component Value Date    GLUTAMICACID 0.00 05/25/2017       Height: 5' 7" (170.2 cm)  //  Weight: 112.3 kg (247 lb 9.2 oz), Body mass index is 38.78 kg/m².  Her blood sugar in clinic today is:    Lab Results   Component Value Date    POCGLU 174 (A) 12/04/2023       Review of Systems   Constitutional:  Negative for activity change, appetite change, chills and fever.   HENT:  Negative for dental problem, mouth sores, nosebleeds, sore throat and trouble swallowing.    Eyes:  Negative for pain and discharge.   Respiratory:  Negative for shortness of breath, wheezing and stridor.    Cardiovascular:  Negative for chest pain, palpitations and leg swelling.   Gastrointestinal:  Negative for abdominal pain, diarrhea, nausea and vomiting.   Endocrine: Negative for polydipsia, polyphagia and polyuria.   Genitourinary:  Negative for dysuria, frequency and urgency.   Musculoskeletal:  Negative for joint swelling and myalgias.   Skin:  Negative for rash and wound.   Neurological:  Negative for dizziness, syncope, weakness and headaches.   Psychiatric/Behavioral:  Negative for behavioral problems and dysphoric mood.          Diabetes Management Status  Statin: Taking  ACE/ARB: Taking    Screening or Prevention Patient's value Goal Complete/Controlled?   HgA1C Testing and Control   Lab Results   Component Value Date    HGBA1C 7.1 (H) 11/17/2023      Annually/Less than 8% No   Lipid profile : 11/17/2023 Annually Yes   LDL control Lab Results   Component Value Date    LDLCALC 49.0 " (L) 11/17/2023    Annually/Less than 100 mg/dl  Yes   Nephropathy screening Lab Results   Component Value Date    MICALBCREAT 2.7 11/17/2023     Lab Results   Component Value Date    PROTEINUA Negative 04/01/2021    Annually Yes   Blood pressure BP Readings from Last 1 Encounters:   12/04/23 126/70    Less than 140/90 Yes   Dilated retinal exam : 10/12/2023 Annually Yes    Foot exam   : 11/30/2023 Annually Yes     ACTIVITY LEVEL: Moderately Active. Discussed activities, benefits, methods, and precautions.  MEAL PLANNING: Patient reports number of meals per day to be 3 and number of snacks per day to be 2.   Patient is encouraged to carb count and consume no more than 30 - 45 grams of carbohydrates in each meal and 15 grams of carbohydrates in each snack.     Social History     Socioeconomic History    Marital status:    Tobacco Use    Smoking status: Never    Smokeless tobacco: Never   Substance and Sexual Activity    Alcohol use: Yes     Comment: rare    Drug use: No    Sexual activity: Yes     Partners: Male   Social History Narrative    . Lives with spouse. Has 3 children. Patient retired as  for [employer].      Social Determinants of Health     Financial Resource Strain: Low Risk  (11/30/2023)    Overall Financial Resource Strain (CARDIA)     Difficulty of Paying Living Expenses: Not hard at all   Food Insecurity: No Food Insecurity (11/30/2023)    Hunger Vital Sign     Worried About Running Out of Food in the Last Year: Never true     Ran Out of Food in the Last Year: Never true   Transportation Needs: No Transportation Needs (11/30/2023)    PRAPARE - Transportation     Lack of Transportation (Medical): No     Lack of Transportation (Non-Medical): No   Physical Activity: Inactive (11/30/2023)    Exercise Vital Sign     Days of Exercise per Week: 0 days     Minutes of Exercise per Session: 0 min   Stress: No Stress Concern Present (11/30/2023)    [employer] [place] - Occupational Stress Questionnaire     Feeling of Stress : Not at all   Social Connections: Socially Integrated (11/30/2023)    Social Connection and Isolation Panel [NHANES]     Frequency of Communication with Friends and Family: More than three times a week     Frequency of Social Gatherings with Friends and Family: Twice a week     Attends Baptism Services: More than 4 times per year     Active Member of Clubs or Organizations: Yes     Attends Club or Organization Meetings: More than 4 times per year     Marital Status:    Housing Stability: Low Risk  (11/30/2023)    Housing Stability Vital Sign     Unable to Pay for Housing in the Last Year: No     Number of Places Lived in the Last Year: 1     Unstable Housing in the Last Year: No     Past Medical History:   Diagnosis Date    Diabetes mellitus type II     Hyperlipidemia     Hypertension     Hypothyroid     TALIA (obstructive sleep apnea)     on CPAP    Osteopenia     Psoriasis        Objective:        Physical Exam  Constitutional:       General: She is not in acute distress.     Appearance: She is well-developed. She is not diaphoretic.   HENT:      Head: Normocephalic and atraumatic.      Right Ear: External ear normal.      Left Ear: External ear normal.      Nose: Nose normal.   Eyes:      General:         Right eye: No discharge.         Left eye: No discharge.      Pupils: Pupils are equal, round, and reactive to light.   Cardiovascular:      Rate and Rhythm: Normal rate and regular rhythm.      Heart sounds: Normal heart sounds.   Pulmonary:      Effort: Pulmonary effort is normal.      Breath sounds: Normal breath sounds.   Abdominal:      Palpations: Abdomen is soft.   Musculoskeletal:         General: Normal range of motion.      Cervical back: Normal range of motion and neck supple.   Skin:     General: Skin is warm and dry.      Capillary Refill: Capillary refill takes less than 2 seconds.   Neurological:      Mental Status: She is  alert and oriented to person, place, and time.      Motor: No abnormal muscle tone.      Coordination: Coordination normal.   Psychiatric:         Behavior: Behavior normal.         Thought Content: Thought content normal.         Judgment: Judgment normal.           Assessment / Plan:     Type 2 diabetes mellitus with stage 3a chronic kidney disease, with long-term current use of insulin  -     POCT Glucose, Hand-Held Device    Cataract associated with type 2 diabetes mellitus    Hyperlipidemia associated with type 2 diabetes mellitus    Acquired hypothyroidism    TALIA on CPAP    Obesity (BMI 30-39.9)        Additional Plan Details:    - POCT Glucose  - Encouraged continuation of lifestyle changes including regular exercise and limiting carbohydrates to 30-45 grams per meal threes times daily and 15 grams per snack with a limit of two daily.   - Encouraged continued monitoring of blood glucose with maintenance of 4 times daily and Continuously with personal CGM Dexcom. Encouraged pt to input notes after hyperglycemia to indicate meal size dosing or if dose was missed.    - Current DM Medication Regimen: Change Lantus 9 units daily. Continue Ozempic 2 mg weekly. Change Humalog meal size dosing TID wm and correction dosing every 3 hours as below. ADJUST INSULIN FREQUENTLY BASED ON BLOOD SUGAR.   - Health Maintenance standards addressed today:  Colonoscopy - cologuard ordered and RSV vaccine - completed  - Nursing Visit: Patient is below goal range for nursing visit for age group and  qualifies for nursing visit, but will defer for now.  .   - Follow up in 8 weeks with DM Management partner    CURRENT DM MEDICATIONS:   Lantus 9 units daily   Ozempic 2 mg weekly   Humalog meal size dosing TID wm  Humalog correction dosing every 3 hours as below    Meal Size:   10 units with small carb meal  12 units with regular carb meal  14 units with heavier carb meal  5 units with night time snack  3 units before Levothyroxine  dose    Humalog Correction Dosing every 3 hours as needed OUTSIDE OF EATING  If  - 250, may take 2 units of Humalog  If  - 300, may take 4 units of Humalog   If  - 350, may take 6 units of Humalog  If  - 400, may take 8 units of Humalog  If +, may take 10 units of Humalog          Blakeney McKnight, PA-C [facility]    A total of 30 minutes was spent in face to face time, of which over 50 % was spent in counseling patient on disease process, complications, treatment, and side effects of medications.

## 2023-12-04 NOTE — PATIENT INSTRUCTIONS
CURRENT DM MEDICATIONS:   Lantus 9 units daily   Ozempic 2 mg weekly   Humalog meal size dosing TID wm  Humalog correction dosing every 3 hours as below    Meal Size:   10 units with small carb meal  12 units with regular carb meal  14 units with heavier carb meal  5 units with night time snack  3 units before Levothyroxine dose    Humalog Correction Dosing every 3 hours as needed OUTSIDE OF EATING  If  - 250, may take 2 units of Humalog  If  - 300, may take 4 units of Humalog   If  - 350, may take 6 units of Humalog  If  - 400, may take 8 units of Humalog  If +, may take 10 units of Humalog

## 2023-12-05 ENCOUNTER — PATIENT MESSAGE (OUTPATIENT)
Dept: DIABETES | Facility: CLINIC | Age: 73
End: 2023-12-05
Payer: MEDICARE

## 2023-12-05 DIAGNOSIS — E11.22 TYPE 2 DIABETES MELLITUS WITH STAGE 3A CHRONIC KIDNEY DISEASE, WITH LONG-TERM CURRENT USE OF INSULIN: Primary | ICD-10-CM

## 2023-12-05 DIAGNOSIS — N18.31 TYPE 2 DIABETES MELLITUS WITH STAGE 3A CHRONIC KIDNEY DISEASE, WITH LONG-TERM CURRENT USE OF INSULIN: Primary | ICD-10-CM

## 2023-12-05 DIAGNOSIS — Z79.4 TYPE 2 DIABETES MELLITUS WITH STAGE 3A CHRONIC KIDNEY DISEASE, WITH LONG-TERM CURRENT USE OF INSULIN: Primary | ICD-10-CM

## 2023-12-05 RX ORDER — PEN NEEDLE, DIABETIC 30 GX3/16"
1 NEEDLE, DISPOSABLE MISCELLANEOUS
Qty: 400 EACH | Refills: 3 | Status: SHIPPED | OUTPATIENT
Start: 2023-12-05 | End: 2024-12-04

## 2023-12-05 NOTE — TELEPHONE ENCOUNTER
Lorraine,      There is not an active order for the BD Dayanara needles on her med list. Please add and send a 90 day supply to the pharmacy

## 2023-12-21 LAB — NONINV COLON CA DNA+OCC BLD SCRN STL QL: NEGATIVE

## 2023-12-22 ENCOUNTER — PATIENT MESSAGE (OUTPATIENT)
Dept: PRIMARY CARE CLINIC | Facility: CLINIC | Age: 73
End: 2023-12-22
Payer: MEDICARE

## 2023-12-28 RX ORDER — ATORVASTATIN CALCIUM 20 MG/1
TABLET, FILM COATED ORAL
Qty: 90 TABLET | Refills: 3 | Status: SHIPPED | OUTPATIENT
Start: 2023-12-28

## 2023-12-28 NOTE — TELEPHONE ENCOUNTER
Refill Decision Note   Jaimie Ene  is requesting a refill authorization.  Brief Assessment and Rationale for Refill:  Approve     Medication Therapy Plan:         Comments:     Note composed:5:44 PM 12/28/2023

## 2023-12-28 NOTE — TELEPHONE ENCOUNTER
No care due was identified.  [facility] Embedded Care Due Messages. Reference number: [id number].   12/28/2023 7:55:51 AM CST

## 2024-01-26 ENCOUNTER — OFFICE VISIT (OUTPATIENT)
Dept: DERMATOLOGY | Facility: CLINIC | Age: 74
End: 2024-01-26
Payer: MEDICARE

## 2024-01-26 DIAGNOSIS — D22.9 MULTIPLE BENIGN NEVI: ICD-10-CM

## 2024-01-26 DIAGNOSIS — L82.1 SEBORRHEIC KERATOSES: Primary | ICD-10-CM

## 2024-01-26 DIAGNOSIS — D18.01 CHERRY ANGIOMA: ICD-10-CM

## 2024-01-26 DIAGNOSIS — R22.9 SUBCUTANEOUS NODULE: ICD-10-CM

## 2024-01-26 DIAGNOSIS — Z12.83 SCREENING, MALIGNANT NEOPLASM, SKIN: ICD-10-CM

## 2024-01-26 PROCEDURE — 99999 PR PBB SHADOW E&M-EST. PATIENT-LVL II: CPT | Mod: PBBFAC,,, | Performed by: STUDENT IN AN ORGANIZED HEALTH CARE EDUCATION/TRAINING PROGRAM

## 2024-01-26 PROCEDURE — 99214 OFFICE O/P EST MOD 30 MIN: CPT | Mod: S$GLB,,, | Performed by: STUDENT IN AN ORGANIZED HEALTH CARE EDUCATION/TRAINING PROGRAM

## 2024-01-26 NOTE — PROGRESS NOTES
Subjective:       Patient ID:  Jaimie Luque is a 74 y.o. female who presents for   Chief Complaint   Patient presents with    Mole     Back and side     History of Present Illness: The patient presents for follow up of skin check and evaluation of a skin lesions. Last seen by Dr. Stout in 2020. Patient reports having several brownish, crusted growths on the trunk and arms. Also with several nodules under the skin that she can often squeeze thick material from (previously had a lesion on the back removed in office but it came back). Denies any know rapidly growing, bleeding or non healing skin lesions.     Mole        Review of Systems   Constitutional:  Negative for fever and chills.   Skin:  Negative for itching, rash and dry skin.        Objective:    Physical Exam   Constitutional: She appears well-developed and well-nourished. No distress.   Neurological: She is alert and oriented to person, place, and time. She is not disoriented.   Psychiatric: She has a normal mood and affect.   Skin:   Areas Examined (abnormalities noted in diagram):   Scalp / Hair Palpated and Inspected  Head / Face Inspection Performed  Neck Inspection Performed  Chest / Axilla Inspection Performed  Abdomen Inspection Performed  Back Inspection Performed  RUE Inspected  LUE Inspection Performed                   Diagram Legend     Erythematous scaling macule/papule c/w actinic keratosis       Vascular papule c/w angioma      Pigmented verrucoid papule/plaque c/w seborrheic keratosis      Yellow umbilicated papule c/w sebaceous hyperplasia      Irregularly shaped tan macule c/w lentigo     1-2 mm smooth white papules consistent with Milia      Movable subcutaneous cyst with punctum c/w epidermal inclusion cyst      Subcutaneous movable cyst c/w pilar cyst      Firm pink to brown papule c/w dermatofibroma      Pedunculated fleshy papule(s) c/w skin tag(s)      Evenly pigmented macule c/w junctional nevus     Mildly variegated  pigmented, slightly irregular-bordered macule c/w mildly atypical nevus      Flesh colored to evenly pigmented papule c/w intradermal nevus       Pink pearly papule/plaque c/w basal cell carcinoma      Erythematous hyperkeratotic cursted plaque c/w SCC      Surgical scar with no sign of skin cancer recurrence      Open and closed comedones      Inflammatory papules and pustules      Verrucoid papule consistent consistent with wart     Erythematous eczematous patches and plaques     Dystrophic onycholytic nail with subungual debris c/w onychomycosis     Umbilicated papule    Erythematous-base heme-crusted tan verrucoid plaque consistent with inflamed seborrheic keratosis     Erythematous Silvery Scaling Plaque c/w Psoriasis     See annotation      Assessment / Plan:        Seborrheic keratoses  These are benign inherited growths without a malignant potential. Reassurance given to patient. No treatment is necessary.     Cherry angioma  This is a benign vascular lesion. Reassurance given. No treatment required.     Subcutaneous nodule  Reassurance given to patient. No treatment is necessary.   Discussed treatment options - excision vs observation. Cysts may recur with excision. Pt will defer treatment at this time.     Multiple benign nevi  Screening, malignant neoplasm, skin  upper body skin examination performed today including at least 6 points as noted in physical examination. No lesions suspicious for malignancy noted.  Reassurance provided.    Instructed patient to observe lesion(s) for changes and follow up in clinic if changes are noted. Patient to monitor skin at home for new or changing lesions and follow up in clinic if noted.    Discussed ABCDE's of moles and brochure provided.           Follow up in about 1 year (around 1/26/2025).

## 2024-01-30 ENCOUNTER — OFFICE VISIT (OUTPATIENT)
Dept: PRIMARY CARE CLINIC | Facility: CLINIC | Age: 74
End: 2024-01-30
Payer: MEDICARE

## 2024-01-30 VITALS
WEIGHT: 242.5 LBS | HEIGHT: 67 IN | TEMPERATURE: 98 F | SYSTOLIC BLOOD PRESSURE: 116 MMHG | DIASTOLIC BLOOD PRESSURE: 58 MMHG | BODY MASS INDEX: 38.06 KG/M2 | HEART RATE: 83 BPM | OXYGEN SATURATION: 97 %

## 2024-01-30 DIAGNOSIS — M35.1 MCTD (MIXED CONNECTIVE TISSUE DISEASE): Primary | ICD-10-CM

## 2024-01-30 DIAGNOSIS — E66.01 SEVERE OBESITY WITH BODY MASS INDEX (BMI) OF 35.0 TO 35.9 AND COMORBIDITY: ICD-10-CM

## 2024-01-30 DIAGNOSIS — J43.9 BLEB, LUNG: ICD-10-CM

## 2024-01-30 DIAGNOSIS — N18.31 TYPE 2 DIABETES MELLITUS WITH STAGE 3A CHRONIC KIDNEY DISEASE, WITH LONG-TERM CURRENT USE OF INSULIN: ICD-10-CM

## 2024-01-30 DIAGNOSIS — J84.9 ILD (INTERSTITIAL LUNG DISEASE): ICD-10-CM

## 2024-01-30 DIAGNOSIS — I70.0 AORTIC ATHEROSCLEROSIS: ICD-10-CM

## 2024-01-30 DIAGNOSIS — E11.22 TYPE 2 DIABETES MELLITUS WITH STAGE 3A CHRONIC KIDNEY DISEASE, WITH LONG-TERM CURRENT USE OF INSULIN: ICD-10-CM

## 2024-01-30 DIAGNOSIS — E11.36 CATARACT ASSOCIATED WITH TYPE 2 DIABETES MELLITUS: ICD-10-CM

## 2024-01-30 DIAGNOSIS — Z79.4 TYPE 2 DIABETES MELLITUS WITH STAGE 3A CHRONIC KIDNEY DISEASE, WITH LONG-TERM CURRENT USE OF INSULIN: ICD-10-CM

## 2024-01-30 PROBLEM — D84.9 IMMUNOCOMPROMISED: Status: ACTIVE | Noted: 2024-01-30

## 2024-01-30 PROCEDURE — 99214 OFFICE O/P EST MOD 30 MIN: CPT | Mod: S$GLB,,, | Performed by: FAMILY MEDICINE

## 2024-01-30 PROCEDURE — 99999 PR PBB SHADOW E&M-EST. PATIENT-LVL IV: CPT | Mod: PBBFAC,,, | Performed by: FAMILY MEDICINE

## 2024-01-30 NOTE — ASSESSMENT & PLAN NOTE
Continue ozempic 2 mg; counseled today on the importance of movement not only for weight loss and cardiovascular health but for avoidance of frailty.

## 2024-01-30 NOTE — ASSESSMENT & PLAN NOTE
Per recent rheumatology a/p:  MCTD w ILD  Labs in 1 mos for Reg 4 and rna polymerase  Stable off cellcept -   will monitor  Pt to notify office if sxs worsen  SOB stable per pt report  F/u pulm as planned  CT stable las visit w Dr. Schumacher  Due for f/u in 8/2024  Consider repeat CT/PFT in 1 year

## 2024-01-30 NOTE — PATIENT INSTRUCTIONS
"If you are feeling unwell, we'd like to be the first ones to know here at [facility]! Please give us a call. Same day appointments are our top priority to keep you well and out of the emergency rooms and hospitals. Call [phone] for our direct line. After hours advice is always available. Please call [phone] after hours to speak to the on-call team.     Your eye exam is due in April; call to schedule if not already on your calendar.     Consider using the stationary bike to help strengthen the muscles around your knees.     Check out the book "Why We Sleep" by Stone Garcia, PhD    Consider listening to an audiobook or podcast while you go for a walk or ride on a [employer] bike.     "Live to 100" about the [employer] research is an inspiring documentary on Netflix.               "

## 2024-01-30 NOTE — PROGRESS NOTES
Subjective:       Patient ID: Jaimie Luque is a 74 y.o. female.    Chief Complaint: Follow-up (2 month f/u)    HPI:     Here for 2 month; Dr. Ward at last visit emphasized the importance of exercise and wanted her to return to clinic to follow-up on that goal. She admits, she is not exercising at all. Does walk, but only around the house. 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     She notes some decline in appetite on ozempic 2 mg; 5# weight loss over past 2 months. She sees rheumatology for MCTD and main complaint is knee pain that comes and goes; stiffness noted after long periods of inactivity- doesn't want Ortho eval at this time.  No exertional cp/sob/palp.  No f/c/sw/cough.  BMs normal.  Urine normal.  Has trouble sleeping at night and naps lots during the day. Reports looking forward to not having a routine when she retired (now 15 years ago), but can see now that there might be some benefit to having a schedule or routine of some sort.    Has upcoming DM clinic visit. Last A1c is at goal    HM: 10/23 fluvax, 10/23 RSV, 2/21 covid vaccines/booster, 4/17 HAV#2, 3/15 [id number], 3/14 [email or web address], 11/22 [email or web address], 10/18 booster at pharm TDaP, 11/12 zoster, 2019 Shingrix x2, 9/21 BMD rep 5y, 5/22 Cscope REPEAT 1y, 6/22 mmg (q2), 4/23 Eye doctor, 9/16 HCV neg, 7/23 CT lung with Pulm Dr. Schumacher.  Addendum: 12/23 Cologuard negative.        Objective:     Vitals:    01/30/24 0841   BP: (!) 116/58   Pulse: 83   Temp: 97.8 °F (36.6 °C)     Wt Readings from Last 3 Encounters:   01/30/24 110 kg (242 lb 8.1 oz)   12/04/23 112.3 kg (247 lb 9.2 oz)   11/30/23 112.4 kg (247 lb 14.4 oz)     Temp Readings from Last 3 Encounters:   01/30/24 97.8 °F (36.6 °C) (Oral)   11/30/23 98.2 °F (36.8 °C) (Oral)   05/30/23 97.8 °F (36.6 °C) (Oral)     BP Readings from Last 3 Encounters:   01/30/24 (!) 116/58    12/04/23 126/70   11/30/23 (!) 112/54     Pulse Readings from Last 3 Encounters:   01/30/24 83   12/04/23 62   11/30/23 (!) 120          Physical Exam  Vitals and nursing note reviewed.   Constitutional:       Appearance: She is well-developed. She is obese.   HENT:      Head: Normocephalic and atraumatic.   Eyes:      Pupils: Pupils are equal, round, and reactive to light.   Cardiovascular:      Rate and Rhythm: Normal rate and regular rhythm.   Pulmonary:      Effort: Pulmonary effort is normal.      Breath sounds: Normal breath sounds.   Abdominal:      Palpations: Abdomen is soft. There is no mass.   Musculoskeletal:         General: No deformity. Normal range of motion.      Cervical back: Normal range of motion and neck supple.   Skin:     General: Skin is warm and dry.   Neurological:      Mental Status: She is alert and oriented to person, place, and time.   Psychiatric:         Mood and Affect: Mood normal.         Behavior: Behavior normal.         Thought Content: Thought content normal.           Albumin   Date Value Ref Range Status   10/25/2023 3.1 (L) 3.5 - 5.2 g/dL Final     eGFR   Date Value Ref Range Status   10/25/2023 59 (A) >60 mL/min/1.73 m^2 Final     Lab Results   Component Value Date    HGBA1C 7.1 (H) 11/17/2023       Assessment:       1. MCTD (mixed connective tissue disease)    2. Bleb, lung    3. Cataract associated with type 2 diabetes mellitus    4. Severe obesity with body mass index (BMI) of 35.0 to 35.9 and comorbidity    5. Type 2 diabetes mellitus with stage 3a chronic kidney disease, with long-term current use of insulin    6. Aortic atherosclerosis    7. ILD (interstitial lung disease)        Plan:           Problem List Items Addressed This Visit          Ophtho    Cataract associated with type 2 diabetes mellitus    Current Assessment & Plan     stable  S/P bilateral cataract extraction with placement of IOL  Follow up with Opthalmology             Pulmonary    Bleb, lung     "Current Assessment & Plan     Stable; surveillance by pulmonology            Cardiac/Vascular    Aortic atherosclerosis    Overview     05-JUL-23; CT Chest Without Contrast: "..ILD CORONARY ARTERY CALCIFICATION.  NO CARDIOMEGALY, AORTIC ANEURYSM OR ADENOPATHY..."          Current Assessment & Plan     LDL is at goal; continue statin            Immunology/Multi System    MCTD (mixed connective tissue disease) - Primary    Current Assessment & Plan     Per recent rheumatology a/p:  MCTD w ILD  Labs in 1 mos for Reg 4 and rna polymerase  Stable off cellcept -   will monitor  Pt to notify office if sxs worsen  SOB stable per pt report  F/u pulm as planned  CT stable las visit w Dr. Schumacher  Due for f/u in 8/2024  Consider repeat CT/PFT in 1 year         Relevant Orders    CBC Auto Differential       Endocrine    Type 2 diabetes mellitus with stage 3a chronic kidney disease, with long-term current use of insulin    Relevant Orders    Comprehensive Metabolic Panel    Hemoglobin A1C    Severe obesity with body mass index (BMI) of 35.0 to 35.9 and comorbidity    Current Assessment & Plan     Continue ozempic 2 mg; counseled today on the importance of movement not only for weight loss and cardiovascular health but for avoidance of frailty.           Labs in 2 months with 3 month f/u  Schedule eye exam in 4/24  "

## 2024-01-31 ENCOUNTER — OFFICE VISIT (OUTPATIENT)
Dept: DIABETES | Facility: CLINIC | Age: 74
End: 2024-01-31
Payer: MEDICARE

## 2024-01-31 DIAGNOSIS — G47.33 OSA ON CPAP: ICD-10-CM

## 2024-01-31 DIAGNOSIS — E78.5 HYPERLIPIDEMIA ASSOCIATED WITH TYPE 2 DIABETES MELLITUS: ICD-10-CM

## 2024-01-31 DIAGNOSIS — E11.69 HYPERLIPIDEMIA ASSOCIATED WITH TYPE 2 DIABETES MELLITUS: ICD-10-CM

## 2024-01-31 DIAGNOSIS — E11.36 CATARACT ASSOCIATED WITH TYPE 2 DIABETES MELLITUS: ICD-10-CM

## 2024-01-31 DIAGNOSIS — N18.31 TYPE 2 DIABETES MELLITUS WITH STAGE 3A CHRONIC KIDNEY DISEASE, WITH LONG-TERM CURRENT USE OF INSULIN: Primary | ICD-10-CM

## 2024-01-31 DIAGNOSIS — E11.22 TYPE 2 DIABETES MELLITUS WITH STAGE 3A CHRONIC KIDNEY DISEASE, WITH LONG-TERM CURRENT USE OF INSULIN: Primary | ICD-10-CM

## 2024-01-31 DIAGNOSIS — Z79.4 TYPE 2 DIABETES MELLITUS WITH STAGE 3A CHRONIC KIDNEY DISEASE, WITH LONG-TERM CURRENT USE OF INSULIN: Primary | ICD-10-CM

## 2024-01-31 DIAGNOSIS — E03.9 ACQUIRED HYPOTHYROIDISM: ICD-10-CM

## 2024-01-31 PROCEDURE — 95251 CONT GLUC MNTR ANALYSIS I&R: CPT | Mod: NDTC,S$GLB,, | Performed by: NURSE PRACTITIONER

## 2024-01-31 PROCEDURE — 99214 OFFICE O/P EST MOD 30 MIN: CPT | Mod: 95,,, | Performed by: NURSE PRACTITIONER

## 2024-01-31 NOTE — PATIENT INSTRUCTIONS
CURRENT DM MEDICATIONS:   Lantus 10 units daily   Ozempic 2 mg weekly   Humalog meal size dosing TID wm  Humalog correction dosing every 3 hours as below    Meal Size:   12 units with small carb meal  14 units with regular carb meal  16 units with heavier carb meal  5 units with between meal snacks  3 units before Levothyroxine dose    Humalog Correction Dosing every 3 hours as needed OUTSIDE OF EATING  If  - 250, may take 2 units of Humalog  If  - 300, may take 4 units of Humalog   If  - 350, may take 6 units of Humalog  If  - 400, may take 8 units of Humalog  If +, may take 10 units of Humalog

## 2024-01-31 NOTE — PROGRESS NOTES
The patient location is: [place]  The chief complaint leading to consultation is: Diabetes management follow up - patient of Lorraine Holliday    Visit type: audiovisual    Face to Face time with patient: 14 minutes  22 minutes of total time spent on the encounter, which includes face to face time and non-face to face time preparing to see the patient (eg, review of tests), Obtaining and/or reviewing separately obtained history, Documenting clinical information in the electronic or other health record, Independently interpreting results (not separately reported) and communicating results to the patient/family/caregiver, or Care coordination (not separately reported).         Each patient to whom he or she provides medical services by telemedicine is:  (1) informed of the relationship between the physician and patient and the respective role of any other health care provider with respect to management of the patient; and (2) notified that he or she may decline to receive medical services by telemedicine and may withdraw from such care at any time.    Notes:      Subjective:         Patient ID: Jaimie Luque is a 74 y.o. female.  Patient's current PCP is Jessica Ward MD.     Chief Complaint: Diabetes Mellitus    HPI  Jaimie Luque is a 74 y.o. White female presenting for a new consult with me, previously seen by Lorraine Holliday who is currently out on [facility]  for diabetes. Patient has been diagnosed with type 2 diabetes for several years.  Received diabetes education: Yes    CURRENT DM MEDICATIONS:   Diabetes Medications               insulin (LANTUS SOLOSTAR U-100 INSULIN) glargine 100 units/mL SubQ pen Inject 10 Units into the skin once daily. 9 units    insulin lispro 100 unit/mL pen Titrate up to 100 units daily as instructed.  Meal Size:   10 units with small carb meal  12 units with regular carb meal  14 units with heavier carb meal  5 units with night time snack  3 units before Levothyroxine dose      Humalog Correction Dosing every 3 hours as needed OUTSIDE OF EATING  If  - 250, may take 2 units of Humalog  If  - 300, may take 4 units of Humalog   If  - 350, may take 6 units of Humalog  If  - 400, may take 8 units of Humalog  If +, may take 10 units of Humalog     semaglutide (OZEMPIC) 2 mg/dose (8 mg/3 mL) PnIj Inject 2 mg into the skin every 7 days.           Past failed treatment include: Metformin - GI side effects; Invokana-coverage; Jardiance- side effects; Basaglar, Glyburide,Glipizide, Victoza, Trulicity- failure to control DM.     Blood glucose testing Continuous Per Dexcom    Any episodes of hypoglycemia? 0% per Dexcom    Complications related to diabetes: nephropathy    Her blood sugar in the clinic today was:   Lab Results   Component Value Date    POCGLU 174 (A) 12/04/2023       Jaimie Luque presents today for follow up visit to discuss diabetes management. At last visit with Lorraine Holliday, 12/4/2023, Lantus was reduced and Humalog increased. She continues to tolerate Ozempic well without GI side effects. She reports her  has been sick with Covid and has been under more stress.     She has labs scheduled 02/2024.    Per Dexcom download, for the last 14 days: Diabetes control has worsened. Average glucose of 205 mg/dL. She is only 38% in range. 41% of readings are above goal, with 21% of readings > 250. No hypoglycemia. High glycemic variability with SD of 54 mg/dL. Estimated GMI of 8.2%. Pattern of hyperglycemia fasting as well as postprandial. Upon further discussion, sometimes having carb snacks without bolusing Humalog. Using about 10-12 units TID AC meals. Based on review, plan to increase Humalog, stressed importance of dosing for carb snacks, and Lantus to be increased. We briefly discussed switching to Mounjaro in place of Ozempic that may be helpful.       Lab Results   Component Value Date    HGBA1C 7.1 (H) 11/17/2023    HGBA1C 6.8 (H)  05/25/2023    HGBA1C 7.0 (H) 02/28/2023    HGBA1C 7.0 (H) 02/28/2023    HGBA1C 7.0 (H) 02/28/2023     STANDARDS OF CARE  Diabetes Management Status    Statin: Taking  ACE/ARB: Taking    Screening or Prevention Patient's value Goal Complete/Controlled?   HgA1C Testing and Control   Lab Results   Component Value Date    HGBA1C 7.1 (H) 11/17/2023      Annually/Less than 8% Yes   Lipid profile : 11/17/2023 Annually Yes   LDL control Lab Results   Component Value Date    LDLCALC 49.0 (L) 11/17/2023    Annually/Less than 100 mg/dl  Yes   Nephropathy screening Lab Results   Component Value Date    LABMICR 6.0 11/17/2023     Lab Results   Component Value Date    PROTEINUA Negative 04/01/2021     Lab Results   Component Value Date    UTPCR 0.04 01/24/2023      Annually Yes   Blood pressure BP Readings from Last 1 Encounters:   01/30/24 (!) 116/58    Less than 140/90 Yes   Dilated retinal exam : 10/12/2023 Annually Yes   Foot exam   : 11/30/2023 Annually Yes        Labs reviewed and are noted below.    Lab Results   Component Value Date    WBC 5.80 10/25/2023    HGB 12.7 10/25/2023    HCT 39.2 10/25/2023     10/25/2023    CHOL 121 11/17/2023    TRIG 50 11/17/2023    HDL 62 11/17/2023    LDLCALC 49.0 (L) 11/17/2023    ALT 15 10/25/2023    AST 14 10/25/2023     10/25/2023    K 4.2 10/25/2023     10/25/2023    ANIONGAP 9 10/25/2023    CREATININE 1.0 10/25/2023    ESTGFRAFRICA >60.0 09/15/2021    EGFRNONAA >60.0 09/15/2021    BUN 14 10/25/2023    CO2 22 (L) 10/25/2023    TSH 1.557 11/17/2023     (H) 10/25/2023    UTPCR 0.04 01/24/2023     Lab Results   Component Value Date    GLUTAMICACID 0.00 05/25/2017    CPEPTIDE 2.35 04/01/2021    FREET4 1.16 10/13/2022    TSH 1.557 11/17/2023    CALCIUM 8.8 10/25/2023     Lab Results   Component Value Date    CPEPTIDE 2.35 04/01/2021     Lab Results   Component Value Date    GLUTAMICACID 0.00 05/25/2017     Glucose   Date Value Ref Range Status   10/25/2023 188 (H)  70 - 110 mg/dL Final     Anion Gap   Date Value Ref Range Status   10/25/2023 9 8 - 16 mmol/L Final     eGFR if    Date Value Ref Range Status   09/15/2021 >60.0 >60 mL/min/1.73 m^2 Final     eGFR if non    Date Value Ref Range Status   09/15/2021 >60.0 >60 mL/min/1.73 m^2 Final     Comment:     Calculation used to obtain the estimated glomerular filtration  rate (eGFR) is the CKD-EPI equation.          The following portions of the patient's history were reviewed and updated as appropriate: allergies, current medications, past family history, past medical history, past social history, past surgical history, and problem list.    Review of patient's allergies indicates:   Allergen Reactions    Adhesive tape-silicones      Other reaction(s): skin irritation to area    Penicillins      Social History     Socioeconomic History    Marital status:    Tobacco Use    Smoking status: Never    Smokeless tobacco: Never   Substance and Sexual Activity    Alcohol use: Yes     Comment: rare    Drug use: No    Sexual activity: Yes     Partners: Male   Social History Narrative    . Lives with spouse. Has 3 children. Patient retired as  for [employer].      Social Determinants of Health     Financial Resource Strain: Low Risk  (11/30/2023)    Overall Financial Resource Strain (CARDIA)     Difficulty of Paying Living Expenses: Not hard at all   Food Insecurity: No Food Insecurity (11/30/2023)    Hunger Vital Sign     Worried About Running Out of Food in the Last Year: Never true     Ran Out of Food in the Last Year: Never true   Transportation Needs: No Transportation Needs (11/30/2023)    PRAPARE - Transportation     Lack of Transportation (Medical): No     Lack of Transportation (Non-Medical): No   Physical Activity: Inactive (11/30/2023)    Exercise Vital Sign     Days of Exercise per Week: 0 days     Minutes of Exercise per Session: 0 min   Stress: No Stress Concern  Present (11/30/2023)    [employer] - Occupational Stress Questionnaire     Feeling of Stress : Not at all   Social Connections: Socially Integrated (11/30/2023)    Social Connection and Isolation Panel [NHANES]     Frequency of Communication with Friends and Family: More than three times a week     Frequency of Social Gatherings with Friends and Family: Twice a week     Attends Orthodox Services: More than 4 times per year     Active Member of Clubs or Organizations: Yes     Attends Club or Organization Meetings: More than 4 times per year     Marital Status:    Housing Stability: Low Risk  (11/30/2023)    Housing Stability Vital Sign     Unable to Pay for Housing in the Last Year: No     Number of Places Lived in the Last Year: 1     Unstable Housing in the Last Year: No     Past Medical History:   Diagnosis Date    Diabetes mellitus type II     Hyperlipidemia     Hypertension     Hypothyroid     TALIA (obstructive sleep apnea)     on CPAP    Osteopenia     Psoriasis        REVIEW OF SYSTEMS:  Eyes No history of DR.  Cardiovascular: History of HTN and HLD.  GI: Tolerating Ozempic well without GI side effects.  : H/o CKD  Neuro: No neuropathy.  PSYCH: No tobacco use.  ENDO: See HPI.        Objective:      There were no vitals filed for this visit.  RESPIRATORY: No respiratory distress  NEUROLOGIC: Not assessed-virtual visit  PSYCHIATRIC: Alert & oriented x3. Normal mood and affect.  FOOT EXAMINATION: Deferred-virtual visit  Assessment:       1. Type 2 diabetes mellitus with stage 3a chronic kidney disease, with long-term current use of insulin    2. Cataract associated with type 2 diabetes mellitus    3. Hyperlipidemia associated with type 2 diabetes mellitus    4. Acquired hypothyroidism    5. TALIA on CPAP        Plan:   Type 2 diabetes mellitus with stage 3a chronic kidney disease, with long-term current use of insulin    Chronic -     CURRENT DM MEDICATIONS:   Lantus 10 units  "daily   Ozempic 2 mg weekly   Humalog meal size dosing TID wm  Humalog correction dosing every 3 hours as below    Meal Size:   12 units with small carb meal  14 units with regular carb meal  16 units with heavier carb meal  5 units with between meal snacks  3 units before Levothyroxine dose    Humalog Correction Dosing every 3 hours as needed OUTSIDE OF EATING  If  - 250, may take 2 units of Humalog  If  - 300, may take 4 units of Humalog   If  - 350, may take 6 units of Humalog  If  - 400, may take 8 units of Humalog  If +, may take 10 units of Humalog      Continuous glucose monitoring report:    The patient's CGM was downloaded and was reviewed for the last 14 days. See HPI for interpretation & plan.    Cataract associated with type 2 diabetes mellitus    Hyperlipidemia associated with type 2 diabetes mellitus    Acquired hypothyroidism    TALIA on CPAP    - Follow up: 3 months      Portions of this note may have been created with voice recognition software. Occasional "wrong-word" or "sound-a-like" substitutions may have occurred due to the inherent limitations of voice recognition software. Please, read the note carefully and recognize, using context, where substitutions have occurred.           Lucero Mckay,CAMPOS-C, BC-ADM  [facility]  "

## 2024-02-02 RX ORDER — LISINOPRIL 2.5 MG/1
TABLET ORAL
Qty: 90 TABLET | Refills: 2 | Status: SHIPPED | OUTPATIENT
Start: 2024-02-02

## 2024-02-02 NOTE — TELEPHONE ENCOUNTER
Refill Decision Note   Jaimie Ene  is requesting a refill authorization.  Brief Assessment and Rationale for Refill:  Approve     Medication Therapy Plan:         Comments:     Note composed:10:46 AM 02/02/2024

## 2024-02-02 NOTE — TELEPHONE ENCOUNTER
No care due was identified.  [facility] Embedded Care Due Messages. Reference number: [id number].   2/02/2024 5:46:45 AM CST

## 2024-02-27 ENCOUNTER — LAB VISIT (OUTPATIENT)
Dept: LAB | Facility: HOSPITAL | Age: 74
End: 2024-02-27
Attending: FAMILY MEDICINE
Payer: MEDICARE

## 2024-02-27 DIAGNOSIS — Z79.4 TYPE 2 DIABETES MELLITUS WITH STAGE 3A CHRONIC KIDNEY DISEASE, WITH LONG-TERM CURRENT USE OF INSULIN: ICD-10-CM

## 2024-02-27 DIAGNOSIS — N18.31 TYPE 2 DIABETES MELLITUS WITH STAGE 3A CHRONIC KIDNEY DISEASE, WITH LONG-TERM CURRENT USE OF INSULIN: ICD-10-CM

## 2024-02-27 DIAGNOSIS — E11.22 TYPE 2 DIABETES MELLITUS WITH STAGE 3A CHRONIC KIDNEY DISEASE, WITH LONG-TERM CURRENT USE OF INSULIN: ICD-10-CM

## 2024-02-27 DIAGNOSIS — M35.1 MCTD (MIXED CONNECTIVE TISSUE DISEASE): ICD-10-CM

## 2024-02-27 LAB
ALBUMIN SERPL BCP-MCNC: 3.1 G/DL (ref 3.5–5.2)
ALP SERPL-CCNC: 70 U/L (ref 55–135)
ALT SERPL W/O P-5'-P-CCNC: 12 U/L (ref 10–44)
ANION GAP SERPL CALC-SCNC: 8 MMOL/L (ref 8–16)
AST SERPL-CCNC: 13 U/L (ref 10–40)
BASOPHILS # BLD AUTO: 0.06 K/UL (ref 0–0.2)
BASOPHILS NFR BLD: 1.1 % (ref 0–1.9)
BILIRUB SERPL-MCNC: 0.8 MG/DL (ref 0.1–1)
BUN SERPL-MCNC: 17 MG/DL (ref 8–23)
CALCIUM SERPL-MCNC: 9 MG/DL (ref 8.7–10.5)
CHLORIDE SERPL-SCNC: 106 MMOL/L (ref 95–110)
CO2 SERPL-SCNC: 23 MMOL/L (ref 23–29)
CREAT SERPL-MCNC: 0.8 MG/DL (ref 0.5–1.4)
DIFFERENTIAL METHOD BLD: ABNORMAL
EOSINOPHIL # BLD AUTO: 0.2 K/UL (ref 0–0.5)
EOSINOPHIL NFR BLD: 3.1 % (ref 0–8)
ERYTHROCYTE [DISTWIDTH] IN BLOOD BY AUTOMATED COUNT: 15.9 % (ref 11.5–14.5)
EST. GFR  (NO RACE VARIABLE): >60 ML/MIN/1.73 M^2
ESTIMATED AVG GLUCOSE: 171 MG/DL (ref 68–131)
GLUCOSE SERPL-MCNC: 178 MG/DL (ref 70–110)
HBA1C MFR BLD: 7.6 % (ref 4–5.6)
HCT VFR BLD AUTO: 40.6 % (ref 37–48.5)
HGB BLD-MCNC: 12.9 G/DL (ref 12–16)
IMM GRANULOCYTES # BLD AUTO: 0.03 K/UL (ref 0–0.04)
IMM GRANULOCYTES NFR BLD AUTO: 0.5 % (ref 0–0.5)
LYMPHOCYTES # BLD AUTO: 1.8 K/UL (ref 1–4.8)
LYMPHOCYTES NFR BLD: 33.5 % (ref 18–48)
MCH RBC QN AUTO: 27.2 PG (ref 27–31)
MCHC RBC AUTO-ENTMCNC: 31.8 G/DL (ref 32–36)
MCV RBC AUTO: 86 FL (ref 82–98)
MONOCYTES # BLD AUTO: 0.5 K/UL (ref 0.3–1)
MONOCYTES NFR BLD: 8.8 % (ref 4–15)
NEUTROPHILS # BLD AUTO: 2.9 K/UL (ref 1.8–7.7)
NEUTROPHILS NFR BLD: 53 % (ref 38–73)
NRBC BLD-RTO: 0 /100 WBC
PLATELET # BLD AUTO: 200 K/UL (ref 150–450)
PMV BLD AUTO: 12.3 FL (ref 9.2–12.9)
POTASSIUM SERPL-SCNC: 4.5 MMOL/L (ref 3.5–5.1)
PROT SERPL-MCNC: 6.4 G/DL (ref 6–8.4)
RBC # BLD AUTO: 4.74 M/UL (ref 4–5.4)
SODIUM SERPL-SCNC: 137 MMOL/L (ref 136–145)
WBC # BLD AUTO: 5.46 K/UL (ref 3.9–12.7)

## 2024-02-27 PROCEDURE — 80053 COMPREHEN METABOLIC PANEL: CPT | Performed by: FAMILY MEDICINE

## 2024-02-27 PROCEDURE — 36415 COLL VENOUS BLD VENIPUNCTURE: CPT | Performed by: FAMILY MEDICINE

## 2024-02-27 PROCEDURE — 85025 COMPLETE CBC W/AUTO DIFF WBC: CPT | Performed by: FAMILY MEDICINE

## 2024-02-27 PROCEDURE — 83036 HEMOGLOBIN GLYCOSYLATED A1C: CPT | Performed by: FAMILY MEDICINE

## 2024-03-14 ENCOUNTER — LAB VISIT (OUTPATIENT)
Dept: LAB | Facility: HOSPITAL | Age: 74
End: 2024-03-14
Attending: PHYSICIAN ASSISTANT
Payer: MEDICARE

## 2024-03-14 ENCOUNTER — OFFICE VISIT (OUTPATIENT)
Dept: RHEUMATOLOGY | Facility: CLINIC | Age: 74
End: 2024-03-14
Payer: MEDICARE

## 2024-03-14 VITALS — HEIGHT: 67 IN | BODY MASS INDEX: 37.98 KG/M2

## 2024-03-14 DIAGNOSIS — M17.0 PRIMARY OSTEOARTHRITIS OF BOTH KNEES: ICD-10-CM

## 2024-03-14 DIAGNOSIS — R76.8 ANA POSITIVE: ICD-10-CM

## 2024-03-14 DIAGNOSIS — J84.9 ILD (INTERSTITIAL LUNG DISEASE): ICD-10-CM

## 2024-03-14 DIAGNOSIS — M35.1 MCTD (MIXED CONNECTIVE TISSUE DISEASE): ICD-10-CM

## 2024-03-14 DIAGNOSIS — N18.31 TYPE 2 DIABETES MELLITUS WITH STAGE 3A CHRONIC KIDNEY DISEASE, WITH LONG-TERM CURRENT USE OF INSULIN: ICD-10-CM

## 2024-03-14 DIAGNOSIS — E11.22 TYPE 2 DIABETES MELLITUS WITH STAGE 3A CHRONIC KIDNEY DISEASE, WITH LONG-TERM CURRENT USE OF INSULIN: ICD-10-CM

## 2024-03-14 DIAGNOSIS — M35.1 MCTD (MIXED CONNECTIVE TISSUE DISEASE): Primary | ICD-10-CM

## 2024-03-14 DIAGNOSIS — Z79.4 TYPE 2 DIABETES MELLITUS WITH STAGE 3A CHRONIC KIDNEY DISEASE, WITH LONG-TERM CURRENT USE OF INSULIN: ICD-10-CM

## 2024-03-14 DIAGNOSIS — D84.9 IMMUNOCOMPROMISED: ICD-10-CM

## 2024-03-14 LAB
ALBUMIN SERPL BCP-MCNC: 3.2 G/DL (ref 3.5–5.2)
ALP SERPL-CCNC: 71 U/L (ref 55–135)
ALT SERPL W/O P-5'-P-CCNC: 20 U/L (ref 10–44)
ANION GAP SERPL CALC-SCNC: 6 MMOL/L (ref 8–16)
AST SERPL-CCNC: 17 U/L (ref 10–40)
BASOPHILS # BLD AUTO: 0.06 K/UL (ref 0–0.2)
BASOPHILS NFR BLD: 0.9 % (ref 0–1.9)
BILIRUB SERPL-MCNC: 0.9 MG/DL (ref 0.1–1)
BUN SERPL-MCNC: 16 MG/DL (ref 8–23)
CALCIUM SERPL-MCNC: 9 MG/DL (ref 8.7–10.5)
CHLORIDE SERPL-SCNC: 104 MMOL/L (ref 95–110)
CO2 SERPL-SCNC: 26 MMOL/L (ref 23–29)
CREAT SERPL-MCNC: 1 MG/DL (ref 0.5–1.4)
CRP SERPL-MCNC: 1 MG/L (ref 0–8.2)
DIFFERENTIAL METHOD BLD: ABNORMAL
EOSINOPHIL # BLD AUTO: 0.2 K/UL (ref 0–0.5)
EOSINOPHIL NFR BLD: 3.8 % (ref 0–8)
ERYTHROCYTE [DISTWIDTH] IN BLOOD BY AUTOMATED COUNT: 15.9 % (ref 11.5–14.5)
ERYTHROCYTE [SEDIMENTATION RATE] IN BLOOD BY PHOTOMETRIC METHOD: 9 MM/HR (ref 0–36)
EST. GFR  (NO RACE VARIABLE): 59 ML/MIN/1.73 M^2
GLUCOSE SERPL-MCNC: 178 MG/DL (ref 70–110)
HCT VFR BLD AUTO: 38.6 % (ref 37–48.5)
HGB BLD-MCNC: 12.7 G/DL (ref 12–16)
IMM GRANULOCYTES # BLD AUTO: 0.03 K/UL (ref 0–0.04)
IMM GRANULOCYTES NFR BLD AUTO: 0.5 % (ref 0–0.5)
LYMPHOCYTES # BLD AUTO: 2.3 K/UL (ref 1–4.8)
LYMPHOCYTES NFR BLD: 36.6 % (ref 18–48)
MCH RBC QN AUTO: 27.7 PG (ref 27–31)
MCHC RBC AUTO-ENTMCNC: 32.9 G/DL (ref 32–36)
MCV RBC AUTO: 84 FL (ref 82–98)
MONOCYTES # BLD AUTO: 0.6 K/UL (ref 0.3–1)
MONOCYTES NFR BLD: 9.1 % (ref 4–15)
NEUTROPHILS # BLD AUTO: 3.1 K/UL (ref 1.8–7.7)
NEUTROPHILS NFR BLD: 49.1 % (ref 38–73)
NRBC BLD-RTO: 0 /100 WBC
PLATELET # BLD AUTO: 188 K/UL (ref 150–450)
PMV BLD AUTO: 10.6 FL (ref 9.2–12.9)
POTASSIUM SERPL-SCNC: 5 MMOL/L (ref 3.5–5.1)
PROT SERPL-MCNC: 6.1 G/DL (ref 6–8.4)
RBC # BLD AUTO: 4.58 M/UL (ref 4–5.4)
SODIUM SERPL-SCNC: 136 MMOL/L (ref 136–145)
WBC # BLD AUTO: 6.39 K/UL (ref 3.9–12.7)

## 2024-03-14 PROCEDURE — 80053 COMPREHEN METABOLIC PANEL: CPT | Performed by: PHYSICIAN ASSISTANT

## 2024-03-14 PROCEDURE — 85652 RBC SED RATE AUTOMATED: CPT | Performed by: PHYSICIAN ASSISTANT

## 2024-03-14 PROCEDURE — 86140 C-REACTIVE PROTEIN: CPT | Performed by: PHYSICIAN ASSISTANT

## 2024-03-14 PROCEDURE — 99213 OFFICE O/P EST LOW 20 MIN: CPT | Mod: S$GLB,,, | Performed by: PHYSICIAN ASSISTANT

## 2024-03-14 PROCEDURE — 99999 PR PBB SHADOW E&M-EST. PATIENT-LVL II: CPT | Mod: PBBFAC,,, | Performed by: PHYSICIAN ASSISTANT

## 2024-03-14 PROCEDURE — 85025 COMPLETE CBC W/AUTO DIFF WBC: CPT | Performed by: PHYSICIAN ASSISTANT

## 2024-03-14 PROCEDURE — 36415 COLL VENOUS BLD VENIPUNCTURE: CPT | Performed by: PHYSICIAN ASSISTANT

## 2024-03-14 NOTE — PROGRESS NOTES
Subjective:      Patient ID: Jaimie Luque is a 74 y.o. female.    Chief Complaint: Disease Management      HPI   Jaimie Luque  is a 74 y.o. female here for follow-up on mixed connective tissue disease with ILD.  One of my colleagues started CellCept and titrated up to 1000 mg b.i.d..  She tolerated it well.  However, patient was concerned about potential side effects and breathing was stable, so she self DC'ed in June 2023.  She has remained stable since DCing cellcept.    She was diagnosed with ILD by Dr. Ivey based on PFT and CT chest.  She was noted to have positive RADHA as well as elevated sed rate.  Further workup showed positive U1 RNP.  Aug 2023 Pulm f/u (Dr. Schumacher) w CT was stable.  No interstitial processes noted.    No complaints of tender swollen joints today.  She does have occasional pain in her knees.  Previously saw Orthopedics and diagnosed with osteoarthritis.  Corticosteroid injections deferred previously.      Also with personal history of skin psoriasis a young age.  Over time symptoms improved on its own.  She relates improved psoriasis symptoms in her 20s when she quit drinking orange juice.  Flares occasionally if she has some OJ.  Not bad enough to see derm per pt.    Patient denies fevers, chills, photosensitivity, eye pain, chest pain, hematuria, blood in the stool, rash, sicca symptoms, raynauds, finger ulcerations.  Rheumatologic systems otherwise negative.    Serologies/Labs:  +RADHA 1:160 homogenous and speckled, neg profile  +U1RNP, o/w Myomarker Panel 3 negative  Neg ANCA, scl 70, rna polymerase  Neg CCP, RF  Current Treatment:  na  Previous Treatment:   Cellcept 1000 mg bid - self DCed      Current Outpatient Medications:     aspirin 81 mg Tab, Take by mouth. 1 Tablet Oral 2 times weekly , Disp: , Rfl:     atorvastatin (LIPITOR) 20 MG tablet, TAKE 1 TABLET(20 MG) BY MOUTH EVERY DAY, Disp: 90 tablet, Rfl: 3    blood-glucose meter,continuous (DEXCOM G7 ) Misc, 1 each by  "Misc.(Non-Drug; Combo Route) route once. for 1 dose, Disp: 1 each, Rfl: 0    blood-glucose sensor (DEXCOM G7 SENSOR) Gretta, 1 each by Misc.(Non-Drug; Combo Route) route every 10 days., Disp: 9 each, Rfl: 3    cholecalciferol, vitamin D3, 2,000 unit Tab, Take by mouth. 1 Tablet Oral Every day, Disp: , Rfl:     gabapentin (NEURONTIN) 300 MG capsule, Take 1 capsule (300 mg total) by mouth 3 (three) times daily., Disp: 270 capsule, Rfl: 3    insulin (LANTUS SOLOSTAR U-100 INSULIN) glargine 100 units/mL SubQ pen, Inject 10 Units into the skin once daily., Disp: 6 mL, Rfl: 11    insulin lispro 100 unit/mL pen, Titrate up to 100 units daily as instructed., Disp: 90 mL, Rfl: 3    levothyroxine (SYNTHROID) 112 MCG tablet, Take 1 tablet (112 mcg total) by mouth before breakfast., Disp: 90 tablet, Rfl: 3    lisinopriL (PRINIVIL,ZESTRIL) 2.5 MG tablet, TAKE 1 TABLET(2.5 MG) BY MOUTH EVERY DAY, Disp: 90 tablet, Rfl: 2    pen needle, diabetic (BD ULTRA-FINE LAURA PEN NEEDLE) 32 gauge x 5/32" Ndle, 1 each by Misc.(Non-Drug; Combo Route) route 4 (four) times daily with meals and nightly., Disp: 400 each, Rfl: 3    semaglutide (OZEMPIC) 2 mg/dose (8 mg/3 mL) PnIj, Inject 2 mg into the skin every 7 days., Disp: 9 mL, Rfl: 3    Past Medical History:   Diagnosis Date    Diabetes mellitus type II     Hyperlipidemia     Hypertension     Hypothyroid     TALIA (obstructive sleep apnea)     on CPAP    Osteopenia     Psoriasis      Family History   Problem Relation Age of Onset    Heart disease Father     Diabetes Brother     Melanoma Neg Hx     Lupus Neg Hx      Social History     Socioeconomic History    Marital status:    Tobacco Use    Smoking status: Never    Smokeless tobacco: Never   Substance and Sexual Activity    Alcohol use: Yes     Comment: rare    Drug use: No    Sexual activity: Yes     Partners: Male   Social History Narrative    . Lives with spouse. Has 3 children. Patient retired as  for state of " "[place].      Social Determinants of Health     Financial Resource Strain: Low Risk  (11/30/2023)    Overall Financial Resource Strain (CARDIA)     Difficulty of Paying Living Expenses: Not hard at all   Food Insecurity: No Food Insecurity (11/30/2023)    Hunger Vital Sign     Worried About Running Out of Food in the Last Year: Never true     Ran Out of Food in the Last Year: Never true   Transportation Needs: No Transportation Needs (11/30/2023)    PRAPARE - Transportation     Lack of Transportation (Medical): No     Lack of Transportation (Non-Medical): No   Physical Activity: Inactive (11/30/2023)    Exercise Vital Sign     Days of Exercise per Week: 0 days     Minutes of Exercise per Session: 0 min   Stress: No Stress Concern Present (11/30/2023)    [employer] - Occupational Stress Questionnaire     Feeling of Stress : Not at all   Social Connections: Socially Integrated (11/30/2023)    Social Connection and Isolation Panel [NHANES]     Frequency of Communication with Friends and Family: More than three times a week     Frequency of Social Gatherings with Friends and Family: Twice a week     Attends Buddhist Services: More than 4 times per year     Active Member of Clubs or Organizations: Yes     Attends Club or Organization Meetings: More than 4 times per year     Marital Status:    Housing Stability: Low Risk  (11/30/2023)    Housing Stability Vital Sign     Unable to Pay for Housing in the Last Year: No     Number of Places Lived in the Last Year: 1     Unstable Housing in the Last Year: No     Review of patient's allergies indicates:   Allergen Reactions    Adhesive tape-silicones      Other reaction(s): skin irritation to area    Penicillins        Objective:   Ht 5' 7" (1.702 m)   BMI 37.98 kg/m²   Immunization History   Administered Date(s) Administered    COVID-19, MRNA, LN-S, PF (Pfizer) (Gray Cap) 06/28/2022    COVID-19, MRNA, LN-S, PF (Pfizer) (Purple Cap) 01/05/2021, " 01/26/2021, 10/15/2021    COVID-19, mRNA, LNP-S, PF, gurpreet-sucrose, 30 mcg/0.3 mL (Pfizer 2023 Ages 12+) 10/21/2023    Hepatitis A, Adult 09/21/2016, 04/04/2017    Influenza 12/12/2006, 11/08/2007, 11/13/2008, 10/08/2009, 10/13/2010, 10/19/2011, 10/31/2013, 10/02/2018, 09/29/2022    Influenza (FLUAD) - Quadrivalent - Adjuvanted - PF *Preferred* (65+) 09/27/2021, 10/03/2022    Influenza - High Dose - PF (65 years and older) 10/24/2014, 10/26/2015, 09/25/2016, 10/12/2017, 10/02/2018, 10/22/2019    Influenza - Intradermal - Quadrivalent - PF 10/01/2012    Influenza - Quadrivalent - High Dose - PF (65 years and older) 09/21/2020, 10/21/2023    Influenza A (H1N1) 2009 Monovalent - IM 02/23/2010    Influenza Split 10/31/2013    Pneumococcal Conjugate - 13 Valent 03/11/2015    Pneumococcal Conjugate - 20 Valent 04/20/2022, 11/17/2022    Pneumococcal Polysaccharide - 23 Valent 02/14/2007, 03/18/2014    Rsv, Bivalent, Rsvpref (Abrysvo) 10/21/2023    Tdap 09/09/2009, 10/02/2018    Zoster 11/09/2012    Zoster Recombinant 05/02/2019, 07/06/2019       Physical Exam   Constitutional: She is oriented to person, place, and time. No distress.   HENT:   Head: Normocephalic and atraumatic.   Pulmonary/Chest: Effort normal.   Abdominal: She exhibits no distension.   Musculoskeletal:         General: No swelling or tenderness. Normal range of motion.      Cervical back: Normal range of motion.   Lymphadenopathy:     She has no cervical adenopathy.   Neurological: She is alert and oriented to person, place, and time.   Skin: Skin is warm and dry. No rash noted.   Psychiatric: Mood normal.   Nursing note and vitals reviewed.      No synovitis, dactylitis, enthesitis  Longitudinal nail ridging noted  Mild tenderness to palpation medial compartment right greater than left knee.  No effusion range of motion intact.      Recent Results (from the past 672 hour(s))   Comprehensive Metabolic Panel    Collection Time: 02/27/24  9:09 AM   Result  Value Ref Range    Sodium 137 136 - 145 mmol/L    Potassium 4.5 3.5 - 5.1 mmol/L    Chloride 106 95 - 110 mmol/L    CO2 23 23 - 29 mmol/L    Glucose 178 (H) 70 - 110 mg/dL    BUN 17 8 - 23 mg/dL    Creatinine 0.8 0.5 - 1.4 mg/dL    Calcium 9.0 8.7 - 10.5 mg/dL    Total Protein 6.4 6.0 - 8.4 g/dL    Albumin 3.1 (L) 3.5 - 5.2 g/dL    Total Bilirubin 0.8 0.1 - 1.0 mg/dL    Alkaline Phosphatase 70 55 - 135 U/L    AST 13 10 - 40 U/L    ALT 12 10 - 44 U/L    eGFR >60.0 >60 mL/min/1.73 m^2    Anion Gap 8 8 - 16 mmol/L   CBC Auto Differential    Collection Time: 02/27/24  9:09 AM   Result Value Ref Range    WBC 5.46 3.90 - 12.70 K/uL    RBC 4.74 4.00 - 5.40 M/uL    Hemoglobin 12.9 12.0 - 16.0 g/dL    Hematocrit 40.6 37.0 - 48.5 %    MCV 86 82 - 98 fL    MCH 27.2 27.0 - 31.0 pg    MCHC 31.8 (L) 32.0 - 36.0 g/dL    RDW 15.9 (H) 11.5 - 14.5 %    Platelets 200 150 - 450 K/uL    MPV 12.3 9.2 - 12.9 fL    Immature Granulocytes 0.5 0.0 - 0.5 %    Gran # (ANC) 2.9 1.8 - 7.7 K/uL    Immature Grans (Abs) 0.03 0.00 - 0.04 K/uL    Lymph # 1.8 1.0 - 4.8 K/uL    Mono # 0.5 0.3 - 1.0 K/uL    Eos # 0.2 0.0 - 0.5 K/uL    Baso # 0.06 0.00 - 0.20 K/uL    nRBC 0 0 /100 WBC    Gran % 53.0 38.0 - 73.0 %    Lymph % 33.5 18.0 - 48.0 %    Mono % 8.8 4.0 - 15.0 %    Eosinophil % 3.1 0.0 - 8.0 %    Basophil % 1.1 0.0 - 1.9 %    Differential Method Automated    Hemoglobin A1C    Collection Time: 02/27/24  9:09 AM   Result Value Ref Range    Hemoglobin A1C 7.6 (H) 4.0 - 5.6 %    Estimated Avg Glucose 171 (H) 68 - 131 mg/dL   CBC Auto Differential    Collection Time: 03/14/24  7:37 AM   Result Value Ref Range    WBC 6.39 3.90 - 12.70 K/uL    RBC 4.58 4.00 - 5.40 M/uL    Hemoglobin 12.7 12.0 - 16.0 g/dL    Hematocrit 38.6 37.0 - 48.5 %    MCV 84 82 - 98 fL    MCH 27.7 27.0 - 31.0 pg    MCHC 32.9 32.0 - 36.0 g/dL    RDW 15.9 (H) 11.5 - 14.5 %    Platelets 188 150 - 450 K/uL    MPV 10.6 9.2 - 12.9 fL    Immature Granulocytes 0.5 0.0 - 0.5 %    Gran # (ANC)  3.1 1.8 - 7.7 K/uL    Immature Grans (Abs) 0.03 0.00 - 0.04 K/uL    Lymph # 2.3 1.0 - 4.8 K/uL    Mono # 0.6 0.3 - 1.0 K/uL    Eos # 0.2 0.0 - 0.5 K/uL    Baso # 0.06 0.00 - 0.20 K/uL    nRBC 0 0 /100 WBC    Gran % 49.1 38.0 - 73.0 %    Lymph % 36.6 18.0 - 48.0 %    Mono % 9.1 4.0 - 15.0 %    Eosinophil % 3.8 0.0 - 8.0 %    Basophil % 0.9 0.0 - 1.9 %    Differential Method Automated            Lab Results   Component Value Date    TBGOLDPLUS Negative 10/28/2019      Lab Results   Component Value Date    HEPCAB Negative 09/14/2016        Imaging  I have personally reviewed images and reports as below.  I agree with the interpretation.  CT Chest Without Contrast  Narrative: EXAMINATION:  CT CHEST WITHOUT CONTRAST    CLINICAL HISTORY:  Interstitial lung disease; Interstitial pulmonary disease, unspecified    TECHNIQUE:  Low dose axial images, sagittal and coronal reformations were obtained from the thoracic inlet to the lung bases. Contrast was not administered.    COMPARISON:  CT chest 11/25/2022    FINDINGS:  Mild coronary artery calcification.  No cardiomegaly, aortic aneurysm or adenopathy.    Unchanged wedge-shaped low density within the posterior segment of the right hepatic lobe.  This is unchanged compared to prior CT chest performed 01/19/2021.    No additional abnormality below the diaphragm.    Unchanged biapical scarring.  Unchanged 11 mm nodule within the right middle lobe.  Additional smaller pulmonary nodules noted bilaterally, unchanged.    No interstitial thickening, ground-glass or airspace opacities.  Unchanged mucous plugging within the anterior segment of the right upper lobe.  No pneumothorax.  Impression: Multiple pulmonary nodules.  The largest cyst within the right middle lobe and is unchanged compared to prior CT chest performed 01/19/2021.    No interstitial prominence or acute infiltrate.    Scattered mucous plugging which appears similar to the previous exam.    Additional findings as  above.    Electronically signed by: Roly Gerber  Date:    07/05/2023  Time:    10:03      PFT also reviewed as below  12/2022- PFT w/ worsening restrictive pattern and decrease in DLCO    Assessment:     1. MCTD (mixed connective tissue disease)    2. RADHA positive    3. ILD (interstitial lung disease)    4. Immunocompromised    5. Primary osteoarthritis of both knees    6. Type 2 diabetes mellitus with stage 3a chronic kidney disease, with long-term current use of insulin          Plan:     Jaimie was seen today for disease management.    Diagnoses and all orders for this visit:    MCTD (mixed connective tissue disease)    RADHA positive    ILD (interstitial lung disease)    Immunocompromised    Primary osteoarthritis of both knees    Type 2 diabetes mellitus with stage 3a chronic kidney disease, with long-term current use of insulin      MCTD w ILD  Labs reviewed as a above and stable  No evidence end organ damage  Stable off cellcept -   will monitor  Pt to notify office if sxs worsen  SOB stable per pt report  F/u pulm as planned  CT stable las visit w Dr. Schumacher  Due for f/u in 8/2024  Consider repeat CT/PFT in 1 year  Bilat knee pain and OA  A1c 7.5%  Can consider CSI vs viscosupplementation in the future w ortho  States pain not bad enough for inj at this time  Kellgren Balbir scale 3 bilat knees  Continue with home exercise program for strengthening  Compromised immune system secondary to autoimmune disease and/or use of immunosuppressive drugs.  Monitor carefully for infections.  Advised patient to get immediate medical care if any infection arises.  Also advised strict adherence age-appropriate vaccinations and cancer screenings with PCP.  Return to clinic:  6 mos, reg 4 prior    No follow-ups on file.    The patient understands, chooses and consents to this plan and accepts all the risks which include but are not limited to the risks mentioned above.     Disclaimer: This note was prepared using a voice  recognition system and is likely to have sound alike errors within the text.

## 2024-03-26 DIAGNOSIS — Z00.00 ENCOUNTER FOR MEDICARE ANNUAL WELLNESS EXAM: ICD-10-CM

## 2024-04-03 ENCOUNTER — PATIENT MESSAGE (OUTPATIENT)
Dept: PULMONOLOGY | Facility: CLINIC | Age: 74
End: 2024-04-03
Payer: MEDICARE

## 2024-04-19 NOTE — PROGRESS NOTES
Subjective:      Patient ID: Jaimie Luque is a 74 y.o. female.    Chief Complaint: Follow-up (3 month f/u) and Insomnia      HPI  Here for follow up of medical problems.  Denies f/c/cough/SOB.  Comfortable on cpap.  Joined [employer], doing circuit training and stationary bike, 3x per week.  On ozempic 2mg weekly, and 10u Lantus.  No weight loss.  AC breakfast today 134.  No cp/sob/palp.  BMs normal.        Per Rheum note recently:  She was diagnosed with ILD by Dr. Ivey based on PFT and CT chest.  She was noted to have positive RADHA as well as elevated sed rate.  Further workup showed positive U1 RNP.  Aug 2023 Pulm f/u (Dr. Schumacher) w CT was stable.  No interstitial processes noted.   ---------------------------------------------------------------  Per 8/23 Pulm:  73-year-old nonsmoker of cigarettes to seen back in follow-up examination for evaluation of abnormalities on her CT scan of the chest.  On prior studies there was a pattern of abnormalities that suggested bronchiectasis or bronchiolitis.  At this time the patient is completely asymptomatic.  She has no complaints of fever chills, no cough or chest congestion, no pleuritic chest pain.  She is noted to have abnormal pulmonary function studies consistent with an obstructive defect on prior lung function tests but the patient is asymptomatic.  She really performs strenuous activities and has no complaints of wheezing or shortness of breath. Overall she feels well.   -----------------------------------------------------------------      Advance Care Planning     Date: 05/02/2024    Power of   I initiated the process of voluntary advance care planning today and explained the importance of this process to the patient.  I introduced the concept of advance directives to the patient, as well. Then the patient received detailed information about the importance of designating a Health Care Power of  (HCPOA). She was also instructed to communicate with  "this person about their wishes for future healthcare, should she become sick and lose decision-making capacity. The patient has not previously appointed a HCPOA. After our discussion, the patient has decided to complete a HCPOA and has appointed her  , sons , health care agent:  Jose Beltrannamkristin, Nimesh Ene, Valente Ene  & health care agent number:  [phone], [phone], [phone].                      Updated/ annual due 11/24:  HM: 10/23 fluvax, 10/23 RSV, 2/21 covid vaccines/booster, 4/17 HAV#2, 3/15 [id number], 3/14 [id number], 11/22 [id number], 10/18 Tdap, 11/12 zoster, 2019 Shingrix x2, 9/21 BMD rep 5y, 5/22 Cscope REPEAT 1y--12/23 Cologuard negative, 6/22 MMG(q2), 4/23 Eye doctor, 9/16 HCV neg, 7/23 CT lung with Pulm Dr. Schumacher.  Addendum: 12/23 Cologuard negative.     Review of Systems   Constitutional:  Negative for chills, diaphoresis and fever.   Respiratory:  Negative for cough and shortness of breath.    Cardiovascular:  Negative for chest pain, palpitations and leg swelling.   Gastrointestinal:  Negative for blood in stool, constipation, diarrhea, nausea and vomiting.   Genitourinary:  Negative for dysuria, frequency and hematuria.   Psychiatric/Behavioral:  The patient is not nervous/anxious.          Objective:   BP (!) 124/56 (BP Location: Right arm, Patient Position: Sitting)   Pulse 76   Temp 97.8 °F (36.6 °C) (Oral)   Ht 5' 7" (1.702 m)   Wt 112.3 kg (247 lb 9.2 oz)   SpO2 98%   BMI 38.78 kg/m²     Physical Exam  Constitutional:       Appearance: She is well-developed.   Neck:      Thyroid: No thyroid mass.      Vascular: No carotid bruit.   Cardiovascular:      Rate and Rhythm: Normal rate and regular rhythm.      Heart sounds: No murmur heard.     No friction rub. No gallop.   Pulmonary:      Effort: Pulmonary effort is normal.      Breath sounds: Normal breath sounds. No wheezing or rales.   Abdominal:      General: Bowel sounds are normal.      Palpations: Abdomen is soft. There " is no mass.      Tenderness: There is no abdominal tenderness.   Musculoskeletal:      Cervical back: Neck supple.   Lymphadenopathy:      Cervical: No cervical adenopathy.   Neurological:      Mental Status: She is alert and oriented to person, place, and time.            Latest Reference Range & Units 02/27/24 09:09 03/14/24 07:37   Sodium 136 - 145 mmol/L 137 136   Potassium 3.5 - 5.1 mmol/L 4.5 5.0   Chloride 95 - 110 mmol/L 106 104   CO2 23 - 29 mmol/L 23 26   Anion Gap 8 - 16 mmol/L 8 6 (L)   BUN 8 - 23 mg/dL 17 16   Creatinine 0.5 - 1.4 mg/dL 0.8 1.0   eGFR >60 mL/min/1.73 m^2 >60.0 59 !   Glucose 70 - 110 mg/dL 178 (H) 178 (H)   Calcium 8.7 - 10.5 mg/dL 9.0 9.0   ALP 55 - 135 U/L 70 71   PROTEIN TOTAL 6.0 - 8.4 g/dL 6.4 6.1   Albumin 3.5 - 5.2 g/dL 3.1 (L) 3.2 (L)   BILIRUBIN TOTAL 0.1 - 1.0 mg/dL 0.8 0.9   AST 10 - 40 U/L 13 17   ALT 10 - 44 U/L 12 20   CRP 0.0 - 8.2 mg/L  1.0   Hemoglobin A1C External 4.0 - 5.6 % 7.6 (H)    Estimated Avg Glucose 68 - 131 mg/dL 171 (H)      Assessment:       1. Acquired hypothyroidism    2. MCTD (mixed connective tissue disease)    3. Psoriasis    4. Type 2 diabetes mellitus with stage 3a chronic kidney disease, with long-term current use of insulin    5. ILD (interstitial lung disease)    6. Hyperlipidemia associated with type 2 diabetes mellitus    7. TALIA on CPAP    8. Asymptomatic postmenopausal state    9. Encounter for screening mammogram for malignant neoplasm of breast    10. PHN (postherpetic neuralgia)          Plan:     Acquired hypothyroidism- Clinically stable, continue present treatment.    MCTD (mixed connective tissue disease), Psoriasis- per Rheum.    Type 2 diabetes mellitus with stage 3a chronic kidney disease, with long-term current use of insulin- follows with NP, repeat gHb scheduled.  Poss change to Mounjaro for better yuri supp, pt doesn't want to now.    ILD (interstitial lung disease), no symptoms at this time.    Hyperlipidemia associated with type  2 diabetes mellitus- LDL at goal, cont statin.    TALIA on CPAP    PHN- doing well on lilliam, still notes sx if misses dose.    Asymptomatic postmenopausal state  -     DXA Bone Density Axial Skeleton 1 or more sites; Future; Expected date: 05/02/2024    Encounter for screening mammogram for malignant neoplasm of breast  -     Mammo Digital Screening Bilat w/ Brant; Future; Expected date: 05/02/2024    RTC 2mo.

## 2024-04-23 ENCOUNTER — OFFICE VISIT (OUTPATIENT)
Dept: DIABETES | Facility: CLINIC | Age: 74
End: 2024-04-23
Payer: MEDICARE

## 2024-04-23 DIAGNOSIS — E03.9 ACQUIRED HYPOTHYROIDISM: ICD-10-CM

## 2024-04-23 DIAGNOSIS — N18.31 TYPE 2 DIABETES MELLITUS WITH STAGE 3A CHRONIC KIDNEY DISEASE, WITH LONG-TERM CURRENT USE OF INSULIN: Primary | ICD-10-CM

## 2024-04-23 DIAGNOSIS — Z79.4 TYPE 2 DIABETES MELLITUS WITH STAGE 3A CHRONIC KIDNEY DISEASE, WITH LONG-TERM CURRENT USE OF INSULIN: Primary | ICD-10-CM

## 2024-04-23 DIAGNOSIS — E66.9 OBESITY (BMI 30-39.9): ICD-10-CM

## 2024-04-23 DIAGNOSIS — E11.22 TYPE 2 DIABETES MELLITUS WITH STAGE 3A CHRONIC KIDNEY DISEASE, WITH LONG-TERM CURRENT USE OF INSULIN: Primary | ICD-10-CM

## 2024-04-23 DIAGNOSIS — E78.5 HYPERLIPIDEMIA ASSOCIATED WITH TYPE 2 DIABETES MELLITUS: ICD-10-CM

## 2024-04-23 DIAGNOSIS — G47.33 OSA ON CPAP: ICD-10-CM

## 2024-04-23 DIAGNOSIS — E11.36 CATARACT ASSOCIATED WITH TYPE 2 DIABETES MELLITUS: ICD-10-CM

## 2024-04-23 DIAGNOSIS — E11.69 HYPERLIPIDEMIA ASSOCIATED WITH TYPE 2 DIABETES MELLITUS: ICD-10-CM

## 2024-04-23 PROCEDURE — 3051F HG A1C>EQUAL 7.0%<8.0%: CPT | Mod: CPTII,95,, | Performed by: PHYSICIAN ASSISTANT

## 2024-04-23 PROCEDURE — 99214 OFFICE O/P EST MOD 30 MIN: CPT | Mod: 95,,, | Performed by: PHYSICIAN ASSISTANT

## 2024-04-23 PROCEDURE — 95251 CONT GLUC MNTR ANALYSIS I&R: CPT | Mod: NDTC,S$GLB,, | Performed by: PHYSICIAN ASSISTANT

## 2024-04-23 PROCEDURE — 4010F ACE/ARB THERAPY RXD/TAKEN: CPT | Mod: CPTII,95,, | Performed by: PHYSICIAN ASSISTANT

## 2024-04-23 NOTE — PATIENT INSTRUCTIONS
CURRENT DM MEDICATIONS:   Lantus 10 units daily   Ozempic 2 mg weekly   Humalog meal size dosing TID wm  Humalog correction dosing every 3 hours as below    Meal Size:   14 units with regular carb meal  16 units with heavier carb meal  5 units with snack    Humalog Correction Dosing every 3 hours as needed OUTSIDE OF EATING  If  - 250, may take 2 units of Humalog  If  - 300, may take 4 units of Humalog   If  - 350, may take 6 units of Humalog  If  - 400, may take 8 units of Humalog  If +, may take 10 units of Humalog

## 2024-04-23 NOTE — PROGRESS NOTES
PCP: Jessica Ward MD    Subjective:     Chief Complaint: Diabetes - Established Patient    TELEMEDICINE VISIT:     The patient location is: Home  The chief complaint leading to consultation is: Diabetes Follow up  Visit type: Virtual visit with synchronous audio and video  Total time spent with patient: 18  Each patient to whom he or she provides medical services by telemedicine is:  (1) informed of the relationship between the physician and patient and the respective role of any other health care provider with respect to management of the patient; and (2) notified that he or she may decline to receive medical services by telemedicine and may withdraw from such care at any time.    Notes: N/A      HISTORY OF PRESENT ILLNESS: 74 y.o.   female presenting for diabetes management visit.   The patient's last visit with me was on 12/4/2023.   Patient has had Type II diabetes since 20 or more years.  Pertinent to decision making is the following comorbidities: HLD, Hypothyroidism and Obesity by BMI  Patient has the following Diabetes complications: without complications  She has attended diabetes education in the past.     Patient's most recent A1c of 7.6% was completed 2 months ago.   Patient states since Her last A1c Her blood glucose levels have been high  throughout the day .   Patient monitors blood glucose 4 times per day and Continuously with personal CGM Dexcom.   Patient blood glucose monitoring device will be uploaded into [employer] [facility] today.        Patients records shows postprandial hyperglycemia and baseline euglycemia.     Patient endorses the following diabetes related symptoms:  None .   Patient is due today for the following diabetes-related health maintenance standards:  None .   She denies any recent hospital admissions or emergency room visits. Patient denies history of DKA.   She voices having hypoglycemia as above..   Patient's concerns today include glycemic control.    Patient  medication regimen is as below.      CURRENT DM MEDICATIONS:   Lantus 10 units daily   Ozempic 2 mg weekly   Humalog meal size dosing TID wm  Humalog correction dosing every 3 hours as below    Meal Size:   12 units with regular carb meal  14 units with heavier carb meal  5 units with snack    Humalog Correction Dosing every 3 hours as needed OUTSIDE OF EATING  If  - 250, may take 2 units of Humalog  If  - 300, may take 4 units of Humalog   If  - 350, may take 6 units of Humalog  If  - 400, may take 8 units of Humalog  If +, may take 10 units of Humalog      Patient has failed the following Diabetes medications:   Metformin - GI   Invokana - coverage  Jardiance - yeast infection  Glyburide / Glipizide  Victoza   Basal - Basaglar  Trulicity - GLP-1 escalation      Labs Reviewed.       Lab Results   Component Value Date    CPEPTIDE 2.35 04/01/2021     Lab Results   Component Value Date    GLUTAMICACID 0.00 05/25/2017          //   , There is no height or weight on file to calculate BMI.  Her blood sugar in clinic today is:    Lab Results   Component Value Date    POCGLU 174 (A) 12/04/2023       Review of Systems   Constitutional:  Negative for activity change, appetite change, chills and fever.   HENT:  Negative for dental problem, mouth sores, nosebleeds, sore throat and trouble swallowing.    Eyes:  Negative for pain and discharge.   Respiratory:  Negative for shortness of breath, wheezing and stridor.    Cardiovascular:  Negative for chest pain, palpitations and leg swelling.   Gastrointestinal:  Negative for abdominal pain, diarrhea, nausea and vomiting.   Endocrine: Negative for polydipsia, polyphagia and polyuria.   Genitourinary:  Negative for dysuria, frequency and urgency.   Musculoskeletal:  Negative for joint swelling and myalgias.   Skin:  Negative for rash and wound.   Neurological:  Negative for dizziness, syncope, weakness and headaches.   Psychiatric/Behavioral:  Negative  for behavioral problems and dysphoric mood.          Diabetes Management Status  Statin: Taking  ACE/ARB: Taking    Screening or Prevention Patient's value Goal Complete/Controlled?   HgA1C Testing and Control   Lab Results   Component Value Date    HGBA1C 7.6 (H) 02/27/2024      Annually/Less than 8% No   Lipid profile : 11/17/2023 Annually Yes   LDL control Lab Results   Component Value Date    LDLCALC 49.0 (L) 11/17/2023    Annually/Less than 100 mg/dl  Yes   Nephropathy screening Lab Results   Component Value Date    MICALBCREAT 2.7 11/17/2023     Lab Results   Component Value Date    PROTEINUA Negative 04/01/2021    Annually Yes   Blood pressure BP Readings from Last 1 Encounters:   01/30/24 (!) 116/58    Less than 140/90 Yes   Dilated retinal exam : 10/12/2023 Annually Yes    Foot exam   : 11/30/2023 Annually Yes     ACTIVITY LEVEL: Moderately Active. Discussed activities, benefits, methods, and precautions.  MEAL PLANNING: Patient reports number of meals per day to be 3 and number of snacks per day to be 2.   Patient is encouraged to carb count and consume no more than 30 - 45 grams of carbohydrates in each meal and 15 grams of carbohydrates in each snack.     Social History     Socioeconomic History    Marital status:    Tobacco Use    Smoking status: Never    Smokeless tobacco: Never   Substance and Sexual Activity    Alcohol use: Yes     Comment: rare    Drug use: No    Sexual activity: Yes     Partners: Male   Social History Narrative    . Lives with spouse. Has 3 children. Patient retired as  for [employer].      Social Determinants of Health     Financial Resource Strain: Low Risk  (11/30/2023)    Overall Financial Resource Strain (CARDIA)     Difficulty of Paying Living Expenses: Not hard at all   Food Insecurity: No Food Insecurity (11/30/2023)    Hunger Vital Sign     Worried About Running Out of Food in the Last Year: Never true     Ran Out of Food in the Last Year:  Never true   Transportation Needs: No Transportation Needs (11/30/2023)    PRAPARE - Transportation     Lack of Transportation (Medical): No     Lack of Transportation (Non-Medical): No   Physical Activity: Inactive (11/30/2023)    Exercise Vital Sign     Days of Exercise per Week: 0 days     Minutes of Exercise per Session: 0 min   Stress: No Stress Concern Present (11/30/2023)    [employer] - Occupational Stress Questionnaire     Feeling of Stress : Not at all   Social Connections: Socially Integrated (11/30/2023)    Social Connection and Isolation Panel [NHANES]     Frequency of Communication with Friends and Family: More than three times a week     Frequency of Social Gatherings with Friends and Family: Twice a week     Attends Taoist Services: More than 4 times per year     Active Member of Clubs or Organizations: Yes     Attends Club or Organization Meetings: More than 4 times per year     Marital Status:    Housing Stability: Low Risk  (11/30/2023)    Housing Stability Vital Sign     Unable to Pay for Housing in the Last Year: No     Number of Places Lived in the Last Year: 1     Unstable Housing in the Last Year: No     Past Medical History:   Diagnosis Date    Diabetes mellitus type II     Hyperlipidemia     Hypertension     Hypothyroid     TALIA (obstructive sleep apnea)     on CPAP    Osteopenia     Psoriasis        Objective:        Physical Exam  Constitutional:       General: She is not in acute distress.     Appearance: She is well-developed. She is not diaphoretic.   HENT:      Head: Normocephalic and atraumatic.      Right Ear: External ear normal.      Left Ear: External ear normal.      Nose: Nose normal.   Eyes:      General:         Right eye: No discharge.         Left eye: No discharge.   Pulmonary:      Effort: Pulmonary effort is normal. No respiratory distress.      Breath sounds: No stridor.   Musculoskeletal:         General: Normal range of motion.       Cervical back: Normal range of motion.   Skin:     Coloration: Skin is not pale.   Neurological:      Mental Status: She is alert and oriented to person, place, and time.      Motor: No abnormal muscle tone.      Coordination: Coordination normal.   Psychiatric:         Behavior: Behavior normal.         Thought Content: Thought content normal.         Judgment: Judgment normal.           Assessment / Plan:     Type 2 diabetes mellitus with stage 3a chronic kidney disease, with long-term current use of insulin  -     Hemoglobin A1C; Standing    Cataract associated with type 2 diabetes mellitus    Hyperlipidemia associated with type 2 diabetes mellitus    Acquired hypothyroidism    TALIA on CPAP    Obesity (BMI 30-39.9)        Additional Plan Details:    - POCT Glucose  - Encouraged continuation of lifestyle changes including regular exercise and limiting carbohydrates to 30-45 grams per meal threes times daily and 15 grams per snack with a limit of two daily.   - Encouraged continued monitoring of blood glucose with maintenance of 4 times daily and Continuously with personal CGM Dexcom.    - Current DM Medication Regimen: Continue Lantus 10 units daily. Continue Ozempic 2 mg weekly. Change Humalog meal size dosing TID wm and correction dosing every 3 hours as below. ADJUST INSULIN FREQUENTLY BASED ON BLOOD SUGAR.   - Health Maintenance standards addressed today:  None - up to date  - Nursing Visit: Patient is below goal range for nursing visit for age group and  qualifies for nursing visit, but will defer for now.  .   - Follow up in 8 weeks with DM Management partner    CURRENT DM MEDICATIONS:   Lantus 10 units daily   Ozempic 2 mg weekly   Humalog meal size dosing TID wm  Humalog correction dosing every 3 hours as below    Meal Size:   14 units with regular carb meal  16 units with heavier carb meal  5 units with snack    Humalog Correction Dosing every 3 hours as needed OUTSIDE OF EATING  If  - 250, may  take 2 units of Humalog  If  - 300, may take 4 units of Humalog   If  - 350, may take 6 units of Humalog  If  - 400, may take 8 units of Humalog  If +, may take 10 units of Humalog          Blakeney McKnight, PA-C [facility]

## 2024-04-30 ENCOUNTER — OFFICE VISIT (OUTPATIENT)
Dept: INTERNAL MEDICINE | Facility: CLINIC | Age: 74
End: 2024-04-30
Payer: MEDICARE

## 2024-04-30 VITALS
SYSTOLIC BLOOD PRESSURE: 122 MMHG | TEMPERATURE: 97 F | BODY MASS INDEX: 38.85 KG/M2 | HEART RATE: 84 BPM | HEIGHT: 67 IN | DIASTOLIC BLOOD PRESSURE: 80 MMHG | WEIGHT: 247.56 LBS

## 2024-04-30 DIAGNOSIS — I70.0 AORTIC ATHEROSCLEROSIS: ICD-10-CM

## 2024-04-30 DIAGNOSIS — E11.69 HYPERLIPIDEMIA ASSOCIATED WITH TYPE 2 DIABETES MELLITUS: ICD-10-CM

## 2024-04-30 DIAGNOSIS — M35.1 MCTD (MIXED CONNECTIVE TISSUE DISEASE): ICD-10-CM

## 2024-04-30 DIAGNOSIS — G47.33 OSA ON CPAP: ICD-10-CM

## 2024-04-30 DIAGNOSIS — E66.01 SEVERE OBESITY WITH BODY MASS INDEX (BMI) OF 35.0 TO 35.9 AND COMORBIDITY: ICD-10-CM

## 2024-04-30 DIAGNOSIS — M85.80 OSTEOPENIA, UNSPECIFIED LOCATION: ICD-10-CM

## 2024-04-30 DIAGNOSIS — J84.9 ILD (INTERSTITIAL LUNG DISEASE): ICD-10-CM

## 2024-04-30 DIAGNOSIS — B02.29 PHN (POSTHERPETIC NEURALGIA): ICD-10-CM

## 2024-04-30 DIAGNOSIS — N18.31 TYPE 2 DIABETES MELLITUS WITH STAGE 3A CHRONIC KIDNEY DISEASE, WITH LONG-TERM CURRENT USE OF INSULIN: ICD-10-CM

## 2024-04-30 DIAGNOSIS — E11.22 TYPE 2 DIABETES MELLITUS WITH STAGE 3A CHRONIC KIDNEY DISEASE, WITH LONG-TERM CURRENT USE OF INSULIN: ICD-10-CM

## 2024-04-30 DIAGNOSIS — J43.9 BLEB, LUNG: ICD-10-CM

## 2024-04-30 DIAGNOSIS — Z00.00 ENCOUNTER FOR PREVENTIVE HEALTH EXAMINATION: Primary | ICD-10-CM

## 2024-04-30 DIAGNOSIS — Z79.4 TYPE 2 DIABETES MELLITUS WITH STAGE 3A CHRONIC KIDNEY DISEASE, WITH LONG-TERM CURRENT USE OF INSULIN: ICD-10-CM

## 2024-04-30 DIAGNOSIS — E03.9 ACQUIRED HYPOTHYROIDISM: ICD-10-CM

## 2024-04-30 DIAGNOSIS — L40.9 PSORIASIS: ICD-10-CM

## 2024-04-30 DIAGNOSIS — E78.5 HYPERLIPIDEMIA ASSOCIATED WITH TYPE 2 DIABETES MELLITUS: ICD-10-CM

## 2024-04-30 PROBLEM — R26.9 ABNORMALITY OF GAIT AND MOBILITY: Status: RESOLVED | Noted: 2023-03-08 | Resolved: 2024-04-30

## 2024-04-30 PROBLEM — R29.898 WEAKNESS OF BOTH LOWER EXTREMITIES: Status: RESOLVED | Noted: 2021-03-09 | Resolved: 2024-04-30

## 2024-04-30 PROBLEM — E11.36 CATARACT ASSOCIATED WITH TYPE 2 DIABETES MELLITUS: Status: RESOLVED | Noted: 2023-03-08 | Resolved: 2024-04-30

## 2024-04-30 PROCEDURE — 1170F FXNL STATUS ASSESSED: CPT | Mod: CPTII,S$GLB,, | Performed by: NURSE PRACTITIONER

## 2024-04-30 PROCEDURE — 1159F MED LIST DOCD IN RCRD: CPT | Mod: CPTII,S$GLB,, | Performed by: NURSE PRACTITIONER

## 2024-04-30 PROCEDURE — 3288F FALL RISK ASSESSMENT DOCD: CPT | Mod: CPTII,S$GLB,, | Performed by: NURSE PRACTITIONER

## 2024-04-30 PROCEDURE — 4010F ACE/ARB THERAPY RXD/TAKEN: CPT | Mod: CPTII,S$GLB,, | Performed by: NURSE PRACTITIONER

## 2024-04-30 PROCEDURE — G0439 PPPS, SUBSEQ VISIT: HCPCS | Mod: S$GLB,,, | Performed by: NURSE PRACTITIONER

## 2024-04-30 PROCEDURE — 3079F DIAST BP 80-89 MM HG: CPT | Mod: CPTII,S$GLB,, | Performed by: NURSE PRACTITIONER

## 2024-04-30 PROCEDURE — 99999 PR PBB SHADOW E&M-EST. PATIENT-LVL IV: CPT | Mod: PBBFAC,,, | Performed by: NURSE PRACTITIONER

## 2024-04-30 PROCEDURE — 3074F SYST BP LT 130 MM HG: CPT | Mod: CPTII,S$GLB,, | Performed by: NURSE PRACTITIONER

## 2024-04-30 PROCEDURE — 1126F AMNT PAIN NOTED NONE PRSNT: CPT | Mod: CPTII,S$GLB,, | Performed by: NURSE PRACTITIONER

## 2024-04-30 PROCEDURE — 1101F PT FALLS ASSESS-DOCD LE1/YR: CPT | Mod: CPTII,S$GLB,, | Performed by: NURSE PRACTITIONER

## 2024-04-30 PROCEDURE — 1160F RVW MEDS BY RX/DR IN RCRD: CPT | Mod: CPTII,S$GLB,, | Performed by: NURSE PRACTITIONER

## 2024-04-30 PROCEDURE — 3051F HG A1C>EQUAL 7.0%<8.0%: CPT | Mod: CPTII,S$GLB,, | Performed by: NURSE PRACTITIONER

## 2024-04-30 NOTE — PROGRESS NOTES
"  Jaimie Luque presented for a  Medicare AWV and comprehensive Health Risk Assessment today. The following components were reviewed and updated:    Medical history  Family History  Social history  Allergies and Current Medications  Health Risk Assessment  Health Maintenance  Care Team         ** See Completed Assessments for Annual Wellness Visit within the encounter summary.**         The following assessments were completed:  Living Situation  CAGE  Depression Screening  Timed Get Up and Go  Whisper Test  Cognitive Function Screening  Nutrition Screening  ADL Screening  PAQ Screening      Opioid documentation:      Patient {does/does not have a current opioid prescription:35205}     Vitals:    04/30/24 1502   BP: 122/80   Pulse: 84   Temp: 97.4 °F (36.3 °C)   Weight: 112.3 kg (247 lb 9.2 oz)   Height: 5' 7" (1.702 m)     Body mass index is 38.78 kg/m².  Physical Exam          Diagnoses and health risks identified today and associated recommendations/orders:    1. Encounter for preventive health examination  ***    2. Severe obesity with body mass index (BMI) of 35.0 to 35.9 and comorbidity  ***    3. Type 2 diabetes mellitus with stage 3a chronic kidney disease, with long-term current use of insulin  ***    4. Aortic atherosclerosis  ***    5. Hyperlipidemia associated with type 2 diabetes mellitus  ***    6. MCTD (mixed connective tissue disease)  ***    7. ILD (interstitial lung disease)  ***    8. Bleb, lung  ***    9. Psoriasis  ***    10. TALIA on CPAP  ***    11. Acquired hypothyroidism  ***    12. Osteopenia, unspecified location  ***    13. PHN (postherpetic neuralgia)  ***      Provided Jaimie with a 5-10 year written screening schedule and personal prevention plan. Recommendations were developed using the USPSTF age appropriate recommendations. Education, counseling, and referrals were provided as needed. After Visit Summary printed and given to patient which includes a list of additional screenings\tests " needed.    Follow up in about 1 year (around 4/30/2025) for Follow up with PCP.    Lynette Kathleen NP

## 2024-04-30 NOTE — PATIENT INSTRUCTIONS
Counseling and Referral of Other Preventative  (Italic type indicates deductible and co-insurance are waived)    Patient Name: Jaimie Luque  Today's Date: 4/30/2024    Health Maintenance       Date Due Completion Date    Hemoglobin A1c 08/27/2024 2/27/2024    Eye Exam 10/12/2024 10/12/2023    Override on 8/24/2022: Done    Override on 2/9/2022: Done    Override on 2/3/2021: Done    Diabetes Urine Screening 11/17/2024 11/17/2023    Lipid Panel 11/17/2024 11/17/2023    Foot Exam 11/30/2024 11/30/2023    Override on 5/25/2020: Done    High Dose Statin 01/30/2025 1/30/2024    DEXA Scan 09/20/2026 9/20/2021    TETANUS VACCINE 10/02/2028 10/2/2018    Colorectal Cancer Screening 05/04/2032 12/14/2023        No orders of the defined types were placed in this encounter.    The following information is provided to all patients.  This information is to help you find resources for any of the problems found today that may be affecting your health:                  Living healthy guide: www.[facility].[place].gov      Understanding Diabetes: www.diabetes.org      Eating healthy: www.cdc.gov/healthyweight      CDC home safety checklist: www.cdc.gov/steadi/patient.html      Agency on Aging: www.goea.[place].[facility]      Alcoholics anonymous (AA): www.aa.org      Physical Activity: www.mikie.nih.gov/rz6opcf      Tobacco use: www.quitwithusla.org

## 2024-04-30 NOTE — PROGRESS NOTES
"  Jaimie Luque presented for a follow-up Medicare AWV today. The following components were reviewed and updated:    Medical history  Family History  Social history  Allergies and Current Medications  Health Risk Assessment  Health Maintenance  Care Team    **See Completed Assessments for Annual Wellness visit with in the encounter summary    The following assessments were completed:  Depression Screening  Cognitive function Screening  Timed Get Up Test  Whisper Test      Opioid documentation:      Patient does not have a current opioid prescription.          Vitals:    04/30/24 1502   BP: 122/80   Pulse: 84   Temp: 97.4 °F (36.3 °C)   Weight: 112.3 kg (247 lb 9.2 oz)   Height: 5' 7" (1.702 m)     Body mass index is 38.78 kg/m².       Physical Exam  Vitals and nursing note reviewed.   Constitutional:       Appearance: Normal appearance. She is obese.   HENT:      Head: Normocephalic.   Eyes:      Pupils: Pupils are equal, round, and reactive to light.   Cardiovascular:      Rate and Rhythm: Normal rate and regular rhythm.      Pulses: Normal pulses.   Pulmonary:      Effort: Pulmonary effort is normal.      Breath sounds: Normal breath sounds.   Musculoskeletal:         General: Normal range of motion.   Skin:     General: Skin is warm.   Neurological:      Mental Status: She is alert and oriented to person, place, and time.   Psychiatric:         Mood and Affect: Mood normal.         Behavior: Behavior normal.         Thought Content: Thought content normal.         Judgment: Judgment normal.         Current Outpatient Medications:     aspirin 81 mg Tab, Take by mouth. 1 Tablet Oral 2 times weekly , Disp: , Rfl:     atorvastatin (LIPITOR) 20 MG tablet, TAKE 1 TABLET(20 MG) BY MOUTH EVERY DAY, Disp: 90 tablet, Rfl: 3    cholecalciferol, vitamin D3, 2,000 unit Tab, Take by mouth. 1 Tablet Oral Every day, Disp: , Rfl:     gabapentin (NEURONTIN) 300 MG capsule, Take 1 capsule (300 mg total) by mouth 3 (three) times " "daily., Disp: 270 capsule, Rfl: 3    insulin (LANTUS SOLOSTAR U-100 INSULIN) glargine 100 units/mL SubQ pen, Inject 10 Units into the skin once daily., Disp: 6 mL, Rfl: 11    levothyroxine (SYNTHROID) 112 MCG tablet, Take 1 tablet (112 mcg total) by mouth before breakfast., Disp: 90 tablet, Rfl: 3    lisinopriL (PRINIVIL,ZESTRIL) 2.5 MG tablet, TAKE 1 TABLET(2.5 MG) BY MOUTH EVERY DAY, Disp: 90 tablet, Rfl: 2    semaglutide (OZEMPIC) 2 mg/dose (8 mg/3 mL) PnIj, Inject 2 mg into the skin every 7 days., Disp: 9 mL, Rfl: 3    blood-glucose meter,continuous (DEXCOM G7 ) Misc, 1 each by Misc.(Non-Drug; Combo Route) route once. for 1 dose, Disp: 1 each, Rfl: 0    blood-glucose sensor (DEXCOM G7 SENSOR) Gretta, 1 each by Misc.(Non-Drug; Combo Route) route every 10 days., Disp: 9 each, Rfl: 3    insulin lispro 100 unit/mL pen, Titrate up to 100 units daily as instructed., Disp: 90 mL, Rfl: 3    pen needle, diabetic (BD ULTRA-FINE LAURA PEN NEEDLE) 32 gauge x 5/32" Ndle, 1 each by Misc.(Non-Drug; Combo Route) route 4 (four) times daily with meals and nightly., Disp: 400 each, Rfl: 3    Diagnoses and health risks identified today and associated recommendations/orders:  1. Encounter for preventive health examination  Utkeanu maya      2. Severe obesity with body mass index (BMI) of 35.0 to 35.9 and comorbidity  BMI 39.78 KG  Chronic and Ongoing on Ozempic,   Discussed and  continue low fat/carb/chol diet. Cardio exercise as tolerated. Life style modifications.  Continue current treatment plan as previously prescribed with your PCP      3. Type 2 diabetes mellitus with stage 3a chronic kidney disease, with long-term current use of insulin  Chronic and Ongoing on Ozempic- hgbaci 7/1  Discussed and  continue low fat/carb/chol diet. Cardio exercise as tolerated. Life style modifications.   Continue current treatment plan as previously prescribed with your PCP/DM team    4. Aortic atherosclerosis  Chronic and Stable " on ASA/Lipitor./diet and exercise. Continue current treatment plan as previously prescribed with your PCP    5. Hyperlipidemia associated with type 2 diabetes mellitus  Chronic and Stable on ASA/Lipitor./diet and exercise. Continue current treatment plan as previously prescribed with your PCP    6. MCTD (mixed connective tissue disease)  .Chronic and Stable off of Cellcept  States s/s stable labs  Followed by routine labs/CT scan per rheumatologist    7. ILD (interstitial lung disease)  .chronic and Stable off of Cellcept  States s/s stable labs  Followed by routine labs/CT scan per rheumatologist    8. Bleb, lung CT scn 7/5/ 2023  FINDINGS:  Mild coronary artery calcification.  No cardiomegaly, aortic aneurysm or adenopathy.   Unchanged wedge-shaped low density within the posterior segment of the right hepatic lobe.  This is unchanged compared to prior CT chest performed 01/19/2021.   No additional abnormality below the diaphragm.   Unchanged biapical scarring.  Unchanged 11 mm nodule within the right middle lobe.  Additional smaller pulmonary nodules noted bilaterally, unchanged.   No interstitial thickening, ground-glass or airspace opacities.  Unchanged mucous plugging within the anterior segment of the right upper lobe.  No pneumothorax.  Yearly CT scan/ followed by pulmo/rheum    9. Psoriasis  Chronic and Stable. Continue current treatment plan as previously prescribed with your derm    10. TALIA on CPAP  Chronic and Stable on CPAP. Continue current treatment plan as previously prescribed with your PULM     11. Acquired hypothyroidism  Chronic and Stable on Synthriod. Continue current treatment plan as previously prescribed with your PCP    12. Osteopenia, unspecified location  Chronic and Stable on vitamin. Diet and exerisc. Continue current treatment plan as previously prescribed with your PCP    13. PHN (postherpetic neuralgia)  Chronic and Stable on Gabapentin. Continue current treatment plan as previously  prescribed with your PCP    Provided Jaimie with a 5-10 year written screening schedule and personal prevention plan. Recommendations were developed using the USPSTF age appropriate recommendations. Education, counseling, and referrals were provided as needed.  After Visit Summary printed and given to patient which includes a list of additional screenings\tests needed.    Follow up in about 1 year (around 4/30/2025) for Follow up with PCP.

## 2024-05-02 ENCOUNTER — OFFICE VISIT (OUTPATIENT)
Dept: PRIMARY CARE CLINIC | Facility: CLINIC | Age: 74
End: 2024-05-02
Payer: MEDICARE

## 2024-05-02 VITALS
OXYGEN SATURATION: 98 % | TEMPERATURE: 98 F | HEIGHT: 67 IN | BODY MASS INDEX: 38.85 KG/M2 | HEART RATE: 76 BPM | SYSTOLIC BLOOD PRESSURE: 124 MMHG | DIASTOLIC BLOOD PRESSURE: 56 MMHG | WEIGHT: 247.56 LBS

## 2024-05-02 DIAGNOSIS — E11.22 TYPE 2 DIABETES MELLITUS WITH STAGE 3A CHRONIC KIDNEY DISEASE, WITH LONG-TERM CURRENT USE OF INSULIN: ICD-10-CM

## 2024-05-02 DIAGNOSIS — N18.31 TYPE 2 DIABETES MELLITUS WITH STAGE 3A CHRONIC KIDNEY DISEASE, WITH LONG-TERM CURRENT USE OF INSULIN: ICD-10-CM

## 2024-05-02 DIAGNOSIS — Z12.31 ENCOUNTER FOR SCREENING MAMMOGRAM FOR MALIGNANT NEOPLASM OF BREAST: ICD-10-CM

## 2024-05-02 DIAGNOSIS — M35.1 MCTD (MIXED CONNECTIVE TISSUE DISEASE): ICD-10-CM

## 2024-05-02 DIAGNOSIS — L40.9 PSORIASIS: ICD-10-CM

## 2024-05-02 DIAGNOSIS — E11.69 HYPERLIPIDEMIA ASSOCIATED WITH TYPE 2 DIABETES MELLITUS: ICD-10-CM

## 2024-05-02 DIAGNOSIS — Z78.0 ASYMPTOMATIC POSTMENOPAUSAL STATE: ICD-10-CM

## 2024-05-02 DIAGNOSIS — Z79.4 TYPE 2 DIABETES MELLITUS WITH STAGE 3A CHRONIC KIDNEY DISEASE, WITH LONG-TERM CURRENT USE OF INSULIN: ICD-10-CM

## 2024-05-02 DIAGNOSIS — E03.9 ACQUIRED HYPOTHYROIDISM: Primary | ICD-10-CM

## 2024-05-02 DIAGNOSIS — J84.9 ILD (INTERSTITIAL LUNG DISEASE): ICD-10-CM

## 2024-05-02 DIAGNOSIS — B02.29 PHN (POSTHERPETIC NEURALGIA): ICD-10-CM

## 2024-05-02 DIAGNOSIS — G47.33 OSA ON CPAP: ICD-10-CM

## 2024-05-02 DIAGNOSIS — E78.5 HYPERLIPIDEMIA ASSOCIATED WITH TYPE 2 DIABETES MELLITUS: ICD-10-CM

## 2024-05-02 PROCEDURE — 3074F SYST BP LT 130 MM HG: CPT | Mod: CPTII,S$GLB,, | Performed by: INTERNAL MEDICINE

## 2024-05-02 PROCEDURE — 99999 PR PBB SHADOW E&M-EST. PATIENT-LVL III: CPT | Mod: PBBFAC,,, | Performed by: INTERNAL MEDICINE

## 2024-05-02 PROCEDURE — 3078F DIAST BP <80 MM HG: CPT | Mod: CPTII,S$GLB,, | Performed by: INTERNAL MEDICINE

## 2024-05-02 PROCEDURE — 1101F PT FALLS ASSESS-DOCD LE1/YR: CPT | Mod: CPTII,S$GLB,, | Performed by: INTERNAL MEDICINE

## 2024-05-02 PROCEDURE — 1159F MED LIST DOCD IN RCRD: CPT | Mod: CPTII,S$GLB,, | Performed by: INTERNAL MEDICINE

## 2024-05-02 PROCEDURE — 1126F AMNT PAIN NOTED NONE PRSNT: CPT | Mod: CPTII,S$GLB,, | Performed by: INTERNAL MEDICINE

## 2024-05-02 PROCEDURE — 99214 OFFICE O/P EST MOD 30 MIN: CPT | Mod: S$GLB,,, | Performed by: INTERNAL MEDICINE

## 2024-05-02 PROCEDURE — 3051F HG A1C>EQUAL 7.0%<8.0%: CPT | Mod: CPTII,S$GLB,, | Performed by: INTERNAL MEDICINE

## 2024-05-02 PROCEDURE — 3288F FALL RISK ASSESSMENT DOCD: CPT | Mod: CPTII,S$GLB,, | Performed by: INTERNAL MEDICINE

## 2024-05-02 PROCEDURE — 4010F ACE/ARB THERAPY RXD/TAKEN: CPT | Mod: CPTII,S$GLB,, | Performed by: INTERNAL MEDICINE

## 2024-05-02 PROCEDURE — 1158F ADVNC CARE PLAN TLK DOCD: CPT | Mod: CPTII,S$GLB,, | Performed by: INTERNAL MEDICINE

## 2024-05-08 DIAGNOSIS — B02.29 PHN (POSTHERPETIC NEURALGIA): ICD-10-CM

## 2024-05-08 DIAGNOSIS — E03.9 ACQUIRED HYPOTHYROIDISM: ICD-10-CM

## 2024-05-08 RX ORDER — GABAPENTIN 300 MG/1
300 CAPSULE ORAL 3 TIMES DAILY
Qty: 270 CAPSULE | Refills: 3 | Status: SHIPPED | OUTPATIENT
Start: 2024-05-08

## 2024-05-08 RX ORDER — LEVOTHYROXINE SODIUM 112 UG/1
112 TABLET ORAL
Qty: 90 TABLET | Refills: 3 | Status: SHIPPED | OUTPATIENT
Start: 2024-05-08

## 2024-05-20 ENCOUNTER — PATIENT MESSAGE (OUTPATIENT)
Dept: DIABETES | Facility: CLINIC | Age: 74
End: 2024-05-20
Payer: MEDICARE

## 2024-05-20 DIAGNOSIS — E11.65 UNCONTROLLED TYPE 2 DIABETES MELLITUS WITH HYPERGLYCEMIA: ICD-10-CM

## 2024-05-21 RX ORDER — INSULIN GLARGINE 100 [IU]/ML
10 INJECTION, SOLUTION SUBCUTANEOUS DAILY
Qty: 9 ML | Refills: 0 | Status: SHIPPED | OUTPATIENT
Start: 2024-05-21 | End: 2025-05-21

## 2024-06-03 ENCOUNTER — LAB VISIT (OUTPATIENT)
Dept: LAB | Facility: HOSPITAL | Age: 74
End: 2024-06-03
Attending: PHYSICIAN ASSISTANT
Payer: MEDICARE

## 2024-06-03 DIAGNOSIS — N18.31 TYPE 2 DIABETES MELLITUS WITH STAGE 3A CHRONIC KIDNEY DISEASE, WITH LONG-TERM CURRENT USE OF INSULIN: ICD-10-CM

## 2024-06-03 DIAGNOSIS — E11.22 TYPE 2 DIABETES MELLITUS WITH STAGE 3A CHRONIC KIDNEY DISEASE, WITH LONG-TERM CURRENT USE OF INSULIN: ICD-10-CM

## 2024-06-03 DIAGNOSIS — Z79.4 TYPE 2 DIABETES MELLITUS WITH STAGE 3A CHRONIC KIDNEY DISEASE, WITH LONG-TERM CURRENT USE OF INSULIN: ICD-10-CM

## 2024-06-03 LAB
ESTIMATED AVG GLUCOSE: 163 MG/DL (ref 68–131)
HBA1C MFR BLD: 7.3 % (ref 4–5.6)

## 2024-06-03 PROCEDURE — 83036 HEMOGLOBIN GLYCOSYLATED A1C: CPT | Performed by: PHYSICIAN ASSISTANT

## 2024-06-03 PROCEDURE — 36415 COLL VENOUS BLD VENIPUNCTURE: CPT | Performed by: PHYSICIAN ASSISTANT

## 2024-06-04 ENCOUNTER — APPOINTMENT (OUTPATIENT)
Dept: RADIOLOGY | Facility: HOSPITAL | Age: 74
End: 2024-06-04
Attending: INTERNAL MEDICINE
Payer: MEDICARE

## 2024-06-04 ENCOUNTER — PATIENT MESSAGE (OUTPATIENT)
Dept: PRIMARY CARE CLINIC | Facility: CLINIC | Age: 74
End: 2024-06-04
Payer: MEDICARE

## 2024-06-04 DIAGNOSIS — Z78.0 ASYMPTOMATIC POSTMENOPAUSAL STATE: ICD-10-CM

## 2024-06-04 PROCEDURE — 77080 DXA BONE DENSITY AXIAL: CPT | Mod: TC

## 2024-06-04 PROCEDURE — 77080 DXA BONE DENSITY AXIAL: CPT | Mod: 26,,, | Performed by: RADIOLOGY

## 2024-06-10 ENCOUNTER — OFFICE VISIT (OUTPATIENT)
Dept: DIABETES | Facility: CLINIC | Age: 74
End: 2024-06-10
Payer: MEDICARE

## 2024-06-10 DIAGNOSIS — E66.9 OBESITY (BMI 30-39.9): ICD-10-CM

## 2024-06-10 DIAGNOSIS — E11.65 UNCONTROLLED TYPE 2 DIABETES MELLITUS WITH HYPERGLYCEMIA: Primary | ICD-10-CM

## 2024-06-10 DIAGNOSIS — E11.69 HYPERLIPIDEMIA ASSOCIATED WITH TYPE 2 DIABETES MELLITUS: ICD-10-CM

## 2024-06-10 DIAGNOSIS — G47.33 OSA ON CPAP: ICD-10-CM

## 2024-06-10 DIAGNOSIS — E78.5 HYPERLIPIDEMIA ASSOCIATED WITH TYPE 2 DIABETES MELLITUS: ICD-10-CM

## 2024-06-10 DIAGNOSIS — E11.36 CATARACT ASSOCIATED WITH TYPE 2 DIABETES MELLITUS: ICD-10-CM

## 2024-06-10 DIAGNOSIS — E03.9 ACQUIRED HYPOTHYROIDISM: ICD-10-CM

## 2024-06-10 PROCEDURE — 1157F ADVNC CARE PLAN IN RCRD: CPT | Mod: CPTII,95,, | Performed by: PHYSICIAN ASSISTANT

## 2024-06-10 PROCEDURE — 95251 CONT GLUC MNTR ANALYSIS I&R: CPT | Mod: NDTC,,, | Performed by: PHYSICIAN ASSISTANT

## 2024-06-10 PROCEDURE — 3051F HG A1C>EQUAL 7.0%<8.0%: CPT | Mod: CPTII,95,, | Performed by: PHYSICIAN ASSISTANT

## 2024-06-10 PROCEDURE — 99214 OFFICE O/P EST MOD 30 MIN: CPT | Mod: 95,,, | Performed by: PHYSICIAN ASSISTANT

## 2024-06-10 PROCEDURE — 4010F ACE/ARB THERAPY RXD/TAKEN: CPT | Mod: CPTII,95,, | Performed by: PHYSICIAN ASSISTANT

## 2024-06-10 NOTE — PROGRESS NOTES
PCP: Jessica Ward MD    Subjective:     Chief Complaint: Diabetes - Established Patient    TELEMEDICINE VISIT:     The patient location is: Home  The chief complaint leading to consultation is: Diabetes Follow up  Visit type: Virtual visit with synchronous audio and video  Total time spent with patient: 20  Each patient to whom he or she provides medical services by telemedicine is:  (1) informed of the relationship between the physician and patient and the respective role of any other health care provider with respect to management of the patient; and (2) notified that he or she may decline to receive medical services by telemedicine and may withdraw from such care at any time.    Notes: N/A      HISTORY OF PRESENT ILLNESS: 74 y.o.   female presenting for diabetes management visit.   The patient's last visit with me was on 4/23/2024.   Patient has had Type II diabetes since 20 or more years.  Pertinent to decision making is the following comorbidities: HLD, Hypothyroidism and Obesity by BMI  Patient has the following Diabetes complications: without complications  She has attended diabetes education in the past.     Patient's most recent A1c of 7.3% was completed 1 weeks ago.   Patient states since Her last A1c Her blood glucose levels have been high  throughout the day .   Patient monitors blood glucose 4 times per day and Continuously with personal CGM Dexcom.   Patient blood glucose monitoring device will be uploaded into [employer] [facility] today.        Patients records shows mixed postprandial hyperglycemia and hypoglycemia and baseline euglycemia.     Patient endorses the following diabetes related symptoms:  None .   Patient is due today for the following diabetes-related health maintenance standards: COVID-19 Vaccine .   She denies any recent hospital admissions or emergency room visits. Patient denies history of DKA.   She voices having hypoglycemia as above..   Patient's concerns today include  glycemic control.    Patient medication regimen is as below.      CURRENT DM MEDICATIONS:   Lantus 10 units daily   Ozempic 2 mg weekly   Humalog meal size dosing TID wm  Humalog correction dosing every 3 hours as below    Meal Size:   12 units with regular carb meal  14 units with heavier carb meal  5 units with snack    Humalog Correction Dosing every 3 hours as needed OUTSIDE OF EATING  If  - 250, may take 2 units of Humalog  If  - 300, may take 4 units of Humalog   If  - 350, may take 6 units of Humalog  If  - 400, may take 8 units of Humalog  If +, may take 10 units of Humalog      Patient has failed the following Diabetes medications:   Metformin - GI   Invokana - coverage  Jardiance - yeast infection  Glyburide / Glipizide  Victoza   Basal - Basaglar  Trulicity - GLP-1 escalation      Labs Reviewed.       Lab Results   Component Value Date    CPEPTIDE 2.35 04/01/2021     Lab Results   Component Value Date    GLUTAMICACID 0.00 05/25/2017          //   , There is no height or weight on file to calculate BMI.  Her blood sugar in clinic today is:    Lab Results   Component Value Date    POCGLU 174 (A) 12/04/2023       Review of Systems   Constitutional:  Negative for activity change, appetite change, chills and fever.   HENT:  Negative for dental problem, mouth sores, nosebleeds, sore throat and trouble swallowing.    Eyes:  Negative for pain and discharge.   Respiratory:  Negative for shortness of breath, wheezing and stridor.    Cardiovascular:  Negative for chest pain, palpitations and leg swelling.   Gastrointestinal:  Negative for abdominal pain, diarrhea, nausea and vomiting.   Endocrine: Negative for polydipsia, polyphagia and polyuria.   Genitourinary:  Negative for dysuria, frequency and urgency.   Musculoskeletal:  Negative for joint swelling and myalgias.   Skin:  Negative for rash and wound.   Neurological:  Negative for dizziness, syncope, weakness and headaches.    Psychiatric/Behavioral:  Negative for behavioral problems and dysphoric mood.          Diabetes Management Status  Statin: Taking  ACE/ARB: Taking    Screening or Prevention Patient's value Goal Complete/Controlled?   HgA1C Testing and Control   Lab Results   Component Value Date    HGBA1C 7.3 (H) 06/03/2024      Annually/Less than 8% No   Lipid profile : 11/17/2023 Annually Yes   LDL control Lab Results   Component Value Date    LDLCALC 49.0 (L) 11/17/2023    Annually/Less than 100 mg/dl  Yes   Nephropathy screening Lab Results   Component Value Date    MICALBCREAT 2.7 11/17/2023     Lab Results   Component Value Date    PROTEINUA Negative 04/01/2021    Annually Yes   Blood pressure BP Readings from Last 1 Encounters:   05/02/24 (!) 124/56    Less than 140/90 Yes   Dilated retinal exam : 10/12/2023 Annually Yes    Foot exam   : 11/30/2023 Annually Yes     ACTIVITY LEVEL: Moderately Active. Discussed activities, benefits, methods, and precautions.  MEAL PLANNING: Patient reports number of meals per day to be 3 and number of snacks per day to be 2.   Patient is encouraged to carb count and consume no more than 30 - 45 grams of carbohydrates in each meal and 15 grams of carbohydrates in each snack.     Social History     Socioeconomic History    Marital status:    Tobacco Use    Smoking status: Never    Smokeless tobacco: Never   Substance and Sexual Activity    Alcohol use: Yes     Comment: rare    Drug use: No    Sexual activity: Yes     Partners: Male   Social History Narrative    . Lives with spouse. Has 3 children. Patient retired as  for [employer].      Social Determinants of Health     Financial Resource Strain: Low Risk  (4/30/2024)    Overall Financial Resource Strain (CARDIA)     Difficulty of Paying Living Expenses: Not hard at all   Food Insecurity: No Food Insecurity (4/30/2024)    Hunger Vital Sign     Worried About Running Out of Food in the Last Year: Never true      Ran Out of Food in the Last Year: Never true   Transportation Needs: No Transportation Needs (4/30/2024)    PRAPARE - Transportation     Lack of Transportation (Medical): No     Lack of Transportation (Non-Medical): No   Physical Activity: Insufficiently Active (4/30/2024)    Exercise Vital Sign     Days of Exercise per Week: 3 days     Minutes of Exercise per Session: 40 min   Stress: No Stress Concern Present (4/30/2024)    [employer] - Occupational Stress Questionnaire     Feeling of Stress : Not at all   Housing Stability: Low Risk  (11/30/2023)    Housing Stability Vital Sign     Unable to Pay for Housing in the Last Year: No     Number of Places Lived in the Last Year: 1     Unstable Housing in the Last Year: No     Past Medical History:   Diagnosis Date    Diabetes mellitus type II     Hyperlipidemia     Hypertension     Hypothyroid     TALIA (obstructive sleep apnea)     on CPAP    Osteopenia     Psoriasis        Objective:        Physical Exam  Constitutional:       General: She is not in acute distress.     Appearance: She is well-developed. She is not diaphoretic.   HENT:      Head: Normocephalic and atraumatic.      Right Ear: External ear normal.      Left Ear: External ear normal.      Nose: Nose normal.   Eyes:      General:         Right eye: No discharge.         Left eye: No discharge.   Pulmonary:      Effort: Pulmonary effort is normal. No respiratory distress.      Breath sounds: No stridor.   Musculoskeletal:         General: Normal range of motion.      Cervical back: Normal range of motion.   Skin:     Coloration: Skin is not pale.   Neurological:      Mental Status: She is alert and oriented to person, place, and time.      Motor: No abnormal muscle tone.      Coordination: Coordination normal.   Psychiatric:         Behavior: Behavior normal.         Thought Content: Thought content normal.         Judgment: Judgment normal.           Assessment / Plan:      Uncontrolled type 2 diabetes mellitus with hyperglycemia    Cataract associated with type 2 diabetes mellitus    Acquired hypothyroidism    Hyperlipidemia associated with type 2 diabetes mellitus    TALIA on CPAP    Obesity (BMI 30-39.9)        Additional Plan Details:    - POCT Glucose  - Encouraged continuation of lifestyle changes including regular exercise and limiting carbohydrates to 30-45 grams per meal threes times daily and 15 grams per snack with a limit of two daily.   - Encouraged continued monitoring of blood glucose with maintenance of 4 times daily and Continuously with personal CGM Dexcom.    - Current DM Medication Regimen: Continue Lantus 10 units daily. Continue Ozempic 2 mg weekly. Change Humalog meal size dosing TID wm and correction dosing every 3 hours as below. ADJUST INSULIN FREQUENTLY BASED ON BLOOD SUGAR.   - Health Maintenance standards addressed today: COVID - 19 Vaccine - patient will schedule outside of [facility]   - Nursing Visit: Patient is below goal range for nursing visit for age group and  qualifies for nursing visit, but will defer for now.  .   - Follow up in 2 weeks with DM Management partner    Lantus 10 units daily   Ozempic 2 mg weekly   Humalog meal size dosing TID wm  Humalog correction dosing every 3 hours as below    Meal Size:   10 units with regular carb meal  16 units with heavier carb meal  5 units with snack    Humalog Correction Dosing every 3 hours as needed OUTSIDE OF EATING  If  - 250, may take 2 units of Humalog  If  - 300, may take 4 units of Humalog   If  - 350, may take 6 units of Humalog  If  - 400, may take 8 units of Humalog  If +, may take 10 units of Humalog          Blakeney McKnight, PA-C [facility]

## 2024-06-10 NOTE — PATIENT INSTRUCTIONS
Lantus 10 units daily   Ozempic 2 mg weekly   Humalog meal size dosing TID wm  Humalog correction dosing every 3 hours as below    Meal Size:   10 units with regular carb meal  16 units with heavier carb meal  5 units with snack    Humalog Correction Dosing every 3 hours as needed OUTSIDE OF EATING  If  - 250, may take 2 units of Humalog  If  - 300, may take 4 units of Humalog   If  - 350, may take 6 units of Humalog  If  - 400, may take 8 units of Humalog  If +, may take 10 units of Humalog

## 2024-06-13 ENCOUNTER — PATIENT MESSAGE (OUTPATIENT)
Dept: DIABETES | Facility: CLINIC | Age: 74
End: 2024-06-13
Payer: MEDICARE

## 2024-06-20 ENCOUNTER — HOSPITAL ENCOUNTER (OUTPATIENT)
Dept: RADIOLOGY | Facility: HOSPITAL | Age: 74
Discharge: HOME | End: 2024-06-20
Attending: INTERNAL MEDICINE
Payer: MEDICARE

## 2024-06-20 DIAGNOSIS — Z12.31 ENCOUNTER FOR SCREENING MAMMOGRAM FOR MALIGNANT NEOPLASM OF BREAST: ICD-10-CM

## 2024-06-20 PROCEDURE — 77067 SCR MAMMO BI INCL CAD: CPT | Mod: 26,,, | Performed by: RADIOLOGY

## 2024-06-20 PROCEDURE — 77067 SCR MAMMO BI INCL CAD: CPT | Mod: TC

## 2024-06-20 PROCEDURE — 77063 BREAST TOMOSYNTHESIS BI: CPT | Mod: 26,,, | Performed by: RADIOLOGY

## 2024-07-02 ENCOUNTER — OFFICE VISIT (OUTPATIENT)
Dept: PRIMARY CARE CLINIC | Facility: CLINIC | Age: 74
End: 2024-07-02
Payer: MEDICARE

## 2024-07-02 ENCOUNTER — PATIENT MESSAGE (OUTPATIENT)
Dept: PRIMARY CARE CLINIC | Facility: CLINIC | Age: 74
End: 2024-07-02

## 2024-07-02 VITALS
SYSTOLIC BLOOD PRESSURE: 112 MMHG | WEIGHT: 249.88 LBS | HEART RATE: 87 BPM | TEMPERATURE: 97 F | HEIGHT: 67 IN | DIASTOLIC BLOOD PRESSURE: 56 MMHG | BODY MASS INDEX: 39.22 KG/M2 | OXYGEN SATURATION: 95 %

## 2024-07-02 DIAGNOSIS — G47.33 OSA ON CPAP: ICD-10-CM

## 2024-07-02 DIAGNOSIS — Z79.4 TYPE 2 DIABETES MELLITUS WITH STAGE 3A CHRONIC KIDNEY DISEASE, WITH LONG-TERM CURRENT USE OF INSULIN: Primary | ICD-10-CM

## 2024-07-02 DIAGNOSIS — E11.22 TYPE 2 DIABETES MELLITUS WITH STAGE 3A CHRONIC KIDNEY DISEASE, WITH LONG-TERM CURRENT USE OF INSULIN: Primary | ICD-10-CM

## 2024-07-02 DIAGNOSIS — J84.9 ILD (INTERSTITIAL LUNG DISEASE): ICD-10-CM

## 2024-07-02 DIAGNOSIS — R68.89 DECREASED FUNCTIONAL ACTIVITY TOLERANCE: ICD-10-CM

## 2024-07-02 DIAGNOSIS — N18.31 TYPE 2 DIABETES MELLITUS WITH STAGE 3A CHRONIC KIDNEY DISEASE, WITH LONG-TERM CURRENT USE OF INSULIN: Primary | ICD-10-CM

## 2024-07-02 DIAGNOSIS — E11.69 HYPERLIPIDEMIA ASSOCIATED WITH TYPE 2 DIABETES MELLITUS: ICD-10-CM

## 2024-07-02 DIAGNOSIS — E66.01 SEVERE OBESITY WITH BODY MASS INDEX (BMI) OF 35.0 TO 35.9 AND COMORBIDITY: ICD-10-CM

## 2024-07-02 DIAGNOSIS — B02.29 PHN (POSTHERPETIC NEURALGIA): ICD-10-CM

## 2024-07-02 DIAGNOSIS — E78.5 HYPERLIPIDEMIA ASSOCIATED WITH TYPE 2 DIABETES MELLITUS: ICD-10-CM

## 2024-07-02 PROCEDURE — 99999 PR PBB SHADOW E&M-EST. PATIENT-LVL III: CPT | Mod: PBBFAC,,, | Performed by: NURSE PRACTITIONER

## 2024-07-02 NOTE — PATIENT INSTRUCTIONS
Instructions from Lorraine:    Lantus 10 units daily   Ozempic 2 mg weekly   Humalog meal size dosing TID wm  Humalog correction dosing every 3 hours as below     Meal Size:   10 units with regular carb meal  16 units with heavier carb meal  5 units with snack     Humalog Correction Dosing every 3 hours as needed OUTSIDE OF EATING  If  - 250, may take 2 units of Humalog  If  - 300, may take 4 units of Humalog   If  - 350, may take 6 units of Humalog  If  - 400, may take 8 units of Humalog  If +, may take 10 units of Humalog

## 2024-07-02 NOTE — ASSESSMENT & PLAN NOTE
Lab Results   Component Value Date    CHOL 121 11/17/2023    HDL 62 11/17/2023    LDLCALC 49.0 (L) 11/17/2023    TRIG 50 11/17/2023     Continue STATIN  Continue activity

## 2024-07-02 NOTE — PROGRESS NOTES
Jaimie MERINO Ene  07/02/2024  [id number]    Jessica Ward MD  Patient Care Team:  Jessica Ward MD as PCP - General (Internal Medicine)  Mallika Arredondo OD as Consulting Provider (Optometry)  Carlin Armstrong III, OD (Optometry)  Roly Olivares OD as Consulting Physician (Optometry)  Kelin Rolle RD, CDE as Dietitian (Diabetes)  Blair Franco MD (Inactive) as Consulting Physician (Cardiology)  Corky Luna MD as Consulting Physician (Pulmonary Disease)  Reba Ness PA-C as Physician Assistant (Rheumatology)  [facility] (Optometry)  Lorraine Holliday PA-C as Physician Assistant (Diabetes)        [facility]      Chief Complaint:  Chief Complaint   Patient presents with    Follow-up     2 month f/u          Assessment/Plan:  1. Type 2 diabetes mellitus with stage 3a chronic kidney disease, with long-term current use of insulin  Assessment & Plan:  Baseline CKD stage 3A  Monitor BP and CBG  Continue plan of care per diabetes      2. Severe obesity with body mass index (BMI) of 35.0 to 35.9 and comorbidity  Assessment & Plan:  Continue activity  No weight loss with Ozempic - offered Mounjaro per diabetes mgt      3. TALIA on CPAP    4. Hyperlipidemia associated with type 2 diabetes mellitus  Assessment & Plan:     Lab Results   Component Value Date    CHOL 121 11/17/2023    HDL 62 11/17/2023    LDLCALC 49.0 (L) 11/17/2023    TRIG 50 11/17/2023     Continue STATIN  Continue activity      5. ILD (interstitial lung disease)  Assessment & Plan:  Asymptomatic  F/U with Pulmonology      6. PHN (postherpetic neuralgia)  Assessment & Plan:  Continue gabapentin      7. Decreased functional activity tolerance  Assessment & Plan:  Plans to schedule with health             Worry Score: 2  History of Present Illness:    Returns for f/u chronic medical conditions.   Mixed connective tissue ds, psoriasis, DM II, CKD 3, ILD, HPL, Talia  Last visit with   " 5/2/24    Activity - [employer] - resistance circuit training - improved strength - mostly 3 times weekly. She notices upper body weakness.   Blood pressure - does not check at home - "runs fairly low" taking lisinopril    Visit with Lorraine in diabetes - working on CHO content and insulin dosing, discussed possible use of insulin pump. Pt states she needs consistency with insulin dosing and CHO content  Ozempic - Mounjaro?  No weight loss. Has not decided regarding change    Nightly CPAP - most of the time  Eye MD - Yaw Schafer MD - recently  Has tried melatonin intermittently for sleep problems  Right foot swelling > left due to recent car travel  Saw Lynette for [facility]   Denies falls  Pain to legs/knees - intermittently, takes Tylenol arthritis or aleve  Denies fever, chills, URI symptoms, chest pain, SOB, palpitations, vomiting, diarrhea, constipation, no gross neuro deficits, urinary symptoms. No dizziness/lightheadedness      The following were reviewed: Active problem list, medication list, allergies, family history, social history, and Health Maintenance.     History:  Past Medical History:   Diagnosis Date    Diabetes mellitus type II     Hyperlipidemia     Hypertension     Hypothyroid     TALIA (obstructive sleep apnea)     on CPAP    Osteopenia     Psoriasis      Past Surgical History:   Procedure Laterality Date    BRONCHOSCOPY Bilateral 12/15/2022    Procedure: Bronchoscopy;  Surgeon: Corky Luna MD;  Location: [facility];  Service: Pulmonary;  Laterality: Bilateral;    CATARACT EXTRACTION W/  INTRAOCULAR LENS IMPLANT Bilateral     cataract surgery Left 08/10/2022    COLONOSCOPY N/A 05/02/2017    Procedure: COLONOSCOPY;  Surgeon: Noe Penn III, MD;  Location: [facility];  Service: Endoscopy;  Laterality: N/A;    COLONOSCOPY N/A 05/04/2022    Procedure: COLONOSCOPY;  Surgeon: Elodia Swain MD;  Location: [facility];  Service: Endoscopy;  Laterality: N/A;    HYSTERECTOMY      OOPHORECTOMY      " ROBOTIC ASSISTED HYSTERECTOMY       Family History   Problem Relation Name Age of Onset    Heart disease Father      Diabetes Brother      Melanoma Neg Hx      Lupus Neg Hx       Patient Active Problem List   Diagnosis    Acquired hypothyroidism    Hyperlipidemia associated with type 2 diabetes mellitus    Psoriasis    PHN (postherpetic neuralgia)    Type 2 diabetes mellitus with stage 3a chronic kidney disease, with long-term current use of insulin    TALIA on CPAP    Bleb, lung    Diarrhea    Severe obesity with body mass index (BMI) of 35.0 to 35.9 and comorbidity    Decreased functional activity tolerance    Multiple lung nodules on CT    Osteopenia    Osteoarthritis of knee    Aortic atherosclerosis    MCTD (mixed connective tissue disease)    ILD (interstitial lung disease)     Review of patient's allergies indicates:   Allergen Reactions    Adhesive tape-silicones      Other reaction(s): skin irritation to area    Penicillins        Medications:  Current Outpatient Medications on File Prior to Visit   Medication Sig Dispense Refill    aspirin 81 mg Tab Take by mouth. 1 Tablet Oral 2 times weekly       atorvastatin (LIPITOR) 20 MG tablet TAKE 1 TABLET(20 MG) BY MOUTH EVERY DAY 90 tablet 3    blood-glucose sensor (DEXCOM G7 SENSOR) Gretta 1 each by Misc.(Non-Drug; Combo Route) route every 10 days. 9 each 3    cholecalciferol, vitamin D3, 2,000 unit Tab Take by mouth. 1 Tablet Oral Every day      gabapentin (NEURONTIN) 300 MG capsule TAKE 1 CAPSULE(300 MG) BY MOUTH THREE TIMES DAILY 270 capsule 3    insulin glargine U-100, Lantus, (LANTUS SOLOSTAR U-100 INSULIN) 100 unit/mL (3 mL) InPn pen Inject 10 Units into the skin once daily. 9 mL 0    insulin lispro 100 unit/mL pen Titrate up to 100 units daily as instructed. 90 mL 3    levothyroxine (SYNTHROID) 112 MCG tablet TAKE 1 TABLET(112 MCG) BY MOUTH BEFORE BREAKFAST 90 tablet 3    lisinopriL (PRINIVIL,ZESTRIL) 2.5 MG tablet TAKE 1 TABLET(2.5 MG) BY MOUTH EVERY DAY 90  "tablet 2    pen needle, diabetic (BD ULTRA-FINE LAURA PEN NEEDLE) 32 gauge x 5/32" Ndle 1 each by Misc.(Non-Drug; Combo Route) route 4 (four) times daily with meals and nightly. 400 each 3    semaglutide (OZEMPIC) 2 mg/dose (8 mg/3 mL) PnIj Inject 2 mg into the skin every 7 days. 9 mL 3    blood-glucose meter,continuous (DEXCOM G7 ) Misc 1 each by Misc.(Non-Drug; Combo Route) route once. for 1 dose 1 each 0     No current facility-administered medications on file prior to visit.       Medications have been reviewed and reconciled with patient at visit today.      Exam:  Vitals:    07/02/24 1000   BP: (!) 112/56   Pulse: 87   Temp: 97.2 °F (36.2 °C)     Weight: 113.3 kg (249 lb 14.3 oz)   Body mass index is 39.14 kg/m².      BP Readings from Last 3 Encounters:   07/02/24 (!) 112/56   05/02/24 (!) 124/56   04/30/24 122/80     Wt Readings from Last 3 Encounters:   07/02/24 1000 113.3 kg (249 lb 14.3 oz)   05/02/24 0915 112.3 kg (247 lb 9.2 oz)   04/30/24 1502 112.3 kg (247 lb 9.2 oz)        REVIEW OF SYSTEMS  Review of Systems   Constitutional:  Negative for chills, fatigue and fever.   HENT:  Negative for congestion, postnasal drip, rhinorrhea and sore throat.         Seasonal allergies   Eyes:  Negative for discharge and redness.   Respiratory:  Negative for cough and shortness of breath.    Cardiovascular:  Negative for chest pain, palpitations and leg swelling.   Gastrointestinal:  Negative for abdominal pain, diarrhea, nausea and vomiting.   Genitourinary:  Negative for dysuria and frequency.   Musculoskeletal:  Negative for arthralgias and myalgias.   Skin:  Negative for rash and wound.   Neurological:  Negative for dizziness, weakness, light-headedness and headaches.   Hematological:  Negative for adenopathy.   Psychiatric/Behavioral:  Positive for sleep disturbance. Negative for dysphoric mood. The patient is not nervous/anxious.         Physical Exam  Vitals reviewed.   Constitutional:       General: " She is not in acute distress.     Appearance: Normal appearance. She is obese.   HENT:      Head: Normocephalic and atraumatic.      Right Ear: Tympanic membrane normal.      Left Ear: Tympanic membrane normal.      Nose: Nose normal.      Mouth/Throat:      Mouth: Mucous membranes are moist.   Eyes:      General: No scleral icterus.     Conjunctiva/sclera: Conjunctivae normal.   Cardiovascular:      Rate and Rhythm: Normal rate and regular rhythm.      Heart sounds: No murmur heard.  Pulmonary:      Effort: Pulmonary effort is normal. No respiratory distress.      Breath sounds: Normal breath sounds.   Abdominal:      Palpations: Abdomen is soft.      Tenderness: There is no abdominal tenderness.   Musculoskeletal:      Cervical back: Normal range of motion and neck supple.      Right lower leg: No edema.      Left lower leg: No edema.   Lymphadenopathy:      Cervical: No cervical adenopathy.   Skin:     General: Skin is warm and dry.   Neurological:      Mental Status: She is alert and oriented to person, place, and time. Mental status is at baseline.   Psychiatric:         Mood and Affect: Mood normal.         Thought Content: Thought content normal.          Laboratory Reviewed:     Lab Results   Component Value Date    WBC 6.39 03/14/2024    HGB 12.7 03/14/2024    HCT 38.6 03/14/2024     03/14/2024    CHOL 121 11/17/2023    TRIG 50 11/17/2023    HDL 62 11/17/2023    ALT 20 03/14/2024    AST 17 03/14/2024     03/14/2024    K 5.0 03/14/2024     03/14/2024    CREATININE 1.0 03/14/2024    BUN 16 03/14/2024    CO2 26 03/14/2024    TSH 1.557 11/17/2023    GLUF 202 (H) 04/01/2021    HGBA1C 7.3 (H) 06/03/2024       Screening or Prevention Patient's value Goal Complete/Controlled?   HgA1C Testing and Control   Lab Results   Component Value Date    HGBA1C 7.3 (H) 06/03/2024      Annually/Less than 8% Yes   Lipid profile : 11/17/2023 Annually Yes   LDL control Lab Results   Component Value Date     LDLCALC 49.0 (L) 11/17/2023    Annually/Less than 100 mg/dl  Yes   Nephropathy screening Lab Results   Component Value Date    LABMICR 6.0 11/17/2023     Lab Results   Component Value Date    PROTEINUA Negative 04/01/2021    Annually Yes   Blood pressure BP Readings from Last 1 Encounters:   07/02/24 (!) 112/56    Less than 140/90 Yes   Dilated retinal exam : 04/15/2024 Annually Yes   Foot exam   : 11/30/2023 Annually Yes       Health Maintenance  Health Maintenance Topics with due status: Not Due       Topic Last Completion Date    TETANUS VACCINE 10/02/2018    Influenza Vaccine 10/21/2023    Diabetes Urine Screening 11/17/2023    Lipid Panel 11/17/2023    Foot Exam 11/30/2023    Colorectal Cancer Screening 12/14/2023    Eye Exam 04/15/2024    High Dose Statin 05/02/2024    Hemoglobin A1c 06/03/2024    DEXA Scan 06/04/2024     Health Maintenance Due   Topic Date Due    COVID-19 Vaccine (6 - 2023-24 season) 02/21/2024               -Patient's lab results were reviewed and discussed with patient  -Treatment options and alternatives were discussed with the patient. Patient expressed understanding. Patient was given the opportunity to ask questions and be an active participant in their medical care. Patient had no further questions or concerns at this time.         Future Appointments   Date Time Provider Department Center   8/6/2024  9:30 AM Evelyn Colindres PA-C [facility]UL[facility]   9/17/2024  9:30 AM LABORATORY, [facility] LAB [facility]   9/24/2024  9:30 AM Constantine Ayala MD [facility] RHEUM [facility]   10/2/2024  2:20 PM Jessica Ward MD [id number]          After visit summary printed and given to patient upon discharge.  Patient goals and care plan are included in After visit summary.      The following issues were discussed: The primary encounter diagnosis was Type 2 diabetes mellitus with stage 3a chronic kidney disease, with long-term current use of insulin. Diagnoses of Severe obesity with  body mass index (BMI) of 35.0 to 35.9 and comorbidity, TALIA on CPAP, Hyperlipidemia associated with type 2 diabetes mellitus, ILD (interstitial lung disease), PHN (postherpetic neuralgia), and Decreased functional activity tolerance were also pertinent to this visit.    Health maintenance needs, recent test results and goals of care discussed with pt and questions answered.           DAREK Rojas, NP-C  [facility] [id number] [place] [address]

## 2024-07-08 ENCOUNTER — DOCUMENTATION ONLY (OUTPATIENT)
Dept: REHABILITATION | Facility: HOSPITAL | Age: 74
End: 2024-07-08
Payer: MEDICARE

## 2024-07-08 NOTE — PROGRESS NOTES
Health  Consult Note    Name: Jaimie Luque  Clinic Number: [id number]  Physician: No ref. provider found  Past Medical History:   Diagnosis Date    Diabetes mellitus type II     Hyperlipidemia     Hypertension     Hypothyroid     TALIA (obstructive sleep apnea)     on CPAP    Osteopenia     Psoriasis        INITIAL date: 7/8/24  One a scale of 1-10, with 10 being 100% happy, how would you rate your happiness in each of the wellness areas below?    Happiness:         1     2     3     4     5     6     7     8     9     10    Initial Date: MO Date: +/- Total Change   Exercise/Movement Goes to the [facility] started this April      Physical Health Mobility problems in her left knee      Stress Level low     Nutrition x     Sleep Good      Play x     Body Image x     Relationships x     Energy/Vitality Medium        Go over egym with her.     Plan:  Patient goals for next consult include seeing a PT for an evaluation     Health : Vesta Roger MA  7/8/2024

## 2024-07-17 ENCOUNTER — DOCUMENTATION ONLY (OUTPATIENT)
Dept: REHABILITATION | Facility: HOSPITAL | Age: 74
End: 2024-07-17
Payer: MEDICARE

## 2024-07-17 NOTE — PROGRESS NOTES
Leg strength:   Sit to stands 3x8  Single leg balance 3x30 sec each leg  Seated marches 3x30 sec   Cardio:   Marches against the wall 3x30 sec  Walks (regular, high knee, and sideways) 2x each

## 2024-08-06 ENCOUNTER — OFFICE VISIT (OUTPATIENT)
Dept: PULMONOLOGY | Facility: CLINIC | Age: 74
End: 2024-08-06
Payer: MEDICARE

## 2024-08-06 VITALS
WEIGHT: 245.81 LBS | SYSTOLIC BLOOD PRESSURE: 122 MMHG | BODY MASS INDEX: 38.58 KG/M2 | HEIGHT: 67 IN | OXYGEN SATURATION: 99 % | RESPIRATION RATE: 16 BRPM | DIASTOLIC BLOOD PRESSURE: 70 MMHG | HEART RATE: 62 BPM

## 2024-08-06 DIAGNOSIS — R91.8 MULTIPLE LUNG NODULES ON CT: Primary | ICD-10-CM

## 2024-08-06 DIAGNOSIS — G47.33 OSA ON CPAP: ICD-10-CM

## 2024-08-06 PROBLEM — J84.9 ILD (INTERSTITIAL LUNG DISEASE): Status: RESOLVED | Noted: 2024-04-30 | Resolved: 2024-08-06

## 2024-08-06 PROCEDURE — 1101F PT FALLS ASSESS-DOCD LE1/YR: CPT | Mod: CPTII,S$GLB,, | Performed by: HOSPITALIST

## 2024-08-06 PROCEDURE — 3051F HG A1C>EQUAL 7.0%<8.0%: CPT | Mod: CPTII,S$GLB,, | Performed by: HOSPITALIST

## 2024-08-06 PROCEDURE — 3288F FALL RISK ASSESSMENT DOCD: CPT | Mod: CPTII,S$GLB,, | Performed by: HOSPITALIST

## 2024-08-06 PROCEDURE — 3078F DIAST BP <80 MM HG: CPT | Mod: CPTII,S$GLB,, | Performed by: HOSPITALIST

## 2024-08-06 PROCEDURE — 99999 PR PBB SHADOW E&M-EST. PATIENT-LVL IV: CPT | Mod: PBBFAC,,, | Performed by: HOSPITALIST

## 2024-08-06 PROCEDURE — 4010F ACE/ARB THERAPY RXD/TAKEN: CPT | Mod: CPTII,S$GLB,, | Performed by: HOSPITALIST

## 2024-08-06 PROCEDURE — 99213 OFFICE O/P EST LOW 20 MIN: CPT | Mod: S$GLB,,, | Performed by: HOSPITALIST

## 2024-08-06 PROCEDURE — 3074F SYST BP LT 130 MM HG: CPT | Mod: CPTII,S$GLB,, | Performed by: HOSPITALIST

## 2024-08-06 PROCEDURE — 1157F ADVNC CARE PLAN IN RCRD: CPT | Mod: CPTII,S$GLB,, | Performed by: HOSPITALIST

## 2024-08-06 PROCEDURE — 1160F RVW MEDS BY RX/DR IN RCRD: CPT | Mod: CPTII,S$GLB,, | Performed by: HOSPITALIST

## 2024-08-06 PROCEDURE — 1159F MED LIST DOCD IN RCRD: CPT | Mod: CPTII,S$GLB,, | Performed by: HOSPITALIST

## 2024-08-06 RX ORDER — LISINOPRIL 2.5 MG/1
TABLET ORAL
Qty: 90 TABLET | Refills: 2 | Status: SHIPPED | OUTPATIENT
Start: 2024-08-06

## 2024-08-09 DIAGNOSIS — E11.65 UNCONTROLLED TYPE 2 DIABETES MELLITUS WITH HYPERGLYCEMIA: ICD-10-CM

## 2024-08-09 NOTE — TELEPHONE ENCOUNTER
Requested Prescriptions     Pending Prescriptions Disp Refills    LANTUS SOLOSTAR U-100 INSULIN 100 unit/mL (3 mL) InPn pen [Pharmacy Med Name: LANTUS SOLOSTAR PEN INJ 3ML] 9 mL 0     Sig: ADMINISTER 10 UNITS UNDER THE SKIN DAILY     LOV: 6/10/2024  Next Office Visit: Not scheduled yet - Needs to be scheduled

## 2024-08-10 DIAGNOSIS — Z79.4 TYPE 2 DIABETES MELLITUS WITH STAGE 3A CHRONIC KIDNEY DISEASE, WITH LONG-TERM CURRENT USE OF INSULIN: ICD-10-CM

## 2024-08-10 DIAGNOSIS — E11.22 TYPE 2 DIABETES MELLITUS WITH STAGE 3A CHRONIC KIDNEY DISEASE, WITH LONG-TERM CURRENT USE OF INSULIN: ICD-10-CM

## 2024-08-10 DIAGNOSIS — N18.31 TYPE 2 DIABETES MELLITUS WITH STAGE 3A CHRONIC KIDNEY DISEASE, WITH LONG-TERM CURRENT USE OF INSULIN: ICD-10-CM

## 2024-08-12 DIAGNOSIS — E11.65 UNCONTROLLED TYPE 2 DIABETES MELLITUS WITH HYPERGLYCEMIA: ICD-10-CM

## 2024-08-12 RX ORDER — SEMAGLUTIDE 2.68 MG/ML
INJECTION, SOLUTION SUBCUTANEOUS
Qty: 9 ML | Refills: 3 | Status: SHIPPED | OUTPATIENT
Start: 2024-08-12

## 2024-08-12 RX ORDER — INSULIN GLARGINE 100 [IU]/ML
10 INJECTION, SOLUTION SUBCUTANEOUS DAILY
Qty: 9 ML | Refills: 0 | Status: CANCELLED | OUTPATIENT
Start: 2024-08-12 | End: 2025-08-12

## 2024-08-12 RX ORDER — INSULIN GLARGINE 100 [IU]/ML
INJECTION, SOLUTION SUBCUTANEOUS
Qty: 9 ML | Refills: 3 | Status: SHIPPED | OUTPATIENT
Start: 2024-08-12

## 2024-08-12 NOTE — TELEPHONE ENCOUNTER
Pt established with JÚNIOR Peters sending to correct provider   O-Z Plasty Text: The defect edges were debeveled with a #15 scalpel blade.  Given the location of the defect, shape of the defect and the proximity to free margins an O-Z plasty (double transposition flap) was deemed most appropriate.  Using a sterile surgical marker, the appropriate transposition flaps were drawn incorporating the defect and placing the expected incisions within the relaxed skin tension lines where possible.    The area thus outlined was incised deep to adipose tissue with a #15 scalpel blade.  The skin margins were undermined to an appropriate distance in all directions utilizing iris scissors.  Hemostasis was achieved with electrocautery.  The flaps were then transposed into place, one clockwise and the other counterclockwise, and anchored with interrupted buried subcutaneous sutures.

## 2024-08-25 DIAGNOSIS — N18.31 TYPE 2 DIABETES MELLITUS WITH STAGE 3A CHRONIC KIDNEY DISEASE, WITH LONG-TERM CURRENT USE OF INSULIN: ICD-10-CM

## 2024-08-25 DIAGNOSIS — E11.22 TYPE 2 DIABETES MELLITUS WITH STAGE 3A CHRONIC KIDNEY DISEASE, WITH LONG-TERM CURRENT USE OF INSULIN: ICD-10-CM

## 2024-08-25 DIAGNOSIS — Z79.4 TYPE 2 DIABETES MELLITUS WITH STAGE 3A CHRONIC KIDNEY DISEASE, WITH LONG-TERM CURRENT USE OF INSULIN: ICD-10-CM

## 2024-08-28 RX ORDER — BLOOD-GLUCOSE SENSOR
EACH MISCELLANEOUS
Qty: 9 EACH | Refills: 3 | Status: SHIPPED | OUTPATIENT
Start: 2024-08-28

## 2024-09-12 ENCOUNTER — PATIENT MESSAGE (OUTPATIENT)
Dept: RHEUMATOLOGY | Facility: CLINIC | Age: 74
End: 2024-09-12
Payer: MEDICARE

## 2024-09-17 ENCOUNTER — LAB VISIT (OUTPATIENT)
Dept: LAB | Facility: HOSPITAL | Age: 74
End: 2024-09-17
Attending: PHYSICIAN ASSISTANT
Payer: MEDICARE

## 2024-09-17 DIAGNOSIS — M35.1 MCTD (MIXED CONNECTIVE TISSUE DISEASE): ICD-10-CM

## 2024-09-17 DIAGNOSIS — R76.8 ANA POSITIVE: ICD-10-CM

## 2024-09-17 DIAGNOSIS — J84.9 ILD (INTERSTITIAL LUNG DISEASE): ICD-10-CM

## 2024-09-17 LAB
ALBUMIN SERPL BCP-MCNC: 3.1 G/DL (ref 3.5–5.2)
ALP SERPL-CCNC: 67 U/L (ref 55–135)
ALT SERPL W/O P-5'-P-CCNC: 12 U/L (ref 10–44)
ANION GAP SERPL CALC-SCNC: 8 MMOL/L (ref 8–16)
AST SERPL-CCNC: 12 U/L (ref 10–40)
BASOPHILS # BLD AUTO: 0.05 K/UL (ref 0–0.2)
BASOPHILS NFR BLD: 0.8 % (ref 0–1.9)
BILIRUB SERPL-MCNC: 0.9 MG/DL (ref 0.1–1)
BUN SERPL-MCNC: 11 MG/DL (ref 8–23)
CALCIUM SERPL-MCNC: 8.8 MG/DL (ref 8.7–10.5)
CHLORIDE SERPL-SCNC: 107 MMOL/L (ref 95–110)
CO2 SERPL-SCNC: 24 MMOL/L (ref 23–29)
CREAT SERPL-MCNC: 1 MG/DL (ref 0.5–1.4)
CRP SERPL-MCNC: 1.1 MG/L (ref 0–8.2)
DIFFERENTIAL METHOD BLD: ABNORMAL
EOSINOPHIL # BLD AUTO: 0.1 K/UL (ref 0–0.5)
EOSINOPHIL NFR BLD: 2 % (ref 0–8)
ERYTHROCYTE [DISTWIDTH] IN BLOOD BY AUTOMATED COUNT: 15.1 % (ref 11.5–14.5)
ERYTHROCYTE [SEDIMENTATION RATE] IN BLOOD BY PHOTOMETRIC METHOD: 19 MM/HR (ref 0–36)
EST. GFR  (NO RACE VARIABLE): 59 ML/MIN/1.73 M^2
GLUCOSE SERPL-MCNC: 173 MG/DL (ref 70–110)
HCT VFR BLD AUTO: 40.7 % (ref 37–48.5)
HGB BLD-MCNC: 13.4 G/DL (ref 12–16)
IMM GRANULOCYTES # BLD AUTO: 0.02 K/UL (ref 0–0.04)
IMM GRANULOCYTES NFR BLD AUTO: 0.3 % (ref 0–0.5)
LYMPHOCYTES # BLD AUTO: 2 K/UL (ref 1–4.8)
LYMPHOCYTES NFR BLD: 29.8 % (ref 18–48)
MCH RBC QN AUTO: 28.6 PG (ref 27–31)
MCHC RBC AUTO-ENTMCNC: 32.9 G/DL (ref 32–36)
MCV RBC AUTO: 87 FL (ref 82–98)
MONOCYTES # BLD AUTO: 0.5 K/UL (ref 0.3–1)
MONOCYTES NFR BLD: 7.9 % (ref 4–15)
NEUTROPHILS # BLD AUTO: 3.9 K/UL (ref 1.8–7.7)
NEUTROPHILS NFR BLD: 59.2 % (ref 38–73)
NRBC BLD-RTO: 0 /100 WBC
PLATELET # BLD AUTO: 208 K/UL (ref 150–450)
PMV BLD AUTO: 11.6 FL (ref 9.2–12.9)
POTASSIUM SERPL-SCNC: 3.6 MMOL/L (ref 3.5–5.1)
PROT SERPL-MCNC: 6.4 G/DL (ref 6–8.4)
RBC # BLD AUTO: 4.68 M/UL (ref 4–5.4)
SODIUM SERPL-SCNC: 139 MMOL/L (ref 136–145)
WBC # BLD AUTO: 6.6 K/UL (ref 3.9–12.7)

## 2024-09-17 PROCEDURE — 86140 C-REACTIVE PROTEIN: CPT | Performed by: PHYSICIAN ASSISTANT

## 2024-09-17 PROCEDURE — 85025 COMPLETE CBC W/AUTO DIFF WBC: CPT | Performed by: PHYSICIAN ASSISTANT

## 2024-09-17 PROCEDURE — 80053 COMPREHEN METABOLIC PANEL: CPT | Performed by: PHYSICIAN ASSISTANT

## 2024-09-17 PROCEDURE — 36415 COLL VENOUS BLD VENIPUNCTURE: CPT | Performed by: PHYSICIAN ASSISTANT

## 2024-09-17 PROCEDURE — 85652 RBC SED RATE AUTOMATED: CPT | Performed by: PHYSICIAN ASSISTANT

## 2024-09-19 NOTE — PROGRESS NOTES
"Subjective:      Patient ID: Jaimie Luque is a 74 y.o. female.    Chief Complaint: Follow-up (3 month f/u )      HPI  Here for f/u medical problems and preventive exam.  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  Down 6#.  No f/c/sw/cough.  No cp/sob/palp.  BMs and urine normal.  Some pain right foot callous.    HM: 9/24 fluvax, 10/23 RSV, 2/21 covid vaccines/booster, 4/17 HAV#2, 3/15 [id number], 3/14 [id number], 11/22 [id number], 10/18 Tdap, 11/12 zoster, 2019 Shingrix x2, 9/21 BMD rep 5y, 5/22 Cscope REPEAT 1y--12/23 Cologuard negative, 6/24 MMG(q2), 4/23 Eye doctor, 9/16 HCV neg, 7/23 CT lung with Pulm Dr. Schumacher.       Review of Systems   Constitutional:  Negative for appetite change, chills, diaphoresis and fever.   HENT:  Negative for congestion, ear pain, rhinorrhea, sinus pressure and sore throat.    Respiratory:  Negative for cough, chest tightness and shortness of breath.    Cardiovascular:  Negative for chest pain and palpitations.   Gastrointestinal:  Negative for blood in stool, constipation, diarrhea, nausea and vomiting.   Genitourinary:  Negative for dysuria, frequency, hematuria, menstrual problem, urgency and vaginal discharge.   Musculoskeletal:  Negative for arthralgias.   Skin:  Negative for rash.   Neurological:  Negative for dizziness and headaches.   Psychiatric/Behavioral:  Negative for sleep disturbance. The patient is not nervous/anxious.          Objective:   BP (!) 118/58 (BP Location: Right arm, Patient Position: Sitting)   Pulse 91   Temp 97.5 °F (36.4 °C) (Skin)   Ht 5' 7" (1.702 m)   Wt 109.8 kg (241 lb 15.3 oz)   SpO2 95%   BMI 37.90 kg/m²     Physical Exam  Constitutional:       Appearance: She is well-developed.   HENT:      Right Ear: External ear normal. Tympanic membrane is not injected.      Left Ear: External ear normal. Tympanic membrane is not injected.   Eyes:      Conjunctiva/sclera: Conjunctivae normal.   Neck:      Thyroid: No thyromegaly.   Cardiovascular:      Rate " and Rhythm: Normal rate and regular rhythm.      Pulses:           Dorsalis pedis pulses are 2+ on the right side and 2+ on the left side.        Posterior tibial pulses are 2+ on the right side and 2+ on the left side.      Heart sounds: No murmur heard.     No friction rub. No gallop.   Pulmonary:      Effort: Pulmonary effort is normal.      Breath sounds: Normal breath sounds. No wheezing or rales.   Abdominal:      General: Bowel sounds are normal.      Palpations: Abdomen is soft. There is no mass.      Tenderness: There is no abdominal tenderness.   Musculoskeletal:      Cervical back: Normal range of motion and neck supple.   Feet:      Right foot:      Protective Sensation: 10 sites tested.  10 sites sensed.      Skin integrity: No ulcer, blister, skin breakdown, erythema, warmth, callus or dry skin.      Left foot:      Protective Sensation: 10 sites tested.  10 sites sensed.      Skin integrity: No ulcer, blister, skin breakdown, erythema, warmth, callus or dry skin.   Lymphadenopathy:      Cervical: No cervical adenopathy.   Skin:     General: Skin is warm.      Findings: No rash.   Neurological:      Mental Status: She is alert and oriented to person, place, and time.        Latest Reference Range & Units 06/03/24 08:19 09/17/24 08:51   WBC 3.90 - 12.70 K/uL  6.60   RBC 4.00 - 5.40 M/uL  4.68   Hemoglobin 12.0 - 16.0 g/dL  13.4   Hematocrit 37.0 - 48.5 %  40.7   MCV 82 - 98 fL  87   MCH 27.0 - 31.0 pg  28.6   MCHC 32.0 - 36.0 g/dL  32.9   RDW 11.5 - 14.5 %  15.1 (H)   Platelet Count 150 - 450 K/uL  208   MPV 9.2 - 12.9 fL  11.6   Gran % 38.0 - 73.0 %  59.2   Lymph % 18.0 - 48.0 %  29.8   Mono % 4.0 - 15.0 %  7.9   Eos % 0.0 - 8.0 %  2.0   Basophil % 0.0 - 1.9 %  0.8   Immature Granulocytes 0.0 - 0.5 %  0.3   Gran # (ANC) 1.8 - 7.7 K/uL  3.9   Lymph # 1.0 - 4.8 K/uL  2.0   Mono # 0.3 - 1.0 K/uL  0.5   Eos # 0.0 - 0.5 K/uL  0.1   Baso # 0.00 - 0.20 K/uL  0.05   Immature Grans (Abs) 0.00 - 0.04 K/uL  0.02    nRBC 0 /100 WBC  0   Differential Method   Automated   Sed Rate 0 - 36 mm/Hr  19   Sodium 136 - 145 mmol/L  139   Potassium 3.5 - 5.1 mmol/L  3.6   Chloride 95 - 110 mmol/L  107   CO2 23 - 29 mmol/L  24   Anion Gap 8 - 16 mmol/L  8   BUN 8 - 23 mg/dL  11   Creatinine 0.5 - 1.4 mg/dL  1.0   eGFR >60 mL/min/1.73 m^2  59 !   Glucose 70 - 110 mg/dL  173 (H)   Calcium 8.7 - 10.5 mg/dL  8.8   ALP 55 - 135 U/L  67   PROTEIN TOTAL 6.0 - 8.4 g/dL  6.4   Albumin 3.5 - 5.2 g/dL  3.1 (L)   BILIRUBIN TOTAL 0.1 - 1.0 mg/dL  0.9   AST 10 - 40 U/L  12   ALT 10 - 44 U/L  12   CRP 0.0 - 8.2 mg/L  1.1   Hemoglobin A1C External 4.0 - 5.6 % 7.3 (H)    Estimated Avg Glucose 68 - 131 mg/dL 163 (H)          Assessment:       1. Acquired hypothyroidism    2. Type 2 diabetes mellitus with stage 3a chronic kidney disease, with long-term current use of insulin    3. TALIA on CPAP    4. Severe obesity with body mass index (BMI) of 35.0 to 35.9 and comorbidity    5. Hyperlipidemia associated with type 2 diabetes mellitus    6. Aortic atherosclerosis    7. Psoriasis    8. Encounter for preventive health examination    9. PHN (postherpetic neuralgia)          Plan:     1. Acquired hypothyroidism- Clinically stable, continue present treatment.  Overview:  Synthroid 112mcg daily.      2. Type 2 diabetes mellitus with stage 3a chronic kidney disease, with long-term current use of insulin- cont ozempic, LIKELY add jardiance after lab.  Overview:  Ozempic 2mg weekly.    Orders:  -     Ambulatory referral/consult to Podiatry; Future; Expected date: 10/09/2024  -     Microalbumin/Creatinine Ratio, Urine; Future; Expected date: 10/02/2024    3. TALIA on CPAP, cont.    4. Severe obesity with body mass index (BMI) of 35.0 to 35.9 and comorbidity- cont wt loss.    5. Hyperlipidemia associated with type 2 diabetes mellitus- recheck now, needs higher dose statin.  Overview:  Atorva 20mg daily.    Orders:  -     Microalbumin/Creatinine Ratio, Urine; Future;  "Expected date: 10/02/2024  -     Basic Metabolic Panel; Future; Expected date: 10/02/2024  -     Hemoglobin A1C; Future; Expected date: 10/02/2024  -     Lipid Panel; Future; Expected date: 10/02/2024    6. Aortic atherosclerosis- cont statin, likely increase after lab.  Overview:  05-JUL-23; CT Chest Without Contrast: "..ILD CORONARY ARTERY CALCIFICATION.  NO CARDIOMEGALY, AORTIC ANEURYSM OR ADENOPATHY..."       7. Psoriasis- cont topicals.    8. Encounter for preventive health examination- utd.    9. PHN (postherpetic neuralgia)  Overview:  Lupe 300mg TID still needed.    RTC 3mo.       "

## 2024-09-23 ENCOUNTER — OFFICE VISIT (OUTPATIENT)
Dept: SPORTS MEDICINE | Facility: CLINIC | Age: 74
End: 2024-09-23
Payer: MEDICARE

## 2024-09-23 DIAGNOSIS — Z71.82 EXERCISE COUNSELING: Primary | ICD-10-CM

## 2024-09-23 PROCEDURE — 1157F ADVNC CARE PLAN IN RCRD: CPT | Mod: CPTII,S$GLB,, | Performed by: STUDENT IN AN ORGANIZED HEALTH CARE EDUCATION/TRAINING PROGRAM

## 2024-09-23 PROCEDURE — 1126F AMNT PAIN NOTED NONE PRSNT: CPT | Mod: CPTII,S$GLB,, | Performed by: STUDENT IN AN ORGANIZED HEALTH CARE EDUCATION/TRAINING PROGRAM

## 2024-09-23 PROCEDURE — 3051F HG A1C>EQUAL 7.0%<8.0%: CPT | Mod: CPTII,S$GLB,, | Performed by: STUDENT IN AN ORGANIZED HEALTH CARE EDUCATION/TRAINING PROGRAM

## 2024-09-23 PROCEDURE — 4010F ACE/ARB THERAPY RXD/TAKEN: CPT | Mod: CPTII,S$GLB,, | Performed by: STUDENT IN AN ORGANIZED HEALTH CARE EDUCATION/TRAINING PROGRAM

## 2024-09-23 PROCEDURE — 99999 PR PBB SHADOW E&M-EST. PATIENT-LVL III: CPT | Mod: PBBFAC,,, | Performed by: STUDENT IN AN ORGANIZED HEALTH CARE EDUCATION/TRAINING PROGRAM

## 2024-09-23 PROCEDURE — 99214 OFFICE O/P EST MOD 30 MIN: CPT | Mod: S$GLB,,, | Performed by: STUDENT IN AN ORGANIZED HEALTH CARE EDUCATION/TRAINING PROGRAM

## 2024-09-23 PROCEDURE — 1101F PT FALLS ASSESS-DOCD LE1/YR: CPT | Mod: CPTII,S$GLB,, | Performed by: STUDENT IN AN ORGANIZED HEALTH CARE EDUCATION/TRAINING PROGRAM

## 2024-09-23 PROCEDURE — 3288F FALL RISK ASSESSMENT DOCD: CPT | Mod: CPTII,S$GLB,, | Performed by: STUDENT IN AN ORGANIZED HEALTH CARE EDUCATION/TRAINING PROGRAM

## 2024-09-23 PROCEDURE — 1159F MED LIST DOCD IN RCRD: CPT | Mod: CPTII,S$GLB,, | Performed by: STUDENT IN AN ORGANIZED HEALTH CARE EDUCATION/TRAINING PROGRAM

## 2024-09-23 NOTE — PATIENT INSTRUCTIONS
Assessment:  Jaimie Luque is a 74 y.o. female   Chief Complaint   Patient presents with    Right Knee - Pain       Encounter Diagnosis   Name Primary?    Exercise counseling Yes        Plan:  Our puckett years can be powerful years. The older you are, the more you can benefit from physical activity. Experts now say that any physical activity counts toward better health - even just a few minutes! Being active will help you feel better, move better and sleep better. Its never too late to start. Stamina, strength, balance and flexibility can be improved into the 80s, 90s and beyond.   Regular physical activity:  Reduces your risk of falling or having a fall-related injury  Helps you stay independent  Keeps your brain healthy by reducing your risk of developing dementia/Alzheimers and helps improve your thinking  Lowers your risk of developing new chronic health problems (such as diabetes, high blood pressure or heart disease) and reduces their risk of getting worse; and  Decreases your risk of getting several types of cancer and helps prevent them from coming back.   Being Active as We Get Older Getting Started Start where you are. Use what you have. Do what you can. Remember: Avoid straining or holding your breath when lifting. If you need it, get help from a certified exercise professional. They can teach you the right way to do exercises and how to breathe properly. What? Hand weights, resistance bands, weight machines or your own body (for example, wall push-ups or chair sit-tostands) How often? 2 or more days/ week *Rest day in between How hard? Start with light effort. Build up to medium or hard effort. How much? 10-15 repetitions to start (for each major muscle group). Build up to 8-12 reps of challenging effort. Repeat 1-3 times. Strength Training Strength training, for example, working with weights or resistance bands, makes you stronger and helps your overall health. Plus, strength training can make daily  activities like lifting laundry baskets or yardwork easier and safer. Remember: Walking, biking, dancing and water exercises are great. How about active yard work or house chores? Fit in 5 or 10 minutes here and there. Or go for 20-30 minutes. Be active however and wherever you can. Every minute adds up! What? Any rhythmic, continuous activity How often? 3-5 days/week How hard? Fairly light to somewhat hard How much? Start w/a few minutes. Gradually build up to 30-60 minutes over the day. Aerobic Activity Aerobic activity improves your stamina and heart health. During aerobic activity your heart rate and breathing get faster. Build up to doing 150 minutes/week of moderate-intensity activity, 75 minutes/week of vigorous activity or a combination of both. To stay safe and injury free:  Be as active as your abilities allow.  Start slowly - with light to medium effort.  Gradually increase your pace and time spent being active.  Warm up gently and cool down at an easy pace before and after exercise. }} More Help For additional information about being active go to www.cdc.gov/features/activity-older-adults Go to www.acsm.org/get-stay-certified/find-a-pro to find an ACSM certified exercise professional near you. Flexibility Stretch your muscles 2 or more days/ week to the point of feeling tightness. Hold each stretch for 30-60 seconds. For example, stretch your calves or the back of your thighs. Range of Motion Gently move your joints through a full range of motion. Do 10 repetitions of activities such as knee slides, wrist and shoulder circles. Motion is lotion! Combo Activities Do aerobic activity, strength training and balance exercises all in one session. Try things like yoga, Leodan Chi and Pilates to help with balance, flexibility and strength! Take More Steps Use a smart phone or activity tracker. Slowly build up to 2,000 more daily steps than youre doing now. Good job! Then aim for 7,000-9,000 steps/day.      Follow-up: As needed or sooner if there are any problems between now and then.    Thank you for choosing [facility] and Dr. Sarwat Rodriguez for your orthopedic & sports medicine care. It is our goal to provide you with exceptional care that will help keep you healthy, active, and get you back in the game.    Please do not hesitate to reach out to us via email, phone, or MyChart with any questions, concerns, or feedback.    If you felt that you received exemplary care today, please consider leaving us feedback on Healthgrades at:  [email or web address]    If you are experiencing pain/discomfort ,or have questions and would like to be connected to the [facility] on-call team, please call this number and specify which Sports Medicine provider is treating you: [phone]

## 2024-09-23 NOTE — PROGRESS NOTES
"      Patient ID: Jaimie Luque  YOB: 1950  MRN: [id number]    Chief Complaint: Pain of the Right Knee    History of Present Illness: Jaimie Luque is a 74 y.o. female who is here for consult on [employer] system. Goes to the [facility] three times a week and uses "[employer]" system. She is here today to discuss this and to see if there any contraindication to using this. She denies any pain using the machine during or after but does endorse intermittent soreness after use for less than 24 hours. She brings with her some paperwork of test results from the machine which include bio age, strength age, abdominal crunch, back extension, shoulder press, seated row, lat pull down, chest press, leg extension, leg curl and leg press.  Appears this is a service from the [facility] that offers a new personalized full body strength training experience where there are 9 machines and you stay of each machine for 90 seconds and they recommend her doing this twice. She also uses elliptical and bike sparingly at the [facility].     Past Medical History:   Past Medical History:   Diagnosis Date    Diabetes mellitus type II     Hyperlipidemia     Hypertension     Hypothyroid     TALIA (obstructive sleep apnea)     on CPAP    Osteopenia     Psoriasis      Past Surgical History:   Procedure Laterality Date    BRONCHOSCOPY Bilateral 12/15/2022    Procedure: Bronchoscopy;  Surgeon: Corky Luna MD;  Location: [facility];  Service: Pulmonary;  Laterality: Bilateral;    CATARACT EXTRACTION W/  INTRAOCULAR LENS IMPLANT Bilateral     cataract surgery Left 08/10/2022    COLONOSCOPY N/A 05/02/2017    Procedure: COLONOSCOPY;  Surgeon: Noe Penn III, MD;  Location: [facility];  Service: Endoscopy;  Laterality: N/A;    COLONOSCOPY N/A 05/04/2022    Procedure: COLONOSCOPY;  Surgeon: Elodia Swain MD;  Location: [facility];  Service: Endoscopy;  Laterality: N/A;    HYSTERECTOMY      OOPHORECTOMY      ROBOTIC ASSISTED HYSTERECTOMY       Family " History   Problem Relation Name Age of Onset    Heart disease Father      Diabetes Brother      Melanoma Neg Hx      Lupus Neg Hx       Social History     Socioeconomic History    Marital status:    Tobacco Use    Smoking status: Never    Smokeless tobacco: Never   Substance and Sexual Activity    Alcohol use: Yes     Comment: rare    Drug use: No    Sexual activity: Yes     Partners: Male   Social History Narrative    . Lives with spouse. Has 3 children. Patient retired as  for [employer].      Social Determinants of Health     Financial Resource Strain: Low Risk  (4/30/2024)    Overall Financial Resource Strain (CARDIA)     Difficulty of Paying Living Expenses: Not hard at all   Food Insecurity: No Food Insecurity (4/30/2024)    Hunger Vital Sign     Worried About Running Out of Food in the Last Year: Never true     Ran Out of Food in the Last Year: Never true   Transportation Needs: No Transportation Needs (4/30/2024)    PRAPARE - Transportation     Lack of Transportation (Medical): No     Lack of Transportation (Non-Medical): No   Physical Activity: Insufficiently Active (4/30/2024)    Exercise Vital Sign     Days of Exercise per Week: 3 days     Minutes of Exercise per Session: 40 min   Stress: No Stress Concern Present (4/30/2024)    [place] [employer] - Occupational Stress Questionnaire     Feeling of Stress : Not at all   Housing Stability: Low Risk  (11/30/2023)    Housing Stability Vital Sign     Unable to Pay for Housing in the Last Year: No     Number of Places Lived in the Last Year: 1     Unstable Housing in the Last Year: No     Medication List with Changes/Refills   Current Medications    ASPIRIN 81 MG TAB    Take by mouth. 1 Tablet Oral 2 times weekly     ATORVASTATIN (LIPITOR) 20 MG TABLET    TAKE 1 TABLET(20 MG) BY MOUTH EVERY DAY    BLOOD-GLUCOSE METER,CONTINUOUS (DEXCOM G7 ) MISC    1 each by Misc.(Non-Drug; Combo Route) route once.  "for 1 dose    CHOLECALCIFEROL, VITAMIN D3, 2,000 UNIT TAB    Take by mouth. 1 Tablet Oral Every day    DEXCOM G7 SENSOR LEX    USE EVERY 10 DAYS    GABAPENTIN (NEURONTIN) 300 MG CAPSULE    TAKE 1 CAPSULE(300 MG) BY MOUTH THREE TIMES DAILY    INSULIN GLARGINE U-100, LANTUS, (LANTUS SOLOSTAR U-100 INSULIN) 100 UNIT/ML (3 ML) INPN PEN    ADMINISTER 10 UNITS UNDER THE SKIN DAILY    INSULIN LISPRO 100 UNIT/ML PEN    Titrate up to 100 units daily as instructed.    LEVOTHYROXINE (SYNTHROID) 112 MCG TABLET    TAKE 1 TABLET(112 MCG) BY MOUTH BEFORE BREAKFAST    LISINOPRIL (PRINIVIL,ZESTRIL) 2.5 MG TABLET    TAKE 1 TABLET(2.5 MG) BY MOUTH EVERY DAY    OZEMPIC 2 MG/DOSE (8 MG/3 ML) PNIJ    INJECT 2 MG UNDER THE SKIN EVERY 7 DAYS    PEN NEEDLE, DIABETIC (BD ULTRA-FINE LAURA PEN NEEDLE) 32 GAUGE X 5/32" NDLE    1 each by Misc.(Non-Drug; Combo Route) route 4 (four) times daily with meals and nightly.     Review of patient's allergies indicates:   Allergen Reactions    Adhesive tape-silicones      Other reaction(s): skin irritation to area    Penicillins        Physical Exam:   There is no height or weight on file to calculate BMI.    GENERAL: Well appearing, in no acute distress.  HEAD: Normocephalic and atraumatic.  ENT: External ears and nose grossly normal.  EYES: EOMI bilaterally  PULMONARY: Respirations are grossly even and non-labored.  NEURO: Awake, alert, and oriented x 3.  SKIN: No obvious rashes appreciated.  PSYCH: Mood & affect are appropriate.    Detailed MSK exam:     Deferred    Imaging:    Deferred      Assessment:  Jaimie Luque is a 74 y.o. female presents today for exercise counseling.  Currently using an individualized program at the [facility] with good results and progression since April to September of this year.  She has seen strength improvements in all activities including upper body core and lower body.  She is not currently doing any aerobic activity and I did discuss the need for regular aerobic activity " at least 3 times week for half an hour 45 minutes a session.  She will start on the recumbent bike.  I cautioned her on the elliptical or stair stepper.  Also discussed getting an at least 1 day of flexibility and balance training.  She will also continue with her strengthening 3 times a week.  I applauded her on continuing to stay fit and exercise and she will follow up with me in the future if having any symptoms/      Exercise counseling           Sarwat Rodriguez MD    Disclaimer: This note was prepared using a voice recognition system and is likely to have sound alike errors within the text.

## 2024-09-25 ENCOUNTER — PATIENT MESSAGE (OUTPATIENT)
Dept: PRIMARY CARE CLINIC | Facility: CLINIC | Age: 74
End: 2024-09-25
Payer: MEDICARE

## 2024-10-02 ENCOUNTER — OFFICE VISIT (OUTPATIENT)
Dept: PRIMARY CARE CLINIC | Facility: CLINIC | Age: 74
End: 2024-10-02
Payer: MEDICARE

## 2024-10-02 VITALS
BODY MASS INDEX: 37.97 KG/M2 | TEMPERATURE: 98 F | HEART RATE: 91 BPM | OXYGEN SATURATION: 95 % | HEIGHT: 67 IN | WEIGHT: 241.94 LBS | SYSTOLIC BLOOD PRESSURE: 118 MMHG | DIASTOLIC BLOOD PRESSURE: 58 MMHG

## 2024-10-02 DIAGNOSIS — E78.5 HYPERLIPIDEMIA ASSOCIATED WITH TYPE 2 DIABETES MELLITUS: ICD-10-CM

## 2024-10-02 DIAGNOSIS — E11.22 TYPE 2 DIABETES MELLITUS WITH STAGE 3A CHRONIC KIDNEY DISEASE, WITH LONG-TERM CURRENT USE OF INSULIN: ICD-10-CM

## 2024-10-02 DIAGNOSIS — Z79.4 TYPE 2 DIABETES MELLITUS WITH STAGE 3A CHRONIC KIDNEY DISEASE, WITH LONG-TERM CURRENT USE OF INSULIN: ICD-10-CM

## 2024-10-02 DIAGNOSIS — E03.9 ACQUIRED HYPOTHYROIDISM: Primary | ICD-10-CM

## 2024-10-02 DIAGNOSIS — I70.0 AORTIC ATHEROSCLEROSIS: ICD-10-CM

## 2024-10-02 DIAGNOSIS — B02.29 PHN (POSTHERPETIC NEURALGIA): ICD-10-CM

## 2024-10-02 DIAGNOSIS — G47.33 OSA ON CPAP: ICD-10-CM

## 2024-10-02 DIAGNOSIS — E66.01 SEVERE OBESITY WITH BODY MASS INDEX (BMI) OF 35.0 TO 35.9 AND COMORBIDITY: ICD-10-CM

## 2024-10-02 DIAGNOSIS — L40.9 PSORIASIS: ICD-10-CM

## 2024-10-02 DIAGNOSIS — Z00.00 ENCOUNTER FOR PREVENTIVE HEALTH EXAMINATION: ICD-10-CM

## 2024-10-02 DIAGNOSIS — E11.69 HYPERLIPIDEMIA ASSOCIATED WITH TYPE 2 DIABETES MELLITUS: ICD-10-CM

## 2024-10-02 DIAGNOSIS — N18.31 TYPE 2 DIABETES MELLITUS WITH STAGE 3A CHRONIC KIDNEY DISEASE, WITH LONG-TERM CURRENT USE OF INSULIN: ICD-10-CM

## 2024-10-02 LAB
ANION GAP SERPL CALC-SCNC: 7 MMOL/L (ref 8–16)
BUN SERPL-MCNC: 13 MG/DL (ref 8–23)
CALCIUM SERPL-MCNC: 8.9 MG/DL (ref 8.7–10.5)
CHLORIDE SERPL-SCNC: 107 MMOL/L (ref 95–110)
CHOLEST SERPL-MCNC: 113 MG/DL (ref 120–199)
CHOLEST/HDLC SERPL: 2.4 {RATIO} (ref 2–5)
CO2 SERPL-SCNC: 25 MMOL/L (ref 23–29)
CREAT SERPL-MCNC: 1.1 MG/DL (ref 0.5–1.4)
EST. GFR  (NO RACE VARIABLE): 52.4 ML/MIN/1.73 M^2
ESTIMATED AVG GLUCOSE: 151 MG/DL (ref 68–131)
GLUCOSE SERPL-MCNC: 205 MG/DL (ref 70–110)
HBA1C MFR BLD: 6.9 % (ref 4–5.6)
HDLC SERPL-MCNC: 48 MG/DL (ref 40–75)
HDLC SERPL: 42.5 % (ref 20–50)
LDLC SERPL CALC-MCNC: 49.4 MG/DL (ref 63–159)
NONHDLC SERPL-MCNC: 65 MG/DL
POTASSIUM SERPL-SCNC: 4.4 MMOL/L (ref 3.5–5.1)
SODIUM SERPL-SCNC: 139 MMOL/L (ref 136–145)
TRIGL SERPL-MCNC: 78 MG/DL (ref 30–150)

## 2024-10-02 PROCEDURE — 1101F PT FALLS ASSESS-DOCD LE1/YR: CPT | Mod: CPTII,S$GLB,, | Performed by: INTERNAL MEDICINE

## 2024-10-02 PROCEDURE — 1157F ADVNC CARE PLAN IN RCRD: CPT | Mod: CPTII,S$GLB,, | Performed by: INTERNAL MEDICINE

## 2024-10-02 PROCEDURE — 80048 BASIC METABOLIC PNL TOTAL CA: CPT | Performed by: INTERNAL MEDICINE

## 2024-10-02 PROCEDURE — 1126F AMNT PAIN NOTED NONE PRSNT: CPT | Mod: CPTII,S$GLB,, | Performed by: INTERNAL MEDICINE

## 2024-10-02 PROCEDURE — 99214 OFFICE O/P EST MOD 30 MIN: CPT | Mod: S$GLB,,, | Performed by: INTERNAL MEDICINE

## 2024-10-02 PROCEDURE — 3074F SYST BP LT 130 MM HG: CPT | Mod: CPTII,S$GLB,, | Performed by: INTERNAL MEDICINE

## 2024-10-02 PROCEDURE — 3051F HG A1C>EQUAL 7.0%<8.0%: CPT | Mod: CPTII,S$GLB,, | Performed by: INTERNAL MEDICINE

## 2024-10-02 PROCEDURE — 4010F ACE/ARB THERAPY RXD/TAKEN: CPT | Mod: CPTII,S$GLB,, | Performed by: INTERNAL MEDICINE

## 2024-10-02 PROCEDURE — 83036 HEMOGLOBIN GLYCOSYLATED A1C: CPT | Performed by: INTERNAL MEDICINE

## 2024-10-02 PROCEDURE — 3078F DIAST BP <80 MM HG: CPT | Mod: CPTII,S$GLB,, | Performed by: INTERNAL MEDICINE

## 2024-10-02 PROCEDURE — 80061 LIPID PANEL: CPT | Performed by: INTERNAL MEDICINE

## 2024-10-02 PROCEDURE — 3288F FALL RISK ASSESSMENT DOCD: CPT | Mod: CPTII,S$GLB,, | Performed by: INTERNAL MEDICINE

## 2024-10-02 PROCEDURE — 1159F MED LIST DOCD IN RCRD: CPT | Mod: CPTII,S$GLB,, | Performed by: INTERNAL MEDICINE

## 2024-10-02 PROCEDURE — 99999 PR PBB SHADOW E&M-EST. PATIENT-LVL IV: CPT | Mod: PBBFAC,,, | Performed by: INTERNAL MEDICINE

## 2024-10-03 ENCOUNTER — LAB VISIT (OUTPATIENT)
Dept: LAB | Facility: HOSPITAL | Age: 74
End: 2024-10-03
Payer: MEDICARE

## 2024-10-03 ENCOUNTER — PATIENT MESSAGE (OUTPATIENT)
Dept: PRIMARY CARE CLINIC | Facility: CLINIC | Age: 74
End: 2024-10-03
Payer: MEDICARE

## 2024-10-03 DIAGNOSIS — N18.31 TYPE 2 DIABETES MELLITUS WITH STAGE 3A CHRONIC KIDNEY DISEASE, WITH LONG-TERM CURRENT USE OF INSULIN: Primary | ICD-10-CM

## 2024-10-03 DIAGNOSIS — Z79.4 TYPE 2 DIABETES MELLITUS WITH STAGE 3A CHRONIC KIDNEY DISEASE, WITH LONG-TERM CURRENT USE OF INSULIN: Primary | ICD-10-CM

## 2024-10-03 DIAGNOSIS — E11.22 TYPE 2 DIABETES MELLITUS WITH STAGE 3A CHRONIC KIDNEY DISEASE, WITH LONG-TERM CURRENT USE OF INSULIN: ICD-10-CM

## 2024-10-03 DIAGNOSIS — N18.31 TYPE 2 DIABETES MELLITUS WITH STAGE 3A CHRONIC KIDNEY DISEASE, WITH LONG-TERM CURRENT USE OF INSULIN: ICD-10-CM

## 2024-10-03 DIAGNOSIS — E11.22 TYPE 2 DIABETES MELLITUS WITH STAGE 3A CHRONIC KIDNEY DISEASE, WITH LONG-TERM CURRENT USE OF INSULIN: Primary | ICD-10-CM

## 2024-10-03 DIAGNOSIS — Z79.4 TYPE 2 DIABETES MELLITUS WITH STAGE 3A CHRONIC KIDNEY DISEASE, WITH LONG-TERM CURRENT USE OF INSULIN: ICD-10-CM

## 2024-10-03 DIAGNOSIS — E78.5 HYPERLIPIDEMIA ASSOCIATED WITH TYPE 2 DIABETES MELLITUS: ICD-10-CM

## 2024-10-03 DIAGNOSIS — E11.69 HYPERLIPIDEMIA ASSOCIATED WITH TYPE 2 DIABETES MELLITUS: ICD-10-CM

## 2024-10-03 LAB
ALBUMIN/CREAT UR: NORMAL UG/MG (ref 0–30)
ALBUMIN/CREAT UR: NORMAL UG/MG (ref 0–30)
CREAT UR-MCNC: 50 MG/DL (ref 15–325)
CREAT UR-MCNC: 50 MG/DL (ref 15–325)
MICROALBUMIN UR DL<=1MG/L-MCNC: <5 UG/ML
MICROALBUMIN UR DL<=1MG/L-MCNC: <5 UG/ML

## 2024-10-03 PROCEDURE — 82570 ASSAY OF URINE CREATININE: CPT | Performed by: INTERNAL MEDICINE

## 2024-10-08 ENCOUNTER — OFFICE VISIT (OUTPATIENT)
Dept: PODIATRY | Facility: CLINIC | Age: 74
End: 2024-10-08
Payer: MEDICARE

## 2024-10-08 VITALS — HEIGHT: 67 IN | WEIGHT: 242.06 LBS | BODY MASS INDEX: 37.99 KG/M2

## 2024-10-08 DIAGNOSIS — E11.9 ENCOUNTER FOR COMPREHENSIVE DIABETIC FOOT EXAMINATION, TYPE 2 DIABETES MELLITUS: Primary | ICD-10-CM

## 2024-10-08 DIAGNOSIS — L84 CORN OR CALLUS: ICD-10-CM

## 2024-10-08 DIAGNOSIS — N18.31 TYPE 2 DIABETES MELLITUS WITH STAGE 3A CHRONIC KIDNEY DISEASE, WITH LONG-TERM CURRENT USE OF INSULIN: ICD-10-CM

## 2024-10-08 DIAGNOSIS — E11.22 TYPE 2 DIABETES MELLITUS WITH STAGE 3A CHRONIC KIDNEY DISEASE, WITH LONG-TERM CURRENT USE OF INSULIN: ICD-10-CM

## 2024-10-08 DIAGNOSIS — Z79.4 TYPE 2 DIABETES MELLITUS WITH STAGE 3A CHRONIC KIDNEY DISEASE, WITH LONG-TERM CURRENT USE OF INSULIN: ICD-10-CM

## 2024-10-08 PROCEDURE — 1160F RVW MEDS BY RX/DR IN RCRD: CPT | Mod: CPTII,S$GLB,, | Performed by: PODIATRIST

## 2024-10-08 PROCEDURE — 3061F NEG MICROALBUMINURIA REV: CPT | Mod: CPTII,S$GLB,, | Performed by: PODIATRIST

## 2024-10-08 PROCEDURE — 3044F HG A1C LEVEL LT 7.0%: CPT | Mod: CPTII,S$GLB,, | Performed by: PODIATRIST

## 2024-10-08 PROCEDURE — 99999 PR PBB SHADOW E&M-EST. PATIENT-LVL III: CPT | Mod: PBBFAC,,, | Performed by: PODIATRIST

## 2024-10-08 PROCEDURE — 3288F FALL RISK ASSESSMENT DOCD: CPT | Mod: CPTII,S$GLB,, | Performed by: PODIATRIST

## 2024-10-08 PROCEDURE — 99203 OFFICE O/P NEW LOW 30 MIN: CPT | Mod: S$GLB,,, | Performed by: PODIATRIST

## 2024-10-08 PROCEDURE — 1101F PT FALLS ASSESS-DOCD LE1/YR: CPT | Mod: CPTII,S$GLB,, | Performed by: PODIATRIST

## 2024-10-08 PROCEDURE — 3066F NEPHROPATHY DOC TX: CPT | Mod: CPTII,S$GLB,, | Performed by: PODIATRIST

## 2024-10-08 PROCEDURE — 1125F AMNT PAIN NOTED PAIN PRSNT: CPT | Mod: CPTII,S$GLB,, | Performed by: PODIATRIST

## 2024-10-08 PROCEDURE — 1157F ADVNC CARE PLAN IN RCRD: CPT | Mod: CPTII,S$GLB,, | Performed by: PODIATRIST

## 2024-10-08 PROCEDURE — 4010F ACE/ARB THERAPY RXD/TAKEN: CPT | Mod: CPTII,S$GLB,, | Performed by: PODIATRIST

## 2024-10-08 PROCEDURE — 1159F MED LIST DOCD IN RCRD: CPT | Mod: CPTII,S$GLB,, | Performed by: PODIATRIST

## 2024-10-08 NOTE — PROGRESS NOTES
Subjective:     Patient ID: Jaimie Luque is a 74 y.o. female.    Chief Complaint: Diabetic Foot Exam (Diabetic pt wears tennis shoes, PCP Dr. Ward last seen 10-2-24) and Callouses (Pt c/o right foot callus pain 6/10 when stepped on)    Jaimie is a 74 y.o. female who presents to the clinic upon referral from Dr. Ward  for evaluation and treatment of diabetic feet. Jaimie has a past medical history of Diabetes mellitus type II, Hyperlipidemia, Hypertension, Hypothyroid, TALIA (obstructive sleep apnea), Osteopenia, and Psoriasis. Patient relates no major problem with feet. Only complaints today consist of painful right foot callus. Patient rates pain 6/10. Patient states she noticed area a couple of weeks ago. Patient has no other pedal complaints at this time.    PCP: Jessica Ward MD    Date Last Seen by PCP: 10/02/2024    Current shoe gear: Tennis shoes    Hemoglobin A1C   Date Value Ref Range Status   10/02/2024 6.9 (H) 4.0 - 5.6 % Final     Comment:     ADA Screening Guidelines:  5.7-6.4%  Consistent with prediabetes  >or=6.5%  Consistent with diabetes    High levels of fetal hemoglobin interfere with the HbA1C  assay. Heterozygous hemoglobin variants (HbS, HgC, etc)do  not significantly interfere with this assay.   However, presence of multiple variants may affect accuracy.     06/03/2024 7.3 (H) 4.0 - 5.6 % Final     Comment:     ADA Screening Guidelines:  5.7-6.4%  Consistent with prediabetes  >or=6.5%  Consistent with diabetes    High levels of fetal hemoglobin interfere with the HbA1C  assay. Heterozygous hemoglobin variants (HbS, HgC, etc)do  not significantly interfere with this assay.   However, presence of multiple variants may affect accuracy.     02/27/2024 7.6 (H) 4.0 - 5.6 % Final     Comment:     ADA Screening Guidelines:  5.7-6.4%  Consistent with prediabetes  >or=6.5%  Consistent with diabetes    High levels of fetal hemoglobin interfere with the HbA1C  assay. Heterozygous hemoglobin  "variants (HbS, HgC, etc)do  not significantly interfere with this assay.   However, presence of multiple variants may affect accuracy.           Patient Active Problem List   Diagnosis    Acquired hypothyroidism    Hyperlipidemia associated with type 2 diabetes mellitus    Psoriasis    PHN (postherpetic neuralgia)    Type 2 diabetes mellitus with stage 3a chronic kidney disease, with long-term current use of insulin    TALIA on CPAP    Bleb, lung    Diarrhea    Severe obesity with body mass index (BMI) of 35.0 to 35.9 and comorbidity    Decreased functional activity tolerance    Multiple lung nodules on CT    Osteopenia    Osteoarthritis of knee    Aortic atherosclerosis    MCTD (mixed connective tissue disease)       Medication List with Changes/Refills   Current Medications    ASPIRIN 81 MG TAB    Take by mouth. 1 Tablet Oral 2 times weekly     ATORVASTATIN (LIPITOR) 20 MG TABLET    TAKE 1 TABLET(20 MG) BY MOUTH EVERY DAY    BLOOD-GLUCOSE METER,CONTINUOUS (DEXCOM G7 ) MISC    1 each by Misc.(Non-Drug; Combo Route) route once. for 1 dose    CHOLECALCIFEROL, VITAMIN D3, 2,000 UNIT TAB    Take by mouth. 1 Tablet Oral Every day    DEXCOM G7 SENSOR LEX    USE EVERY 10 DAYS    EMPAGLIFLOZIN (JARDIANCE) 10 MG TABLET    Take 1 tablet (10 mg total) by mouth once daily.    GABAPENTIN (NEURONTIN) 300 MG CAPSULE    TAKE 1 CAPSULE(300 MG) BY MOUTH THREE TIMES DAILY    INSULIN GLARGINE U-100, LANTUS, (LANTUS SOLOSTAR U-100 INSULIN) 100 UNIT/ML (3 ML) INPN PEN    ADMINISTER 10 UNITS UNDER THE SKIN DAILY    INSULIN LISPRO 100 UNIT/ML PEN    Titrate up to 100 units daily as instructed.    LEVOTHYROXINE (SYNTHROID) 112 MCG TABLET    TAKE 1 TABLET(112 MCG) BY MOUTH BEFORE BREAKFAST    LISINOPRIL (PRINIVIL,ZESTRIL) 2.5 MG TABLET    TAKE 1 TABLET(2.5 MG) BY MOUTH EVERY DAY    OZEMPIC 2 MG/DOSE (8 MG/3 ML) PNIJ    INJECT 2 MG UNDER THE SKIN EVERY 7 DAYS    PEN NEEDLE, DIABETIC (BD ULTRA-FINE LAURA PEN NEEDLE) 32 GAUGE X 5/32" " NDLE    1 each by Misc.(Non-Drug; Combo Route) route 4 (four) times daily with meals and nightly.       Review of patient's allergies indicates:   Allergen Reactions    Adhesive tape-silicones      Other reaction(s): skin irritation to area    Penicillins        Past Surgical History:   Procedure Laterality Date    BRONCHOSCOPY Bilateral 12/15/2022    Procedure: Bronchoscopy;  Surgeon: Corky Luna MD;  Location: [facility];  Service: Pulmonary;  Laterality: Bilateral;    CATARACT EXTRACTION W/  INTRAOCULAR LENS IMPLANT Bilateral     cataract surgery Left 08/10/2022    COLONOSCOPY N/A 05/02/2017    Procedure: COLONOSCOPY;  Surgeon: Noe Penn III, MD;  Location: [facility] ENDO;  Service: Endoscopy;  Laterality: N/A;    COLONOSCOPY N/A 05/04/2022    Procedure: COLONOSCOPY;  Surgeon: Elodia Swain MD;  Location: [facility];  Service: Endoscopy;  Laterality: N/A;    HYSTERECTOMY      OOPHORECTOMY      ROBOTIC ASSISTED HYSTERECTOMY         Family History   Problem Relation Name Age of Onset    Heart disease Father      Diabetes Brother      Melanoma Neg Hx      Lupus Neg Hx         Social History     Socioeconomic History    Marital status:    Tobacco Use    Smoking status: Never    Smokeless tobacco: Never   Substance and Sexual Activity    Alcohol use: Yes     Comment: rare    Drug use: No    Sexual activity: Yes     Partners: Male   Social History Narrative    . Lives with spouse. Has 3 children. Patient retired as  for [employer].      Social Drivers of Health     Financial Resource Strain: Low Risk  (9/25/2024)    Overall Financial Resource Strain (CARDIA)     Difficulty of Paying Living Expenses: Not hard at all   Food Insecurity: No Food Insecurity (9/25/2024)    Hunger Vital Sign     Worried About Running Out of Food in the Last Year: Never true     Ran Out of Food in the Last Year: Never true   Transportation Needs: No Transportation Needs (4/30/2024)    PRAPARE -  "Transportation     Lack of Transportation (Medical): No     Lack of Transportation (Non-Medical): No   Physical Activity: Insufficiently Active (9/25/2024)    Exercise Vital Sign     Days of Exercise per Week: 3 days     Minutes of Exercise per Session: 30 min   Stress: No Stress Concern Present (9/25/2024)    [employer] - Occupational Stress Questionnaire     Feeling of Stress : Only a little   Housing Stability: Low Risk  (11/30/2023)    Housing Stability Vital Sign     Unable to Pay for Housing in the Last Year: No     Number of Places Lived in the Last Year: 1     Unstable Housing in the Last Year: No       Vitals:    10/08/24 0718   Weight: 109.8 kg (242 lb 1 oz)   Height: 5' 7" (1.702 m)   PainSc:   6       Hemoglobin A1C   Date Value Ref Range Status   10/02/2024 6.9 (H) 4.0 - 5.6 % Final     Comment:     ADA Screening Guidelines:  5.7-6.4%  Consistent with prediabetes  >or=6.5%  Consistent with diabetes    High levels of fetal hemoglobin interfere with the HbA1C  assay. Heterozygous hemoglobin variants (HbS, HgC, etc)do  not significantly interfere with this assay.   However, presence of multiple variants may affect accuracy.     06/03/2024 7.3 (H) 4.0 - 5.6 % Final     Comment:     ADA Screening Guidelines:  5.7-6.4%  Consistent with prediabetes  >or=6.5%  Consistent with diabetes    High levels of fetal hemoglobin interfere with the HbA1C  assay. Heterozygous hemoglobin variants (HbS, HgC, etc)do  not significantly interfere with this assay.   However, presence of multiple variants may affect accuracy.     02/27/2024 7.6 (H) 4.0 - 5.6 % Final     Comment:     ADA Screening Guidelines:  5.7-6.4%  Consistent with prediabetes  >or=6.5%  Consistent with diabetes    High levels of fetal hemoglobin interfere with the HbA1C  assay. Heterozygous hemoglobin variants (HbS, HgC, etc)do  not significantly interfere with this assay.   However, presence of multiple variants may affect " accuracy.         Review of Systems   Constitutional:  Negative for chills and fever.   Respiratory:  Negative for shortness of breath.    Cardiovascular:  Negative for chest pain, palpitations, orthopnea, claudication and leg swelling.   Gastrointestinal:  Negative for diarrhea, nausea and vomiting.   Musculoskeletal:  Negative for joint pain.   Skin:  Negative for rash.   Neurological:  Negative for dizziness, tingling, sensory change, focal weakness and weakness.   Psychiatric/Behavioral: Negative.           Objective:      PHYSICAL EXAM: Apperance: Alert and orient in no distress,well developed, and with good attention to grooming and body habits  Patient presents ambulating in tennis shoes.   LOWER EXTREMITY EXAM:  VASCULAR: Dorsalis pedis pulses 2/4 bilateral and Posterior Tibial pulses 2/4 bilateral. Capillary fill time <blanched bilateral. No edema observed bilateral. Varicosities absent bilateral. Skin temperature of the lower extremities is warm to warm, proximal to distal. Hair growth dim bilateral.  DERMATOLOGICAL: No skin rashes, subcutaneous nodules, lesions, or ulcers observed bilateral. Nails 1,2,3,4,5 bilateral normal length and thickness. Webspaces 1,2,3,4 bilateral clean, dry and without evidence of break in skin integrity. Mild hyperkeratotic tissue noted to bilateral plantar 5th submetatarsal.   NEUROLOGICAL: Light touch, sharp-dull, proprioception all present and equal bilaterally.  Vibratory sensation intact at bilateral hallux. Protective sensation intact at all 10 sites as tested with a Breckenridge-Jake 5.07 monofilament.   MUSCULOSKELETAL: Muscle strength is 5/5 for foot inverters, everters, plantarflexors, and dorsiflexors. Muscle tone is normal.         Assessment:       ICD-10-CM ICD-9-CM   1. Encounter for comprehensive diabetic foot examination, type 2 diabetes mellitus  E11.9 250.00   2. Type 2 diabetes mellitus with stage 3a chronic kidney disease, with long-term current use of  insulin  E11.22 250.40    N18.31 585.3    Z79.4 V58.67   3. Corn or callus  L84 700       Plan:   Encounter for comprehensive diabetic foot examination, type 2 diabetes mellitus    Type 2 diabetes mellitus with stage 3a chronic kidney disease, with long-term current use of insulin  -     Ambulatory referral/consult to Podiatry    Corn or callus      I counseled the patient on her conditions, regarding findings of my examination, my impressions, and usual treatment plan.   This visit spent on counseling and coordination of care.  Appointment spent on education about the diabetic foot, neuropathy, and prevention of limb loss.  Shoe inspection. Diabetic Foot Education. Patient reminded of the importance of good nutrition and blood sugar control to help prevent podiatric complications of diabetes. Patient instructed on proper foot hygeine. We discussed wearing proper shoe gear, daily foot inspections, never walking without protective shoe gear, never putting sharp instruments to feet.    Patient instructed to regularly file area to prevent build up. Patient also instructed to keep area moisturized.   The patient and I reviewed the types of shoes she should be wearing, my recommendation includes generally the best time of the day for a shoe fitting is the afternoon, shoes with a wide toe box, very good cushion, and tennis shoes with removable inner soles.The patient and I reviewed my recommendations for over-the-counter orthotic inserts.   Patient  will continue to monitor the areas daily, inspect feet, wear protective shoe gear when ambulatory, moisturizer to maintain skin integrity. Patient reminded of the importance of good nutrition and blood sugar control to help prevent podiatric complications of diabetes.  Patient to return 12 months or sooner if needed.          Isiah Ohara DPM  [facility]

## 2024-10-17 ENCOUNTER — PATIENT MESSAGE (OUTPATIENT)
Dept: SPORTS MEDICINE | Facility: CLINIC | Age: 74
End: 2024-10-17
Payer: MEDICARE

## 2024-10-21 ENCOUNTER — OFFICE VISIT (OUTPATIENT)
Dept: RHEUMATOLOGY | Facility: CLINIC | Age: 74
End: 2024-10-21
Payer: MEDICARE

## 2024-10-21 VITALS
HEART RATE: 73 BPM | SYSTOLIC BLOOD PRESSURE: 122 MMHG | WEIGHT: 237.19 LBS | BODY MASS INDEX: 37.15 KG/M2 | DIASTOLIC BLOOD PRESSURE: 74 MMHG

## 2024-10-21 DIAGNOSIS — M17.0 PRIMARY OSTEOARTHRITIS OF BOTH KNEES: ICD-10-CM

## 2024-10-21 DIAGNOSIS — R76.8 ANA POSITIVE: ICD-10-CM

## 2024-10-21 DIAGNOSIS — R76.8 ANTI-RNP ANTIBODIES PRESENT: ICD-10-CM

## 2024-10-21 DIAGNOSIS — R91.8 MULTIPLE LUNG NODULES ON CT: ICD-10-CM

## 2024-10-21 DIAGNOSIS — Z87.39 HISTORY OF MIXED CONNECTIVE TISSUE DISEASE: Primary | ICD-10-CM

## 2024-10-21 PROCEDURE — 1157F ADVNC CARE PLAN IN RCRD: CPT | Mod: CPTII,S$GLB,, | Performed by: INTERNAL MEDICINE

## 2024-10-21 PROCEDURE — 1126F AMNT PAIN NOTED NONE PRSNT: CPT | Mod: CPTII,S$GLB,, | Performed by: INTERNAL MEDICINE

## 2024-10-21 PROCEDURE — 3074F SYST BP LT 130 MM HG: CPT | Mod: CPTII,S$GLB,, | Performed by: INTERNAL MEDICINE

## 2024-10-21 PROCEDURE — 3061F NEG MICROALBUMINURIA REV: CPT | Mod: CPTII,S$GLB,, | Performed by: INTERNAL MEDICINE

## 2024-10-21 PROCEDURE — 1159F MED LIST DOCD IN RCRD: CPT | Mod: CPTII,S$GLB,, | Performed by: INTERNAL MEDICINE

## 2024-10-21 PROCEDURE — 4010F ACE/ARB THERAPY RXD/TAKEN: CPT | Mod: CPTII,S$GLB,, | Performed by: INTERNAL MEDICINE

## 2024-10-21 PROCEDURE — 3288F FALL RISK ASSESSMENT DOCD: CPT | Mod: CPTII,S$GLB,, | Performed by: INTERNAL MEDICINE

## 2024-10-21 PROCEDURE — 3078F DIAST BP <80 MM HG: CPT | Mod: CPTII,S$GLB,, | Performed by: INTERNAL MEDICINE

## 2024-10-21 PROCEDURE — 1101F PT FALLS ASSESS-DOCD LE1/YR: CPT | Mod: CPTII,S$GLB,, | Performed by: INTERNAL MEDICINE

## 2024-10-21 PROCEDURE — 99214 OFFICE O/P EST MOD 30 MIN: CPT | Mod: S$GLB,,, | Performed by: INTERNAL MEDICINE

## 2024-10-21 PROCEDURE — 1160F RVW MEDS BY RX/DR IN RCRD: CPT | Mod: CPTII,S$GLB,, | Performed by: INTERNAL MEDICINE

## 2024-10-21 PROCEDURE — 3044F HG A1C LEVEL LT 7.0%: CPT | Mod: CPTII,S$GLB,, | Performed by: INTERNAL MEDICINE

## 2024-10-21 PROCEDURE — 3066F NEPHROPATHY DOC TX: CPT | Mod: CPTII,S$GLB,, | Performed by: INTERNAL MEDICINE

## 2024-10-21 PROCEDURE — 99999 PR PBB SHADOW E&M-EST. PATIENT-LVL III: CPT | Mod: PBBFAC,,, | Performed by: INTERNAL MEDICINE

## 2024-10-21 PROCEDURE — G2211 COMPLEX E/M VISIT ADD ON: HCPCS | Mod: S$GLB,,, | Performed by: INTERNAL MEDICINE

## 2024-10-21 NOTE — PROGRESS NOTES
RHEUMATOLOGY CLINIC FOLLOW UP VISIT  Chief complaints, HPI, ROS, EXAM, Assessment & Plans:-  Jaimie Cruz a 74 y.o. pleasant female comes in for follow-up visit.  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  She was on CellCept.  Treatment was discontinued for inactive disease.  Reports doing well today.  Mild activity-related bilateral knee joint pain.  Had severe worsening pain after using her recumbent bike for more than 20 minutes .  Under sports medicine specialist care for knee osteoarthritis.  No persistent cough for change in exercise tolerance.  Rheumatological review of system negative otherwise.  Physical examination shows no synovitis, dactylitis, enthesitis or effusion.    1. History of mixed connective tissue disease    2. RADHA positive    3. Multiple lung nodules on CT    4. Primary osteoarthritis of both knees    5. Anti-RNP antibodies present      Problem List Items Addressed This Visit       RADHA positive    Relevant Orders    CBC Auto Differential    Comprehensive Metabolic Panel    C-Reactive Protein    Sedimentation rate    History of mixed connective tissue disease - Primary    Relevant Orders    CBC Auto Differential    Comprehensive Metabolic Panel    C-Reactive Protein    Sedimentation rate    Multiple lung nodules on CT    Relevant Orders    CBC Auto Differential    Comprehensive Metabolic Panel    C-Reactive Protein    Sedimentation rate    Osteoarthritis of knee     Other Visit Diagnoses       Anti-RNP antibodies present                Labs reviewed today:-   Latest Reference Range & Units 09/17/24 08:51   WBC 3.90 - 12.70 K/uL 6.60   RBC 4.00 - 5.40 M/uL 4.68   Hemoglobin 12.0 - 16.0 g/dL 13.4   Hematocrit 37.0 - 48.5 % 40.7   MCV 82 - 98 fL 87   MCH 27.0 - 31.0 pg 28.6   MCHC 32.0 - 36.0 g/dL  32.9   RDW 11.5 - 14.5 % 15.1 (H)   Platelet Count 150 - 450 K/uL 208   MPV 9.2 - 12.9 fL 11.6   Gran % 38.0 - 73.0 % 59.2   Lymph % 18.0 - 48.0 % 29.8   Mono % 4.0 - 15.0 % 7.9   Eos % 0.0 - 8.0 % 2.0   Basophil % 0.0 - 1.9 % 0.8   Immature Granulocytes 0.0 - 0.5 % 0.3   Gran # (ANC) 1.8 - 7.7 K/uL 3.9   Lymph # 1.0 - 4.8 K/uL 2.0   Mono # 0.3 - 1.0 K/uL 0.5   Eos # 0.0 - 0.5 K/uL 0.1   Baso # 0.00 - 0.20 K/uL 0.05   Immature Grans (Abs) 0.00 - 0.04 K/uL 0.02   nRBC 0 /100 WBC 0   Differential Method  Automated   Sed Rate 0 - 36 mm/Hr 19   Sodium 136 - 145 mmol/L 139   Potassium 3.5 - 5.1 mmol/L 3.6   Chloride 95 - 110 mmol/L 107   CO2 23 - 29 mmol/L 24   Anion Gap 8 - 16 mmol/L 8   BUN 8 - 23 mg/dL 11   Creatinine 0.5 - 1.4 mg/dL 1.0   eGFR >60 mL/min/1.73 m^2 59 !   Glucose 70 - 110 mg/dL 173 (H)   Calcium 8.7 - 10.5 mg/dL 8.8   ALP 55 - 135 U/L 67   PROTEIN TOTAL 6.0 - 8.4 g/dL 6.4   Albumin 3.5 - 5.2 g/dL 3.1 (L)   BILIRUBIN TOTAL 0.1 - 1.0 mg/dL 0.9   AST 10 - 40 U/L 12   ALT 10 - 44 U/L 12   CRP 0.0 - 8.2 mg/L 1.1   (H): Data is abnormally high  !: Data is abnormal  (L): Data is abnormally low     Latest Reference Range & Units Most Recent   RADHA Neg <1:160  Negative  2/9/07 15:56   RADHA Screen Negative <1:80  Positive !  11/29/22 17:24   RADHA Titer 1  1:160  11/29/22 17:24   RADHA PATTERN 1  Homogeneous  11/29/22 17:24   RADHA Titer 2  1:160  11/29/22 17:24   RADHA Pattern 2  Speckled  11/29/22 17:24   ds DNA Ab Negative 1:10  Negative 1:10  11/29/22 17:24   Anti-SSA Antibody 0.00 - 0.99 Ratio 0.25  11/29/22 17:24   Anti-SSA Interpretation Negative  Negative  11/29/22 17:24   Anti-SSB Antibody 0.00 - 0.99 Ratio 0.11  11/29/22 17:24   Anti-SSB Interpretation Negative  Negative  11/29/22 17:24   Anti Sm Antibody 0.00 - 0.99 Ratio 0.37  11/29/22 17:24   Anti-Sm Interpretation Negative  Negative  11/29/22 17:24   Anti Sm/RNP Antibody 0.00 - 0.99 Ratio 0.29  11/29/22 17:24   Anti-Sm/RNP Interpretation Negative   Negative  11/29/22 17:24   Cytoplasmic Neutrophilic Ab <1:20 Titer <1:20  11/29/22 17:24   Perinuclear (P-ANCA) <1:20 Titer <1:20  11/29/22 17:24   Scleroderma SCL- <20 UNITS 6  1/24/23 08:57   CCP Antibodies <5.0 U/mL <0.5  12/29/22 08:06   Alpha-1 Anti-Trypsin 100 - 190 mg/dL 151  4/11/18 11:00   Complement (C-3) 50 - 180 mg/dL 132  1/24/23 08:57   Complement (C-4) 11 - 44 mg/dL 30  1/24/23 08:57   Total IgE 0 - 100 IU/mL <35  11/29/22 17:24   RBC 4.00 - 5.40 M/uL 4.68  9/17/24 08:51   Hemoglobin 12.0 - 16.0 g/dL 13.4  9/17/24 08:51   Hematocrit 37.0 - 48.5 % 40.7  9/17/24 08:51   MCV 82 - 98 fL 87  9/17/24 08:51   RDW 11.5 - 14.5 % 15.1 (H)  9/17/24 08:51   Anti-Lakshmi-1 Antibody <20 Units <20  12/29/22 08:06   Anti-PM/Scl Ab <20 Units <20  12/29/22 08:06   Anti-SS-A 52 kD Ab, IgG <20 Units <20  12/29/22 08:06   Anti-U1-RNP  Ab <20 Units 21 (H)  12/29/22 08:06   EJ Negative  Negative  12/29/22 08:06   Fibrillarin (U3 RNP) Negative  Negative  12/29/22 08:06   Ku Negative  Negative  12/29/22 08:06   MDA-5 (P140) (CADM-140) <20 Units <20  12/29/22 08:06   MI-2 Negative  Negative  12/29/22 08:06   NXP-2 (P140) <20 Units <20  12/29/22 08:06   OJ Negative  Negative  12/29/22 08:06   PL-12 Negative  Negative  12/29/22 08:06   PL-7 Negative  Negative  12/29/22 08:06   Rheumatoid Factor 0.0 - 15.0 IU/mL <13.0  11/29/22 17:24   SRP Negative  Negative  12/29/22 08:06   TIF1 GAMMA (P155/140) <20 Units <20  12/29/22 08:06   U2 snRNP Negative  Negative  12/29/22 08:06   !: Data is abnormal  (H): Data is abnormally high        History of MCTD without any active disease.  Continue to monitor clinically.  Continue follow-up with pulmonologist.  Bilateral knee osteoarthritis related pain without any significant effusion.  Diabetes contraindicated use of intra-articular corticosteroid injection.  Advised to consider hyaluronic acid injection if knee pain worsens.   I have explained all of the above in detail and the patient understands  all of the current recommendation(s). I have answered all questions to the best of my ability and to their complete satisfaction.       # Follow up in about 6 months (around 4/21/2025).      Disclaimer: This note was prepared using voice recognition system and is likely to have sound alike errors and is not proof read.  Please call me with any questions.    Answers submitted by the patient for this visit:  Rheumatology Questionnaire (Submitted on 10/14/2024)  fever: No  eye redness: No  mouth sores: Yes  headaches: No  shortness of breath: No  chest pain: No  trouble swallowing: No  diarrhea: No  constipation: No  unexpected weight change: No  genital sore: No  dysuria: No  During the last 3 days, have you had a skin rash?: No  Bruises or bleeds easily: Yes  cough: No

## 2024-11-04 RX ORDER — ATORVASTATIN CALCIUM 20 MG/1
TABLET, FILM COATED ORAL
Qty: 90 TABLET | Refills: 3 | Status: SHIPPED | OUTPATIENT
Start: 2024-11-04

## 2024-11-09 ENCOUNTER — PATIENT MESSAGE (OUTPATIENT)
Dept: PRIMARY CARE CLINIC | Facility: CLINIC | Age: 74
End: 2024-11-09
Payer: MEDICARE

## 2024-12-19 NOTE — PROGRESS NOTES
"Subjective:      Patient ID: Jaimie Luque is a 74 y.o. female.    Chief Complaint: Follow-up (3 month f/u ), Sinusitis, and Cough      HPI  Here for follow up of medical problems. Cold sx x 2d, not taking anything.  Working out at [employer] 3x per week.  Down 10# more in 3mo, total 16#.  No cp/sob/palp.  BMs normal.  Denies stress.    Advance Care Planning     Date: 01/02/2025    ACP Reviewed/No Changes  Voluntary advance care planning discussion had today with patient. Previously completed HCPOA in electronic medical record is current, no changes made.      A total of 5 min was spent on advance care planning, goals of care discussion, emotional support, formulating and communicating prognosis and exploring burden/benefit of various approaches of treatment. This discussion occurred on a fully voluntary basis with the verbal consent of the patient and/or family.            Updated/annual due 10/25:  HM: 9/24 fluvax, 10/23 RSV, 2/21 covid vaccines/booster, 4/17 HAV#2, 3/15 [id number], 3/14 [id number], 11/22 [id number], 10/18 Tdap, 11/12 zoster, 2019 Shingrix x2, 9/21 BMD rep 5y, 5/22 Cscope REPEAT 1y--12/23 Cologuard negative, 6/24 MMG(q2), 4/23 Eye doctor, 9/16 HCV neg, 7/23 CT lung with Pulm Dr. Schumacher.     Review of Systems   Constitutional:  Negative for chills, diaphoresis and fever.   Respiratory:  Negative for cough and shortness of breath.    Cardiovascular:  Negative for chest pain, palpitations and leg swelling.   Gastrointestinal:  Negative for blood in stool, constipation, diarrhea, nausea and vomiting.   Genitourinary:  Negative for dysuria, frequency and hematuria.   Psychiatric/Behavioral:  The patient is not nervous/anxious.          Objective:   /60 (BP Location: Right arm, Patient Position: Sitting)   Pulse 81   Temp 98 °F (36.7 °C) (Skin)   Ht 5' 7" (1.702 m)   Wt 105.7 kg (232 lb 14.7 oz)   SpO2 96%   BMI 36.48 kg/m²     Physical Exam  Constitutional:       Appearance: She is well-developed. "   Neck:      Thyroid: No thyroid mass.      Vascular: No carotid bruit.   Cardiovascular:      Rate and Rhythm: Normal rate and regular rhythm.      Heart sounds: No murmur heard.     No friction rub. No gallop.   Pulmonary:      Effort: Pulmonary effort is normal.      Breath sounds: Normal breath sounds. No wheezing or rales.   Abdominal:      General: Bowel sounds are normal.      Palpations: Abdomen is soft. There is no mass.      Tenderness: There is no abdominal tenderness.   Musculoskeletal:      Cervical back: Neck supple.   Lymphadenopathy:      Cervical: No cervical adenopathy.   Neurological:      Mental Status: She is alert and oriented to person, place, and time.             Assessment:       1. Type 2 diabetes mellitus with stage 3a chronic kidney disease, with long-term current use of insulin    2. Acquired hypothyroidism    3. Hyperlipidemia associated with type 2 diabetes mellitus    4. Severe obesity with body mass index (BMI) of 35.0 to 35.9 and comorbidity          Plan:     1. Type 2 diabetes mellitus with stage 3a chronic kidney disease, with long-term current use of insulin- doing well, cont rxs and exercise.  Overview:  Ozempic 2mg weekly,  Jardiance 10mg dailiy,  Lantus.10mg daily, ins lispro prn.      2. Acquired hypothyroidism- Clinically stable, continue present treatment.  Overview:  Synthroid 112mcg daily.      3. Hyperlipidemia associated with type 2 diabetes mellitus- LDL at goal, cont rx.  Overview:  Atorva 20mg daily.      4. Severe obesity with body mass index (BMI) of 35.0 to 35.9 and comorbidity- cont wt loss.    RTC 3mo.

## 2024-12-30 ENCOUNTER — LAB VISIT (OUTPATIENT)
Dept: LAB | Facility: HOSPITAL | Age: 74
End: 2024-12-30
Attending: INTERNAL MEDICINE
Payer: MEDICARE

## 2024-12-30 DIAGNOSIS — N18.31 TYPE 2 DIABETES MELLITUS WITH STAGE 3A CHRONIC KIDNEY DISEASE, WITH LONG-TERM CURRENT USE OF INSULIN: ICD-10-CM

## 2024-12-30 DIAGNOSIS — E11.22 TYPE 2 DIABETES MELLITUS WITH STAGE 3A CHRONIC KIDNEY DISEASE, WITH LONG-TERM CURRENT USE OF INSULIN: ICD-10-CM

## 2024-12-30 DIAGNOSIS — Z79.4 TYPE 2 DIABETES MELLITUS WITH STAGE 3A CHRONIC KIDNEY DISEASE, WITH LONG-TERM CURRENT USE OF INSULIN: ICD-10-CM

## 2024-12-30 LAB
ANION GAP SERPL CALC-SCNC: 8 MMOL/L (ref 8–16)
BUN SERPL-MCNC: 11 MG/DL (ref 8–23)
CALCIUM SERPL-MCNC: 8.8 MG/DL (ref 8.7–10.5)
CHLORIDE SERPL-SCNC: 105 MMOL/L (ref 95–110)
CO2 SERPL-SCNC: 26 MMOL/L (ref 23–29)
CREAT SERPL-MCNC: 0.9 MG/DL (ref 0.5–1.4)
EST. GFR  (NO RACE VARIABLE): >60 ML/MIN/1.73 M^2
GLUCOSE SERPL-MCNC: 166 MG/DL (ref 70–110)
POTASSIUM SERPL-SCNC: 4.5 MMOL/L (ref 3.5–5.1)
SODIUM SERPL-SCNC: 139 MMOL/L (ref 136–145)

## 2024-12-30 PROCEDURE — 36415 COLL VENOUS BLD VENIPUNCTURE: CPT | Performed by: INTERNAL MEDICINE

## 2024-12-30 PROCEDURE — 80048 BASIC METABOLIC PNL TOTAL CA: CPT | Performed by: INTERNAL MEDICINE

## 2024-12-31 ENCOUNTER — TELEPHONE (OUTPATIENT)
Dept: DIABETES | Facility: CLINIC | Age: 74
End: 2024-12-31
Payer: MEDICARE

## 2025-01-02 ENCOUNTER — OFFICE VISIT (OUTPATIENT)
Dept: PRIMARY CARE CLINIC | Facility: CLINIC | Age: 75
End: 2025-01-02
Payer: MEDICARE

## 2025-01-02 VITALS
WEIGHT: 232.94 LBS | DIASTOLIC BLOOD PRESSURE: 60 MMHG | OXYGEN SATURATION: 96 % | HEIGHT: 67 IN | BODY MASS INDEX: 36.56 KG/M2 | SYSTOLIC BLOOD PRESSURE: 122 MMHG | TEMPERATURE: 98 F | HEART RATE: 81 BPM

## 2025-01-02 DIAGNOSIS — E66.01 SEVERE OBESITY WITH BODY MASS INDEX (BMI) OF 35.0 TO 35.9 AND COMORBIDITY: ICD-10-CM

## 2025-01-02 DIAGNOSIS — E11.69 HYPERLIPIDEMIA ASSOCIATED WITH TYPE 2 DIABETES MELLITUS: ICD-10-CM

## 2025-01-02 DIAGNOSIS — E11.22 TYPE 2 DIABETES MELLITUS WITH STAGE 3A CHRONIC KIDNEY DISEASE, WITH LONG-TERM CURRENT USE OF INSULIN: Primary | ICD-10-CM

## 2025-01-02 DIAGNOSIS — N18.31 TYPE 2 DIABETES MELLITUS WITH STAGE 3A CHRONIC KIDNEY DISEASE, WITH LONG-TERM CURRENT USE OF INSULIN: Primary | ICD-10-CM

## 2025-01-02 DIAGNOSIS — E78.5 HYPERLIPIDEMIA ASSOCIATED WITH TYPE 2 DIABETES MELLITUS: ICD-10-CM

## 2025-01-02 DIAGNOSIS — E03.9 ACQUIRED HYPOTHYROIDISM: ICD-10-CM

## 2025-01-02 DIAGNOSIS — Z79.4 TYPE 2 DIABETES MELLITUS WITH STAGE 3A CHRONIC KIDNEY DISEASE, WITH LONG-TERM CURRENT USE OF INSULIN: Primary | ICD-10-CM

## 2025-01-02 PROCEDURE — 99999 PR PBB SHADOW E&M-EST. PATIENT-LVL III: CPT | Mod: PBBFAC,,, | Performed by: INTERNAL MEDICINE

## 2025-01-08 RX ORDER — ATORVASTATIN CALCIUM 20 MG/1
TABLET, FILM COATED ORAL
Qty: 90 TABLET | Refills: 3 | Status: SHIPPED | OUTPATIENT
Start: 2025-01-08

## 2025-01-09 ENCOUNTER — PATIENT MESSAGE (OUTPATIENT)
Dept: PRIMARY CARE CLINIC | Facility: CLINIC | Age: 75
End: 2025-01-09
Payer: MEDICARE

## 2025-02-26 ENCOUNTER — PATIENT MESSAGE (OUTPATIENT)
Dept: SPORTS MEDICINE | Facility: CLINIC | Age: 75
End: 2025-02-26
Payer: MEDICARE

## 2025-03-05 DIAGNOSIS — Z79.4 TYPE 2 DIABETES MELLITUS WITH HYPERGLYCEMIA, WITH LONG-TERM CURRENT USE OF INSULIN: ICD-10-CM

## 2025-03-05 DIAGNOSIS — E11.65 TYPE 2 DIABETES MELLITUS WITH HYPERGLYCEMIA, WITH LONG-TERM CURRENT USE OF INSULIN: ICD-10-CM

## 2025-03-05 RX ORDER — INSULIN LISPRO 100 [IU]/ML
INJECTION, SOLUTION INTRAVENOUS; SUBCUTANEOUS
Qty: 90 ML | Refills: 3 | Status: SHIPPED | OUTPATIENT
Start: 2025-03-05

## 2025-03-12 ENCOUNTER — LAB VISIT (OUTPATIENT)
Dept: LAB | Facility: HOSPITAL | Age: 75
End: 2025-03-12
Attending: INTERNAL MEDICINE
Payer: MEDICARE

## 2025-03-12 ENCOUNTER — OFFICE VISIT (OUTPATIENT)
Dept: DIABETES | Facility: CLINIC | Age: 75
End: 2025-03-12
Payer: MEDICARE

## 2025-03-12 VITALS
BODY MASS INDEX: 35.53 KG/M2 | WEIGHT: 226.88 LBS | DIASTOLIC BLOOD PRESSURE: 73 MMHG | HEART RATE: 106 BPM | SYSTOLIC BLOOD PRESSURE: 135 MMHG

## 2025-03-12 DIAGNOSIS — E11.69 HYPERLIPIDEMIA ASSOCIATED WITH TYPE 2 DIABETES MELLITUS: ICD-10-CM

## 2025-03-12 DIAGNOSIS — E11.36 CATARACT ASSOCIATED WITH TYPE 2 DIABETES MELLITUS: ICD-10-CM

## 2025-03-12 DIAGNOSIS — E66.9 OBESITY (BMI 30-39.9): ICD-10-CM

## 2025-03-12 DIAGNOSIS — Z79.4 TYPE 2 DIABETES MELLITUS WITH HYPERGLYCEMIA, WITH LONG-TERM CURRENT USE OF INSULIN: Primary | ICD-10-CM

## 2025-03-12 DIAGNOSIS — G47.33 OSA ON CPAP: ICD-10-CM

## 2025-03-12 DIAGNOSIS — Z79.4 TYPE 2 DIABETES MELLITUS WITH HYPERGLYCEMIA, WITH LONG-TERM CURRENT USE OF INSULIN: ICD-10-CM

## 2025-03-12 DIAGNOSIS — E11.65 TYPE 2 DIABETES MELLITUS WITH HYPERGLYCEMIA, WITH LONG-TERM CURRENT USE OF INSULIN: Primary | ICD-10-CM

## 2025-03-12 DIAGNOSIS — E11.65 TYPE 2 DIABETES MELLITUS WITH HYPERGLYCEMIA, WITH LONG-TERM CURRENT USE OF INSULIN: ICD-10-CM

## 2025-03-12 DIAGNOSIS — E03.9 ACQUIRED HYPOTHYROIDISM: ICD-10-CM

## 2025-03-12 DIAGNOSIS — E78.5 HYPERLIPIDEMIA ASSOCIATED WITH TYPE 2 DIABETES MELLITUS: ICD-10-CM

## 2025-03-12 LAB
ESTIMATED AVG GLUCOSE: 166 MG/DL (ref 68–131)
GLUCOSE SERPL-MCNC: 96 MG/DL (ref 70–110)
HBA1C MFR BLD: 7.4 % (ref 4–5.6)

## 2025-03-12 PROCEDURE — 83036 HEMOGLOBIN GLYCOSYLATED A1C: CPT | Performed by: PHYSICIAN ASSISTANT

## 2025-03-12 PROCEDURE — 99999 PR PBB SHADOW E&M-EST. PATIENT-LVL III: CPT | Mod: PBBFAC,,, | Performed by: PHYSICIAN ASSISTANT

## 2025-03-12 PROCEDURE — 82962 GLUCOSE BLOOD TEST: CPT | Mod: S$GLB,,, | Performed by: PHYSICIAN ASSISTANT

## 2025-03-12 PROCEDURE — 36415 COLL VENOUS BLD VENIPUNCTURE: CPT | Performed by: PHYSICIAN ASSISTANT

## 2025-03-12 NOTE — PROGRESS NOTES
PCP: Jessica Ward MD    Subjective:     Chief Complaint: Diabetes - Established Patient    HISTORY OF PRESENT ILLNESS: 75 y.o.   female presenting for diabetes management visit.   The patient's last visit with me was on 6/10/2024.   Patient has had Type II diabetes since 20 or more years.  Pertinent to decision making is the following comorbidities: HLD, Hypothyroidism and Obesity by BMI  Patient has the following Diabetes complications: without complications  She has attended diabetes education in the past.     Patient's most recent A1c of 6.9% was completed 5 months ago.   Patient states since Her last A1c Her blood glucose levels have been high  throughout the day .   Patient monitors blood glucose 4 times per day and Continuously with personal CGM Dexcom.   Patient blood glucose monitoring device will be uploaded into [employer] today.        Interpretation of CGM as following baseline euglycemia with some postprandial hyperglycemia related to snacks without insulin.     Patient endorses the following diabetes related symptoms:  None .   Patient is due today for the following diabetes-related health maintenance standards: A1c.   She denies any recent hospital admissions or emergency room visits. Patient denies history of DKA.   She voices having hypoglycemia as above..   Patient's concerns today include glycemic control.    Patient medication regimen is as below.      CURRENT DM MEDICATIONS:   Lantus 10 units daily   Ozempic 2 mg weekly   Humalog meal size dosing TID wm  Humalog correction dosing every 3 hours as below    Meal Size:   12 units with regular carb meal  14 units with heavier carb meal  5 units with snack    Humalog Correction Dosing every 3 hours as needed OUTSIDE OF EATING  If  - 250, may take 2 units of Humalog  If  - 300, may take 4 units of Humalog   If  - 350, may take 6 units of Humalog  If  - 400, may take 8 units of Humalog  If +, may take  10 units of Humalog      Patient has failed the following Diabetes medications:   Metformin - GI   Invokana - coverage  Jardiance - yeast infection  Glyburide / Glipizide  Victoza   Basal - Basaglar  Trulicity - GLP-1 escalation      Labs Reviewed.       Lab Results   Component Value Date    CPEPTIDE 2.35 04/01/2021     Lab Results   Component Value Date    GLUTAMICACID 0.00 05/25/2017          //  Weight: 102.9 kg (226 lb 13.7 oz), Body mass index is 35.53 kg/m².  Her blood sugar in clinic today is:    Lab Results   Component Value Date    POCGLU 96 03/12/2025       Review of Systems   Constitutional:  Negative for activity change, appetite change, chills and fever.   HENT:  Negative for dental problem, mouth sores, nosebleeds, sore throat and trouble swallowing.    Eyes:  Negative for pain and discharge.   Respiratory:  Negative for shortness of breath, wheezing and stridor.    Cardiovascular:  Negative for chest pain, palpitations and leg swelling.   Gastrointestinal:  Negative for abdominal pain, diarrhea, nausea and vomiting.   Endocrine: Negative for polydipsia, polyphagia and polyuria.   Genitourinary:  Negative for dysuria, frequency and urgency.   Musculoskeletal:  Negative for joint swelling and myalgias.   Skin:  Negative for rash and wound.   Neurological:  Negative for dizziness, syncope, weakness and headaches.   Psychiatric/Behavioral:  Negative for behavioral problems and dysphoric mood.          Diabetes Management Status  Statin: Taking  ACE/ARB: Taking    Screening or Prevention Patient's value Goal Complete/Controlled?   HgA1C Testing and Control   Lab Results   Component Value Date    HGBA1C 6.9 (H) 10/02/2024      Annually/Less than 8% No   Lipid profile : 10/02/2024 Annually Yes   LDL control Lab Results   Component Value Date    LDLCALC 49.4 (L) 10/02/2024    Annually/Less than 100 mg/dl  Yes   Nephropathy screening Lab Results   Component Value Date    MICALBCREAT Unable to calculate  10/03/2024    MICALBCREAT Unable to calculate 10/03/2024     Lab Results   Component Value Date    PROTEINUA Negative 04/01/2021    Annually Yes   Blood pressure BP Readings from Last 1 Encounters:   03/12/25 135/73    Less than 140/90 Yes   Dilated retinal exam : 10/22/2024 Annually Yes    Foot exam   : 10/08/2024 Annually Yes     ACTIVITY LEVEL: Moderately Active. Discussed activities, benefits, methods, and precautions.  MEAL PLANNING: Patient reports number of meals per day to be 3 and number of snacks per day to be 2.   Patient is encouraged to carb count and consume no more than 30 - 45 grams of carbohydrates in each meal and 15 grams of carbohydrates in each snack.     Social History     Socioeconomic History    Marital status:    Tobacco Use    Smoking status: Never    Smokeless tobacco: Never   Substance and Sexual Activity    Alcohol use: Yes     Comment: rare    Drug use: No    Sexual activity: Yes     Partners: Male   Social History Narrative    . Lives with spouse. Has 3 children. Patient retired as  for [employer].      Social Drivers of Health     Financial Resource Strain: Low Risk  (9/25/2024)    Overall Financial Resource Strain (CARDIA)     Difficulty of Paying Living Expenses: Not hard at all   Food Insecurity: No Food Insecurity (9/25/2024)    Hunger Vital Sign     Worried About Running Out of Food in the Last Year: Never true     Ran Out of Food in the Last Year: Never true   Transportation Needs: No Transportation Needs (4/30/2024)    PRAPARE - Transportation     Lack of Transportation (Medical): No     Lack of Transportation (Non-Medical): No   Physical Activity: Insufficiently Active (9/25/2024)    Exercise Vital Sign     Days of Exercise per Week: 3 days     Minutes of Exercise per Session: 30 min   Stress: No Stress Concern Present (9/25/2024)    [employer] - Occupational Stress Questionnaire     Feeling of Stress : Only a little    Housing Stability: Low Risk  (11/30/2023)    Housing Stability Vital Sign     Unable to Pay for Housing in the Last Year: No     Number of Places Lived in the Last Year: 1     Unstable Housing in the Last Year: No     Past Medical History:   Diagnosis Date    Diabetes mellitus type II     Hyperlipidemia     Hypertension     Hypothyroid     TALIA (obstructive sleep apnea)     on CPAP    Osteopenia     Psoriasis        Objective:        Physical Exam  Constitutional:       General: She is not in acute distress.     Appearance: She is well-developed. She is not diaphoretic.   HENT:      Head: Normocephalic and atraumatic.      Right Ear: External ear normal.      Left Ear: External ear normal.      Nose: Nose normal.   Eyes:      General:         Right eye: No discharge.         Left eye: No discharge.   Pulmonary:      Effort: Pulmonary effort is normal. No respiratory distress.      Breath sounds: No stridor.   Musculoskeletal:         General: Normal range of motion.      Cervical back: Normal range of motion.   Skin:     Coloration: Skin is not pale.   Neurological:      Mental Status: She is alert and oriented to person, place, and time.      Motor: No abnormal muscle tone.      Coordination: Coordination normal.   Psychiatric:         Behavior: Behavior normal.         Thought Content: Thought content normal.         Judgment: Judgment normal.           Assessment / Plan:     Type 2 diabetes mellitus with hyperglycemia, with long-term current use of insulin  -     POCT Glucose, Hand-Held Device  -     Hemoglobin A1C; Future; Expected date: 03/12/2025    Cataract associated with type 2 diabetes mellitus    Acquired hypothyroidism    Hyperlipidemia associated with type 2 diabetes mellitus    TALIA on CPAP    Obesity (BMI 30-39.9)        Additional Plan Details:    - POCT Glucose  - Encouraged continuation of lifestyle changes including regular exercise and limiting carbohydrates to 30-45 grams per meal threes times  daily and 15 grams per snack with a limit of two daily.   - Encouraged continued monitoring of blood glucose with maintenance of 4 times daily and Continuously with personal CGM Dexcom.    - Current DM Medication Regimen: Continue Lantus 10 units daily. Continue Ozempic 2 mg weekly. Change Humalog meal size dosing TID wm and correction dosing every 3 hours as below. ADJUST INSULIN FREQUENTLY BASED ON BLOOD SUGAR.   - Health Maintenance standards addressed today: A1c to be scheduled  - Nursing Visit: Patient is below goal range for nursing visit for age group and  qualifies for nursing visit, but will defer for now.  .   - Follow up in 4 weeks.    Lantus 10 units daily   Ozempic 2 mg weekly   Humalog meal size dosing TID wm  Humalog correction dosing every 3 hours as below    Meal Size:   12 units with regular carb meal  14 units with heavier carb meal  5 units with snack    Humalog Correction Dosing every 3 hours as needed OUTSIDE OF EATING  If  - 250, may take 2 units of Humalog  If  - 300, may take 4 units of Humalog   If  - 350, may take 6 units of Humalog  If  - 400, may take 8 units of Humalog  If +, may take 10 units of Humalog          Blakeney McKnight, PA-C [facility]    A total of 30 minutes was spent in face to face time, of which over 50 % was spent in counseling patient on disease process, complications, treatment, and side effects of medications.

## 2025-03-13 ENCOUNTER — RESULTS FOLLOW-UP (OUTPATIENT)
Dept: DIABETES | Facility: CLINIC | Age: 75
End: 2025-03-13

## 2025-03-16 ENCOUNTER — PATIENT MESSAGE (OUTPATIENT)
Dept: DIABETES | Facility: CLINIC | Age: 75
End: 2025-03-16
Payer: MEDICARE

## 2025-03-16 DIAGNOSIS — Z79.4 TYPE 2 DIABETES MELLITUS WITH HYPERGLYCEMIA, WITH LONG-TERM CURRENT USE OF INSULIN: Primary | ICD-10-CM

## 2025-03-16 DIAGNOSIS — E11.65 TYPE 2 DIABETES MELLITUS WITH HYPERGLYCEMIA, WITH LONG-TERM CURRENT USE OF INSULIN: Primary | ICD-10-CM

## 2025-03-17 RX ORDER — PEN NEEDLE, DIABETIC 30 GX3/16"
1 NEEDLE, DISPOSABLE MISCELLANEOUS
Qty: 300 EACH | Refills: 3 | Status: SHIPPED | OUTPATIENT
Start: 2025-03-17 | End: 2026-03-17

## 2025-03-25 ENCOUNTER — HOSPITAL ENCOUNTER (OUTPATIENT)
Dept: RADIOLOGY | Facility: HOSPITAL | Age: 75
Discharge: HOME | End: 2025-03-25
Attending: STUDENT IN AN ORGANIZED HEALTH CARE EDUCATION/TRAINING PROGRAM
Payer: MEDICARE

## 2025-03-25 ENCOUNTER — OFFICE VISIT (OUTPATIENT)
Dept: SPORTS MEDICINE | Facility: CLINIC | Age: 75
End: 2025-03-25
Payer: MEDICARE

## 2025-03-25 VITALS — BODY MASS INDEX: 35.61 KG/M2 | WEIGHT: 226.88 LBS | HEIGHT: 67 IN

## 2025-03-25 DIAGNOSIS — M17.11 PRIMARY OSTEOARTHRITIS OF RIGHT KNEE: ICD-10-CM

## 2025-03-25 DIAGNOSIS — M17.11 PRIMARY OSTEOARTHRITIS OF RIGHT KNEE: Primary | ICD-10-CM

## 2025-03-25 PROCEDURE — 1157F ADVNC CARE PLAN IN RCRD: CPT | Mod: CPTII,S$GLB,, | Performed by: STUDENT IN AN ORGANIZED HEALTH CARE EDUCATION/TRAINING PROGRAM

## 2025-03-25 PROCEDURE — 3288F FALL RISK ASSESSMENT DOCD: CPT | Mod: CPTII,S$GLB,, | Performed by: STUDENT IN AN ORGANIZED HEALTH CARE EDUCATION/TRAINING PROGRAM

## 2025-03-25 PROCEDURE — 73562 X-RAY EXAM OF KNEE 3: CPT | Mod: 26,59,LT, | Performed by: STUDENT IN AN ORGANIZED HEALTH CARE EDUCATION/TRAINING PROGRAM

## 2025-03-25 PROCEDURE — 99999 PR PBB SHADOW E&M-EST. PATIENT-LVL IV: CPT | Mod: PBBFAC,,, | Performed by: STUDENT IN AN ORGANIZED HEALTH CARE EDUCATION/TRAINING PROGRAM

## 2025-03-25 PROCEDURE — 1101F PT FALLS ASSESS-DOCD LE1/YR: CPT | Mod: CPTII,S$GLB,, | Performed by: STUDENT IN AN ORGANIZED HEALTH CARE EDUCATION/TRAINING PROGRAM

## 2025-03-25 PROCEDURE — 73564 X-RAY EXAM KNEE 4 OR MORE: CPT | Mod: 26,RT,, | Performed by: STUDENT IN AN ORGANIZED HEALTH CARE EDUCATION/TRAINING PROGRAM

## 2025-03-25 PROCEDURE — 99214 OFFICE O/P EST MOD 30 MIN: CPT | Mod: S$GLB,,, | Performed by: STUDENT IN AN ORGANIZED HEALTH CARE EDUCATION/TRAINING PROGRAM

## 2025-03-25 PROCEDURE — 73562 X-RAY EXAM OF KNEE 3: CPT | Mod: TC,PN,LT

## 2025-03-25 PROCEDURE — 1159F MED LIST DOCD IN RCRD: CPT | Mod: CPTII,S$GLB,, | Performed by: STUDENT IN AN ORGANIZED HEALTH CARE EDUCATION/TRAINING PROGRAM

## 2025-03-25 PROCEDURE — 1125F AMNT PAIN NOTED PAIN PRSNT: CPT | Mod: CPTII,S$GLB,, | Performed by: STUDENT IN AN ORGANIZED HEALTH CARE EDUCATION/TRAINING PROGRAM

## 2025-03-25 RX ORDER — MELOXICAM 7.5 MG/1
7.5 TABLET ORAL DAILY
Qty: 30 TABLET | Refills: 1 | Status: SHIPPED | OUTPATIENT
Start: 2025-03-25

## 2025-03-25 NOTE — PATIENT INSTRUCTIONS
Assessment:  Jaimie Luque is a 75 y.o. female   Chief Complaint   Patient presents with    Right Knee - Pain       Encounter Diagnosis   Name Primary?    Primary osteoarthritis of right knee Yes        Plan:  Dr. Rodriguez has placed an order for Physical Therapy today.  Please expect a call from our Centralized Scheduling number, [phone].  If you do not hear from them in the next few days, please call out local PT department directly at [phone].    You were prescribed meloxicam today as an anti-inflammatory medicine for the pain you are having.  Please take this medicine once a day with food for 2 weeks, and then as needed for days with significant recurrent pain.  Please do not take any other anti-inflammatories such as naproxen, ibuprofen, Aleve at the same time you are taking this medicine.    General Arthritis info:    -shiny white stuff at end of a chicken bones is cartilage    -arthritis is wearing away of the cartilage that lines the end of your bones    -osteoarthritis is thought to be a wear and tear phenomenon    -symptoms are due to inflammation of joint causing stiffness, aching, and sometimes swelling    -occasionally sharp pain will occur causing a give way sensation    -Risk factors: genetic, weight, female > male, age    Treatment options:    -maintain healthy weight (every pound is 4 pounds of pressure on the knee)    -daily moderate exercise (walk, bike, swim 30 minutes per day) to keep joints moving    -daily strengthening exercises (through therapy or on own) to keep muscles supporting joint healthy and strong    -glucosamine 1500mg daily (look for USP label on bottle)    -tylenol as needed for pain (follow directions on the bottle)    -anti-inflammatory medication such as alleve may be helpful- take 1-2 tabs twice daily for 7 days. If it helps your pain, continue. If you do not feel any change, you may stop and then take it as needed.    (you may be given a once daily  anti-inflammatory such as MOBIC. If given, avoid other anti-inflammatory medications such as advil, ibuprofen, motrin, naprosyn, alleve, etc)    -if swollen and painful, ice, decrease activity, and take anti-inflammatory daily for 5-7 days and if no relief call your doctor for further options    -consider cortisone injection (every 3-4 months at most)- anti- inflammatory steroid medication that can be injected directly into the joint to reduce inflammation    -consider hyaluronic acid injections (eufflexxa, hyalgan, synvisc, supartz) (every 6 months at most)- protein injection that helps decrease pain and irritability in the joint. It is best used to help prolong intervals between cortisone injections to minimize steroid injections. These are currently approved for knee injections. Discuss with your doctor if other joint involved. Call to seek approval prior to the injections.    -long-term treatment may include a total joint replacement (keep diary of good days and bad days, then evaluate as to when you are ready)   Supplements for pain, inflammation, arthritis.    Supplements for pain, inflammation, arthritis:    Glucosamine sulfate/chondroitin sulfate has been shown to be safe and effective for knee arthritis (and possibly other joints affected with arthritis). This is an over the counter medication/supplement and usually needs to be taken for 1-2 months before results are seen. If you do not see any results after this time, consider stopping it. The dose is usually found on the bottle, and is 1500mg to start (first 1-2 months) then you can back down to 1000 mg (current recommendations are 1500mg per day, all at once).    Some people can have stomach problems with this and if you do, you may stop taking it or divide up the doses. If you are ALLERGIC TO SHELLFISH, please do not take. Follow the instructions on the bottle.    Other supplements that have been shown to help with joint and muscle pain  include:    (Unless otherwise noted, as these supplements are not FDA controlled, please follow the recommendations on the bottle)    Turmeric 500mg PO BID    Flax seed oil    Avocado soybean unsaponifiables    EMILIE-e    MSM    Cherry juice extract (tart)    Rosebeka    Anikinara    Diacerein    If there are any concerns about taking these supplements with other medications you may already be taking, you can speak to your pharmacist, physician, health care provider, or research other medical information available to you. Side effects and interactions are possible with any supplement or medication - be safe!     Follow-up: 8-10 weeks or sooner if there are any problems between now and then.    Thank you for choosing [facility] and Dr. Sarwat Rodriguez for your orthopedic & sports medicine care. It is our goal to provide you with exceptional care that will help keep you healthy, active, and get you back in the game.    Please do not hesitate to reach out to us via email, phone, or MyChart with any questions, concerns, or feedback.    If you felt that you received exemplary care today, please consider leaving us feedback on [employer] at:  [email or web address]    If you are experiencing pain/discomfort ,or have questions and would like to be connected to the [facility] on-call team, please call this number and specify which Sports Medicine provider is treating you: [phone]

## 2025-03-25 NOTE — PROGRESS NOTES
Patient ID: Jaimie Luque  YOB: 1950  MRN: [id number]    Chief Complaint: Pain of the Right Knee    Referred By: self for right knee    History of Present Illness:     CHIEF COMPLAINT:  - Knee pain    HPI:  Jaimie presents with ongoing knee pain, which has recently worsened. Pain is primarily located in the posterior aspect of the knee, along the popliteal crease. The onset of this exacerbation was around February 21st, after visiting the gym at the [facility]. This led to a two-week hiatus from gym activities until March 10th. She attempted to resume gym activities but experienced a flare-up over the weekend prior to this visit.    She reports difficulty bending the knee, particularly when flexing it. She also mentions discomfort when getting in and out of the car and when driving, noting that keeping the leg straight while driving is more comfortable than bending it.    To manage pain, she has been using a heat pad and taking Tylenol. She denies using any topical creams. Jaimie also reports some tenderness on the anterior aspect of the knee and along the lateral aspect of the calf. She has been trying to reduce physical activity to alleviate pain.    Jaimie had X-rays three years ago that showed some arthritis in the knee. No new injury is reported, but she suspects she may have overdone exercises at the gym, potentially by performing too many repetitions during her workout routine.    Jaimie denies any swelling, numbness, or tingling in the affected leg. She denies any recent changes to her normal routine.    PREVIOUS TREATMENTS:  - Heat pad application: Frequent use, unclear benefit  - Gym hiatus: Approximately 2 weeks between February 21st and March 10th, 2025, due to knee discomfort    WORK STATUS:  - Jaimie attends gym at the [facility]  - Knee pain affects ability to bend the knee and mobility, particularly when getting in and out of the car and walking      ROS:  ROS as indicated in HPI.          Past  Medical History:   Past Medical History:   Diagnosis Date    Diabetes mellitus type II     Hyperlipidemia     Hypertension     Hypothyroid     TALIA (obstructive sleep apnea)     on CPAP    Osteopenia     Psoriasis      Past Surgical History:   Procedure Laterality Date    BRONCHOSCOPY Bilateral 12/15/2022    Procedure: Bronchoscopy;  Surgeon: Corky Luna MD;  Location: [facility];  Service: Pulmonary;  Laterality: Bilateral;    CATARACT EXTRACTION W/  INTRAOCULAR LENS IMPLANT Bilateral     cataract surgery Left 08/10/2022    COLONOSCOPY N/A 05/02/2017    Procedure: COLONOSCOPY;  Surgeon: Noe Penn III, MD;  Location: [facility] ENDO;  Service: Endoscopy;  Laterality: N/A;    COLONOSCOPY N/A 05/04/2022    Procedure: COLONOSCOPY;  Surgeon: Elodia Swain MD;  Location: [facility] ENDO;  Service: Endoscopy;  Laterality: N/A;    HYSTERECTOMY      OOPHORECTOMY      ROBOTIC ASSISTED HYSTERECTOMY       Family History   Problem Relation Name Age of Onset    Heart disease Father      Diabetes Brother      Melanoma Neg Hx      Lupus Neg Hx       Social History[1]  Medication List with Changes/Refills   New Medications    MELOXICAM (MOBIC) 7.5 MG TABLET    Take 1 tablet (7.5 mg total) by mouth once daily.   Current Medications    ASPIRIN 81 MG TAB    Take by mouth. 1 Tablet Oral 2 times weekly     ATORVASTATIN (LIPITOR) 20 MG TABLET    TAKE 1 TABLET(20 MG) BY MOUTH EVERY DAY    BLOOD-GLUCOSE METER,CONTINUOUS (DEXCOM G7 ) MISC    1 each by Misc.(Non-Drug; Combo Route) route once. for 1 dose    CHOLECALCIFEROL, VITAMIN D3, 2,000 UNIT TAB    Take by mouth. 1 Tablet Oral Every day    DEXCOM G7 SENSOR LEX    USE EVERY 10 DAYS    EMPAGLIFLOZIN (JARDIANCE) 10 MG TABLET    Take 1 tablet (10 mg total) by mouth once daily.    GABAPENTIN (NEURONTIN) 300 MG CAPSULE    TAKE 1 CAPSULE(300 MG) BY MOUTH THREE TIMES DAILY    INSULIN GLARGINE U-100, LANTUS, (LANTUS SOLOSTAR U-100 INSULIN) 100 UNIT/ML (3 ML) INPN PEN    ADMINISTER 10  "UNITS UNDER THE SKIN DAILY    INSULIN LISPRO 100 UNIT/ML PEN    Titrate up to 100 units daily as instructed.    LEVOTHYROXINE (SYNTHROID) 112 MCG TABLET    TAKE 1 TABLET(112 MCG) BY MOUTH BEFORE BREAKFAST    LISINOPRIL (PRINIVIL,ZESTRIL) 2.5 MG TABLET    TAKE 1 TABLET(2.5 MG) BY MOUTH EVERY DAY    OZEMPIC 2 MG/DOSE (8 MG/3 ML) PNIJ    INJECT 2 MG UNDER THE SKIN EVERY 7 DAYS    PEN NEEDLE, DIABETIC (BD ULTRA-FINE LAURA PEN NEEDLE) 32 GAUGE X 5/32" NDLE    1 each by Misc.(Non-Drug; Combo Route) route 4 (four) times daily with meals and nightly.     Review of patient's allergies indicates:   Allergen Reactions    Adhesive tape-silicones      Other reaction(s): skin irritation to area    Penicillins        Physical Exam:   Body mass index is 35.53 kg/m².    GENERAL: Well appearing, in no acute distress.  HEAD: Normocephalic and atraumatic.  ENT: External ears and nose grossly normal.  EYES: EOMI bilaterally  PULMONARY: Respirations are grossly even and non-labored.  NEURO: Awake, alert, and oriented x 3.  SKIN: No obvious rashes appreciated.  PSYCH: Mood & affect are appropriate.    Detailed MSK exam:    Right knee: Genu valgum noted, no ecchymosis, no erythema. Mild effusion. Knee flexion limited to 105 with crepitus.  Tender to palpation at medial joint line anterior and posterior, pes anserine, popliteal space, lateral joint line. Ligamentous exam: Lachman negative. Valgus @ 0 negative. Valgus @ 30 negative. Varus negative. Anterior drawer negative. Posterior Drawer negative. Mensicus exam: Thessaly positive, Pain with maximal passive knee flexion positive, Pain/click Clementina equivocal     Imaging:  X-ray Knee Ortho Right with Flexion (XPD)  Narrative: EXAMINATION:  XR KNEE ORTHO RIGHT WITH FLEXION (XPD)    CLINICAL HISTORY:  Unilateral primary osteoarthritis, right knee    TECHNIQUE:  XR KNEE ORTHO RIGHT WITH FLEXION (XPD)    COMPARISON:  01/04/2022.    FINDINGS:  Right: Interval worsening of the tricompartmental " joint space narrowing now with bone-on-bone apposition in the lateral compartment.  Tricompartmental hypertrophy.  Moderate to large right knee joint effusion.  Atherosclerotic disease.    Left: Worsening tricompartmental joint space narrowing and hypertrophy worst in the medial compartment.    Bilateral osseous demineralization.  Impression: As above.    Electronically signed by: Jose Alberto Bowen  Date:    03/25/2025  Time:    09:47      Physical Exam    IMAGING:  - X-rays: 2022, showed some arthritis in the knee         Relevant imaging results were reviewed and interpreted by me and per my read as above.  This was discussed with the patient and / or family today.     Assessment:  Jaimie Luque is a 75 y.o. female presents today with worsening primary osteoarthritis of right knee consistent with lateral joint line narrowing which has worsened since her last x-rays a few years ago.  Her pain is mostly posterior lateral today as well which correlates with her worsening x-rays.  I discussed holding off on any lower bag activity at the gym as she does circuit workout and she will start formal physical therapy.  We discussed low-dose meloxicam as well as possible injection she would like to hold off on any intra-articular injections at this time.  We will follow-up in 10-12 weeks if not see any clinical improvement could consider intra-articular injections.    Primary osteoarthritis of right knee  -     X-ray Knee Ortho Right with Flexion (XPD); Future; Expected date: 03/25/2025  -     meloxicam (MOBIC) 7.5 MG tablet; Take 1 tablet (7.5 mg total) by mouth once daily.  Dispense: 30 tablet; Refill: 1  -     Ambulatory Referral/Consult to Physical Therapy; Future; Expected date: 04/01/2025       Today's visit is associated with current or anticipated ongoing medical care related to this patient's diagnosis of osteoarthritis.  Currently there is no cure of osteoarthritis and the patient will require regular follow up to  manage symptoms and progression.  The patient is to return to the office within a minimum of 3-6 months to review symptoms and function at that time.   CPT code      A copy of today's visit note has been sent to the referring provider.       Sarwat Rodriguez MD    This note was generated with the assistance of ambient listening technology. Verbal consent was obtained by the patient and accompanying visitor(s) for the recording of patient appointment to facilitate this note. I attest to having reviewed and edited the generated note for accuracy, though some syntax or spelling errors may persist. Please contact the author of this note for any clarification.       Disclaimer: This note was prepared using a voice recognition system and is likely to have sound alike errors within the text.          [1]   Social History  Socioeconomic History    Marital status:    Tobacco Use    Smoking status: Never    Smokeless tobacco: Never   Substance and Sexual Activity    Alcohol use: Yes     Comment: rare    Drug use: No    Sexual activity: Yes     Partners: Male   Social History Narrative    . Lives with spouse. Has 3 children. Patient retired as  for [employer].      Social Drivers of Health     Financial Resource Strain: Low Risk  (9/25/2024)    Overall Financial Resource Strain (CARDIA)     Difficulty of Paying Living Expenses: Not hard at all   Food Insecurity: No Food Insecurity (9/25/2024)    Hunger Vital Sign     Worried About Running Out of Food in the Last Year: Never true     Ran Out of Food in the Last Year: Never true   Transportation Needs: No Transportation Needs (4/30/2024)    PRAPARE - Transportation     Lack of Transportation (Medical): No     Lack of Transportation (Non-Medical): No   Physical Activity: Insufficiently Active (9/25/2024)    Exercise Vital Sign     Days of Exercise per Week: 3 days     Minutes of Exercise per Session: 30 min   Stress: No Stress Concern Present  (9/25/2024)    [employer] - Occupational Stress Questionnaire     Feeling of Stress : Only a little   Housing Stability: Low Risk  (11/30/2023)    Housing Stability Vital Sign     Unable to Pay for Housing in the Last Year: No     Number of Places Lived in the Last Year: 1     Unstable Housing in the Last Year: No

## 2025-03-29 ENCOUNTER — CLINICAL SUPPORT (OUTPATIENT)
Dept: REHABILITATION | Facility: HOSPITAL | Age: 75
End: 2025-03-29
Attending: STUDENT IN AN ORGANIZED HEALTH CARE EDUCATION/TRAINING PROGRAM
Payer: MEDICARE

## 2025-03-29 DIAGNOSIS — M17.11 PRIMARY OSTEOARTHRITIS OF RIGHT KNEE: Primary | ICD-10-CM

## 2025-03-29 DIAGNOSIS — R29.898 WEAKNESS OF BOTH LOWER EXTREMITIES: ICD-10-CM

## 2025-03-29 DIAGNOSIS — M25.661 DECREASED RANGE OF MOTION (ROM) OF RIGHT KNEE: ICD-10-CM

## 2025-03-29 DIAGNOSIS — R68.89 DECREASED FUNCTIONAL ACTIVITY TOLERANCE: ICD-10-CM

## 2025-03-29 PROCEDURE — 97161 PT EVAL LOW COMPLEX 20 MIN: CPT

## 2025-03-29 NOTE — PROGRESS NOTES
Outpatient Rehab    Physical Therapy Evaluation    Patient Name: Jaimie Luque  MRN: [id number]  YOB: 1950  Encounter Date: 3/29/2025    Therapy Diagnosis:   Encounter Diagnoses   Name Primary?    Primary osteoarthritis of right knee Yes    Weakness of both lower extremities     Decreased functional activity tolerance     Decreased range of motion (ROM) of right knee      Physician: Sarwat Rodriguez MD    Physician Orders: Eval and Treat  Medical Diagnosis: Primary osteoarthritis of right knee    Visit # / Visits Authorized:  1 / 1  Insurance Authorization Period: 3/25/2025 to 3/25/2026  Date of Evaluation: 3/29/2025  Plan of Care Certification: 3/29/2025 to  6/20/2025    Time In: 0848   Time Out: 0930  Total Time: 42   Total Billable Time:      Intake Outcome Measure for FOTO Survey    Therapist reviewed FOTO scores for Jaimie Luque on 3/29/2025.   FOTO report - see Media section or FOTO account episode details.     Intake Score: 49%         Subjective   History of Present Illness  Jaimie is a 75 y.o. female who reports to physical therapy with a chief concern of Right Knee Pain.     The patient reports a medical diagnosis of Primary osteoarthritis of right knee (M17.11).    Diagnostic tests related to this condition: X-ray.   X-Ray Details: FINDINGS:  Right: Interval worsening of the tricompartmental joint space narrowing now with bone-on-bone apposition in the lateral compartment.  Tricompartmental hypertrophy.  Moderate to large right knee joint effusion.  Atherosclerotic disease.     Left: Worsening tricompartmental joint space narrowing and hypertrophy worst in the medial compartment.     Bilateral osseous demineralization.    History of Present Condition/Illness: Mrs. Etienne presents to PT today with complaints of right knee pain and lack of movement due to arthritis. She reports she has been dealing with this for sometime now and it has started to get worse in the past few months. She states  that she has been going to fitness center where she does a group of machines in a circuit, but some of the cause her to hurt a lot afterwards and she wanted advice. She is trying to stay active as she does not want a knee replacement if she can avoid it. She reports her knee feels stiff with prolonged resting in any position. She is curious as to what she can do to work on strengthening her legs to avoid having surgery.     Activities of Daily Living  Previously independent with activities of daily living? Yes     Currently independent with activities of daily living? Yes          Previously independent with instrumental activities of daily living? Yes     Currently independent with instrumental activities of daily living? Yes              Pain     Patient reports a current pain level of 6/10. Pain at best is reported as 8/10. Pain at worst is reported as 4/10.   Location: Right knee joint  Clinical Progression (since onset): Worsening  Pain Qualities: Aching, Dull, Pulling, Tightness  Pain-Relieving Factors: Heat, Rest, Walking  Pain-Aggravating Factors: Bending, Driving, Kneeling, Sitting, Squatting, Standing, Straightening, Stair climbing         Review of Systems  Patient reports: Sleep Disturbance, Diabetes, and Osteoarthritis          Past Medical History/Physical Systems Review:   Jaimie Luque  has a past medical history of Diabetes mellitus type II, Hyperlipidemia, Hypertension, Hypothyroid, TALIA (obstructive sleep apnea), Osteopenia, and Psoriasis.    Jaimie Luque  has a past surgical history that includes Robotic assisted hysterectomy; Hysterectomy; Colonoscopy (N/A, 05/02/2017); Oophorectomy; Colonoscopy (N/A, 05/04/2022); cataract surgery (Left, 08/10/2022); Bronchoscopy (Bilateral, 12/15/2022); and Cataract extraction w/  intraocular lens implant (Bilateral).    Jaimie has a current medication list which includes the following prescription(s): aspirin, atorvastatin, dexcom g7 ,  cholecalciferol (vitamin d3), dexcom g7 sensor, empagliflozin, gabapentin, lantus solostar u-100 insulin, insulin lispro, levothyroxine, lisinopril, meloxicam, ozempic, and pen needle, diabetic.    Review of patient's allergies indicates:   Allergen Reactions    Adhesive tape-silicones      Other reaction(s): skin irritation to area    Penicillins         Objective        RANGE OF MOTION:   Knee AROM/PROM Right   Left   Goal   Hyper - Zero - Flexion  2-85 1-0-120           STRENGTH: (* denotes pain)  L/E MMT Right  (spine) Left Dysfunction with Movement Goal   Modified (90/90) Abdominal Strength  Poor ---     Hip Flexion  3+/5 3+/5  4+/5 B   Hip Extension  3+/5 3+/5  4+/5 B   Hip Abduction  3+/5 3+/5  4+/5 B   Knee Extension 4/5 5/5  5/5 B   Knee Flexion 4/5 5/5  5/5 B   Hip IR 4-/5 4-/5  4+/5 B   Hip ER 4-/5 4-/5  4+/5 B   Ankle DF 5/5 5/5  5/5 B   Ankle PF 4-/5 4-/5  5/5 B   Ankle Inversion 5/5 5/5  5/5 B   Ankle Eversion 5/5 5/5  5/5 B        SENSATION:  Intact to Light Touch        PALPATION: Increased tenderness to palpation of: Right quadriceps, hamstring and joint capsule , . Increased tenderness to palpation of: Right knee joint line medially and laterally        GAIT ANALYSIS The patient ambulated with the following assistive device: none; the pt presents with the following gait abnormalities: Decreased step length on the left, decreased stance time on the right, Decrease knee flexion on the right during swing phase and decrease heel strike with initial contact likely due to pain with bending and full straightening.     FUNCTIONAL MOVEMENT PATTERNS  Movement Analysis Notes   Bed Mobility  Functional- Non painful    Transfers Functional- Non painful    Sit to Stand Functional- painful Pain in the right knee with ascending   Squat Dysfunctional- painful Staggered stance to off load the right knee.    Single Leg Squat      Step Down      Single leg calf raises   R:   L:        Functional Tests  Outcome Norms    Timed Up and Go 14.3 <13.5 sec   30 second Sit to Stand 12 Age Male Female   60-64 <14 <12   65-69 <12 <11   70-74 <12 <10   75-79 <11 <10   80-84 <10 <9   85-89 <8 <8   90-93 <7 <4         Treatment:     CPT Intervention Performed   Today Duration / Intensity   MT      TE                   NMR                    TA                                                PLAN  Recumbent bike vs nu step, Heel prop, heel slides, Prone quad stretch, calf stretch, Hamstring stretch, SAQ, LAQ, SLR, SL hip abduction, Clamshells, Box squats              CPT Codes available for Billing:   (00) minutes of Manual therapy (MT) to improve pain and ROM.  (00) minutes of Therapeutic Exercise (TE) to develop strength, endurance, range of motion, and flexibility.  (00) minutes of Neuromuscular Re-Education (NMR)  to improve: Balance, Coordination, Kinesthetic, Sense, Proprioception, and Posture.  (00) minutes of Therapeutic Activities (TA) to improve functional performance.  Vasopneumatic Device Therapy () for management of swelling/edema. ([id number])  Unattended Electrical Stimulation (ES) for muscle performance or pain modulation.  BFR: Blood flow restriction applied during exercise      Time Entry(in minutes):       Assessment & Plan   Assessment  Jaimie presents with a condition of Low complexity.   Presentation of Symptoms: Stable  Will Comorbidities Impact Care: No       Functional Limitations: Activity tolerance, Completing self-care activities, Completing work/school activities, Functional mobility, Pain with ADLs/IADLs, Participating in sports, Range of motion, Reaching, Ambulating on uneven surfaces, Bed mobility, Decreased ambulation distance/endurance, Driving, Gait limitations, Getting off the floor, Increased risk of fall, Maintaining balance, Performing household chores, Painful locomotion/ambulation, Squatting, Sitting tolerance, Standing tolerance  Impairments: Abnormal or restricted range of motion, Activity intolerance,  Impaired physical strength, Pain with functional activity, Weight-bearing intolerance, Impaired balance, Abnormal gait  Personal Factors Affecting Prognosis: Schedule, Pain    Patient Goal for Therapy (PT): To decreased pain and improve overall function with normal ADL's and IADL's  Prognosis: Good    Plan  From a physical therapy perspective, the patient would benefit from: Skilled Rehab Services    Planned therapy interventions include: Therapeutic exercise, Therapeutic activities, Neuromuscular re-education, Manual therapy, ADLs/IADLs, Aquatic therapy, Gait training, Sensory integration, Wound care, and Other (Comment). virtual visits, dry needling, modalities, electrical stimulation (IFC, Pre-Mod, Attended with Functional Dry Needling),Cervical/Lumbar Traction, Electrical Stimulation IFC/NMES, Moist Heat/ Ice, Patient Education, and Self Care          Visit Frequency: 2 times Per Week for 12 Weeks.       This plan was discussed with Patient.   Discussion participants: Agreed Upon Plan of Care             Patient's spiritual, cultural, and educational needs considered and patient agreeable to plan of care and goals.     Education  Education was done with Patient. The patient's learning style includes Demonstration and Pictures/video. The patient Demonstrates understanding and Verbalizes understanding.         Education provided: PURPOSE: Patient educated on the impairments noted above and the effects of physical therapy intervention to improve overall condition and QOL.  EXERCISE: Patient was educated on all the above exercise prior/during/after for proper posture, positioning, and execution for safe performance with home exercise program.  STRENGTH: Patient educated on the importance of improved core and extremity strength in order to improve alignment of the spine and extremities with static positions and dynamic movement.  Written Home Exercises Provided: yes. Exercises were reviewed and Patient was able to  demonstrate them prior to the end of the session. Patient demonstrated good  understanding of the education provided. See EMR under Patient Instructions for exercises provided during therapy sessions.       Goals:   Active       LTG       Pt will report worst pain of 2-3/10 in order to progress toward max functional ability and improve quality of life                Start:  03/28/25    Expected End:  06/20/25            Patient will demonstrate improved function as indicated by a score of greater than or equal to 58 out of 100 on FOTO.                Start:  03/28/25    Expected End:  06/20/25            Patient will improve AROM to normal limits in order to return to maximal functional potential and improve quality of life.         Start:  03/28/25    Expected End:  06/20/25            Patient will improve strength to at least 4+/5 grossly,  in order to improve functional independence and quality of life.         Start:  03/28/25    Expected End:  06/20/25            Patient will be able to participate in her own gym program focused on LE strengthening without exacerbating her symptoms to improve her QOL.        Start:  03/28/25    Expected End:  06/20/25               STG       Pt will report worst pain of 5-6/10 in order to progress toward max functional ability and improve quality of life                Start:  03/28/25    Expected End:  05/09/25            Patient will demonstrate improved function as indicated by a score of greater than or equal to 53 out of 100 on FOTO.                Start:  03/28/25    Expected End:  05/09/25            Patient will improve AROM to 50% of stated goals, listed in objective measures above, in order to progress towards independence with functional activities.         Start:  03/28/25    Expected End:  05/09/25            Patient will improve strength by 1/2 a grade, in order to progress towards independence with functional activities.                Start:  03/28/25     Expected End:  05/09/25            Patient will demonstrate independence with HEP in order to progress toward functional independence.        Start:  03/28/25    Expected End:  05/09/25                Mynor Ceja PT

## 2025-03-30 PROBLEM — M25.661 DECREASED RANGE OF MOTION (ROM) OF RIGHT KNEE: Status: ACTIVE | Noted: 2025-03-30

## 2025-04-01 ENCOUNTER — CLINICAL SUPPORT (OUTPATIENT)
Dept: REHABILITATION | Facility: HOSPITAL | Age: 75
End: 2025-04-01
Payer: MEDICARE

## 2025-04-01 ENCOUNTER — DOCUMENTATION ONLY (OUTPATIENT)
Dept: REHABILITATION | Facility: HOSPITAL | Age: 75
End: 2025-04-01

## 2025-04-01 DIAGNOSIS — R68.89 DECREASED FUNCTIONAL ACTIVITY TOLERANCE: ICD-10-CM

## 2025-04-01 DIAGNOSIS — M25.661 DECREASED RANGE OF MOTION (ROM) OF RIGHT KNEE: ICD-10-CM

## 2025-04-01 DIAGNOSIS — M17.0 PRIMARY OSTEOARTHRITIS OF BOTH KNEES: ICD-10-CM

## 2025-04-01 DIAGNOSIS — R29.898 WEAKNESS OF BOTH LOWER EXTREMITIES: Primary | ICD-10-CM

## 2025-04-01 PROCEDURE — 97110 THERAPEUTIC EXERCISES: CPT | Mod: CQ

## 2025-04-01 PROCEDURE — 97112 NEUROMUSCULAR REEDUCATION: CPT | Mod: CQ

## 2025-04-01 NOTE — PROGRESS NOTES
Outpatient Rehab    Physical Therapy Visit    Patient Name: Jaimie Luque  MRN: [id number]  YOB: 1950  Encounter Date: 4/1/2025    Therapy Diagnosis:   Encounter Diagnoses   Name Primary?    Weakness of both lower extremities Yes    Decreased functional activity tolerance     Primary osteoarthritis of both knees     Decreased range of motion (ROM) of right knee      Physician: Sarwat Rodriguez MD    Physician Orders: Eval and Treat  Medical Diagnosis: Primary osteoarthritis of right knee    Visit # / Visits Authorized:  1 / 12  Insurance Authorization Period: 3/29/2025 to 12/31/2025  Date of Evaluation: 3/29/2025  Plan of Care Certification: 3/29/2025 to 6/20/2025     PT/PTA: PTA   Number of PTA visits since last PT visit:1  Time In: 0946   Time Out: 1037  Total Time: 51   Total Billable Time:  51    FOTO:  Intake Score:  %  Survey Score 1:  %  Survey Score 2:  %         Subjective   initially no pain. gnerally she has pain with walking longer distances, and with staying still too long but also more pain with more movement of her right knee.         Objective            Treatment:       CPT Intervention Performed   Today Duration / Intensity   MT         TE  nustep x  5 minutes        heel slide with overpressure x  X 20 resting as needed      Dorsiflexion stretch  x 3 x 30 seconds with strap    Quad set x 3 x 5 seconds hold bilateral     Hamstring stretch  x 3 x 30 seconds sitting right     Knee hang x 3 minutes sitting right     Tailgaters  x 3 minutes 3 pound bilateral              NMR  short arch quads  x  3 x 10        long arch quad  x  3 x 10 bilateral                                             TA                                                                                                             PLAN                     CPT Codes available for Billing:   (00) minutes of Manual therapy (MT) to improve pain and ROM.  (40) minutes of Therapeutic Exercise (TE) to develop strength,  endurance, range of motion, and flexibility.  (11) minutes of Neuromuscular Re-Education (NMR)  to improve: Balance, Coordination, Kinesthetic, Sense, Proprioception, and Posture.  (00) minutes of Therapeutic Activities (TA) to improve functional performance.  Unattended Electrical Stimulation (ES) for muscle performance or pain modulation.  BFR: Blood flow restriction applied during exercise  NP or (-): Not Performed       Time Entry(in minutes):       Assessment & Plan   Assessment: pt came in with less pain but with more pain after initiation of nustep. pt demonstrates good understanding of all hep exercises. she is limited in knee flexion and extension.       Patient will continue to benefit from skilled outpatient physical therapy to address the deficits listed in the problem list box on initial evaluation, provide pt/family education and to maximize pt's level of independence in the home and community environment.     Patient's spiritual, cultural, and educational needs considered and patient agreeable to plan of care and goals.           Plan: continue with plan as established on evaluation    Goals:   Active       LTG       Pt will report worst pain of 2-3/10 in order to progress toward max functional ability and improve quality of life                Start:  03/28/25    Expected End:  06/20/25            Patient will demonstrate improved function as indicated by a score of greater than or equal to 58 out of 100 on FOTO.                Start:  03/28/25    Expected End:  06/20/25            Patient will improve AROM to normal limits in order to return to maximal functional potential and improve quality of life.         Start:  03/28/25    Expected End:  06/20/25            Patient will improve strength to at least 4+/5 grossly,  in order to improve functional independence and quality of life.         Start:  03/28/25    Expected End:  06/20/25            Patient will be able to participate in her own gym  program focused on LE strengthening without exacerbating her symptoms to improve her QOL.        Start:  03/28/25    Expected End:  06/20/25               STG       Pt will report worst pain of 5-6/10 in order to progress toward max functional ability and improve quality of life                Start:  03/28/25    Expected End:  05/09/25            Patient will demonstrate improved function as indicated by a score of greater than or equal to 53 out of 100 on FOTO.                Start:  03/28/25    Expected End:  05/09/25            Patient will improve AROM to 50% of stated goals, listed in objective measures above, in order to progress towards independence with functional activities.         Start:  03/28/25    Expected End:  05/09/25            Patient will improve strength by 1/2 a grade, in order to progress towards independence with functional activities.                Start:  03/28/25    Expected End:  05/09/25            Patient will demonstrate independence with HEP in order to progress toward functional independence.        Start:  03/28/25    Expected End:  05/09/25                Roly Chaney, PTA

## 2025-04-02 ENCOUNTER — PATIENT MESSAGE (OUTPATIENT)
Dept: PRIMARY CARE CLINIC | Facility: CLINIC | Age: 75
End: 2025-04-02
Payer: MEDICARE

## 2025-04-02 ENCOUNTER — OFFICE VISIT (OUTPATIENT)
Dept: PRIMARY CARE CLINIC | Facility: CLINIC | Age: 75
End: 2025-04-02
Payer: MEDICARE

## 2025-04-02 VITALS
BODY MASS INDEX: 35.31 KG/M2 | OXYGEN SATURATION: 97 % | DIASTOLIC BLOOD PRESSURE: 62 MMHG | TEMPERATURE: 98 F | HEIGHT: 67 IN | HEART RATE: 85 BPM | SYSTOLIC BLOOD PRESSURE: 122 MMHG | WEIGHT: 225 LBS

## 2025-04-02 DIAGNOSIS — B02.29 PHN (POSTHERPETIC NEURALGIA): Primary | ICD-10-CM

## 2025-04-02 DIAGNOSIS — E11.69 HYPERLIPIDEMIA ASSOCIATED WITH TYPE 2 DIABETES MELLITUS: ICD-10-CM

## 2025-04-02 DIAGNOSIS — G47.00 INSOMNIA, UNSPECIFIED TYPE: ICD-10-CM

## 2025-04-02 DIAGNOSIS — N18.31 TYPE 2 DIABETES MELLITUS WITH STAGE 3A CHRONIC KIDNEY DISEASE, WITH LONG-TERM CURRENT USE OF INSULIN: ICD-10-CM

## 2025-04-02 DIAGNOSIS — J84.9 ILD (INTERSTITIAL LUNG DISEASE): ICD-10-CM

## 2025-04-02 DIAGNOSIS — G47.00 INSOMNIA, UNSPECIFIED TYPE: Primary | ICD-10-CM

## 2025-04-02 DIAGNOSIS — E78.5 HYPERLIPIDEMIA ASSOCIATED WITH TYPE 2 DIABETES MELLITUS: ICD-10-CM

## 2025-04-02 DIAGNOSIS — E66.01 SEVERE OBESITY WITH BODY MASS INDEX (BMI) OF 35.0 TO 35.9 AND COMORBIDITY: ICD-10-CM

## 2025-04-02 DIAGNOSIS — Z79.4 TYPE 2 DIABETES MELLITUS WITH STAGE 3A CHRONIC KIDNEY DISEASE, WITH LONG-TERM CURRENT USE OF INSULIN: ICD-10-CM

## 2025-04-02 DIAGNOSIS — E11.22 TYPE 2 DIABETES MELLITUS WITH STAGE 3A CHRONIC KIDNEY DISEASE, WITH LONG-TERM CURRENT USE OF INSULIN: ICD-10-CM

## 2025-04-02 PROCEDURE — 99214 OFFICE O/P EST MOD 30 MIN: CPT | Mod: S$GLB,,, | Performed by: NURSE PRACTITIONER

## 2025-04-02 PROCEDURE — 1159F MED LIST DOCD IN RCRD: CPT | Mod: CPTII,S$GLB,, | Performed by: NURSE PRACTITIONER

## 2025-04-02 PROCEDURE — 1157F ADVNC CARE PLAN IN RCRD: CPT | Mod: CPTII,S$GLB,, | Performed by: NURSE PRACTITIONER

## 2025-04-02 PROCEDURE — 3074F SYST BP LT 130 MM HG: CPT | Mod: CPTII,S$GLB,, | Performed by: NURSE PRACTITIONER

## 2025-04-02 PROCEDURE — 3288F FALL RISK ASSESSMENT DOCD: CPT | Mod: CPTII,S$GLB,, | Performed by: NURSE PRACTITIONER

## 2025-04-02 PROCEDURE — 1125F AMNT PAIN NOTED PAIN PRSNT: CPT | Mod: CPTII,S$GLB,, | Performed by: NURSE PRACTITIONER

## 2025-04-02 PROCEDURE — 99999 PR PBB SHADOW E&M-EST. PATIENT-LVL IV: CPT | Mod: PBBFAC,,, | Performed by: NURSE PRACTITIONER

## 2025-04-02 PROCEDURE — 3078F DIAST BP <80 MM HG: CPT | Mod: CPTII,S$GLB,, | Performed by: NURSE PRACTITIONER

## 2025-04-02 PROCEDURE — 1101F PT FALLS ASSESS-DOCD LE1/YR: CPT | Mod: CPTII,S$GLB,, | Performed by: NURSE PRACTITIONER

## 2025-04-02 RX ORDER — TRAZODONE HYDROCHLORIDE 50 MG/1
50 TABLET ORAL NIGHTLY PRN
Qty: 30 TABLET | Refills: 11 | Status: SHIPPED | OUTPATIENT
Start: 2025-04-02 | End: 2026-04-02

## 2025-04-02 NOTE — PROGRESS NOTES
Jaimie Luque  04/10/2025  [id number]    Jessica Ward MD  Patient Care Team:  Jessica Ward MD as PCP - General (Internal Medicine)  Mallika Arredondo OD as Consulting Provider (Optometry)  Carlin Armstrong III, OD (Optometry)  Roly Olivares OD as Consulting Physician (Optometry)  Kelin Rolle RD, [facility] as Dietitian (Diabetes)  Blair Franco MD (Inactive) as Consulting Physician (Cardiology)  Corky Luna MD as Consulting Physician (Pulmonary Disease)  Reba Ness PA-C as Physician Assistant (Rheumatology)  [facility], [facility] (Optometry)  Lorraine Holliday PA-C as Physician Assistant (Diabetes)        [facility]      Chief Complaint:  Chief Complaint   Patient presents with    Follow-up     3 month f/u          Assessment/Plan:  1. PHN (postherpetic neuralgia)  Overview:  Lilliam 300mg TID still needed.    Assessment & Plan:  Continue lilliam at current dose      2. ILD (interstitial lung disease)  Overview:  Patchy ground-glass opacities versus mosaic attenuation. Multiple pulmonary nodules, stable or decreased in size/resolved within the bilateral lungs. New similar nodules present within the left upper lobe and inferolateral right middle lobe. Nodules again suggest possible mucous plugging or infectious/inflammatory etiology       3. Hyperlipidemia associated with type 2 diabetes mellitus  Overview:  Atorva 20mg daily.    Assessment & Plan:  Weight declining  Continue [facility]  Last ldl = 49      4. Severe obesity with body mass index (BMI) of 35.0 to 35.9 and comorbidity  Assessment & Plan:  Weight loss 8 #  On Ozempic 2 mg weekly  Continue weight lifting activity      5. Type 2 diabetes mellitus with stage 3a chronic kidney disease, with long-term current use of insulin  Overview:  Ozempic 2mg weekly,  Jardiance 10mg dailiy,  Lantus.10mg daily, ins lispro prn.      6. Insomnia, unspecified type  Assessment &  Plan:  Trazodone trial            Worry Score: 2  History of Present Illness:    Last visit with Dr. Ward 1/2/2025  - working out at [employer] 3x/wk - Resistance machines - has lost 16 # in 3 months    Right knee pain - PT - arthritis has progressed  EYAD Holliday - DM II  Lantus 10 units daily   Ozempic 2 mg weekly   Humalog meal size dosing TID wm  Humalog correction dosing every 3 hours as below     Meal Size:   12 units with regular carb meal  14 units with heavier carb meal  5 units with snack     Humalog Correction Dosing every 3 hours as needed OUTSIDE OF EATING  If  - 250, may take 2 units of Humalog  If  - 300, may take 4 units of Humalog   If  - 350, may take 6 units of Humalog  If  - 400, may take 8 units of Humalog  If +, may take 10 units of Humalog          Lab Results   Component Value Date    HGBA1C 7.4 (H) 03/12/2025      She returns for follow up of medical problems.   Recent Fall:  []Yes  [x]No  Activity:  []Vigorous [x]Moderate []Sedentary -injured the right knee - now in therapy  Appetite:  []Good  [x]Fair  []Poor  Mood: [x]Stable []Anxious []Depressed   Insomnia: [x]Yes  []No   trouble falling asleep - melatonin not helping  Tries less napping in the day    Weight down 232.92 >>> 224.98  Low glucose 54, 52 - lightheaded - Dexcom alarms  Not using CPaP like she should  Taking OTC allergy med currently  Meloxicam has helped with knee pain  Gabapentin - herpetic neuralgia around bra line  Does not check BP at home  Reports insomnia      Denies fever, chills, chest pain, SOB, palpitations, vomiting, diarrhea, no gross neuro deficits, urinary symptoms       The following were reviewed: Active problem list, medication list, allergies, family history, social history, and Health Maintenance.     History:  Past Medical History:   Diagnosis Date    Diabetes mellitus type II     Hyperlipidemia     Hypertension     Hypothyroid     TALIA (obstructive sleep apnea)     on CPAP     Osteopenia     Psoriasis      Past Surgical History:   Procedure Laterality Date    BRONCHOSCOPY Bilateral 12/15/2022    Procedure: Bronchoscopy;  Surgeon: Corky Luna MD;  Location: [facility];  Service: Pulmonary;  Laterality: Bilateral;    CATARACT EXTRACTION W/  INTRAOCULAR LENS IMPLANT Bilateral     cataract surgery Left 08/10/2022    COLONOSCOPY N/A 05/02/2017    Procedure: COLONOSCOPY;  Surgeon: Noe Penn III, MD;  Location: [facility];  Service: Endoscopy;  Laterality: N/A;    COLONOSCOPY N/A 05/04/2022    Procedure: COLONOSCOPY;  Surgeon: Elodia Swain MD;  Location: [facility] ENDO;  Service: Endoscopy;  Laterality: N/A;    HYSTERECTOMY      OOPHORECTOMY      ROBOTIC ASSISTED HYSTERECTOMY       Family History   Problem Relation Name Age of Onset    Heart disease Father      Diabetes Brother      Melanoma Neg Hx      Lupus Neg Hx       Problem List[1]  Review of patient's allergies indicates:   Allergen Reactions    Adhesive tape-silicones      Other reaction(s): skin irritation to area    Penicillins        PHQ-9       THEA-7       Medications:  Medications Ordered Prior to Encounter[2]    Medications have been reviewed and reconciled with patient at visit today.      Exam:  Vitals:    04/02/25 1050   BP: 122/62   Pulse: 85   Temp: 97.9 °F (36.6 °C)     Weight: 102 kg (224 lb 15.7 oz)   Body mass index is 35.24 kg/m².      BP Readings from Last 3 Encounters:   04/02/25 122/62   03/12/25 135/73   01/02/25 122/60     Wt Readings from Last 3 Encounters:   04/02/25 102 kg (224 lb 15.7 oz)   03/25/25 102.9 kg (226 lb 13.7 oz)   03/12/25 102.9 kg (226 lb 13.7 oz)         Physical Exam  Constitutional:       Appearance: Normal appearance. She is well-developed. She is obese.   Eyes:      Conjunctiva/sclera: Conjunctivae normal.   Neck:      Thyroid: No thyroid mass.      Vascular: No carotid bruit.   Cardiovascular:      Rate and Rhythm: Normal rate and regular rhythm.      Heart sounds: No murmur  heard.     No friction rub. No gallop.   Pulmonary:      Effort: Pulmonary effort is normal.      Breath sounds: Normal breath sounds. No wheezing or rales.   Abdominal:      General: Bowel sounds are normal.      Palpations: Abdomen is soft. There is no mass.      Tenderness: There is no abdominal tenderness.   Musculoskeletal:      Cervical back: Neck supple.   Lymphadenopathy:      Cervical: No cervical adenopathy.   Neurological:      Mental Status: She is alert and oriented to person, place, and time.              Laboratory Reviewed:     Lab Results   Component Value Date    WBC 6.60 09/17/2024    HGB 13.4 09/17/2024    HCT 40.7 09/17/2024     09/17/2024    CHOL 113 (L) 10/02/2024    TRIG 78 10/02/2024    HDL 48 10/02/2024    ALT 12 09/17/2024    AST 12 09/17/2024     12/30/2024    K 4.5 12/30/2024     12/30/2024    CREATININE 0.9 12/30/2024    BUN 11 12/30/2024    CO2 26 12/30/2024    TSH 1.557 11/17/2023    GLUF 202 (H) 04/01/2021    HGBA1C 7.4 (H) 03/12/2025       Screening or Prevention Patient's value Goal Complete/Controlled?   HgA1C Testing and Control   Lab Results   Component Value Date    HGBA1C 7.4 (H) 03/12/2025      Annually/Less than 8% Yes   Lipid profile : 10/02/2024 Annually Yes   LDL control Lab Results   Component Value Date    LDLCALC 49.4 (L) 10/02/2024    Annually/Less than 100 mg/dl  Yes   Nephropathy screening Lab Results   Component Value Date    LABMICR <5.0 10/03/2024    LABMICR <5.0 10/03/2024     Lab Results   Component Value Date    PROTEINUA Negative 04/01/2021    Annually Yes   Blood pressure BP Readings from Last 1 Encounters:   04/02/25 122/62    Less than 140/90 Yes   Dilated retinal exam : 10/22/2024 Annually Yes   Foot exam   : 10/08/2024 Annually Yes       Health Maintenance  Health Maintenance Topics with due status: Not Due       Topic Last Completion Date    TETANUS VACCINE 10/02/2018    DEXA Scan 06/04/2024    Lipid Panel 10/02/2024    Diabetes Urine  Screening 10/03/2024    Foot Exam 10/08/2024    Diabetic Eye Exam 10/22/2024    Hemoglobin A1c 03/12/2025     There are no preventive care reminders to display for this patient.            -Patient's lab results were reviewed and discussed with patient  -Treatment options and alternatives were discussed with the patient. Patient expressed understanding. Patient was given the opportunity to ask questions and be an active participant in their medical care. Patient had no further questions or concerns at this time.         Future Appointments   Date Time Provider Department Center   4/14/2025 11:00 AM Mynor Ceja, PT [facility] [place]   4/15/2025 12:30 PM Lorraine Holliday PA-C [facility] [place]   4/16/2025 11:00 AM Mynor Ceja, PT [facility] [place]   4/22/2025 11:00 AM Mynor Ceja, PT [facility] [place]   4/24/2025  9:45 AM Roly Yung PTA [facility] [place]   4/25/2025  9:35 AM LABORATORY, [facility] [place]   4/29/2025  8:15 AM Dominick Humphries MD [facility] RHE[facility] [place]   4/29/2025  9:45 AM Roly Yung PTA [facility] [place]   5/1/2025  9:45 AM Roly Yung PTA [facility] [place]   5/6/2025 11:00 AM Mynor Ceja, PT [facility] [place]   5/8/2025 11:00 AM Mynor Ceja, PT [facility] [place]   6/2/2025 10:00 AM Sarwat Rodriguez MD [facility] [place]   6/19/2025  9:00 AM Enriqueta Patel NP [facility]   6/27/2025 11:00 AM Jessica Ward MD [facility]   8/6/2025  9:30 AM Evelyn Colindres PA-C [facility] [place]          After visit summary printed and given to patient upon discharge.  Patient goals and care plan are included in After visit summary.      The following issues were discussed: The primary encounter diagnosis was PHN (postherpetic neuralgia). Diagnoses of ILD (interstitial lung disease), Hyperlipidemia associated with type 2 diabetes mellitus, Severe  obesity with body mass index (BMI) of 35.0 to 35.9 and comorbidity, Type 2 diabetes mellitus with stage 3a chronic kidney disease, with long-term current use of insulin, and Insomnia, unspecified type were also pertinent to this visit.    Health maintenance needs, recent test results and goals of care discussed with pt and questions answered.           DAREK Rojas, NP-C  [facility] [address] [place] [address]          [1]   Patient Active Problem List  Diagnosis    Acquired hypothyroidism    Hyperlipidemia associated with type 2 diabetes mellitus    Psoriasis    PHN (postherpetic neuralgia)    Type 2 diabetes mellitus with stage 3a chronic kidney disease, with long-term current use of insulin    TALIA on CPAP    Bleb, lung    Diarrhea    Severe obesity with body mass index (BMI) of 35.0 to 35.9 and comorbidity    Weakness of both lower extremities    Decreased functional activity tolerance    Multiple lung nodules on CT    Osteopenia    Osteoarthritis of knee    Aortic atherosclerosis    MCTD (mixed connective tissue disease)    History of mixed connective tissue disease    RADHA positive    Decreased range of motion (ROM) of right knee    Insomnia    ILD (interstitial lung disease)   [2]   Current Outpatient Medications on File Prior to Visit   Medication Sig Dispense Refill    aspirin 81 mg Tab Take by mouth. 1 Tablet Oral 2 times weekly       atorvastatin (LIPITOR) 20 MG tablet TAKE 1 TABLET(20 MG) BY MOUTH EVERY DAY 90 tablet 3    cholecalciferol, vitamin D3, 2,000 unit Tab Take by mouth. 1 Tablet Oral Every day      DEXCOM G7 SENSOR Gretta USE EVERY 10 DAYS 9 each 3    gabapentin (NEURONTIN) 300 MG capsule TAKE 1 CAPSULE(300 MG) BY MOUTH THREE TIMES DAILY 270 capsule 3    insulin glargine U-100, Lantus, (LANTUS SOLOSTAR U-100 INSULIN) 100 unit/mL (3 mL) InPn pen ADMINISTER 10 UNITS UNDER THE SKIN DAILY 9 mL 3    insulin lispro 100 unit/mL pen Titrate up to 100 units daily as  "instructed. 90 mL 3    levothyroxine (SYNTHROID) 112 MCG tablet TAKE 1 TABLET(112 MCG) BY MOUTH BEFORE BREAKFAST 90 tablet 3    lisinopriL (PRINIVIL,ZESTRIL) 2.5 MG tablet TAKE 1 TABLET(2.5 MG) BY MOUTH EVERY DAY 90 tablet 2    meloxicam (MOBIC) 7.5 MG tablet Take 1 tablet (7.5 mg total) by mouth once daily. 30 tablet 1    pen needle, diabetic (BD ULTRA-FINE LAURA PEN NEEDLE) 32 gauge x 5/32" Ndle 1 each by Misc.(Non-Drug; Combo Route) route 4 (four) times daily with meals and nightly. 300 each 3    blood-glucose meter,continuous (DEXCOM G7 ) Misc 1 each by Misc.(Non-Drug; Combo Route) route once. for 1 dose 1 each 0     No current facility-administered medications on file prior to visit.     "

## 2025-04-03 ENCOUNTER — CLINICAL SUPPORT (OUTPATIENT)
Dept: REHABILITATION | Facility: HOSPITAL | Age: 75
End: 2025-04-03
Payer: MEDICARE

## 2025-04-03 DIAGNOSIS — M25.661 DECREASED RANGE OF MOTION (ROM) OF RIGHT KNEE: ICD-10-CM

## 2025-04-03 DIAGNOSIS — R68.89 DECREASED FUNCTIONAL ACTIVITY TOLERANCE: ICD-10-CM

## 2025-04-03 DIAGNOSIS — R29.898 WEAKNESS OF BOTH LOWER EXTREMITIES: Primary | ICD-10-CM

## 2025-04-03 DIAGNOSIS — M17.0 PRIMARY OSTEOARTHRITIS OF BOTH KNEES: ICD-10-CM

## 2025-04-03 PROCEDURE — 97110 THERAPEUTIC EXERCISES: CPT | Mod: CQ

## 2025-04-03 PROCEDURE — 97140 MANUAL THERAPY 1/> REGIONS: CPT | Mod: CQ

## 2025-04-03 PROCEDURE — 97112 NEUROMUSCULAR REEDUCATION: CPT | Mod: CQ

## 2025-04-03 NOTE — PROGRESS NOTES
"  Outpatient Rehab    Physical Therapy Visit    Patient Name: Jaimie Luque  MRN: [id number]  YOB: 1950  Encounter Date: 4/3/2025    Therapy Diagnosis:   Encounter Diagnoses   Name Primary?    Weakness of both lower extremities Yes    Decreased functional activity tolerance     Primary osteoarthritis of both knees     Decreased range of motion (ROM) of right knee      Physician: Sarwat Rodriguez MD    Physician Orders: Eval and Treat  Medical Diagnosis: Primary osteoarthritis of right knee    Visit # / Visits Authorized:  2 / 12  Insurance Authorization Period: 3/29/2025 to 12/31/2025  Date of Evaluation: 3/29/2025  Plan of Care Certification: 3/29/2025 to 6/20/2025     PT/PTA: PTA   Number of PTA visits since last PT visit:2  Time In: 1415   Time Out: 1508  Total Time: 53   Total Billable Time:  53    FOTO:  Intake Score:  %  Survey Score 1:  %  Survey Score 2:  %         Subjective   overall she is having less pain. since last session, she has had pain only 1 x which occurred with a "wrong step". her most painful activity is sitting without support under knee (knee extension stretch) but she continues to perform daily.  Pain reported as 1/10.      Objective            Treatment:       CPT Intervention Performed   Today Duration / Intensity   MT Stm to right knee x   x Right distal quads and hamstrings   Anterior and posterior right knee joint mobs   TE  nustep x  5 minutes        heel slide with overpressure x  X 20 resting as needed      Dorsiflexion stretch   3 x 30 seconds  Quad set x 3  minutes x 5 seconds hold bilateral     Hamstring stretch   3 x 30 seconds sitting right     Knee hang  3 minutes sitting right     Tailgaters   3 minutes 3 pound bilateral              NMR  short arch quads  x  3 x 10  2 pound       long arch quad   3 x 10 bilateral      Bridges  x 3 x 10    Straight leg raise  x 3 x 7 active range of motion left, active assistive range of motion right    clams x 3 x 10 hook lying "                         TA                                                                                                             PLAN                     CPT Codes available for Billing:   (13) minutes of Manual therapy (MT) to improve pain and ROM.  (25) minutes of Therapeutic Exercise (TE) to develop strength, endurance, range of motion, and flexibility.  (15) minutes of Neuromuscular Re-Education (NMR)  to improve: Balance, Coordination, Kinesthetic, Sense, Proprioception, and Posture.  (00) minutes of Therapeutic Activities (TA) to improve functional performance.  Unattended Electrical Stimulation (ES) for muscle performance or pain modulation.  BFR: Blood flow restriction applied during exercise  NP or (-): Not Performed       Time Entry(in minutes):       Assessment & Plan   Assessment: pt is able to be progressed with strengthening exercises iwth no complaints of additional pain. she performed nustep with no complaints unlike last treatment where this activity increased her pain. her right le is weaker than her left and she needs assistance to perform straight leg raise on the right le.       Patient will continue to benefit from skilled outpatient physical therapy to address the deficits listed in the problem list box on initial evaluation, provide pt/family education and to maximize pt's level of independence in the home and community environment.     Patient's spiritual, cultural, and educational needs considered and patient agreeable to plan of care and goals.           Plan: continue with plan as established on evaluation    Goals:   Active       LTG       Pt will report worst pain of 2-3/10 in order to progress toward max functional ability and improve quality of life                Start:  03/28/25    Expected End:  06/20/25            Patient will demonstrate improved function as indicated by a score of greater than or equal to 58 out of 100 on FOTO.                Start:  03/28/25    Expected  End:  06/20/25            Patient will improve AROM to normal limits in order to return to maximal functional potential and improve quality of life.         Start:  03/28/25    Expected End:  06/20/25            Patient will improve strength to at least 4+/5 grossly,  in order to improve functional independence and quality of life.         Start:  03/28/25    Expected End:  06/20/25            Patient will be able to participate in her own gym program focused on LE strengthening without exacerbating her symptoms to improve her QOL.        Start:  03/28/25    Expected End:  06/20/25               STG       Pt will report worst pain of 5-6/10 in order to progress toward max functional ability and improve quality of life                Start:  03/28/25    Expected End:  05/09/25            Patient will demonstrate improved function as indicated by a score of greater than or equal to 53 out of 100 on FOTO.                Start:  03/28/25    Expected End:  05/09/25            Patient will improve AROM to 50% of stated goals, listed in objective measures above, in order to progress towards independence with functional activities.         Start:  03/28/25    Expected End:  05/09/25            Patient will improve strength by 1/2 a grade, in order to progress towards independence with functional activities.                Start:  03/28/25    Expected End:  05/09/25            Patient will demonstrate independence with HEP in order to progress toward functional independence.        Start:  03/28/25    Expected End:  05/09/25                Roly Chaney, NIKIA

## 2025-04-08 ENCOUNTER — CLINICAL SUPPORT (OUTPATIENT)
Dept: REHABILITATION | Facility: HOSPITAL | Age: 75
End: 2025-04-08
Payer: MEDICARE

## 2025-04-08 DIAGNOSIS — M17.11 OSTEOARTHRITIS OF RIGHT KNEE, UNSPECIFIED OSTEOARTHRITIS TYPE: ICD-10-CM

## 2025-04-08 DIAGNOSIS — R29.898 WEAKNESS OF BOTH LOWER EXTREMITIES: Primary | ICD-10-CM

## 2025-04-08 DIAGNOSIS — M25.661 DECREASED RANGE OF MOTION (ROM) OF RIGHT KNEE: ICD-10-CM

## 2025-04-08 DIAGNOSIS — R68.89 DECREASED FUNCTIONAL ACTIVITY TOLERANCE: ICD-10-CM

## 2025-04-08 PROCEDURE — 97112 NEUROMUSCULAR REEDUCATION: CPT

## 2025-04-08 PROCEDURE — 97140 MANUAL THERAPY 1/> REGIONS: CPT

## 2025-04-08 PROCEDURE — 97110 THERAPEUTIC EXERCISES: CPT

## 2025-04-08 NOTE — PROGRESS NOTES
"  Outpatient Rehab    Physical Therapy Visit    Patient Name: Jaimie Luque  MRN: [id number]  YOB: 1950  Encounter Date: 4/8/2025    Therapy Diagnosis:   Encounter Diagnoses   Name Primary?    Weakness of both lower extremities Yes    Decreased functional activity tolerance     Osteoarthritis of right knee, unspecified osteoarthritis type     Decreased range of motion (ROM) of right knee      Physician: Sarwat Rodriguez MD    Physician Orders: Eval and Treat  Medical Diagnosis: Primary osteoarthritis of right knee    Visit # / Visits Authorized:  3 / 12  Insurance Authorization Period: 3/29/2025 to 12/31/2025  Date of Evaluation: 3/29/2025  Plan of Care Certification: 3/29/2025 to 6/20/2025     PT/PTA:     Number of PTA visits since last PT visit:   Time In: 1000   Time Out: 1100  Total Time: 60   Total Billable Time: 55    FOTO:  Intake Score:  %  Survey Score 1:  %  Survey Score 2:  %         Subjective   She had some pain in her knee following some exercises she did on her own but feels like therapy has been helping to loosen her up..  Pain reported as 1/10.      Objective            Treatment:       CPT Intervention Performed   Today Duration / Intensity   MT Stm to right knee x   x Right distal quads and hamstrings   Anterior and posterior right knee joint mobs   TE  nustep x  5 minutes        heel slide with overpressure x  X 20 resting as needed      Dorsiflexion stretch   3 x 30 seconds  Quad set x 3  minutes x 5 seconds hold bilateral     Hamstring stretch  x 3 x 30 seconds sitting right     Knee hang  3 minutes sitting right     Standing Heel Raises x 3x10     Tailgaters  x 3 minutes 3 pound bilateral       Leg Press x  65# 3x10    NMR  short arch quads  x  3 x 10  2 pound       long arch quad  x 3 x 10 bilateral      Bridges  x 3 x 10    Straight leg raise  x 3 x 7 active range of motion left, active assistive range of motion right    clams x 3 x 10 hook lying    Sit to stands x 22" 3x10      "               TA                                                                                                             PLAN                     CPT Codes available for Billing:   (10) minutes of Manual therapy (MT) to improve pain and ROM.  (25) minutes of Therapeutic Exercise (TE) to develop strength, endurance, range of motion, and flexibility.  (20) minutes of Neuromuscular Re-Education (NMR)  to improve: Balance, Coordination, Kinesthetic, Sense, Proprioception, and Posture.  (00) minutes of Therapeutic Activities (TA) to improve functional performance.  Unattended Electrical Stimulation (ES) for muscle performance or pain modulation.  BFR: Blood flow restriction applied during exercise  NP or (-): Not Performed       Time Entry(in minutes):       Assessment & Plan   Assessment: Patient tolerated this session well we progressed her plan with leg press and sit to stand today we will monitor her response to guage if continued progression will be tolerated at this time.  Evaluation/Treatment Tolerance: Patient tolerated treatment well    Patient will continue to benefit from skilled outpatient physical therapy to address the deficits listed in the problem list box on initial evaluation, provide pt/family education and to maximize pt's level of independence in the home and community environment.     Patient's spiritual, cultural, and educational needs considered and patient agreeable to plan of care and goals.           Plan: Continue Plan of Care (POC) and progress per patient tolerance. See treatment section for details on planned progressions next session.    Goals:   Active       LTG       Pt will report worst pain of 2-3/10 in order to progress toward max functional ability and improve quality of life                Start:  03/28/25    Expected End:  06/20/25            Patient will demonstrate improved function as indicated by a score of greater than or equal to 58 out of 100 on FOTO.                 Start:  03/28/25    Expected End:  06/20/25            Patient will improve AROM to normal limits in order to return to maximal functional potential and improve quality of life.         Start:  03/28/25    Expected End:  06/20/25            Patient will improve strength to at least 4+/5 grossly,  in order to improve functional independence and quality of life.         Start:  03/28/25    Expected End:  06/20/25            Patient will be able to participate in her own gym program focused on LE strengthening without exacerbating her symptoms to improve her QOL.        Start:  03/28/25    Expected End:  06/20/25               STG       Pt will report worst pain of 5-6/10 in order to progress toward max functional ability and improve quality of life                Start:  03/28/25    Expected End:  05/09/25            Patient will demonstrate improved function as indicated by a score of greater than or equal to 53 out of 100 on FOTO.                Start:  03/28/25    Expected End:  05/09/25            Patient will improve AROM to 50% of stated goals, listed in objective measures above, in order to progress towards independence with functional activities.         Start:  03/28/25    Expected End:  05/09/25            Patient will improve strength by 1/2 a grade, in order to progress towards independence with functional activities.                Start:  03/28/25    Expected End:  05/09/25            Patient will demonstrate independence with HEP in order to progress toward functional independence.        Start:  03/28/25    Expected End:  05/09/25                Mynor Ceja, PT

## 2025-04-09 DIAGNOSIS — E11.65 UNCONTROLLED TYPE 2 DIABETES MELLITUS WITH HYPERGLYCEMIA: ICD-10-CM

## 2025-04-09 RX ORDER — SEMAGLUTIDE 2.68 MG/ML
2 INJECTION, SOLUTION SUBCUTANEOUS
Qty: 9 ML | Refills: 3 | Status: SHIPPED | OUTPATIENT
Start: 2025-04-09

## 2025-04-10 ENCOUNTER — CLINICAL SUPPORT (OUTPATIENT)
Dept: REHABILITATION | Facility: HOSPITAL | Age: 75
End: 2025-04-10
Payer: MEDICARE

## 2025-04-10 DIAGNOSIS — R68.89 DECREASED FUNCTIONAL ACTIVITY TOLERANCE: ICD-10-CM

## 2025-04-10 DIAGNOSIS — M17.11 OSTEOARTHRITIS OF RIGHT KNEE, UNSPECIFIED OSTEOARTHRITIS TYPE: ICD-10-CM

## 2025-04-10 DIAGNOSIS — R29.898 WEAKNESS OF BOTH LOWER EXTREMITIES: Primary | ICD-10-CM

## 2025-04-10 DIAGNOSIS — M25.661 DECREASED RANGE OF MOTION (ROM) OF RIGHT KNEE: ICD-10-CM

## 2025-04-10 PROBLEM — J84.9 ILD (INTERSTITIAL LUNG DISEASE): Status: ACTIVE | Noted: 2025-04-10

## 2025-04-10 PROBLEM — G47.00 INSOMNIA: Status: ACTIVE | Noted: 2025-04-10

## 2025-04-10 PROCEDURE — 97112 NEUROMUSCULAR REEDUCATION: CPT

## 2025-04-10 PROCEDURE — 97110 THERAPEUTIC EXERCISES: CPT

## 2025-04-10 NOTE — PROGRESS NOTES
"  Outpatient Rehab    Physical Therapy Visit    Patient Name: Jaimie Luque  MRN: [id number]  YOB: 1950  Encounter Date: 4/10/2025    Therapy Diagnosis:   Encounter Diagnoses   Name Primary?    Weakness of both lower extremities Yes    Decreased functional activity tolerance     Osteoarthritis of right knee, unspecified osteoarthritis type     Decreased range of motion (ROM) of right knee      Physician: Sarwat Rodriguez MD    Physician Orders: Eval and Treat  Medical Diagnosis: Primary osteoarthritis of right knee    Visit # / Visits Authorized:  4 / 12  Insurance Authorization Period: 3/29/2025 to 12/31/2025  Date of Evaluation: 3/29/2025  Plan of Care Certification: 3/29/2025 to 6/20/2025     PT/PTA:     Number of PTA visits since last PT visit:   Time In: 1055   Time Out: 1154  Total Time: 59   Total Billable Time: 55    FOTO:  Intake Score:  %  Survey Score 1:  %  Survey Score 2:  %         Subjective   She is feeling okay today no major reports of pain, she has been going back to the gym..  Pain reported as 1/10.      Objective            Treatment:       CPT Intervention Performed   Today Duration / Intensity   MT Stm to right knee -   x Right distal quads and hamstrings   Anterior and posterior right knee joint mobs   TE  Recumbent bike x 8 minutes        heel slide with overpressure x  5 minutes     Dorsiflexion stretch   3 x 30 seconds  Quad set x 3  minutes x 5 seconds hold bilateral     Hamstring stretch  x 3 x 30 seconds sitting right     Knee hang  3 minutes sitting right     Standing Heel Raises x 3x10     Tailgaters  x 3 minutes 7 pound bilateral       Leg Press x  65# 3x10     Knee Flexion matrix x 25# 3x10    Knee Extension Matrix x 15# 3x10    NMR  short arch quads  x  3 x 10  5# pound       long arch quad  x 3 x 10 bilateral  5#    Bridges  x 3 x 10 5#    Straight leg raise  x 3 x 10     clams x 3 x 10 hook lying    Sit to stands x 22" 3x10     Sidelying hip abduction x 3x10 each " side              TA                                                                                                             PLAN                     CPT Codes available for Billing:   (00) minutes of Manual therapy (MT) to improve pain and ROM.  (30) minutes of Therapeutic Exercise (TE) to develop strength, endurance, range of motion, and flexibility.  (25) minutes of Neuromuscular Re-Education (NMR)  to improve: Balance, Coordination, Kinesthetic, Sense, Proprioception, and Posture.  (00) minutes of Therapeutic Activities (TA) to improve functional performance.  Unattended Electrical Stimulation (ES) for muscle performance or pain modulation.  BFR: Blood flow restriction applied during exercise  NP or (-): Not Performed       Time Entry(in minutes):       Assessment & Plan   Assessment: patient tolerated this session well we added in knee matrix focusing on hamstring and quad strength with good tolerance overall. She was instructed to add these activites back to her gym routine at low levels to guage how hwer knee respondes to the load.  Evaluation/Treatment Tolerance: Patient tolerated treatment well    Patient will continue to benefit from skilled outpatient physical therapy to address the deficits listed in the problem list box on initial evaluation, provide pt/family education and to maximize pt's level of independence in the home and community environment.     Patient's spiritual, cultural, and educational needs considered and patient agreeable to plan of care and goals.           Plan: Continue Plan of Care (POC) and progress per patient tolerance. See treatment section for details on planned progressions next session.    Goals:   Active       LTG       Pt will report worst pain of 2-3/10 in order to progress toward max functional ability and improve quality of life                Start:  03/28/25    Expected End:  06/20/25            Patient will demonstrate improved function as indicated by a score of  greater than or equal to 58 out of 100 on FOTO.                Start:  03/28/25    Expected End:  06/20/25            Patient will improve AROM to normal limits in order to return to maximal functional potential and improve quality of life.         Start:  03/28/25    Expected End:  06/20/25            Patient will improve strength to at least 4+/5 grossly,  in order to improve functional independence and quality of life.         Start:  03/28/25    Expected End:  06/20/25            Patient will be able to participate in her own gym program focused on LE strengthening without exacerbating her symptoms to improve her QOL.        Start:  03/28/25    Expected End:  06/20/25               STG       Pt will report worst pain of 5-6/10 in order to progress toward max functional ability and improve quality of life                Start:  03/28/25    Expected End:  05/09/25            Patient will demonstrate improved function as indicated by a score of greater than or equal to 53 out of 100 on FOTO.                Start:  03/28/25    Expected End:  05/09/25            Patient will improve AROM to 50% of stated goals, listed in objective measures above, in order to progress towards independence with functional activities.         Start:  03/28/25    Expected End:  05/09/25            Patient will improve strength by 1/2 a grade, in order to progress towards independence with functional activities.                Start:  03/28/25    Expected End:  05/09/25            Patient will demonstrate independence with HEP in order to progress toward functional independence.        Start:  03/28/25    Expected End:  05/09/25                Mynor Ceja, PT

## 2025-04-14 ENCOUNTER — CLINICAL SUPPORT (OUTPATIENT)
Dept: REHABILITATION | Facility: HOSPITAL | Age: 75
End: 2025-04-14
Payer: MEDICARE

## 2025-04-14 DIAGNOSIS — M25.661 DECREASED RANGE OF MOTION (ROM) OF RIGHT KNEE: ICD-10-CM

## 2025-04-14 DIAGNOSIS — M17.11 OSTEOARTHRITIS OF RIGHT KNEE, UNSPECIFIED OSTEOARTHRITIS TYPE: ICD-10-CM

## 2025-04-14 DIAGNOSIS — R29.898 WEAKNESS OF BOTH LOWER EXTREMITIES: Primary | ICD-10-CM

## 2025-04-14 DIAGNOSIS — R68.89 DECREASED FUNCTIONAL ACTIVITY TOLERANCE: ICD-10-CM

## 2025-04-14 PROCEDURE — 97112 NEUROMUSCULAR REEDUCATION: CPT

## 2025-04-14 NOTE — PROGRESS NOTES
"  Outpatient Rehab    Physical Therapy Visit    Patient Name: Jaimie Luque  MRN: [id number]  YOB: 1950  Encounter Date: 4/14/2025    Therapy Diagnosis:   Encounter Diagnoses   Name Primary?    Weakness of both lower extremities Yes    Decreased functional activity tolerance     Osteoarthritis of right knee, unspecified osteoarthritis type     Decreased range of motion (ROM) of right knee        Physician: Sarwat Rodriguez MD    Physician Orders: Eval and Treat  Medical Diagnosis: Primary osteoarthritis of right knee    Visit # / Visits Authorized:  5 / 12  Insurance Authorization Period: 3/29/2025 to 12/31/2025  Date of Evaluation: 3/29/2025  Plan of Care Certification: 3/29/2025 to 6/20/2025     PT/PTA:     Number of PTA visits since last PT visit:   Time In: 1100   Time Out: 1200  Total Time: 60   Total Billable Time: 30    FOTO:  Intake Score:  %  Survey Score 1:  %  Survey Score 2:  %         Subjective   She is feeling pretty good today just a little stiff..  Pain reported as 1/10.      Objective            Treatment:       CPT Intervention Performed   Today Duration / Intensity   MT Stm to right knee -   x Right distal quads and hamstrings   Anterior and posterior right knee joint mobs   TE  Recumbent bike x 8 minutes        heel slide with overpressure x  5 minutes     Dorsiflexion stretch   3 x 30 seconds  Quad set - 3  minutes x 5 seconds hold bilateral     Hamstring stretch  x 3 x 30 seconds sitting right     Knee hang  3 minutes sitting right     Standing Heel Raises x 3x10     Tailgaters  x 3 minutes 7 pound bilateral       Leg Press x  65# 3x10     Knee Flexion matrix x 25# 3x10    Knee Extension Matrix x 15# 3x10    NMR  short arch quads  x  3 x 10  5# pound       long arch quad  x 3 x 10 bilateral  5#    Bridges  x 3 x 10 5#    Straight leg raise  x 3 x 10     clams x 3 x 10 hook lying RTB    Sit to stands x 22" 3x10     Sidelying hip abduction x 3x10 each side RTB             TA       "                                                                                                       PLAN                     CPT Codes available for Billing:   (00) minutes of Manual therapy (MT) to improve pain and ROM.  (30) minutes of Therapeutic Exercise (TE) to develop strength, endurance, range of motion, and flexibility.  (25) minutes of Neuromuscular Re-Education (NMR)  to improve: Balance, Coordination, Kinesthetic, Sense, Proprioception, and Posture.  (00) minutes of Therapeutic Activities (TA) to improve functional performance.  Unattended Electrical Stimulation (ES) for muscle performance or pain modulation.  BFR: Blood flow restriction applied during exercise  NP or (-): Not Performed       Time Entry(in minutes):       Assessment & Plan   Assessment: Patient tolerated this session well we progressed some of her lateral hip exercises in resistance today. She was able to maintain good lumboplevic coordination with resistance increase and needed minimal need for cueing.  Evaluation/Treatment Tolerance: Patient tolerated treatment well    Patient will continue to benefit from skilled outpatient physical therapy to address the deficits listed in the problem list box on initial evaluation, provide pt/family education and to maximize pt's level of independence in the home and community environment.     Patient's spiritual, cultural, and educational needs considered and patient agreeable to plan of care and goals.           Plan: Continue Plan of Care (POC) and progress per patient tolerance. See treatment section for details on planned progressions next session.    Goals:   Active       LTG       Pt will report worst pain of 2-3/10 in order to progress toward max functional ability and improve quality of life                Start:  03/28/25    Expected End:  06/20/25            Patient will demonstrate improved function as indicated by a score of greater than or equal to 58 out of 100 on FOTO.                 Start:  03/28/25    Expected End:  06/20/25            Patient will improve AROM to normal limits in order to return to maximal functional potential and improve quality of life.         Start:  03/28/25    Expected End:  06/20/25            Patient will improve strength to at least 4+/5 grossly,  in order to improve functional independence and quality of life.         Start:  03/28/25    Expected End:  06/20/25            Patient will be able to participate in her own gym program focused on LE strengthening without exacerbating her symptoms to improve her QOL.        Start:  03/28/25    Expected End:  06/20/25               STG       Pt will report worst pain of 5-6/10 in order to progress toward max functional ability and improve quality of life                Start:  03/28/25    Expected End:  05/09/25            Patient will demonstrate improved function as indicated by a score of greater than or equal to 53 out of 100 on FOTO.                Start:  03/28/25    Expected End:  05/09/25            Patient will improve AROM to 50% of stated goals, listed in objective measures above, in order to progress towards independence with functional activities.         Start:  03/28/25    Expected End:  05/09/25            Patient will improve strength by 1/2 a grade, in order to progress towards independence with functional activities.                Start:  03/28/25    Expected End:  05/09/25            Patient will demonstrate independence with HEP in order to progress toward functional independence.        Start:  03/28/25    Expected End:  05/09/25                Mynor Ceja, PT

## 2025-04-15 ENCOUNTER — OFFICE VISIT (OUTPATIENT)
Dept: DIABETES | Facility: CLINIC | Age: 75
End: 2025-04-15
Payer: MEDICARE

## 2025-04-15 DIAGNOSIS — E11.65 UNCONTROLLED TYPE 2 DIABETES MELLITUS WITH HYPERGLYCEMIA: Primary | ICD-10-CM

## 2025-04-15 DIAGNOSIS — E03.9 ACQUIRED HYPOTHYROIDISM: ICD-10-CM

## 2025-04-15 DIAGNOSIS — E11.36 CATARACT ASSOCIATED WITH TYPE 2 DIABETES MELLITUS: ICD-10-CM

## 2025-04-15 DIAGNOSIS — G47.33 OSA ON CPAP: ICD-10-CM

## 2025-04-15 DIAGNOSIS — E66.9 OBESITY (BMI 30-39.9): ICD-10-CM

## 2025-04-15 DIAGNOSIS — E11.69 HYPERLIPIDEMIA ASSOCIATED WITH TYPE 2 DIABETES MELLITUS: ICD-10-CM

## 2025-04-15 DIAGNOSIS — E78.5 HYPERLIPIDEMIA ASSOCIATED WITH TYPE 2 DIABETES MELLITUS: ICD-10-CM

## 2025-04-15 RX ORDER — TIRZEPATIDE 5 MG/.5ML
5 INJECTION, SOLUTION SUBCUTANEOUS
Qty: 4 PEN | Refills: 11 | Status: SHIPPED | OUTPATIENT
Start: 2025-04-15

## 2025-04-15 NOTE — PATIENT INSTRUCTIONS
CURRENT DM MEDICATIONS:   Lantus 8 units daily   Mounjaro 5 mg weekly - replaces Ozempic  Humalog meal size dosing TID wm  Humalog correction dosing every 3 hours as below    Meal Size:   6 units with small carb meal  10 units with regular carb meal  12 units with heavier carb meal  3 units with snack    Humalog Correction Dosing every 3 hours as needed OUTSIDE OF EATING  If  - 250, may take 2 units of Humalog  If  - 300, may take 4 units of Humalog   If  - 350, may take 6 units of Humalog  If  - 400, may take 8 units of Humalog  If +, may take 10 units of Humalog      Mounjaro Injection Technique Video:   https://youtu.[email or web address]

## 2025-04-15 NOTE — Clinical Note
"A1c mid June  Virtual f/u 1 week after June A1c "Dex sharing"   Florencia, will you see if Dave needs prior auth "

## 2025-04-15 NOTE — PROGRESS NOTES
PCP: Jessica Ward MD    Subjective:     Chief Complaint: Diabetes - Established Patient    Established Patient - Audio Only Telehealth Visit     The patient location is: Home  The chief complaint leading to consultation is: Diabetes follow up  Visit type: Virtual visit with audio only (telephone)  Total Time Spent in Medical Discussion: 16 minutes     The reason for the audio only service rather than synchronous audio and video virtual visit was related to technical difficulties or patient preference/necessity.     Each patient to whom I provide medical services by telemedicine is:  (1) informed of the relationship between the physician and patient and the respective role of any other health care provider with respect to management of the patient; and (2) notified that they may decline to receive medical services by telemedicine and may withdraw from such care at any time. Patient verbally consented to receive this service via voice-only telephone call.    This service was not originating from a related E/M service provided within the previous 7 days nor will  to an E/M service or procedure within the next 24 hours or my soonest available appointment.  Prevailing standard of care was able to be met in this audio-only visit.      HISTORY OF PRESENT ILLNESS: 75 y.o.   female presenting for diabetes management visit.   The patient's last visit with me was on 3/12/2025.   Patient has had Type II diabetes since 20 or more years.  Pertinent to decision making is the following comorbidities: HLD, Hypothyroidism and Obesity by BMI  Patient has the following Diabetes complications: without complications  She has attended diabetes education in the past.     Patient's most recent A1c of 7.4% was completed 1 months ago.   Patient states since Her last A1c Her blood glucose levels have been both high and low throughout the day .   Patient monitors blood glucose 4 times per day and Continuously with  personal CGM Dexcom.   Patient blood glucose monitoring device will be uploaded into [employer] [facility] today.        Interpretation of CGM as following baseline euglycemia with some postprandial hypoglycemia with taking meal time dose late and with 10u for small carb meal and some hyperglycemia related to missed meal time dosing.      Patient endorses the following diabetes related symptoms:  None .   Patient is due today for the following diabetes-related health maintenance standards: None - up to date.   She denies any recent hospital admissions or emergency room visits. Patient denies history of DKA.   She voices having hypoglycemia as above..   Patient's concerns today include glycemic control.    Patient medication regimen is as below.      CURRENT DM MEDICATIONS:   Lantus 10 units daily   Ozempic 2 mg weekly - 1 dose left  Humalog meal size dosing TID wm  Humalog correction dosing every 3 hours as below    Meal Size:   10 units with small carb meal  12 units with regular carb meal  14 units with heavier carb meal  5 units with snack    Humalog Correction Dosing every 3 hours as needed OUTSIDE OF EATING  If  - 250, may take 2 units of Humalog  If  - 300, may take 4 units of Humalog   If  - 350, may take 6 units of Humalog  If  - 400, may take 8 units of Humalog  If +, may take 10 units of Humalog      Patient has failed the following Diabetes medications:   Metformin - GI   Invokana - coverage  Jardiance - yeast infection  Glyburide / Glipizide  Victoza   Basal - Basaglar  Trulicity - GLP-1 escalation      Labs Reviewed.       Lab Results   Component Value Date    CPEPTIDE 2.35 04/01/2021     Lab Results   Component Value Date    GLUTAMICACID 0.00 05/25/2017          //   , There is no height or weight on file to calculate BMI.  Her blood sugar in clinic today is:    Lab Results   Component Value Date    POCGLU 96 03/12/2025       Review of Systems   Constitutional:  Negative for  activity change, appetite change, chills and fever.   HENT:  Negative for dental problem, mouth sores, nosebleeds, sore throat and trouble swallowing.    Eyes:  Negative for pain and discharge.   Respiratory:  Negative for shortness of breath, wheezing and stridor.    Cardiovascular:  Negative for chest pain, palpitations and leg swelling.   Gastrointestinal:  Negative for abdominal pain, diarrhea, nausea and vomiting.   Endocrine: Negative for polydipsia, polyphagia and polyuria.   Genitourinary:  Negative for dysuria, frequency and urgency.   Musculoskeletal:  Negative for joint swelling and myalgias.   Skin:  Negative for rash and wound.   Neurological:  Negative for dizziness, syncope, weakness and headaches.   Psychiatric/Behavioral:  Negative for behavioral problems and dysphoric mood.          Diabetes Management Status  Statin: Taking  ACE/ARB: Taking    Screening or Prevention Patient's value Goal Complete/Controlled?   HgA1C Testing and Control   Lab Results   Component Value Date    HGBA1C 7.4 (H) 03/12/2025      Annually/Less than 8% No   Lipid profile : 10/02/2024 Annually Yes   LDL control Lab Results   Component Value Date    LDLCALC 49.4 (L) 10/02/2024    Annually/Less than 100 mg/dl  Yes   Nephropathy screening Lab Results   Component Value Date    MICALBCREAT Unable to calculate 10/03/2024    MICALBCREAT Unable to calculate 10/03/2024     Lab Results   Component Value Date    PROTEINUA Negative 04/01/2021    Annually Yes   Blood pressure BP Readings from Last 1 Encounters:   04/02/25 122/62    Less than 140/90 Yes   Dilated retinal exam : 10/22/2024 Annually Yes    Foot exam   : 10/08/2024 Annually Yes     ACTIVITY LEVEL: Moderately Active. Discussed activities, benefits, methods, and precautions.  MEAL PLANNING: Patient reports number of meals per day to be 3 and number of snacks per day to be 2.   Patient is encouraged to carb count and consume no more than 30 - 45 grams of carbohydrates in each  meal and 15 grams of carbohydrates in each snack.     Social History     Socioeconomic History    Marital status:    Tobacco Use    Smoking status: Never    Smokeless tobacco: Never   Substance and Sexual Activity    Alcohol use: Yes     Comment: rare    Drug use: No    Sexual activity: Yes     Partners: Male   Social History Narrative    . Lives with spouse. Has 3 children. Patient retired as  for [employer].      Social Drivers of Health     Financial Resource Strain: Low Risk  (9/25/2024)    Overall Financial Resource Strain (CARDIA)     Difficulty of Paying Living Expenses: Not hard at all   Food Insecurity: No Food Insecurity (9/25/2024)    Hunger Vital Sign     Worried About Running Out of Food in the Last Year: Never true     Ran Out of Food in the Last Year: Never true   Transportation Needs: No Transportation Needs (4/30/2024)    PRAPARE - Transportation     Lack of Transportation (Medical): No     Lack of Transportation (Non-Medical): No   Physical Activity: Insufficiently Active (9/25/2024)    Exercise Vital Sign     Days of Exercise per Week: 3 days     Minutes of Exercise per Session: 30 min   Stress: No Stress Concern Present (9/25/2024)    [employer] [place] [employer] - Occupational Stress Questionnaire     Feeling of Stress : Only a little   Housing Stability: Low Risk  (11/30/2023)    Housing Stability Vital Sign     Unable to Pay for Housing in the Last Year: No     Number of Places Lived in the Last Year: 1     Unstable Housing in the Last Year: No     Past Medical History:   Diagnosis Date    Diabetes mellitus type II     Hyperlipidemia     Hypertension     Hypothyroid     TALIA (obstructive sleep apnea)     on CPAP    Osteopenia     Psoriasis        Objective:        Physical Exam  Neurological:      Mental Status: She is alert and oriented to person, place, and time. Mental status is at baseline.   Psychiatric:         Mood and Affect: Mood normal.          Behavior: Behavior normal.         Thought Content: Thought content normal.         Judgment: Judgment normal.           Assessment / Plan:     Uncontrolled type 2 diabetes mellitus with hyperglycemia  -     Hemoglobin A1C; Future; Expected date: 04/15/2025  -     tirzepatide (MOUNJARO) 5 mg/0.5 mL PnIj; Inject 5 mg into the skin every 7 days.  Dispense: 4 Pen; Refill: 11    Cataract associated with type 2 diabetes mellitus    Acquired hypothyroidism    Hyperlipidemia associated with type 2 diabetes mellitus    TALIA on CPAP    Obesity (BMI 30-39.9)          Additional Plan Details:    - POCT Glucose  - Encouraged continuation of lifestyle changes including regular exercise and limiting carbohydrates to 30-45 grams per meal threes times daily and 15 grams per snack with a limit of two daily.   - Encouraged continued monitoring of blood glucose with maintenance of 4 times daily and Continuously with personal CGM Dexcom.    - Current DM Medication Regimen: Change Lantus 8 units daily. Change Ozempic to Mounjaro 5 mg weekly. Change Humalog meal size dosing TID wm and correction dosing every 3 hours as below. ADJUST INSULIN FREQUENTLY BASED ON BLOOD SUGAR.   - Health Maintenance standards addressed today: A1c to be scheduled  - Nursing Visit: Patient is below goal range for nursing visit for age group and  qualifies for nursing visit, but will defer for now.  .   - Follow up in 8 weeks.    CURRENT DM MEDICATIONS:   Lantus 8 units daily   Mounjaro 5 mg weekly - replaces Ozempic  Humalog meal size dosing TID wm  Humalog correction dosing every 3 hours as below    Meal Size:   6 units with small carb meal  10 units with regular carb meal  12 units with heavier carb meal  3 units with snack    Humalog Correction Dosing every 3 hours as needed OUTSIDE OF EATING  If  - 250, may take 2 units of Humalog  If  - 300, may take 4 units of Humalog   If  - 350, may take 6 units of Humalog  If  - 400, may take  8 units of Humalog  If +, may take 10 units of Humalog          Blakeney McKnight, PA-C [facility]

## 2025-04-16 ENCOUNTER — CLINICAL SUPPORT (OUTPATIENT)
Dept: REHABILITATION | Facility: HOSPITAL | Age: 75
End: 2025-04-16
Payer: MEDICARE

## 2025-04-16 DIAGNOSIS — M25.661 DECREASED RANGE OF MOTION (ROM) OF RIGHT KNEE: ICD-10-CM

## 2025-04-16 DIAGNOSIS — R68.89 DECREASED FUNCTIONAL ACTIVITY TOLERANCE: ICD-10-CM

## 2025-04-16 DIAGNOSIS — R29.898 WEAKNESS OF BOTH LOWER EXTREMITIES: Primary | ICD-10-CM

## 2025-04-16 DIAGNOSIS — M17.11 OSTEOARTHRITIS OF RIGHT KNEE, UNSPECIFIED OSTEOARTHRITIS TYPE: ICD-10-CM

## 2025-04-16 PROCEDURE — 97110 THERAPEUTIC EXERCISES: CPT

## 2025-04-16 PROCEDURE — 97112 NEUROMUSCULAR REEDUCATION: CPT

## 2025-04-16 NOTE — PROGRESS NOTES
"  Outpatient Rehab    Physical Therapy Visit    Patient Name: Jaimie Luque  MRN: [id number]  YOB: 1950  Encounter Date: 4/16/2025    Therapy Diagnosis:   Encounter Diagnoses   Name Primary?    Weakness of both lower extremities Yes    Decreased functional activity tolerance     Osteoarthritis of right knee, unspecified osteoarthritis type     Decreased range of motion (ROM) of right knee          Physician: Sarwat Rodriguez MD    Physician Orders: Eval and Treat  Medical Diagnosis: Primary osteoarthritis of right knee    Visit # / Visits Authorized:  6 / 12  Insurance Authorization Period: 3/29/2025 to 12/31/2025  Date of Evaluation: 3/29/2025  Plan of Care Certification: 3/29/2025 to 6/20/2025     PT/PTA:     Number of PTA visits since last PT visit:   Time In: 1100   Time Out: 1200  Total Time: 60   Total Billable Time: 45    FOTO:  Intake Score:  %  Survey Score 1:  %  Survey Score 2:  %         Subjective   She is feeling okay has noticed any significant pain increase with any exercises so far after therapy..  Pain reported as 1/10.      Objective            Treatment:       CPT Intervention Performed   Today Duration / Intensity   MT Stm to right knee -   x Right distal quads and hamstrings   Anterior and posterior right knee joint mobs   TE  Recumbent bike x 8 minutes        heel slide with overpressure x  5 minutes     Dorsiflexion stretch   3 x 30 seconds  Quad set - 3  minutes x 5 seconds hold bilateral     Hamstring stretch  x 3 x 30 seconds sitting right     Knee hang  3 minutes sitting right     Standing Heel Raises x 3x10     Tailgaters  x 3 minutes 7 pound bilateral       Leg Press x  65# 3x10     Knee Flexion matrix x 25# 3x10    Knee Extension Matrix x 17# 3x10    NMR  short arch quads  x  3 x 10  5# pound       long arch quad  x 3 x 10 bilateral  5#    Bridges  x 3 x 10     Straight leg raise  x 3 x 10     clams x 3 x 10 hook lying RTB    Sit to stands- with TRX x 20" 3x10     " Sidelying hip abduction x 3x10 each side RTB             TA                                                                                                             PLAN                     CPT Codes available for Billing:   (00) minutes of Manual therapy (MT) to improve pain and ROM.  (30) minutes of Therapeutic Exercise (TE) to develop strength, endurance, range of motion, and flexibility.  (25) minutes of Neuromuscular Re-Education (NMR)  to improve: Balance, Coordination, Kinesthetic, Sense, Proprioception, and Posture.  (00) minutes of Therapeutic Activities (TA) to improve functional performance.  Unattended Electrical Stimulation (ES) for muscle performance or pain modulation.  BFR: Blood flow restriction applied during exercise  NP or (-): Not Performed       Time Entry(in minutes):       Assessment & Plan   Assessment: Patient tolerated this session well we progressed her sit to stands with a lower box and using the TRX for balance support. Patient needed some cueing for increasing knee flexion to encourage stable base of support with squatting  Evaluation/Treatment Tolerance: Patient tolerated treatment well    Patient will continue to benefit from skilled outpatient physical therapy to address the deficits listed in the problem list box on initial evaluation, provide pt/family education and to maximize pt's level of independence in the home and community environment.     Patient's spiritual, cultural, and educational needs considered and patient agreeable to plan of care and goals.           Plan: Continue Plan of Care (POC) and progress per patient tolerance. See treatment section for details on planned progressions next session.    Goals:   Active       LTG       Pt will report worst pain of 2-3/10 in order to progress toward max functional ability and improve quality of life                Start:  03/28/25    Expected End:  06/20/25            Patient will demonstrate improved function as indicated  by a score of greater than or equal to 58 out of 100 on FOTO.                Start:  03/28/25    Expected End:  06/20/25            Patient will improve AROM to normal limits in order to return to maximal functional potential and improve quality of life.         Start:  03/28/25    Expected End:  06/20/25            Patient will improve strength to at least 4+/5 grossly,  in order to improve functional independence and quality of life.         Start:  03/28/25    Expected End:  06/20/25            Patient will be able to participate in her own gym program focused on LE strengthening without exacerbating her symptoms to improve her QOL.        Start:  03/28/25    Expected End:  06/20/25               STG       Pt will report worst pain of 5-6/10 in order to progress toward max functional ability and improve quality of life                Start:  03/28/25    Expected End:  05/09/25            Patient will demonstrate improved function as indicated by a score of greater than or equal to 53 out of 100 on FOTO.                Start:  03/28/25    Expected End:  05/09/25            Patient will improve AROM to 50% of stated goals, listed in objective measures above, in order to progress towards independence with functional activities.         Start:  03/28/25    Expected End:  05/09/25            Patient will improve strength by 1/2 a grade, in order to progress towards independence with functional activities.                Start:  03/28/25    Expected End:  05/09/25            Patient will demonstrate independence with HEP in order to progress toward functional independence.        Start:  03/28/25    Expected End:  05/09/25                Mynor Ceja, PT

## 2025-04-22 ENCOUNTER — CLINICAL SUPPORT (OUTPATIENT)
Dept: REHABILITATION | Facility: HOSPITAL | Age: 75
End: 2025-04-22
Payer: MEDICARE

## 2025-04-22 DIAGNOSIS — R68.89 DECREASED FUNCTIONAL ACTIVITY TOLERANCE: ICD-10-CM

## 2025-04-22 DIAGNOSIS — M17.11 OSTEOARTHRITIS OF RIGHT KNEE, UNSPECIFIED OSTEOARTHRITIS TYPE: ICD-10-CM

## 2025-04-22 DIAGNOSIS — M25.661 DECREASED RANGE OF MOTION (ROM) OF RIGHT KNEE: ICD-10-CM

## 2025-04-22 DIAGNOSIS — R29.898 WEAKNESS OF BOTH LOWER EXTREMITIES: Primary | ICD-10-CM

## 2025-04-22 PROCEDURE — 97112 NEUROMUSCULAR REEDUCATION: CPT

## 2025-04-22 NOTE — PROGRESS NOTES
"  Outpatient Rehab    Physical Therapy Visit    Patient Name: Jaimie Luque  MRN: [id number]  YOB: 1950  Encounter Date: 4/22/2025    Therapy Diagnosis:   Encounter Diagnoses   Name Primary?    Weakness of both lower extremities Yes    Decreased functional activity tolerance     Osteoarthritis of right knee, unspecified osteoarthritis type     Decreased range of motion (ROM) of right knee            Physician: Sarwat Rodriguez MD    Physician Orders: Eval and Treat  Medical Diagnosis: Primary osteoarthritis of right knee    Visit # / Visits Authorized:  7 / 12  Insurance Authorization Period: 3/29/2025 to 12/31/2025  Date of Evaluation: 3/29/2025  Plan of Care Certification: 3/29/2025 to 6/20/2025     PT/PTA:     Number of PTA visits since last PT visit:   Time In: 1050   Time Out: 1154  Total Time: 64   Total Billable Time: 30    FOTO:  Intake Score:  %  Survey Score 1:  %  Survey Score 2:  %         Subjective   She has been feeling better overall, she has been adding more workouts in at the gym lately..  Pain reported as 1/10.      Objective            Treatment:       CPT Intervention Performed   Today Duration / Intensity   MT Stm to right knee -   x Right distal quads and hamstrings   Anterior and posterior right knee joint mobs   TE  Recumbent bike x 8 minutes        heel slide with overpressure x  5 minutes     Dorsiflexion stretch   3 x 30 seconds  Quad set - 3  minutes x 5 seconds hold bilateral     Hamstring stretch  x 3 x 30 seconds sitting right     Knee hang  3 minutes sitting right     Standing Heel Raises x 3x10     Tailgaters  x 3 minutes 7 pound bilateral       Leg Press x  65# 3x10     Knee Flexion matrix x 25# 3x10    Knee Extension Matrix x 17# 3x10    NMR  short arch quads  x  3 x 10  5# pound       long arch quad  x 3 x 10 bilateral  5#    Bridges  x 3 x 10     Straight leg raise  x 3 x 10     clams x 3 x 10 hook lying RTB    Sit to stands- with TRX x 20" 3x10     Sidelying hip " abduction x 3x10 each side RTB             TA                                                                                                             PLAN                     CPT Codes available for Billing:   (00) minutes of Manual therapy (MT) to improve pain and ROM.  (30) minutes of Therapeutic Exercise (TE) to develop strength, endurance, range of motion, and flexibility.  (25) minutes of Neuromuscular Re-Education (NMR)  to improve: Balance, Coordination, Kinesthetic, Sense, Proprioception, and Posture.  (00) minutes of Therapeutic Activities (TA) to improve functional performance.  Unattended Electrical Stimulation (ES) for muscle performance or pain modulation.  BFR: Blood flow restriction applied during exercise  NP or (-): Not Performed       Time Entry(in minutes):       Assessment & Plan   Assessment: Patient tolerated this session well,but towards the end she recieved an alert her blood sugar was low. The session was ended on her last exercises and she was given apple juice and peppermint and waited until her levels were back to normal before leaving.  Evaluation/Treatment Tolerance: Patient tolerated treatment well    Patient will continue to benefit from skilled outpatient physical therapy to address the deficits listed in the problem list box on initial evaluation, provide pt/family education and to maximize pt's level of independence in the home and community environment.     Patient's spiritual, cultural, and educational needs considered and patient agreeable to plan of care and goals.     Education  Education was done with Patient. The patient's learning style includes Demonstration and Pictures/video. The patient Demonstrates understanding and Verbalizes understanding.         Education provided: PURPOSE: Patient educated on the impairments noted above and the effects of physical therapy intervention to improve overall condition and QOL.  EXERCISE: Patient was educated on all the above  exercise prior/during/after for proper posture, positioning, and execution for safe performance with home exercise program.  STRENGTH: Patient educated on the importance of improved core and extremity strength in order to improve alignment of the spine and extremities with static positions and dynamic movement.  Written Home Exercises Provided: yes. Exercises were reviewed and Patient was able to demonstrate them prior to the end of the session. Patient demonstrated good  understanding of the education provided. See EMR under Patient Instructions for exercises provided during therapy sessions.       Plan: Continue Plan of Care (POC) and progress per patient tolerance. See treatment section for details on planned progressions next session.    Goals:   Active       LTG       Pt will report worst pain of 2-3/10 in order to progress toward max functional ability and improve quality of life                Start:  03/28/25    Expected End:  06/20/25            Patient will demonstrate improved function as indicated by a score of greater than or equal to 58 out of 100 on FOTO.                Start:  03/28/25    Expected End:  06/20/25            Patient will improve AROM to normal limits in order to return to maximal functional potential and improve quality of life.         Start:  03/28/25    Expected End:  06/20/25            Patient will improve strength to at least 4+/5 grossly,  in order to improve functional independence and quality of life.         Start:  03/28/25    Expected End:  06/20/25            Patient will be able to participate in her own gym program focused on LE strengthening without exacerbating her symptoms to improve her QOL.        Start:  03/28/25    Expected End:  06/20/25               STG       Pt will report worst pain of 5-6/10 in order to progress toward max functional ability and improve quality of life                Start:  03/28/25    Expected End:  05/09/25            Patient will  demonstrate improved function as indicated by a score of greater than or equal to 53 out of 100 on FOTO.                Start:  03/28/25    Expected End:  05/09/25            Patient will improve AROM to 50% of stated goals, listed in objective measures above, in order to progress towards independence with functional activities.         Start:  03/28/25    Expected End:  05/09/25            Patient will improve strength by 1/2 a grade, in order to progress towards independence with functional activities.                Start:  03/28/25    Expected End:  05/09/25            Patient will demonstrate independence with HEP in order to progress toward functional independence.        Start:  03/28/25    Expected End:  05/09/25                Mynor Ceja PT

## 2025-04-24 ENCOUNTER — CLINICAL SUPPORT (OUTPATIENT)
Dept: REHABILITATION | Facility: HOSPITAL | Age: 75
End: 2025-04-24
Payer: MEDICARE

## 2025-04-24 DIAGNOSIS — M17.0 PRIMARY OSTEOARTHRITIS OF BOTH KNEES: ICD-10-CM

## 2025-04-24 DIAGNOSIS — M25.661 DECREASED RANGE OF MOTION (ROM) OF RIGHT KNEE: ICD-10-CM

## 2025-04-24 DIAGNOSIS — R29.898 WEAKNESS OF BOTH LOWER EXTREMITIES: Primary | ICD-10-CM

## 2025-04-24 DIAGNOSIS — R68.89 DECREASED FUNCTIONAL ACTIVITY TOLERANCE: ICD-10-CM

## 2025-04-24 PROCEDURE — 97530 THERAPEUTIC ACTIVITIES: CPT | Mod: CQ

## 2025-04-24 PROCEDURE — 97110 THERAPEUTIC EXERCISES: CPT | Mod: CQ

## 2025-04-24 PROCEDURE — 97112 NEUROMUSCULAR REEDUCATION: CPT | Mod: CQ

## 2025-04-24 NOTE — PROGRESS NOTES
Outpatient Rehab    Physical Therapy Visit    Patient Name: Jaimie Luque  MRN: [id number]  YOB: 1950  Encounter Date: 4/24/2025    Therapy Diagnosis:   Encounter Diagnoses   Name Primary?    Weakness of both lower extremities Yes    Decreased functional activity tolerance     Primary osteoarthritis of both knees     Decreased range of motion (ROM) of right knee            Physician: Sarwat Rodriguez MD    Physician Orders: Eval and Treat  Medical Diagnosis: Primary osteoarthritis of right knee    Visit # / Visits Authorized:  8 / 12  Insurance Authorization Period: 3/29/2025 to 12/31/2025  Date of Evaluation: 3/29/2025  Plan of Care Certification: 3/29/2025 to 6/20/2025     PT/PTA: PTA   Number of PTA visits since last PT visit:1  Time In: 0947   Time Out: 1100  Total Time: 73   Total Billable Time:  60    FOTO:  Intake Score:  %  Survey Score 1:  %  Survey Score 2:  %         Subjective   feeling okay today. going to the [facility] on the days she is not coming to therapy. 1/2 wasy through this session, [facility] comap[facility] to the resistance here..         Objective            Treatment:       CPT Intervention Performed   Today Duration / Intensity   MT Stm to right knee -    Right distal quads and hamstrings   Anterior and posterior right knee joint mobs   TE  Recumbent bike x 6 minutes        heel slide with overpressure x  5 minutes     Dorsiflexion stretch  x 3 x 30 seconds  wedge    Quad set - 3  minutes x 5 seconds hold bilateral     Hamstring stretch   3 x 30 seconds sitting right     Knee hang  3 minutes sitting right     Standing Heel Raises  3x10     Tailgaters  x 3 minutes 3 pound bilateral       Leg Press x 65# 3x10     Knee Flexion matrix x 27# 3x10 (increased)     Knee Extension Matrix x 39 # 3x10 (increased)    NMR  short arch quads   3 x 10  5# pound       long arch quad   3 x 10 bilateral  5#    Bridges  x 3 x 10 2 seconds hold (glut  "emphasis)    Straight leg raise   3 x 10     clams x 3 x 10 side lying  Red Band (progressed)     Sit to stands- with TRX  20" 3x10     Sidelying hip abduction  3x10 each side RTB      heel raises x  3 x 10      Toe raises x 3 x 10 touching parallel bars as needed    Unilateral standing      TA  march in place  x  3 x 10 parallel bars no hands       step ups        Side to side steps  x 2 minutes parallel bars no hands    Sit to stand x 3 x 8 mat at 22 inch no hands                                                                                 PLAN                     CPT Codes available for Billing:   (00) minutes of Manual therapy (MT) to improve pain and ROM.  (30) minutes of Therapeutic Exercise (TE) to develop strength, endurance, range of motion, and flexibility.  (20) minutes of Neuromuscular Re-Education (NMR)  to improve: Balance, Coordination, Kinesthetic, Sense, Proprioception, and Posture.  (10) minutes of Therapeutic Activities (TA) to improve functional performance.  Unattended Electrical Stimulation (ES) for muscle performance or pain modulation.  BFR: Blood flow restriction applied during exercise  NP or (-): Not Performed          Assessment & Plan   Assessment: pt was able to tolerate more resistance on Matrix knee extension/flexion exercises after [facility] she reports crepitis in vince knees with Matrix knee extension but no pain associated with crepitis.       Patient will continue to benefit from skilled outpatient physical therapy to address the deficits listed in the problem list box on initial evaluation, provide pt/family education and to maximize pt's level of independence in the home and community environment.     Patient's spiritual, cultural, and educational needs considered and patient agreeable to plan of care and goals.             Plan: Continue Plan of Care (POC) and progress per patient tolerance. See treatment section for details on planned " progressions next session.    Goals:   Active       LTG       Pt will report worst pain of 2-3/10 in order to progress toward max functional ability and improve quality of life                Start:  03/28/25    Expected End:  06/20/25            Patient will demonstrate improved function as indicated by a score of greater than or equal to 58 out of 100 on FOTO.                Start:  03/28/25    Expected End:  06/20/25            Patient will improve AROM to normal limits in order to return to maximal functional potential and improve quality of life.         Start:  03/28/25    Expected End:  06/20/25            Patient will improve strength to at least 4+/5 grossly,  in order to improve functional independence and quality of life.         Start:  03/28/25    Expected End:  06/20/25            Patient will be able to participate in her own gym program focused on LE strengthening without exacerbating her symptoms to improve her QOL.        Start:  03/28/25    Expected End:  06/20/25               STG       Pt will report worst pain of 5-6/10 in order to progress toward max functional ability and improve quality of life                Start:  03/28/25    Expected End:  05/09/25            Patient will demonstrate improved function as indicated by a score of greater than or equal to 53 out of 100 on FOTO.                Start:  03/28/25    Expected End:  05/09/25            Patient will improve AROM to 50% of stated goals, listed in objective measures above, in order to progress towards independence with functional activities.         Start:  03/28/25    Expected End:  05/09/25            Patient will improve strength by 1/2 a grade, in order to progress towards independence with functional activities.                Start:  03/28/25    Expected End:  05/09/25            Patient will demonstrate independence with HEP in order to progress toward functional independence.        Start:  03/28/25    Expected End:   05/09/25                Roly Chaney, PTA

## 2025-04-25 ENCOUNTER — LAB VISIT (OUTPATIENT)
Dept: LAB | Facility: HOSPITAL | Age: 75
End: 2025-04-25
Attending: INTERNAL MEDICINE
Payer: MEDICARE

## 2025-04-25 DIAGNOSIS — R91.8 MULTIPLE LUNG NODULES ON CT: ICD-10-CM

## 2025-04-25 DIAGNOSIS — R76.8 ANA POSITIVE: ICD-10-CM

## 2025-04-25 DIAGNOSIS — Z87.39 HISTORY OF MIXED CONNECTIVE TISSUE DISEASE: ICD-10-CM

## 2025-04-25 LAB
ABSOLUTE EOSINOPHIL (OHS): 0.22 K/UL
ABSOLUTE MONOCYTE (OHS): 0.74 K/UL (ref 0.3–1)
ABSOLUTE NEUTROPHIL COUNT (OHS): 3.61 K/UL (ref 1.8–7.7)
ALBUMIN SERPL BCP-MCNC: 3.2 G/DL (ref 3.5–5.2)
ALP SERPL-CCNC: 120 UNIT/L (ref 40–150)
ALT SERPL W/O P-5'-P-CCNC: 59 UNIT/L (ref 10–44)
ANION GAP (OHS): 8 MMOL/L (ref 8–16)
AST SERPL-CCNC: 33 UNIT/L (ref 11–45)
BASOPHILS # BLD AUTO: 0.05 K/UL
BASOPHILS NFR BLD AUTO: 0.7 %
BILIRUB SERPL-MCNC: 0.8 MG/DL (ref 0.1–1)
BUN SERPL-MCNC: 13 MG/DL (ref 8–23)
CALCIUM SERPL-MCNC: 8.9 MG/DL (ref 8.7–10.5)
CHLORIDE SERPL-SCNC: 104 MMOL/L (ref 95–110)
CO2 SERPL-SCNC: 24 MMOL/L (ref 23–29)
CREAT SERPL-MCNC: 1 MG/DL (ref 0.5–1.4)
CRP SERPL-MCNC: 1.9 MG/L
ERYTHROCYTE [DISTWIDTH] IN BLOOD BY AUTOMATED COUNT: 16.9 % (ref 11.5–14.5)
ERYTHROCYTE [SEDIMENTATION RATE] IN BLOOD: 11 MM/HR
GFR SERPLBLD CREATININE-BSD FMLA CKD-EPI: 59 ML/MIN/1.73/M2
GLUCOSE SERPL-MCNC: 200 MG/DL (ref 70–110)
HCT VFR BLD AUTO: 40.5 % (ref 37–48.5)
HGB BLD-MCNC: 13.3 GM/DL (ref 12–16)
IMM GRANULOCYTES # BLD AUTO: 0.03 K/UL (ref 0–0.04)
IMM GRANULOCYTES NFR BLD AUTO: 0.4 % (ref 0–0.5)
LYMPHOCYTES # BLD AUTO: 2.04 K/UL (ref 1–4.8)
MCH RBC QN AUTO: 27.8 PG (ref 27–31)
MCHC RBC AUTO-ENTMCNC: 32.8 G/DL (ref 32–36)
MCV RBC AUTO: 85 FL (ref 82–98)
NUCLEATED RBC (/100WBC) (OHS): 0 /100 WBC
PLATELET # BLD AUTO: 203 K/UL (ref 150–450)
PMV BLD AUTO: 11.4 FL (ref 9.2–12.9)
POTASSIUM SERPL-SCNC: 5 MMOL/L (ref 3.5–5.1)
PROT SERPL-MCNC: 6.9 GM/DL (ref 6–8.4)
RBC # BLD AUTO: 4.79 M/UL (ref 4–5.4)
RELATIVE EOSINOPHIL (OHS): 3.3 %
RELATIVE LYMPHOCYTE (OHS): 30.5 % (ref 18–48)
RELATIVE MONOCYTE (OHS): 11.1 % (ref 4–15)
RELATIVE NEUTROPHIL (OHS): 54 % (ref 38–73)
SODIUM SERPL-SCNC: 136 MMOL/L (ref 136–145)
WBC # BLD AUTO: 6.69 K/UL (ref 3.9–12.7)

## 2025-04-25 PROCEDURE — 85025 COMPLETE CBC W/AUTO DIFF WBC: CPT

## 2025-04-25 PROCEDURE — 80053 COMPREHEN METABOLIC PANEL: CPT

## 2025-04-25 PROCEDURE — 85652 RBC SED RATE AUTOMATED: CPT

## 2025-04-25 PROCEDURE — 86140 C-REACTIVE PROTEIN: CPT

## 2025-04-25 PROCEDURE — 36415 COLL VENOUS BLD VENIPUNCTURE: CPT

## 2025-04-29 ENCOUNTER — OFFICE VISIT (OUTPATIENT)
Dept: RHEUMATOLOGY | Facility: CLINIC | Age: 75
End: 2025-04-29
Payer: MEDICARE

## 2025-04-29 ENCOUNTER — CLINICAL SUPPORT (OUTPATIENT)
Dept: REHABILITATION | Facility: HOSPITAL | Age: 75
End: 2025-04-29
Payer: MEDICARE

## 2025-04-29 VITALS
HEART RATE: 65 BPM | BODY MASS INDEX: 35.09 KG/M2 | WEIGHT: 223.56 LBS | SYSTOLIC BLOOD PRESSURE: 127 MMHG | DIASTOLIC BLOOD PRESSURE: 62 MMHG | HEIGHT: 67 IN

## 2025-04-29 DIAGNOSIS — R29.898 WEAKNESS OF BOTH LOWER EXTREMITIES: Primary | ICD-10-CM

## 2025-04-29 DIAGNOSIS — R76.8 ANTI-RNP ANTIBODIES PRESENT: Primary | ICD-10-CM

## 2025-04-29 DIAGNOSIS — M17.0 PRIMARY OSTEOARTHRITIS OF BOTH KNEES: ICD-10-CM

## 2025-04-29 DIAGNOSIS — Z87.39 HISTORY OF MIXED CONNECTIVE TISSUE DISEASE: ICD-10-CM

## 2025-04-29 DIAGNOSIS — R68.89 DECREASED FUNCTIONAL ACTIVITY TOLERANCE: ICD-10-CM

## 2025-04-29 DIAGNOSIS — R91.8 MULTIPLE LUNG NODULES ON CT: ICD-10-CM

## 2025-04-29 DIAGNOSIS — M25.661 DECREASED RANGE OF MOTION (ROM) OF RIGHT KNEE: ICD-10-CM

## 2025-04-29 PROCEDURE — 1160F RVW MEDS BY RX/DR IN RCRD: CPT | Mod: CPTII,S$GLB,, | Performed by: INTERNAL MEDICINE

## 2025-04-29 PROCEDURE — G2211 COMPLEX E/M VISIT ADD ON: HCPCS | Mod: S$GLB,,, | Performed by: INTERNAL MEDICINE

## 2025-04-29 PROCEDURE — 3288F FALL RISK ASSESSMENT DOCD: CPT | Mod: CPTII,S$GLB,, | Performed by: INTERNAL MEDICINE

## 2025-04-29 PROCEDURE — 99999 PR PBB SHADOW E&M-EST. PATIENT-LVL III: CPT | Mod: PBBFAC,,, | Performed by: INTERNAL MEDICINE

## 2025-04-29 PROCEDURE — 1101F PT FALLS ASSESS-DOCD LE1/YR: CPT | Mod: CPTII,S$GLB,, | Performed by: INTERNAL MEDICINE

## 2025-04-29 PROCEDURE — 3078F DIAST BP <80 MM HG: CPT | Mod: CPTII,S$GLB,, | Performed by: INTERNAL MEDICINE

## 2025-04-29 PROCEDURE — 99215 OFFICE O/P EST HI 40 MIN: CPT | Mod: S$GLB,,, | Performed by: INTERNAL MEDICINE

## 2025-04-29 PROCEDURE — 1157F ADVNC CARE PLAN IN RCRD: CPT | Mod: CPTII,S$GLB,, | Performed by: INTERNAL MEDICINE

## 2025-04-29 PROCEDURE — 97110 THERAPEUTIC EXERCISES: CPT | Mod: CQ

## 2025-04-29 PROCEDURE — 3074F SYST BP LT 130 MM HG: CPT | Mod: CPTII,S$GLB,, | Performed by: INTERNAL MEDICINE

## 2025-04-29 PROCEDURE — 97112 NEUROMUSCULAR REEDUCATION: CPT | Mod: CQ

## 2025-04-29 PROCEDURE — 1159F MED LIST DOCD IN RCRD: CPT | Mod: CPTII,S$GLB,, | Performed by: INTERNAL MEDICINE

## 2025-04-29 PROCEDURE — 97530 THERAPEUTIC ACTIVITIES: CPT | Mod: CQ

## 2025-04-29 PROCEDURE — 1126F AMNT PAIN NOTED NONE PRSNT: CPT | Mod: CPTII,S$GLB,, | Performed by: INTERNAL MEDICINE

## 2025-04-29 NOTE — PROGRESS NOTES
Outpatient Rehab    Physical Therapy Progress Note    Patient Name: Jaimie Luque  MRN: [id number]  YOB: 1950  Encounter Date: 4/29/2025    Therapy Diagnosis:   Encounter Diagnoses   Name Primary?    Weakness of both lower extremities Yes    Decreased functional activity tolerance     Primary osteoarthritis of both knees     Decreased range of motion (ROM) of right knee            Physician: Sarwat Rodriguez MD    Physician Orders: Eval and Treat  Medical Diagnosis: Primary osteoarthritis of right knee    Visit # / Visits Authorized:  9 / 12  Insurance Authorization Period: 3/29/2025 to 12/31/2025  Date of Evaluation: 3/29/2025  Plan of Care Certification: 3/29/2025 to 6/20/2025     PT/PTA: PTA   Number of PTA visits since last PT visit:2  Time In: 0945   Time Out: 1035  Total Time: 50  Total Billable Time:  50    FOTO:  Intake Score: 49%  Survey Score 1: 52%  Survey Score 2:  %             Subjective   no complaints of pain. reports difficulty with sitting/standing from low surface and unable to squat to get objects out of lower cabinet..         Objective     RANGE OF MOTION:   Knee AROM/PROM Right    Left    Goal Right 4/29/25 Left 4/29/25   Hyper - Zero - Flexion  2-85 1-0-120   -3 - 113 -5 - 122            STRENGTH: (* denotes pain)  L/E MMT Right  (spine) Left Dysfunction with Movement Goal Right 4/29/25 Left 4/29/25   Modified (90/90) Abdominal Strength  Poor ---     NT ----   Hip Flexion  3+/5 3+/5   4+/5 B NT NT   Hip Extension  3+/5 3+/5   4+/5 B 3+/5 3/5   Hip Abduction  3+/5 3+/5   4+/5 B 4-/5 4/5   Knee Extension 4/5 5/5   5/5 B 4/5 5/5   Knee Flexion 4/5 5/5   5/5 B 4/5 4+/5   Hip IR 4-/5 4-/5   4+/5 B NT NT   Hip ER 4-/5 4-/5   4+/5 B NT NT   Ankle DF 5/5 5/5   5/5 B NT NT   Ankle PF 4-/5 4-/5   5/5 B  NT   Ankle Inversion 5/5 5/5   5/5 B NT NT   Ankle Eversion 5/5 5/5 5/5 B NT NT               Treatment:       CPT Intervention Performed   Today Duration / Intensity   MT Stm to right  "knee -    Right distal quads and hamstrings   Anterior and posterior right knee joint mobs   TE  Recumbent bike x 6 minutes        heel slide with overpressure  5 minutes     Dorsiflexion stretch   3 x 30 seconds  wedge    Quad set - 3  minutes x 5 seconds hold bilateral     Hamstring stretch   3 x 30 seconds sitting right     Knee hang  3 minutes sitting right     Standing Heel Raises  3x10     Tailgaters   3 minutes 3 pound bilateral       Leg Press  65# 3x10     Knee Flexion matrix  27# 3x10 (increased)     Knee Extension Matrix  39 # 3x10 (increased)     Hip extension x 3 x 10 prone bilateral                      NMR  short arch quads  ----- 3 x 10  5# pound       long arch quad  ----- 3 x 10 bilateral  5#    Bridges   3 x 10 2 seconds hold (glut emphasis)    Straight leg raise   3 x 10     clams x 3 x 10 side lying  Red Band     Sit to stands- with TRX  20" 3x10     Sidelying hip abduction x 3x10 each       heel raises  3 x 10      Toe raises  3 x 10 touching parallel bars as needed    Unilateral standing      TA  march in place   3 x 10 parallel bars no hands       step ups        Side to side steps   2 minutes parallel bars no hands    Sit to stand x 3 x 8 chair no hands    Squat  x 3 x 10 parallel bars to stool    lunge x X 10 bilateral to 4 inch box with airex pad on box                                                                     PLAN                     CPT Codes available for Billing:   (00) minutes of Manual therapy (MT) to improve pain and ROM.  (15) minutes of Therapeutic Exercise (TE) to develop strength, endurance, range of motion, and flexibility.  (20) minutes of Neuromuscular Re-Education (NMR)  to improve: Balance, Coordination, Kinesthetic, Sense, Proprioception, and Posture.  (15) minutes of Therapeutic Activities (TA) to improve functional performance.  Unattended Electrical Stimulation (ES) for muscle performance or pain modulation.  BFR: Blood flow restriction applied during " exercise  NP or (-): Not Performed          Assessment & Plan   Assessment: pt is stronger in the following: left hip abduction and has improved right knee flexion. all measurements are compared to 3/29/25. her FOTO has improved from her evaluation. she may benefit from additional physical therapy to address her remaining seficits.       Patient will continue to benefit from skilled outpatient physical therapy to address the deficits listed in the problem list box on initial evaluation, provide pt/family education and to maximize pt's level of independence in the home and community environment.     Patient's spiritual, cultural, and educational needs considered and patient agreeable to plan of care and goals.             Plan: Continue Plan of Care (POC) and progress per patient tolerance. See treatment section for details on planned progressions next session.    Goals:   Active       LTG       Pt will report worst pain of 2-3/10 in order to progress toward max functional ability and improve quality of life          (Met)       Start:  03/28/25    Expected End:  06/20/25    Resolved:  04/29/25         Patient will demonstrate improved function as indicated by a score of greater than or equal to 58 out of 100 on FOTO.          (Progressing)       Start:  03/28/25    Expected End:  06/20/25            Patient will improve AROM to normal limits in order to return to maximal functional potential and improve quality of life.   (Progressing)       Start:  03/28/25    Expected End:  06/20/25            Patient will improve strength to at least 4+/5 grossly,  in order to improve functional independence and quality of life.   (Progressing)       Start:  03/28/25    Expected End:  06/20/25            Patient will be able to participate in her own gym program focused on LE strengthening without exacerbating her symptoms to improve her QOL.  (Met)       Start:  03/28/25    Expected End:  06/20/25    Resolved:  04/29/25             STG       Pt will report worst pain of 5-6/10 in order to progress toward max functional ability and improve quality of life          (Met)       Start:  03/28/25    Expected End:  05/09/25    Resolved:  04/29/25         Patient will demonstrate improved function as indicated by a score of greater than or equal to 53 out of 100 on FOTO.          (Progressing)       Start:  03/28/25    Expected End:  05/09/25            Patient will improve AROM to 50% of stated goals, listed in objective measures above, in order to progress towards independence with functional activities.   (Progressing)       Start:  03/28/25    Expected End:  05/09/25            Patient will improve strength by 1/2 a grade, in order to progress towards independence with functional activities.          (Progressing)       Start:  03/28/25    Expected End:  05/09/25            Patient will demonstrate independence with HEP in order to progress toward functional independence.  (Progressing)       Start:  03/28/25    Expected End:  05/09/25                Roly Chaney, PTA

## 2025-04-29 NOTE — PROGRESS NOTES
RHEUMATOLOGY CLINIC FOLLOW UP VISIT  Chief complaints, HPI, ROS, EXAM, Assessment & Plans:-  Jaimie Cruz a 75 y.o. pleasant female comes in for follow-up visit.  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  She was on CellCept.  Treatment was discontinued for inactive disease.  Reports doing well today.  Moderate activity-related right knee joint pain.  Improving with mobic and PT. No persistent cough for change in exercise tolerance.  Rheumatological review of system negative otherwise.  Physical examination shows no synovitis, dactylitis, enthesitis or effusion.    1. Anti-RNP antibodies present    2. History of mixed connective tissue disease    3. Multiple lung nodules on CT    4. Primary osteoarthritis of both knees      Problem List Items Addressed This Visit       History of mixed connective tissue disease    Multiple lung nodules on CT    Osteoarthritis of knee     Other Visit Diagnoses         Anti-RNP antibodies present    -  Primary            Labs reviewed today:-   Latest Reference Range & Units 04/25/25 08:59   WBC 3.90 - 12.70 K/uL 6.69   RBC 4.00 - 5.40 M/uL 4.79   Hemoglobin 12.0 - 16.0 gm/dL 13.3   Hematocrit 37.0 - 48.5 % 40.5   MCV 82 - 98 fL 85   MCH 27.0 - 31.0 pg 27.8   MCHC 32.0 - 36.0 g/dL 32.8   RDW 11.5 - 14.5 % 16.9 (H)   Platelet Count 150 - 450 K/uL 203   MPV 9.2 - 12.9 fL 11.4   Neut % 38 - 73 % 54.0   Lymph % 18 - 48 % 30.5   Mono % 4 - 15 % 11.1   Eos % <=8 % 3.3   Basophil % <=1.9 % 0.7   Immature Granulocytes 0.0 - 0.5 % 0.4   Gran # (ANC) 1.8 - 7.7 K/uL 3.61   Lymph # 1 - 4.8 K/uL 2.04   Mono # 0.3 - 1 K/uL 0.74   Eos # <=0.5 K/uL 0.22   Baso # <=0.2 K/uL 0.05   Immature Grans (Abs) 0.00 - 0.04 K/uL 0.03   nRBC <=0 /100 WBC 0   Sed Rate <=36 mm/hr 11   Sodium 136 - 145 mmol/L 136    Potassium 3.5 - 5.1 mmol/L 5.0   Chloride 95 - 110 mmol/L 104   CO2 23 - 29 mmol/L 24   Anion Gap 8 - 16 mmol/L 8   BUN 8 - 23 mg/dL 13   Creatinine 0.5 - 1.4 mg/dL 1.0   eGFR >60 mL/min/1.73/m2 59 (L)   Glucose 70 - 110 mg/dL 200 (H)   Calcium 8.7 - 10.5 mg/dL 8.9   ALP 40 - 150 unit/L 120   PROTEIN TOTAL 6.0 - 8.4 gm/dL 6.9   Albumin 3.5 - 5.2 g/dL 3.2 (L)   BILIRUBIN TOTAL 0.1 - 1.0 mg/dL 0.8   AST 11 - 45 unit/L 33   ALT 10 - 44 unit/L 59 (H)   CRP <=8.2 mg/L 1.9   (H): Data is abnormally high  (L): Data is abnormally low     Latest Reference Range & Units Most Recent   RADHA Neg <1:160  Negative  2/9/07 15:56   RADHA Screen Negative <1:80  Positive !  11/29/22 17:24   RADHA Titer 1  1:160  11/29/22 17:24   RADHA PATTERN 1  Homogeneous  11/29/22 17:24   RADHA Titer 2  1:160  11/29/22 17:24   RADHA Pattern 2  Speckled  11/29/22 17:24   ds DNA Ab Negative 1:10  Negative 1:10  11/29/22 17:24   Anti-SSA Antibody 0.00 - 0.99 Ratio 0.25  11/29/22 17:24   Anti-SSA Interpretation Negative  Negative  11/29/22 17:24   Anti-SSB Antibody 0.00 - 0.99 Ratio 0.11  11/29/22 17:24   Anti-SSB Interpretation Negative  Negative  11/29/22 17:24   Anti Sm Antibody 0.00 - 0.99 Ratio 0.37  11/29/22 17:24   Anti-Sm Interpretation Negative  Negative  11/29/22 17:24   Anti Sm/RNP Antibody 0.00 - 0.99 Ratio 0.29  11/29/22 17:24   Anti-Sm/RNP Interpretation Negative  Negative  11/29/22 17:24   Cytoplasmic Neutrophilic Ab <1:20 Titer <1:20  11/29/22 17:24   Perinuclear (P-ANCA) <1:20 Titer <1:20  11/29/22 17:24   Scleroderma SCL- <20 UNITS 6  1/24/23 08:57   CCP Antibodies <5.0 U/mL <0.5  12/29/22 08:06   Alpha-1 Anti-Trypsin 100 - 190 mg/dL 151  4/11/18 11:00   Complement (C-3) 50 - 180 mg/dL 132  1/24/23 08:57   Complement (C-4) 11 - 44 mg/dL 30  1/24/23 08:57   Total IgE 0 - 100 IU/mL <35  11/29/22 17:24   RBC 4.00 - 5.40 M/uL 4.68  9/17/24 08:51   Hemoglobin 12.0 - 16.0 g/dL 13.4  9/17/24 08:51   Hematocrit 37.0 - 48.5 % 40.7  9/17/24 08:51   MCV  82 - 98 fL 87  9/17/24 08:51   RDW 11.5 - 14.5 % 15.1 (H)  9/17/24 08:51   Anti-Lakshmi-1 Antibody <20 Units <20  12/29/22 08:06   Anti-PM/Scl Ab <20 Units <20  12/29/22 08:06   Anti-SS-A 52 kD Ab, IgG <20 Units <20  12/29/22 08:06   Anti-U1-RNP  Ab <20 Units 21 (H)  12/29/22 08:06   EJ Negative  Negative  12/29/22 08:06   Fibrillarin (U3 RNP) Negative  Negative  12/29/22 08:06   Ku Negative  Negative  12/29/22 08:06   MDA-5 (P140) (CADM-140) <20 Units <20  12/29/22 08:06   MI-2 Negative  Negative  12/29/22 08:06   NXP-2 (P140) <20 Units <20  12/29/22 08:06   OJ Negative  Negative  12/29/22 08:06   PL-12 Negative  Negative  12/29/22 08:06   PL-7 Negative  Negative  12/29/22 08:06   Rheumatoid Factor 0.0 - 15.0 IU/mL <13.0  11/29/22 17:24   SRP Negative  Negative  12/29/22 08:06   TIF1 GAMMA (P155/140) <20 Units <20  12/29/22 08:06   U2 snRNP Negative  Negative  12/29/22 08:06   !: Data is abnormal  (H): Data is abnormally high    CT Chest Without Contrast  Order: [id number]   Status: Final result       Next appt: Today at 09:45 AM in Outpatient Rehab (Roly Chaney, NIKIA)       Dx: Interstitial pulmonary disease, unspe...    Test Result Released: Yes (seen)       Messages: Seen    0 Result Notes       1 Patient Communication  Details    Reading Physician Reading Date Result Priority   Dawood Gerber III, MD  [phone] 7/5/2023 Routine     Narrative & Impression  EXAMINATION:  CT CHEST WITHOUT CONTRAST     CLINICAL HISTORY:  Interstitial lung disease; Interstitial pulmonary disease, unspecified     TECHNIQUE:  Low dose axial images, sagittal and coronal reformations were obtained from the thoracic inlet to the lung bases. Contrast was not administered.     COMPARISON:  CT chest 11/25/2022     FINDINGS:  Mild coronary artery calcification.  No cardiomegaly, aortic aneurysm or adenopathy.     Unchanged wedge-shaped low density within the posterior segment of the right hepatic lobe.  This is unchanged compared  to prior CT chest performed 01/19/2021.     No additional abnormality below the diaphragm.     Unchanged biapical scarring.  Unchanged 11 mm nodule within the right middle lobe.  Additional smaller pulmonary nodules noted bilaterally, unchanged.     No interstitial thickening, ground-glass or airspace opacities.  Unchanged mucous plugging within the anterior segment of the right upper lobe.  No pneumothorax.     Impression:     Multiple pulmonary nodules.  The largest cyst within the right middle lobe and is unchanged compared to prior CT chest performed 01/19/2021.     No interstitial prominence or acute infiltrate.     Scattered mucous plugging which appears similar to the previous exam.     Additional findings as above.        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  No arthritis, myositis, Raynaud's sclerodactyly or change in exercise tolerance. No chronic cough. Continue to monitor clinically.  Continue follow-up with pulmonologist.  Bilateral knee osteoarthritis related pain without any significant effusion.  Mild improvement with PT and mobic. Continue follow up with . Diabetes contraindicates use of intra-articular corticosteroid injection.     I have explained all of the above in detail and the patient understands all of the current recommendation(s). I have answered all questions to the best of my ability and to their complete satisfaction.       # Follow up in about 1 year (around 4/29/2026).      Disclaimer: This note was prepared using voice recognition system and is likely to have sound alike errors and is not proof read.  Please call me with any questions.

## 2025-05-01 ENCOUNTER — CLINICAL SUPPORT (OUTPATIENT)
Dept: REHABILITATION | Facility: HOSPITAL | Age: 75
End: 2025-05-01
Payer: MEDICARE

## 2025-05-01 DIAGNOSIS — R68.89 DECREASED FUNCTIONAL ACTIVITY TOLERANCE: ICD-10-CM

## 2025-05-01 DIAGNOSIS — R29.898 WEAKNESS OF BOTH LOWER EXTREMITIES: Primary | ICD-10-CM

## 2025-05-01 DIAGNOSIS — M17.0 PRIMARY OSTEOARTHRITIS OF BOTH KNEES: ICD-10-CM

## 2025-05-01 DIAGNOSIS — M25.661 DECREASED RANGE OF MOTION (ROM) OF RIGHT KNEE: ICD-10-CM

## 2025-05-01 PROCEDURE — 97110 THERAPEUTIC EXERCISES: CPT | Mod: CQ

## 2025-05-01 PROCEDURE — 97112 NEUROMUSCULAR REEDUCATION: CPT | Mod: CQ

## 2025-05-01 NOTE — PROGRESS NOTES
Outpatient Rehab    Physical Therapy Visit    Patient Name: Jaimie Luque  MRN: [id number]  YOB: 1950  Encounter Date: 5/1/2025    Therapy Diagnosis:   Encounter Diagnoses   Name Primary?    Weakness of both lower extremities Yes    Decreased functional activity tolerance     Primary osteoarthritis of both knees     Decreased range of motion (ROM) of right knee        Physician: Sarwat Rodriguez MD    Physician Orders: Eval and Treat  Medical Diagnosis: Primary osteoarthritis of right knee    Visit # / Visits Authorized:  10 / 12  Insurance Authorization Period: 3/29/2025 to 12/31/2025  Date of Evaluation: 3/29/2025  Plan of Care Certification: 3/29/2025 to 6/20/2025     PT/PTA: PTA   Number of PTA visits since last PT visit:3  Time In: 0945   Time Out: 1030  Total Time: 45  Total Billable Time:  45    FOTO:  Intake Score:  %  Survey Score 1:  %  Survey Score 2:  %             Subjective   not painful just enouigh to make her notice that something is there (vince patella).  Pain reported as 1/10. vince patella    Objective            Treatment:       CPT Intervention Performed   Today Duration / Intensity   MT Stm to right knee -    Right distal quads and hamstrings   Anterior and posterior right knee joint mobs   TE  Recumbent bike x 6 minutes  level 2 (increased)       heel slide with overpressure  5 minutes     Dorsiflexion stretch   3 x 30 seconds  wedge    Quad set - 3  minutes x 5 seconds hold bilateral     Hamstring stretch   3 x 30 seconds sitting right     Knee hang  3 minutes sitting right     Standing Heel Raises  3x10     Tailgaters   3 minutes 3 pound bilateral       Leg Press  65# 3x10     Knee Flexion matrix  27# 3x10 (increased)     Knee Extension Matrix  39 # 3x10 (increased)     Hip extension   x 3 x 10 prone bilateral  2 x 10 standing with red band (added)                      NMR  short arch quads  ----- 3 x 10  5# pound       long arch quad  ----- 3 x 10 bilateral  5#    Bridges  x 3  "x 10 2 seconds hold (glut emphasis)    Straight leg raise  x 3 x 10     clams x 3 x 10 side lying  Red Band 3 seconds hold    Sit to stands- with TRX  20" 3x10     Sidelying hip abduction x 3x10 each       heel raises x 3 x 10      Toe raises  3 x 10 touching parallel bars as needed    Unilateral standing       Side to side steps  x 3 laps parallel bars red band cues    TA  march in place   3 x 10 parallel bars no hands       step ups        Side to side steps   2 minutes parallel bars no hands    Sit to stand  3 x 8 chair no hands    Squat  x 3 x 10 parallel bars to stool    lunge  X 10 bilateral to 4 inch box with airex pad on box                                                                     PLAN                CPT Codes available for Billing:   (00) minutes of Manual therapy (MT) to improve pain and ROM.  (11) minutes of Therapeutic Exercise (TE) to develop strength, endurance, range of motion, and flexibility.  (31) minutes of Neuromuscular Re-Education (NMR)  to improve: Balance, Coordination, Kinesthetic, Sense, Proprioception, and Posture.  (3) minutes of Therapeutic Activities (TA) to improve functional performance.  Unattended Electrical Stimulation (ES) for muscle performance or pain modulation.  BFR: Blood flow restriction applied during exercise  NP or (-): Not Performed          Assessment & Plan   Assessment: pt tolerated exercise bike with resistance. she continues to demonstrate increased vince hip weakness as evident by her lateral sway with sidw to side steps and her lack of rom with standing hip extension.       Patient will continue to benefit from skilled outpatient physical therapy to address the deficits listed in the problem list box on initial evaluation, provide pt/family education and to maximize pt's level of independence in the home and community environment.     Patient's spiritual, cultural, and educational needs considered and patient agreeable to plan of care and goals.         "     Plan: Continue Plan of Care (POC) and progress per patient tolerance. See treatment section for details on planned progressions next session.    Goals:   Active       LTG       Pt will report worst pain of 2-3/10 in order to progress toward max functional ability and improve quality of life          (Met)       Start:  03/28/25    Expected End:  06/20/25    Resolved:  04/29/25         Patient will demonstrate improved function as indicated by a score of greater than or equal to 58 out of 100 on FOTO.          (Progressing)       Start:  03/28/25    Expected End:  06/20/25            Patient will improve AROM to normal limits in order to return to maximal functional potential and improve quality of life.   (Progressing)       Start:  03/28/25    Expected End:  06/20/25            Patient will improve strength to at least 4+/5 grossly,  in order to improve functional independence and quality of life.   (Progressing)       Start:  03/28/25    Expected End:  06/20/25            Patient will be able to participate in her own gym program focused on LE strengthening without exacerbating her symptoms to improve her QOL.  (Met)       Start:  03/28/25    Expected End:  06/20/25    Resolved:  04/29/25            STG       Pt will report worst pain of 5-6/10 in order to progress toward max functional ability and improve quality of life          (Met)       Start:  03/28/25    Expected End:  05/09/25    Resolved:  04/29/25         Patient will demonstrate improved function as indicated by a score of greater than or equal to 53 out of 100 on FOTO.          (Progressing)       Start:  03/28/25    Expected End:  05/09/25            Patient will improve AROM to 50% of stated goals, listed in objective measures above, in order to progress towards independence with functional activities.   (Progressing)       Start:  03/28/25    Expected End:  05/09/25            Patient will improve strength by 1/2 a grade, in order to progress  towards independence with functional activities.          (Progressing)       Start:  03/28/25    Expected End:  05/09/25            Patient will demonstrate independence with HEP in order to progress toward functional independence.  (Progressing)       Start:  03/28/25    Expected End:  05/09/25                Roly Chaney, NIKIA

## 2025-05-06 ENCOUNTER — DOCUMENTATION ONLY (OUTPATIENT)
Dept: REHABILITATION | Facility: HOSPITAL | Age: 75
End: 2025-05-06

## 2025-05-06 ENCOUNTER — CLINICAL SUPPORT (OUTPATIENT)
Dept: REHABILITATION | Facility: HOSPITAL | Age: 75
End: 2025-05-06
Payer: MEDICARE

## 2025-05-06 DIAGNOSIS — R68.89 DECREASED FUNCTIONAL ACTIVITY TOLERANCE: ICD-10-CM

## 2025-05-06 DIAGNOSIS — M17.11 OSTEOARTHRITIS OF RIGHT KNEE, UNSPECIFIED OSTEOARTHRITIS TYPE: ICD-10-CM

## 2025-05-06 DIAGNOSIS — R29.898 WEAKNESS OF BOTH LOWER EXTREMITIES: Primary | ICD-10-CM

## 2025-05-06 DIAGNOSIS — M25.661 DECREASED RANGE OF MOTION (ROM) OF RIGHT KNEE: ICD-10-CM

## 2025-05-06 PROCEDURE — 97530 THERAPEUTIC ACTIVITIES: CPT

## 2025-05-06 PROCEDURE — 97112 NEUROMUSCULAR REEDUCATION: CPT

## 2025-05-06 NOTE — PROGRESS NOTES
PT/PTA met face to face to discuss pt's treatment plan and progress towards established goals. Pt will be seen by a physical therapist minimally every 6th visit or every 30 days.      Roly Chaney PTA

## 2025-05-06 NOTE — PROGRESS NOTES
"  Outpatient Rehab    Physical Therapy Visit    Patient Name: Jaimie Luque  MRN: [id number]  YOB: 1950  Encounter Date: 5/6/2025    Therapy Diagnosis:   Encounter Diagnoses   Name Primary?    Weakness of both lower extremities Yes    Decreased functional activity tolerance     Osteoarthritis of right knee, unspecified osteoarthritis type     Decreased range of motion (ROM) of right knee          Physician: Sarwat Rodriguez MD    Physician Orders: Eval and Treat  Medical Diagnosis: Primary osteoarthritis of right knee    Visit # / Visits Authorized:  11 / 12  Insurance Authorization Period: 3/29/2025 to 12/31/2025  Date of Evaluation: 3/29/2025  Plan of Care Certification: 3/29/2025 to 6/20/2025     PT/PTA:     Number of PTA visits since last PT visit:   Time In: 1050   Time Out: 1150  Total Time: 60  Total Billable Time: 30    FOTO:  Intake Score:  %  Survey Score 1:  %  Survey Score 2:  %             Subjective   She feels like she has been doing better here and at the gym..  Pain reported as 1/10. vince patella    Objective            Treatment:       CPT Intervention Performed   Today Duration / Intensity   MT Stm to right knee -    Right distal quads and hamstrings   Anterior and posterior right knee joint mobs   TE  Recumbent bike x 6 minutes  level 2 (increased)       Leg Press  65# 3x10     Knee Flexion matrix  27# 3x10 (increased)     Knee Extension Matrix  39 # 3x10 (increased)     Hip extension   x 3 x 10 prone bilateral  2 x 10 standing with red band (added)                      NMR  short arch quads  ----- 3 x 10  5# pound       long arch quad  ----- 3 x 10 bilateral  5#    Bridges  x 3 x 10 2 seconds hold (glut emphasis)    Straight leg raise  x 3 x 10     clams x 3 x 10 side lying  Red Band 3 seconds hold    Sit to stands- with TRX  20" 3x10     Sidelying hip abduction x 3x10 each       heel raises x 3 x 10      Toe raises x 3 x 10 touching parallel bars as needed    Unilateral standing    "   TA  march in place   3 x 10 parallel bars no hands       step ups  x 3x10 6 in     Side to side steps  x 2 minutes parallel bars no hands    Sit to stand  3 x 8 chair no hands    Squat  x 3 x 10 parallel bars to stool    lunge  X 10 bilateral to 4 inch box with airex pad on box                                                                     PLAN                CPT Codes available for Billing:   (00) minutes of Manual therapy (MT) to improve pain and ROM.  (11) minutes of Therapeutic Exercise (TE) to develop strength, endurance, range of motion, and flexibility.  (31) minutes of Neuromuscular Re-Education (NMR)  to improve: Balance, Coordination, Kinesthetic, Sense, Proprioception, and Posture.  (10) minutes of Therapeutic Activities (TA) to improve functional performance.  Unattended Electrical Stimulation (ES) for muscle performance or pain modulation.  BFR: Blood flow restriction applied during exercise  NP or (-): Not Performed          Assessment & Plan   Assessment: Patient tolerated this session well with continued progressions from last visit we added back in step ups today to work on functional mobility and single leg quad activation with CKC work.  Evaluation/Treatment Tolerance: Patient tolerated treatment well    Patient will continue to benefit from skilled outpatient physical therapy to address the deficits listed in the problem list box on initial evaluation, provide pt/family education and to maximize pt's level of independence in the home and community environment.     Patient's spiritual, cultural, and educational needs considered and patient agreeable to plan of care and goals.             Plan: Continue Plan of Care (POC) and progress per patient tolerance. See treatment section for details on planned progressions next session.    Goals:   Active       LTG       Pt will report worst pain of 2-3/10 in order to progress toward max functional ability and improve quality of life          (Met)        Start:  03/28/25    Expected End:  06/20/25    Resolved:  04/29/25         Patient will demonstrate improved function as indicated by a score of greater than or equal to 58 out of 100 on FOTO.          (Progressing)       Start:  03/28/25    Expected End:  06/20/25            Patient will improve AROM to normal limits in order to return to maximal functional potential and improve quality of life.   (Progressing)       Start:  03/28/25    Expected End:  06/20/25            Patient will improve strength to at least 4+/5 grossly,  in order to improve functional independence and quality of life.   (Progressing)       Start:  03/28/25    Expected End:  06/20/25            Patient will be able to participate in her own gym program focused on LE strengthening without exacerbating her symptoms to improve her QOL.  (Met)       Start:  03/28/25    Expected End:  06/20/25    Resolved:  04/29/25            STG       Pt will report worst pain of 5-6/10 in order to progress toward max functional ability and improve quality of life          (Met)       Start:  03/28/25    Expected End:  05/09/25    Resolved:  04/29/25         Patient will demonstrate improved function as indicated by a score of greater than or equal to 53 out of 100 on FOTO.          (Progressing)       Start:  03/28/25    Expected End:  05/09/25            Patient will improve AROM to 50% of stated goals, listed in objective measures above, in order to progress towards independence with functional activities.   (Progressing)       Start:  03/28/25    Expected End:  05/09/25            Patient will improve strength by 1/2 a grade, in order to progress towards independence with functional activities.          (Progressing)       Start:  03/28/25    Expected End:  05/09/25            Patient will demonstrate independence with HEP in order to progress toward functional independence.  (Progressing)       Start:  03/28/25    Expected End:  05/09/25                 Mynor Ceja, PT

## 2025-05-08 ENCOUNTER — CLINICAL SUPPORT (OUTPATIENT)
Dept: REHABILITATION | Facility: HOSPITAL | Age: 75
End: 2025-05-08
Payer: MEDICARE

## 2025-05-08 DIAGNOSIS — R68.89 DECREASED FUNCTIONAL ACTIVITY TOLERANCE: ICD-10-CM

## 2025-05-08 DIAGNOSIS — M17.0 PRIMARY OSTEOARTHRITIS OF BOTH KNEES: ICD-10-CM

## 2025-05-08 DIAGNOSIS — R29.898 WEAKNESS OF BOTH LOWER EXTREMITIES: Primary | ICD-10-CM

## 2025-05-08 DIAGNOSIS — M25.661 DECREASED RANGE OF MOTION (ROM) OF RIGHT KNEE: ICD-10-CM

## 2025-05-08 PROCEDURE — 97112 NEUROMUSCULAR REEDUCATION: CPT | Mod: CQ

## 2025-05-08 PROCEDURE — 97110 THERAPEUTIC EXERCISES: CPT | Mod: CQ

## 2025-05-08 PROCEDURE — 97530 THERAPEUTIC ACTIVITIES: CPT | Mod: CQ

## 2025-05-08 NOTE — PROGRESS NOTES
Outpatient Rehab    Physical Therapy Visit    Patient Name: Jaimie Luque  MRN: [id number]  YOB: 1950  Encounter Date: 5/8/2025    Therapy Diagnosis:   Encounter Diagnoses   Name Primary?    Weakness of both lower extremities Yes    Decreased functional activity tolerance     Primary osteoarthritis of both knees     Decreased range of motion (ROM) of right knee            Physician: Sarwat Rodriguez MD    Physician Orders: Eval and Treat  Medical Diagnosis: Primary osteoarthritis of right knee    Visit # / Visits Authorized:  12 / 12  Insurance Authorization Period: 3/29/2025 to 12/31/2025  Date of Evaluation: 3/29/2025  Plan of Care Certification: 3/29/2025 to 6/20/2025     PT/PTA: PTA   Number of PTA visits since last PT visit:1  Time In: 1100   Time Out: 1149  Total Time: 49  Total Billable Time:  49    FOTO:  Intake Score:  %  Survey Score 1:  %  Survey Score 2:  %             Subjective   had a work out change at the gym which is part of the program she follows. she feels that her left le is weaker than her right.         Objective            Treatment:       CPT Intervention Performed   Today Duration / Intensity   MT Stm to right knee -    Right distal quads and hamstrings   Anterior and posterior right knee joint mobs   TE  Recumbent bike x 6 minutes  level 2 (increased)       Leg Press x 85 pound 3x10     Knee Flexion matrix  27# 3x10 (increased)     Knee Extension Matrix  39 # 3x10 (increased)     Hip extension    3 x 10 prone bilateral  2 x 10 standing with red band (added)                                        NMR  short arch quads  ----- 3 x 10  5# pound       long arch quad  ----- 3 x 10 bilateral  5#    Bridges   3 x 10 2 seconds hold (glut emphasis)    Straight leg raise   3 x 10     clams x 3 x 12 side lying  Red Band 3 seconds hold    Sit to stands-      Sidelying hip abduction  3x10 each       heel raises x 3 x 12 (increased)     Toe raises X 3 x 10 touching parallel bars as needed     Unilateral standing  x 2 minutes (added)                                        TA  march in place  x 3 x 10 parallel bars no hands       step ups   3x10 6 in     Side to side steps  x 2 minutes parallel bars no hands    Sit to stand x 20 inch box 3 x 8 on turf    Squat   3 x 10 parallel bars to stool    lunge  X 10 bilateral to 4 inch box with airex pad on box                                                                     PLAN                CPT Codes available for Billing:   (00) minutes of Manual therapy (MT) to improve pain and ROM.  (11) minutes of Therapeutic Exercise (TE) to develop strength, endurance, range of motion, and flexibility.  (14) minutes of Neuromuscular Re-Education (NMR)  to improve: Balance, Coordination, Kinesthetic, Sense, Proprioception, and Posture.  (23) minutes of Therapeutic Activities (TA) to improve functional performance.  Unattended Electrical Stimulation (ES) for muscle performance or pain modulation.  BFR: Blood flow restriction applied during exercise  NP or (-): Not Performed          Assessment & Plan   Assessment: pt was able to be progressed with sit to stand by her not using  her hands from a lower surface. she required cues to improve her technique but after cues she performed this exercise with good quality.       Patient will continue to benefit from skilled outpatient physical therapy to address the deficits listed in the problem list box on initial evaluation, provide pt/family education and to maximize pt's level of independence in the home and community environment.     Patient's spiritual, cultural, and educational needs considered and patient agreeable to plan of care and goals.             Plan: Continue Plan of Care (POC) and progress per patient tolerance. See treatment section for details on planned progressions next session.    Goals:   Active       LTG       Pt will report worst pain of 2-3/10 in order to progress toward max functional ability and  improve quality of life          (Met)       Start:  03/28/25    Expected End:  06/20/25    Resolved:  04/29/25         Patient will demonstrate improved function as indicated by a score of greater than or equal to 58 out of 100 on FOTO.          (Progressing)       Start:  03/28/25    Expected End:  06/20/25            Patient will improve AROM to normal limits in order to return to maximal functional potential and improve quality of life.   (Progressing)       Start:  03/28/25    Expected End:  06/20/25            Patient will improve strength to at least 4+/5 grossly,  in order to improve functional independence and quality of life.   (Progressing)       Start:  03/28/25    Expected End:  06/20/25            Patient will be able to participate in her own gym program focused on LE strengthening without exacerbating her symptoms to improve her QOL.  (Met)       Start:  03/28/25    Expected End:  06/20/25    Resolved:  04/29/25            STG       Pt will report worst pain of 5-6/10 in order to progress toward max functional ability and improve quality of life          (Met)       Start:  03/28/25    Expected End:  05/09/25    Resolved:  04/29/25         Patient will demonstrate improved function as indicated by a score of greater than or equal to 53 out of 100 on FOTO.          (Progressing)       Start:  03/28/25    Expected End:  05/09/25            Patient will improve AROM to 50% of stated goals, listed in objective measures above, in order to progress towards independence with functional activities.   (Progressing)       Start:  03/28/25    Expected End:  05/09/25            Patient will improve strength by 1/2 a grade, in order to progress towards independence with functional activities.          (Progressing)       Start:  03/28/25    Expected End:  05/09/25            Patient will demonstrate independence with HEP in order to progress toward functional independence.  (Progressing)       Start:  03/28/25     Expected End:  05/09/25                Roly Chaney, PTA

## 2025-05-09 RX ORDER — LISINOPRIL 2.5 MG/1
2.5 TABLET ORAL
Qty: 90 TABLET | Refills: 3 | Status: SHIPPED | OUTPATIENT
Start: 2025-05-09

## 2025-05-13 ENCOUNTER — CLINICAL SUPPORT (OUTPATIENT)
Dept: REHABILITATION | Facility: HOSPITAL | Age: 75
End: 2025-05-13
Payer: MEDICARE

## 2025-05-13 DIAGNOSIS — R68.89 DECREASED FUNCTIONAL ACTIVITY TOLERANCE: ICD-10-CM

## 2025-05-13 DIAGNOSIS — B02.29 PHN (POSTHERPETIC NEURALGIA): ICD-10-CM

## 2025-05-13 DIAGNOSIS — E03.9 ACQUIRED HYPOTHYROIDISM: ICD-10-CM

## 2025-05-13 DIAGNOSIS — M25.661 DECREASED RANGE OF MOTION (ROM) OF RIGHT KNEE: ICD-10-CM

## 2025-05-13 DIAGNOSIS — R29.898 WEAKNESS OF BOTH LOWER EXTREMITIES: Primary | ICD-10-CM

## 2025-05-13 DIAGNOSIS — M17.0 PRIMARY OSTEOARTHRITIS OF BOTH KNEES: ICD-10-CM

## 2025-05-13 PROCEDURE — 97112 NEUROMUSCULAR REEDUCATION: CPT | Mod: CQ

## 2025-05-13 PROCEDURE — 97530 THERAPEUTIC ACTIVITIES: CPT | Mod: CQ

## 2025-05-13 PROCEDURE — 97110 THERAPEUTIC EXERCISES: CPT | Mod: CQ

## 2025-05-13 RX ORDER — GABAPENTIN 300 MG/1
300 CAPSULE ORAL 3 TIMES DAILY
Qty: 270 CAPSULE | Refills: 3 | Status: SHIPPED | OUTPATIENT
Start: 2025-05-13

## 2025-05-13 RX ORDER — LEVOTHYROXINE SODIUM 112 UG/1
112 TABLET ORAL
Qty: 90 TABLET | Refills: 3 | Status: SHIPPED | OUTPATIENT
Start: 2025-05-13

## 2025-05-13 NOTE — PROGRESS NOTES
Outpatient Rehab    Physical Therapy Visit    Patient Name: Jaimie Luque  MRN: [id number]  YOB: 1950  Encounter Date: 5/13/2025    Therapy Diagnosis:   Encounter Diagnoses   Name Primary?    Weakness of both lower extremities Yes    Decreased functional activity tolerance     Primary osteoarthritis of both knees     Decreased range of motion (ROM) of right knee              Physician: Sarwat Rodriguez MD    Physician Orders: Eval and Treat  Medical Diagnosis: Primary osteoarthritis of right knee    Visit # / Visits Authorized:  13 / 20  Insurance Authorization Period: 3/29/2025 to 12/31/2025  Date of Evaluation: 3/29/2025  Plan of Care Certification: 3/29/2025 to 6/20/2025     PT/PTA: PTA   Number of PTA visits since last PT visit:2  Time In: 0845   Time Out: 0944  Total Time: 59  Total Billable Time:  59    FOTO:  Intake Score:  %  Survey Score 1:  %  Survey Score 2:  %             Subjective   pt without complaints today. more aware of managing her lateral shoulder shift while walking..         Objective            Treatment:       CPT Intervention Performed   Today Duration / Intensity   MT Stm to right knee -    Right distal quads and hamstrings   Anterior and posterior right knee joint mobs   TE  Recumbent bike x 6 minutes  level 2       Leg Press x 85 pound 3 x 12 (increased)      Knee Flexion matrix  27# 3x10 (increased)     Knee Extension Matrix  39 # 3x10 (increased)     Hip extension    3 x 10 prone bilateral  2 x 10 standing with red band (added)                                        NMR  short arch quads  ----- 3 x 10  5# pound       long arch quad  ----- 3 x 10 bilateral  5#    Bridges  x 3 x 15 2 seconds hold (glut emphasis) (increased)     Straight leg raise   3 x 10     clams x 3 x 12 side lying  Red Band 3 seconds hold    Sit to stands-      Sidelying hip abduction  3x10 each       heel raises x 3 x 10 no hands (progressed)     Toe raises X 3 x 10 no hands (progressed)      Unilateral standing   2 minutes (added)     March in place (for balance) x 2 minutes                                  TA  march in place   3 x 10 parallel bars no hands       step ups x  2 x 10 6 inch box no hands     Side to side steps  x 2 minutes parallel bars no hands    Sit to stand x 3 x 10 no hands mat at 20 inch     Squat   3 x 10 parallel bars to stool    lunge  X 10 bilateral to 4 inch box with airex pad on box    Up/down stairs x 7 x alternating steps 2 hand rails                                                               PLAN                CPT Codes available for Billing:   (00) minutes of Manual therapy (MT) to improve pain and ROM.  (14) minutes of Therapeutic Exercise (TE) to develop strength, endurance, range of motion, and flexibility.  (15) minutes of Neuromuscular Re-Education (NMR)  to improve: Balance, Coordination, Kinesthetic, Sense, Proprioception, and Posture.  (30) minutes of Therapeutic Activities (TA) to improve functional performance.  Unattended Electrical Stimulation (ES) for muscle performance or pain modulation.  BFR: Blood flow restriction applied during exercise  NP or (-): Not Performed          Assessment & Plan   Assessment: noted to shift weight to her right with toe raises. pt was able to be progressed  with strengthening and balance activities with no complaints.       Patient will continue to benefit from skilled outpatient physical therapy to address the deficits listed in the problem list box on initial evaluation, provide pt/family education and to maximize pt's level of independence in the home and community environment.     Patient's spiritual, cultural, and educational needs considered and patient agreeable to plan of care and goals.             Plan: Continue Plan of Care (POC) and progress per patient tolerance. See treatment section for details on planned progressions next session.    Goals:   Active       LTG       Pt will report worst pain of 2-3/10 in order  to progress toward max functional ability and improve quality of life          (Met)       Start:  03/28/25    Expected End:  06/20/25    Resolved:  04/29/25         Patient will demonstrate improved function as indicated by a score of greater than or equal to 58 out of 100 on FOTO.          (Progressing)       Start:  03/28/25    Expected End:  06/20/25            Patient will improve AROM to normal limits in order to return to maximal functional potential and improve quality of life.   (Progressing)       Start:  03/28/25    Expected End:  06/20/25            Patient will improve strength to at least 4+/5 grossly,  in order to improve functional independence and quality of life.   (Progressing)       Start:  03/28/25    Expected End:  06/20/25            Patient will be able to participate in her own gym program focused on LE strengthening without exacerbating her symptoms to improve her QOL.  (Met)       Start:  03/28/25    Expected End:  06/20/25    Resolved:  04/29/25            STG       Pt will report worst pain of 5-6/10 in order to progress toward max functional ability and improve quality of life          (Met)       Start:  03/28/25    Expected End:  05/09/25    Resolved:  04/29/25         Patient will demonstrate improved function as indicated by a score of greater than or equal to 53 out of 100 on FOTO.          (Progressing)       Start:  03/28/25    Expected End:  05/09/25            Patient will improve AROM to 50% of stated goals, listed in objective measures above, in order to progress towards independence with functional activities.   (Progressing)       Start:  03/28/25    Expected End:  05/09/25            Patient will improve strength by 1/2 a grade, in order to progress towards independence with functional activities.          (Progressing)       Start:  03/28/25    Expected End:  05/09/25            Patient will demonstrate independence with HEP in order to progress toward functional  independence.  (Progressing)       Start:  03/28/25    Expected End:  05/09/25                  Roly Chaney, PTA

## 2025-05-15 ENCOUNTER — CLINICAL SUPPORT (OUTPATIENT)
Dept: REHABILITATION | Facility: HOSPITAL | Age: 75
End: 2025-05-15
Payer: MEDICARE

## 2025-05-15 DIAGNOSIS — R68.89 DECREASED FUNCTIONAL ACTIVITY TOLERANCE: ICD-10-CM

## 2025-05-15 DIAGNOSIS — M17.0 PRIMARY OSTEOARTHRITIS OF BOTH KNEES: ICD-10-CM

## 2025-05-15 DIAGNOSIS — R29.898 WEAKNESS OF BOTH LOWER EXTREMITIES: Primary | ICD-10-CM

## 2025-05-15 DIAGNOSIS — M25.661 DECREASED RANGE OF MOTION (ROM) OF RIGHT KNEE: ICD-10-CM

## 2025-05-15 PROCEDURE — 97112 NEUROMUSCULAR REEDUCATION: CPT | Mod: CQ

## 2025-05-15 PROCEDURE — 97530 THERAPEUTIC ACTIVITIES: CPT | Mod: CQ

## 2025-05-15 NOTE — PATIENT INSTRUCTIONS
PTA and patient discussed safety with performing home exercise program in standing and to perform home exercise program with shoes on.

## 2025-05-15 NOTE — PROGRESS NOTES
Outpatient Rehab    Physical Therapy Visit    Patient Name: Jaimie Luque  MRN: [id number]  YOB: 1950  Encounter Date: 5/15/2025    Therapy Diagnosis:   Encounter Diagnoses   Name Primary?    Weakness of both lower extremities Yes    Decreased functional activity tolerance     Primary osteoarthritis of both knees     Decreased range of motion (ROM) of right knee                Physician: Sarwat Rodriguez MD    Physician Orders: Eval and Treat  Medical Diagnosis: Primary osteoarthritis of right knee    Visit # / Visits Authorized:  14 / 20  Insurance Authorization Period: 3/29/2025 to 12/31/2025  Date of Evaluation: 3/29/2025  Plan of Care Certification: 3/29/2025 to 6/20/2025     PT/PTA: PTA   Number of PTA visits since last PT visit:3  Time In: 1415   Time Out: 1500  Total Time: 45  Total Billable Time:  25    FOTO:  Intake Score:  %  Survey Score 1:  %  Survey Score 2:  %             Subjective   she states that all activity is going well at home and does not have any difficulty performing adl's..  Pain reported as 0/10.      Objective            Treatment:       CPT Intervention Performed   Today Duration / Intensity   MT Stm to right knee -    Right distal quads and hamstrings   Anterior and posterior right knee joint mobs   TE  Recumbent bike x 6 minutes  level 2       Leg Press  85 pound 3 x 12 (increased)      Knee Flexion matrix  27# 3x10 (increased)     Knee Extension Matrix  39 # 3x10 (increased)     Hip extension    3 x 10 prone bilateral  2 x 10 standing with red band (added)                                        NMR  short arch quads  ----- 3 x 10  5# pound       long arch quad  ----- 3 x 10 bilateral  5#    Bridges   3 x 15 2 seconds hold (glut emphasis) (increased)     Straight leg raise   3 x 10     clams  3 x 12 side lying  Red Band 3 seconds hold    Sit to stands-      Sidelying hip abduction  3x10 each       heel raises x 3 x 10 hands as needed standing on airex pad(progressed)      Toe raises X 3 x 10 hands as needed standing on airex pad (progressed)     Unilateral standing   2 minutes (added)     March in place (for balance) x 2 minutes standing on airex pad                                 TA  march in place   3 x 10 parallel bars no hands       step ups x  3 x 10 6 inch box no hands (progressed)     Side to side steps  x 3 laps green band no hands    Sit to stand  3 x 10 no hands mat at 20 inch     Squat   3 x 10 parallel bars to stool    lunge  X 10 bilateral to 4 inch box with airex pad on box    Up/down stairs  7 x alternating steps 2 hand rails                                                               PLAN                CPT Codes available for Billing:   (00) minutes of Manual therapy (MT) to improve pain and ROM.  (6) minutes of Therapeutic Exercise (TE) to develop strength, endurance, range of motion, and flexibility.  (9) minutes of Neuromuscular Re-Education (NMR)  to improve: Balance, Coordination, Kinesthetic, Sense, Proprioception, and Posture.  (10) minutes of Therapeutic Activities (TA) to improve functional performance.  Unattended Electrical Stimulation (ES) for muscle performance or pain modulation.  BFR: Blood flow restriction applied during exercise  NP or (-): Not Performed          Assessment & Plan   Assessment: she was able to be progressed with stability and strengthening exercises with good quality and with no complaints. she is motivated to be progressed within her tolerance.       Patient will continue to benefit from skilled outpatient physical therapy to address the deficits listed in the problem list box on initial evaluation, provide pt/family education and to maximize pt's level of independence in the home and community environment.     Patient's spiritual, cultural, and educational needs considered and patient agreeable to plan of care and goals.             Plan: Continue Plan of Care (POC) and progress per patient tolerance. See treatment section for  details on planned progressions next session.    Goals:   Active       LTG       Pt will report worst pain of 2-3/10 in order to progress toward max functional ability and improve quality of life          (Met)       Start:  03/28/25    Expected End:  06/20/25    Resolved:  04/29/25         Patient will demonstrate improved function as indicated by a score of greater than or equal to 58 out of 100 on FOTO.          (Progressing)       Start:  03/28/25    Expected End:  06/20/25            Patient will improve AROM to normal limits in order to return to maximal functional potential and improve quality of life.   (Progressing)       Start:  03/28/25    Expected End:  06/20/25            Patient will improve strength to at least 4+/5 grossly,  in order to improve functional independence and quality of life.   (Progressing)       Start:  03/28/25    Expected End:  06/20/25            Patient will be able to participate in her own gym program focused on LE strengthening without exacerbating her symptoms to improve her QOL.  (Met)       Start:  03/28/25    Expected End:  06/20/25    Resolved:  04/29/25            STG       Pt will report worst pain of 5-6/10 in order to progress toward max functional ability and improve quality of life          (Met)       Start:  03/28/25    Expected End:  05/09/25    Resolved:  04/29/25         Patient will demonstrate improved function as indicated by a score of greater than or equal to 53 out of 100 on FOTO.          (Progressing)       Start:  03/28/25    Expected End:  05/09/25            Patient will improve AROM to 50% of stated goals, listed in objective measures above, in order to progress towards independence with functional activities.   (Progressing)       Start:  03/28/25    Expected End:  05/09/25            Patient will improve strength by 1/2 a grade, in order to progress towards independence with functional activities.          (Progressing)       Start:  03/28/25     Expected End:  05/09/25            Patient will demonstrate independence with HEP in order to progress toward functional independence.  (Progressing)       Start:  03/28/25    Expected End:  05/09/25                    Roly Chaney PTA

## 2025-05-20 ENCOUNTER — CLINICAL SUPPORT (OUTPATIENT)
Dept: REHABILITATION | Facility: HOSPITAL | Age: 75
End: 2025-05-20
Payer: MEDICARE

## 2025-05-20 DIAGNOSIS — M17.11 OSTEOARTHRITIS OF RIGHT KNEE, UNSPECIFIED OSTEOARTHRITIS TYPE: ICD-10-CM

## 2025-05-20 DIAGNOSIS — R29.898 WEAKNESS OF BOTH LOWER EXTREMITIES: Primary | ICD-10-CM

## 2025-05-20 DIAGNOSIS — M25.661 DECREASED RANGE OF MOTION (ROM) OF RIGHT KNEE: ICD-10-CM

## 2025-05-20 DIAGNOSIS — R68.89 DECREASED FUNCTIONAL ACTIVITY TOLERANCE: ICD-10-CM

## 2025-05-20 PROCEDURE — 97530 THERAPEUTIC ACTIVITIES: CPT

## 2025-05-20 PROCEDURE — 97112 NEUROMUSCULAR REEDUCATION: CPT

## 2025-05-20 NOTE — PROGRESS NOTES
Outpatient Rehab    Physical Therapy Visit    Patient Name: Jaimie Luque  MRN: [id number]  YOB: 1950  Encounter Date: 5/20/2025    Therapy Diagnosis:   Encounter Diagnoses   Name Primary?    Weakness of both lower extremities Yes    Decreased functional activity tolerance     Osteoarthritis of right knee, unspecified osteoarthritis type     Decreased range of motion (ROM) of right knee                Physician: Sarwat Rodriguez MD    Physician Orders: Eval and Treat  Medical Diagnosis: Primary osteoarthritis of right knee    Visit # / Visits Authorized:  15 / 20  Insurance Authorization Period: 3/29/2025 to 12/31/2025  Date of Evaluation: 3/29/2025  Plan of Care Certification: 3/29/2025 to 6/20/2025     PT/PTA:     Number of PTA visits since last PT visit:   Time In: 1055   Time Out: 1149  Total Time: 54  Total Billable Time: 30    FOTO:  Intake Score:  %  Survey Score 1:  %  Survey Score 2:  %             Subjective   She feels like her knee has been improving..  Pain reported as 0/10.      Objective            Treatment:       CPT Intervention Performed   Today Duration / Intensity   MT Stm to right knee -    Right distal quads and hamstrings   Anterior and posterior right knee joint mobs   TE  Recumbent bike x 6 minutes  level 2       Leg Press x 85 pound 3 x 12 (increased)      Knee Flexion matrix  27# 3x10 (increased)     Knee Extension Matrix  39 # 3x10 (increased)     Hip extension    3 x 10 prone bilateral  2 x 10 standing with red band (added)                                        NMR  short arch quads  ----- 3 x 10  5# pound       long arch quad  ----- 3 x 10 bilateral  5#    Bridges   3 x 15 2 seconds hold (glut emphasis) (increased)     Straight leg raise  x 3 x 10     clams  3 x 12 side lying  Red Band 3 seconds hold    Sit to stands-      Sidelying hip abduction  3x10 each       heel raises x 3 x 10 hands as needed standing on airex pad(progressed)     Toe raises X 3 x 10 hands as  needed standing on airex pad (progressed)     Unilateral standing   2 minutes (added)     March in place (for balance) x 2 minutes standing on airex pad                                 TA  march in place   3 x 10 parallel bars no hands       step ups x  3 x 10 6 inch box no hands (progressed)     Side to side steps  x 3 laps green band no hands    Sit to stand x 3 x 10 no hands mat at 20 inch     Squat   3 x 10 parallel bars to stool    lunge  X 10 bilateral to 4 inch box with airex pad on box    Up/down stairs  7 x alternating steps 2 hand rails                                                               PLAN                CPT Codes available for Billing:   (00) minutes of Manual therapy (MT) to improve pain and ROM.  (15) minutes of Therapeutic Exercise (TE) to develop strength, endurance, range of motion, and flexibility.  (15) minutes of Neuromuscular Re-Education (NMR)  to improve: Balance, Coordination, Kinesthetic, Sense, Proprioception, and Posture.  (10) minutes of Therapeutic Activities (TA) to improve functional performance.  Unattended Electrical Stimulation (ES) for muscle performance or pain modulation.  BFR: Blood flow restriction applied during exercise  NP or (-): Not Performed          Assessment & Plan   Assessment: Continued with all progression from last visit patients balance and tolerance to single leg loading is the better  Evaluation/Treatment Tolerance: Patient tolerated treatment well    Patient will continue to benefit from skilled outpatient physical therapy to address the deficits listed in the problem list box on initial evaluation, provide pt/family education and to maximize pt's level of independence in the home and community environment.     Patient's spiritual, cultural, and educational needs considered and patient agreeable to plan of care and goals.             Plan: Continue Plan of Care (POC) and progress per patient tolerance. See treatment section for details on planned  progressions next session.    Goals:   Active       LTG       Pt will report worst pain of 2-3/10 in order to progress toward max functional ability and improve quality of life          (Met)       Start:  03/28/25    Expected End:  06/20/25    Resolved:  04/29/25         Patient will demonstrate improved function as indicated by a score of greater than or equal to 58 out of 100 on FOTO.          (Progressing)       Start:  03/28/25    Expected End:  06/20/25            Patient will improve AROM to normal limits in order to return to maximal functional potential and improve quality of life.   (Progressing)       Start:  03/28/25    Expected End:  06/20/25            Patient will improve strength to at least 4+/5 grossly,  in order to improve functional independence and quality of life.   (Progressing)       Start:  03/28/25    Expected End:  06/20/25            Patient will be able to participate in her own gym program focused on LE strengthening without exacerbating her symptoms to improve her QOL.  (Met)       Start:  03/28/25    Expected End:  06/20/25    Resolved:  04/29/25            STG       Pt will report worst pain of 5-6/10 in order to progress toward max functional ability and improve quality of life          (Met)       Start:  03/28/25    Expected End:  05/09/25    Resolved:  04/29/25         Patient will demonstrate improved function as indicated by a score of greater than or equal to 53 out of 100 on FOTO.          (Progressing)       Start:  03/28/25    Expected End:  05/09/25            Patient will improve AROM to 50% of stated goals, listed in objective measures above, in order to progress towards independence with functional activities.   (Progressing)       Start:  03/28/25    Expected End:  05/09/25            Patient will improve strength by 1/2 a grade, in order to progress towards independence with functional activities.          (Progressing)       Start:  03/28/25    Expected End:   05/09/25            Patient will demonstrate independence with HEP in order to progress toward functional independence.  (Progressing)       Start:  03/28/25    Expected End:  05/09/25                    Mynor Ceja PT

## 2025-05-22 ENCOUNTER — CLINICAL SUPPORT (OUTPATIENT)
Dept: REHABILITATION | Facility: HOSPITAL | Age: 75
End: 2025-05-22
Payer: MEDICARE

## 2025-05-22 DIAGNOSIS — M25.661 DECREASED RANGE OF MOTION (ROM) OF RIGHT KNEE: ICD-10-CM

## 2025-05-22 DIAGNOSIS — R68.89 DECREASED FUNCTIONAL ACTIVITY TOLERANCE: ICD-10-CM

## 2025-05-22 DIAGNOSIS — R29.898 WEAKNESS OF BOTH LOWER EXTREMITIES: Primary | ICD-10-CM

## 2025-05-22 DIAGNOSIS — M17.0 PRIMARY OSTEOARTHRITIS OF BOTH KNEES: ICD-10-CM

## 2025-05-22 PROCEDURE — 97110 THERAPEUTIC EXERCISES: CPT | Mod: CQ

## 2025-05-22 PROCEDURE — 97112 NEUROMUSCULAR REEDUCATION: CPT | Mod: CQ

## 2025-05-22 PROCEDURE — 97530 THERAPEUTIC ACTIVITIES: CPT | Mod: CQ

## 2025-05-22 NOTE — PROGRESS NOTES
Outpatient Rehab    Physical Therapy Visit    Patient Name: Jaimie Luque  MRN: [id number]  YOB: 1950  Encounter Date: 5/22/2025    Therapy Diagnosis:   Encounter Diagnoses   Name Primary?    Weakness of both lower extremities Yes    Decreased functional activity tolerance     Primary osteoarthritis of both knees     Decreased range of motion (ROM) of right knee                Physician: Sarwat Rodriguez MD    Physician Orders: Eval and Treat  Medical Diagnosis: Primary osteoarthritis of right knee    Visit # / Visits Authorized:  16 / 20  Insurance Authorization Period: 3/29/2025 to 12/31/2025  Date of Evaluation: 3/29/2025  Plan of Care Certification: 3/29/2025 to 6/20/2025     PT/PTA: PTA   Number of PTA visits since last PT visit:1  Time In: 0811   Time Out: 0900  Total Time: 49  Total Billable Time:  49    FOTO:  Intake Score:  %  Survey Score 1:  %  Survey Score 2:  %             Subjective   no complaints today. feels like she is getting stronger.         Objective            Treatment:       CPT Intervention Performed   Today Duration / Intensity   MT Stm to right knee -    Right distal quads and hamstrings   Anterior and posterior right knee joint mobs   TE  Recumbent bike x 6 minutes  level 2       Leg Press  85 pound 3 x 12 (increased)      Knee Flexion matrix x 39# 3x10     Knee Extension Matrix x 27 # 3x10     Hip extension    3 x 10 prone bilateral  2 x 10 standing with red band (added)                                        NMR  short arch quads  ----- 3 x 10  5# pound       long arch quad  ----- 3 x 10 bilateral  5#    Bridges  x 3 x 15 2 seconds hold (glut emphasis) (increased)     Straight leg raise   3 x 10     clams x 3 x 12 side lying  Red Band 3 seconds hold    Sit to stands-      Sidelying hip abduction  3x10 each       heel raises x 3 x 10 no hands standing on airex pad (progressed)     Toe raises X 3 x 10 hands as needed standing on airex pad (progressed)     Unilateral  standing   2 minutes (added)     March in place (for balance) x 2 minutes standing on airex pad                                 TA  march in place  x 3 x 10 parallel bars no hands standing on airex pad no hands tapping 6 inch box      step ups  3 x 10 6 inch box no hands (progressed)     Side to side steps  x 3 laps no hands on turf red band at knees    Monster walks x 3 laps turf red band at ankles no hands    Sit to stand  3 x 10 no hands mat at 20 inch     Squat   3 x 10 parallel bars to stool    lunge  X 10 bilateral to 4 inch box with airex pad on box    Up/down stairs  7 x alternating steps 2 hand rails                                                               PLAN                CPT Codes available for Billing:   (00) minutes of Manual therapy (MT) to improve pain and ROM.  (13) minutes of Therapeutic Exercise (TE) to develop strength, endurance, range of motion, and flexibility.  (21) minutes of Neuromuscular Re-Education (NMR)  to improve: Balance, Coordination, Kinesthetic, Sense, Proprioception, and Posture.  (15) minutes of Therapeutic Activities (TA) to improve functional performance.  Unattended Electrical Stimulation (ES) for muscle performance or pain modulation.  BFR: Blood flow restriction applied during exercise  NP or (-): Not Performed          Assessment & Plan   Assessment: pt ambulated today with a more noticable limp but had not complaints of pain. she was able to be progressed with balance exercises and performing standing exercises on less stable surface with no loss of balance.       Patient will continue to benefit from skilled outpatient physical therapy to address the deficits listed in the problem list box on initial evaluation, provide pt/family education and to maximize pt's level of independence in the home and community environment.     Patient's spiritual, cultural, and educational needs considered and patient agreeable to plan of care and goals.             Plan: Continue  Plan of Care (POC) and progress per patient tolerance. See treatment section for details on planned progressions next session.    Goals:   Active       LTG       Pt will report worst pain of 2-3/10 in order to progress toward max functional ability and improve quality of life          (Met)       Start:  03/28/25    Expected End:  06/20/25    Resolved:  04/29/25         Patient will demonstrate improved function as indicated by a score of greater than or equal to 58 out of 100 on FOTO.          (Progressing)       Start:  03/28/25    Expected End:  06/20/25            Patient will improve AROM to normal limits in order to return to maximal functional potential and improve quality of life.   (Progressing)       Start:  03/28/25    Expected End:  06/20/25            Patient will improve strength to at least 4+/5 grossly,  in order to improve functional independence and quality of life.   (Progressing)       Start:  03/28/25    Expected End:  06/20/25            Patient will be able to participate in her own gym program focused on LE strengthening without exacerbating her symptoms to improve her QOL.  (Met)       Start:  03/28/25    Expected End:  06/20/25    Resolved:  04/29/25            STG       Pt will report worst pain of 5-6/10 in order to progress toward max functional ability and improve quality of life          (Met)       Start:  03/28/25    Expected End:  05/09/25    Resolved:  04/29/25         Patient will demonstrate improved function as indicated by a score of greater than or equal to 53 out of 100 on FOTO.          (Progressing)       Start:  03/28/25    Expected End:  05/09/25            Patient will improve AROM to 50% of stated goals, listed in objective measures above, in order to progress towards independence with functional activities.   (Progressing)       Start:  03/28/25    Expected End:  05/09/25            Patient will improve strength by 1/2 a grade, in order to progress towards independence  with functional activities.          (Progressing)       Start:  03/28/25    Expected End:  05/09/25            Patient will demonstrate independence with HEP in order to progress toward functional independence.  (Progressing)       Start:  03/28/25    Expected End:  05/09/25                    Roly Chaney PTA

## 2025-05-27 ENCOUNTER — CLINICAL SUPPORT (OUTPATIENT)
Dept: REHABILITATION | Facility: HOSPITAL | Age: 75
End: 2025-05-27
Payer: MEDICARE

## 2025-05-27 DIAGNOSIS — M25.661 DECREASED RANGE OF MOTION (ROM) OF RIGHT KNEE: ICD-10-CM

## 2025-05-27 DIAGNOSIS — R29.898 WEAKNESS OF BOTH LOWER EXTREMITIES: Primary | ICD-10-CM

## 2025-05-27 DIAGNOSIS — M17.0 PRIMARY OSTEOARTHRITIS OF BOTH KNEES: ICD-10-CM

## 2025-05-27 DIAGNOSIS — R68.89 DECREASED FUNCTIONAL ACTIVITY TOLERANCE: ICD-10-CM

## 2025-05-27 PROCEDURE — 97110 THERAPEUTIC EXERCISES: CPT | Mod: CQ

## 2025-05-27 PROCEDURE — 97112 NEUROMUSCULAR REEDUCATION: CPT | Mod: CQ

## 2025-05-27 PROCEDURE — 97530 THERAPEUTIC ACTIVITIES: CPT | Mod: CQ

## 2025-05-27 NOTE — PROGRESS NOTES
Outpatient Rehab    Physical Therapy Visit    Patient Name: Jaimie Luque  MRN: [id number]  YOB: 1950  Encounter Date: 5/27/2025    Therapy Diagnosis:   Encounter Diagnoses   Name Primary?    Weakness of both lower extremities Yes    Decreased functional activity tolerance     Primary osteoarthritis of both knees     Decreased range of motion (ROM) of right knee                Physician: Sarwat Rodriguez MD    Physician Orders: Eval and Treat  Medical Diagnosis: Primary osteoarthritis of right knee    Visit # / Visits Authorized:  17 / 20  Insurance Authorization Period: 3/29/2025 to 12/31/2025  Date of Evaluation: 3/29/2025  Plan of Care Certification: 3/29/2025 to 6/20/2025     PT/PTA: PTA   Number of PTA visits since last PT visit:2  Time In: 0900   Time Out: 0945  Total Time: 45  Total Billable Time:  45    FOTO:  Intake Score:  %  Survey Score 1:  %  Survey Score 2:  %             Subjective   left hip pain which gets worse with close chain trunk rotation to the left. in retrospect, pt remembers that PTA noted an increased limp in her gait after her last therap session. pain is not constant..  Pain reported as 2/10. left hip    Objective            Treatment:       CPT Intervention Performed   Today Duration / Intensity   MT Stm to right knee -    Right distal quads and hamstrings   Anterior and posterior right knee joint mobs   TE  Recumbent bike x 6 minutes  level 2       Leg Press  85 pound 3 x 12 (increased)      Knee Flexion matrix x 39# 3x10     Knee Extension Matrix x 27 # 3x10     Hip extension    3 x 10 prone bilateral  2 x 10 standing with red band (added)     Passive range of motion left hip x Left hip supine x 3 minutes total all planes                                 NMR  short arch quads  ----- 3 x 10  5# pound       long arch quad  ----- 3 x 10 bilateral  5#    Bridges  x 3 x 15 2 seconds hold (glut emphasis) (increased)     Straight leg raise  x 3 x 10 bilateral      clams x 3 x  12 side lying  Red Band 3 seconds hold    Sit to stands-      Sidelying hip abduction  3x10 each       heel raises x 3 x 10 no hands standing on airex pad (progressed)     Toe raises X 3 x 10 hands as needed standing on airex pad (progressed)     Unilateral standing   2 minutes (added)     March in place (for balance) x 2 minutes standing on airex pad                                 TA  march in place  x 3 x 10 parallel bars no hands standing on airex pad no hands tapping 6 inch box      step ups  3 x 10 6 inch box no hands (progressed)     Side to side steps  x 3 laps no hands on turf red band at knees    Monster walks x 3 laps turf red band at ankles no hands    Sit to stand  3 x 10 no hands mat at 20 inch     Squat   3 x 10 parallel bars to stool    lunge  X 10 bilateral to 4 inch box with airex pad on box    Up/down stairs  7 x alternating steps 2 hand rails                                                               PLAN                CPT Codes available for Billing:   (00) minutes of Manual therapy (MT) to improve pain and ROM.  (15) minutes of Therapeutic Exercise (TE) to develop strength, endurance, range of motion, and flexibility.  (15) minutes of Neuromuscular Re-Education (NMR)  to improve: Balance, Coordination, Kinesthetic, Sense, Proprioception, and Posture.  (15) minutes of Therapeutic Activities (TA) to improve functional performance.  Unattended Electrical Stimulation (ES) for muscle performance or pain modulation.  BFR: Blood flow restriction applied during exercise  NP or (-): Not Performed          Assessment & Plan   Assessment: pt tolerated session with no additional complaints of pain and was able to perform exercises with same reps/resistance as during previous visits. she continues to ambulate with a lime. no rom restrictions noted with prom left hip.       Patient will continue to benefit from skilled outpatient physical therapy to address the deficits listed in the problem list box on  initial evaluation, provide pt/family education and to maximize pt's level of independence in the home and community environment.     Patient's spiritual, cultural, and educational needs considered and patient agreeable to plan of care and goals.             Plan: Continue Plan of Care (POC) and progress per patient tolerance. See treatment section for details on planned progressions next session.    Goals:   Active       LTG       Pt will report worst pain of 2-3/10 in order to progress toward max functional ability and improve quality of life          (Met)       Start:  03/28/25    Expected End:  06/20/25    Resolved:  04/29/25         Patient will demonstrate improved function as indicated by a score of greater than or equal to 58 out of 100 on FOTO.          (Progressing)       Start:  03/28/25    Expected End:  06/20/25            Patient will improve AROM to normal limits in order to return to maximal functional potential and improve quality of life.   (Progressing)       Start:  03/28/25    Expected End:  06/20/25            Patient will improve strength to at least 4+/5 grossly,  in order to improve functional independence and quality of life.   (Progressing)       Start:  03/28/25    Expected End:  06/20/25            Patient will be able to participate in her own gym program focused on LE strengthening without exacerbating her symptoms to improve her QOL.  (Met)       Start:  03/28/25    Expected End:  06/20/25    Resolved:  04/29/25            STG       Pt will report worst pain of 5-6/10 in order to progress toward max functional ability and improve quality of life          (Met)       Start:  03/28/25    Expected End:  05/09/25    Resolved:  04/29/25         Patient will demonstrate improved function as indicated by a score of greater than or equal to 53 out of 100 on FOTO.          (Progressing)       Start:  03/28/25    Expected End:  05/09/25            Patient will improve AROM to 50% of stated  goals, listed in objective measures above, in order to progress towards independence with functional activities.   (Progressing)       Start:  03/28/25    Expected End:  05/09/25            Patient will improve strength by 1/2 a grade, in order to progress towards independence with functional activities.          (Progressing)       Start:  03/28/25    Expected End:  05/09/25            Patient will demonstrate independence with HEP in order to progress toward functional independence.  (Progressing)       Start:  03/28/25    Expected End:  05/09/25                    Roly Chaney PTA

## 2025-05-29 ENCOUNTER — CLINICAL SUPPORT (OUTPATIENT)
Dept: REHABILITATION | Facility: HOSPITAL | Age: 75
End: 2025-05-29
Payer: MEDICARE

## 2025-05-29 DIAGNOSIS — M25.661 DECREASED RANGE OF MOTION (ROM) OF RIGHT KNEE: ICD-10-CM

## 2025-05-29 DIAGNOSIS — R29.898 WEAKNESS OF BOTH LOWER EXTREMITIES: Primary | ICD-10-CM

## 2025-05-29 DIAGNOSIS — M17.0 PRIMARY OSTEOARTHRITIS OF BOTH KNEES: ICD-10-CM

## 2025-05-29 DIAGNOSIS — R68.89 DECREASED FUNCTIONAL ACTIVITY TOLERANCE: ICD-10-CM

## 2025-05-29 PROCEDURE — 97112 NEUROMUSCULAR REEDUCATION: CPT | Mod: CQ

## 2025-05-29 PROCEDURE — 97110 THERAPEUTIC EXERCISES: CPT | Mod: CQ

## 2025-05-29 NOTE — PROGRESS NOTES
Outpatient Rehab    Physical Therapy Progress Note      Patient Name: Jaimie Luque  MRN: [id number]  YOB: 1950  Encounter Date: 5/29/2025    Therapy Diagnosis:   Encounter Diagnoses   Name Primary?    Weakness of both lower extremities Yes    Decreased functional activity tolerance     Primary osteoarthritis of both knees     Decreased range of motion (ROM) of right knee                Physician: Sarwat Rodriguez MD    Physician Orders: Eval and Treat  Medical Diagnosis: Primary osteoarthritis of right knee    Visit # / Visits Authorized:  18 / 20  Insurance Authorization Period: 3/29/2025 to 12/31/2025  Date of Evaluation: 3/29/2025  Plan of Care Certification: 3/29/2025 to 6/20/2025     PT/PTA: PTA   Number of PTA visits since last PT visit:3  Time In: 0900   Time Out: 0945  Total Time: 45  Total Billable Time:  45    FOTO:  Intake Score: 49%  Survey Score 1: 52%  Survey Score 2: 58%             Subjective   she does not report any pain today..  Pain reported as 0/10.      Objective        Expand All Collapse All       Outpatient Rehab     Physical Therapy Progress Note     Patient Name: Jaimie Luque  MRN: [id number]  YOB: 1950  Encounter Date: 4/29/2025     Therapy Diagnosis:        Encounter Diagnoses   Name Primary?    Weakness of both lower extremities Yes    Decreased functional activity tolerance      Primary osteoarthritis of both knees      Decreased range of motion (ROM) of right knee                 Physician: Sarwat Rodriguez MD     Physician Orders: Eval and Treat  Medical Diagnosis: Primary osteoarthritis of right knee     Visit # / Visits Authorized:  9 / 12  Insurance Authorization Period: 3/29/2025 to 12/31/2025  Date of Evaluation: 3/29/2025  Plan of Care Certification: 3/29/2025 to 6/20/2025                PT/PTA: PTA   Number of PTA visits since last PT visit:2  Time In: 0900   Time Out: 0945  Total Time: 45  Total Billable Time:  45     FOTO:  Intake Score:  49%  Survey Score 1: 52%  Survey Score 2:  %             Subjective  no complaints of pain. reports difficulty with sitting/standing from low surface and unable to squat to get objects out of lower cabinet.          Objective  RANGE OF MOTION:   Knee AROM/PROM Right    Left    Goal Right 4/29/25 Left 4/29/25 Right 5/29/25 Left 5/29/2025   Hyper - Zero - Flexion  2-85 1-0-120   -3 - 113 -5 - 122 -7 - 118 0 - 125            STRENGTH: (* denotes pain)  L/E MMT Right  (spine) Left Dysfunction with Movement Goal Right 4/29/25 Left 4/29/25 Right 5/29/25 Left 5/29/2025   Modified (90/90) Abdominal Strength  Poor ---     NT ---- NT ---   Hip Flexion  3+/5 3+/5   4+/5 B NT NT 4/5 4+/5   Hip Extension  3+/5 3+/5   4+/5 B 3+/5 3/5 4/5 4/5   Hip Abduction  3+/5 3+/5   4+/5 B 4-/5 4/5 4/5 4/5   Knee Extension 4/5 5/5   5/5 B 4/5 5/5 4+/5 5/5   Knee Flexion 4/5 5/5   5/5 B 4/5 4+/5 4+/5 5/5   Hip IR 4-/5 4-/5   4+/5 B NT NT 4/5 4/5   Hip ER 4-/5 4-/5   4+/5 B NT NT 4/5 4/5   Ankle DF 5/5 5/5   5/5 B NT NT 5/5 5/5   Ankle PF 4-/5 4-/5   5/5 B   NT 5/5 5/5   Ankle Inversion 5/5 5/5   5/5 B NT NT 5/5 5/5   Ankle Eversion 5/5 5/5   5/5 B NT NT 4/5 5/5                         Treatment:       CPT Intervention Performed   Today Duration / Intensity   MT Stm to right knee -    Right distal quads and hamstrings   Anterior and posterior right knee joint mobs   TE  Recumbent bike x 6 minutes  level 2       Leg Press  85 pound 3 x 12 (increased)      Knee Flexion matrix x 39# 3x10     Knee Extension Matrix x 27 # 3x10     Hip extension    3 x 10 prone bilateral  2 x 10 standing with red band (added)     Passive range of motion left hip  Left hip supine x 3 minutes total all planes    Heel prop x 4 minutes 7.5 pound right lower extremity sitting                           NMR  short arch quads  ----- 3 x 10  5# pound       long arch quad  ----- 3 x 10 bilateral  5#    Bridges  x 3 x 12, 10 pound glut emphasis     Straight leg raise   3 x 10  bilateral      clams x 3 x 12 side lying  Red Band 3 seconds hold    Reverse clams x 3 x 10 bilateral     Sit to stands-      Sidelying hip abduction  3x10 each       heel raises  3 x 10 no hands standing on airex pad (progressed)     Toe raises  3 x 10 hands as needed standing on airex pad (progressed)     Unilateral standing   2 minutes (added)     March in place (for balance)  2 minutes standing on airex pad                                 TA  march in place   3 x 10 parallel bars no hands standing on airex pad no hands tapping 6 inch box      step ups  3 x 10 6 inch box no hands (progressed)     Side to side steps   3 laps no hands on turf red band at knees    Monster walks  3 laps turf red band at ankles no hands    Sit to stand  3 x 10 no hands mat at 20 inch     Squat   3 x 10 parallel bars to stool    lunge  X 10 bilateral to 4 inch box with airex pad on box    Up/down stairs  7 x alternating steps 2 hand rails      timed up and go  Next          30 seconds sit to stand next                                              PLAN                CPT Codes available for Billing:   (00) minutes of Manual therapy (MT) to improve pain and ROM.  (35) minutes of Therapeutic Exercise (TE) to develop strength, endurance, range of motion, flexibility, obtain objective measurements and complete FOTO.  (10) minutes of Neuromuscular Re-Education (NMR)  to improve: Balance, Coordination, Kinesthetic, Sense, Proprioception, and Posture.  (00) minutes of Therapeutic Activities (TA) to improve functional performance.  Unattended Electrical Stimulation (ES) for muscle performance or pain modulation.  BFR: Blood flow restriction applied during exercise  NP or (-): Not Performed          Assessment & Plan   Assessment: pt is able to show an improvement in her right knee flexion but also has minimal decreased right knee extension. she is stronger in her vince gluteals. all  measurements are compared to 5/2/25. she appears to be stronger  with her left hip ir compared to right. she may benefit from additional physical therapy to address her remaining deficits.       Patient will continue to benefit from skilled outpatient physical therapy to address the deficits listed in the problem list box on initial evaluation, provide pt/family education and to maximize pt's level of independence in the home and community environment.     Patient's spiritual, cultural, and educational needs considered and patient agreeable to plan of care and goals.             Plan: Continue Plan of Care (POC) and progress per patient tolerance. See treatment section for details on planned progressions next session.    Goals:   Active       LTG       Pt will report worst pain of 2-3/10 in order to progress toward max functional ability and improve quality of life          (Met)       Start:  03/28/25    Expected End:  06/20/25    Resolved:  04/29/25         Patient will demonstrate improved function as indicated by a score of greater than or equal to 58 out of 100 on FOTO.          (Met)       Start:  03/28/25    Expected End:  06/20/25    Resolved:  05/29/25         Patient will improve AROM to normal limits in order to return to maximal functional potential and improve quality of life.   (Progressing)       Start:  03/28/25    Expected End:  06/20/25            Patient will improve strength to at least 4+/5 grossly,  in order to improve functional independence and quality of life.   (Progressing)       Start:  03/28/25    Expected End:  06/20/25            Patient will be able to participate in her own gym program focused on LE strengthening without exacerbating her symptoms to improve her QOL.  (Met)       Start:  03/28/25    Expected End:  06/20/25    Resolved:  04/29/25            STG       Pt will report worst pain of 5-6/10 in order to progress toward max functional ability and improve quality of life          (Met)       Start:  03/28/25    Expected End:  05/09/25     Resolved:  04/29/25         Patient will demonstrate improved function as indicated by a score of greater than or equal to 53 out of 100 on FOTO.          (Met)       Start:  03/28/25    Expected End:  05/09/25    Resolved:  05/29/25         Patient will improve AROM to 50% of stated goals, listed in objective measures above, in order to progress towards independence with functional activities.   (Progressing)       Start:  03/28/25    Expected End:  05/09/25            Patient will improve strength by 1/2 a grade, in order to progress towards independence with functional activities.          (Met)       Start:  03/28/25    Expected End:  05/09/25    Resolved:  05/29/25         Patient will demonstrate independence with HEP in order to progress toward functional independence.  (Met)       Start:  03/28/25    Expected End:  05/09/25    Resolved:  05/29/25                 Roly Chaney PTA

## 2025-06-02 ENCOUNTER — OFFICE VISIT (OUTPATIENT)
Dept: SPORTS MEDICINE | Facility: CLINIC | Age: 75
End: 2025-06-02
Payer: MEDICARE

## 2025-06-02 DIAGNOSIS — M17.11 PRIMARY OSTEOARTHRITIS OF RIGHT KNEE: Primary | ICD-10-CM

## 2025-06-02 PROCEDURE — 1159F MED LIST DOCD IN RCRD: CPT | Mod: CPTII,S$GLB,, | Performed by: STUDENT IN AN ORGANIZED HEALTH CARE EDUCATION/TRAINING PROGRAM

## 2025-06-02 PROCEDURE — 1157F ADVNC CARE PLAN IN RCRD: CPT | Mod: CPTII,S$GLB,, | Performed by: STUDENT IN AN ORGANIZED HEALTH CARE EDUCATION/TRAINING PROGRAM

## 2025-06-02 PROCEDURE — 99213 OFFICE O/P EST LOW 20 MIN: CPT | Mod: S$GLB,,, | Performed by: STUDENT IN AN ORGANIZED HEALTH CARE EDUCATION/TRAINING PROGRAM

## 2025-06-02 PROCEDURE — 99999 PR PBB SHADOW E&M-EST. PATIENT-LVL III: CPT | Mod: PBBFAC,,, | Performed by: STUDENT IN AN ORGANIZED HEALTH CARE EDUCATION/TRAINING PROGRAM

## 2025-06-02 PROCEDURE — 1101F PT FALLS ASSESS-DOCD LE1/YR: CPT | Mod: CPTII,S$GLB,, | Performed by: STUDENT IN AN ORGANIZED HEALTH CARE EDUCATION/TRAINING PROGRAM

## 2025-06-02 PROCEDURE — 1125F AMNT PAIN NOTED PAIN PRSNT: CPT | Mod: CPTII,S$GLB,, | Performed by: STUDENT IN AN ORGANIZED HEALTH CARE EDUCATION/TRAINING PROGRAM

## 2025-06-02 PROCEDURE — 3288F FALL RISK ASSESSMENT DOCD: CPT | Mod: CPTII,S$GLB,, | Performed by: STUDENT IN AN ORGANIZED HEALTH CARE EDUCATION/TRAINING PROGRAM

## 2025-06-03 ENCOUNTER — CLINICAL SUPPORT (OUTPATIENT)
Dept: REHABILITATION | Facility: HOSPITAL | Age: 75
End: 2025-06-03
Payer: MEDICARE

## 2025-06-03 DIAGNOSIS — M17.0 PRIMARY OSTEOARTHRITIS OF BOTH KNEES: ICD-10-CM

## 2025-06-03 DIAGNOSIS — R68.89 DECREASED FUNCTIONAL ACTIVITY TOLERANCE: ICD-10-CM

## 2025-06-03 DIAGNOSIS — R29.898 WEAKNESS OF BOTH LOWER EXTREMITIES: Primary | ICD-10-CM

## 2025-06-03 DIAGNOSIS — M25.661 DECREASED RANGE OF MOTION (ROM) OF RIGHT KNEE: ICD-10-CM

## 2025-06-03 PROCEDURE — 97110 THERAPEUTIC EXERCISES: CPT | Mod: CQ

## 2025-06-03 PROCEDURE — 97112 NEUROMUSCULAR REEDUCATION: CPT | Mod: CQ

## 2025-06-05 ENCOUNTER — CLINICAL SUPPORT (OUTPATIENT)
Dept: REHABILITATION | Facility: HOSPITAL | Age: 75
End: 2025-06-05
Payer: MEDICARE

## 2025-06-05 DIAGNOSIS — M17.0 PRIMARY OSTEOARTHRITIS OF BOTH KNEES: ICD-10-CM

## 2025-06-05 DIAGNOSIS — M25.661 DECREASED RANGE OF MOTION (ROM) OF RIGHT KNEE: ICD-10-CM

## 2025-06-05 DIAGNOSIS — R29.898 WEAKNESS OF BOTH LOWER EXTREMITIES: Primary | ICD-10-CM

## 2025-06-05 DIAGNOSIS — R68.89 DECREASED FUNCTIONAL ACTIVITY TOLERANCE: ICD-10-CM

## 2025-06-05 PROCEDURE — 97112 NEUROMUSCULAR REEDUCATION: CPT | Mod: CQ

## 2025-06-05 PROCEDURE — 97110 THERAPEUTIC EXERCISES: CPT | Mod: CQ

## 2025-06-05 PROCEDURE — 97530 THERAPEUTIC ACTIVITIES: CPT | Mod: CQ

## 2025-06-06 ENCOUNTER — DOCUMENTATION ONLY (OUTPATIENT)
Dept: REHABILITATION | Facility: HOSPITAL | Age: 75
End: 2025-06-06
Payer: MEDICARE

## 2025-06-07 ENCOUNTER — PATIENT MESSAGE (OUTPATIENT)
Dept: DIABETES | Facility: CLINIC | Age: 75
End: 2025-06-07
Payer: MEDICARE

## 2025-06-07 DIAGNOSIS — E11.65 UNCONTROLLED TYPE 2 DIABETES MELLITUS WITH HYPERGLYCEMIA: ICD-10-CM

## 2025-06-07 DIAGNOSIS — N18.31 TYPE 2 DIABETES MELLITUS WITH STAGE 3A CHRONIC KIDNEY DISEASE, WITH LONG-TERM CURRENT USE OF INSULIN: ICD-10-CM

## 2025-06-07 DIAGNOSIS — Z79.4 TYPE 2 DIABETES MELLITUS WITH STAGE 3A CHRONIC KIDNEY DISEASE, WITH LONG-TERM CURRENT USE OF INSULIN: ICD-10-CM

## 2025-06-07 DIAGNOSIS — E11.22 TYPE 2 DIABETES MELLITUS WITH STAGE 3A CHRONIC KIDNEY DISEASE, WITH LONG-TERM CURRENT USE OF INSULIN: ICD-10-CM

## 2025-06-09 ENCOUNTER — CLINICAL SUPPORT (OUTPATIENT)
Dept: REHABILITATION | Facility: HOSPITAL | Age: 75
End: 2025-06-09
Payer: MEDICARE

## 2025-06-09 DIAGNOSIS — M17.11 OSTEOARTHRITIS OF RIGHT KNEE, UNSPECIFIED OSTEOARTHRITIS TYPE: ICD-10-CM

## 2025-06-09 DIAGNOSIS — R29.898 WEAKNESS OF BOTH LOWER EXTREMITIES: Primary | ICD-10-CM

## 2025-06-09 DIAGNOSIS — R68.89 DECREASED FUNCTIONAL ACTIVITY TOLERANCE: ICD-10-CM

## 2025-06-09 DIAGNOSIS — M25.661 DECREASED RANGE OF MOTION (ROM) OF RIGHT KNEE: ICD-10-CM

## 2025-06-09 PROCEDURE — 97530 THERAPEUTIC ACTIVITIES: CPT

## 2025-06-09 PROCEDURE — 97112 NEUROMUSCULAR REEDUCATION: CPT

## 2025-06-09 RX ORDER — TIRZEPATIDE 5 MG/.5ML
5 INJECTION, SOLUTION SUBCUTANEOUS
Qty: 6 ML | Refills: 3 | Status: SHIPPED | OUTPATIENT
Start: 2025-06-09

## 2025-06-09 RX ORDER — BLOOD-GLUCOSE SENSOR
EACH MISCELLANEOUS
Qty: 9 EACH | Refills: 3 | Status: SHIPPED | OUTPATIENT
Start: 2025-06-09

## 2025-06-09 NOTE — PROGRESS NOTES
Outpatient Rehab    Physical Therapy Visit    Patient Name: Jaimie Luque  MRN: [id number]  YOB: 1950  Encounter Date: 6/9/2025    Therapy Diagnosis:   Encounter Diagnoses   Name Primary?    Weakness of both lower extremities Yes    Decreased functional activity tolerance     Osteoarthritis of right knee, unspecified osteoarthritis type     Decreased range of motion (ROM) of right knee      Physician: Sarwat Rodriguez MD    Physician Orders: Eval and Treat  Medical Diagnosis: Primary osteoarthritis of right knee    Visit # / Visits Authorized:  21 / 30  Insurance Authorization Period: 3/29/2025 to 12/31/2025  Date of Evaluation: 3/25/2025  Plan of Care Certification: 3/30/2025 to 6/20/2025      PT/PTA:     Number of PTA visits since last PT visit:   Time In: 0650   Time Out: 0749  Total Time (in minutes): 59   Total Billable Time (in minutes): 4045    FOTO:  Intake Score:  %  Survey Score 2:  %  Survey Score 3:  %    Precautions:       Subjective   She has been working on her balance and her strength to prepare for her [place] trip. She is most nervous about going up and down stairs as the house is 3 story house..  Pain reported as 0/10.      Objective            Treatment:         CPT Intervention Performed   Today Duration / Intensity   MT Stm to right knee -    Right distal quads and hamstrings   Anterior and posterior right knee joint mobs   TE  Recumbent bike x 6 minutes  level 2       Leg Press  85 pound 3 x 12 (increased)      Knee Flexion matrix  39# 3x10     Knee Extension Matrix  27 # 3x10     Hip extension    3 x 10 prone bilateral  2 x 10 standing with red band (added)     Passive range of motion left hip  Left hip supine x 3 minutes total all planes    Heel prop    4 minutes 7.5 pound right lower extremity sitting                           NMR  short arch quads  ----- 3 x 10  5# pound       long arch quad  ----- 3 x 10 bilateral  5#    Bridges  x 3 x 12, 10 pound glut emphasis      Straight leg raise   3 x 10 bilateral      clams  3 x 12 side lying  Red Band 3 seconds hold    Reverse clams  3 x 10 bilateral     Tandem Walking in // bars on foam x 6 laps    Sidelying hip abduction x 3x10 each       heel raises x 3 x 10 no hands standing on airex pad (progressed)     Toe raises x 3 x 10 hands as needed standing on airex pad (progressed)     Unilateral standing  x 2 minutes     March in place (for balance) x 3 x 10 standing on airex pad                                 TA  march in place   3 x 10 parallel bars no hands standing on airex pad no hands tapping 6 inch box    Treadmill  x 5 minutes .7mph incline #1      Step downs in // bars x 2 x 10 4 inch box with hands (progressed)     Side to side steps   3 laps no hands on turf red band at knees    Monster walks  3 laps turf red band at ankles no hands    Sit to stand  3 x 10 no hands mat at 20 inch     Squat   3 x 10 parallel bars to stool    lunge  X 10 bilateral to 4 inch box with airex pad on box    Up/down stairs x 3 x alternating steps 2 hand rails alternating steps      timed up and go          30 seconds sit to stand                                             PLAN                CPT Codes available for Billing:   (00) minutes of Manual therapy (MT) to improve pain and ROM.  (6) minutes of Therapeutic Exercise (TE) to develop strength, endurance, range of motion, flexibility, obtain objective measurements and complete FOTO.  (30) minutes of Neuromuscular Re-Education (NMR)  to improve: Balance, Coordination, Kinesthetic, Sense, Proprioception, and Posture.  (15) minutes of Therapeutic Activities (TA) to improve functional performance.  Unattended Electrical Stimulation (ES) for muscle performance or pain modulation.  BFR: Blood flow restriction applied during exercise  NP or (-): Not Performed              Assessment & Plan   Assessment: Patient tolerated this session well continued to work on stairs and incline walking. Added in tandem  foam walking to work on balance.  Evaluation/Treatment Tolerance: Patient tolerated treatment well    The patient will continue to benefit from skilled outpatient physical therapy in order to address the deficits listed in the problem list on the initial evaluation, provide patient and family education, and maximize the patients level of independence in the home and community environments.     The patient's spiritual, cultural, and educational needs were considered, and the patient is agreeable to the plan of care and goals.           Plan: Continue Plan of Care (POC) and progress per patient tolerance. See treatment section for details on planned progressions next session.    Goals:   Active       LTG       Pt will report worst pain of 2-3/10 in order to progress toward max functional ability and improve quality of life          (Met)       Start:  03/28/25    Expected End:  06/20/25    Resolved:  04/29/25         Patient will demonstrate improved function as indicated by a score of greater than or equal to 58 out of 100 on FOTO.          (Met)       Start:  03/28/25    Expected End:  06/20/25    Resolved:  05/29/25         Patient will improve AROM to normal limits in order to return to maximal functional potential and improve quality of life.   (Progressing)       Start:  03/28/25    Expected End:  06/20/25            Patient will improve strength to at least 4+/5 grossly,  in order to improve functional independence and quality of life.   (Progressing)       Start:  03/28/25    Expected End:  06/20/25            Patient will be able to participate in her own gym program focused on LE strengthening without exacerbating her symptoms to improve her QOL.  (Met)       Start:  03/28/25    Expected End:  06/20/25    Resolved:  04/29/25            STG       Pt will report worst pain of 5-6/10 in order to progress toward max functional ability and improve quality of life          (Met)       Start:  03/28/25    Expected  End:  05/09/25    Resolved:  04/29/25         Patient will demonstrate improved function as indicated by a score of greater than or equal to 53 out of 100 on FOTO.          (Met)       Start:  03/28/25    Expected End:  05/09/25    Resolved:  05/29/25         Patient will improve AROM to 50% of stated goals, listed in objective measures above, in order to progress towards independence with functional activities.   (Progressing)       Start:  03/28/25    Expected End:  05/09/25            Patient will improve strength by 1/2 a grade, in order to progress towards independence with functional activities.          (Met)       Start:  03/28/25    Expected End:  05/09/25    Resolved:  05/29/25         Patient will demonstrate independence with HEP in order to progress toward functional independence.  (Met)       Start:  03/28/25    Expected End:  05/09/25    Resolved:  05/29/25             Mynor Ceja PT

## 2025-06-13 ENCOUNTER — CLINICAL SUPPORT (OUTPATIENT)
Dept: REHABILITATION | Facility: HOSPITAL | Age: 75
End: 2025-06-13
Payer: MEDICARE

## 2025-06-13 DIAGNOSIS — M25.661 DECREASED RANGE OF MOTION (ROM) OF RIGHT KNEE: ICD-10-CM

## 2025-06-13 DIAGNOSIS — R29.898 WEAKNESS OF BOTH LOWER EXTREMITIES: Primary | ICD-10-CM

## 2025-06-13 DIAGNOSIS — R68.89 DECREASED FUNCTIONAL ACTIVITY TOLERANCE: ICD-10-CM

## 2025-06-13 DIAGNOSIS — M17.0 PRIMARY OSTEOARTHRITIS OF BOTH KNEES: ICD-10-CM

## 2025-06-13 PROCEDURE — 97530 THERAPEUTIC ACTIVITIES: CPT | Mod: CQ

## 2025-06-13 PROCEDURE — 97112 NEUROMUSCULAR REEDUCATION: CPT | Mod: CQ

## 2025-06-13 PROCEDURE — 97110 THERAPEUTIC EXERCISES: CPT | Mod: CQ

## 2025-06-13 NOTE — PROGRESS NOTES
Outpatient Rehab    Physical Therapy Visit    Patient Name: Jaimie Luque  MRN: [id number]  YOB: 1950  Encounter Date: 6/13/2025    Therapy Diagnosis:   Encounter Diagnoses   Name Primary?    Weakness of both lower extremities Yes    Decreased functional activity tolerance     Primary osteoarthritis of both knees     Decreased range of motion (ROM) of right knee        Physician: Sarwat Rodriguez MD    Physician Orders: Eval and Treat  Medical Diagnosis: Primary osteoarthritis of right knee    Visit # / Visits Authorized:  22 / 30  Insurance Authorization Period: 3/29/2025 to 12/31/2025  Date of Evaluation: 3/25/2025  Plan of Care Certification: 3/30/2025 to 6/20/2025      PT/PTA: PTA   Number of PTA visits since last PT visit:1  Time In: 0905   Time Out: 1002  Total Time (in minutes): 57   Total Billable Time (in minutes):  57    FOTO:  Intake Score:  %  Survey Score 2:  %  Survey Score 3:  %    Precautions:       Subjective   more knee pain yesterday. took over the counter pain medication for another issue and the pain medication seemed to have helped her right knee. no pain today. thinks the origin of her knee pain yesterday was weather related..  Pain reported as 0/10. no knee pain today    Objective            Treatment:         CPT Intervention Performed   Today Duration / Intensity   MT Stm to right knee -    Right distal quads and hamstrings   Anterior and posterior right knee joint mobs   TE  Recumbent bike x 6 minutes  level 2       Leg Press  85 pound 3 x 12     Knee Flexion matrix  39# 3x10     Knee Extension Matrix  27 # 3x10     Hip extension    3 x 10 prone bilateral  2 x 10 standing with red band (added)     Passive range of motion left hip  Left hip supine x 3 minutes total all planes    Heel prop    4 minutes 7.5 pound right lower extremity sitting                           NMR  short arch quads  ----- 3 x 10  5# pound       long arch quad  ----- 3 x 10 bilateral  5#    Bridges   3 x  12, 10 pound glut emphasis     Straight leg raise   3 x 10 bilateral      clams  3 x 12 side lying  Red Band 3 seconds hold    Reverse clams  3 x 10 bilateral     Tandem Walking in // bars on foam x 6 laps    Sidelying hip abduction  3x10 each       heel raises  3 x 10 no hands standing on airex pad (progressed)     Toe raises  3 x 10 hands as needed standing on airex pad (progressed)     Unilateral standing  x 2 minutes each    March in place (for balance) x 3 x 10 standing on airex pad                                 TA  march in place   3 x 10 parallel bars no hands standing on airex pad no hands tapping 6 inch box    Treadmill  x 5 minutes .8 mph incline #2      Step downs in // bars  2 x 10 4 inch box with hands (progressed)     Side to side steps   3 laps no hands on turf red band at knees    Monster walks  3 laps turf red band at ankles no hands    Sit to stand x 3 x 10 no hands mat at 20 inch holding 5 pound     Squat   3 x 10 parallel bars to stool    lunge  X 10 bilateral to 4 inch box with airex pad on box    Up/down stairs x 4 x alternating steps 2 hand rails alternating steps progressed to 1 rail      timed up and go  x  10.11 seconds with hands      30 seconds sit to stand x  9 x with hands                                           PLAN                CPT Codes available for Billing:   (00) minutes of Manual therapy (MT) to improve pain and ROM.  (6) minutes of Therapeutic Exercise (TE) to develop strength, endurance, range of motion, flexibility, obtain objective measurements and complete FOTO.  (22) minutes of Neuromuscular Re-Education (NMR)  to improve: Balance, Coordination, Kinesthetic, Sense, Proprioception, and Posture.  (29) minutes of Therapeutic Activities (TA) to improve functional performance.  Unattended Electrical Stimulation (ES) for muscle performance or pain modulation.  BFR: Blood flow restriction applied during exercise  NP or (-): Not Performed              Assessment & Plan    Assessment: pt performed all exercises with good tolerance. she demonstrated no loss of balance with tandem walking but continues to require assistance from upper extremities with unilateral standing on each le. hep with good understanding. feels somewhat confident with being discharged in the near future onto hep and with her continuing to exercise at the [facility]. she may be interested in starting to exercise in the pool at the [facility] in the near future.       The patient will continue to benefit from skilled outpatient physical therapy in order to address the deficits listed in the problem list on the initial evaluation, provide patient and family education, and maximize the patients level of independence in the home and community environments.     The patient's spiritual, cultural, and educational needs were considered, and the patient is agreeable to the plan of care and goals.           Plan: Continue Plan of Care (POC) and progress per patient tolerance. See treatment section for details on planned progressions next session.    Goals:   Active       LTG       Pt will report worst pain of 2-3/10 in order to progress toward max functional ability and improve quality of life          (Met)       Start:  03/28/25    Expected End:  06/20/25    Resolved:  04/29/25         Patient will demonstrate improved function as indicated by a score of greater than or equal to 58 out of 100 on FOTO.          (Met)       Start:  03/28/25    Expected End:  06/20/25    Resolved:  05/29/25         Patient will improve AROM to normal limits in order to return to maximal functional potential and improve quality of life.   (Progressing)       Start:  03/28/25    Expected End:  06/20/25            Patient will improve strength to at least 4+/5 grossly,  in order to improve functional independence and quality of life.   (Progressing)       Start:  03/28/25    Expected End:  06/20/25            Patient will be able to participate in her  own gym program focused on LE strengthening without exacerbating her symptoms to improve her QOL.  (Met)       Start:  03/28/25    Expected End:  06/20/25    Resolved:  04/29/25            STG       Pt will report worst pain of 5-6/10 in order to progress toward max functional ability and improve quality of life          (Met)       Start:  03/28/25    Expected End:  05/09/25    Resolved:  04/29/25         Patient will demonstrate improved function as indicated by a score of greater than or equal to 53 out of 100 on FOTO.          (Met)       Start:  03/28/25    Expected End:  05/09/25    Resolved:  05/29/25         Patient will improve AROM to 50% of stated goals, listed in objective measures above, in order to progress towards independence with functional activities.   (Progressing)       Start:  03/28/25    Expected End:  05/09/25            Patient will improve strength by 1/2 a grade, in order to progress towards independence with functional activities.          (Met)       Start:  03/28/25    Expected End:  05/09/25    Resolved:  05/29/25         Patient will demonstrate independence with HEP in order to progress toward functional independence.  (Met)       Start:  03/28/25    Expected End:  05/09/25    Resolved:  05/29/25               Roly Chaney PTA

## 2025-06-16 ENCOUNTER — CLINICAL SUPPORT (OUTPATIENT)
Dept: REHABILITATION | Facility: HOSPITAL | Age: 75
End: 2025-06-16
Payer: MEDICARE

## 2025-06-16 ENCOUNTER — RESULTS FOLLOW-UP (OUTPATIENT)
Dept: DIABETES | Facility: CLINIC | Age: 75
End: 2025-06-16

## 2025-06-16 ENCOUNTER — LAB VISIT (OUTPATIENT)
Dept: LAB | Facility: HOSPITAL | Age: 75
End: 2025-06-16
Attending: INTERNAL MEDICINE
Payer: MEDICARE

## 2025-06-16 DIAGNOSIS — M17.11 OSTEOARTHRITIS OF RIGHT KNEE, UNSPECIFIED OSTEOARTHRITIS TYPE: ICD-10-CM

## 2025-06-16 DIAGNOSIS — R68.89 DECREASED FUNCTIONAL ACTIVITY TOLERANCE: ICD-10-CM

## 2025-06-16 DIAGNOSIS — M25.661 DECREASED RANGE OF MOTION (ROM) OF RIGHT KNEE: ICD-10-CM

## 2025-06-16 DIAGNOSIS — E11.65 UNCONTROLLED TYPE 2 DIABETES MELLITUS WITH HYPERGLYCEMIA: ICD-10-CM

## 2025-06-16 DIAGNOSIS — R29.898 WEAKNESS OF BOTH LOWER EXTREMITIES: Primary | ICD-10-CM

## 2025-06-16 LAB
EAG (OHS): 166 MG/DL (ref 68–131)
HBA1C MFR BLD: 7.4 % (ref 4–5.6)

## 2025-06-16 PROCEDURE — 83036 HEMOGLOBIN GLYCOSYLATED A1C: CPT

## 2025-06-16 PROCEDURE — 97530 THERAPEUTIC ACTIVITIES: CPT

## 2025-06-16 PROCEDURE — 97112 NEUROMUSCULAR REEDUCATION: CPT

## 2025-06-16 PROCEDURE — 36415 COLL VENOUS BLD VENIPUNCTURE: CPT

## 2025-06-16 NOTE — PROGRESS NOTES
Outpatient Rehab    Physical Therapy Visit    Patient Name: Jaimie Luque  MRN: [id number]  YOB: 1950  Encounter Date: 6/16/2025    Therapy Diagnosis:   Encounter Diagnoses   Name Primary?    Weakness of both lower extremities Yes    Decreased functional activity tolerance     Osteoarthritis of right knee, unspecified osteoarthritis type     Decreased range of motion (ROM) of right knee        Physician: Sarwat Rodriguez MD    Physician Orders: Eval and Treat  Medical Diagnosis: Primary osteoarthritis of right knee    Visit # / Visits Authorized:  23 / 30  Insurance Authorization Period: 3/29/2025 to 12/31/2025  Date of Evaluation: 3/25/2025  Plan of Care Certification: 3/30/2025 to 6/20/2025      PT/PTA:     Number of PTA visits since last PT visit:   Time In:     Time Out:    Total Time (in minutes):     Total Billable Time (in minutes):  57    FOTO:  Intake Score:  %  Survey Score 2:  %  Survey Score 3:  %    Precautions:       Subjective   She is feeling okay today she is little under two weeks away from her trip..  Pain reported as 0/10.      Objective             Treatment:        CPT Intervention Performed   Today Duration / Intensity   MT Stm to right knee -     Right distal quads and hamstrings   Anterior and posterior right knee joint mobs   TE  Recumbent bike xx 6 minutes  level 2       Leg Press xx 85 pound 3 x 12 (increased)       Knee Flexion matrix xx 27# 3x10 (increased)      Knee Extension Matrix xx 39 # 3x10 (increased)      Hip extension      3 x 10 prone bilateral  2 x 10 standing with red band (added)    NMR  short arch quads  ----- 3 x 10  5# pound       long arch quad  ----- 3 x 10 bilateral  5#     Bridges  xx  3 x 15 2 seconds hold (glut emphasis) (increased)      Straight leg raise    3 x 10      clams  xx 3 x 12 side lying Green Band 3 seconds hold     Sit to stands-         Sidelying hip abduction  xx 3x10 each       heel raises xx 3 x 10 hands as needed standing on airex  pad(progressed)      Toe raises   3 x 10 hands as needed standing on airex pad (progressed)      Unilateral standing    2 minutes (added)      March in place (for balance) xx 2 minutes standing on airex pad   TA  march in place    3 x 10 parallel bars no hands       step ups xx 3 x 10 6 inch box no hands (progressed)      Side to side steps  xx 3 laps green band no hands     Sit to stand   3 x 10 no hands mat at 20 inch      Squat    3 x 10 parallel bars to stool     lunge   X 10 bilateral to 4 inch box with airex pad on box     Up/down stairs  xx 7 x alternating steps 2 hand rails   PLAN                CPT Codes available for Billing:   (00) minutes of Manual therapy (MT) to improve pain and ROM.  (25) minutes of Therapeutic Exercise (TE) to develop strength, endurance, range of motion, and flexibility.  (15) minutes of Neuromuscular Re-Education (NMR)  to improve: Balance, Coordination, Kinesthetic, Sense, Proprioception, and Posture.  (15) minutes of Therapeutic Activities (TA) to improve functional performance.  Unattended Electrical Stimulation (ES) for muscle performance or pain modulation.  BFR: Blood flow restriction applied during exercise  NP or (-): Not Performed             Assessment & Plan   Assessment: Patient tolerated treatment well and was able to progress through exercise plan with addition of multiple rest breaks. Added back in resistance program to work on functional lower extremity strength. Plan to DC next visit.        The patient will continue to benefit from skilled outpatient physical therapy in order to address the deficits listed in the problem list on the initial evaluation, provide patient and family education, and maximize the patients level of independence in the home and community environments.     The patient's spiritual, cultural, and educational needs were considered, and the patient is agreeable to the plan of care and goals.           Plan: Continue Plan of Care (POC) and  progress per patient tolerance. See treatment section for details on planned progressions next session.    Goals:   Active       LTG       Pt will report worst pain of 2-3/10 in order to progress toward max functional ability and improve quality of life          (Met)       Start:  03/28/25    Expected End:  06/20/25    Resolved:  04/29/25         Patient will demonstrate improved function as indicated by a score of greater than or equal to 58 out of 100 on FOTO.          (Met)       Start:  03/28/25    Expected End:  06/20/25    Resolved:  05/29/25         Patient will improve AROM to normal limits in order to return to maximal functional potential and improve quality of life.   (Progressing)       Start:  03/28/25    Expected End:  06/20/25            Patient will improve strength to at least 4+/5 grossly,  in order to improve functional independence and quality of life.   (Progressing)       Start:  03/28/25    Expected End:  06/20/25            Patient will be able to participate in her own gym program focused on LE strengthening without exacerbating her symptoms to improve her QOL.  (Met)       Start:  03/28/25    Expected End:  06/20/25    Resolved:  04/29/25            STG       Pt will report worst pain of 5-6/10 in order to progress toward max functional ability and improve quality of life          (Met)       Start:  03/28/25    Expected End:  05/09/25    Resolved:  04/29/25         Patient will demonstrate improved function as indicated by a score of greater than or equal to 53 out of 100 on FOTO.          (Met)       Start:  03/28/25    Expected End:  05/09/25    Resolved:  05/29/25         Patient will improve AROM to 50% of stated goals, listed in objective measures above, in order to progress towards independence with functional activities.   (Progressing)       Start:  03/28/25    Expected End:  05/09/25            Patient will improve strength by 1/2 a grade, in order to progress towards  independence with functional activities.          (Met)       Start:  03/28/25    Expected End:  05/09/25    Resolved:  05/29/25         Patient will demonstrate independence with HEP in order to progress toward functional independence.  (Met)       Start:  03/28/25    Expected End:  05/09/25    Resolved:  05/29/25               Mynor Ceja PT

## 2025-06-19 ENCOUNTER — OFFICE VISIT (OUTPATIENT)
Dept: PRIMARY CARE CLINIC | Facility: CLINIC | Age: 75
End: 2025-06-19
Payer: MEDICARE

## 2025-06-19 VITALS
BODY MASS INDEX: 34.87 KG/M2 | SYSTOLIC BLOOD PRESSURE: 100 MMHG | WEIGHT: 222.13 LBS | HEIGHT: 67 IN | DIASTOLIC BLOOD PRESSURE: 60 MMHG | HEART RATE: 109 BPM | TEMPERATURE: 97 F | OXYGEN SATURATION: 97 %

## 2025-06-19 DIAGNOSIS — E11.22 TYPE 2 DIABETES MELLITUS WITH STAGE 3A CHRONIC KIDNEY DISEASE, WITH LONG-TERM CURRENT USE OF INSULIN: ICD-10-CM

## 2025-06-19 DIAGNOSIS — M85.80 OSTEOPENIA, UNSPECIFIED LOCATION: ICD-10-CM

## 2025-06-19 DIAGNOSIS — E03.9 ACQUIRED HYPOTHYROIDISM: ICD-10-CM

## 2025-06-19 DIAGNOSIS — Z00.00 ENCOUNTER FOR MEDICARE ANNUAL WELLNESS EXAM: Primary | ICD-10-CM

## 2025-06-19 DIAGNOSIS — Z79.4 TYPE 2 DIABETES MELLITUS WITH STAGE 3A CHRONIC KIDNEY DISEASE, WITH LONG-TERM CURRENT USE OF INSULIN: ICD-10-CM

## 2025-06-19 DIAGNOSIS — R91.8 MULTIPLE LUNG NODULES ON CT: ICD-10-CM

## 2025-06-19 DIAGNOSIS — E78.5 HYPERLIPIDEMIA ASSOCIATED WITH TYPE 2 DIABETES MELLITUS: ICD-10-CM

## 2025-06-19 DIAGNOSIS — G47.33 OSA ON CPAP: ICD-10-CM

## 2025-06-19 DIAGNOSIS — E11.69 HYPERLIPIDEMIA ASSOCIATED WITH TYPE 2 DIABETES MELLITUS: ICD-10-CM

## 2025-06-19 DIAGNOSIS — N18.31 TYPE 2 DIABETES MELLITUS WITH STAGE 3A CHRONIC KIDNEY DISEASE, WITH LONG-TERM CURRENT USE OF INSULIN: ICD-10-CM

## 2025-06-19 DIAGNOSIS — E66.01 SEVERE OBESITY WITH BODY MASS INDEX (BMI) OF 35.0 TO 35.9 AND COMORBIDITY: ICD-10-CM

## 2025-06-19 DIAGNOSIS — M35.1 MCTD (MIXED CONNECTIVE TISSUE DISEASE): ICD-10-CM

## 2025-06-19 DIAGNOSIS — I70.0 AORTIC ATHEROSCLEROSIS: ICD-10-CM

## 2025-06-19 DIAGNOSIS — J84.9 ILD (INTERSTITIAL LUNG DISEASE): ICD-10-CM

## 2025-06-19 PROCEDURE — 3074F SYST BP LT 130 MM HG: CPT | Mod: CPTII,S$GLB,, | Performed by: NURSE PRACTITIONER

## 2025-06-19 PROCEDURE — 1126F AMNT PAIN NOTED NONE PRSNT: CPT | Mod: CPTII,S$GLB,, | Performed by: NURSE PRACTITIONER

## 2025-06-19 PROCEDURE — 1160F RVW MEDS BY RX/DR IN RCRD: CPT | Mod: CPTII,S$GLB,, | Performed by: NURSE PRACTITIONER

## 2025-06-19 PROCEDURE — 1123F ACP DISCUSS/DSCN MKR DOCD: CPT | Mod: CPTII,S$GLB,, | Performed by: NURSE PRACTITIONER

## 2025-06-19 PROCEDURE — G0439 PPPS, SUBSEQ VISIT: HCPCS | Mod: S$GLB,,, | Performed by: NURSE PRACTITIONER

## 2025-06-19 PROCEDURE — 1101F PT FALLS ASSESS-DOCD LE1/YR: CPT | Mod: CPTII,S$GLB,, | Performed by: NURSE PRACTITIONER

## 2025-06-19 PROCEDURE — 3078F DIAST BP <80 MM HG: CPT | Mod: CPTII,S$GLB,, | Performed by: NURSE PRACTITIONER

## 2025-06-19 PROCEDURE — 1159F MED LIST DOCD IN RCRD: CPT | Mod: CPTII,S$GLB,, | Performed by: NURSE PRACTITIONER

## 2025-06-19 PROCEDURE — 99999 PR PBB SHADOW E&M-EST. PATIENT-LVL V: CPT | Mod: PBBFAC,,, | Performed by: NURSE PRACTITIONER

## 2025-06-19 PROCEDURE — 3288F FALL RISK ASSESSMENT DOCD: CPT | Mod: CPTII,S$GLB,, | Performed by: NURSE PRACTITIONER

## 2025-06-19 NOTE — ASSESSMENT & PLAN NOTE
Lab Results   Component Value Date    TSH 1.557 11/17/2023    FREET4 1.16 10/13/2022    Asymptomatic  Continue Rx  F/U with PCP

## 2025-06-19 NOTE — ASSESSMENT & PLAN NOTE
CKD 3 A stable - control blood glucose, hydrate    Hemoglobin A1C   Date Value Ref Range Status   03/12/2025 7.4 (H) 4.0 - 5.6 % Final     Comment:     ADA Screening Guidelines:  5.7-6.4%  Consistent with prediabetes  >or=6.5%  Consistent with diabetes    High levels of fetal hemoglobin interfere with the HbA1C  assay. Heterozygous hemoglobin variants (HbS, HgC, etc)do  not significantly interfere with this assay.   However, presence of multiple variants may affect accuracy.     10/02/2024 6.9 (H) 4.0 - 5.6 % Final     Comment:     ADA Screening Guidelines:  5.7-6.4%  Consistent with prediabetes  >or=6.5%  Consistent with diabetes    High levels of fetal hemoglobin interfere with the HbA1C  assay. Heterozygous hemoglobin variants (HbS, HgC, etc)do  not significantly interfere with this assay.   However, presence of multiple variants may affect accuracy.     06/03/2024 7.3 (H) 4.0 - 5.6 % Final     Comment:     ADA Screening Guidelines:  5.7-6.4%  Consistent with prediabetes  >or=6.5%  Consistent with diabetes    High levels of fetal hemoglobin interfere with the HbA1C  assay. Heterozygous hemoglobin variants (HbS, HgC, etc)do  not significantly interfere with this assay.   However, presence of multiple variants may affect accuracy.       Hemoglobin A1c   Date Value Ref Range Status   06/16/2025 7.4 (H) 4.0 - 5.6 % Final     Comment:     ADA Screening Guidelines:  5.7-6.4%  Consistent with prediabetes  >=6.5%  Consistent with diabetes    High levels of fetal hemoglobin interfere with the HbA1C  assay. Heterozygous hemoglobin variants (HbS, HgC, etc)do  not significantly interfere with this assay.   However, presence of multiple variants may affect accuracy.     Assess and monitor  Hgb A1 C fluctuating  Continue weight loss and exercise  Continue diabetic meds  F/U with Diabetes provider

## 2025-06-19 NOTE — PROGRESS NOTES
"Subjective:      Patient ID: Jaimie Luque is a 75 y.o. female.    Chief Complaint: Follow-up (3 month f/u )      HPI  Here for follow up of medical problems.  Down 12# more, total 28#.  In PT for one leg weakness.  Goes to [facility] for circuit training, 3x per week.  One month ago, Lorraine changed ozempic to mounjaro.  AC breakfast sugars still 180-200.      Updated/annual due 10/25:  HM: 9/24 fluvax, 10/23 RSV, 2/21 covid vaccines/booster, 4/17 HAV#2, 3/15 [id number], 3/14 [id number], 11/22 [id number], 10/18 Tdap, 11/12 zoster, 2019 Shingrix x2, 9/21 BMD rep 5y, 5/22 Cscope REPEAT 1y--12/23 Cologuard negative, 6/24 MMG(q2), 4/24 Eye doctor, 9/16 HCV neg, 7/23 CT lung with Pulm Dr. Schumacher.     Review of Systems   Constitutional:  Negative for chills, diaphoresis and fever.   Respiratory:  Negative for cough and shortness of breath.    Cardiovascular:  Negative for chest pain, palpitations and leg swelling.   Gastrointestinal:  Negative for blood in stool, constipation, diarrhea, nausea and vomiting.   Genitourinary:  Negative for dysuria, frequency and hematuria.   Psychiatric/Behavioral:  The patient is not nervous/anxious.          Objective:   BP (!) 104/52 (BP Location: Right arm, Patient Position: Sitting)   Pulse 73   Temp 97.6 °F (36.4 °C) (Skin)   Ht 5' 7" (1.702 m)   Wt 100.2 kg (220 lb 14.4 oz)   SpO2 97%   BMI 34.60 kg/m²     Physical Exam  Constitutional:       Appearance: She is well-developed.   Neck:      Thyroid: No thyroid mass.      Vascular: No carotid bruit.   Cardiovascular:      Rate and Rhythm: Normal rate and regular rhythm.      Heart sounds: No murmur heard.     No friction rub. No gallop.   Pulmonary:      Effort: Pulmonary effort is normal.      Breath sounds: Normal breath sounds. No wheezing or rales.   Abdominal:      General: Bowel sounds are normal.      Palpations: Abdomen is soft. There is no mass.      Tenderness: There is no abdominal tenderness.   Musculoskeletal:      Cervical " "back: Neck supple.   Lymphadenopathy:      Cervical: No cervical adenopathy.   Neurological:      Mental Status: She is alert and oriented to person, place, and time.        Latest Reference Range & Units 06/16/25 08:58   Hemoglobin A1C External 4.0 - 5.6 % 7.4 (H)   Estimated Avg Glucose 68 - 131 mg/dL 166 (H)       Assessment:       1. Hyperlipidemia associated with type 2 diabetes mellitus    2. Type 2 diabetes mellitus with stage 3a chronic kidney disease, with long-term current use of insulin    3. Acquired hypothyroidism    4. Aortic atherosclerosis    5. Asymptomatic postmenopausal state          Plan:     1. Hyperlipidemia associated with type 2 diabetes mellitus  Overview:  Atorva 20mg daily.    Assessment & Plan:  LDL at goal, cont current regimen. LDL 49.    Orders:  -     CBC Auto Differential; Future; Expected date: 06/27/2025  -     Comprehensive Metabolic Panel; Future; Expected date: 06/27/2025  -     Lipid Panel; Future; Expected date: 06/27/2025  -     TSH; Future; Expected date: 06/27/2025  -     Hemoglobin A1C; Future; Expected date: 06/27/2025  -     Microalbumin/Creatinine Ratio, Urine; Future; Expected date: 06/27/2025    2. Type 2 diabetes mellitus with stage 3a chronic kidney disease, with long-term current use of insulin  Overview:  Diabetes Medications              insulin glargine U-100, Lantus, (LANTUS SOLOSTAR U-100 INSULIN) 100 unit/mL (3 mL) InPn pen ADMINISTER 10 UNITS UNDER THE SKIN DAILY    insulin lispro 100 unit/mL pen Titrate up to 100 units daily as instructed.    tirzepatide (MOUNJARO) 5 mg/0.5 mL PnIj Inject 5 mg into the skin every 7 days.         Intolerant to Jardiance - vaginal candidiasis    Assessment & Plan:  Cont current regimen, recheck 3mo.    3. Acquired hypothyroidism  Overview:  Synthroid 112mcg daily.    Assessment & Plan:  Clinically stable, continue present treatment.    4. Aortic atherosclerosis  Overview:  05-JUL-23; CT Chest Without Contrast: "..ILD " "CORONARY ARTERY CALCIFICATION.  NO CARDIOMEGALY, AORTIC ANEURYSM OR ADENOPATHY..."     On statin.    Assessment & Plan:  Stable, cont rx.    5. Asymptomatic postmenopausal state  -     DXA Bone Density Axial Skeleton 1 or more sites; Future; Expected date: 06/27/2025     RTC  3mo with labs and BMD prior.    "

## 2025-06-19 NOTE — ASSESSMENT & PLAN NOTE
LdL at goal, monitor  Weight declining  [facility]  Last ldl = 49  F/U with PCP

## 2025-06-19 NOTE — PATIENT INSTRUCTIONS
Counseling and Referral of Other Preventative  (Italic type indicates deductible and co-insurance are waived)    Patient Name: Jaimie Luque  Today's Date: 6/19/2025    Health Maintenance       Date Due Completion Date    Lipid Panel 10/02/2025 10/2/2024    Diabetes Urine Screening 10/03/2025 10/3/2024    Foot Exam 10/08/2025 10/8/2024 (Done)    Override on 10/8/2024: Done    Override on 5/25/2020: Done    Diabetic Eye Exam 10/22/2025 10/22/2024 (Done)    Override on 10/22/2024: Done    Override on 4/15/2024: Done    Override on 8/24/2022: Done    Override on 2/9/2022: Done    Override on 2/3/2021: Done    Hemoglobin A1c 12/16/2025 6/16/2025    TETANUS VACCINE 10/02/2028 10/2/2018    DEXA Scan 06/04/2029 6/4/2024        Orders Placed This Encounter   Procedures    Ambulatory referral/consult to Podiatry     The following information is provided to all patients.  This information is to help you find resources for any of the problems found today that may be affecting your health:                  Living healthy guide: www.[facility].[place].gov      Understanding Diabetes: www.diabetes.org      Eating healthy: www.cdc.gov/healthyweight      CDC home safety checklist: www.cdc.gov/steadi/patient.html      Agency on Aging: www.goea.[place].[facility]      Alcoholics anonymous (AA): www.aa.org      Physical Activity: www.mikie.nih.gov/zb0yfik      Tobacco use: www.quitwithusla.org

## 2025-06-19 NOTE — ASSESSMENT & PLAN NOTE
Chronic, stable  Continue calcium/vit D supplement  Continue weight bearing exercise to strengthen bones  F/U with PCP

## 2025-06-19 NOTE — ASSESSMENT & PLAN NOTE
Chronic, stable  Continue activity and weight loss  Control blood pressure and serum lipids -  LDL is controlled   Latest Reference Range & Units Most Recent   LDL Cholesterol 63.0 - 159.0 mg/dL 49.4 (L)  10/2/24 14:51   Continue current tx plan  F/U with PCP

## 2025-06-19 NOTE — ASSESSMENT & PLAN NOTE
decreased compliance now that her 's hearing has declined and he can't hear when she snores and tell her to put the machine on  - element of daytime sleepiness related to fractionated sleep pattern- takes naps during the day, will wake up in the night and read for several hours then fall back to sleep  - encouraged routine exercise, limiting naps  Monitor  F/u with PCP

## 2025-06-19 NOTE — PROGRESS NOTES
Jaimie Luque presented for a follow-up Medicare AWV today. The following components were reviewed and updated:    Medical history  Family History  Social history  Allergies and Current Medications  Health Risk Assessment  Health Maintenance  Care Team    **See Completed Assessments for Annual Wellness visit with in the encounter summary    The following assessments were completed:  Depression Screening  Cognitive function Screening  Timed Get Up Test  Whisper Test    PT for leg weakness    Opioid documentation:    Patient does not have a current opioid prescription.          Vitals:    06/19/25 0852   BP: 100/60   Pulse: 109   Temp: 97.4 °F (36.3 °C)     Body mass index is 34.79 kg/m².        Review of Systems   Constitutional:  Negative for chills and fever.   HENT:  Negative for congestion and sore throat.         Rhinitis   Respiratory:  Negative for cough and shortness of breath.    Cardiovascular:  Positive for leg swelling. Negative for chest pain and palpitations.   Gastrointestinal:  Positive for constipation. Negative for abdominal pain, diarrhea, nausea and vomiting.        Acid reflux   Genitourinary:  Negative for dysuria and hematuria.   Musculoskeletal:  Positive for joint pain (knee). Negative for falls.   Neurological:  Negative for dizziness, focal weakness and headaches.   Psychiatric/Behavioral:  Negative for depression. The patient has insomnia. The patient is not nervous/anxious.      Physical Exam  Vitals reviewed.   Constitutional:       General: She is not in acute distress.     Appearance: Normal appearance. She is obese.   HENT:      Head: Normocephalic and atraumatic.      Nose: Nose normal.      Mouth/Throat:      Mouth: Mucous membranes are moist.   Eyes:      General: No scleral icterus.     Conjunctiva/sclera: Conjunctivae normal.   Cardiovascular:      Rate and Rhythm: Normal rate and regular rhythm.      Heart sounds: No murmur heard.  Pulmonary:      Effort: Pulmonary effort is  "normal. No respiratory distress.      Breath sounds: Normal breath sounds.   Abdominal:      Palpations: Abdomen is soft.      Tenderness: There is no abdominal tenderness.   Musculoskeletal:      Cervical back: Normal range of motion and neck supple.      Right lower leg: No edema.      Left lower leg: No edema.   Lymphadenopathy:      Cervical: No cervical adenopathy.   Skin:     General: Skin is warm and dry.   Neurological:      Mental Status: She is alert and oriented to person, place, and time. Mental status is at baseline.   Psychiatric:         Mood and Affect: Mood normal.         Thought Content: Thought content normal.           Diagnoses and health risks identified today and associated recommendations/orders:  1. Encounter for Medicare annual wellness exam  Assessment & Plan:  2025 - needs eye and foot appt, labs pending for later in year      2. Multiple lung nodules on CT  Overview:  - stable since 2018  - no further intervention - per Pulmonology    Assessment & Plan:  Monitor for SOB, cough  May warrant repeat imaging  F/U with PCP or Pulm      3. ILD (interstitial lung disease)  Overview:  Patchy ground-glass opacities versus mosaic attenuation. Multiple pulmonary nodules, stable or decreased in size/resolved within the bilateral lungs. New similar nodules present within the left upper lobe and inferolateral right middle lobe. Nodules again suggest possible mucous plugging or infectious/inflammatory etiology     Assessment & Plan:  Monitoring  F/U with Pulm or PCP      4. Hyperlipidemia associated with type 2 diabetes mellitus  Overview:  Atorva 20mg daily.    Assessment & Plan:  LdL at goal, monitor  Weight declining  [facility]  Last ldl = 49  F/U with PCP    Orders:  -     Ambulatory referral/consult to Podiatry; Future; Expected date: 06/26/2025    5. Aortic atherosclerosis  Overview:  05-JUL-23; CT Chest Without Contrast: "..ILD CORONARY ARTERY CALCIFICATION.  NO " "CARDIOMEGALY, AORTIC ANEURYSM OR ADENOPATHY..."     Assessment & Plan:  Chronic, stable  Continue activity and weight loss  Control blood pressure and serum lipids -  LDL is controlled   Latest Reference Range & Units Most Recent   LDL Cholesterol 63.0 - 159.0 mg/dL 49.4 (L)  10/2/24 14:51   Continue current tx plan  F/U with PCP       6. MCTD (mixed connective tissue disease)  Overview:  Last Rheumatology visit - RNP antibody positive MCTD without any active disease, in remission .  No arthritis, myositis, Raynaud's sclerodactyly or change in exercise tolerance. No chronic cough. Continue to monitor clinically.  Continue follow-up with pulmonologist.  Bilateral knee osteoarthritis related pain without any significant effusion.  Mild improvement with PT and mobic. Continue follow up with . Diabetes contraindicates use of intra-articular corticosteroid injection.      Assessment & Plan:  Chronic, stable  F/U with Rheumatology      7. Type 2 diabetes mellitus with stage 3a chronic kidney disease, with long-term current use of insulin  Overview:  Diabetes Medications              insulin glargine U-100, Lantus, (LANTUS SOLOSTAR U-100 INSULIN) 100 unit/mL (3 mL) InPn pen ADMINISTER 10 UNITS UNDER THE SKIN DAILY    insulin lispro 100 unit/mL pen Titrate up to 100 units daily as instructed.    tirzepatide (MOUNJARO) 5 mg/0.5 mL PnIj Inject 5 mg into the skin every 7 days.         Intolerant to Jardiance - vaginal candidiasis    Assessment & Plan:  CKD 3 A stable - control blood glucose, hydrate    Hemoglobin A1C   Date Value Ref Range Status   03/12/2025 7.4 (H) 4.0 - 5.6 % Final     Comment:     ADA Screening Guidelines:  5.7-6.4%  Consistent with prediabetes  >or=6.5%  Consistent with diabetes    High levels of fetal hemoglobin interfere with the HbA1C  assay. Heterozygous hemoglobin variants (HbS, HgC, etc)do  not significantly interfere with this assay.   However, presence of multiple variants may affect " accuracy.     10/02/2024 6.9 (H) 4.0 - 5.6 % Final     Comment:     ADA Screening Guidelines:  5.7-6.4%  Consistent with prediabetes  >or=6.5%  Consistent with diabetes    High levels of fetal hemoglobin interfere with the HbA1C  assay. Heterozygous hemoglobin variants (HbS, HgC, etc)do  not significantly interfere with this assay.   However, presence of multiple variants may affect accuracy.     06/03/2024 7.3 (H) 4.0 - 5.6 % Final     Comment:     ADA Screening Guidelines:  5.7-6.4%  Consistent with prediabetes  >or=6.5%  Consistent with diabetes    High levels of fetal hemoglobin interfere with the HbA1C  assay. Heterozygous hemoglobin variants (HbS, HgC, etc)do  not significantly interfere with this assay.   However, presence of multiple variants may affect accuracy.       Hemoglobin A1c   Date Value Ref Range Status   06/16/2025 7.4 (H) 4.0 - 5.6 % Final     Comment:     ADA Screening Guidelines:  5.7-6.4%  Consistent with prediabetes  >=6.5%  Consistent with diabetes    High levels of fetal hemoglobin interfere with the HbA1C  assay. Heterozygous hemoglobin variants (HbS, HgC, etc)do  not significantly interfere with this assay.   However, presence of multiple variants may affect accuracy.     Assess and monitor  Hgb A1 C fluctuating  Continue weight loss and exercise  Continue diabetic meds  F/U with Diabetes provider    Orders:  -     Ambulatory referral/consult to Podiatry; Future; Expected date: 06/26/2025    8. Severe obesity with body mass index (BMI) of 35.0 to 35.9 and comorbidity  Overview:  Mounjaro 5 mg weekly    Assessment & Plan:  Continue weekly activity - weight lifting  Encouraged hydration  26 # weight loss on GLP1 agonist      9. Acquired hypothyroidism  Overview:  Synthroid 112mcg daily.    Assessment & Plan:  Lab Results   Component Value Date    TSH 1.557 11/17/2023    FREET4 1.16 10/13/2022    Asymptomatic  Continue Rx  F/U with PCP      10. Osteopenia, unspecified  location  Overview:  Seen on Dexa Scan - 3/2021    Assessment & Plan:  Chronic, stable  Continue calcium/vit D supplement  Continue weight bearing exercise to strengthen bones  F/U with PCP       11. TALIA on CPAP  Assessment & Plan:  decreased compliance now that her 's hearing has declined and he can't hear when she snores and tell her to put the machine on  - element of daytime sleepiness related to fractionated sleep pattern- takes naps during the day, will wake up in the night and read for several hours then fall back to sleep  - encouraged routine exercise, limiting naps  Monitor  F/u with PCP              Provided Jenn with a 5-10 year written screening schedule and personal prevention plan. Recommendations were developed using the USPSTF age appropriate recommendations. Education, counseling, and referrals were provided as needed.  After Visit Summary printed and given to patient which includes a list of additional screenings\tests needed.    Future Appointments   Date Time Provider Department Center   6/23/2025 12:00 PM Lorraine Holliday PA-C [facility] DIABETE [facility]   6/27/2025 11:00 AM Jessica Ward MD [facility] 65PLUS 65+ [place]   7/11/2025  9:30 AM Nataly Cruz, DPM [facility] POD [facility]   8/6/2025  9:30 AM Evelyn Colindres PA-C [facility] PULMSVC [facility]   4/22/2026  9:30 AM LABORATORY, [facility] LAB [facility]   4/29/2026  9:15 AM Dominick Humphries MD [facility] RHEUM [facility]            [place] 65 Plus  I offered to discuss advanced care planning, including how to pick a person who would make decisions for you if you were unable to make them for yourself, called a health care power of , and what kind of decisions you might make such as use of life sustaining treatments such as ventilators and tube feeding when faced with a life limiting illness recorded on a living will that they will need to know. (How you want to be cared for as you near the end of your  natural life)     X  Patient has advanced directives on file, which we reviewed, and they do not wish to make changes.

## 2025-06-20 ENCOUNTER — CLINICAL SUPPORT (OUTPATIENT)
Dept: REHABILITATION | Facility: HOSPITAL | Age: 75
End: 2025-06-20
Payer: MEDICARE

## 2025-06-20 DIAGNOSIS — R29.898 WEAKNESS OF BOTH LOWER EXTREMITIES: Primary | ICD-10-CM

## 2025-06-20 DIAGNOSIS — M25.661 DECREASED RANGE OF MOTION (ROM) OF RIGHT KNEE: ICD-10-CM

## 2025-06-20 DIAGNOSIS — M17.11 OSTEOARTHRITIS OF RIGHT KNEE, UNSPECIFIED OSTEOARTHRITIS TYPE: ICD-10-CM

## 2025-06-20 DIAGNOSIS — R68.89 DECREASED FUNCTIONAL ACTIVITY TOLERANCE: ICD-10-CM

## 2025-06-20 PROCEDURE — 97112 NEUROMUSCULAR REEDUCATION: CPT

## 2025-06-20 PROCEDURE — 97530 THERAPEUTIC ACTIVITIES: CPT

## 2025-06-20 PROCEDURE — 97110 THERAPEUTIC EXERCISES: CPT

## 2025-06-20 NOTE — PROGRESS NOTES
Outpatient Rehab    Physical Therapy Discharge     Patient Name: Jaimie Luque  MRN: [id number]  YOB: 1950  Encounter Date: 6/20/2025    Therapy Diagnosis:   Encounter Diagnoses   Name Primary?    Weakness of both lower extremities Yes    Decreased functional activity tolerance     Osteoarthritis of right knee, unspecified osteoarthritis type     Decreased range of motion (ROM) of right knee        Physician: Sarwat Rodriguez MD    Physician Orders: Eval and Treat  Medical Diagnosis: Primary osteoarthritis of right knee    Visit # / Visits Authorized:  24 / 30  Insurance Authorization Period: 3/29/2025 to 12/31/2025  Date of Evaluation: 3/25/2025  Plan of Care Certification: 3/30/2025 to 6/20/2025      PT/PTA:     Number of PTA visits since last PT visit:   Time In: 0845   Time Out: 0955  Total Time (in minutes): 70   Total Billable Time (in minutes): 40    FOTO:  Intake Score:  %  Survey Score 2:  %  Survey Score 3:  %    Precautions:       Subjective   She has been feeling really good and is looking forward to her trip..  Pain reported as 0/10.      Objective           Treatment:        CPT Intervention Performed   Today Duration / Intensity   MT Stm to right knee -     Right distal quads and hamstrings   Anterior and posterior right knee joint mobs   TE  Recumbent bike xx 6 minutes  level 2       Leg Press xx 85 pound 3 x 12 (increased)       Knee Flexion matrix xx 27# 3x10 (increased)      Knee Extension Matrix xx 39 # 3x10 (increased)      Hip extension      3 x 10 prone bilateral  2 x 10 standing with red band (added)    NMR  short arch quads  ----- 3 x 10  5# pound       long arch quad  ----- 3 x 10 bilateral  5#     Bridges  xx  3 x 15 2 seconds hold (glut emphasis) (increased)      Straight leg raise    3 x 10      clams  xx 3 x 12 side lying Green Band 3 seconds hold     Sit to stands-         Sidelying hip abduction  xx 3x10 each       heel raises xx 3 x 10 hands as needed standing on airex  pad(progressed)      Toe raises   3 x 10 hands as needed standing on airex pad (progressed)      Unilateral standing    2 minutes (added)      March in place (for balance) xx 2 minutes standing on airex pad   TA  march in place    3 x 10 parallel bars no hands       step ups xx 3 x 10 6 inch box no hands (progressed)      Side to side steps  xx 3 laps green band no hands     Sit to stand   3 x 10 no hands mat at 20 inch      Squat    3 x 10 parallel bars to stool     lunge   X 10 bilateral to 4 inch box with airex pad on box     Up/down stairs  xx 7 x alternating steps 2 hand rails   PLAN                CPT Codes available for Billing:   (00) minutes of Manual therapy (MT) to improve pain and ROM.  (25) minutes of Therapeutic Exercise (TE) to develop strength, endurance, range of motion, and flexibility.  (15) minutes of Neuromuscular Re-Education (NMR)  to improve: Balance, Coordination, Kinesthetic, Sense, Proprioception, and Posture.  (15) minutes of Therapeutic Activities (TA) to improve functional performance.  Unattended Electrical Stimulation (ES) for muscle performance or pain modulation.  BFR: Blood flow restriction applied during exercise  NP or (-): Not Performed             Assessment & Plan   Assessment: Since the start of therapy Mrs. Etienne has made significant progress in all areas. At this time she is no longer having pain in her knee with her normal ADL's. [facility]. Patient at this time has met all of her goals and is no longer in need of skilled care.        The patient will continue to benefit from skilled outpatient physical therapy in order to address the deficits listed in the problem list on the initial evaluation, provide patient and family education, and maximize the patients level of independence in the home and community environments.     The patient's spiritual, cultural, and educational needs were considered, and the patient is agreeable to the  plan of care and goals.           Plan: Patient is discharged from formal Physical Therapy.    Goals:   Active       LTG       Pt will report worst pain of 2-3/10 in order to progress toward max functional ability and improve quality of life          (Met)       Start:  03/28/25    Expected End:  06/20/25    Resolved:  04/29/25         Patient will demonstrate improved function as indicated by a score of greater than or equal to 58 out of 100 on FOTO.          (Met)       Start:  03/28/25    Expected End:  06/20/25    Resolved:  05/29/25         Patient will improve AROM to normal limits in order to return to maximal functional potential and improve quality of life.   (Progressing)       Start:  03/28/25    Expected End:  06/20/25            Patient will improve strength to at least 4+/5 grossly,  in order to improve functional independence and quality of life.   (Progressing)       Start:  03/28/25    Expected End:  06/20/25            Patient will be able to participate in her own gym program focused on LE strengthening without exacerbating her symptoms to improve her QOL.  (Met)       Start:  03/28/25    Expected End:  06/20/25    Resolved:  04/29/25            STG       Pt will report worst pain of 5-6/10 in order to progress toward max functional ability and improve quality of life          (Met)       Start:  03/28/25    Expected End:  05/09/25    Resolved:  04/29/25         Patient will demonstrate improved function as indicated by a score of greater than or equal to 53 out of 100 on FOTO.          (Met)       Start:  03/28/25    Expected End:  05/09/25    Resolved:  05/29/25         Patient will improve AROM to 50% of stated goals, listed in objective measures above, in order to progress towards independence with functional activities.   (Progressing)       Start:  03/28/25    Expected End:  05/09/25            Patient will improve strength by 1/2 a grade, in order to progress towards independence with  functional activities.          (Met)       Start:  03/28/25    Expected End:  05/09/25    Resolved:  05/29/25         Patient will demonstrate independence with HEP in order to progress toward functional independence.  (Met)       Start:  03/28/25    Expected End:  05/09/25    Resolved:  05/29/25               Mynor Ceja PT

## 2025-06-23 ENCOUNTER — PATIENT MESSAGE (OUTPATIENT)
Dept: DIABETES | Facility: CLINIC | Age: 75
End: 2025-06-23

## 2025-06-23 ENCOUNTER — OFFICE VISIT (OUTPATIENT)
Dept: DIABETES | Facility: CLINIC | Age: 75
End: 2025-06-23
Payer: MEDICARE

## 2025-06-23 DIAGNOSIS — E11.69 HYPERLIPIDEMIA ASSOCIATED WITH TYPE 2 DIABETES MELLITUS: ICD-10-CM

## 2025-06-23 DIAGNOSIS — Z79.4 TYPE 2 DIABETES MELLITUS WITH STAGE 3A CHRONIC KIDNEY DISEASE, WITH LONG-TERM CURRENT USE OF INSULIN: Primary | ICD-10-CM

## 2025-06-23 DIAGNOSIS — E03.9 ACQUIRED HYPOTHYROIDISM: ICD-10-CM

## 2025-06-23 DIAGNOSIS — E11.36 CATARACT ASSOCIATED WITH TYPE 2 DIABETES MELLITUS: ICD-10-CM

## 2025-06-23 DIAGNOSIS — E11.22 TYPE 2 DIABETES MELLITUS WITH STAGE 3A CHRONIC KIDNEY DISEASE, WITH LONG-TERM CURRENT USE OF INSULIN: Primary | ICD-10-CM

## 2025-06-23 DIAGNOSIS — E78.5 HYPERLIPIDEMIA ASSOCIATED WITH TYPE 2 DIABETES MELLITUS: ICD-10-CM

## 2025-06-23 DIAGNOSIS — E66.9 OBESITY (BMI 30-39.9): ICD-10-CM

## 2025-06-23 DIAGNOSIS — G47.33 OSA ON CPAP: ICD-10-CM

## 2025-06-23 DIAGNOSIS — N18.31 TYPE 2 DIABETES MELLITUS WITH STAGE 3A CHRONIC KIDNEY DISEASE, WITH LONG-TERM CURRENT USE OF INSULIN: Primary | ICD-10-CM

## 2025-06-23 PROCEDURE — 98006 SYNCH AUDIO-VIDEO EST MOD 30: CPT | Mod: 95,,, | Performed by: PHYSICIAN ASSISTANT

## 2025-06-23 PROCEDURE — G2211 COMPLEX E/M VISIT ADD ON: HCPCS | Mod: 95,,, | Performed by: PHYSICIAN ASSISTANT

## 2025-06-23 PROCEDURE — 95251 CONT GLUC MNTR ANALYSIS I&R: CPT | Mod: NDTC,,, | Performed by: PHYSICIAN ASSISTANT

## 2025-06-23 PROCEDURE — 1157F ADVNC CARE PLAN IN RCRD: CPT | Mod: CPTII,95,, | Performed by: PHYSICIAN ASSISTANT

## 2025-06-23 RX ORDER — BLOOD-GLUCOSE SENSOR
EACH MISCELLANEOUS
Qty: 9 EACH | Refills: 3 | Status: SHIPPED | OUTPATIENT
Start: 2025-06-23

## 2025-06-23 NOTE — PROGRESS NOTES
PCP: Jessica Ward MD    Subjective:     Chief Complaint: Diabetes - Established Patient    TELEMEDICINE VISIT:     The patient location is: Home  The chief complaint leading to consultation is: Diabetes Follow up  Visit type: Virtual visit with synchronous audio and video  Each patient to whom he or she provides medical services by telemedicine is:  (1) informed of the relationship between the physician and patient and the respective role of any other health care provider with respect to management of the patient; and (2) notified that he or she may decline to receive medical services by telemedicine and may withdraw from such care at any time.    Notes: N/A     HISTORY OF PRESENT ILLNESS: 75 y.o.   female presenting for diabetes management visit.   The patient's last visit with me was on 4/15/2025.   Patient has had Type II diabetes since 20 or more years.  Pertinent to decision making is the following comorbidities: HLD, Hypothyroidism and Obesity by BMI  Patient has the following Diabetes complications: without complications  She has attended diabetes education in the past.     Patient's most recent A1c of 7.4% was completed 1 months ago.   Patient states since Her last A1c Her blood glucose levels have been both high and low throughout the day .   Patient monitors blood glucose 4 times per day and Continuously with personal CGM Dexcom.   Patient blood glucose monitoring device will be uploaded into Media Section today.          Interpretation of CGM as following baseline hyperglycemia with some postprandial hypoglycemia. May be related to dose amount or timing.      Patient endorses the following diabetes related symptoms: None.   Patient is due today for the following diabetes-related health maintenance standards: Foot Exam .   She denies any recent hospital admissions or emergency room visits. Patient denies history of DKA.   She voices having hypoglycemia as above..   Patient's concerns  today include glycemic control.    Patient medication regimen is as below.      CURRENT DM MEDICATIONS:   Lantus 8 units daily   Mounjaro 5 mg weekly - 8 -10 wk supply at home  Humalog meal size dosing TID wm  Humalog correction dosing every 3 hours as below    Meal Size:   6 units with small carb meal  10 units with regular carb meal  12 units with heavier carb meal  3 units with snack    Humalog Correction Dosing every 3 hours as needed OUTSIDE OF EATING  If  - 250, may take 2 units of Humalog  If  - 300, may take 4 units of Humalog   If  - 350, may take 6 units of Humalog  If  - 400, may take 8 units of Humalog  If +, may take 10 units of Humalog      Patient has failed the following Diabetes medications:   Metformin - GI   Invokana - coverage  Jardiance - yeast infection  Glyburide / Glipizide  Victoza   Basal - Basaglar  Trulicity - GLP-1 escalation  Ozempic - escalation to Mounjaro      Labs Reviewed.       Lab Results   Component Value Date    CPEPTIDE 2.35 04/01/2021     Lab Results   Component Value Date    GLUTAMICACID 0.00 05/25/2017          //   , There is no height or weight on file to calculate BMI.  Her blood sugar in clinic today is:    Lab Results   Component Value Date    POCGLU 96 03/12/2025       Review of Systems   Constitutional:  Negative for activity change, appetite change, chills and fever.   HENT:  Negative for dental problem, mouth sores, nosebleeds, sore throat and trouble swallowing.    Eyes:  Negative for pain and discharge.   Respiratory:  Negative for shortness of breath, wheezing and stridor.    Cardiovascular:  Negative for chest pain, palpitations and leg swelling.   Gastrointestinal:  Negative for abdominal pain, diarrhea, nausea and vomiting.   Endocrine: Negative for polydipsia, polyphagia and polyuria.   Genitourinary:  Negative for dysuria, frequency and urgency.   Musculoskeletal:  Negative for joint swelling and myalgias.   Skin:  Negative for  rash and wound.   Neurological:  Negative for dizziness, syncope, weakness and headaches.   Psychiatric/Behavioral:  Negative for behavioral problems and dysphoric mood.          Diabetes Management Status  Statin: Taking  ACE/ARB: Taking    Screening or Prevention Patient's value Goal Complete/Controlled?   HgA1C Testing and Control   Lab Results   Component Value Date    HGBA1C 7.4 (H) 06/16/2025      Annually/Less than 8% No   Lipid profile : 10/02/2024 Annually Yes   LDL control Lab Results   Component Value Date    LDLCALC 49.4 (L) 10/02/2024    Annually/Less than 100 mg/dl  Yes   Nephropathy screening Lab Results   Component Value Date    MICALBCREAT Unable to calculate 10/03/2024    MICALBCREAT Unable to calculate 10/03/2024     Lab Results   Component Value Date    PROTEINUA Negative 04/01/2021    Annually Yes   Blood pressure BP Readings from Last 1 Encounters:   06/19/25 100/60    Less than 140/90 Yes   Dilated retinal exam : 10/22/2024 Annually Yes    Foot exam   : 10/08/2024 Annually Yes     ACTIVITY LEVEL: Moderately Active. Discussed activities, benefits, methods, and precautions.  MEAL PLANNING: Patient reports number of meals per day to be 3 and number of snacks per day to be 2.   Patient is encouraged to carb count and consume no more than 30 - 45 grams of carbohydrates in each meal and 15 grams of carbohydrates in each snack.     Social History     Socioeconomic History    Marital status:    Tobacco Use    Smoking status: Never    Smokeless tobacco: Never   Substance and Sexual Activity    Alcohol use: Yes     Comment: rare    Drug use: No    Sexual activity: Yes     Partners: Male   Social History Narrative    . Lives with spouse. Has 3 children. Patient retired as  for [employer].      Social Drivers of Health     Financial Resource Strain: Low Risk  (6/19/2025)    Overall Financial Resource Strain (CARDIA)     Difficulty of Paying Living Expenses: Not hard at all    Food Insecurity: No Food Insecurity (6/19/2025)    Hunger Vital Sign     Worried About Running Out of Food in the Last Year: Never true     Ran Out of Food in the Last Year: Never true   Transportation Needs: No Transportation Needs (6/19/2025)    PRAPARE - Transportation     Lack of Transportation (Medical): No     Lack of Transportation (Non-Medical): No   Physical Activity: Insufficiently Active (6/19/2025)    Exercise Vital Sign     Days of Exercise per Week: 3 days     Minutes of Exercise per Session: 40 min   Stress: No Stress Concern Present (6/19/2025)    [employer] [place] [employer] - Occupational Stress Questionnaire     Feeling of Stress : Not at all   Housing Stability: Low Risk  (6/19/2025)    Housing Stability Vital Sign     Unable to Pay for Housing in the Last Year: No     Number of Times Moved in the Last Year: 0     Homeless in the Last Year: No     Past Medical History:   Diagnosis Date    Diabetes mellitus type II     Hyperlipidemia     Hypertension     Hypothyroid     TALIA (obstructive sleep apnea)     on CPAP    Osteopenia     Psoriasis        Objective:        Physical Exam  Constitutional:       General: She is not in acute distress.     Appearance: She is well-developed. She is not diaphoretic.   HENT:      Head: Normocephalic and atraumatic.      Right Ear: External ear normal.      Left Ear: External ear normal.      Nose: Nose normal.   Eyes:      General:         Right eye: No discharge.         Left eye: No discharge.   Pulmonary:      Effort: Pulmonary effort is normal. No respiratory distress.      Breath sounds: No stridor.   Musculoskeletal:         General: Normal range of motion.      Cervical back: Normal range of motion.   Skin:     Coloration: Skin is not pale.   Neurological:      Mental Status: She is alert and oriented to person, place, and time. Mental status is at baseline.      Motor: No abnormal muscle tone.      Coordination: Coordination normal.    Psychiatric:         Mood and Affect: Mood normal.         Behavior: Behavior normal.         Thought Content: Thought content normal.         Judgment: Judgment normal.           Assessment / Plan:     Type 2 diabetes mellitus with stage 3a chronic kidney disease, with long-term current use of insulin    Cataract associated with type 2 diabetes mellitus    Acquired hypothyroidism    Hyperlipidemia associated with type 2 diabetes mellitus    TALIA on CPAP    Obesity (BMI 30-39.9)          Additional Plan Details:    - POCT Glucose  - Encouraged continuation of lifestyle changes including regular exercise and limiting carbohydrates to 30-45 grams per meal threes times daily and 15 grams per snack with a limit of two daily.   - Encouraged continued monitoring of blood glucose with maintenance of 4 times daily and Continuously with personal CGM Dexcom.    - Current DM Medication Regimen: Change Lantus 10 units daily. Continue Mounjaro 5 mg weekly to finish supply. Will plan to titrate in attempt to get off insulin. Change Humalog meal size dosing TID wm and correction dosing every 3 hours as below. ADJUST INSULIN FREQUENTLY BASED ON BLOOD SUGAR.   - Health Maintenance standards addressed today: Foot Exam - deferred by patient today because Telemedicine or Telephone visit  - Nursing Visit: Patient is below goal range for nursing visit for age group and qualifies for nursing visit, but will defer for now. .   - Follow up in 8-10 weeks.       CURRENT DM MEDICATIONS:   Lantus 10 units daily   Mounjaro 5 mg weekly   Humalog meal size dosing TID wm  Humalog correction dosing every 3 hours as below    Meal Size:   5 units with small carb meal  9 units with regular carb meal  11 units with heavier carb meal  3 units with snack    Humalog Correction Dosing every 3 hours as needed OUTSIDE OF EATING  If  - 250, may take 2 units of Humalog  If  - 300, may take 4 units of Humalog   If  - 350, may take 6 units of  Humalog  If  - 400, may take 8 units of Humalog  If +, may take 10 units of Humalog          Blakeney McKnight, PA-C [facility]

## 2025-06-23 NOTE — PATIENT INSTRUCTIONS
CURRENT DM MEDICATIONS:  Lantus 10 units daily   Mounjaro 5 mg weekly   Humalog meal size dosing TID wm  Humalog correction dosing every 3 hours as below     Meal Size:   5 units with small carb meal  9 units with regular carb meal  11 units with heavier carb meal  3 units with snack     Humalog Correction Dosing every 3 hours as needed OUTSIDE OF EATING  If  - 250, may take 2 units of Humalog  If  - 300, may take 4 units of Humalog   If  - 350, may take 6 units of Humalog  If  - 400, may take 8 units of Humalog  If +, may take 10 units of Humalog

## 2025-06-27 ENCOUNTER — OFFICE VISIT (OUTPATIENT)
Dept: PRIMARY CARE CLINIC | Facility: CLINIC | Age: 75
End: 2025-06-27
Payer: MEDICARE

## 2025-06-27 VITALS
WEIGHT: 220.88 LBS | BODY MASS INDEX: 34.67 KG/M2 | DIASTOLIC BLOOD PRESSURE: 52 MMHG | TEMPERATURE: 98 F | HEART RATE: 73 BPM | OXYGEN SATURATION: 97 % | HEIGHT: 67 IN | SYSTOLIC BLOOD PRESSURE: 104 MMHG

## 2025-06-27 DIAGNOSIS — Z79.4 TYPE 2 DIABETES MELLITUS WITH STAGE 3A CHRONIC KIDNEY DISEASE, WITH LONG-TERM CURRENT USE OF INSULIN: ICD-10-CM

## 2025-06-27 DIAGNOSIS — I70.0 AORTIC ATHEROSCLEROSIS: ICD-10-CM

## 2025-06-27 DIAGNOSIS — E11.69 HYPERLIPIDEMIA ASSOCIATED WITH TYPE 2 DIABETES MELLITUS: Primary | ICD-10-CM

## 2025-06-27 DIAGNOSIS — E03.9 ACQUIRED HYPOTHYROIDISM: ICD-10-CM

## 2025-06-27 DIAGNOSIS — Z78.0 ASYMPTOMATIC POSTMENOPAUSAL STATE: ICD-10-CM

## 2025-06-27 DIAGNOSIS — E78.5 HYPERLIPIDEMIA ASSOCIATED WITH TYPE 2 DIABETES MELLITUS: Primary | ICD-10-CM

## 2025-06-27 DIAGNOSIS — E11.22 TYPE 2 DIABETES MELLITUS WITH STAGE 3A CHRONIC KIDNEY DISEASE, WITH LONG-TERM CURRENT USE OF INSULIN: ICD-10-CM

## 2025-06-27 DIAGNOSIS — N18.31 TYPE 2 DIABETES MELLITUS WITH STAGE 3A CHRONIC KIDNEY DISEASE, WITH LONG-TERM CURRENT USE OF INSULIN: ICD-10-CM

## 2025-06-27 PROCEDURE — 99999 PR PBB SHADOW E&M-EST. PATIENT-LVL IV: CPT | Mod: PBBFAC,,, | Performed by: INTERNAL MEDICINE

## 2025-07-11 ENCOUNTER — OFFICE VISIT (OUTPATIENT)
Dept: PODIATRY | Facility: CLINIC | Age: 75
End: 2025-07-11
Payer: MEDICARE

## 2025-07-11 DIAGNOSIS — E11.69 HYPERLIPIDEMIA ASSOCIATED WITH TYPE 2 DIABETES MELLITUS: ICD-10-CM

## 2025-07-11 DIAGNOSIS — E78.5 HYPERLIPIDEMIA ASSOCIATED WITH TYPE 2 DIABETES MELLITUS: ICD-10-CM

## 2025-07-11 DIAGNOSIS — E11.9 ENCOUNTER FOR COMPREHENSIVE DIABETIC FOOT EXAMINATION, TYPE 2 DIABETES MELLITUS: Primary | ICD-10-CM

## 2025-07-11 DIAGNOSIS — Z79.4 TYPE 2 DIABETES MELLITUS WITH STAGE 3A CHRONIC KIDNEY DISEASE, WITH LONG-TERM CURRENT USE OF INSULIN: ICD-10-CM

## 2025-07-11 DIAGNOSIS — N18.31 TYPE 2 DIABETES MELLITUS WITH STAGE 3A CHRONIC KIDNEY DISEASE, WITH LONG-TERM CURRENT USE OF INSULIN: ICD-10-CM

## 2025-07-11 DIAGNOSIS — E11.22 TYPE 2 DIABETES MELLITUS WITH STAGE 3A CHRONIC KIDNEY DISEASE, WITH LONG-TERM CURRENT USE OF INSULIN: ICD-10-CM

## 2025-07-11 DIAGNOSIS — I83.893 VARICOSE VEINS OF LOWER EXTREMITY WITH EDEMA, BILATERAL: ICD-10-CM

## 2025-07-11 DIAGNOSIS — Q82.8 POROKERATOSIS: ICD-10-CM

## 2025-07-11 PROCEDURE — 99999 PR PBB SHADOW E&M-EST. PATIENT-LVL III: CPT | Mod: PBBFAC,,, | Performed by: PODIATRIST

## 2025-07-11 NOTE — PROGRESS NOTES
Subjective:       Patient ID: Jaimie Luque is a 75 y.o. female.    Chief Complaint: Diabetic Foot Exam (DFE: C/o denies pain at present, pt is diabetic, pt states of right foot under 5th toe callouse, pt was last seen on 6.27.25 by PCP Jessica Ward)      HPI: Jaimie Luque presents to the office today, under referral by ,Enriqueta Patel NP and Jessica Ward MD, for her annual diabetic foot assessment and risk evaluation.  Patient is a DMII.  Pain to the plantar aspect of the right foot sub 5th metatarsal head due to   Hard callus/lesion. This patient last saw his/her internal/family medicine physician on  06/27/2025.     Hemoglobin A1C   Date Value Ref Range Status   03/12/2025 7.4 (H) 4.0 - 5.6 % Final     Comment:     ADA Screening Guidelines:  5.7-6.4%  Consistent with prediabetes  >or=6.5%  Consistent with diabetes    High levels of fetal hemoglobin interfere with the HbA1C  assay. Heterozygous hemoglobin variants (HbS, HgC, etc)do  not significantly interfere with this assay.   However, presence of multiple variants may affect accuracy.     10/02/2024 6.9 (H) 4.0 - 5.6 % Final     Comment:     ADA Screening Guidelines:  5.7-6.4%  Consistent with prediabetes  >or=6.5%  Consistent with diabetes    High levels of fetal hemoglobin interfere with the HbA1C  assay. Heterozygous hemoglobin variants (HbS, HgC, etc)do  not significantly interfere with this assay.   However, presence of multiple variants may affect accuracy.     06/03/2024 7.3 (H) 4.0 - 5.6 % Final     Comment:     ADA Screening Guidelines:  5.7-6.4%  Consistent with prediabetes  >or=6.5%  Consistent with diabetes    High levels of fetal hemoglobin interfere with the HbA1C  assay. Heterozygous hemoglobin variants (HbS, HgC, etc)do  not significantly interfere with this assay.   However, presence of multiple variants may affect accuracy.       Hemoglobin A1c   Date Value Ref Range Status   06/16/2025 7.4 (H) 4.0 - 5.6 % Final     Comment:      ADA Screening Guidelines:  5.7-6.4%  Consistent with prediabetes  >=6.5%  Consistent with diabetes    High levels of fetal hemoglobin interfere with the HbA1C  assay. Heterozygous hemoglobin variants (HbS, HgC, etc)do  not significantly interfere with this assay.   However, presence of multiple variants may affect accuracy.   .    Review of patient's allergies indicates:   Allergen Reactions    Adhesive tape-silicones      Other reaction(s): skin irritation to area    Penicillins        Past Medical History:   Diagnosis Date    Diabetes mellitus type II     Hyperlipidemia     Hypertension     Hypothyroid     TALIA (obstructive sleep apnea)     on CPAP    Osteopenia     Psoriasis        Family History   Problem Relation Name Age of Onset    Heart disease Father      Diabetes Brother      Melanoma Neg Hx      Lupus Neg Hx         Social History[1]    Past Surgical History:   Procedure Laterality Date    BRONCHOSCOPY Bilateral 12/15/2022    Procedure: Bronchoscopy;  Surgeon: Corky Luna MD;  Location: [facility];  Service: Pulmonary;  Laterality: Bilateral;    CATARACT EXTRACTION W/  INTRAOCULAR LENS IMPLANT Bilateral     cataract surgery Left 08/10/2022    COLONOSCOPY N/A 05/02/2017    Procedure: COLONOSCOPY;  Surgeon: Noe Penn III, MD;  Location: [facility];  Service: Endoscopy;  Laterality: N/A;    COLONOSCOPY N/A 05/04/2022    Procedure: COLONOSCOPY;  Surgeon: Elodia Swain MD;  Location: [facility];  Service: Endoscopy;  Laterality: N/A;    HYSTERECTOMY      OOPHORECTOMY      ROBOTIC ASSISTED HYSTERECTOMY         Review of Systems        Objective:   There were no vitals taken for this visit.    Physical Exam  LOWER EXTREMITY PHYSICAL EXAMINATION    ORTHOPEDIC:  No pain on palpation of the foot or ankle.  Intrinsic and extrinsic musculature intact to the bilateral lower extremities.  No history of amputations.  No pain with ambulation or gait    VASCULAR:  The right dorsalis pedis pulse 2/4 and  the right posterior tibial pulse 2/4.  The left dorsalis pedis pulse 2/4 and posterior tibial pulse on the left is 2/4.  Capillary refill is intact.  Pedal hair growth intact. Telangiectasias and varicosities bilaterally.  Edema present to the bilateral lower extremities    NEUROLOGY:  Sharp/dull, light touch, proprioception all intact and equal bilaterally.  Vibratory sensation is intact.  Protective sensation intact    DERMATOLOGY:   Skin is supple, moist, intact.  Porokeratotic lesion noted to the plantar surface of the right 5th metatarsal head.  No evidence of underlying ulceration.  No acute signs of infection. .  There is no signs of ingrowing into the medial or lateral borders.  There is no evidence of wounds or skin breakdown.  No edema or erythema.  No obvious lacerations or fissuring.  Interdigital spaces are clean, dry, intact.  No rashes or scars appreciated.    Assessment:     1. Encounter for comprehensive diabetic foot examination, type 2 diabetes mellitus    2. Hyperlipidemia associated with type 2 diabetes mellitus    3. Type 2 diabetes mellitus with stage 3a chronic kidney disease, with long-term current use of insulin    4. Porokeratosis    5. Varicose veins of lower extremity with edema, bilateral        Plan:     Encounter for comprehensive diabetic foot examination, type 2 diabetes mellitus    Hyperlipidemia associated with type 2 diabetes mellitus  -     Ambulatory referral/consult to Podiatry    Type 2 diabetes mellitus with stage 3a chronic kidney disease, with long-term current use of insulin  -     Ambulatory referral/consult to Podiatry    Porokeratosis    Varicose veins of lower extremity with edema, bilateral      Thorough discussion is had with the patient today, concerning the diagnosis, its etiology, and the treatment algorithm at present.      Diabetic education was discussed and provided. Discussed appropriate foot care. Encourage compliance with diet and A1c.     Thorough  discussion is had with the patient concerning the diagnosis, its etiology, and the treatment algorithm at present. Shoe inspection. Foot Education. Patient reminded of the importance of good nutrition and blood sugar control to help prevent podiatric complications of diabetes. Patient instructed on proper foot hygeine. We discussed wearing proper and supportive shoe gear, daily foot inspections, never walking barefooted or sock footed, never putting sharp instruments to feet which can cause major complications associated with infection, ulcers, lacerations.      We discussed the etiology and pathology associated with acquired plantar porokeratosis.  We discussed the importance of removing the centralize core and alleviating pain discomfort.  We also discussed utilizing a pumice stone at home to reduce callus formation and subsequent recurrence of this area.  Recommend to apply hydrating creams and lotions this area daily to ensure that there is no subsequent buildup.    Porokeratotic skin formation, as outlined within the examination portion of this note, is surgically debrided with sharp #10/#15 blade, to alleviate discomfort with weight bearing and ambulation, and to lessen the possibility of skin complications, e.g., ulceration due to pressure. No ulceration(s) is are noted with/post debridement. The lesion is completed healed and resolved. No evidence of infection.          [1]   Social History  Socioeconomic History    Marital status:    Tobacco Use    Smoking status: Never    Smokeless tobacco: Never   Substance and Sexual Activity    Alcohol use: Yes     Comment: rare    Drug use: No    Sexual activity: Yes     Partners: Male   Social History Narrative    . Lives with spouse. Has 3 children. Patient retired as  for [employer].      Social Drivers of Health     Financial Resource Strain: Low Risk  (6/19/2025)    Overall Financial Resource Strain (CARDIA)     Difficulty of Paying  Living Expenses: Not hard at all   Food Insecurity: No Food Insecurity (6/19/2025)    Hunger Vital Sign     Worried About Running Out of Food in the Last Year: Never true     Ran Out of Food in the Last Year: Never true   Transportation Needs: No Transportation Needs (6/19/2025)    PRAPARE - Transportation     Lack of Transportation (Medical): No     Lack of Transportation (Non-Medical): No   Physical Activity: Insufficiently Active (6/19/2025)    Exercise Vital Sign     Days of Exercise per Week: 3 days     Minutes of Exercise per Session: 40 min   Stress: No Stress Concern Present (6/19/2025)    [employer] [place] [employer] - Occupational Stress Questionnaire     Feeling of Stress : Not at all   Housing Stability: Low Risk  (6/19/2025)    Housing Stability Vital Sign     Unable to Pay for Housing in the Last Year: No     Number of Times Moved in the Last Year: 0     Homeless in the Last Year: No

## 2025-08-01 ENCOUNTER — PATIENT MESSAGE (OUTPATIENT)
Dept: PULMONOLOGY | Facility: CLINIC | Age: 75
End: 2025-08-01
Payer: MEDICARE

## 2025-08-06 ENCOUNTER — OFFICE VISIT (OUTPATIENT)
Dept: PULMONOLOGY | Facility: CLINIC | Age: 75
End: 2025-08-06
Payer: MEDICARE

## 2025-08-06 VITALS
HEIGHT: 67 IN | BODY MASS INDEX: 34.16 KG/M2 | DIASTOLIC BLOOD PRESSURE: 70 MMHG | OXYGEN SATURATION: 98 % | RESPIRATION RATE: 14 BRPM | HEART RATE: 85 BPM | SYSTOLIC BLOOD PRESSURE: 114 MMHG | WEIGHT: 217.63 LBS

## 2025-08-06 DIAGNOSIS — G47.33 OSA ON CPAP: Primary | ICD-10-CM

## 2025-08-06 PROBLEM — J84.9 ILD (INTERSTITIAL LUNG DISEASE): Status: RESOLVED | Noted: 2025-04-10 | Resolved: 2025-08-06

## 2025-08-06 PROCEDURE — 99999 PR PBB SHADOW E&M-EST. PATIENT-LVL IV: CPT | Mod: PBBFAC,,, | Performed by: HOSPITALIST

## 2025-08-06 PROCEDURE — 1159F MED LIST DOCD IN RCRD: CPT | Mod: CPTII,S$GLB,, | Performed by: HOSPITALIST

## 2025-08-06 PROCEDURE — 3074F SYST BP LT 130 MM HG: CPT | Mod: CPTII,S$GLB,, | Performed by: HOSPITALIST

## 2025-08-06 PROCEDURE — 1126F AMNT PAIN NOTED NONE PRSNT: CPT | Mod: CPTII,S$GLB,, | Performed by: HOSPITALIST

## 2025-08-06 PROCEDURE — 1100F PTFALLS ASSESS-DOCD GE2>/YR: CPT | Mod: CPTII,S$GLB,, | Performed by: HOSPITALIST

## 2025-08-06 PROCEDURE — 3288F FALL RISK ASSESSMENT DOCD: CPT | Mod: CPTII,S$GLB,, | Performed by: HOSPITALIST

## 2025-08-06 PROCEDURE — 99213 OFFICE O/P EST LOW 20 MIN: CPT | Mod: S$GLB,,, | Performed by: HOSPITALIST

## 2025-08-06 PROCEDURE — 1157F ADVNC CARE PLAN IN RCRD: CPT | Mod: CPTII,S$GLB,, | Performed by: HOSPITALIST

## 2025-08-06 PROCEDURE — 1160F RVW MEDS BY RX/DR IN RCRD: CPT | Mod: CPTII,S$GLB,, | Performed by: HOSPITALIST

## 2025-08-06 PROCEDURE — 3078F DIAST BP <80 MM HG: CPT | Mod: CPTII,S$GLB,, | Performed by: HOSPITALIST

## 2025-08-06 NOTE — PROGRESS NOTES
"Subjective:      Patient ID: Jaimie Luque is a 75 y.o. female.    Chief Complaint: TALIA    Interval Hx 8/6/25:    Ms. Luque presents today for follow up sleep apnea. She was last seen 8/2024- encouraged to improve compliance.     Has not used PAP in several months. Has supplies at home. Still with fractured sleep schedule, but is not overall disruptive to her day to day goals. Was prescribed Trazodone and took once, but was afraid to further after reading side effects.     HPI 8/6/24:     74 year old female with history of HLD, MCTD, DM2, obesity, TALIA who presents to Pulmonary clinic for follow up sleep apnea. She was last seen 8/2023 by Dr. Schumacher for CT (stable nodules from 2018) and sleep apnea therapy review.      Tried nasal pillows and didn't stay on. Has been going to the [employer] three times per week since April. Doesn't notice a benefit in daytime energy when she wears, does seem to have more deep sleep recorded on her watch.     Pertinent Work Up:  - [place] - 13  - CT Chest 7/2023- "Multiple pulmonary nodules.  The largest cyst within the right middle lobe and is unchanged compared to prior CT chest performed 01/19/2021.No interstitial prominence or acute infiltrate. Scattered mucous plugging which appears similar to the previous exam."  - Bronch 2022- Negative cultures  - PSG 2014- AHI 5.9, SpO2 sasha 86%    Review of Systems   Respiratory:  Positive for somnolence.    Psychiatric/Behavioral:  Positive for sleep disturbance.      Objective:     Physical Exam   Constitutional: She is oriented to person, place, and time. She appears well-developed and well-nourished. She is obese.   Pulmonary/Chest: Effort normal.   Neurological: She is alert and oriented to person, place, and time.   Skin: Skin is warm and dry.     Personal Diagnostic Review  As Above      6/27/2025    11:02 AM 6/19/2025     8:52 AM 4/29/2025     8:10 AM 4/2/2025    10:50 AM 3/25/2025     9:02 AM 3/12/2025     1:53 PM 1/2/2025    11:00 AM " "  Pulmonary Function Tests   SpO2 97 % 97 %  97 %   96 %   Height 5' 7" (1.702 m) 5' 7" (1.702 m) 5' 7" (1.702 m) 5' 7" (1.702 m) 5' 7" (1.702 m)  5' 7" (1.702 m)   Weight 100.2 kg (220 lb 14.4 oz) 100.8 kg (222 lb 1.8 oz) 101.4 kg (223 lb 8.7 oz) 102 kg (224 lb 15.7 oz) 102.9 kg (226 lb 13.7 oz) 102.9 kg (226 lb 13.7 oz) 105.7 kg (232 lb 14.7 oz)   BMI (Calculated) 34.6 34.8 35 35.2 35.5  36.5        Assessment:     No diagnosis found.     Encounter Medications[1]  No orders of the defined types were placed in this encounter.    Plan:     Problem List Items Addressed This Visit       TALIA on CPAP - Primary    - reviewed going to sleep and waking up at the same time everyday regardless of how much sleep she got the night before and not sleeping during the day as a strategy to change her sleep pattern  - not compliant with PAP- is inconvenient, PSG 2014 with mild sleep apnea          Follow up in one year or sooner as needed.          [1]   Outpatient Encounter Medications as of 8/6/2025   Medication Sig Dispense Refill    aspirin 81 mg Tab Take by mouth. 1 Tablet Oral 2 times weekly       atorvastatin (LIPITOR) 20 MG tablet TAKE 1 TABLET(20 MG) BY MOUTH EVERY DAY 90 tablet 3    blood-glucose meter,continuous (DEXCOM G7 ) Misc 1 each by Misc.(Non-Drug; Combo Route) route once. for 1 dose 1 each 0    blood-glucose sensor (DEXCOM G7 SENSOR) Gretta USE EVERY 10 DAYS 9 each 3    cholecalciferol, vitamin D3, 2,000 unit Tab Take by mouth. 1 Tablet Oral Every day      gabapentin (NEURONTIN) 300 MG capsule Take 1 capsule (300 mg total) by mouth 3 (three) times daily. 270 capsule 3    insulin glargine U-100, Lantus, (LANTUS SOLOSTAR U-100 INSULIN) 100 unit/mL (3 mL) InPn pen ADMINISTER 10 UNITS UNDER THE SKIN DAILY 9 mL 3    insulin lispro 100 unit/mL pen Titrate up to 100 units daily as instructed. 90 mL 3    levothyroxine (SYNTHROID) 112 MCG tablet Take 1 tablet (112 mcg total) by mouth before breakfast. 90 tablet 3    " "lisinopriL (PRINIVIL,ZESTRIL) 2.5 MG tablet TAKE 1 TABLET(2.5 MG) BY MOUTH EVERY DAY 90 tablet 3    pen needle, diabetic (BD ULTRA-FINE LAURA PEN NEEDLE) 32 gauge x 5/32" Ndle 1 each by Misc.(Non-Drug; Combo Route) route 4 (four) times daily with meals and nightly. 300 each 3    tirzepatide (MOUNJARO) 5 mg/0.5 mL PnIj Inject 5 mg into the skin every 7 days. 6 mL 3    traZODone (DESYREL) 50 MG tablet Take 1 tablet (50 mg total) by mouth nightly as needed for Insomnia. 30 tablet 11     No facility-administered encounter medications on file as of 8/6/2025.     "

## 2025-08-06 NOTE — ASSESSMENT & PLAN NOTE
- reviewed going to sleep and waking up at the same time everyday regardless of how much sleep she got the night before and not sleeping during the day as a strategy to change her sleep pattern  - not compliant with PAP- is inconvenient, PSG 2014 with mild sleep apnea

## 2025-08-25 ENCOUNTER — OFFICE VISIT (OUTPATIENT)
Dept: DIABETES | Facility: CLINIC | Age: 75
End: 2025-08-25
Payer: MEDICARE

## 2025-08-25 ENCOUNTER — PATIENT MESSAGE (OUTPATIENT)
Dept: DIABETES | Facility: CLINIC | Age: 75
End: 2025-08-25

## 2025-08-25 DIAGNOSIS — E66.9 OBESITY (BMI 30-39.9): ICD-10-CM

## 2025-08-25 DIAGNOSIS — E03.9 ACQUIRED HYPOTHYROIDISM: ICD-10-CM

## 2025-08-25 DIAGNOSIS — G47.33 OSA ON CPAP: ICD-10-CM

## 2025-08-25 DIAGNOSIS — E78.5 HYPERLIPIDEMIA ASSOCIATED WITH TYPE 2 DIABETES MELLITUS: ICD-10-CM

## 2025-08-25 DIAGNOSIS — E11.36 CATARACT ASSOCIATED WITH TYPE 2 DIABETES MELLITUS: ICD-10-CM

## 2025-08-25 DIAGNOSIS — E11.22 TYPE 2 DIABETES MELLITUS WITH STAGE 3A CHRONIC KIDNEY DISEASE, WITH LONG-TERM CURRENT USE OF INSULIN: Primary | ICD-10-CM

## 2025-08-25 DIAGNOSIS — E11.69 HYPERLIPIDEMIA ASSOCIATED WITH TYPE 2 DIABETES MELLITUS: ICD-10-CM

## 2025-08-25 DIAGNOSIS — N18.31 TYPE 2 DIABETES MELLITUS WITH STAGE 3A CHRONIC KIDNEY DISEASE, WITH LONG-TERM CURRENT USE OF INSULIN: Primary | ICD-10-CM

## 2025-08-25 DIAGNOSIS — Z79.4 TYPE 2 DIABETES MELLITUS WITH STAGE 3A CHRONIC KIDNEY DISEASE, WITH LONG-TERM CURRENT USE OF INSULIN: Primary | ICD-10-CM

## 2025-08-25 PROCEDURE — 95251 CONT GLUC MNTR ANALYSIS I&R: CPT | Mod: NDTC,,, | Performed by: PHYSICIAN ASSISTANT

## 2025-08-25 PROCEDURE — G2211 COMPLEX E/M VISIT ADD ON: HCPCS | Mod: 95,,, | Performed by: PHYSICIAN ASSISTANT

## 2025-08-25 PROCEDURE — 1157F ADVNC CARE PLAN IN RCRD: CPT | Mod: CPTII,95,, | Performed by: PHYSICIAN ASSISTANT

## 2025-08-25 PROCEDURE — 98006 SYNCH AUDIO-VIDEO EST MOD 30: CPT | Mod: 95,,, | Performed by: PHYSICIAN ASSISTANT

## 2025-09-02 DIAGNOSIS — E11.65 UNCONTROLLED TYPE 2 DIABETES MELLITUS WITH HYPERGLYCEMIA: ICD-10-CM

## 2025-09-03 RX ORDER — INSULIN GLARGINE 100 [IU]/ML
INJECTION, SOLUTION SUBCUTANEOUS
Qty: 15 ML | Refills: 3 | Status: SHIPPED | OUTPATIENT
Start: 2025-09-03